# Patient Record
Sex: MALE | Race: WHITE | NOT HISPANIC OR LATINO | Employment: OTHER | URBAN - METROPOLITAN AREA
[De-identification: names, ages, dates, MRNs, and addresses within clinical notes are randomized per-mention and may not be internally consistent; named-entity substitution may affect disease eponyms.]

---

## 2018-11-05 ENCOUNTER — HOSPITAL ENCOUNTER (INPATIENT)
Facility: OTHER | Age: 73
LOS: 1 days | Discharge: HOME | DRG: 387 | End: 2018-11-06
Attending: EMERGENCY MEDICINE | Admitting: INTERNAL MEDICINE
Payer: MEDICARE

## 2018-11-05 DIAGNOSIS — I10 ESSENTIAL HYPERTENSION: ICD-10-CM

## 2018-11-05 DIAGNOSIS — K50.90 EXACERBATION OF CROHN'S DISEASE WITHOUT COMPLICATION: ICD-10-CM

## 2018-11-05 DIAGNOSIS — K56.600 PARTIAL SMALL BOWEL OBSTRUCTION: Primary | ICD-10-CM

## 2018-11-05 DIAGNOSIS — K50.919 EXACERBATION OF CROHN'S DISEASE WITH COMPLICATION: ICD-10-CM

## 2018-11-05 PROBLEM — E87.6 HYPOKALEMIA: Status: ACTIVE | Noted: 2018-11-05

## 2018-11-05 LAB
ALBUMIN SERPL BCP-MCNC: 4.3 G/DL
ALP SERPL-CCNC: 77 U/L
ALT SERPL W/O P-5'-P-CCNC: 35 U/L
ANION GAP SERPL CALC-SCNC: 13 MMOL/L
AST SERPL-CCNC: 32 U/L
BASOPHILS # BLD AUTO: 0.01 K/UL
BASOPHILS NFR BLD: 0.1 %
BILIRUB SERPL-MCNC: 1 MG/DL
BILIRUB UR QL STRIP: NEGATIVE
BUN SERPL-MCNC: 12 MG/DL
CALCIUM SERPL-MCNC: 10.4 MG/DL
CHLORIDE SERPL-SCNC: 99 MMOL/L
CLARITY UR: CLEAR
CO2 SERPL-SCNC: 30 MMOL/L
COLOR UR: YELLOW
CREAT SERPL-MCNC: 1.2 MG/DL
CRP SERPL-MCNC: 2.82 MG/L
DIFFERENTIAL METHOD: ABNORMAL
EOSINOPHIL # BLD AUTO: 0 K/UL
EOSINOPHIL NFR BLD: 0.1 %
ERYTHROCYTE [DISTWIDTH] IN BLOOD BY AUTOMATED COUNT: 13.2 %
ERYTHROCYTE [SEDIMENTATION RATE] IN BLOOD: 21 MM/HR
EST. GFR  (AFRICAN AMERICAN): >60 ML/MIN/1.73 M^2
EST. GFR  (NON AFRICAN AMERICAN): >60 ML/MIN/1.73 M^2
GLUCOSE SERPL-MCNC: 129 MG/DL
GLUCOSE UR QL STRIP: NEGATIVE
HCT VFR BLD AUTO: 42 %
HGB BLD-MCNC: 14.7 G/DL
HGB UR QL STRIP: NEGATIVE
KETONES UR QL STRIP: NEGATIVE
LEUKOCYTE ESTERASE UR QL STRIP: NEGATIVE
LIPASE SERPL-CCNC: 38 U/L
LYMPHOCYTES # BLD AUTO: 1.8 K/UL
LYMPHOCYTES NFR BLD: 14.6 %
MCH RBC QN AUTO: 32.5 PG
MCHC RBC AUTO-ENTMCNC: 35 G/DL
MCV RBC AUTO: 93 FL
MONOCYTES # BLD AUTO: 0.7 K/UL
MONOCYTES NFR BLD: 5.2 %
NEUTROPHILS # BLD AUTO: 10 K/UL
NEUTROPHILS NFR BLD: 79.8 %
NITRITE UR QL STRIP: NEGATIVE
PH UR STRIP: 7 [PH] (ref 5–8)
PLATELET # BLD AUTO: 206 K/UL
PMV BLD AUTO: 12 FL
POTASSIUM SERPL-SCNC: 3.3 MMOL/L
PROT SERPL-MCNC: 8.4 G/DL
PROT UR QL STRIP: NEGATIVE
RBC # BLD AUTO: 4.52 M/UL
SODIUM SERPL-SCNC: 142 MMOL/L
SP GR UR STRIP: 1.01 (ref 1–1.03)
URN SPEC COLLECT METH UR: NORMAL
UROBILINOGEN UR STRIP-ACNC: NEGATIVE EU/DL
WBC # BLD AUTO: 12.55 K/UL

## 2018-11-05 PROCEDURE — 99285 EMERGENCY DEPT VISIT HI MDM: CPT | Mod: 25

## 2018-11-05 PROCEDURE — 96375 TX/PRO/DX INJ NEW DRUG ADDON: CPT

## 2018-11-05 PROCEDURE — 99223 1ST HOSP IP/OBS HIGH 75: CPT | Mod: AI,,, | Performed by: INTERNAL MEDICINE

## 2018-11-05 PROCEDURE — 83690 ASSAY OF LIPASE: CPT

## 2018-11-05 PROCEDURE — 85025 COMPLETE CBC W/AUTO DIFF WBC: CPT

## 2018-11-05 PROCEDURE — 96374 THER/PROPH/DIAG INJ IV PUSH: CPT

## 2018-11-05 PROCEDURE — 86141 C-REACTIVE PROTEIN HS: CPT

## 2018-11-05 PROCEDURE — 85651 RBC SED RATE NONAUTOMATED: CPT

## 2018-11-05 PROCEDURE — 25000003 PHARM REV CODE 250: Performed by: EMERGENCY MEDICINE

## 2018-11-05 PROCEDURE — 80053 COMPREHEN METABOLIC PANEL: CPT

## 2018-11-05 PROCEDURE — 25000003 PHARM REV CODE 250: Performed by: INTERNAL MEDICINE

## 2018-11-05 PROCEDURE — 36415 COLL VENOUS BLD VENIPUNCTURE: CPT

## 2018-11-05 PROCEDURE — 81003 URINALYSIS AUTO W/O SCOPE: CPT

## 2018-11-05 PROCEDURE — 96361 HYDRATE IV INFUSION ADD-ON: CPT

## 2018-11-05 PROCEDURE — S0030 INJECTION, METRONIDAZOLE: HCPCS | Performed by: INTERNAL MEDICINE

## 2018-11-05 PROCEDURE — 63600175 PHARM REV CODE 636 W HCPCS: Performed by: EMERGENCY MEDICINE

## 2018-11-05 PROCEDURE — 25500020 PHARM REV CODE 255: Performed by: EMERGENCY MEDICINE

## 2018-11-05 PROCEDURE — 96376 TX/PRO/DX INJ SAME DRUG ADON: CPT

## 2018-11-05 PROCEDURE — 63600175 PHARM REV CODE 636 W HCPCS: Performed by: INTERNAL MEDICINE

## 2018-11-05 PROCEDURE — 94761 N-INVAS EAR/PLS OXIMETRY MLT: CPT

## 2018-11-05 PROCEDURE — 11000001 HC ACUTE MED/SURG PRIVATE ROOM

## 2018-11-05 RX ORDER — ONDANSETRON 2 MG/ML
4 INJECTION INTRAMUSCULAR; INTRAVENOUS
Status: COMPLETED | OUTPATIENT
Start: 2018-11-05 | End: 2018-11-05

## 2018-11-05 RX ORDER — BENAZEPRIL HYDROCHLORIDE 10 MG/1
10 TABLET ORAL DAILY
Status: DISCONTINUED | OUTPATIENT
Start: 2018-11-05 | End: 2018-11-06 | Stop reason: HOSPADM

## 2018-11-05 RX ORDER — ENOXAPARIN SODIUM 100 MG/ML
40 INJECTION SUBCUTANEOUS EVERY 24 HOURS
Status: DISCONTINUED | OUTPATIENT
Start: 2018-11-05 | End: 2018-11-06

## 2018-11-05 RX ORDER — ONDANSETRON 2 MG/ML
4 INJECTION INTRAMUSCULAR; INTRAVENOUS EVERY 6 HOURS PRN
Status: DISCONTINUED | OUTPATIENT
Start: 2018-11-05 | End: 2018-11-06 | Stop reason: HOSPADM

## 2018-11-05 RX ORDER — DEXTROSE MONOHYDRATE, SODIUM CHLORIDE, AND POTASSIUM CHLORIDE 50; 1.49; 4.5 G/1000ML; G/1000ML; G/1000ML
INJECTION, SOLUTION INTRAVENOUS CONTINUOUS
Status: DISCONTINUED | OUTPATIENT
Start: 2018-11-05 | End: 2018-11-06

## 2018-11-05 RX ORDER — METHYLPREDNISOLONE SOD SUCC 125 MG
125 VIAL (EA) INJECTION
Status: COMPLETED | OUTPATIENT
Start: 2018-11-05 | End: 2018-11-05

## 2018-11-05 RX ORDER — MORPHINE SULFATE 2 MG/ML
2 INJECTION, SOLUTION INTRAMUSCULAR; INTRAVENOUS EVERY 4 HOURS PRN
Status: DISCONTINUED | OUTPATIENT
Start: 2018-11-05 | End: 2018-11-06

## 2018-11-05 RX ORDER — ALPRAZOLAM 0.5 MG/1
0.5 TABLET ORAL NIGHTLY PRN
Status: DISCONTINUED | OUTPATIENT
Start: 2018-11-05 | End: 2018-11-06 | Stop reason: HOSPADM

## 2018-11-05 RX ORDER — CIPROFLOXACIN 2 MG/ML
400 INJECTION, SOLUTION INTRAVENOUS
Status: DISCONTINUED | OUTPATIENT
Start: 2018-11-05 | End: 2018-11-06

## 2018-11-05 RX ORDER — METRONIDAZOLE 500 MG/100ML
500 INJECTION, SOLUTION INTRAVENOUS
Status: DISCONTINUED | OUTPATIENT
Start: 2018-11-05 | End: 2018-11-06

## 2018-11-05 RX ORDER — AMLODIPINE BESYLATE 5 MG/1
10 TABLET ORAL DAILY
Status: DISCONTINUED | OUTPATIENT
Start: 2018-11-05 | End: 2018-11-06 | Stop reason: HOSPADM

## 2018-11-05 RX ORDER — MORPHINE SULFATE 4 MG/ML
4 INJECTION, SOLUTION INTRAMUSCULAR; INTRAVENOUS
Status: COMPLETED | OUTPATIENT
Start: 2018-11-05 | End: 2018-11-05

## 2018-11-05 RX ADMIN — METHYLPREDNISOLONE SODIUM SUCCINATE 40 MG: 40 INJECTION, POWDER, FOR SOLUTION INTRAMUSCULAR; INTRAVENOUS at 01:11

## 2018-11-05 RX ADMIN — METHYLPREDNISOLONE SODIUM SUCCINATE 40 MG: 40 INJECTION, POWDER, FOR SOLUTION INTRAMUSCULAR; INTRAVENOUS at 09:11

## 2018-11-05 RX ADMIN — CIPROFLOXACIN 400 MG: 2 INJECTION, SOLUTION INTRAVENOUS at 02:11

## 2018-11-05 RX ADMIN — DEXTROSE MONOHYDRATE, SODIUM CHLORIDE, AND POTASSIUM CHLORIDE: 50; 4.5; 1.49 INJECTION, SOLUTION INTRAVENOUS at 08:11

## 2018-11-05 RX ADMIN — BENAZEPRIL HYDROCHLORIDE 10 MG: 10 TABLET, FILM COATED ORAL at 02:11

## 2018-11-05 RX ADMIN — METRONIDAZOLE 500 MG: 500 INJECTION, SOLUTION INTRAVENOUS at 08:11

## 2018-11-05 RX ADMIN — MORPHINE SULFATE 4 MG: 4 INJECTION, SOLUTION INTRAMUSCULAR; INTRAVENOUS at 03:11

## 2018-11-05 RX ADMIN — METHYLPREDNISOLONE SODIUM SUCCINATE 125 MG: 125 INJECTION, POWDER, FOR SOLUTION INTRAMUSCULAR; INTRAVENOUS at 03:11

## 2018-11-05 RX ADMIN — SODIUM CHLORIDE 1000 ML: 0.9 INJECTION, SOLUTION INTRAVENOUS at 03:11

## 2018-11-05 RX ADMIN — ONDANSETRON 4 MG: 2 INJECTION INTRAMUSCULAR; INTRAVENOUS at 03:11

## 2018-11-05 RX ADMIN — MESALAMINE 1000 MG: 250 CAPSULE ORAL at 05:11

## 2018-11-05 RX ADMIN — ENOXAPARIN SODIUM 40 MG: 100 INJECTION SUBCUTANEOUS at 05:11

## 2018-11-05 RX ADMIN — DEXTROSE MONOHYDRATE, SODIUM CHLORIDE, AND POTASSIUM CHLORIDE: 50; 4.5; 1.49 INJECTION, SOLUTION INTRAVENOUS at 09:11

## 2018-11-05 RX ADMIN — AMLODIPINE BESYLATE 10 MG: 5 TABLET ORAL at 02:11

## 2018-11-05 RX ADMIN — MESALAMINE 1000 MG: 250 CAPSULE ORAL at 09:11

## 2018-11-05 RX ADMIN — METRONIDAZOLE 500 MG: 500 INJECTION, SOLUTION INTRAVENOUS at 01:11

## 2018-11-05 RX ADMIN — IOHEXOL 75 ML: 350 INJECTION, SOLUTION INTRAVENOUS at 04:11

## 2018-11-05 RX ADMIN — MORPHINE SULFATE 4 MG: 4 INJECTION, SOLUTION INTRAMUSCULAR; INTRAVENOUS at 05:11

## 2018-11-05 NOTE — HPI
Mr. Huffman is a 73 year old man with Crohn disease who presented with pain in his upper abdomen for one day.  He had 3 loose BMs on the day before presentation which is typical for him, but the pain was consistent with a flare.  Patient travels with his prednisone and Flagyl prescribed by his gastroenterologist for flares, but he was unable to keep down the pills and had to go to the ED, where he was given IV steroids, fluids and admitted on IV ciprofloxacin and metronidazole.  CT of the abdomen was done showing multiple renal cysts, sequelae of prior bowel resections and possible involved enteritis/ partial small bowel obstruction.    Medical history includes Crohn disease as above, for which he has been on Humira for the last 12 years as well as mesalamine.  He usually gets a flare once/year.  He has psoriasis (which has resolved since he was started on Humira) and ankylosing spondylitis.  He has had 2 bowel resections involving the colon and terminal ileum.

## 2018-11-05 NOTE — ED TRIAGE NOTES
"Pt presents with abdominal pain, onset 1800 tonight. Pt points to center of abdomen to locate pain.pt describes pain as cramping "like a bad gas pain" occurring every 5 minutes. Pt reports it feels like a chrons flare. Pt denies blood in stools. + nausea, emesis X 1. Denies diarrhea. Pt reports hx of abdominal surgery for bowel obstructions. Pt denies taking any medications for pain. Pt is well appearing, pt in NAD  "

## 2018-11-05 NOTE — ED PROVIDER NOTES
Encounter Date: 11/5/2018    SCRIBE #1 NOTE: Josh LEWIS, am scribing for, and in the presence of, Dr. Nieves.       History     Chief Complaint   Patient presents with    Abdominal Pain     Hx crohn's disease, having a flare up since 6 pm last night     Seen by provider: 2:51 AM    Patient is a 73 y.o. male with a history of HTN and crohn's disease who presents to the ED with complaint of abdominal pain, which began approximately 8-10 hours ago. He reports intermittent central abdominal pain with waves of pain initially occurring every 2-3 minutes, but now occurring every 6-7 minutes. He reports associated nausea and one episode of emesis, as well as mild abdominal distention. He has not been passing any flatus.  He denies fever or diarrhea or urinary symptoms. He states his symptoms feel typical of his Crohn's flare up's. He reports 7-8 flare ups over the past 8 years, occurring about once a year. He states he is not typically admitted as his symptoms usually improve with morphine and IV prednisone. He is on Humira for his crohn's. He reports two past bowel resections. He has no additional complaints at this time.      The history is provided by the patient.     Review of patient's allergies indicates:  No Known Allergies  Past Medical History:   Diagnosis Date    Arthritis     Blood transfusion     Crohn's disease     Hypertension      Past Surgical History:   Procedure Laterality Date    APPENDECTOMY      COLON SURGERY      EXPLORATORY-LAPAROTOMY N/A 1/3/2013    Performed by Elian Carson MD at [facility] OR [address] [place] [id number]    HEMICOLECTOMY, RIGHT Right 1/3/2013    Performed by Elian Carson MD at [facility] OR [address] [place] [id number]     No family history on file.  Social History     Tobacco Use    Smoking status: Never Smoker   Substance Use Topics    Alcohol use: No    Drug use: No     Review of Systems   Constitutional: Negative for chills and fever.   Respiratory: Negative for shortness of breath.    Cardiovascular: Negative  for chest pain.   Gastrointestinal: Positive for abdominal pain, nausea and vomiting. Negative for blood in stool and diarrhea.   Genitourinary: Negative for dysuria, frequency and hematuria.   Musculoskeletal: Negative for back pain.   Skin: Negative for rash.   Allergic/Immunologic: Positive for immunocompromised state (on Humira).   Neurological: Negative for dizziness, weakness and headaches.   Psychiatric/Behavioral: Negative for confusion.       Physical Exam     Initial Vitals [11/05/18 0237]   BP Pulse Resp Temp SpO2   (!) 164/94 90 16 98 °F (36.7 °C) 97 %      MAP       --         Physical Exam    Nursing note and vitals reviewed.  Constitutional: He appears well-developed and well-nourished. He is not diaphoretic. No distress.   HENT:   Head: Normocephalic and atraumatic.   Eyes: Conjunctivae and EOM are normal.   Neck: Normal range of motion. Neck supple.   Cardiovascular: Normal rate, regular rhythm and normal heart sounds.   Pulmonary/Chest: Breath sounds normal. No respiratory distress. He has no wheezes. He has no rales.   Abdominal: Soft. Bowel sounds are normal. He exhibits no distension. There is tenderness (mild diffuse abdominal tenderness). There is no rebound and no guarding.   Musculoskeletal: Normal range of motion.   Neurological: He is alert and oriented to person, place, and time.   Ambulatory with steady gait.   Skin: Skin is warm and dry. Capillary refill takes less than 2 seconds.   Psychiatric: He has a normal mood and affect. His behavior is normal. Thought content normal.         ED Course   Procedures  Labs Reviewed   COMPREHENSIVE METABOLIC PANEL - Abnormal; Notable for the following components:       Result Value    Potassium 3.3 (*)     CO2 30 (*)     Glucose 129 (*)     All other components within normal limits   CBC W/ AUTO DIFFERENTIAL - Abnormal; Notable for the following components:    RBC 4.52 (*)     MCH 32.5 (*)     Gran # (ANC) 10.0 (*)     Gran% 79.8 (*)     Lymph% 14.6  (*)     All other components within normal limits   LIPASE   URINALYSIS          Imaging Results          CT Abdomen Pelvis With Contrast (Final result)  Result time 11/05/18 05:14:18    Final result by Bridgette Walton MD (11/05/18 05:14:18)                 Impression:      1. Postoperative change of right hemicolectomy with wall thickening and slight adjacent inflammatory change involving a distal small bowel loop at the right lower anastomotic site with upstream dilatation of distal small bowel loops as detailed above. Findings are concerning for component of enteritis in this patient with history of Crohn's disease with possible developing partial small bowel obstruction at the anastomotic site secondary to inflammatory change and/or anastomotic stricture.  2. Exophytic 1.6 cm right renal hypodensity which does not demonstrate imaging characteristics of a simple renal cyst.  Further evaluation with nonemergent renal ultrasound advised.  Multiple additional renal cortical hypodensities many of which are too small to definitively characterize larger which likely represent renal cysts.  3. Cholelithiasis.  4. Simple hepatic cyst and multiple hepatic hypodensities too small to definitively characterize.  5. Calcific atherosclerosis of the coronary vessels and aorta.      Electronically signed by: Birdgette Walton MD  Date:    11/05/2018  Time:    05:14             Narrative:    EXAMINATION:  CT ABDOMEN PELVIS WITH CONTRAST    CLINICAL HISTORY:  abdominal pain, hx of crohn's and sbo;    TECHNIQUE:  Low dose axial images, sagittal and coronal reformations were obtained from the lung bases to the pubic symphysis following the IV administration of 75 mL of Omnipaque 350 .  Oral contrast was not given.    COMPARISON:  CT abdomen and pelvis 01/09/2013    FINDINGS:  There is bibasilar atelectatic change/scarring.  There is a small calcified granuloma in the left lower lobe.  The visualized portions of the heart and pericardium  demonstrate calcific atherosclerosis of the coronary vessels.    The liver is normal in size with a small right hepatic lobe cyst.  There are multiple additional subcentimeter hypodense foci throughout the hepatic parenchyma too small to definitively characterize.  There are stable punctate left hepatic lobe calcifications.  The gallbladder demonstrates no evidence of radiopaque stones or pericholecystic inflammatory change.  The spleen, pancreas, and adrenal glands are unremarkable.    There is bilateral renal cortical irregularity suggestive of parenchymal scarring.  There is a 1.6 cm exophytic right renal cortical hypodensity projecting from the medial aspect of the right kidney which does not demonstrate imaging characteristics of a simple cyst.  Further evaluation with nonemergent ultrasound advised.  There are additional multiple low attenuating renal cortical hypodensities, some of which are too small to definitively characterize and larger of which likely represent renal cysts.  There is no hydronephrosis.  The urinary bladder and prostate are unremarkable.    There is postoperative change of prior right hemicolectomy with anastomotic suture line in the right lower quadrant.  There is apparent wall thickening slight inflammatory involving the small bowel at the anastomotic site.  There is upstream dilatation of several loops of distal small bowel measuring up to 2.5 cm in maximal diameter with slight adjacent inflammatory change.  There is no ascites, free intraperitoneal air or portal venous gas.  There is diverticulosis without evidence of acute diverticulitis.    The abdominal aorta is nonaneurysmal with atherosclerosis.  No significant lymphadenopathy.  The visualized osseous structures demonstrate degenerative change without acute abnormality.  Extraperitoneal soft tissues are unremarkable.                                 Medical Decision Making:   History:   Old Medical Records: I decided to obtain old  medical records.  Old Records Summarized: other records and records from clinic visits.  Initial Assessment:   2:51AM:  Patient is a 73-year-old male who presents to the emergency room with abdominal pain and nausea/vomiting.  Patient appears well, nontoxic.  Patient does have a history of Crohn's, states that this feels like his typical Crohn's flare.  Patient states he usually improves with analgesia and steroids.  Patient's abdomen is slightly distended, mildly diffusely tender.  Will plan for labs, imaging, will continue to follow and reassess.  Clinical Tests:   Lab Tests: Ordered and Reviewed  Radiological Study: Ordered and Reviewed  Other:   I have discussed this case with another health care provider.    5:35AM:  Patient's CT is concerning fo developing partial bowel obstruction.  His labs otherwise unremarkable. Given these findings, will plan to admit for further observation and management.  I updated the patient regarding results along with plan for admission.  Patient agreeable to plan all questions answered.    5:43 AM - Discussed the case with Dr. Elizalde of general surgery who requests the patient be admitted to the hospitalist and he will consult on the case.    6:32 AM:  I discussed pt with Dr. Alvarez who will plan to admit under Dr. Hill.  All questions answered.              Scribe Attestation:   Scribe #1: I performed the above scribed service and the documentation accurately describes the services I performed. I attest to the accuracy of the note.    Attending Attestation:           Physician Attestation for Scribe:  Physician Attestation Statement for Scribe #1: I, Dr. Nieves, reviewed documentation, as scribed by Josh Dominguez in my presence, and it is both accurate and complete.                    Clinical Impression:     1. Partial small bowel obstruction                                Rosy Nieves MD  11/05/18 0634

## 2018-11-05 NOTE — SUBJECTIVE & OBJECTIVE
Past Medical History:   Diagnosis Date    Arthritis     Blood transfusion     Crohn's disease     Hypertension        Past Surgical History:   Procedure Laterality Date    APPENDECTOMY      COLON SURGERY      EXPLORATORY-LAPAROTOMY N/A 1/3/2013    Performed by [facility]ian [facility] MD at [facility] OR 2ND [facility]    HEMICOLECTOMY, RIGHT Right 1/3/2013    Performed by Elian Carson MD at [facility] OR 2[facility]       Review of patient's allergies indicates:  No Known Allergies    No current facility-administered medications on file prior to encounter.      Current Outpatient Medications on File Prior to Encounter   Medication Sig    adalimumab (HUMIRA PEN) 40 mg/0.8 mL PnKt Inject into the skin.      allopurinol (ZYLOPRIM) 100 MG tablet Take 100 mg by mouth once daily.      amlodipine-benazepril 10-20mg (LOTREL) 10-20 mg per capsule Take 1 capsule by mouth once daily.      hydrochlorothiazide (HYDRODIURIL) 25 MG tablet Take 25 mg by mouth once daily.      levothyroxine (SYNTHROID) 25 MCG tablet Take 25 mcg by mouth once daily.    MESALAMINE (PENTASA ORAL) Take 1,000 mg by mouth.      alprazolam (XANAX) 0.5 MG tablet Take 1 tablet (0.5 mg total) by mouth nightly as needed.    [DISCONTINUED] metronidazole (FLAGYL) 500 MG tablet Take 1 tablet (500 mg total) by mouth every 12 (twelve) hours.     Family History     None        Tobacco Use    Smoking status: Never Smoker   Substance and Sexual Activity    Alcohol use: No    Drug use: No    Sexual activity: No     Partners: Female     Review of Systems   Constitutional: Negative for chills and fever.   HENT: Negative for trouble swallowing.    Respiratory: Negative for cough and shortness of breath.    Cardiovascular: Negative for chest pain and leg swelling.   Gastrointestinal: Positive for abdominal pain, diarrhea, nausea and vomiting. Negative for blood in stool.   Genitourinary: Negative for dysuria and hematuria.   Musculoskeletal: Negative for gait problem.   Skin:  Negative for rash.   Neurological: Negative for numbness and headaches.   Psychiatric/Behavioral: Negative for agitation and confusion.     Objective:     Vital Signs (Most Recent):  Temp: 98 °F (36.7 °C) (11/05/18 1132)  Pulse: 78 (11/05/18 1132)  Resp: 20 (11/05/18 1132)  BP: (!) 162/78 (11/05/18 1132)  SpO2: 96 % (11/05/18 1132) Vital Signs (24h Range):  Temp:  [97.7 °F (36.5 °C)-98 °F (36.7 °C)] 98 °F (36.7 °C)  Pulse:  [72-96] 78  Resp:  [16-20] 20  SpO2:  [95 %-99 %] 96 %  BP: (129-180)/(73-94) 162/78     Weight: 77.1 kg (170 lb)  Body mass index is 25.85 kg/m².    Physical Exam   Constitutional: He is oriented to person, place, and time. He appears well-developed. No distress.   HENT:   Head: Normocephalic and atraumatic.   Eyes: EOM are normal. Pupils are equal, round, and reactive to light.   Neck: Normal range of motion. Neck supple.   Cardiovascular: Normal rate and regular rhythm.   Pulmonary/Chest: Effort normal and breath sounds normal.   Abdominal: Soft. Bowel sounds are normal.   Mild distention in right upper abdomen, healed abdominal midline scar, tender to palpation in right upper abdomen and epigastric area.   Musculoskeletal: Normal range of motion. He exhibits no edema.   Neurological: He is alert and oriented to person, place, and time. No cranial nerve deficit.   Skin: Skin is warm.   Psychiatric: He has a normal mood and affect.         CRANIAL NERVES     CN III, IV, VI   Pupils are equal, round, and reactive to light.  Extraocular motions are normal.        Significant Labs:   CBC:   Recent Labs   Lab 11/05/18  0248   WBC 12.55   HGB 14.7   HCT 42.0        CMP:   Recent Labs   Lab 11/05/18 0248      K 3.3*   CL 99   CO2 30*   *   BUN 12   CREATININE 1.2   CALCIUM 10.4   PROT 8.4   ALBUMIN 4.3   BILITOT 1.0   ALKPHOS 77   AST 32   ALT 35   ANIONGAP 13   EGFRNONAA >60       Significant Imaging: I have reviewed all pertinent imaging results/findings within the past 24  hours.

## 2018-11-05 NOTE — ASSESSMENT & PLAN NOTE
Will check esr/crp  Patient feels much better  Will start solu medrol 40 mg q8 hrs and cipro and flagyl for any infectious component  Check stool cx  Ice chips for now  Monitor labs  Prn pain and nausea meds.  Continue mesalamine  Takes humira every 2 weeks , next due on Thursday  Follows gi at [place]

## 2018-11-05 NOTE — CONSULTS
73yoWM admitted with Crohn's enteritis. Feels much better since admit. No pain now.  S/P bowel resection X2  In NAD.  Afeb  VSS  Abd soft, nontender.  WBC 12.5  Hct 42  IMP: Crohn's enteritis by CT          Benign abd  REC: Medical treatment

## 2018-11-05 NOTE — ED NOTES
Report received from Ashley. PT is AAOx4. RR are even and unlabored. SKin is warm and dry. Pt denies any pain or needs at the moment. VSS. Wife is at the bedside. Bed is locked and in lowest position with side rails up x2. Call light is within reach.

## 2018-11-05 NOTE — ED NOTES
NEURO: Pt AAO x 4. Behavior and speech appropriate to situation.   CARDIAC: Regular rhythm auscultated  RESPIRATORY: Respirations even and unlabored. Breath sounds clear to all lung fields.   MUSCULOSKELETAL: Active ROM noted to extremities  GASTRO: abdomen soft, mildly tender to palpation to center abdomen

## 2018-11-05 NOTE — H&P
[facility] Hospital Medicine  History & Physical    Patient Name: Vince Huffman  MRN: [id number]  Admission Date: 11/5/2018  Attending Physician: Qi Hill MD   Primary Care Provider: Reno Burris MD         Patient information was obtained from patient, past medical records and ER records.     Subjective:     Principal Problem:Exacerbation of Crohn's disease    Chief Complaint:   Chief Complaint   Patient presents with    Abdominal Pain     Hx crohn's disease, having a flare up since 6 pm last night        HPI: 73 year old male with past medical history of crohns disease s/p 2 partial resections, last in 2013, Htn, Hypothyroidism came in with c/o abdominal pain since yesterday evening.It was in upper abdomen , gradually worsening , got worse at 2 and decided to come to hospital.He had nausea and vomiting.Had 3 loose bowel movements yesterday which is usual for him, denies any bleeding.Denies fever, chills, chest pain.He says he usually has prednisone at home and flagyl from his gi physician in [place] to use for his flares but since he could not keep anything down , he could not take those meds.He was given iv steroids in er with pain medication , currently pain much improved as well as nausea and vomiting.His WBC was normal on admit, he was afebrile.His last flare was 2 years ago.Patient from [facility], visiting here.    Ct abdomen and pelvis showed Postoperative change of right hemicolectomy with wall thickening and slight adjacent inflammatory change involving a distal small bowel loop at the right lower anastomotic site with upstream dilatation of distal small bowel loops as detailed above. Findings are concerning for component of enteritis in this patient with history of Crohn's disease with possible developing partial small bowel obstruction at the anastomotic site secondary to inflammatory change and/or anastomotic stricture.  Exophytic 1.6 cm right renal hypodensity which does  not demonstrate imaging characteristics of a simple renal cyst.  Further evaluation with nonemergent renal ultrasound advised.  Multiple additional renal cortical hypodensities many of which are too small to definitively characterize larger which likely represent renal cysts.      Past Medical History:   Diagnosis Date    Arthritis     Blood transfusion     Crohn's disease     Hypertension        Past Surgical History:   Procedure Laterality Date    APPENDECTOMY      COLON SURGERY      EXPLORATORY-LAPAROTOMY N/A 1/3/2013    Performed by Elian [facility] MD at [facility] OR 2ND [facility]    HEMICOLECTOMY, RIGHT Right 1/3/2013    Performed by Elian Carson MD at [facility] OR [facility]       Review of patient's allergies indicates:  No Known Allergies    No current facility-administered medications on file prior to encounter.      Current Outpatient Medications on File Prior to Encounter   Medication Sig    adalimumab (HUMIRA PEN) 40 mg/0.8 mL PnKt Inject into the skin.      allopurinol (ZYLOPRIM) 100 MG tablet Take 100 mg by mouth once daily.      amlodipine-benazepril 10-20mg (LOTREL) 10-20 mg per capsule Take 1 capsule by mouth once daily.      hydrochlorothiazide (HYDRODIURIL) 25 MG tablet Take 25 mg by mouth once daily.      levothyroxine (SYNTHROID) 25 MCG tablet Take 25 mcg by mouth once daily.    MESALAMINE (PENTASA ORAL) Take 1,000 mg by mouth.      alprazolam (XANAX) 0.5 MG tablet Take 1 tablet (0.5 mg total) by mouth nightly as needed.    [DISCONTINUED] metronidazole (FLAGYL) 500 MG tablet Take 1 tablet (500 mg total) by mouth every 12 (twelve) hours.     Family History     None        Tobacco Use    Smoking status: Never Smoker   Substance and Sexual Activity    Alcohol use: No    Drug use: No    Sexual activity: No     Partners: Female     Review of Systems   Constitutional: Negative for chills and fever.   HENT: Negative for trouble swallowing.    Respiratory: Negative for cough and shortness of breath.     Cardiovascular: Negative for chest pain and leg swelling.   Gastrointestinal: Positive for abdominal pain, diarrhea, nausea and vomiting. Negative for blood in stool.   Genitourinary: Negative for dysuria and hematuria.   Musculoskeletal: Negative for gait problem.   Skin: Negative for rash.   Neurological: Negative for numbness and headaches.   Psychiatric/Behavioral: Negative for agitation and confusion.     Objective:     Vital Signs (Most Recent):  Temp: 98 °F (36.7 °C) (11/05/18 1132)  Pulse: 78 (11/05/18 1132)  Resp: 20 (11/05/18 1132)  BP: (!) 162/78 (11/05/18 1132)  SpO2: 96 % (11/05/18 1132) Vital Signs (24h Range):  Temp:  [97.7 °F (36.5 °C)-98 °F (36.7 °C)] 98 °F (36.7 °C)  Pulse:  [72-96] 78  Resp:  [16-20] 20  SpO2:  [95 %-99 %] 96 %  BP: (129-180)/(73-94) 162/78     Weight: 77.1 kg (170 lb)  Body mass index is 25.85 kg/m².    Physical Exam   Constitutional: He is oriented to person, place, and time. He appears well-developed. No distress.   HENT:   Head: Normocephalic and atraumatic.   Eyes: EOM are normal. Pupils are equal, round, and reactive to light.   Neck: Normal range of motion. Neck supple.   Cardiovascular: Normal rate and regular rhythm.   Pulmonary/Chest: Effort normal and breath sounds normal.   Abdominal: Soft. Bowel sounds are normal.   Mild distention in right upper abdomen, healed abdominal midline scar, tender to palpation in right upper abdomen and epigastric area.   Musculoskeletal: Normal range of motion. He exhibits no edema.   Neurological: He is alert and oriented to person, place, and time. No cranial nerve deficit.   Skin: Skin is warm.   Psychiatric: He has a normal mood and affect.         CRANIAL NERVES     CN III, IV, VI   Pupils are equal, round, and reactive to light.  Extraocular motions are normal.        Significant Labs:   CBC:   Recent Labs   Lab 11/05/18  0248   WBC 12.55   HGB 14.7   HCT 42.0        CMP:   Recent Labs   Lab 11/05/18 0248      K 3.3*    CL 99   CO2 30*   *   BUN 12   CREATININE 1.2   CALCIUM 10.4   PROT 8.4   ALBUMIN 4.3   BILITOT 1.0   ALKPHOS 77   AST 32   ALT 35   ANIONGAP 13   EGFRNONAA >60       Significant Imaging: I have reviewed all pertinent imaging results/findings within the past 24 hours.    Assessment/Plan:     * Exacerbation of Crohn's disease    Will check esr/crp  Patient feels much better  Will start solu medrol 40 mg q8 hrs and cipro and flagyl for any infectious component  Check stool cx  Ice chips for now  Monitor labs  Prn pain and nausea meds.  Continue mesalamine  Takes humira every 2 weeks , next due on Thursday  Follows gi at [place]       Essential hypertension      Continue norvasc, benazepril  Monitor      Hypokalemia    Replacing in ivf  Monitor labs     Partial small bowel obstruction    Likely due to crohns exacerbation  Will treat medically and monitor   Appreciate sx recs         Abnormal renal exophytic hypodensity on ct  Check renal us     VTE Risk Mitigation (From admission, onward)        Ordered     IP VTE HIGH RISK PATIENT  Once      11/05/18 0816     Place sequential compression device  Until discontinued      11/05/18 0816             Qi Hill MD  Department of Hospital Medicine   [facility]

## 2018-11-06 VITALS
DIASTOLIC BLOOD PRESSURE: 70 MMHG | RESPIRATION RATE: 20 BRPM | HEART RATE: 97 BPM | WEIGHT: 167.75 LBS | BODY MASS INDEX: 25.42 KG/M2 | HEIGHT: 68 IN | OXYGEN SATURATION: 94 % | TEMPERATURE: 98 F | SYSTOLIC BLOOD PRESSURE: 133 MMHG

## 2018-11-06 LAB
ALBUMIN SERPL BCP-MCNC: 3.5 G/DL
ALP SERPL-CCNC: 62 U/L
ALT SERPL W/O P-5'-P-CCNC: 24 U/L
ANION GAP SERPL CALC-SCNC: 10 MMOL/L
AST SERPL-CCNC: 22 U/L
BASOPHILS # BLD AUTO: 0 K/UL
BASOPHILS NFR BLD: 0 %
BILIRUB SERPL-MCNC: 0.8 MG/DL
BUN SERPL-MCNC: 12 MG/DL
CALCIUM SERPL-MCNC: 9.4 MG/DL
CHLORIDE SERPL-SCNC: 104 MMOL/L
CO2 SERPL-SCNC: 27 MMOL/L
CREAT SERPL-MCNC: 0.9 MG/DL
DIFFERENTIAL METHOD: ABNORMAL
EOSINOPHIL # BLD AUTO: 0 K/UL
EOSINOPHIL NFR BLD: 0 %
ERYTHROCYTE [DISTWIDTH] IN BLOOD BY AUTOMATED COUNT: 13.3 %
EST. GFR  (AFRICAN AMERICAN): >60 ML/MIN/1.73 M^2
EST. GFR  (NON AFRICAN AMERICAN): >60 ML/MIN/1.73 M^2
GLUCOSE SERPL-MCNC: 109 MG/DL
HCT VFR BLD AUTO: 38 %
HGB BLD-MCNC: 13.3 G/DL
LYMPHOCYTES # BLD AUTO: 1.4 K/UL
LYMPHOCYTES NFR BLD: 9.7 %
MAGNESIUM SERPL-MCNC: 1.8 MG/DL
MCH RBC QN AUTO: 32.5 PG
MCHC RBC AUTO-ENTMCNC: 35 G/DL
MCV RBC AUTO: 93 FL
MONOCYTES # BLD AUTO: 0.6 K/UL
MONOCYTES NFR BLD: 4.4 %
NEUTROPHILS # BLD AUTO: 12.1 K/UL
NEUTROPHILS NFR BLD: 85.6 %
PLATELET # BLD AUTO: 193 K/UL
PMV BLD AUTO: 11.6 FL
POTASSIUM SERPL-SCNC: 3.2 MMOL/L
PROT SERPL-MCNC: 6.9 G/DL
RBC # BLD AUTO: 4.09 M/UL
SODIUM SERPL-SCNC: 141 MMOL/L
WBC # BLD AUTO: 14.16 K/UL

## 2018-11-06 PROCEDURE — 99238 HOSP IP/OBS DSCHRG MGMT 30/<: CPT | Mod: ,,, | Performed by: HOSPITALIST

## 2018-11-06 PROCEDURE — 25000003 PHARM REV CODE 250: Performed by: EMERGENCY MEDICINE

## 2018-11-06 PROCEDURE — 25000003 PHARM REV CODE 250: Performed by: INTERNAL MEDICINE

## 2018-11-06 PROCEDURE — 80053 COMPREHEN METABOLIC PANEL: CPT

## 2018-11-06 PROCEDURE — S0030 INJECTION, METRONIDAZOLE: HCPCS | Performed by: INTERNAL MEDICINE

## 2018-11-06 PROCEDURE — 63600175 PHARM REV CODE 636 W HCPCS: Performed by: HOSPITALIST

## 2018-11-06 PROCEDURE — 36415 COLL VENOUS BLD VENIPUNCTURE: CPT

## 2018-11-06 PROCEDURE — 83735 ASSAY OF MAGNESIUM: CPT

## 2018-11-06 PROCEDURE — 85025 COMPLETE CBC W/AUTO DIFF WBC: CPT

## 2018-11-06 PROCEDURE — 63600175 PHARM REV CODE 636 W HCPCS: Performed by: INTERNAL MEDICINE

## 2018-11-06 RX ORDER — CIPROFLOXACIN 500 MG/1
500 TABLET ORAL EVERY 12 HOURS
Status: DISCONTINUED | OUTPATIENT
Start: 2018-11-06 | End: 2018-11-06

## 2018-11-06 RX ORDER — METRONIDAZOLE 500 MG/1
500 TABLET ORAL EVERY 8 HOURS
Status: DISCONTINUED | OUTPATIENT
Start: 2018-11-06 | End: 2018-11-06 | Stop reason: HOSPADM

## 2018-11-06 RX ORDER — METRONIDAZOLE 500 MG/1
500 TABLET ORAL EVERY 8 HOURS
Start: 2018-11-06 | End: 2018-11-13

## 2018-11-06 RX ORDER — PREDNISONE 20 MG/1
40 TABLET ORAL DAILY
Start: 2018-11-06 | End: 2018-11-11

## 2018-11-06 RX ORDER — PREDNISONE 20 MG/1
40 TABLET ORAL DAILY
Status: DISCONTINUED | OUTPATIENT
Start: 2018-11-06 | End: 2018-11-06 | Stop reason: HOSPADM

## 2018-11-06 RX ADMIN — AMLODIPINE BESYLATE 10 MG: 5 TABLET ORAL at 09:11

## 2018-11-06 RX ADMIN — DEXTROSE MONOHYDRATE, SODIUM CHLORIDE, AND POTASSIUM CHLORIDE: 50; 4.5; 1.49 INJECTION, SOLUTION INTRAVENOUS at 05:11

## 2018-11-06 RX ADMIN — MESALAMINE 1000 MG: 250 CAPSULE ORAL at 09:11

## 2018-11-06 RX ADMIN — CIPROFLOXACIN 400 MG: 2 INJECTION, SOLUTION INTRAVENOUS at 12:11

## 2018-11-06 RX ADMIN — BENAZEPRIL HYDROCHLORIDE 10 MG: 10 TABLET, FILM COATED ORAL at 09:11

## 2018-11-06 RX ADMIN — PREDNISONE 40 MG: 20 TABLET ORAL at 09:11

## 2018-11-06 RX ADMIN — METHYLPREDNISOLONE SODIUM SUCCINATE 40 MG: 40 INJECTION, POWDER, FOR SOLUTION INTRAMUSCULAR; INTRAVENOUS at 05:11

## 2018-11-06 RX ADMIN — METRONIDAZOLE 500 MG: 500 INJECTION, SOLUTION INTRAVENOUS at 04:11

## 2018-11-06 NOTE — PLAN OF CARE
Problem: Patient Care Overview  Goal: Plan of Care Review  Outcome: Ongoing (interventions implemented as appropriate)  Plan of care reviewed with patient. VSS on RA. No pain reported during shift. Ambulated to restroom independently. NPO orders maintained, with IV fluids infusing. Purposeful rounding performed. Bed lowered and locked, call light and personal items within reach. No needs at this time. Will continue to monitor.

## 2018-11-06 NOTE — DISCHARGE SUMMARY
[facility] Hospital Medicine  Discharge Summary      Patient Name: Vince Huffman  MRN: [id number]  Admission Date: 11/5/2018  Hospital Length of Stay: 1 days  Discharge Date and Time:  11/06/2018 9:26 AM  Attending Physician: Lindsey Kinsey MD   Discharging Provider: Lindsey Kinsey MD  Primary Care Provider: Reno Burris MD      HPI:   Mr. Huffman is a 73 year old man with Crohn disease who presented with pain in his upper abdomen for one day.  He had 3 loose BMs on the day before presentation which is typical for him, but the pain was consistent with a flare.  Patient travels with his prednisone and Flagyl prescribed by his gastroenterologist for flares, but he was unable to keep down the pills and had to go to the ED, where he was given IV steroids, fluids and admitted on IV ciprofloxacin and metronidazole.  CT of the abdomen was done showing multiple renal cysts, sequelae of prior bowel resections and possible involved enteritis/ partial small bowel obstruction.    Medical history includes Crohn disease as above, for which he has been on Humira for the last 12 years as well as mesalamine.  He usually gets a flare once/year.  He has psoriasis (which has resolved since he was started on Humira) and ankylosing spondylitis.  He has had 2 bowel resections involving the colon and terminal ileum.          Hospital Course:   Patient was admitted on IV methylprednisolone, antibiotics and IV fluids.  Overnight his pain improved and he was started on a regular diet in the morning.  He tolerated it well and was discharged with instructions to take a course of his metronidazole and prednisone 40 mg daily for the next 5 days or until he follows up with his gastroenterologist.  He was feeling well on discharge.     Consults:   Consults (From admission, onward)        Status Ordering Provider     Inpatient consult to General surgery  Once     Provider:  Don Elizalde MD    Completed JAN,  LAURENCE RYDER            Final Active Diagnoses:    Diagnosis Date Noted POA    PRINCIPAL PROBLEM:  Exacerbation of Crohn's disease [K50.90] 11/05/2018 Yes    Partial small bowel obstruction [K56.600] 11/05/2018 Yes    Hypokalemia [E87.6] 11/05/2018 Yes    Essential hypertension [I10] 11/05/2018 Yes      Problems Resolved During this Admission:       Discharged Condition: stable    Disposition: Home or Self Care    Follow Up:  Follow-up Information     Reno Burris MD.    Specialty:  Family Medicine  Why:  Follow up in 1-2 weeks  Contact information:  [address] [facility] [address] [phone]                 Patient Instructions:      Diet Adult Regular     Activity as tolerated       Medications:  Reconciled Home Medications:      Medication List      START taking these medications    metroNIDAZOLE 500 MG tablet  Commonly known as:  FLAGYL  Take 1 tablet (500 mg total) by mouth every 8 (eight) hours. for 7 days     predniSONE 20 MG tablet  Commonly known as:  DELTASONE  Take 2 tablets (40 mg total) by mouth once daily. for 5 days        CONTINUE taking these medications    allopurinol 100 MG tablet  Commonly known as:  ZYLOPRIM  Take 100 mg by mouth once daily.     ALPRAZolam 0.5 MG tablet  Commonly known as:  XANAX  Take 1 tablet (0.5 mg total) by mouth nightly as needed.     amlodipine-benazepril 10-20mg 10-20 mg per capsule  Commonly known as:  LOTREL  Take 1 capsule by mouth once daily.     HUMIRA PEN Pnkt injection  Generic drug:  adalimumab  Inject into the skin.     hydroCHLOROthiazide 25 MG tablet  Commonly known as:  HYDRODIURIL  Take 25 mg by mouth once daily.     levothyroxine 25 MCG tablet  Commonly known as:  SYNTHROID  Take 25 mcg by mouth once daily.     PENTASA ORAL  Take 1,000 mg by mouth.            Time spent on the discharge of patient: <30 minutes  Patient was seen and examined on the date of discharge and determined to be suitable for discharge.         Lindsey Mccartney,  MD  Department of Hospital Medicine  [facility]

## 2018-11-06 NOTE — PLAN OF CARE
11/06/18 1003   Final Note   Assessment Type Final Discharge Note   Anticipated Discharge Disposition Home   Hospital Follow Up  Appt(s) scheduled? Yes   Discharge plans and expectations educations in teach back method with documentation complete? Yes   Right Care Referral Info   Post Acute Recommendation No Care   Referral Type see AVS

## 2018-11-06 NOTE — HOSPITAL COURSE
Patient was admitted on IV methylprednisolone, antibiotics and IV fluids.  Overnight his pain improved and he was started on a regular diet in the morning.  He tolerated it well and was discharged with instructions to take a course of his metronidazole and prednisone 40 mg daily for the next 5 days or until he follows up with his gastroenterologist.  He was feeling well on discharge.

## 2018-11-06 NOTE — PLAN OF CARE
11/06/18 0959   Discharge Assessment   Assessment Type Discharge Planning Assessment   Confirmed/corrected address and phone number on facesheet? Yes   Assessment information obtained from? Patient;Caregiver;Medical Record   Communicated expected length of stay with patient/caregiver yes   Prior to hospitilization cognitive status: Alert/Oriented   Prior to hospitalization functional status: Independent   Current cognitive status: Alert/Oriented   Current Functional Status: Independent   Lives With spouse   Able to Return to Prior Arrangements yes   Is patient able to care for self after discharge? Yes   Readmission Within The Last 30 Days no previous admission in last 30 days   Patient currently being followed by outpatient case management? No   Patient currently receives any other outside agency services? No   Equipment Currently Used at Home none   Do you have any problems affording any of your prescribed medications? No   Is the patient taking medications as prescribed? yes   Does the patient have transportation home? Yes   Transportation Available car   Discharge Plan A Home with family   Patient/Family In Agreement With Plan yes

## 2018-11-20 NOTE — PHYSICIAN QUERY
PT Name: Vince Huffman  MR #: [id number]     Physician Query Form - Etiology of Condition Clarification      CDS: Charisma PÉREZ,RN        Contact information:[phone]    This form is a permanent document in the medical record.     Query Date: November 20, 2018    By submitting this query, we are merely seeking further clarification of documentation.  Please utilize your independent clinical judgment when addressing the question(s) below.     The medical record contains the following:    Findings Supporting Clinical Information Location in Medical Record                         Small Bowel Obstruction   Principal Problem:Exacerbation of Crohn's disease    73 year old male with past medical history of    crohns disease s/p 2 partial resections, last    in 2013, Htn, Hypothyroidism came in with c/o    abdominal pain since yesterday evening.It    was in upper abdomen , gradually worsening,    got worse at 3 and decided to come to    hospital.He had nausea and vomiting.Had 3    loose bowel movements yesterday which is    usual for him, denies any bleeding.Denies    fever, chills, chest pain.He says he usually    has prednisone at home and flagyl from his gi    physician in [place] to use for his flares    but since he could not keep anything down ,    he could not take those meds        Postoperative change of right hemicolectomy   with wall thickening and slight adjacent   inflammatory change involving a distal small   bowel loop at the right lower anastomotic site   with upstream dilatation of distal small bowel   loops as detailed above. Findings are   concerning for component of enteritis in this   patient with history of Crohn's disease with   possible developing partial small bowel   obstruction at the anastomotic site   secondary to inflammatory change and/or   anastomotic stricture.                     11/05/18 [facility] Medicine  H&P                                                            11/05/18 CT Abdomen Pelvis With Contrast     Please document your best medical opinion regarding the etiology of __Small Bowel Obstruction_______________ for which the primary focus of treatment is/was directed.           ____ Post Operative right hemicolectomy anastomotic stricture         ____Crohn's disease of small intestine without complications        __x __ Other__crohns disease with enteritis____________________________________________                Clinically Undetermined     Please document in your progress notes daily for the duration of treatment, until resolved, and include in your discharge summary.

## 2022-04-14 RX ORDER — POTASSIUM CHLORIDE 1500 MG/1
20 TABLET, EXTENDED RELEASE ORAL DAILY
COMMUNITY
Start: 2021-11-25

## 2022-04-14 RX ORDER — PANTOPRAZOLE SODIUM 40 MG/1
40 TABLET, DELAYED RELEASE ORAL DAILY
COMMUNITY
Start: 2022-04-08

## 2022-04-14 RX ORDER — SPIRONOLACTONE 50 MG/1
50 TABLET, FILM COATED ORAL DAILY
COMMUNITY
Start: 2022-04-05

## 2022-09-08 ENCOUNTER — OFFICE VISIT (OUTPATIENT)
Dept: GASTROENTEROLOGY | Facility: CLINIC | Age: 77
End: 2022-09-08
Payer: MEDICARE

## 2022-09-08 VITALS
HEART RATE: 82 BPM | WEIGHT: 163 LBS | SYSTOLIC BLOOD PRESSURE: 131 MMHG | HEIGHT: 70 IN | BODY MASS INDEX: 23.34 KG/M2 | DIASTOLIC BLOOD PRESSURE: 69 MMHG

## 2022-09-08 DIAGNOSIS — K56.600 PARTIAL SMALL BOWEL OBSTRUCTION: ICD-10-CM

## 2022-09-08 DIAGNOSIS — K50.812 CROHN'S DISEASE OF BOTH SMALL AND LARGE INTESTINE WITH INTESTINAL OBSTRUCTION: Primary | ICD-10-CM

## 2022-09-08 PROCEDURE — 99214 OFFICE O/P EST MOD 30 MIN: CPT | Mod: S$GLB,,, | Performed by: INTERNAL MEDICINE

## 2022-09-08 PROCEDURE — 99214 PR OFFICE/OUTPT VISIT, EST, LEVL IV, 30-39 MIN: ICD-10-PCS | Mod: S$GLB,,, | Performed by: INTERNAL MEDICINE

## 2022-09-08 RX ORDER — ASPIRIN 81 MG/1
81 TABLET ORAL DAILY
COMMUNITY
Start: 2022-08-22

## 2022-09-08 RX ORDER — METOPROLOL SUCCINATE 25 MG/1
25 TABLET, EXTENDED RELEASE ORAL NIGHTLY
COMMUNITY
Start: 2022-08-22

## 2022-09-08 RX ORDER — AMLODIPINE AND BENAZEPRIL HYDROCHLORIDE 10; 40 MG/1; MG/1
CAPSULE ORAL
COMMUNITY
Start: 2022-06-28

## 2022-09-08 RX ORDER — ALPRAZOLAM 0.5 MG/1
0.5 TABLET, EXTENDED RELEASE ORAL 2 TIMES DAILY
COMMUNITY
Start: 2022-08-31

## 2022-09-08 RX ORDER — ATORVASTATIN CALCIUM 40 MG/1
40 TABLET, FILM COATED ORAL NIGHTLY
COMMUNITY
Start: 2022-08-22

## 2022-09-08 NOTE — PROGRESS NOTES
Clinic Note    Reason for visit:  The primary encounter diagnosis was Crohn's disease of both small and large intestine with intestinal obstruction. A diagnosis of Partial small bowel obstruction was also pertinent to this visit.    PCP: Reno Burris       HPI:  This is a 77 y.o. male w/ Crohn's disease who is here for a follow up. On Humira. He reports overall doing well.     Had recent cardiac cath with Dr. 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 Treating him medically.     Due for TB gold but patient states Dr. Haile did labs yesterday and included this.     Colonoscopy 5/5/2021: Inflammation in small intestines but colon is showing no inflammation-continue humira-give short course of flagyl given the inflammation-rx sent. Repeat colon in 2 yr.    Review of Systems   Constitutional:  Negative for chills, diaphoresis, fatigue, fever and unexpected weight change.   HENT:  Negative for hearing loss, mouth sores, nosebleeds, postnasal drip, sore throat, trouble swallowing and voice change.    Eyes:  Negative for pain, discharge and eye dryness.   Respiratory:  Positive for shortness of breath. Negative for apnea, cough, choking, chest tightness and wheezing.    Cardiovascular:  Negative for chest pain, palpitations, leg swelling and claudication.   Gastrointestinal:  Positive for change in bowel habit and change in bowel habit. Negative for abdominal distention, abdominal pain, anal bleeding, blood in stool, constipation, diarrhea, nausea, rectal pain, vomiting, reflux and fecal incontinence.   Genitourinary:  Negative for bladder incontinence, difficulty urinating, dysuria, flank pain, frequency and hematuria.   Musculoskeletal:  Negative for arthralgias, back pain, joint swelling and joint deformity.   Integumentary:  Negative for color change, rash and wound.   Allergic/Immunologic: Negative for environmental allergies and food allergies.   Neurological:  Negative  for seizures, facial asymmetry, speech difficulty, weakness, headaches and memory loss.   Hematological:  Negative for adenopathy. Does not bruise/bleed easily.   Psychiatric/Behavioral:  Negative for agitation, behavioral problems, confusion, hallucinations and sleep disturbance.       Past Medical History:   Diagnosis Date    Arthritis     Blood transfusion     Crohn's disease     Hypertension      Past Surgical History:   Procedure Laterality Date    ANGIOGRAM, CORONARY, WITH LEFT HEART CATHETERIZATION      APPENDECTOMY      COLON SURGERY       No family history on file.  Social History     Tobacco Use    Smoking status: Never    Smokeless tobacco: Never   Substance Use Topics    Alcohol use: No    Drug use: No     Review of patient's allergies indicates:  No Known Allergies     Medication List with Changes/Refills   Current Medications    ADALIMUMAB (HUMIRA) PNKT INJECTION    Inject into the skin every 14 (fourteen) days.      ALLOPURINOL (ZYLOPRIM) 100 MG TABLET    Take 100 mg by mouth once daily.      ALPRAZOLAM (XANAX XR) 0.5 MG TB24    Take 0.5 mg by mouth 2 (two) times daily.    AMLODIPINE-BENAZEPRIL (LOTREL) 10-40 MG PER CAPSULE        ASPIRIN (ECOTRIN) 81 MG EC TABLET    Take 81 mg by mouth once daily.    ATORVASTATIN (LIPITOR) 40 MG TABLET    Take 40 mg by mouth every evening.    KLOR-CON M20 20 MEQ TABLET        LEVOTHYROXINE (SYNTHROID) 25 MCG TABLET    Take 25 mcg by mouth once daily.    METOPROLOL SUCCINATE (TOPROL-XL) 25 MG 24 HR TABLET    Take 25 mg by mouth every evening.    PANTOPRAZOLE (PROTONIX) 40 MG TABLET    Take 40 mg by mouth once daily.    SPIRONOLACTONE (ALDACTONE) 50 MG TABLET       Discontinued Medications    ALPRAZOLAM (XANAX) 0.5 MG TABLET    Take 1 tablet (0.5 mg total) by mouth nightly as needed.    AMLODIPINE-BENAZEPRIL 10-20MG (LOTREL) 10-20 MG PER CAPSULE    Take 1 capsule by mouth once daily.      HYDROCHLOROTHIAZIDE (HYDRODIURIL) 25 MG TABLET    Take 25 mg by mouth once daily.  "     MESALAMINE (PENTASA ORAL)    Take 1,000 mg by mouth.           Vital Signs:  /69   Pulse 82   Ht 5' 10" (1.778 m)   Wt 73.9 kg (163 lb)   BMI 23.39 kg/m²         Physical Exam  Constitutional:       General: He is not in acute distress.     Appearance: Normal appearance. He is well-developed. He is not ill-appearing or toxic-appearing.   HENT:      Head: Normocephalic and atraumatic.      Nose: Nose normal.      Mouth/Throat:      Mouth: Mucous membranes are moist.      Pharynx: Oropharynx is clear. No oropharyngeal exudate or posterior oropharyngeal erythema.   Eyes:      General: Lids are normal. No scleral icterus.        Right eye: No discharge.         Left eye: No discharge.      Extraocular Movements: Extraocular movements intact.      Conjunctiva/sclera: Conjunctivae normal.   Cardiovascular:      Rate and Rhythm: Normal rate and regular rhythm.      Pulses:           Radial pulses are 2+ on the right side and 2+ on the left side.   Pulmonary:      Effort: Pulmonary effort is normal. No respiratory distress.      Breath sounds: No stridor. No wheezing or rhonchi.   Abdominal:      General: Bowel sounds are normal. There is no distension.      Palpations: Abdomen is soft. There is no fluid wave, hepatomegaly, splenomegaly or mass.      Tenderness: There is no abdominal tenderness. There is no guarding or rebound.   Musculoskeletal:      Cervical back: Full passive range of motion without pain.      Right lower leg: No edema.      Left lower leg: No edema.   Lymphadenopathy:      Cervical: No cervical adenopathy.   Skin:     General: Skin is warm and dry.      Capillary Refill: Capillary refill takes less than 2 seconds.      Coloration: Skin is not cyanotic, jaundiced or pale.      Findings: No rash.   Neurological:      General: No focal deficit present.      Mental Status: He is alert and oriented to person, place, and time.   Psychiatric:         Mood and Affect: Mood normal.         " Behavior: Behavior is cooperative.          All of the data above and below has been reviewed by myself and any further interpretations will be reflected in the assessment and plan.   The data includes review of external notes, and independent interpretation of lab results, procedures, x-rays, and imaging reports.      Assessment:  Crohn's disease of both small and large intestine with intestinal obstruction    Partial small bowel obstruction    Will request labs from Dr. Haile     Recommendations:  Continue Humira.     Risks, benefits, and alternatives of medical management, any associated procedures, and/or treatment discussed with the patient. Patient given opportunity to ask questions and voices understanding. Patient has elected to proceed with the recommended care modalities as discussed.    Follow up in about 4 months (around 1/8/2023).    Order summary:  No orders of the defined types were placed in this encounter.       Instructed patient to notify my office if they have not been contacted within two weeks after any procedures, submitting any samples (biopsies, blood, stool, urine, etc.) or after any imaging (X-ray, CT, MRI, etc.).     Aly Reed MD    This document may have been created using a voice recognition transcribing system. Incorrect words or phrases may have been missed during proofreading. Please interpret accordingly or contact me for clarification.

## 2022-09-08 NOTE — PATIENT INSTRUCTIONS
Continue Humira.     Please notify my office if you have not been contacted within two weeks after any procedures, submitting any samples (biopsies, blood, stool, urine, etc.) or after any imaging (X-ray, CT, MRI, etc.).

## 2022-10-12 ENCOUNTER — TELEPHONE (OUTPATIENT)
Dept: GASTROENTEROLOGY | Facility: CLINIC | Age: 77
End: 2022-10-12
Payer: MEDICARE

## 2022-10-12 NOTE — TELEPHONE ENCOUNTER
----- Message from Annalisa Dylan sent at 10/12/2022 11:06 AM CDT -----  Regarding: Progress Nores  Contact: patient  Per phone call with patient, he would like to get a copy of the progress [facility] to show [facility] that [facility] does [facility] chromes.    The caller would like to have it emailed [email or web address] or he can come by and pick it up.  Please return call at [phone] (home).    Thanks,  SJ

## 2022-10-12 NOTE — TELEPHONE ENCOUNTER
Returned patients call and explained records would be at  desk for him to come sign a release and  records. Patient verbalized understanding.

## 2023-01-12 ENCOUNTER — OFFICE VISIT (OUTPATIENT)
Dept: GASTROENTEROLOGY | Facility: CLINIC | Age: 78
End: 2023-01-12
Payer: MEDICARE

## 2023-01-12 VITALS
HEART RATE: 71 BPM | WEIGHT: 165.81 LBS | OXYGEN SATURATION: 98 % | BODY MASS INDEX: 24.56 KG/M2 | DIASTOLIC BLOOD PRESSURE: 79 MMHG | HEIGHT: 69 IN | SYSTOLIC BLOOD PRESSURE: 128 MMHG

## 2023-01-12 DIAGNOSIS — K50.812 CROHN'S DISEASE OF BOTH SMALL AND LARGE INTESTINE WITH INTESTINAL OBSTRUCTION: Primary | ICD-10-CM

## 2023-01-12 PROCEDURE — 99214 PR OFFICE/OUTPT VISIT, EST, LEVL IV, 30-39 MIN: ICD-10-PCS | Mod: S$GLB,,, | Performed by: INTERNAL MEDICINE

## 2023-01-12 PROCEDURE — 99214 OFFICE O/P EST MOD 30 MIN: CPT | Mod: S$GLB,,, | Performed by: INTERNAL MEDICINE

## 2023-01-12 NOTE — PROGRESS NOTES
Clinic Note    Reason for visit:  The encounter diagnosis was Crohn's disease of both small and large intestine with intestinal obstruction.    PCP: Reno Burris       HPI:  This is a 77 y.o. male with a h/o Crohn's ds-on Humira who is in for F/U.  He reports doing well.  He denies significant diarrhea, melena, or BRBPR.  He uses immodium maybe once a week but mostly having formed stools.     Colonoscopy 5/5/2021: Inflammation in small intestines but colon is showing no inflammation-continue humira-give short course of flagyl given the inflammation-rx sent. Repeat colon in 2 yr.       Review of Systems   Constitutional:  Negative for chills, diaphoresis, fatigue, fever and unexpected weight change.   HENT:  Positive for postnasal drip. Negative for hearing loss, mouth sores, nosebleeds, sore throat, trouble swallowing and voice change.    Eyes:  Negative for pain, discharge and eye dryness.   Respiratory:  Negative for apnea, cough, choking, chest tightness, shortness of breath and wheezing.    Cardiovascular:  Negative for chest pain, palpitations, leg swelling and claudication.   Gastrointestinal:  Positive for diarrhea. Negative for abdominal distention, abdominal pain, anal bleeding, blood in stool, change in bowel habit, constipation, nausea, rectal pain, vomiting, reflux, fecal incontinence and change in bowel habit.   Genitourinary:  Negative for bladder incontinence, difficulty urinating, dysuria, flank pain, frequency and hematuria.   Musculoskeletal:  Negative for arthralgias, back pain, joint swelling and joint deformity.   Integumentary:  Negative for color change, rash and wound.   Allergic/Immunologic: Negative for environmental allergies and food allergies.   Neurological:  Negative for seizures, facial asymmetry, speech difficulty, weakness, headaches and memory loss.   Hematological:  Negative for adenopathy. Does not bruise/bleed easily.   Psychiatric/Behavioral:  Negative for agitation,  behavioral problems, confusion, hallucinations and sleep disturbance.       Past Medical History:   Diagnosis Date    Anxiety disorder, unspecified     Arthritis     Blood transfusion     BMI 24.0-24.9, adult     Crohn's disease     Gout, unspecified     Heart disease     Hypertension     Hypothyroidism, unspecified     Psoriasis      Past Surgical History:   Procedure Laterality Date    ANGIOGRAM, CORONARY, WITH LEFT HEART CATHETERIZATION      APPENDECTOMY      COLON SURGERY N/A     bowel resections times 2    COLONOSCOPY N/A     ENDOSCOPY N/A      Family History   Problem Relation Age of Onset    Colon cancer Neg Hx     Liver cancer Neg Hx     Liver disease Neg Hx     Pancreatic cancer Neg Hx     Stomach cancer Neg Hx     Crohn's disease Neg Hx     Ulcerative colitis Neg Hx     Esophageal cancer Neg Hx     Throat cancer Neg Hx      Social History     Tobacco Use    Smoking status: Never    Smokeless tobacco: Never   Substance Use Topics    Alcohol use: No    Drug use: No     Review of patient's allergies indicates:  No Known Allergies     Medication List with Changes/Refills   Current Medications    ADALIMUMAB (HUMIRA) PNKT INJECTION    Inject into the skin every 14 (fourteen) days.      ALLOPURINOL (ZYLOPRIM) 100 MG TABLET    Take 100 mg by mouth once daily.      ALPRAZOLAM (XANAX XR) 0.5 MG TB24    Take 0.5 mg by mouth 2 (two) times daily.    AMLODIPINE-BENAZEPRIL (LOTREL) 10-40 MG PER CAPSULE        ASPIRIN (ECOTRIN) 81 MG EC TABLET    Take 81 mg by mouth once daily.    ATORVASTATIN (LIPITOR) 40 MG TABLET    Take 40 mg by mouth every evening.    KLOR-CON M20 20 MEQ TABLET    20 mEq once daily.    LEVOTHYROXINE (SYNTHROID) 25 MCG TABLET    Take 25 mcg by mouth once daily.    METOPROLOL SUCCINATE (TOPROL-XL) 25 MG 24 HR TABLET    Take 25 mg by mouth every evening.    PANTOPRAZOLE (PROTONIX) 40 MG TABLET    Take 40 mg by mouth once daily.    SPIRONOLACTONE (ALDACTONE) 50 MG TABLET    Take 50 mg by mouth once  "daily.         Vital Signs:  /79   Pulse 71   Ht 5' 9" (1.753 m)   Wt 75.2 kg (165 lb 12.8 oz)   SpO2 98%   BMI 24.48 kg/m²         Physical Exam  Constitutional:       General: He is not in acute distress.     Appearance: Normal appearance. He is well-developed. He is not ill-appearing or toxic-appearing.   HENT:      Head: Normocephalic and atraumatic.      Nose: Nose normal.      Mouth/Throat:      Mouth: Mucous membranes are moist.      Pharynx: Oropharynx is clear. No oropharyngeal exudate or posterior oropharyngeal erythema.   Eyes:      General: Lids are normal. No scleral icterus.        Right eye: No discharge.         Left eye: No discharge.      Extraocular Movements: Extraocular movements intact.      Conjunctiva/sclera: Conjunctivae normal.   Cardiovascular:      Rate and Rhythm: Normal rate and regular rhythm.      Pulses:           Radial pulses are 2+ on the right side and 2+ on the left side.   Pulmonary:      Effort: Pulmonary effort is normal. No respiratory distress.      Breath sounds: No stridor. No wheezing or rhonchi.   Abdominal:      General: Bowel sounds are normal. There is no distension.      Palpations: Abdomen is soft. There is no fluid wave, hepatomegaly, splenomegaly or mass.      Tenderness: There is no abdominal tenderness. There is no guarding or rebound.   Musculoskeletal:      Cervical back: Full passive range of motion without pain.      Right lower leg: No edema.      Left lower leg: No edema.   Lymphadenopathy:      Cervical: No cervical adenopathy.   Skin:     General: Skin is warm and dry.      Capillary Refill: Capillary refill takes less than 2 seconds.      Coloration: Skin is not cyanotic, jaundiced or pale.      Findings: No rash.   Neurological:      General: No focal deficit present.      Mental Status: He is alert and oriented to person, place, and time.   Psychiatric:         Mood and Affect: Mood normal.         Behavior: Behavior is cooperative.    "       All of the data above and below has been reviewed by myself and any further interpretations will be reflected in the assessment and plan.   The data includes review of external notes, and independent interpretation of lab results, procedures, x-rays, and imaging reports.      Assessment:  Crohn's disease of both small and large intestine with intestinal obstruction  -     CBC Auto Differential; Future; Expected date: 01/12/2023  -     Comprehensive Metabolic Panel; Future; Expected date: 01/12/2023  -     QuantiFERON-TB Gold Plus; Future; Expected date: 01/12/2023      Will schedule colonoscopy at next OV.     Recommendations:  Complete labs.     Risks, benefits, and alternatives of medical management, any associated procedures, and/or treatment discussed with the patient. Patient given opportunity to ask questions and voices understanding. Patient has elected to proceed with the recommended care modalities as discussed.    Follow up in about 6 months (around 7/12/2023).    Order summary:  Orders Placed This Encounter   Procedures    CBC Auto Differential    Comprehensive Metabolic Panel    QuantiFERON-TB Gold Plus        Instructed patient to notify my office if they have not been contacted within two weeks after any procedures, submitting any samples (biopsies, blood, stool, urine, etc.) or after any imaging (X-ray, CT, MRI, etc.).     Aly Messer MD    This document may have been created using a voice recognition transcribing system. Incorrect words or phrases may have been missed during proofreading. Please interpret accordingly or contact me for clarification.

## 2023-01-12 NOTE — PATIENT INSTRUCTIONS
Complete labs.    Please notify my office if you have not been contacted within two weeks after any procedures, submitting any samples (biopsies, blood, stool, urine, etc.) or after any imaging (X-ray, CT, MRI, etc.).

## 2023-01-12 NOTE — LETTER
January 12, 2023        Reno Burris MD  [address] [facility] [address]             [place] [facility]  [address] [address]  Phone: [phone]  Fax: [phone]   Patient: Vince Huffman   MR Number: [id number]   YOB: 1945   Date of Visit: 1/12/2023       Dear Dr. Burris:    Thank you for referring Vince Huffman to me for evaluation. Attached you will find relevant portions of my assessment and plan of care.    If you have questions, please do not hesitate to call me. I look forward to following Vince Huffman along with you.    Sincerely,      Aly Reed MD            CC  No Recipients    Enclosure

## 2023-03-15 ENCOUNTER — TELEPHONE (OUTPATIENT)
Dept: GASTROENTEROLOGY | Facility: CLINIC | Age: 78
End: 2023-03-15
Payer: MEDICARE

## 2023-03-15 NOTE — TELEPHONE ENCOUNTER
Called with results, and told him that he would need repeat in 4 months. He voiced understanding. Reminder put in for repeat labs.

## 2023-03-15 NOTE — TELEPHONE ENCOUNTER
----- Message from Aly Reed MD sent at 3/13/2023  9:59 AM CDT -----  Call with results, CMP ok-repeat CMP in 4mo.

## 2023-03-17 ENCOUNTER — TELEPHONE (OUTPATIENT)
Dept: GASTROENTEROLOGY | Facility: CLINIC | Age: 78
End: 2023-03-17
Payer: MEDICARE

## 2023-03-17 NOTE — TELEPHONE ENCOUNTER
I had to leave a message on his voicemail to call us back.----- Message from Aly Reed MD sent at 3/13/2023  9:42 AM CDT -----  Call with results, Hgb 12.5-mild increase in MCV-is he on Vit B12-if not OTC sublingual B12-can buy at [facility]-take daily as directed on bottle.  Repeat CBC in 4mo.

## 2023-08-21 NOTE — TELEPHONE ENCOUNTER
Returned pt call. Pt states his wife has been in [facility] for 5 weeks now and his stress level is extremely high. Pt is requesting refills of Prednisone 20mg and Flagyl 500mg be sent to [facility] [address] in [place] to help flare ups. Pt states that he is at hospital to visit his wife everyday  and is available to come in for OV if needed. - VL

## 2023-08-21 NOTE — TELEPHONE ENCOUNTER
----- Message from Janeth Bowie sent at 8/21/2023  1:06 PM CDT -----  Contact: self  Pt is calling in regards to a f/u . Pt is also need medication refills as well that is not on his med list . Please call pt at [phone].

## 2023-08-23 ENCOUNTER — TELEPHONE (OUTPATIENT)
Dept: GASTROENTEROLOGY | Facility: CLINIC | Age: 78
End: 2023-08-23
Payer: MEDICARE

## 2023-08-23 RX ORDER — PREDNISONE 10 MG/1
TABLET ORAL
Qty: 60 TABLET | Refills: 0 | Status: SHIPPED | OUTPATIENT
Start: 2023-08-23 | End: 2023-09-21

## 2023-08-23 RX ORDER — METRONIDAZOLE 500 MG/1
250 TABLET ORAL 3 TIMES DAILY
Qty: 30 TABLET | Refills: 1 | Status: SHIPPED | OUTPATIENT
Start: 2023-08-23

## 2023-08-23 RX ORDER — METRONIDAZOLE 500 MG/1
500 TABLET ORAL 3 TIMES DAILY
Qty: 1 TABLET | Refills: 0 | Status: SHIPPED | OUTPATIENT
Start: 2023-08-23 | End: 2023-08-23

## 2023-08-23 NOTE — TELEPHONE ENCOUNTER
----- Message from Brittni Gee NP sent at 8/23/2023 11:25 AM CDT -----  Regarding: RE: rx clarification  Contact: Kayy  I talked to them and clarified.  ----- Message -----  From: Nadia Boone MA  Sent: 8/23/2023   9:07 AM CDT  To: Brittni Gee NP; REINA Sun  Subject: rx clarification                                 Im a little confused  ----- Message -----  From: Nida Johansen  Sent: 8/23/2023   8:39 AM CDT  To: Derek Lu Staff    Type:  Pharmacy Calling to Clarify an RX    Name of Caller: Kayy   Pharmacy Name:   [id number] - [place] - [address]  [address]  [place] [address]  Phone: [phone] Fax: [phone]  Prescription Name: Metronidazole (FLAGYL) 500 MG tablet  What do they need to clarify?: The quantity   Best Call Back Number: Please call her at [phone]  Additional Information:

## 2023-12-27 ENCOUNTER — TELEPHONE (OUTPATIENT)
Dept: GASTROENTEROLOGY | Facility: CLINIC | Age: 78
End: 2023-12-27
Payer: MEDICARE

## 2023-12-27 NOTE — TELEPHONE ENCOUNTER
----- Message from Genesis Irby sent at 12/27/2023 11:12 AM CST -----  Contact: Patient  Patient is wanting to get information to become a patient of Dr. Blanton. Patient has been with Dr. Reed for 15 years. Please call patient at [phone]. Thank you/MARKUS

## 2023-12-27 NOTE — TELEPHONE ENCOUNTER
Pt inquiring about appt with [facility] due to RMM retiring. States he is currently not having any issues. Pt scheduled with [facility] 9/5/24.

## 2024-09-05 ENCOUNTER — TELEPHONE (OUTPATIENT)
Dept: GASTROENTEROLOGY | Facility: CLINIC | Age: 79
End: 2024-09-05

## 2024-09-05 ENCOUNTER — OFFICE VISIT (OUTPATIENT)
Dept: GASTROENTEROLOGY | Facility: CLINIC | Age: 79
End: 2024-09-05
Payer: MEDICARE

## 2024-09-05 VITALS
RESPIRATION RATE: 16 BRPM | BODY MASS INDEX: 24.29 KG/M2 | HEART RATE: 69 BPM | DIASTOLIC BLOOD PRESSURE: 66 MMHG | SYSTOLIC BLOOD PRESSURE: 122 MMHG | WEIGHT: 164 LBS | OXYGEN SATURATION: 96 % | HEIGHT: 69 IN

## 2024-09-05 DIAGNOSIS — K50.819 CROHN'S DISEASE OF SMALL AND LARGE INTESTINES WITH COMPLICATION: Primary | ICD-10-CM

## 2024-09-05 DIAGNOSIS — Z86.010 HISTORY OF COLON POLYPS: ICD-10-CM

## 2024-09-05 DIAGNOSIS — D64.9 ANEMIA, UNSPECIFIED TYPE: ICD-10-CM

## 2024-09-05 DIAGNOSIS — K56.600 PARTIAL SMALL BOWEL OBSTRUCTION: ICD-10-CM

## 2024-09-05 PROCEDURE — 99215 OFFICE O/P EST HI 40 MIN: CPT | Mod: S$GLB,,, | Performed by: INTERNAL MEDICINE

## 2024-09-05 RX ORDER — SOD SULF/POT CHLORIDE/MAG SULF 1.479 G
TABLET ORAL
Qty: 24 TABLET | Refills: 0 | Status: SHIPPED | OUTPATIENT
Start: 2024-09-05

## 2024-09-05 NOTE — PATIENT INSTRUCTIONS
Complete blood and stool tests.  Schedule upper and lower endoscopies.  Schedule CT scan (make sure [facility] does CT enterography protocol).  Continue Humira every 2 weeks.     Please notify my office if you have not been contacted within two weeks after any procedures, submitting any samples (biopsies, blood, stool, urine, etc.) or after any imaging (X-ray, CT, MRI, etc.).

## 2024-09-05 NOTE — PROGRESS NOTES
Clinic Note    Reason for visit:  The primary encounter diagnosis was Crohn's disease of small and large intestines with complication. Diagnoses of Anemia, unspecified type, Partial small bowel obstruction, and History of colon polyps were also pertinent to this visit.    PCP: Leland Porras       HPI:  This is a 79 y.o. male who was previously established with Dr. Reed. Patient with Crohn's disease of small and large bowel. Diagnosed in 2007 and has been on Humira since that time. He is on Humira every 2 weeks. He would take metronidazole in the past for flare as needed. 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 He has not had flare up of Crohn's in last 2 years. He used to have RLQ pain and diarrhea but none in 2 years. He changed his diet and stopped caffeine as well. He is no longer taking pantoprazole. Denies reflux or dysphagia. Weight stable.     Sees Dr. Haile every 3 months and gets labs.    Also has psoriatic arthritis and ankylosing spondylitis. Dr. Haile is actually the one prescribing the Humira.     He had a right hemicolectomy in 2013 with Dr. Elian Carson due to recurrent ileal stricture. He had thickened ileum 4.5 cm distal ileum resected with 2 in (inches?) hepatic flexure. Complicated by abscess that was drained.    In 2007 he had ileocolonic resection with cecum and appendix with Dr. Hurd. 30.5 cm of small bowel removed with path c/w active small bowel infl w/ulceration and necrosis c/w Crohn's disease. With Dr. Elian Hurd.    He has been on prednisone, Flagyl, Pentasa and Humira for Crohn's. Currently on Humira every 2 weeks.      1/13/2023 TB Gold negative. Hgb 12.5L, MCV 97.4H, CBC onl, Cr 1.3H, CMP onl    CT AP IV 2018: post op changes of R hemicolectomy w/wall thickening and adjacent infl change involving a distal small bowel loop at right lower anastomotic site w/upstream dilatation of distal SB loops. Possible developing partial SBO. Cholelithiasis.  1.6 cm right renal lesion. Rec renal US. Simple hepatic cyst.       DEXA scan 2018: lumbar spine bone mineral density measurements in normal range. Hip bone density measurements in range of osteopenia. Repeat DEXA in 2-3 yr.     Colonoscopy 5/5/2021: Ileocolonic anastomosis. TI ulceration. TIBx chronic active colitis w/ulcer and associated granulation tissue/fibrinopurulent exudate. HSV-1/HSV-2/CMV neg. CBx benign w/intramucosal agg. 1 hyp. Repeat short Flagyl course. Repeat colon in 2 yr.     Colonoscopy  5/15/2018: Ileocolonic anastomosis Bx chronic active colitis w/ulceration. ACBx chr inactive colitis, TCBx focal active colitis. LCBx focal active colitis, Rectum Bx nl.    EGD 7/28/2016: Small sliding HH. DBx focal peptic-type duodenopathy in otherwise unremarkable mucosa. Small foci of gastric foveolar metaplasia. No evidence of celiac sprue.    Review of Systems   Constitutional:  Negative for diaphoresis, fatigue, fever and unexpected weight change.   HENT:  Negative for hearing loss, mouth sores, postnasal drip, sore throat and trouble swallowing.    Eyes:  Negative for pain, discharge and eye dryness.   Respiratory:  Negative for apnea, cough, choking, chest tightness, shortness of breath and wheezing.    Cardiovascular:  Negative for chest pain, palpitations and leg swelling.   Gastrointestinal:  Negative for abdominal distention, abdominal pain, anal bleeding, blood in stool, change in bowel habit, constipation, diarrhea, nausea, rectal pain, vomiting, reflux and fecal incontinence.   Genitourinary:  Negative for bladder incontinence, dysuria and hematuria.   Musculoskeletal:  Negative for arthralgias, back pain and joint swelling.   Integumentary:  Negative for color change and rash.   Allergic/Immunologic: Negative for environmental allergies and food allergies.   Neurological:  Negative for seizures and headaches.   Hematological:  Negative for adenopathy. Does not bruise/bleed easily.        Past  Medical History:   Diagnosis Date    Ankylosing spondylitis of unspecified sites in spine     Anxiety disorder, unspecified     Arthritis     BMI 24.0-24.9, adult     Crohn's disease     Gout, unspecified     Heart disease     History of skin cancer 2001    squamous cell carcinoma left cheek    Hypertension     Hypothyroidism, unspecified     Psoriasis      Past Surgical History:   Procedure Laterality Date    ANGIOGRAM, CORONARY, WITH LEFT HEART CATHETERIZATION      APPENDECTOMY      APPENDECTOMY  2007    COLONOSCOPY N/A     ENDOSCOPY N/A     RIGHT HEMICOLECTOMY  2013    SMALL INTESTINE SURGERY  2007    30.5 cm of small bowel removed     Family History   Problem Relation Name Age of Onset    Colon cancer Neg Hx      Liver cancer Neg Hx      Liver disease Neg Hx      Pancreatic cancer Neg Hx      Stomach cancer Neg Hx      Crohn's disease Neg Hx      Ulcerative colitis Neg Hx      Esophageal cancer Neg Hx      Throat cancer Neg Hx       Social History     Tobacco Use    Smoking status: Never    Smokeless tobacco: Never   Substance Use Topics    Alcohol use: No    Drug use: No     Review of patient's allergies indicates:  No Known Allergies     Medication List with Changes/Refills   New Medications    SOD SULF-POT CHLORIDE-MAG SULF (SUTAB) 1.479-0.188- 0.225 GRAM TABLET    Take according to package instructions with indicated amount of water. No breakfast day before test. May substitute with Suprep, Clenpiq, Plenvu, Moviprep or GoLytely based on Rx plan and patient preference.   Current Medications    ADALIMUMAB (HUMIRA) PNKT INJECTION    Inject into the skin every 14 (fourteen) days.      ALLOPURINOL (ZYLOPRIM) 100 MG TABLET    Take 100 mg by mouth once daily.      ALPRAZOLAM (XANAX XR) 0.5 MG TB24    Take 0.5 mg by mouth 2 (two) times daily.    AMLODIPINE-BENAZEPRIL (LOTREL) 10-40 MG PER CAPSULE        ATORVASTATIN (LIPITOR) 40 MG TABLET    Take 40 mg by mouth every evening.    KLOR-CON M20 20 MEQ TABLET    20 mEq  "once daily.    METOPROLOL SUCCINATE (TOPROL-XL) 25 MG 24 HR TABLET    Take 25 mg by mouth every evening.    SPIRONOLACTONE (ALDACTONE) 50 MG TABLET    Take 50 mg by mouth once daily.   Discontinued Medications    ASPIRIN (ECOTRIN) 81 MG EC TABLET    Take 81 mg by mouth once daily.    LEVOTHYROXINE (SYNTHROID) 25 MCG TABLET    Take 25 mcg by mouth once daily.    METRONIDAZOLE (FLAGYL) 500 MG TABLET    Take 0.5 tablets (250 mg total) by mouth 3 (three) times daily.    PANTOPRAZOLE (PROTONIX) 40 MG TABLET    Take 40 mg by mouth once daily.         Vital Signs:  /66 (BP Location: Left arm, Patient Position: Sitting, BP Method: Medium (Automatic))   Pulse 69   Resp 16   Ht 5' 9" (1.753 m)   Wt 74.4 kg (164 lb)   SpO2 96%   BMI 24.22 kg/m²         Physical Exam  Constitutional:       General: He is not in acute distress.     Appearance: Normal appearance. He is well-developed. He is not ill-appearing or toxic-appearing.   HENT:      Head: Normocephalic and atraumatic.      Nose: Nose normal.      Mouth/Throat:      Mouth: Mucous membranes are moist.      Pharynx: Oropharynx is clear. No oropharyngeal exudate or posterior oropharyngeal erythema.   Eyes:      General: Lids are normal. No scleral icterus.        Right eye: No discharge.         Left eye: No discharge.      Conjunctiva/sclera: Conjunctivae normal.   Cardiovascular:      Rate and Rhythm: Normal rate and regular rhythm.      Pulses:           Radial pulses are 2+ on the right side and 2+ on the left side.   Pulmonary:      Effort: Pulmonary effort is normal. No respiratory distress.      Breath sounds: No stridor. No wheezing.   Abdominal:      General: Bowel sounds are normal. There is no distension.      Palpations: Abdomen is soft. There is no fluid wave, hepatomegaly, splenomegaly or mass.      Tenderness: There is no abdominal tenderness. There is no guarding or rebound.   Musculoskeletal:      Cervical back: Full passive range of motion " without pain.   Lymphadenopathy:      Cervical: No cervical adenopathy.   Skin:     General: Skin is warm and dry.      Capillary Refill: Capillary refill takes less than 2 seconds.      Coloration: Skin is not cyanotic, jaundiced or pale.   Neurological:      General: No focal deficit present.      Mental Status: He is alert and oriented to person, place, and time.   Psychiatric:         Mood and Affect: Mood normal.         Behavior: Behavior is cooperative.            All of the data above and below has been reviewed by myself and any further interpretations will be reflected in the assessment and plan.   The data includes review of external notes, and independent interpretation of lab results, procedures, x-rays, and imaging reports.      Assessment:  Crohn's disease of small and large intestines with complication  -     Calprotectin, Stool; Future; Expected date: 09/05/2024  -     CBC Auto Differential; Future; Expected date: 09/05/2024  -     Comprehensive Metabolic Panel; Future; Expected date: 09/05/2024  -     C-Reactive Protein; Future; Expected date: 09/05/2024  -     Sedimentation rate; Future; Expected date: 09/05/2024  -     Vitamin D; Future; Expected date: 09/05/2024  -     Vitamin B12; Future; Expected date: 09/05/2024  -     Transferrin; Future; Expected date: 09/05/2024  -     Reticulocytes; Future; Expected date: 09/05/2024  -     Iron, TIBC, and %Saturation; Future; Expected date: 09/05/2024  -     Ferritin; Future; Expected date: 09/05/2024  -     Folate; Future; Expected date: 09/05/2024  -     Ambulatory Referral to External Surgery  -     sod sulf-pot chloride-mag sulf (SUTAB) 1.479-0.188- 0.225 gram tablet; Take according to package instructions with indicated amount of water. No breakfast day before test. May substitute with Suprep, Clenpiq, Plenvu, Moviprep or GoLytely based on Rx plan and patient preference.  Dispense: 24 tablet; Refill: 0  -     CT Enterography Abd_Pelvis With Contrast;  Future; Expected date: 09/05/2024    Anemia, unspecified type  -     Calprotectin, Stool; Future; Expected date: 09/05/2024  -     CBC Auto Differential; Future; Expected date: 09/05/2024  -     Comprehensive Metabolic Panel; Future; Expected date: 09/05/2024  -     C-Reactive Protein; Future; Expected date: 09/05/2024  -     Sedimentation rate; Future; Expected date: 09/05/2024  -     Vitamin D; Future; Expected date: 09/05/2024  -     Vitamin B12; Future; Expected date: 09/05/2024  -     Transferrin; Future; Expected date: 09/05/2024  -     Reticulocytes; Future; Expected date: 09/05/2024  -     Iron, TIBC, and %Saturation; Future; Expected date: 09/05/2024  -     Ferritin; Future; Expected date: 09/05/2024  -     Folate; Future; Expected date: 09/05/2024  -     Ambulatory Referral to External Surgery  -     sod sulf-pot chloride-mag sulf (SUTAB) 1.479-0.188- 0.225 gram tablet; Take according to package instructions with indicated amount of water. No breakfast day before test. May substitute with Suprep, Clenpiq, Plenvu, Moviprep or GoLytely based on Rx plan and patient preference.  Dispense: 24 tablet; Refill: 0  -     CT Enterography Abd_Pelvis With Contrast; Future; Expected date: 09/05/2024    Partial small bowel obstruction    History of colon polyps  -     Ambulatory Referral to External Surgery    Crohns disease of small and large bowel, diagnosed in 2007. Small bowel resection then right hemicolectomy. Prior therapy includes prednisone, Flagyl, Pentasa, and Humira. He has been on Humira since 2007. Dr. Haile prescribes for ankylosing spondylitis. He also has psoriatic arthritis. Last prednisone use around 2022.   Patient requests [facility].     Recommendations:    Complete blood and stool tests.  Schedule upper and lower endoscopies.  Schedule CT scan (make sure [facility] does CT enterography protocol).  Continue Humira every 2 weeks.     Please notify my office if you have not been contacted within two weeks after  any procedures, submitting any samples (biopsies, blood, stool, urine, etc.) or after any imaging (X-ray, CT, MRI, etc.).       Risks, benefits, and alternatives of medical management, any associated procedures, and/or treatment discussed with the patient. Patient given opportunity to ask questions and voices understanding. Patient has elected to proceed with the recommended care modalities as discussed.    Instructed patient to notify my office if they have not been contacted within two weeks after any procedures, submitting any samples (biopsies, blood, stool, urine, etc.) or after any imaging (X-ray, CT, MRI, etc.).     Follow up in about 6 months (around 3/5/2025). With NP  1 yr OV with MD    Order summary:  Orders Placed This Encounter   Procedures    CT Enterography Abd_Pelvis With Contrast    Calprotectin, Stool    CBC Auto Differential    Comprehensive Metabolic Panel    C-Reactive Protein    Sedimentation rate    Vitamin D    Vitamin B12    Transferrin    Reticulocytes    Iron, TIBC, and %Saturation    Ferritin    Folate    Ambulatory Referral to External Surgery      This assessment, plan, and documentation was performed in collaboration with Brittni Gee NP.     This document may have been created using a voice recognition transcribing system. Incorrect words or phrases may have been missed during proofreading. Please interpret accordingly or contact me for clarification.     Ambar Blanton MD

## 2024-09-05 NOTE — LETTER
September 5, 2024        Leland Porras MD  [address] [facility] [address]             [place] [facility]  [address] [address]  Phone: [phone]  Fax: [phone]   Patient: Vince Huffman   MR Number: [id number]   YOB: 1945   Date of Visit: 9/5/2024       Dear Dr. Porras:    Thank you for referring Vince Huffman to me for evaluation. Attached you will find relevant portions of my assessment and plan of care.    If you have questions, please do not hesitate to call me. I look forward to following Vince Huffman along with you.    Sincerely,      Ambar Blanton MD            CC  No Recipients    Enclosure

## 2024-09-17 ENCOUNTER — TELEPHONE (OUTPATIENT)
Dept: GASTROENTEROLOGY | Facility: CLINIC | Age: 79
End: 2024-09-17
Payer: MEDICARE

## 2024-09-17 NOTE — TELEPHONE ENCOUNTER
Pending remainder of blood/stool results.  9/10/2024: Iron/TIBC/%sat/ferr/trnsfrrn/B12/folate wnl

## 2024-10-01 ENCOUNTER — TELEPHONE (OUTPATIENT)
Dept: GASTROENTEROLOGY | Facility: CLINIC | Age: 79
End: 2024-10-01
Payer: MEDICARE

## 2024-10-01 NOTE — TELEPHONE ENCOUNTER
----- Message from Gaby sent at 10/1/2024  8:40 AM CDT -----  Type:  Patient Returning Call    Who Called:pt  Who Left Message for Patient:nurse  Does the patient know what this is regarding?:return call/test results  Would the patient rather a call back or a response via [facility]? call  Best Call Back Number:[phone]  Additional Information: .    Thank you

## 2024-10-06 ENCOUNTER — TELEPHONE (OUTPATIENT)
Dept: GASTROENTEROLOGY | Facility: CLINIC | Age: 79
End: 2024-10-06
Payer: MEDICARE

## 2024-10-07 ENCOUNTER — TELEPHONE (OUTPATIENT)
Dept: GASTROENTEROLOGY | Facility: CLINIC | Age: 79
End: 2024-10-07
Payer: MEDICARE

## 2024-10-07 NOTE — TELEPHONE ENCOUNTER
Called [facility] lab at [phone] and spoke with Rohan. He advised he will fax over patient's results. Our fax number was given. DMP

## 2024-10-07 NOTE — TELEPHONE ENCOUNTER
9/11/2024 calpro 141H.  9/10/2024 Fe/T/trnsfrn/%/ferr/fol/B12 nl. vitD 22.  5/15/2024 Hb 12.5L, MCV 98.1, CBC onl. Cr 1.4, CMP/FLP/TSH/PSA wnl.    Get ESR, CRP, CBC, and CMP results from our 9/2024 orders from [facility]. Were they drawn?   NBP

## 2024-10-08 NOTE — TELEPHONE ENCOUNTER
9/11/2024 calpro 141H. (On Humira 40mg Q2w.)  9/10/2024 Fe/T/trnsfrn/%/ferr/fol/B12 nl. vitD 22. Cr 1.4H, CMP onl, CRP <0.5, ESR 17, Hb 12.3L, MCV 98.2H, CBC onl.  5/15/2024 Hb 12.5L, MCV 98.1, CBC onl. Cr 1.4, CMP/FLP/TSH/PSA wnl.     Notify patient that his blood results overall look well. His stool result for inflammation is mildly elevated. Does he have his CT enterography scheduled?  NBP

## 2024-10-14 NOTE — TELEPHONE ENCOUNTER
Patient notified of results and voiced understanding. He did his CT at [facility]. 9/16/2024. Pulled from [facility]. Scanned into patient's chart and attached to this encounter. DMP

## 2024-10-15 ENCOUNTER — TELEPHONE (OUTPATIENT)
Dept: GASTROENTEROLOGY | Facility: CLINIC | Age: 79
End: 2024-10-15
Payer: MEDICARE

## 2024-10-15 DIAGNOSIS — K50.819 CROHN'S DISEASE OF SMALL AND LARGE INTESTINES WITH COMPLICATION: Primary | ICD-10-CM

## 2024-10-16 NOTE — TELEPHONE ENCOUNTER
10/10/2024 Hb 12.1L, MCV 97H, CBC onl. vitD 22.3L. Trnsfrn/retic/Fe/T/%/CMP/B12/ferr/fol nl. CRP <3, ESR 7.  9/11/2024 calpro 141H. (On Humira 40mg Q2w.)  9/10/2024 Fe/T/trnsfrn/%/ferr/fol/B12 nl. vitD 22. Cr 1.4H, CMP onl, CRP <0.5, ESR 17, Hb 12.3L, MCV 98.2H, CBC onl.  5/15/2024 Hb 12.5L, MCV 98.1, CBC onl. Cr 1.4, CMP/FLP/TSH/PSA wnl.    9/16/2024 CTE: wall thickening of TI w/mild stranding, no SB dil, A-F levels in SB and LB, renal/liver cysts.    Notify patient that I reviewed his CT results.  There are signs of continued inflammation of this small bowel where his prior surgery was.  It would be useful to know if the Humira level is at a good level or low. I recommend he have blood drawn the day of his next injection but BEFORE he has the injection. It will check for Humira drug level and for antibodies to Humira. Order signed.  JARRETT

## 2024-10-24 NOTE — TELEPHONE ENCOUNTER
Patient was notified of results/remarks and voiced understanding. Lab order was faxed to [facility] in [place] at [phone] per patient's request. DMP

## 2024-11-17 ENCOUNTER — TELEPHONE (OUTPATIENT)
Dept: GASTROENTEROLOGY | Facility: CLINIC | Age: 79
End: 2024-11-17
Payer: MEDICARE

## 2024-11-17 DIAGNOSIS — D84.821 IMMUNOSUPPRESSION DUE TO DRUG THERAPY: ICD-10-CM

## 2024-11-17 DIAGNOSIS — Z79.899 IMMUNOSUPPRESSION DUE TO DRUG THERAPY: ICD-10-CM

## 2024-11-17 DIAGNOSIS — K50.819 CROHN'S DISEASE OF SMALL AND LARGE INTESTINES WITH COMPLICATION: Primary | ICD-10-CM

## 2024-11-17 NOTE — TELEPHONE ENCOUNTER
11/4/2024 ADA trough 6, Ab <10. (On Humira 40mg Q2w)    Notify patient that his Humira level was good and he does not have antibodies built up against the Humira.  This tells us we need to switch his Crohn's therapy away from all medicines in the Humira family.  I recommend Stelara. It is also used to treat psoriatic arthritis and has been shown to help with ankylosing spondylitis.  If he agrees then we can begin the PA process.  Will need updated TB and hep B labs.  Another option is increasing his Humira to weekly and we can see if his Crohn's ulcers are healed at his 2/2025 procedure (I feel this is unlikely but given his medical history we can try to maximize his Humira).   Separately, has he received his shingles vaccines with Shingrix (two shots of the dead shingles vaccine)? If not, I recommend he do so.  NBP

## 2024-11-18 NOTE — TELEPHONE ENCOUNTER
Patient was notified of NBP's remarks and voiced understanding. Patient agreed to switch Crohn's therapy to Stelara. Patient requested to send TB and Hep B lab orders to [facility].     Patient said he did receive his shingles vaccines already with Dr. Reed. Maybe 2-3 years ago. DMP

## 2024-11-27 ENCOUNTER — TELEPHONE (OUTPATIENT)
Dept: GASTROENTEROLOGY | Facility: CLINIC | Age: 79
End: 2024-11-27
Payer: MEDICARE

## 2024-11-27 RX ORDER — SODIUM CHLORIDE 0.9 % (FLUSH) 0.9 %
10 SYRINGE (ML) INJECTION
OUTPATIENT
Start: 2024-11-27

## 2024-11-27 RX ORDER — METHYLPREDNISOLONE SOD SUCC 125 MG
40 VIAL (EA) INJECTION
OUTPATIENT
Start: 2024-11-27

## 2024-11-27 RX ORDER — HEPARIN 100 UNIT/ML
500 SYRINGE INTRAVENOUS
OUTPATIENT
Start: 2024-11-27

## 2024-11-27 RX ORDER — EPINEPHRINE 1 MG/ML
0.3 INJECTION, SOLUTION, CONCENTRATE INTRAVENOUS
OUTPATIENT
Start: 2024-11-27

## 2024-11-27 RX ORDER — ACETAMINOPHEN 325 MG/1
650 TABLET ORAL
OUTPATIENT
Start: 2024-11-27

## 2024-11-27 RX ORDER — IPRATROPIUM BROMIDE AND ALBUTEROL SULFATE 2.5; .5 MG/3ML; MG/3ML
3 SOLUTION RESPIRATORY (INHALATION)
OUTPATIENT
Start: 2024-11-27

## 2024-11-27 RX ORDER — DIPHENHYDRAMINE HYDROCHLORIDE 50 MG/ML
25 INJECTION INTRAMUSCULAR; INTRAVENOUS
OUTPATIENT
Start: 2024-11-27

## 2024-11-27 NOTE — TELEPHONE ENCOUNTER
Hep B and TB lab orders sent to [facility]. Once he has those completed, we can send him for Stelara infusion at [facility] and then once he is scheduled for the Stelara infusion, we can send maintenance Stelara injection dose to [facility]. Stelara infusion should not need PA but will still send through therapy plan.   MLC

## 2024-11-27 NOTE — TELEPHONE ENCOUNTER
Returned patients call. Lvm to speak to patient. 11/27/24 LRA         ----- Message from Jennifer sent at 11/26/2024 11:50 AM CST -----  Contact: pt  Type:  Patient Returning Call    Who Called:pt  Who Left Message for Patient:An  Does the patient know what this is regarding?:missed call  Would the patient rather a call back or a response via [facility]? Call back  Best Call Back Number:[phone]  Additional Information: none

## 2024-12-06 ENCOUNTER — TELEPHONE (OUTPATIENT)
Dept: GASTROENTEROLOGY | Facility: CLINIC | Age: 79
End: 2024-12-06
Payer: MEDICARE

## 2024-12-06 NOTE — TELEPHONE ENCOUNTER
Pt stated he called because he had a miss call.. staff advised him we are returning his call. He stated he's not sure what the call was for... OTF

## 2024-12-06 NOTE — TELEPHONE ENCOUNTER
----- Message from Jennifer sent at 12/6/2024 10:34 AM CST -----  Contact: pt  Type:  Patient Returning Call    Who Called:pt  Who Left Message for Patient:juana  Does the patient know what this is regarding?:missed call  Would the patient rather a call back or a response via [facility]? Call back  Best Call Back Number:[phone]  Additional Information: none

## 2024-12-11 ENCOUNTER — PATIENT MESSAGE (OUTPATIENT)
Dept: GASTROENTEROLOGY | Facility: CLINIC | Age: 79
End: 2024-12-11
Payer: MEDICARE

## 2024-12-11 DIAGNOSIS — K50.819 CROHN'S DISEASE OF SMALL AND LARGE INTESTINES WITH COMPLICATION: Primary | ICD-10-CM

## 2024-12-16 RX ORDER — USTEKINUMAB 90 MG/ML
90 INJECTION, SOLUTION SUBCUTANEOUS
Qty: 2 ML | Refills: 2 | Status: SHIPPED | OUTPATIENT
Start: 2024-12-16

## 2025-01-23 ENCOUNTER — TELEPHONE (OUTPATIENT)
Dept: GASTROENTEROLOGY | Facility: CLINIC | Age: 80
End: 2025-01-23
Payer: MEDICARE

## 2025-01-23 NOTE — TELEPHONE ENCOUNTER
----- Message from Michelle sent at 1/23/2025  1:31 PM CST -----  Contact: self  Type:  Patient Returning Call    Who Called:Vince Huffman  Who Left Message for Patient:unsure  Does the patient know what this is regarding?:unsure  Would the patient rather a call back or a response via [facility]? Call back  Best Call Back Number:[phone]  Additional Information: n/a

## 2025-01-23 NOTE — TELEPHONE ENCOUNTER
Staff return call to pt.. he stated someone call him him yesterday.. staff looked in pt chart and no one called from the office.  Pt asked date of colonoscopy.. 02-  OTF

## 2025-02-11 ENCOUNTER — OUTSIDE PLACE OF SERVICE (OUTPATIENT)
Dept: GASTROENTEROLOGY | Facility: CLINIC | Age: 80
End: 2025-02-11

## 2025-02-11 LAB — CRC RECOMMENDATION EXT: NORMAL

## 2025-02-11 PROCEDURE — 45380 COLONOSCOPY AND BIOPSY: CPT | Mod: ,,, | Performed by: INTERNAL MEDICINE

## 2025-02-11 PROCEDURE — 43239 EGD BIOPSY SINGLE/MULTIPLE: CPT | Mod: 51,,, | Performed by: INTERNAL MEDICINE

## 2025-02-17 ENCOUNTER — RESULTS FOLLOW-UP (OUTPATIENT)
Dept: GASTROENTEROLOGY | Facility: CLINIC | Age: 80
End: 2025-02-17
Payer: MEDICARE

## 2025-02-18 NOTE — TELEPHONE ENCOUNTER
S/W pt & notified him his colon Bx looked well w/o inflammation. His surgery site had some inflammation which is likely stable from his prior. Keep his follow up OV with us 3/3/25. Also informed him we will likely get routine blood work w/celiac Ab and plan for blood/stool/imaging prior to his 9/2025 OV with NBP. -JOBYP

## 2025-02-18 NOTE — TELEPHONE ENCOUNTER
2/11/2025 DBx non-sp ditis, GBx wnl, anas Bx (stenosis/ulcer, prox ?polyps) acute non-sp colitis w/crypt abscess, CBx nl. On Stelara since 12/19/2024.    Notify patient that his colon Bx looked well w/o inflammation. His surgery site had some inflammation which is likely stable from his prior.  Keep his follow up OV with us. Will likely get routine blood work w/celiac Ab and plan for blood/stool/imaging prior to his 9/2025 OV with me.  NBP

## 2025-03-03 ENCOUNTER — OFFICE VISIT (OUTPATIENT)
Dept: GASTROENTEROLOGY | Facility: CLINIC | Age: 80
End: 2025-03-03
Payer: MEDICARE

## 2025-03-03 ENCOUNTER — TELEPHONE (OUTPATIENT)
Dept: GASTROENTEROLOGY | Facility: CLINIC | Age: 80
End: 2025-03-03

## 2025-03-03 VITALS
HEIGHT: 69 IN | OXYGEN SATURATION: 97 % | HEART RATE: 72 BPM | DIASTOLIC BLOOD PRESSURE: 70 MMHG | SYSTOLIC BLOOD PRESSURE: 122 MMHG | BODY MASS INDEX: 23.76 KG/M2 | WEIGHT: 160.38 LBS

## 2025-03-03 DIAGNOSIS — K29.80 DUODENITIS DETERMINED BY BIOPSY: ICD-10-CM

## 2025-03-03 DIAGNOSIS — D64.9 ANEMIA, UNSPECIFIED TYPE: ICD-10-CM

## 2025-03-03 DIAGNOSIS — K50.819 CROHN'S DISEASE OF SMALL AND LARGE INTESTINES WITH COMPLICATION: Primary | ICD-10-CM

## 2025-03-03 DIAGNOSIS — E55.9 VITAMIN D DEFICIENCY: ICD-10-CM

## 2025-03-03 DIAGNOSIS — Z86.0100 HISTORY OF COLON POLYPS: ICD-10-CM

## 2025-03-03 PROCEDURE — 99214 OFFICE O/P EST MOD 30 MIN: CPT | Mod: S$PBB,,,

## 2025-03-03 RX ORDER — CALCIUM CARBONATE 200(500)MG
2 TABLET,CHEWABLE ORAL
COMMUNITY

## 2025-03-03 RX ORDER — ACETAMINOPHEN 500 MG
500 TABLET ORAL EVERY 6 HOURS PRN
COMMUNITY

## 2025-03-03 NOTE — PATIENT INSTRUCTIONS
Continue Stelara injections every 8 weeks.   Complete blood and stool tests.   Take Vitamin D over the counter 5,000 units daily.       If any tests, procedures, or imaging has been ordered and you are not contacted to schedule within 1-2 weeks, then you may call the central scheduling department directly at [phone].   Please notify my office if you have not been contacted within two weeks after any procedures, submitting any samples (biopsies, blood, stool, urine, etc.) or after any imaging (X-ray, CT, MRI, etc.).

## 2025-03-03 NOTE — LETTER
March 3, 2025        Leland Porras MD  [address] [facility] [address]             [place] [facility]  [address] [address]  Phone: [phone]  Fax: [phone]   Patient: Vince Huffman   MR Number: [id number]   YOB: 1945   Date of Visit: 3/3/2025       Dear Dr. Porras:    Thank you for referring Vince Huffman to me for evaluation. Attached you will find relevant portions of my assessment and plan of care.    If you have questions, please do not hesitate to call me. I look forward to following Vince Huffman along with you.    Sincerely,      Brittni Gee, NP            CC  No Recipients    Enclosure

## 2025-03-03 NOTE — PROGRESS NOTES
Clinic Note    Reason for visit:  The primary encounter diagnosis was Crohn's disease of small and large intestines with complication. Diagnoses of Duodenitis determined by biopsy, Anemia, unspecified type, Vitamin D deficiency, and History of colon polyps were also pertinent to this visit.    PCP: Leland Porras       HPI:  This is a 79 y.o. male who is here for a follow up.  Patient with Crohn's disease of small and large bowel. Switched from Humira to Stelara in 12/2024 and now on Stelara injections every 8 weeks. He is doing well. Prior to Stelara he was taking Imodium daily but has not had to take any with Stelara. Taking a multivitamin. Has Vit D at home but not taking. No NSAID use. Weight stable.     Also has psoriatic arthritis and ankylosing spondylitis. Followed by Dr. Haile. No joint pain.      He had a right hemicolectomy in 2013 with Dr. Elian Carson due to recurrent ileal stricture. He had thickened ileum 4.5 cm distal ileum resected with 2 in (inches?) hepatic flexure. Complicated by abscess that was drained.     In 2007 he had ileocolonic resection with cecum and appendix with Dr. Hurd. 30.5 cm of small bowel removed with path c/w active small bowel infl w/ulceration and necrosis c/w Crohn's disease. With Dr. Elian Hurd.     Prior Crohn's therapy: prednisone, Flagyl, Pentasa and Humira q 2 wk for Crohn's. Now on Stelara.      Vaccines: completed shingles vaccine x 2.     Labs:  12/2024: HBVcAb/HBVsAb/HBVsAg//TB Gold neg.   11/4/2024 ADA trough 6, Ab <10. (On Humira 40mg Q2w)   10/10/2024 Hb 12.1L, MCV 97H, CBC onl. vitD 22.3L. Trnsfrn/retic/Fe/T/%/CMP/B12/ferr/fol nl. CRP <3, ESR 7.   9/11/2024 calpro 141H. (On Humira 40mg Q2w.)   9/10/2024 Fe/T/trnsfrn/%/ferr/fol/B12 nl. vitD 22. Cr 1.4H, CMP onl, CRP <0.5, ESR 17, Hb 12.3L, MCV 98.2H, CBC onl.     5/15/2024 Hb 12.5L, MCV 98.1, CBC onl. Cr 1.4, CMP/FLP/TSH/PSA wnl.   1/13/2023 TB Gold negative. Hgb 12.5L, MCV 97.4H, CBC onl, Cr 1.3H, CMP  onl     Imagin2024 CTE: wall thickening of TI w/mild stranding, no SB dil, A-F levels in SB and LB, renal/liver cysts.   CT AP IV 2018: post op changes of R hemicolectomy w/wall thickening and adjacent infl change involving a distal small bowel loop at right lower anastomotic site w/upstream dilatation of distal SB loops. Possible developing partial SBO. Cholelithiasis. 1.6 cm right renal lesion. Rec renal US. Simple hepatic cyst.   DEXA scan 2018: lumbar spine bone mineral density measurements in normal range. Hip bone density measurements in range of osteopenia. Repeat DEXA in 2-3 yr.      Procedures:  EGD/Colonoscopy 2025: Small HH, DBx non-sp ditis, GBx wnl, anas Bx (stenosis/ulcer, prox ?polyps) acute non-sp colitis w/crypt abscess, CBx nl. Ileocolonic stricture/ulcer. Right colon ileocolonic anastomosis w/associated inflammatory appearing polyps proximal to the anastomosis, unable to traverse anastomosis with pediatric colonoscope. Sigmoid diverticulosis. On Stelara since 2024.     Colonoscopy 2021: Ileocolonic anastomosis. TI ulceration. TIBx chronic active colitis w/ulcer and associated granulation tissue/fibrinopurulent exudate. HSV-1/HSV-2/CMV neg. CBx benign w/intramucosal agg. 1 hyp. Repeat short Flagyl course. Repeat colon in 2 yr.   Colonoscopy  5/15/2018: Ileocolonic anastomosis Bx chronic active colitis w/ulceration. ACBx chr inactive colitis, TCBx focal active colitis. LCBx focal active colitis, Rectum Bx nl.  EGD 2016: Small sliding HH. DBx focal peptic-type duodenopathy in otherwise unremarkable mucosa. Small foci of gastric foveolar metaplasia. No evidence of celiac sprue.        Review of Systems   Constitutional:  Negative for diaphoresis, fatigue, fever and unexpected weight change.   HENT:  Negative for hearing loss, mouth sores, postnasal drip, sore throat and trouble swallowing.    Eyes:  Negative for pain, discharge and eye dryness.   Respiratory:  Negative for  apnea, cough, choking, chest tightness, shortness of breath and wheezing.    Cardiovascular:  Negative for chest pain, palpitations and leg swelling.   Gastrointestinal:  Negative for abdominal distention, abdominal pain, anal bleeding, blood in stool, change in bowel habit, constipation, diarrhea, nausea, rectal pain, vomiting, reflux and fecal incontinence.   Genitourinary:  Negative for bladder incontinence, dysuria and hematuria.   Musculoskeletal:  Negative for arthralgias, back pain and joint swelling.   Integumentary:  Negative for color change and rash.   Allergic/Immunologic: Negative for environmental allergies and food allergies.   Neurological:  Negative for seizures and headaches.   Hematological:  Negative for adenopathy. Does not bruise/bleed easily.        Past Medical History:   Diagnosis Date    Ankylosing spondylitis of unspecified sites in spine     Anxiety disorder, unspecified     BMI 23.0-23.9, adult     Crohn's disease     Gout, unspecified     Heart disease     History of skin cancer 2001    squamous cell carcinoma left cheek    Hypertension     Hypothyroidism, unspecified     Psoriasis     Psoriatic arthritis      Past Surgical History:   Procedure Laterality Date    ANGIOGRAM, CORONARY, WITH LEFT HEART CATHETERIZATION      APPENDECTOMY      APPENDECTOMY  2007    COLONOSCOPY N/A     ENDOSCOPY N/A     RIGHT HEMICOLECTOMY  2013    SMALL INTESTINE SURGERY  2007    30.5 cm of small bowel removed     Family History   Problem Relation Name Age of Onset    Colon cancer Neg Hx      Liver cancer Neg Hx      Liver disease Neg Hx      Pancreatic cancer Neg Hx      Stomach cancer Neg Hx      Crohn's disease Neg Hx      Ulcerative colitis Neg Hx      Esophageal cancer Neg Hx      Throat cancer Neg Hx      Celiac disease Neg Hx       Social History[1]  Review of patient's allergies indicates:  No Known Allergies   Medication List with Changes/Refills   Current Medications    ACETAMINOPHEN (TYLENOL) 500  "MG TABLET    Take 500 mg by mouth every 6 (six) hours as needed.    ALLOPURINOL (ZYLOPRIM) 100 MG TABLET    Take 100 mg by mouth once daily.      ALPRAZOLAM (XANAX XR) 0.5 MG TB24    Take 0.5 mg by mouth 2 (two) times daily.    AMLODIPINE-BENAZEPRIL (LOTREL) 10-40 MG PER CAPSULE        ATORVASTATIN (LIPITOR) 40 MG TABLET    Take 40 mg by mouth every evening.    CALCIUM CARBONATE (TUMS) 200 MG CALCIUM (500 MG) CHEWABLE TABLET    Take 2 tablets by mouth.    KLOR-CON M20 20 MEQ TABLET    20 mEq once daily.    METOPROLOL SUCCINATE (TOPROL-XL) 25 MG 24 HR TABLET    Take 25 mg by mouth every evening.    SPIRONOLACTONE (ALDACTONE) 50 MG TABLET    Take 50 mg by mouth once daily.    USTEKINUMAB (STELARA) 90 MG/ML SYRG SYRINGE    Inject 1 mL (90 mg total) into the skin every 8 weeks.   Discontinued Medications    SOD SULF-POT CHLORIDE-MAG SULF (SUTAB) 1.479-0.188- 0.225 GRAM TABLET    Take according to package instructions with indicated amount of water. No breakfast day before test. May substitute with Suprep, Clenpiq, Plenvu, Moviprep or GoLytely based on Rx plan and patient preference.         Vital Signs:  /70 (BP Location: Left arm, Patient Position: Sitting)   Pulse 72   Ht 5' 9" (1.753 m)   Wt 72.8 kg (160 lb 6.4 oz)   SpO2 97%   BMI 23.69 kg/m²        Physical Exam  Constitutional:       General: He is not in acute distress.     Appearance: Normal appearance. He is well-developed. He is not ill-appearing or toxic-appearing.   HENT:      Head: Normocephalic and atraumatic.      Nose: Nose normal.      Mouth/Throat:      Mouth: Mucous membranes are moist.      Pharynx: Oropharynx is clear. No oropharyngeal exudate or posterior oropharyngeal erythema.   Eyes:      General: Lids are normal. No scleral icterus.        Right eye: No discharge.         Left eye: No discharge.      Conjunctiva/sclera: Conjunctivae normal.   Cardiovascular:      Rate and Rhythm: Normal rate and regular rhythm.      Pulses:           " Radial pulses are 2+ on the right side and 2+ on the left side.   Pulmonary:      Effort: Pulmonary effort is normal. No respiratory distress.      Breath sounds: No stridor. No wheezing.   Abdominal:      General: Bowel sounds are normal. There is no distension.      Palpations: Abdomen is soft. There is no fluid wave, hepatomegaly, splenomegaly or mass.      Tenderness: There is no abdominal tenderness. There is no guarding or rebound.   Lymphadenopathy:      Cervical: No cervical adenopathy.   Skin:     General: Skin is warm and dry.      Capillary Refill: Capillary refill takes less than 2 seconds.      Coloration: Skin is not cyanotic, jaundiced or pale.   Neurological:      General: No focal deficit present.      Mental Status: He is alert and oriented to person, place, and time.   Psychiatric:         Mood and Affect: Mood normal.         Behavior: Behavior is cooperative.            All of the data above and below has been reviewed by myself and any further interpretations will be reflected in the assessment and plan.   The data includes review of external notes, and independent interpretation of lab results, procedures, x-rays, and imaging reports.      Assessment:  Crohn's disease of small and large intestines with complication  -     Calprotectin, Stool; Future; Expected date: 03/03/2025  -     CBC Auto Differential; Future; Expected date: 03/03/2025  -     Comprehensive Metabolic Panel; Future; Expected date: 03/03/2025  -     C-Reactive Protein; Future; Expected date: 03/03/2025  -     Sedimentation rate; Future; Expected date: 03/03/2025  -     Vitamin D; Future; Expected date: 03/03/2025  -     Endomysial antibody, IgA titer; Future; Expected date: 03/03/2025  -     Gliadin (Deamidated) Ab (IgG); Future; Expected date: 03/03/2025  -     Gliadin (Deamidated) Ab IgA; Future; Expected date: 03/03/2025  -     IgA; Future; Expected date: 03/03/2025  -     Tissue Transglutaminase, IgA; Future; Expected  date: 03/03/2025    Duodenitis determined by biopsy  -     Calprotectin, Stool; Future; Expected date: 03/03/2025  -     CBC Auto Differential; Future; Expected date: 03/03/2025  -     Comprehensive Metabolic Panel; Future; Expected date: 03/03/2025  -     C-Reactive Protein; Future; Expected date: 03/03/2025  -     Sedimentation rate; Future; Expected date: 03/03/2025  -     Vitamin D; Future; Expected date: 03/03/2025  -     Endomysial antibody, IgA titer; Future; Expected date: 03/03/2025  -     Gliadin (Deamidated) Ab (IgG); Future; Expected date: 03/03/2025  -     Gliadin (Deamidated) Ab IgA; Future; Expected date: 03/03/2025  -     IgA; Future; Expected date: 03/03/2025  -     Tissue Transglutaminase, IgA; Future; Expected date: 03/03/2025    Anemia, unspecified type    Vitamin D deficiency  -     Calprotectin, Stool; Future; Expected date: 03/03/2025  -     CBC Auto Differential; Future; Expected date: 03/03/2025  -     Comprehensive Metabolic Panel; Future; Expected date: 03/03/2025  -     C-Reactive Protein; Future; Expected date: 03/03/2025  -     Sedimentation rate; Future; Expected date: 03/03/2025  -     Vitamin D; Future; Expected date: 03/03/2025  -     Endomysial antibody, IgA titer; Future; Expected date: 03/03/2025  -     Gliadin (Deamidated) Ab (IgG); Future; Expected date: 03/03/2025  -     Gliadin (Deamidated) Ab IgA; Future; Expected date: 03/03/2025  -     IgA; Future; Expected date: 03/03/2025  -     Tissue Transglutaminase, IgA; Future; Expected date: 03/03/2025    History of colon polyps      Crohns disease of small and large bowel, diagnosed in 2007. Small bowel resection then right hemicolectomy. Prior therapy includes prednisone, Flagyl, Pentasa, and Humira. Had active inflammation on Humira. Transitioned to Stelara 12/2024. On Stelara every 8 weeks. Doing well.   Will plan for blood/stool tests prior to next OV. Will also order celiac serology. Would also plan for repeat imaging. May  order from next OV once we have updated kidney function etc.  Vit D deficiency. Rec Vit D 5,000 units OTC daily.  Dr. Haile follows him for ankylosing spondylitis. He also has psoriatic arthritis. Last prednisone use around 2022.     Recommendations:    Continue Stelara injections every 8 weeks.   Complete blood and stool tests.   Take Vitamin D over the counter 5,000 units daily.     If any tests, procedures, or imaging has been ordered and you are not contacted to schedule within 1-2 weeks, then you may call the central scheduling department directly at [phone].     Risks, benefits, and alternatives of medical management, any associated procedures, and/or treatment discussed with the patient. Patient given opportunity to ask questions and voices understanding. Patient has elected to proceed with the recommended care modalities as discussed.    Keep follow up with Dr. Blanton on 9/23/2025.     Order summary:  Orders Placed This Encounter   Procedures    Calprotectin, Stool    CBC Auto Differential    Comprehensive Metabolic Panel    C-Reactive Protein    Sedimentation rate    Vitamin D    Endomysial antibody, IgA titer    Gliadin (Deamidated) Ab (IgG)    Gliadin (Deamidated) Ab IgA    IgA    Tissue Transglutaminase, IgA          Instructed patient to notify my office if they have not been contacted within two weeks after any procedures, submitting any samples (biopsies, blood, stool, urine, etc.) or after any imaging (X-ray, CT, MRI, etc.).      Brittni Gee NP    This document may have been created using a voice recognition transcribing system. Incorrect words or phrases may have been missed during proofreading. Please interpret accordingly or contact me for clarification.          [1]   Social History  Tobacco Use    Smoking status: Never    Smokeless tobacco: Never   Substance Use Topics    Alcohol use: No    Drug use: No

## 2025-03-06 LAB
ABS NRBC COUNT: 0 X 10 3/UL (ref 0–0.01)
ABSOLUTE BASOPHIL: 0.03 X 10 3/UL (ref 0–0.22)
ABSOLUTE EOSINOPHIL: 0.21 X 10 3/UL (ref 0.04–0.54)
ABSOLUTE IMMATURE GRAN: 0.02 X 10 3/UL (ref 0–0.04)
ABSOLUTE LYMPHOCYTE: 2.1 X 10 3/UL (ref 0.86–4.75)
ABSOLUTE MONOCYTE: 0.71 X 10 3/UL (ref 0.22–1.08)
ADDITIONAL TESTING: NORMAL
ALBUMIN SERPL-MCNC: 4 G/DL (ref 3.5–5.2)
ALBUMIN/GLOB SERPL ELPH: 1.4 {RATIO} (ref 1–2.7)
ALP ISOS SERPL LEV INH-CCNC: 99 U/L (ref 40–130)
ALT (SGPT): 17 U/L (ref 0–41)
ANION GAP SERPL CALC-SCNC: 11 MMOL/L (ref 8–17)
AST SERPL-CCNC: 20 U/L (ref 0–40)
BASOPHILS NFR BLD: 0.3 % (ref 0.2–1.2)
BILIRUBIN, TOTAL: 0.79 MG/DL (ref 0–1.2)
BUN/CREAT SERPL: 11.1 (ref 6–20)
CALCIUM SERPL-MCNC: 9.4 MG/DL (ref 8.6–10.2)
CARBON DIOXIDE, CO2: 28 MMOL/L (ref 22–29)
CHLORIDE: 101 MMOL/L (ref 98–107)
CREAT SERPL-MCNC: 1.21 MG/DL (ref 0.7–1.2)
CRP QUALITATIVE: NEGATIVE MG/L
CRP QUANTITATIVE: <3 MG/L
EOSINOPHIL NFR BLD: 2.4 % (ref 0.7–7)
GFR ESTIMATION: 61.29 ML/MIN/1.73M2
GLIADIN AB IGA, DEAMINATED: NORMAL
GLIADIN AB IGG, DEAMINATED: NORMAL
GLOBULIN: 2.9 G/DL (ref 1.5–4.5)
GLUCOSE: 92 MG/DL (ref 82–115)
HCT VFR BLD AUTO: 33.6 % (ref 42–52)
HGB BLD-MCNC: 11.4 G/DL (ref 14–18)
IGA SERPL-MCNC: 420 MG/DL (ref 70–400)
IMMATURE GRANULOCYTES: 0.2 % (ref 0–0.5)
LYMPHOCYTES NFR BLD: 24.4 % (ref 19.3–53.1)
MCH RBC QN AUTO: 32.9 PG (ref 27–32)
MCHC RBC AUTO-ENTMCNC: 33.9 G/DL (ref 32–36)
MCV RBC AUTO: 96.8 FL (ref 80–94)
MONOCYTES NFR BLD: 8.2 % (ref 4.7–12.5)
NEUTROPHILS # BLD AUTO: 5.54 X 10 3/UL (ref 2.15–7.56)
NEUTROPHILS NFR BLD: 64.5 % (ref 34–71.1)
NUCLEATED RED BLOOD CELLS: 0 /100 WBC (ref 0–0.2)
PLATELET # BLD AUTO: 230 X 10 3/UL (ref 135–400)
POTASSIUM: 4.3 MMOL/L (ref 3.5–5.1)
PROT SNV-MCNC: 6.9 G/DL (ref 6.4–8.3)
RBC # BLD AUTO: 3.47 X 10 6/UL (ref 4.7–6.1)
RDW-SD: 46.8 FL (ref 37–54)
SED RATE (WESTERGREN): 9 MM/HR (ref 0–20)
SODIUM: 140 MMOL/L (ref 136–145)
TTG IGA: NORMAL
UREA NITROGEN (BUN): 13.4 MG/DL (ref 8–23)
VITAMIN D (25OHD): 22.8 NG/ML
WBC # BLD: 8.61 X 10 3/UL (ref 4.3–10.8)

## 2025-03-14 ENCOUNTER — RESULTS FOLLOW-UP (OUTPATIENT)
Dept: GASTROENTEROLOGY | Facility: CLINIC | Age: 80
End: 2025-03-14

## 2025-03-14 NOTE — TELEPHONE ENCOUNTER
Notify patient. I am waiting for one more celiac lab to result but so far he is negative for celiac disease. We will let him know if that changes after we receive the last result. Overall his labs look well. He is mildly anemic. We will plan to check iron studies/B12/folate with next last draw. His vitamin D level was low. He has vitamin D at home and was to start taking it again. He should make sure to take Vitamin D over the counter 5,000 units daily. His stool inflammation marker was normal and improved compared to when he was on Humira.     3/6/2025: Hgb 11.4L, MCV 96.8H, CBC onl, Cr 1.21H, CMP onl, VitD 22.8L, CRP nl, ESR 9, celiac neg pending endomysial Ab.   3/7/2025 calpro 61B. (On Stelara 90 mgQ8w since 12/2024)  MLC

## 2025-03-14 NOTE — PROGRESS NOTES
3/6/2025: Hgb 11.4L, MCV 96.8H, CBC onl, Cr 1.21H, CMP onl, VitD 22.8L, CRP nl, ESR 9, celiac neg pending endomysial Ab.   3/7/2025 calpro 61B.

## 2025-03-14 NOTE — LETTER
March 25, 2025    Vince Huffman  [address] [facility] [address]              Hi Mr. Clarke,     Our office has been trying to contact you but have been unsuccessful. Please give our office a call back at your earliest convenience.     Sincerely,    An Cosby MA  [facility]  Phone: [phone]  Fax: [phone]

## 2025-03-18 ENCOUNTER — RESULTS FOLLOW-UP (OUTPATIENT)
Dept: GASTROENTEROLOGY | Facility: CLINIC | Age: 80
End: 2025-03-18

## 2025-03-18 NOTE — TELEPHONE ENCOUNTER
May notify his last celiac lab was negative as well. Not sure if he was given any results yet.    Endomysial Ab neg.   MLC

## 2025-03-19 ENCOUNTER — TELEPHONE (OUTPATIENT)
Dept: GASTROENTEROLOGY | Facility: CLINIC | Age: 80
End: 2025-03-19
Payer: MEDICARE

## 2025-03-19 NOTE — TELEPHONE ENCOUNTER
----- Message from Gabriela sent at 3/18/2025  4:45 PM CDT -----  Contact: WILLA GALLOWAY [[id number]]  ...Type:  Patient Requesting Appointment Who Called: WILLA GALLOWAY [[id number]]Symptoms?: frequent going to the bathroom. Weak and feeling bad. No pain currently. When they would like to be seen?  This weekWould the patient rather a call back or a response via [facility]?  [facility] Call Back Number: [phone] (home) Additional Information:

## 2025-03-25 ENCOUNTER — TELEPHONE (OUTPATIENT)
Dept: GASTROENTEROLOGY | Facility: CLINIC | Age: 80
End: 2025-03-25
Payer: MEDICARE

## 2025-03-25 NOTE — TELEPHONE ENCOUNTER
----- Message from Lisa sent at 3/25/2025  3:46 PM CDT -----  Patient is calling in regards to medication ustekinumab (STELARA) 90 mg/mL Syrg syringe   [[id number]] Please call him back at [phone]

## 2025-04-01 ENCOUNTER — DOCUMENTATION ONLY (OUTPATIENT)
Dept: GASTROENTEROLOGY | Facility: CLINIC | Age: 80
End: 2025-04-01
Payer: MEDICARE

## 2025-04-13 NOTE — PROGRESS NOTES
Clinic Note    Reason for visit:  The primary encounter diagnosis was Crohn's disease of small and large intestines with complication. Diagnoses of Weight loss, Diarrhea, unspecified type, Duodenitis determined by biopsy, Anemia, unspecified type, Vitamin D deficiency, History of colon polyps, Immunosuppression due to drug therapy, Partial small bowel obstruction, Right lower quadrant pain, and Right upper quadrant pain were also pertinent to this visit.    PCP: Leland Porras       HPI:  This is a 79 y.o. male who is here for a follow up. Patient with Crohn's disease of small and large bowel. Switched from Humira to Stelara in 12/2024 and now on Stelara injections every 8 weeks. He hasn't been feeling well for past month.   He states since last OV he feels he has developed a gluten allergy. If he eats anything with gluten, he will have diarrhea. Small amount of diarrhea. He started gluten free diet a few days after his 3/3/2025 OV. He was also feeling fatigued and weak last month. No blood in stool. He is not having issues as long as he is avoiding gluten. He has lost 13# since last OV due to not being able to eat gluten. States he is not able to eat as much due to avoiding gluten. He drinks a Boost with his breakfast. He has intermittent right sided abd pain. Sometimes after eating but not always. Pain is not severe.     2/4/2025 weight 164#. Weight 3/3/2025 160#. Today 4/14/2025 weight is 147#.    States [employer] rep told him he qualified for 5$ drug but was still working on his application. He has his Stelara at his house that was shipped from the pharmacy and he did not have to pay anything for it.      States Dr. Porras recently took him off most of his medications to see if his fluid status?/kidneys improve.     Also has psoriatic arthritis and ankylosing spondylitis. Followed by Dr. Haile. No joint pain.      He had a right hemicolectomy in 2013 with Dr. Elian Carson due to recurrent ileal stricture.  He had thickened ileum 4.5 cm distal ileum resected with 2 in (inches?) hepatic flexure. Complicated by abscess that was drained.     In  he had ileocolonic resection with cecum and appendix with Dr. Hurd. 30.5 cm of small bowel removed with path c/w active small bowel infl w/ulceration and necrosis c/w Crohn's disease. With Dr. Elian Hurd.     Prior Crohn's therapy: prednisone, Flagyl, Pentasa and Humira q 2 wk for Crohn's. Now on Stelara.       Vaccines: completed shingles vaccine x 2.     Labs:  3/6/2025: Hgb 11.4L, MCV 96.8H, CBC onl, Cr 1.21H, CMP onl, VitD 22.8L, CRP nl, ESR 9, celiac neg   3/7/2025 calpro 61B. (On Stelara 90 mgQ8w since 2024: HBVcAb/HBVsAb/HBVsAg//TB Gold neg.   2024 ADA trough 6, Ab <10. (On Humira 40mg Q2w)   10/10/2024 Hb 12.1L, MCV 97H, CBC onl. vitD 22.3L. Trnsfrn/retic/Fe/T/%/CMP/B12/ferr/fol nl. CRP <3, ESR 7.   2024 calpro 141H. (On Humira 40mg Q2w.)   9/10/2024 Fe/T/trnsfrn/%/ferr/fol/B12 nl. vitD 22. Cr 1.4H, CMP onl, CRP <0.5, ESR 17, Hb 12.3L, MCV 98.2H, CBC onl.     5/15/2024 Hb 12.5L, MCV 98.1, CBC onl. Cr 1.4, CMP/FLP/TSH/PSA wnl.   2023 TB Gold negative. Hgb 12.5L, MCV 97.4H, CBC onl, Cr 1.3H, CMP onl     Imagin2024 CTE: wall thickening of TI w/mild stranding, no SB dil, A-F levels in SB and LB, renal/liver cysts.   CT AP IV 2018: post op changes of R hemicolectomy w/wall thickening and adjacent infl change involving a distal small bowel loop at right lower anastomotic site w/upstream dilatation of distal SB loops. Possible developing partial SBO. Cholelithiasis. 1.6 cm right renal lesion. Rec renal US. Simple hepatic cyst.   DEXA scan 2018: lumbar spine bone mineral density measurements in normal range. Hip bone density measurements in range of osteopenia. Repeat DEXA in 2-3 yr.      Procedures:  EGD/Colonoscopy 2025: Small HH, DBx non-sp ditis, GBx wnl, anas Bx (stenosis/ulcer, prox ?polyps) acute non-sp colitis w/crypt abscess,  CBx nl. Ileocolonic stricture/ulcer. Right colon ileocolonic anastomosis w/associated inflammatory appearing polyps proximal to the anastomosis, unable to traverse anastomosis with pediatric colonoscope. Sigmoid diverticulosis. On Stelara since 12/19/2024.      Colonoscopy 5/5/2021: Ileocolonic anastomosis. TI ulceration. TIBx chronic active colitis w/ulcer and associated granulation tissue/fibrinopurulent exudate. HSV-1/HSV-2/CMV neg. CBx benign w/intramucosal agg. 1 hyp. Repeat short Flagyl course. Repeat colon in 2 yr.   Colonoscopy  5/15/2018: Ileocolonic anastomosis Bx chronic active colitis w/ulceration. ACBx chr inactive colitis, TCBx focal active colitis. LCBx focal active colitis, Rectum Bx nl.  EGD 7/28/2016: Small sliding HH. DBx focal peptic-type duodenopathy in otherwise unremarkable mucosa. Small foci of gastric foveolar metaplasia. No evidence of celiac sprue.    Review of Systems   Constitutional:  Negative for diaphoresis, fatigue, fever and unexpected weight change.   HENT:  Negative for hearing loss, mouth sores, postnasal drip, sore throat and trouble swallowing.    Eyes:  Negative for pain, discharge and eye dryness.   Respiratory:  Negative for apnea, cough, choking, chest tightness, shortness of breath and wheezing.    Cardiovascular:  Negative for chest pain, palpitations and leg swelling.   Gastrointestinal:  Positive for diarrhea. Negative for abdominal distention, abdominal pain, anal bleeding, blood in stool, change in bowel habit, constipation, nausea, rectal pain, vomiting, reflux and fecal incontinence.   Genitourinary:  Negative for bladder incontinence, dysuria and hematuria.   Musculoskeletal:  Negative for arthralgias, back pain and joint swelling.   Integumentary:  Negative for color change and rash.   Allergic/Immunologic: Positive for food allergies. Negative for environmental allergies.   Neurological:  Negative for seizures and headaches.   Hematological:  Negative for  adenopathy. Does not bruise/bleed easily.        Past Medical History:   Diagnosis Date    Ankylosing spondylitis of unspecified sites in spine     Anxiety disorder, unspecified     BMI 21.0-21.9, adult 04/14/2025    Crohn's disease     Gout, unspecified     Heart disease     History of skin cancer 2001    squamous cell carcinoma left cheek    Hypertension     Hypothyroidism, unspecified     Psoriasis     Psoriatic arthritis      Past Surgical History:   Procedure Laterality Date    ANGIOGRAM, CORONARY, WITH LEFT HEART CATHETERIZATION      APPENDECTOMY      APPENDECTOMY  2007    COLONOSCOPY N/A     ENDOSCOPY N/A     RIGHT HEMICOLECTOMY  2013    SMALL INTESTINE SURGERY  2007    30.5 cm of small bowel removed     Family History   Problem Relation Name Age of Onset    Colon cancer Neg Hx      Liver cancer Neg Hx      Liver disease Neg Hx      Pancreatic cancer Neg Hx      Stomach cancer Neg Hx      Crohn's disease Neg Hx      Ulcerative colitis Neg Hx      Esophageal cancer Neg Hx      Throat cancer Neg Hx      Celiac disease Neg Hx       Social History[1]  Review of patient's allergies indicates:   Allergen Reactions    Gluten Diarrhea    Lactose       Medication List with Changes/Refills   Current Medications    ACETAMINOPHEN (TYLENOL) 500 MG TABLET    Take 500 mg by mouth every 6 (six) hours as needed.    ALLOPURINOL (ZYLOPRIM) 100 MG TABLET    Take 100 mg by mouth once daily.      ALPRAZOLAM (XANAX XR) 0.5 MG TB24    Take 0.5 mg by mouth 2 (two) times daily.    CALCIUM CARBONATE (TUMS) 200 MG CALCIUM (500 MG) CHEWABLE TABLET    Take 2 tablets by mouth.    USTEKINUMAB (STELARA) 90 MG/ML SYRG SYRINGE    Inject 1 mL (90 mg total) into the skin every 8 weeks.   Discontinued Medications    AMLODIPINE-BENAZEPRIL (LOTREL) 10-40 MG PER CAPSULE        ATORVASTATIN (LIPITOR) 40 MG TABLET    Take 40 mg by mouth every evening.    KLOR-CON M20 20 MEQ TABLET    20 mEq once daily.    METOPROLOL SUCCINATE (TOPROL-XL) 25 MG 24 HR  "TABLET    Take 25 mg by mouth every evening.    SPIRONOLACTONE (ALDACTONE) 50 MG TABLET    Take 50 mg by mouth once daily.         Vital Signs:  BP (!) 144/75   Pulse 68   Resp 16   Ht 5' 9" (1.753 m)   Wt 66.7 kg (147 lb)   SpO2 99%   BMI 21.71 kg/m²        Physical Exam  Constitutional:       General: He is not in acute distress.     Appearance: Normal appearance. He is well-developed. He is not ill-appearing or toxic-appearing.   HENT:      Head: Normocephalic and atraumatic.      Nose: Nose normal.      Mouth/Throat:      Mouth: Mucous membranes are moist.      Pharynx: Oropharynx is clear. No oropharyngeal exudate or posterior oropharyngeal erythema.   Eyes:      General: Lids are normal. No scleral icterus.        Right eye: No discharge.         Left eye: No discharge.      Conjunctiva/sclera: Conjunctivae normal.   Cardiovascular:      Rate and Rhythm: Normal rate and regular rhythm.      Pulses:           Radial pulses are 2+ on the right side and 2+ on the left side.   Pulmonary:      Effort: Pulmonary effort is normal. No respiratory distress.      Breath sounds: No stridor. No wheezing.   Abdominal:      General: Bowel sounds are normal. There is no distension.      Palpations: Abdomen is soft. There is no fluid wave, hepatomegaly, splenomegaly or mass.      Tenderness: There is no abdominal tenderness. There is no guarding or rebound.   Lymphadenopathy:      Cervical: No cervical adenopathy.   Skin:     General: Skin is warm and dry.      Capillary Refill: Capillary refill takes less than 2 seconds.      Coloration: Skin is not cyanotic, jaundiced or pale.   Neurological:      General: No focal deficit present.      Mental Status: He is alert and oriented to person, place, and time.   Psychiatric:         Mood and Affect: Mood normal.         Behavior: Behavior is cooperative.            All of the data above and below has been reviewed by myself and any further interpretations will be reflected " in the assessment and plan.   The data includes review of external notes, and independent interpretation of lab results, procedures, x-rays, and imaging reports.      Assessment:  Crohn's disease of small and large intestines with complication  -     CBC Auto Differential; Future; Expected date: 04/14/2025  -     Comprehensive Metabolic Panel; Future; Expected date: 04/14/2025  -     C-Reactive Protein; Future; Expected date: 04/14/2025  -     Sedimentation rate; Future; Expected date: 04/14/2025  -     Ferritin; Future; Expected date: 04/14/2025  -     Iron, TIBC, and %Saturation; Future; Expected date: 04/14/2025  -     Reticulocytes; Future; Expected date: 04/14/2025  -     Vitamin B12; Future; Expected date: 04/14/2025  -     Transferrin; Future; Expected date: 04/14/2025  -     TSH w/reflex to FT4; Future; Expected date: 04/14/2025  -     Folate; Future; Expected date: 04/14/2025  -     Calprotectin, Stool; Future; Expected date: 04/14/2025  -     Clostridium diff Toxin/GDH w/ reflex PCR; Future; Expected date: 04/14/2025    Weight loss  -     CBC Auto Differential; Future; Expected date: 04/14/2025  -     Comprehensive Metabolic Panel; Future; Expected date: 04/14/2025  -     C-Reactive Protein; Future; Expected date: 04/14/2025  -     Sedimentation rate; Future; Expected date: 04/14/2025  -     Ferritin; Future; Expected date: 04/14/2025  -     Iron, TIBC, and %Saturation; Future; Expected date: 04/14/2025  -     Reticulocytes; Future; Expected date: 04/14/2025  -     Vitamin B12; Future; Expected date: 04/14/2025  -     Transferrin; Future; Expected date: 04/14/2025  -     TSH w/reflex to FT4; Future; Expected date: 04/14/2025  -     Folate; Future; Expected date: 04/14/2025  -     Calprotectin, Stool; Future; Expected date: 04/14/2025    Diarrhea, unspecified type  -     Calprotectin, Stool; Future; Expected date: 04/14/2025  -     Clostridium diff Toxin/GDH w/ reflex PCR; Future; Expected date:  04/14/2025    Duodenitis determined by biopsy    Anemia, unspecified type  -     CBC Auto Differential; Future; Expected date: 04/14/2025  -     Comprehensive Metabolic Panel; Future; Expected date: 04/14/2025  -     C-Reactive Protein; Future; Expected date: 04/14/2025  -     Sedimentation rate; Future; Expected date: 04/14/2025  -     Ferritin; Future; Expected date: 04/14/2025  -     Iron, TIBC, and %Saturation; Future; Expected date: 04/14/2025  -     Reticulocytes; Future; Expected date: 04/14/2025  -     Vitamin B12; Future; Expected date: 04/14/2025  -     Transferrin; Future; Expected date: 04/14/2025  -     TSH w/reflex to FT4; Future; Expected date: 04/14/2025  -     Folate; Future; Expected date: 04/14/2025    Vitamin D deficiency    History of colon polyps    Immunosuppression due to drug therapy    Partial small bowel obstruction    Right lower quadrant pain    Right upper quadrant pain    Crohns disease of small and large bowel, diagnosed in 2007. Small bowel resection then right hemicolectomy. Prior therapy includes prednisone, Flagyl, Pentasa, and Humira. Had active inflammation on Humira. Transitioned to Stelara 12/2024. On Stelara every 8 weeks. Was doing well clinically. In 3/2025 he noticed diarrhea after eating gluten. Doing well now that he is avoiding gluten weight loss of 13 pounds in one month with 20# loss in 2 months. Attributes this to not eating as much due to avoiding gluten. Anemia on prior labs. Will get iron studies with B12/folate. Will likely need updated imaging but will review with Dr. Blanton. Will get updated calpro and check for C diff. 3/7/2025 calpro was 61. Celiac serology negative.   Vit D deficiency. Rec Vit D 5,000 units OTC daily.  Dr. Haile follows him for ankylosing spondylitis. He also has psoriatic arthritis. Last prednisone use around 2022       Recommendations:    Complete blood and stool tests.   Continue Stelara every 8 weeks.  Drink Boost or Ensure after each  meal.    If any tests, procedures, or imaging has been ordered and you are not contacted to schedule within 1-2 weeks, then you may call the central scheduling department directly at [phone].     Risks, benefits, and alternatives of medical management, any associated procedures, and/or treatment discussed with the patient. Patient given opportunity to ask questions and voices understanding. Patient has elected to proceed with the recommended care modalities as discussed.    Keep follow up with Dr. Blanton.     Order summary:  Orders Placed This Encounter   Procedures    Clostridium diff Toxin/GDH w/ reflex PCR    CBC Auto Differential    Comprehensive Metabolic Panel    C-Reactive Protein    Sedimentation rate    Ferritin    Iron, TIBC, and %Saturation    Reticulocytes    Vitamin B12    Transferrin    TSH w/reflex to FT4    Folate    Calprotectin, Stool          Instructed patient to notify my office if they have not been contacted within two weeks after any procedures, submitting any samples (biopsies, blood, stool, urine, etc.) or after any imaging (X-ray, CT, MRI, etc.).      Brittni Gee NP    This document may have been created using a voice recognition transcribing system. Incorrect words or phrases may have been missed during proofreading. Please interpret accordingly or contact me for clarification.          [1]   Social History  Tobacco Use    Smoking status: Never    Smokeless tobacco: Never   Substance Use Topics    Alcohol use: No    Drug use: No

## 2025-04-14 ENCOUNTER — TELEPHONE (OUTPATIENT)
Dept: GASTROENTEROLOGY | Facility: CLINIC | Age: 80
End: 2025-04-14

## 2025-04-14 ENCOUNTER — OFFICE VISIT (OUTPATIENT)
Dept: GASTROENTEROLOGY | Facility: CLINIC | Age: 80
End: 2025-04-14
Payer: MEDICARE

## 2025-04-14 VITALS
DIASTOLIC BLOOD PRESSURE: 75 MMHG | BODY MASS INDEX: 21.77 KG/M2 | WEIGHT: 147 LBS | HEART RATE: 68 BPM | HEIGHT: 69 IN | SYSTOLIC BLOOD PRESSURE: 144 MMHG | RESPIRATION RATE: 16 BRPM | OXYGEN SATURATION: 99 %

## 2025-04-14 DIAGNOSIS — D84.821 IMMUNOSUPPRESSION DUE TO DRUG THERAPY: ICD-10-CM

## 2025-04-14 DIAGNOSIS — K56.600 PARTIAL SMALL BOWEL OBSTRUCTION: ICD-10-CM

## 2025-04-14 DIAGNOSIS — R10.11 RIGHT UPPER QUADRANT PAIN: ICD-10-CM

## 2025-04-14 DIAGNOSIS — D64.9 ANEMIA, UNSPECIFIED TYPE: ICD-10-CM

## 2025-04-14 DIAGNOSIS — K29.80 DUODENITIS DETERMINED BY BIOPSY: ICD-10-CM

## 2025-04-14 DIAGNOSIS — K50.819 CROHN'S DISEASE OF SMALL AND LARGE INTESTINES WITH COMPLICATION: Primary | ICD-10-CM

## 2025-04-14 DIAGNOSIS — Z79.899 IMMUNOSUPPRESSION DUE TO DRUG THERAPY: ICD-10-CM

## 2025-04-14 DIAGNOSIS — R63.4 WEIGHT LOSS: ICD-10-CM

## 2025-04-14 DIAGNOSIS — R19.7 DIARRHEA, UNSPECIFIED TYPE: ICD-10-CM

## 2025-04-14 DIAGNOSIS — R10.31 RIGHT LOWER QUADRANT PAIN: ICD-10-CM

## 2025-04-14 DIAGNOSIS — E55.9 VITAMIN D DEFICIENCY: ICD-10-CM

## 2025-04-14 DIAGNOSIS — Z86.0100 HISTORY OF COLON POLYPS: ICD-10-CM

## 2025-04-14 PROCEDURE — 99215 OFFICE O/P EST HI 40 MIN: CPT | Mod: S$PBB,,,

## 2025-04-14 NOTE — PATIENT INSTRUCTIONS
Complete blood and stool tests.   Continue Stelara every 8 weeks.  Drink Boost or Ensure after each meal.    If any tests, procedures, or imaging has been ordered and you are not contacted to schedule within 1-2 weeks, then you may call the central scheduling department directly at [phone].   Please notify my office if you have not been contacted within two weeks after any procedures, submitting any samples (biopsies, blood, stool, urine, etc.) or after any imaging (X-ray, CT, MRI, etc.).

## 2025-04-15 ENCOUNTER — RESULTS FOLLOW-UP (OUTPATIENT)
Dept: GASTROENTEROLOGY | Facility: CLINIC | Age: 80
End: 2025-04-15
Payer: MEDICARE

## 2025-04-15 DIAGNOSIS — R10.31 RIGHT LOWER QUADRANT PAIN: ICD-10-CM

## 2025-04-15 DIAGNOSIS — R63.4 WEIGHT LOSS: ICD-10-CM

## 2025-04-15 DIAGNOSIS — K50.819 CROHN'S DISEASE OF SMALL AND LARGE INTESTINES WITH COMPLICATION: Primary | ICD-10-CM

## 2025-04-15 DIAGNOSIS — K50.80 CROHN'S DISEASE OF BOTH SMALL AND LARGE INTESTINE WITHOUT COMPLICATION: ICD-10-CM

## 2025-04-15 LAB
%TRANSFERRIN SATURATION: 28.6 % (ref 20–50)
ABS NRBC COUNT: 0 X 10 3/UL (ref 0–0.01)
ABSOLUTE BASOPHIL: 0.02 X 10 3/UL (ref 0–0.22)
ABSOLUTE EOSINOPHIL: 0.24 X 10 3/UL (ref 0.04–0.54)
ABSOLUTE IMMATURE GRAN: 0.01 X 10 3/UL (ref 0–0.04)
ABSOLUTE LYMPHOCYTE: 0.99 X 10 3/UL (ref 0.86–4.75)
ABSOLUTE MONOCYTE: 0.84 X 10 3/UL (ref 0.22–1.08)
ABSOLUTE RETIC: 0.04 X 10 6/UL (ref 0.03–0.1)
ADDITIONAL TESTING: NORMAL
ALBUMIN SERPL-MCNC: 3.7 G/DL (ref 3.5–5.2)
ALBUMIN/GLOB SERPL ELPH: 1.2 {RATIO} (ref 1–2.7)
ALP ISOS SERPL LEV INH-CCNC: 74 U/L (ref 40–130)
ALT (SGPT): 11 U/L (ref 0–41)
ANION GAP SERPL CALC-SCNC: 12 MMOL/L (ref 8–17)
APPEARANCE SNV: ABNORMAL
AST SERPL-CCNC: 14 U/L (ref 0–40)
B12: 363 PG/ML (ref 232–1245)
BASOPHILS NFR BLD: 0.3 % (ref 0.2–1.2)
BILIRUBIN, TOTAL: 0.63 MG/DL (ref 0–1.2)
BUN/CREAT SERPL: 9.6 (ref 6–20)
C DIFF TOXIN: ABNORMAL
CALCIUM SERPL-MCNC: 9.2 MG/DL (ref 8.6–10.2)
CARBON DIOXIDE, CO2: 27 MMOL/L (ref 22–29)
CHLORIDE: 101 MMOL/L (ref 98–107)
CREAT SERPL-MCNC: 1.43 MG/DL (ref 0.7–1.2)
CRP QUALITATIVE: NEGATIVE MG/L
CRP QUANTITATIVE: <3 MG/L
EOSINOPHIL NFR BLD: 3.7 % (ref 0.7–7)
FERRITIN: 123 NG/ML (ref 20–380)
FOLATE SERPL-MCNC: 37.2 NG/ML (ref 5.6–45.8)
GFR ESTIMATION: 50.15 ML/MIN/1.73M2
GLOBULIN: 3.1 G/DL (ref 1.5–4.5)
GLUCOSE: 98 MG/DL (ref 82–115)
HCT VFR BLD AUTO: 30.8 % (ref 42–52)
HGB BLD-MCNC: 10.4 G/DL (ref 14–18)
IMMATURE GRANULOCYTES: 0.2 % (ref 0–0.5)
IRON BINDING CAPACITY: 210 UG/DL (ref 262–472)
IRON SERPL-MCNC: 60 UG/DL (ref 59–158)
LYMPHOCYTES NFR BLD: 15.4 % (ref 19.3–53.1)
MCH RBC QN AUTO: 32.8 PG (ref 27–32)
MCHC RBC AUTO-ENTMCNC: 33.8 G/DL (ref 32–36)
MCV RBC AUTO: 97.2 FL (ref 80–94)
MONOCYTES NFR BLD: 13.1 % (ref 4.7–12.5)
NEUTROPHILS # BLD AUTO: 4.32 X 10 3/UL (ref 2.15–7.56)
NEUTROPHILS NFR BLD: 67.3 % (ref 34–71.1)
NUCLEATED RED BLOOD CELLS: 0 /100 WBC (ref 0–0.2)
PLATELET # BLD AUTO: 163 X 10 3/UL (ref 135–400)
POTASSIUM: 3.3 MMOL/L (ref 3.5–5.1)
PROT SNV-MCNC: 6.8 G/DL (ref 6.4–8.3)
RBC # BLD AUTO: 3.17 X 10 6/UL (ref 4.7–6.1)
RDW-SD: 45.9 FL (ref 37–54)
RETICULOCYTE COUNT: 1.4 % (ref 0.5–1.8)
SED RATE (WESTERGREN): 8 MM/HR (ref 0–20)
SODIUM: 140 MMOL/L (ref 136–145)
T4, FREE: 1.08 NG/DL (ref 0.93–1.7)
TSH W/REFLEX TO FT4: 0.24 UIU/ML (ref 0.27–4.2)
UIBC SERPL-MCNC: 150 UG/DL (ref 112–346)
UREA NITROGEN (BUN): 13.7 MG/DL (ref 8–23)
WBC # BLD: 6.42 X 10 3/UL (ref 4.3–10.8)

## 2025-04-15 NOTE — TELEPHONE ENCOUNTER
Sindi from [facility] called w/[facility]:  + C-Diff..  Staff notified [facility] of result at 9:18 am..  LDN  Staff to get the result and scan into chart.. LDN    Staff called [facility] spoke to Dayan to see if pt was positive for Antigen or Toxin... she stated positive for ANTIGEN and negative for toxin..  [facility] notified...  LDN

## 2025-04-15 NOTE — PROGRESS NOTES
4/14/2025: Cdiff Ag +, toxin neg. Pending calpro.  4/14/2025: Hgb 10.4L, MCV 97.2H, CBC onl, Cr 1.43H, K 3.3L, CMP onl, iron/%sat/ferr/retic/folate wnl, TSH 0.24L, FT4 1.08 wnl, CRP <3, ESR 8, B12 363. Pending trnsfrrn.  [facility]

## 2025-04-15 NOTE — TELEPHONE ENCOUNTER
Notify patient his stool test was negative for active C diff infection. I am waiting for his inflammatory marker to result. His iron/B12/folate levels were normal but he is still mildly anemic. His kidney function level was also elevated. I believe he said Dr. Porras is monitoring his kidney function. He should stay very well hydrated. Send a copy of blood results to Dr. Porras. Dr. Blanton recommended we go ahead and order an MRI of his small bowel  given his weight loss etc. MRE order signed.       Discussed with Dr. Blanton. Will recommend he stay well hydrated and order MRE. Would not treat for active C diff since he no longer has diarrhea.     Will have staff send results to PCP/[facility].     4/14/2025: Cdiff Ag +, toxin neg. Pending calpro.  4/14/2025: Hgb 10.4L, MCV 97.2H, CBC onl, Cr 1.43H, K 3.3L, CMP onl, iron/%sat/ferr/retic/folate wnl, TSH 0.24L, FT4 1.08 wnl, CRP <3, ESR 8, B12 363. Pending trnsfrrn.  [facility]

## 2025-04-16 LAB — TRANSFERRIN: 188 MG/DL (ref 188–341)

## 2025-04-16 NOTE — TELEPHONE ENCOUNTER
Patient was notified of results/remarks and voiced understanding. Dr. Porras is monitoring patient's kidney function. Patient agreed to stay very well hydrated. Patient also agreed to MRI. Order faxed to central scheduling. No PA required. Blood results faxed to Dr. Porras at [phone]. DMP

## 2025-04-19 LAB — CALPROTECTIN, STOOL: 38 MCG/G

## 2025-04-22 NOTE — TELEPHONE ENCOUNTER
Notify patient his stool inflammation marker is in normal range. Was he scheduled for MRI?    4/14/2025: Calpro 38.   [facility]

## 2025-04-23 ENCOUNTER — HOSPITAL ENCOUNTER (OUTPATIENT)
Facility: OTHER | Age: 80
Discharge: NURSING, REHAB OR LONG-TERM CARE | End: 2025-04-25
Attending: EMERGENCY MEDICINE | Admitting: INTERNAL MEDICINE
Payer: MEDICARE

## 2025-04-23 DIAGNOSIS — R53.1 WEAKNESS: ICD-10-CM
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
## 2025-04-23 DIAGNOSIS — R53.81 DEBILITY: ICD-10-CM

## 2025-04-23 DIAGNOSIS — R25.1 TREMOR: ICD-10-CM
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
## 2025-04-23 DIAGNOSIS — R26.81 UNSTEADY GAIT: Primary | ICD-10-CM

## 2025-04-23 LAB
ABSOLUTE EOSINOPHIL (OHS): 0.19 K/UL
ABSOLUTE MONOCYTE (OHS): 0.98 K/UL (ref 0.3–1)
ABSOLUTE NEUTROPHIL COUNT (OHS): 6.13 K/UL (ref 1.8–7.7)
ALBUMIN SERPL BCP-MCNC: 3.3 G/DL (ref 3.5–5.2)
ALP SERPL-CCNC: 68 UNIT/L (ref 40–150)
ALT SERPL W/O P-5'-P-CCNC: 11 UNIT/L (ref 10–44)
ANION GAP (OHS): 8 MMOL/L (ref 8–16)
AST SERPL-CCNC: 16 UNIT/L (ref 11–45)
BASOPHILS # BLD AUTO: 0.02 K/UL
BASOPHILS NFR BLD AUTO: 0.2 %
BILIRUB SERPL-MCNC: 0.5 MG/DL (ref 0.1–1)
BILIRUB UR QL STRIP.AUTO: NEGATIVE
BUN SERPL-MCNC: 12 MG/DL (ref 8–23)
CALCIUM SERPL-MCNC: 8.7 MG/DL (ref 8.7–10.5)
CHLORIDE SERPL-SCNC: 103 MMOL/L (ref 95–110)
CLARITY UR: CLEAR
CO2 SERPL-SCNC: 26 MMOL/L (ref 23–29)
COLOR UR AUTO: YELLOW
CREAT SERPL-MCNC: 1.3 MG/DL (ref 0.5–1.4)
CREAT SERPL-MCNC: 1.4 MG/DL (ref 0.5–1.4)
ERYTHROCYTE [DISTWIDTH] IN BLOOD BY AUTOMATED COUNT: 13.2 % (ref 11.5–14.5)
GFR SERPLBLD CREATININE-BSD FMLA CKD-EPI: 56 ML/MIN/1.73/M2
GLUCOSE SERPL-MCNC: 95 MG/DL (ref 70–110)
GLUCOSE UR QL STRIP: NEGATIVE
HCT VFR BLD AUTO: 31.8 % (ref 40–54)
HCV AB SERPL QL IA: NEGATIVE
HGB BLD-MCNC: 10.9 GM/DL (ref 14–18)
HGB UR QL STRIP: NEGATIVE
HIV 1+2 AB+HIV1 P24 AG SERPL QL IA: NEGATIVE
HOLD SPECIMEN: NORMAL
IMM GRANULOCYTES # BLD AUTO: 0.03 K/UL (ref 0–0.04)
IMM GRANULOCYTES NFR BLD AUTO: 0.3 % (ref 0–0.5)
KETONES UR QL STRIP: NEGATIVE
LEUKOCYTE ESTERASE UR QL STRIP: NEGATIVE
LYMPHOCYTES # BLD AUTO: 1.49 K/UL (ref 1–4.8)
MCH RBC QN AUTO: 33 PG (ref 27–31)
MCHC RBC AUTO-ENTMCNC: 34.3 G/DL (ref 32–36)
MCV RBC AUTO: 96 FL (ref 82–98)
NITRITE UR QL STRIP: NEGATIVE
NUCLEATED RBC (/100WBC) (OHS): 0 /100 WBC
PH UR STRIP: 6 [PH]
PLATELET # BLD AUTO: 223 K/UL (ref 150–450)
PMV BLD AUTO: 10.6 FL (ref 9.2–12.9)
POCT GLUCOSE: 104 MG/DL (ref 70–110)
POTASSIUM SERPL-SCNC: 3.5 MMOL/L (ref 3.5–5.1)
PROT SERPL-MCNC: 7 GM/DL (ref 6–8.4)
PROT UR QL STRIP: ABNORMAL
RBC # BLD AUTO: 3.3 M/UL (ref 4.6–6.2)
RELATIVE EOSINOPHIL (OHS): 2.1 %
RELATIVE LYMPHOCYTE (OHS): 16.9 % (ref 18–48)
RELATIVE MONOCYTE (OHS): 11.1 % (ref 4–15)
RELATIVE NEUTROPHIL (OHS): 69.4 % (ref 38–73)
SAMPLE: NORMAL
SODIUM SERPL-SCNC: 137 MMOL/L (ref 136–145)
SP GR UR STRIP: 1.02
T4 FREE SERPL-MCNC: 0.94 NG/DL (ref 0.71–1.51)
TSH SERPL-ACNC: 0.08 UIU/ML (ref 0.4–4)
UROBILINOGEN UR STRIP-ACNC: NEGATIVE EU/DL
WBC # BLD AUTO: 8.84 K/UL (ref 3.9–12.7)

## 2025-04-23 PROCEDURE — 93010 ELECTROCARDIOGRAM REPORT: CPT | Mod: ,,, | Performed by: INTERNAL MEDICINE

## 2025-04-23 PROCEDURE — 93005 ELECTROCARDIOGRAM TRACING: CPT

## 2025-04-23 PROCEDURE — 84425 ASSAY OF VITAMIN B-1: CPT | Performed by: EMERGENCY MEDICINE

## 2025-04-23 PROCEDURE — 82565 ASSAY OF CREATININE: CPT

## 2025-04-23 PROCEDURE — 82962 GLUCOSE BLOOD TEST: CPT

## 2025-04-23 PROCEDURE — 25000003 PHARM REV CODE 250: Performed by: EMERGENCY MEDICINE

## 2025-04-23 PROCEDURE — 94761 N-INVAS EAR/PLS OXIMETRY MLT: CPT

## 2025-04-23 PROCEDURE — 99285 EMERGENCY DEPT VISIT HI MDM: CPT | Mod: 25

## 2025-04-23 PROCEDURE — 81003 URINALYSIS AUTO W/O SCOPE: CPT

## 2025-04-23 PROCEDURE — 99900035 HC TECH TIME PER 15 MIN (STAT)

## 2025-04-23 PROCEDURE — 85025 COMPLETE CBC W/AUTO DIFF WBC: CPT

## 2025-04-23 PROCEDURE — 86803 HEPATITIS C AB TEST: CPT | Performed by: EMERGENCY MEDICINE

## 2025-04-23 PROCEDURE — 84443 ASSAY THYROID STIM HORMONE: CPT | Performed by: EMERGENCY MEDICINE

## 2025-04-23 PROCEDURE — 96361 HYDRATE IV INFUSION ADD-ON: CPT

## 2025-04-23 PROCEDURE — 80053 COMPREHEN METABOLIC PANEL: CPT

## 2025-04-23 PROCEDURE — 87389 HIV-1 AG W/HIV-1&-2 AB AG IA: CPT | Performed by: EMERGENCY MEDICINE

## 2025-04-23 PROCEDURE — 82746 ASSAY OF FOLIC ACID SERUM: CPT | Performed by: EMERGENCY MEDICINE

## 2025-04-23 PROCEDURE — 82803 BLOOD GASES ANY COMBINATION: CPT

## 2025-04-23 RX ADMIN — SODIUM CHLORIDE 1000 ML: 9 INJECTION, SOLUTION INTRAVENOUS at 09:04

## 2025-04-23 NOTE — FIRST PROVIDER EVALUATION
Medical screening examination initiated.  I have conducted a focused provider triage encounter, findings are as follows:    Brief history of present illness:  generazlied weakness X 5 weeks. Was recently diagnosed with gluten allergy and has not been eating a lot. Told by PCP he was severely dehydrated 2 weeks ago. No follow up. Living with his sister here in [facility] now (from [place]) who wants him evaluated. Ambulates independently but is weak.     Vitals:    04/23/25 1642   BP: (!) 168/82   BP Location: Left arm   Pulse: 84   Resp: 16   Temp: 98 °F (36.7 °C)   TempSrc: Oral   SpO2: 99%   Weight: 63.5 kg (140 lb)       Pertinent physical exam:  appears frail. Lungs cta bilaterally .No leg swelling.    Brief workup plan:  labs    Preliminary workup initiated; this workup will be continued and followed by the physician or advanced practice provider that is assigned to the patient when roomed.

## 2025-04-24 PROBLEM — R63.4 UNINTENDED WEIGHT LOSS: Status: ACTIVE | Noted: 2025-04-24

## 2025-04-24 PROBLEM — R53.1 GENERALIZED WEAKNESS: Status: ACTIVE | Noted: 2025-04-24

## 2025-04-24 PROBLEM — Z63.4 BEREAVEMENT: Status: ACTIVE | Noted: 2025-04-24

## 2025-04-24 LAB
ABSOLUTE EOSINOPHIL (OHS): 0.14 K/UL
ABSOLUTE MONOCYTE (OHS): 0.84 K/UL (ref 0.3–1)
ABSOLUTE NEUTROPHIL COUNT (OHS): 6.74 K/UL (ref 1.8–7.7)
ANION GAP (OHS): 9 MMOL/L (ref 8–16)
BASOPHILS # BLD AUTO: 0.03 K/UL
BASOPHILS NFR BLD AUTO: 0.3 %
BUN SERPL-MCNC: 9 MG/DL (ref 8–23)
CALCIUM SERPL-MCNC: 8.6 MG/DL (ref 8.7–10.5)
CHLORIDE SERPL-SCNC: 104 MMOL/L (ref 95–110)
CO2 SERPL-SCNC: 25 MMOL/L (ref 23–29)
CREAT SERPL-MCNC: 1 MG/DL (ref 0.5–1.4)
ERYTHROCYTE [DISTWIDTH] IN BLOOD BY AUTOMATED COUNT: 13 % (ref 11.5–14.5)
FOLATE SERPL-MCNC: 16.8 NG/ML (ref 4–24)
GFR SERPLBLD CREATININE-BSD FMLA CKD-EPI: >60 ML/MIN/1.73/M2
GLUCOSE SERPL-MCNC: 90 MG/DL (ref 70–110)
HCT VFR BLD AUTO: 30.4 % (ref 40–54)
HGB BLD-MCNC: 10.4 GM/DL (ref 14–18)
IMM GRANULOCYTES # BLD AUTO: 0.03 K/UL (ref 0–0.04)
IMM GRANULOCYTES NFR BLD AUTO: 0.3 % (ref 0–0.5)
LYMPHOCYTES # BLD AUTO: 1.33 K/UL (ref 1–4.8)
MAGNESIUM SERPL-MCNC: 1.5 MG/DL (ref 1.6–2.6)
MCH RBC QN AUTO: 32.7 PG (ref 27–31)
MCHC RBC AUTO-ENTMCNC: 34.2 G/DL (ref 32–36)
MCV RBC AUTO: 96 FL (ref 82–98)
NUCLEATED RBC (/100WBC) (OHS): 0 /100 WBC
PLATELET # BLD AUTO: 215 K/UL (ref 150–450)
PMV BLD AUTO: 10.1 FL (ref 9.2–12.9)
POTASSIUM SERPL-SCNC: 3.3 MMOL/L (ref 3.5–5.1)
PREALB SERPL-MCNC: 17 MG/DL (ref 20–43)
RBC # BLD AUTO: 3.18 M/UL (ref 4.6–6.2)
RELATIVE EOSINOPHIL (OHS): 1.5 %
RELATIVE LYMPHOCYTE (OHS): 14.6 % (ref 18–48)
RELATIVE MONOCYTE (OHS): 9.2 % (ref 4–15)
RELATIVE NEUTROPHIL (OHS): 74.1 % (ref 38–73)
SODIUM SERPL-SCNC: 138 MMOL/L (ref 136–145)
WBC # BLD AUTO: 9.11 K/UL (ref 3.9–12.7)

## 2025-04-24 PROCEDURE — 97166 OT EVAL MOD COMPLEX 45 MIN: CPT

## 2025-04-24 PROCEDURE — 96372 THER/PROPH/DIAG INJ SC/IM: CPT | Performed by: PHYSICIAN ASSISTANT

## 2025-04-24 PROCEDURE — A4216 STERILE WATER/SALINE, 10 ML: HCPCS | Performed by: PHYSICIAN ASSISTANT

## 2025-04-24 PROCEDURE — 25000003 PHARM REV CODE 250: Performed by: PHYSICIAN ASSISTANT

## 2025-04-24 PROCEDURE — G0378 HOSPITAL OBSERVATION PER HR: HCPCS

## 2025-04-24 PROCEDURE — 84134 ASSAY OF PREALBUMIN: CPT | Performed by: PHYSICIAN ASSISTANT

## 2025-04-24 PROCEDURE — 63600175 PHARM REV CODE 636 W HCPCS: Performed by: PHYSICIAN ASSISTANT

## 2025-04-24 PROCEDURE — 96366 THER/PROPH/DIAG IV INF ADDON: CPT

## 2025-04-24 PROCEDURE — 83735 ASSAY OF MAGNESIUM: CPT | Performed by: PHYSICIAN ASSISTANT

## 2025-04-24 PROCEDURE — 96365 THER/PROPH/DIAG IV INF INIT: CPT

## 2025-04-24 PROCEDURE — 80048 BASIC METABOLIC PNL TOTAL CA: CPT | Performed by: PHYSICIAN ASSISTANT

## 2025-04-24 PROCEDURE — 97162 PT EVAL MOD COMPLEX 30 MIN: CPT

## 2025-04-24 PROCEDURE — 85025 COMPLETE CBC W/AUTO DIFF WBC: CPT | Performed by: PHYSICIAN ASSISTANT

## 2025-04-24 PROCEDURE — 97535 SELF CARE MNGMENT TRAINING: CPT

## 2025-04-24 PROCEDURE — 36415 COLL VENOUS BLD VENIPUNCTURE: CPT | Performed by: PHYSICIAN ASSISTANT

## 2025-04-24 PROCEDURE — 25000003 PHARM REV CODE 250: Performed by: EMERGENCY MEDICINE

## 2025-04-24 PROCEDURE — 97116 GAIT TRAINING THERAPY: CPT

## 2025-04-24 RX ORDER — HEPARIN SODIUM 5000 [USP'U]/ML
5000 INJECTION, SOLUTION INTRAVENOUS; SUBCUTANEOUS EVERY 8 HOURS
Status: DISCONTINUED | OUTPATIENT
Start: 2025-04-24 | End: 2025-04-25 | Stop reason: HOSPADM

## 2025-04-24 RX ORDER — TALC
6 POWDER (GRAM) TOPICAL NIGHTLY PRN
Status: DISCONTINUED | OUTPATIENT
Start: 2025-04-24 | End: 2025-04-24

## 2025-04-24 RX ORDER — IBUPROFEN 200 MG
16 TABLET ORAL
Status: DISCONTINUED | OUTPATIENT
Start: 2025-04-24 | End: 2025-04-25 | Stop reason: HOSPADM

## 2025-04-24 RX ORDER — IBUPROFEN 200 MG
24 TABLET ORAL
Status: DISCONTINUED | OUTPATIENT
Start: 2025-04-24 | End: 2025-04-25 | Stop reason: HOSPADM

## 2025-04-24 RX ORDER — GLUCAGON 1 MG
1 KIT INJECTION
Status: DISCONTINUED | OUTPATIENT
Start: 2025-04-24 | End: 2025-04-25 | Stop reason: HOSPADM

## 2025-04-24 RX ORDER — AMLODIPINE BESYLATE 5 MG/1
5 TABLET ORAL DAILY
Status: DISCONTINUED | OUTPATIENT
Start: 2025-04-24 | End: 2025-04-25

## 2025-04-24 RX ORDER — MAGNESIUM SULFATE HEPTAHYDRATE 40 MG/ML
2 INJECTION, SOLUTION INTRAVENOUS ONCE
Status: COMPLETED | OUTPATIENT
Start: 2025-04-24 | End: 2025-04-24

## 2025-04-24 RX ORDER — ACETAMINOPHEN 325 MG/1
650 TABLET ORAL EVERY 6 HOURS PRN
Status: DISCONTINUED | OUTPATIENT
Start: 2025-04-24 | End: 2025-04-25 | Stop reason: HOSPADM

## 2025-04-24 RX ORDER — SODIUM CHLORIDE 0.9 % (FLUSH) 0.9 %
10 SYRINGE (ML) INJECTION
Status: DISCONTINUED | OUTPATIENT
Start: 2025-04-24 | End: 2025-04-25 | Stop reason: HOSPADM

## 2025-04-24 RX ORDER — TALC
6 POWDER (GRAM) TOPICAL NIGHTLY PRN
Status: DISCONTINUED | OUTPATIENT
Start: 2025-04-24 | End: 2025-04-25 | Stop reason: HOSPADM

## 2025-04-24 RX ORDER — SODIUM CHLORIDE 0.9 % (FLUSH) 0.9 %
10 SYRINGE (ML) INJECTION EVERY 8 HOURS
Status: DISCONTINUED | OUTPATIENT
Start: 2025-04-24 | End: 2025-04-25 | Stop reason: HOSPADM

## 2025-04-24 RX ORDER — ALPRAZOLAM 0.5 MG/1
0.5 TABLET ORAL
Status: DISCONTINUED | OUTPATIENT
Start: 2025-04-24 | End: 2025-04-25 | Stop reason: HOSPADM

## 2025-04-24 RX ORDER — ONDANSETRON HYDROCHLORIDE 2 MG/ML
4 INJECTION, SOLUTION INTRAVENOUS EVERY 8 HOURS PRN
Status: DISCONTINUED | OUTPATIENT
Start: 2025-04-24 | End: 2025-04-25 | Stop reason: HOSPADM

## 2025-04-24 RX ORDER — NALOXONE HCL 0.4 MG/ML
0.02 VIAL (ML) INJECTION
Status: DISCONTINUED | OUTPATIENT
Start: 2025-04-24 | End: 2025-04-25 | Stop reason: HOSPADM

## 2025-04-24 RX ADMIN — Medication 10 ML: at 02:04

## 2025-04-24 RX ADMIN — HEPARIN SODIUM 5000 UNITS: 5000 INJECTION INTRAVENOUS; SUBCUTANEOUS at 09:04

## 2025-04-24 RX ADMIN — SODIUM CHLORIDE 500 ML: 9 INJECTION, SOLUTION INTRAVENOUS at 02:04

## 2025-04-24 RX ADMIN — MAGNESIUM SULFATE HEPTAHYDRATE 2 G: 40 INJECTION, SOLUTION INTRAVENOUS at 02:04

## 2025-04-24 RX ADMIN — Medication 10 ML: at 09:04

## 2025-04-24 RX ADMIN — AMLODIPINE BESYLATE 5 MG: 5 TABLET ORAL at 04:04

## 2025-04-24 RX ADMIN — HEPARIN SODIUM 5000 UNITS: 5000 INJECTION INTRAVENOUS; SUBCUTANEOUS at 10:04

## 2025-04-24 NOTE — ED NOTES
Pt incontinent of urine, cleaned and dry linen placed. Pt remains otherwise NAD, resting quietly, denies any needs at this time

## 2025-04-24 NOTE — ED PROVIDER NOTES
"  Source of History:  Medical record, patient, patient's son, patient's sister    Chief complaint:  Per triage note: "multiple complaints  (Reports unsteady gait, slow speech, hand tremor, weight loss x1 week. Hx of Chron's disease. )  "    HPI:    Patient presents for evaluation of generalized weakness, decreased appetite, decreased oral intake, slower than usual speech, hand tremor over the past several weeks.  Progression has been gradually worsening accept the patient's sister states that the patient overall appears slightly better in the past several days.  He also has approximately 30 lb weight loss. No clear inciting factor, however the patient's wife recently passed away.  Since then he has been living by himself.  His sister convinced him to stay with her starting 5 days ago.  At baseline he does not use a cane, but 2 days ago she convinced him to start using hers to steady his gait.  He denies any focal strength or sensation deficits. He denies any fevers, dyspnea, or back pain.   He denies any motor weakness in his legs.  He states that he has trouble getting his knees moving, however once he does so he was able to walk and move around.  He denies any gait freezing or festination symptoms.        ROS:   See HPI for pertinent review of systems    Review of patient's allergies indicates:   Allergen Reactions    Gluten Diarrhea    Lactose        PMH:  As per HPI and below:  Past Medical History:   Diagnosis Date    Ankylosing spondylitis of unspecified sites in spine     Anxiety disorder, unspecified     BMI 21.0-21.9, adult 04/14/2025    Crohn's disease     Gout, unspecified     Heart disease     History of skin cancer 2001    squamous cell carcinoma left cheek    Hypertension     Hypothyroidism, unspecified     Psoriatic arthritis        Past Surgical History:   Procedure Laterality Date    ANGIOGRAM, CORONARY, WITH LEFT HEART CATHETERIZATION      APPENDECTOMY      APPENDECTOMY  2007    COLONOSCOPY N/A     " ENDOSCOPY N/A     RIGHT HEMICOLECTOMY  2013    SMALL INTESTINE SURGERY  2007    30.5 cm of small bowel removed       Social History[1]    Physical Exam:      Nursing note and vitals reviewed.  BP (!) 166/77   Pulse 82   Temp 98 °F (36.7 °C) (Oral)   Resp 20   Wt 63.5 kg (140 lb)   SpO2 98%   BMI 20.67 kg/m²     Constitutional:  Frail, thin, weak, chronically ill appearing.  No distress.   Eyes: EOMI. No discharge. Anicteric.  HENT:   Neck: Normal range of motion. Neck supple.  Cardiovascular: Normal rate. No murmur, no gallop and no friction rub heard.   Pulmonary/Chest: No respiratory distress. Effort normal. No wheezes, no rales, no rhonchi.   Abdominal: Bowel sounds normal. Soft. No distension and no mass. There is no tenderness. There is no rebound, no guarding, no tenderness at McBurney's point.  Neurological: GCS 15. Alert and oriented to person, place, and time. No gross cranial nerve, light touch or strength deficit.  Intention tremor.   Skin: Skin is warm and dry.   EXT: 2+ radial pulses.   Psych: appears to be minimizing symptoms when family present. Flat affect.         Medical Decision Making / Independent Interpretations / External Records Reviewed:      Pt is a 79 y.o. M PMH ankylosing spondylitis, Crohns, gout, HTN, history of hypothyroidism, former smoker x approx 40 yrs, who presents with fatigue, malaise, new unsteady gait, new tremor, in the setting of 30 lb weight loss, decreased appetite, decreased oral intake.  Patient's wife recently passed away.  Of these symptoms have been present for about 5 weeks.  Patient also reports having a new celiac disease diagnosis, only just started avoiding gluten.  On exam, patient has intention tremor, appears frail, thin, weak, has difficulty transferring from wheelchair to stretcher using his sister's cane.  The initial differential includes stroke, dehydration, gross metabolic abnormality, malnutrition, hypothyroid, neoplasm.  History and physical is  not consistent with cauda equina.  I doubt spinal mass given the patient's fairly global symptoms, no reported incontinence or retention of stool or urine.       ED Course as of 04/24/25 0216   Wed Apr 23, 2025 2117 --  EKG Interpretation: Sinus tachycardia at 103 beats per minute, no STEMI, no ischemic changes, normal intervals.   No acute change compared to prior tracing.  --   [RC]   2247 I independently interpreted labs which are notable for subclinical hyperthyroid, otherwise unremarkable and unrevealing.   I independently interpreted CXR which shows no pneumothorax, no focal consolidation, no cardiomegaly, no acute process.  I independently interpreted CT head which shows no acute intracranial bleeding, mass, skull fracture, or acute intracranial process.   [RC]   u Apr 24, 2025   0028 On reassessment patient appeared much better, was able to ambulate without any difficulty.  With the patient was back in the room, the patient's son asked for help, the patient was on the floor along said were of the walls sitting up.  Patient's son initially stated that he saw the patient fall, was unable to give any details, then stated that he heard the patient fall.  He was no reported loss of consciousness.  We assisted the patient back to standing in his assisted him back onto the stretcher.  When he initially stood up he was leaning far backwards, stated he was dizzy.  He again had intention tremor of his upper extremities as he is supported himself getting back into the stretcher.  We will obtain CT head, likely admit given his debility, inability to walk steadily or safely due to his high fall risk. [RC]   0212 I independently interpreted CT head which shows no acute intracranial bleeding, mass, skull fracture, or acute intracranial process.    Patient has a true medical need for admission/observation. I have discussed the patient history, exam, findings with hospitalist PRISCILLA Rangel, who accepts the patient for   [place].   [RC]      ED Course User Index  [RC] Franco Tamayo MD              Procedures    --  I decided to obtain the patient's medical records. I reviewed patient's prior external notes / results:  specialist documentation   and pharmacy / prescription records.   --  Additional Medical Decision Making: Prescription drug management, Hospitalization or escalation of care considered, and Decision regarding advanced imaging    Pt seen at a time of critical national shortage of IV fluids, affecting decision making and treatment considerations.       Medications   sodium chloride 0.9% bolus 500 mL 500 mL (has no administration in time range)   ALPRAZolam tablet 0.5 mg (has no administration in time range)   sodium chloride 0.9% bolus 1,000 mL 1,000 mL (1,000 mLs Intravenous New Bag 4/23/25 8777)              Future Appointments   Date Time Provider Department Center   9/23/2025  1:00 PM Ambar Blanton MD [facility]  [place]          Diagnostic Impression:    1. Unsteady gait    2. Weakness    3. Tremor    4. Fall during current hospitalization, initial encounter    5. Debility                      Franco Tamayo MD  04/24/25 0216         [1]   Social History  Tobacco Use    Smoking status: Never    Smokeless tobacco: Never   Substance Use Topics    Alcohol use: No    Drug use: No        Franco Tamayo MD  04/24/25 0218

## 2025-04-24 NOTE — ED NOTES
Pt resting quietly on stretcher with eyes closed; opens eyes to verbal stimuli.  Pt remains on continuous pulse ox monitoring with non-invasive blood pressure to cycle every 60 minutes. VS stable. Pt denies pain at this time; no acute distress or discomfort reported or observed.  Pt denies restroom needs at this time; remains on Pure Wick external catheter to continuous low wall suction. Pt remains clean and dry. Pt is able to reposition self on stretcher. Bed locked in lowest position; side rails up and locked x 2; call light, bedside table, and personal belongings within reach. Room assessed for safety measures and cleanliness; no action needed at this time. Plan of care discussed; awaiting hospital bed assignment. Pt instructed to alert nurse for assistance and before attempting to get out of bed; verbalizes understanding. Pt denies needs or complaints at this time; monitoring ongoing.

## 2025-04-24 NOTE — ED NOTES
Assumed care of pt.  Resting quietly with family member at bedside. Reports chronic symptoms; is otherwise NAD, VSS, call light in reach. Denies any needs at this time; plan of care ongoing.

## 2025-04-24 NOTE — NURSING
"1430  Pt received to unit in stable condition from ER. Pt AAOx4 and answering questions appropriately. Equal rise and fall of chest. Pt denies CP/SOB or pain at this time. Pt mood assessed, pt stated, "I'm fine." PIV flushed and patent. Pt acclimated to room. SR up x2. Bed in lowest position. Call light placed within reach. Cane at bedside. Care assumed.   "

## 2025-04-24 NOTE — PLAN OF CARE
Problem: Occupational Therapy  Goal: Occupational Therapy Goal  Description: Goals to be met by: 5/8/2025     Patient will increase functional independence with ADLs by performing:    UE Dressing with Stand-by Assistance.  LE Dressing with Moderate Assistance and Assistive Devices as needed.  Grooming for 1  task while standing at sink with Contact Guard Assistance.  Toileting from bedside commode with Minimal Assistance for hygiene and clothing management.   Toilet transfer to bedside commode with Contact Guard Assistance.    Outcome: Progressing     Initial OT eval/treat complete.  Has shower chair and hurri-care that Pt. Reports has been in use for past 3-4 days.  Needs RW and BSC currently though will defer DME needs to next level of care.  Able to tolerate 3 hours of daily therapy.  Recommend post acute High Intensity therapy.  Currently living with his sister.  Previously MOD I to independent with ADL reporting recent use of cane.  Will require intense interdisciplinary therapy services to return safely to previous living situation.  To benefit from continued acute care OT services to increase independence in self-care/functional transfers.  OT to follow.

## 2025-04-24 NOTE — ED NOTES
Pt ambulatory with steady gait and reports feeling much better after IVF completed. Dr. Tamayo notified.

## 2025-04-24 NOTE — PT/OT/SLP EVAL
Physical Therapy Evaluation    Patient Name:  Vince Huffman   MRN:  [id number]    Recommendations:     Discharge Recommendations: High Intensity Therapy   Discharge Equipment Recommendations: walker, rolling   Barriers to discharge: Decreased caregiver support and mobility limitations    Assessment:     Vince Huffman is a 79 y.o. male admitted with a medical diagnosis of Generalized weakness.  He presents with the following impairments/functional limitations: weakness, impaired endurance, impaired functional mobility, gait instability, decreased coordination, impaired balance .  Pt exhibited global weakness and decreased balance. Pt's PLOF is modified independent w cane however has had an onset of weakness and weight loss x5 weeks.Pt exhibiting posterior lean in sitting and standing at EOB requiring frequent cues for upright sitting/standing. Pt ambulated 30' w RW and CGA for balance and Min A for RW mgmt.   Recommending high intensity therapy following discharge.  Dizziness and loss of balance directly impact pt's safety and independence with ADLs and mobility.  Pt faces decreased chances of recovery and safety without intense therapy and direct medical oversight.     Rehab Prognosis: Good; patient would benefit from acute skilled PT services to address these deficits and reach maximum level of function.    Recent Surgery: * No surgery found *      Plan:     During this hospitalization, patient to be seen 4 x/week to address the identified rehab impairments via therapeutic activities, therapeutic exercises, gait training, neuromuscular re-education and progress toward the following goals:    Plan of Care Expires:  05/08/25    Subjective     Chief Complaint: pt reporting weakness/mobility limitations  Patient/Family Comments/goals: agreeable to participate with PT for evaluation  Pain/Comfort:  Pain Rating 1: 0/10    Patients cultural, spiritual, Latter day conflicts given the current situation: no    Living  Environment:  Lives w family in a H w 1 TERESA.   Prior to admission, patients level of function was Mod I with hurri-cane.  Equipment used at home: other (see comments) (hurri-cane).  DME owned (not currently used): none.  Upon discharge, patient will have assistance from family.    Objective:     Communicated with GRUPO Rosales prior to session.  Patient found supine with telemetry, pulse ox (continuous), Condom Catheter, PureWick, blood pressure cuff, bed alarm  upon PT entry to room.    General Precautions: Standard, fall  Orthopedic Precautions:N/A   Braces: N/A  Respiratory Status: Room air    Exams:  Sensation: -       Intact  RLE ROM: WFL  RLE Strength: 4/5 grossly  LLE ROM: WFL  LLE Strength: 4/5 grossly    Functional Mobility:  Bed Mobility:  Rolling Left:  minimum assistance  Scooting: contact guard assistance  Bridging: stand by assistance  Supine to Sit: moderate assistance  Sit to Supine: minimum assistance  Transfers:  Sit to Stand:  minimum assistance with rolling walker  Gait: 30' w RW and CGA for balance and Min A for RW management. Pt with forward flexed posture, decreased step length, and decreased trudi  Balance:     sitting EOB pt with fair balance. Requiring verbal cues for upright posture as well as Mod A to bring pt's COM to neutral as pt leaning posterior and to L.  Standing at EOB with RW, pt with fair (+) balance as pt leaning posteriorly onto bed. Verbal cues as well as Min A provided to bring COM to neutral for 2 episodes of posteriorly leaning and then verbal cues with good correction observed for 1 episode of posterior leaning prior to ambulation.       AM-PAC 6 CLICK MOBILITY  Total Score:17       Treatment & Education:  education provided regarding PT role, PT POC, and safety w use of RW for mobility       Patient left supine with all lines intact, bed alarm on, and RN notified.    GOALS:   Multidisciplinary Problems       Physical Therapy Goals          Problem: Physical Therapy     Goal Priority Disciplines Outcome Interventions   Physical Therapy Goal     PT, PT/OT Progressing    Description: P.T goals to be met in 2 weeks as follows:  1. Independent with  Bed Mobility  2. Mod I T/F using LRAD  3. Gait 200' Mod I with LRAD  4. Pt will ascend/descend 1 step with SBA in order to demonstrate safety with entry/exit of home.                          DME Justifications:   Vince's mobility limitation cannot be sufficiently resolved by the use of a cane. His functional mobility deficit can be sufficiently resolved with the use of a Rolling Walker. Patient's mobility limitation significantly impairs their ability to participate in one of more activities of daily living.  The use of a RW will significantly improve the patient's ability to participate in MRADLS and the patient will use it on regular basis in the home.    History:     Past Medical History:   Diagnosis Date    Ankylosing spondylitis of unspecified sites in spine     Anxiety disorder, unspecified     BMI 21.0-21.9, adult 04/14/2025    Crohn's disease     Gout, unspecified     Heart disease     History of skin cancer 2001    squamous cell carcinoma left cheek    Hypertension     Hypothyroidism, unspecified     Psoriatic arthritis        Past Surgical History:   Procedure Laterality Date    ANGIOGRAM, CORONARY, WITH LEFT HEART CATHETERIZATION      APPENDECTOMY      APPENDECTOMY  2007    COLONOSCOPY N/A     ENDOSCOPY N/A     RIGHT HEMICOLECTOMY  2013    SMALL INTESTINE SURGERY  2007    30.5 cm of small bowel removed       Time Tracking:     PT Received On: 04/24/25  PT Start Time: 0859     PT Stop Time: 0922  PT Total Time (min): 23 min     Billable Minutes: Evaluation 15 and Gait Training 8      04/24/2025

## 2025-04-24 NOTE — ED NOTES
Pt soiled of stool; cleaned. Pt assisted to bedside commode for BM and then returned to stretcher. New brief, gown, and linens provided.  Pt repositioned on stretcher for comfort; replaced on Pure wick external catheter to continuous low wall suction. Bed locked in lowest position; side rails up and locked x 2; call light, bedside table, and personal belongings within reach. Pt instructed to alert nurse for assistance before attempting to get out of bed; verbalizes understanding. Monitoring ongoing.

## 2025-04-24 NOTE — SUBJECTIVE & OBJECTIVE
Past Medical History:   Diagnosis Date    Ankylosing spondylitis of unspecified sites in spine     Anxiety disorder, unspecified     BMI 21.0-21.9, adult 04/14/2025    Crohn's disease     Gout, unspecified     Heart disease     History of skin cancer 2001    squamous cell carcinoma left cheek    Hypertension     Hypothyroidism, unspecified     Psoriatic arthritis        Past Surgical History:   Procedure Laterality Date    ANGIOGRAM, CORONARY, WITH LEFT HEART CATHETERIZATION      APPENDECTOMY      APPENDECTOMY  2007    COLONOSCOPY N/A     ENDOSCOPY N/A     RIGHT HEMICOLECTOMY  2013    SMALL INTESTINE SURGERY  2007    30.5 cm of small bowel removed       Review of patient's allergies indicates:   Allergen Reactions    Gluten Diarrhea    Lactose        No current facility-administered medications on file prior to encounter.     Current Outpatient Medications on File Prior to Encounter   Medication Sig    acetaminophen (TYLENOL) 500 MG tablet Take 500 mg by mouth every 6 (six) hours as needed.    allopurinol (ZYLOPRIM) 100 MG tablet Take 100 mg by mouth once daily.      ALPRAZolam (XANAX XR) 0.5 MG Tb24 Take 0.5 mg by mouth 2 (two) times daily.    calcium carbonate (TUMS) 200 mg calcium (500 mg) chewable tablet Take 2 tablets by mouth.    ustekinumab (STELARA) 90 mg/mL Syrg syringe Inject 1 mL (90 mg total) into the skin every 8 weeks.     Family History    None       Tobacco Use    Smoking status: Never    Smokeless tobacco: Never   Substance and Sexual Activity    Alcohol use: No    Drug use: No    Sexual activity: Never     Partners: Female     Review of Systems   Constitutional:  Positive for activity change, appetite change, fatigue and unexpected weight change. Negative for chills, diaphoresis and fever.   Respiratory:  Negative for cough, shortness of breath and wheezing.    Cardiovascular:  Negative for chest pain and palpitations.   Gastrointestinal:  Positive for diarrhea. Negative for abdominal pain,  constipation, nausea and vomiting.   Genitourinary:  Negative for decreased urine volume, difficulty urinating, dysuria, frequency, hematuria and urgency.   Musculoskeletal:  Negative for back pain, gait problem, joint swelling, myalgias, neck pain and neck stiffness.   Skin:  Negative for color change, pallor and rash.   Neurological:  Negative for dizziness, syncope, weakness, light-headedness and headaches.   Hematological:  Does not bruise/bleed easily.   Psychiatric/Behavioral:  Negative for agitation and confusion.      Objective:     Vital Signs (Most Recent):  Temp: 97.8 °F (36.6 °C) (04/24/25 0300)  Pulse: 81 (04/24/25 0600)  Resp: 11 (04/24/25 0215)  BP: (!) 150/94 (04/24/25 0441)  SpO2: (!) 93 % (04/24/25 0500) Vital Signs (24h Range):  Temp:  [97.8 °F (36.6 °C)-98.1 °F (36.7 °C)] 97.8 °F (36.6 °C)  Pulse:  [] 81  Resp:  [11-21] 11  SpO2:  [91 %-99 %] 93 %  BP: (150-188)/(77-94) 150/94     Weight: 63.5 kg (140 lb)  Body mass index is 20.67 kg/m².     Physical Exam  Vitals and nursing note reviewed.   Constitutional:       General: He is not in acute distress.     Appearance: Normal appearance. He is well-developed and underweight. He is ill-appearing (chronically). He is not toxic-appearing or diaphoretic.   HENT:      Head: Normocephalic and atraumatic.      Right Ear: External ear normal.      Left Ear: External ear normal.   Eyes:      General: No scleral icterus.        Right eye: No discharge.         Left eye: No discharge.      Conjunctiva/sclera: Conjunctivae normal.   Neck:      Vascular: No JVD.      Trachea: No tracheal deviation.   Cardiovascular:      Rate and Rhythm: Normal rate and regular rhythm.      Heart sounds: Normal heart sounds. No murmur heard.     No gallop.   Pulmonary:      Effort: Pulmonary effort is normal. No respiratory distress.      Breath sounds: Normal breath sounds. No stridor. No wheezing or rales.   Abdominal:      General: Bowel sounds are normal. There is no  "distension.      Palpations: Abdomen is soft. There is no mass.      Tenderness: There is no abdominal tenderness. There is no guarding.   Musculoskeletal:         General: No deformity. Normal range of motion.      Cervical back: Normal range of motion and neck supple.   Skin:     General: Skin is warm and dry.   Neurological:      General: No focal deficit present.      Mental Status: He is alert and oriented to person, place, and time.      Cranial Nerves: No cranial nerve deficit.      Motor: No abnormal muscle tone.      Coordination: Coordination normal.      Comments: Has some word finding difficulties, slowed speech.    Psychiatric:         Mood and Affect: Mood normal.         Behavior: Behavior normal.         Thought Content: Thought content normal.         Judgment: Judgment normal.                Significant Labs: All pertinent labs within the past 24 hours have been reviewed.  BMP:   Recent Labs   Lab 04/23/25 1936      K 3.5      CO2 26   BUN 12   CREATININE 1.3   CALCIUM 8.7     CBC:   Recent Labs   Lab 04/23/25 1936   WBC 8.84   HGB 10.9*   HCT 31.8*        CMP:   Recent Labs   Lab 04/23/25 1936      K 3.5      CO2 26   BUN 12   CREATININE 1.3   CALCIUM 8.7   ALBUMIN 3.3*   BILITOT 0.5   ALKPHOS 68   AST 16   ALT 11   ANIONGAP 8     Urine Culture: No results for input(s): "LABURIN" in the last 48 hours.  Urine Studies:   Recent Labs   Lab 04/23/25 1954   COLORU Yellow   APPEARANCEUA Clear   PHUR 6.0   SPECGRAV 1.020   PROTEINUA Trace*   GLUCUA Negative   BILIRUBINUA Negative   OCCULTUA Negative   NITRITE Negative   UROBILINOGEN Negative   LEUKOCYTESUR Negative       Significant Imaging: I have reviewed all pertinent imaging results/findings within the past 24 hours.  Imaging Results              MRI Brain Without Contrast (Final result)  Result time 04/24/25 02:57:51      Final result by Darryl Cabrera MD (04/24/25 02:57:51)                   Impression:    "   Limited examination as above.      Electronically signed by: Darryl Cabrera  Date:    04/24/2025  Time:    02:57               Narrative:    EXAMINATION:  MRI BRAIN WITHOUT CONTRAST    CLINICAL HISTORY:  Parkinsonian syndrome;    TECHNIQUE:  Multiplanar multisequence MR imaging of the brain was performed without contrast.    COMPARISON:  CT examination of the brain April 24, 2025    FINDINGS:  The examination is significantly limited, of limited diagnostic utility, there is prominent motion artifact on the examination and the examination is incomplete, the technologist indicates the patient terminated the examination prior to completion.    Chronic changes are noted, there is no evidence for intracranial mass, mass effect or midline shift, and no additional evidence for acute process on limited available imaging.  Clinical and historical correlation is otherwise needed to determine need for additional follow-up when the patient can tolerate.                                       CT Head Without Contrast (Final result)  Result time 04/24/25 01:24:29      Final result by Sarah Johnston MD (04/24/25 01:24:29)                   Impression:      No acute intracranial abnormality detected.  No significant change.      Electronically signed by: Sarah Johnston  Date:    04/24/2025  Time:    01:24               Narrative:    EXAMINATION:  CT OF THE HEAD WITHOUT    CLINICAL HISTORY:  Head trauma, minor (Age >= 65y);Fell while in Room;    TECHNIQUE:  5 mm unenhanced axial images were obtained from the skull base to the vertex.    COMPARISON:  04/23/2025 at 21:14    FINDINGS:  There is stable cerebral atrophy and chronic small vessel ischemic changes.  There is no acute intracranial hemorrhage, territorial infarct or mass effect, or midline shift. The visualized paranasal sinuses and mastoid air cells are clear.  The sella is empty.                                       CT Head Without Contrast (Final result)  Result  time 04/23/25 22:45:14      Final result by Umer Presley MD (04/23/25 22:45:14)                   Impression:      No acute intracranial process.  Consideration for contrast enhanced MRI of the brain, as clinically warranted.    Changes of chronic vessel ischemic disease and cerebral volume loss.      Electronically signed by: Umer Presley MD  Date:    04/23/2025  Time:    22:45               Narrative:    EXAMINATION:  CT HEAD WITHOUT CONTRAST    CLINICAL HISTORY:  Neuro deficit, persistent/recurrent, CNS neoplasm suspected;    TECHNIQUE:  Low dose axial images were obtained through the head.  Coronal and sagittal reformations were also performed. Contrast was not administered.    COMPARISON:  None.    FINDINGS:  The subcutaneous tissues are unremarkable.  The bony calvarium is intact.  The paranasal sinuses are unremarkable.  The mastoid air cells are clear.  The orbits and intraorbital contents are within normal limits.    The craniocervical junction is intact.  There is empty sella configuration.  There are no extra-axial fluid collections.  There is no evidence of intracranial hemorrhage.    The ventricles and sulci are prominent, suggestive cerebral volume loss.  There are hypodensities within the periventricular and subcortical white matter.  The gray-white differentiation is maintained.  There is no dense vessel sign.  There is no evidence of mass effect.                                       X-Ray Chest AP Portable (Final result)  Result time 04/23/25 18:08:44      Final result by Sarah Johnston MD (04/23/25 18:08:44)                   Impression:      No acute intrathoracic abnormality detected.      Electronically signed by: Sarah Johnston  Date:    04/23/2025  Time:    18:08               Narrative:    EXAMINATION:  AP PORTABLE CHEST    CLINICAL HISTORY:  Sepsis;    TECHNIQUE:  AP portable chest radiograph was submitted.    COMPARISON:  01/27/2013    FINDINGS:  AP portable chest radiograph  demonstrates a cardiac silhouette within normal limits.  There is no focal consolidation, pneumothorax, or pleural effusion.

## 2025-04-24 NOTE — PLAN OF CARE
D/C Recommendation- high intensity   DME Recommendation- RW    Eval completed; Pt exhibited global weakness and decreased balance. Pt's PLOF is modified independent w cane however has had an onset of weakness and weight loss x5 weeks.Pt exhibiting posterior lean in sitting and standing at EOB requiring frequent cues for upright sitting/standing. Pt ambulated 30' w RW and CGA for balance and Min A for RW mgmt.   Recommending high intensity therapy following discharge.  Dizziness and loss of balance directly impact pt's safety and independence with ADLs and mobility.  Pt faces decreased chances of recovery and safety without intense therapy and direct medical oversight.   Problem: Physical Therapy  Goal: Physical Therapy Goal  Description: P.T goals to be met in 2 weeks as follows:  1. Independent with  Bed Mobility  2. Mod I T/F using LRAD  3. Gait 200' Mod I with LRAD  4. Pt will ascend/descend 1 step with SBA in order to demonstrate safety with entry/exit of home.     Outcome: Progressing

## 2025-04-24 NOTE — H&P
"  Merged with [facility]  History & Physical    Patient Name: Vince Huffman  MRN: [id number]  Patient Class: OP- Observation  Admission Date: 4/23/2025  Attending Physician: JEFFREY Crowley MD   Primary Care Provider: Leland Porras MD         Patient information was obtained from patient, past medical records, and ER records.     Subjective:     Principal Problem:Generalized weakness    Chief Complaint:   Chief Complaint   Patient presents with    multiple complaints      Reports unsteady gait, slow speech, hand tremor, weight loss x1 week. Hx of Chron's disease.         HPI: Mr. Vince Huffman is a 79 y.o. male, with PMH of Crohn's disease, prior SBO, HTN, who presented to [facility] ED on 4/24/25 due to gradually worsening generalized weakness. He has not been getting out of bed as much, or walking as much as usual. He also notes decreased appetite/PO intake, slowed speech, unsteady gait, and hand tremors over the past several weeks. He reports a 30 lb weights loss. Since he lost his wife he has been staying with his sister, who has felt his gait is unsteady enough that she encouraged him to start using a cane. He denies changes/losses of strength of sensation, fever, dyspnea, back pain, leg weakness, gait freezing, or symptoms of festination. He states that moving his knees when he begins walking is difficult. He was evaluated in the ED with labs showing no leukocytosis or left shift. H&H were 10.9/31.8. A metabolic panel was overall normal. UA was without infection. A CXR showed no acute findings. A CT Head showed no acute process. An MRI of the brain was "significantly limited," showing motion artifact causing the exam to be incomplete.  He was placed on observation.     Past Medical History:   Diagnosis Date    Ankylosing spondylitis of unspecified sites in spine     Anxiety disorder, unspecified     BMI 21.0-21.9, adult 04/14/2025    Crohn's disease     Gout, unspecified     Heart " disease     History of skin cancer 2001    squamous cell carcinoma left cheek    Hypertension     Hypothyroidism, unspecified     Psoriatic arthritis        Past Surgical History:   Procedure Laterality Date    ANGIOGRAM, CORONARY, WITH LEFT HEART CATHETERIZATION      APPENDECTOMY      APPENDECTOMY  2007    COLONOSCOPY N/A     ENDOSCOPY N/A     RIGHT HEMICOLECTOMY  2013    SMALL INTESTINE SURGERY  2007    30.5 cm of small bowel removed       Review of patient's allergies indicates:   Allergen Reactions    Gluten Diarrhea    Lactose        No current facility-administered medications on file prior to encounter.     Current Outpatient Medications on File Prior to Encounter   Medication Sig    acetaminophen (TYLENOL) 500 MG tablet Take 500 mg by mouth every 6 (six) hours as needed.    allopurinol (ZYLOPRIM) 100 MG tablet Take 100 mg by mouth once daily.      ALPRAZolam (XANAX XR) 0.5 MG Tb24 Take 0.5 mg by mouth 2 (two) times daily.    calcium carbonate (TUMS) 200 mg calcium (500 mg) chewable tablet Take 2 tablets by mouth.    ustekinumab (STELARA) 90 mg/mL Syrg syringe Inject 1 mL (90 mg total) into the skin every 8 weeks.     Family History    None       Tobacco Use    Smoking status: Never    Smokeless tobacco: Never   Substance and Sexual Activity    Alcohol use: No    Drug use: No    Sexual activity: Never     Partners: Female     Review of Systems   Constitutional:  Positive for activity change, appetite change, fatigue and unexpected weight change. Negative for chills, diaphoresis and fever.   Respiratory:  Negative for cough, shortness of breath and wheezing.    Cardiovascular:  Negative for chest pain and palpitations.   Gastrointestinal:  Positive for diarrhea. Negative for abdominal pain, constipation, nausea and vomiting.   Genitourinary:  Negative for decreased urine volume, difficulty urinating, dysuria, frequency, hematuria and urgency.   Musculoskeletal:  Negative for back pain, gait problem, joint  swelling, myalgias, neck pain and neck stiffness.   Skin:  Negative for color change, pallor and rash.   Neurological:  Negative for dizziness, syncope, weakness, light-headedness and headaches.   Hematological:  Does not bruise/bleed easily.   Psychiatric/Behavioral:  Negative for agitation and confusion.      Objective:     Vital Signs (Most Recent):  Temp: 97.8 °F (36.6 °C) (04/24/25 0300)  Pulse: 81 (04/24/25 0600)  Resp: 11 (04/24/25 0215)  BP: (!) 150/94 (04/24/25 0441)  SpO2: (!) 93 % (04/24/25 0500) Vital Signs (24h Range):  Temp:  [97.8 °F (36.6 °C)-98.1 °F (36.7 °C)] 97.8 °F (36.6 °C)  Pulse:  [] 81  Resp:  [11-21] 11  SpO2:  [91 %-99 %] 93 %  BP: (150-188)/(77-94) 150/94     Weight: 63.5 kg (140 lb)  Body mass index is 20.67 kg/m².     Physical Exam  Vitals and nursing note reviewed.   Constitutional:       General: He is not in acute distress.     Appearance: Normal appearance. He is well-developed and underweight. He is ill-appearing (chronically). He is not toxic-appearing or diaphoretic.   HENT:      Head: Normocephalic and atraumatic.      Right Ear: External ear normal.      Left Ear: External ear normal.   Eyes:      General: No scleral icterus.        Right eye: No discharge.         Left eye: No discharge.      Conjunctiva/sclera: Conjunctivae normal.   Neck:      Vascular: No JVD.      Trachea: No tracheal deviation.   Cardiovascular:      Rate and Rhythm: Normal rate and regular rhythm.      Heart sounds: Normal heart sounds. No murmur heard.     No gallop.   Pulmonary:      Effort: Pulmonary effort is normal. No respiratory distress.      Breath sounds: Normal breath sounds. No stridor. No wheezing or rales.   Abdominal:      General: Bowel sounds are normal. There is no distension.      Palpations: Abdomen is soft. There is no mass.      Tenderness: There is no abdominal tenderness. There is no guarding.   Musculoskeletal:         General: No deformity. Normal range of motion.       "Cervical back: Normal range of motion and neck supple.   Skin:     General: Skin is warm and dry.   Neurological:      General: No focal deficit present.      Mental Status: He is alert and oriented to person, place, and time.      Cranial Nerves: No cranial nerve deficit.      Motor: No abnormal muscle tone.      Coordination: Coordination normal.      Comments: Has some word finding difficulties, slowed speech.    Psychiatric:         Mood and Affect: Mood normal.         Behavior: Behavior normal.         Thought Content: Thought content normal.         Judgment: Judgment normal.                Significant Labs: All pertinent labs within the past 24 hours have been reviewed.  BMP:   Recent Labs   Lab 04/23/25 1936      K 3.5      CO2 26   BUN 12   CREATININE 1.3   CALCIUM 8.7     CBC:   Recent Labs   Lab 04/23/25 1936   WBC 8.84   HGB 10.9*   HCT 31.8*        CMP:   Recent Labs   Lab 04/23/25 1936      K 3.5      CO2 26   BUN 12   CREATININE 1.3   CALCIUM 8.7   ALBUMIN 3.3*   BILITOT 0.5   ALKPHOS 68   AST 16   ALT 11   ANIONGAP 8     Urine Culture: No results for input(s): "LABURIN" in the last 48 hours.  Urine Studies:   Recent Labs   Lab 04/23/25 1954   COLORU Yellow   APPEARANCEUA Clear   PHUR 6.0   SPECGRAV 1.020   PROTEINUA Trace*   GLUCUA Negative   BILIRUBINUA Negative   OCCULTUA Negative   NITRITE Negative   UROBILINOGEN Negative   LEUKOCYTESUR Negative       Significant Imaging: I have reviewed all pertinent imaging results/findings within the past 24 hours.  Imaging Results              MRI Brain Without Contrast (Final result)  Result time 04/24/25 02:57:51      Final result by Darryl Cabrera MD (04/24/25 02:57:51)                   Impression:      Limited examination as above.      Electronically signed by: Darryl Cabrera  Date:    04/24/2025  Time:    02:57               Narrative:    EXAMINATION:  MRI BRAIN WITHOUT CONTRAST    CLINICAL HISTORY:  Parkinsonian " syndrome;    TECHNIQUE:  Multiplanar multisequence MR imaging of the brain was performed without contrast.    COMPARISON:  CT examination of the brain April 24, 2025    FINDINGS:  The examination is significantly limited, of limited diagnostic utility, there is prominent motion artifact on the examination and the examination is incomplete, the technologist indicates the patient terminated the examination prior to completion.    Chronic changes are noted, there is no evidence for intracranial mass, mass effect or midline shift, and no additional evidence for acute process on limited available imaging.  Clinical and historical correlation is otherwise needed to determine need for additional follow-up when the patient can tolerate.                                       CT Head Without Contrast (Final result)  Result time 04/24/25 01:24:29      Final result by Sarah Johnston MD (04/24/25 01:24:29)                   Impression:      No acute intracranial abnormality detected.  No significant change.      Electronically signed by: Sarah Johnston  Date:    04/24/2025  Time:    01:24               Narrative:    EXAMINATION:  CT OF THE HEAD WITHOUT    CLINICAL HISTORY:  Head trauma, minor (Age >= 65y);Fell while in Room;    TECHNIQUE:  5 mm unenhanced axial images were obtained from the skull base to the vertex.    COMPARISON:  04/23/2025 at 21:14    FINDINGS:  There is stable cerebral atrophy and chronic small vessel ischemic changes.  There is no acute intracranial hemorrhage, territorial infarct or mass effect, or midline shift. The visualized paranasal sinuses and mastoid air cells are clear.  The sella is empty.                                       CT Head Without Contrast (Final result)  Result time 04/23/25 22:45:14      Final result by Umer Presley MD (04/23/25 22:45:14)                   Impression:      No acute intracranial process.  Consideration for contrast enhanced MRI of the brain, as clinically  warranted.    Changes of chronic vessel ischemic disease and cerebral volume loss.      Electronically signed by: Umer Presley MD  Date:    04/23/2025  Time:    22:45               Narrative:    EXAMINATION:  CT HEAD WITHOUT CONTRAST    CLINICAL HISTORY:  Neuro deficit, persistent/recurrent, CNS neoplasm suspected;    TECHNIQUE:  Low dose axial images were obtained through the head.  Coronal and sagittal reformations were also performed. Contrast was not administered.    COMPARISON:  None.    FINDINGS:  The subcutaneous tissues are unremarkable.  The bony calvarium is intact.  The paranasal sinuses are unremarkable.  The mastoid air cells are clear.  The orbits and intraorbital contents are within normal limits.    The craniocervical junction is intact.  There is empty sella configuration.  There are no extra-axial fluid collections.  There is no evidence of intracranial hemorrhage.    The ventricles and sulci are prominent, suggestive cerebral volume loss.  There are hypodensities within the periventricular and subcortical white matter.  The gray-white differentiation is maintained.  There is no dense vessel sign.  There is no evidence of mass effect.                                       X-Ray Chest AP Portable (Final result)  Result time 04/23/25 18:08:44      Final result by Sarah Johnston MD (04/23/25 18:08:44)                   Impression:      No acute intrathoracic abnormality detected.      Electronically signed by: Sarah Johnston  Date:    04/23/2025  Time:    18:08               Narrative:    EXAMINATION:  AP PORTABLE CHEST    CLINICAL HISTORY:  Sepsis;    TECHNIQUE:  AP portable chest radiograph was submitted.    COMPARISON:  01/27/2013    FINDINGS:  AP portable chest radiograph demonstrates a cardiac silhouette within normal limits.  There is no focal consolidation, pneumothorax, or pleural effusion.                                       Assessment/Plan:     Assessment & Plan  Generalized  weakness  - Mr. Vince Huffman presents due to generalized weakness, 30 lb weight loss, poor appetite/poor PO intake   - PT, OT consulted   - prealbumin pending   - fall precautions   - imaging obtained in ED without explanatory causes   - states he had a colonoscopy 3 months ago, no documentation locally by last GI OV note indicates:   EGD/Colonoscopy 2/11/2025: Small HH, DBx non-sp ditis, GBx wnl, anas Bx (stenosis/ulcer, prox ?polyps) acute non-sp colitis w/crypt abscess, CBx nl. Ileocolonic stricture/ulcer. Right colon ileocolonic anastomosis w/associated inflammatory appearing polyps proximal to the anastomosis, unable to traverse anastomosis with pediatric colonoscope. Sigmoid diverticulosis. On Stelara since 12/19/2024.   - states he has diarrhea, which is his baseline with Crohn's, and denies increased pain/diarrhea recently   - last GI OV note indicates he drinks Boost with meals   - Dietary consulted to optimize protein/nutrient intake       Essential hypertension  Patient's blood pressure range in the last 24 hours was: BP  Min: 150/94  Max: 188/86.The patient's inpatient anti-hypertensive regimen is listed below:  Current Antihypertensives       Plan  - BP is controlled, no changes needed to their regimen  - PRN Hydralazine for SBP >180   Unintended weight loss  - reports a 30 lb unintended weight loss   - no explanatory findings on available imaging    Bereavement  - recently lost wife 5 months ago  - Psych consult suggested, patient agreed       VTE Risk Mitigation (From admission, onward)           Ordered     heparin (porcine) injection 5,000 Units  Every 8 hours         04/24/25 0308     IP VTE HIGH RISK PATIENT  Once         04/24/25 0315     Place sequential compression device  Until discontinued         04/24/25 0308                         On 04/24/2025, patient should be placed in hospital observation services under the care of Dr. JEFFREY Crowley MD.           Jada Rangel,  ARIK  Department of [facility] Medicine  [facility] - Emergency Dept

## 2025-04-24 NOTE — ED NOTES
On walking down the franco, this RN heard a loud sound followed by someone audibly asking for help; found pt on the ground, stating he was trying to get OOB and became dizzy and fell. Son at bedside stating he doesn 't know what happened and didn't see him fall while he was in there with him. Pt with c/o headache, states he hit his head on the wall.  No neck pain, no LOC. VSS, Dr. Tamayo aware and at bedside for eval.

## 2025-04-24 NOTE — PLAN OF CARE
Case Management Assessment     PCP: Leland Porras MD  Pharmacy: bedside    Patient Arrived From: Home  Existing Help at Home: Son    Barriers to Discharge: None    Discharge Plan:    A. Rehab    B. Home with HH     Met with patient to review discharge recommendation of Rehab and is agreeable to plan    Patient/family provided list of facilities in-network with patient's payor plan. Providers that are owned, operated, or affiliated with [facility] are included on the list.     Notified that referral sent to below listed facilities from in-network list based on proximity to home/family support:   1.[facility]    If an additional preferred facility not listed above is identified, additional referral to be sent. If above facilities unable to accept, will send additional referrals to in-network providers.     Sw sent referral and contacted Doug Barbosa with [facility] regarding the referral.     [facility] - Emergency Dept  Initial Discharge Assessment       Primary Care Provider: Leland Porras MD    Admission Diagnosis: Unsteady gait [R26.81]    Admission Date: 4/23/2025  Expected Discharge Date:     Transition of Care Barriers: (P) None    Payor: MEDICARE / Plan: MEDICARE PART A & B / Product Type: Government /     Extended Emergency Contact Information  Primary Emergency Contact: ciscoantonio  Home Phone: [phone]  Relation: Son  Preferred language: English   needed? No  Secondary Emergency Contact: Dale Huffman  Address: [address] [place] [address] [facility]  Home Phone: [phone]  Relation: Spouse    Discharge Plan A: (P) Rehab  Discharge Plan B: (P) Home Health      [facility] - [place] - [address]  [address] [facility] [address]  Phone: [phone] Fax: [phone]    [facility] [place] - [address]  [address]  [place] [address]  Phone: [phone] Fax:  [phone]    [facility] [place] - [address]  [address]  [address]  [facility] [address]  Phone: [phone] Fax: [phone]      Initial Assessment (most recent)       Adult Discharge Assessment - 04/24/25 1003          Discharge Assessment    Assessment Type Discharge Planning Assessment (P)      Confirmed/corrected address, phone number and insurance Yes (P)      Confirmed Demographics Correct on Facesheet (P)      Source of Information patient;health record (P)      People in Home child(aleida), adult (P)      Do you expect to return to your current living situation? No (P)      Do you have help at home or someone to help you manage your care at home? Yes (P)      Who are your caregiver(s) and their phone number(s)? Son (P)      Prior to hospitilization cognitive status: Alert/Oriented;No Deficits (P)      Current cognitive status: Alert/Oriented;No Deficits (P)      Equipment Currently Used at Home none (P)      Readmission within 30 days? No (P)      Patient currently being followed by outpatient case management? No (P)      Do you currently have service(s) that help you manage your care at home? No (P)      Do you take prescription medications? Yes (P)      Do you have prescription coverage? Yes (P)      Do you have any problems affording any of your prescribed medications? No (P)      Is the patient taking medications as prescribed? yes (P)      Who is going to help you get home at discharge? Son (P)      How do you get to doctors appointments? car, drives self;family or friend will provide (P)      Are you on dialysis? No (P)      Do you take coumadin? No (P)      Discharge Plan A Rehab (P)      Discharge Plan B Home Health (P)      Discharge Plan discussed with: Patient (P)      Transition of Care Barriers None (P)

## 2025-04-24 NOTE — ED NOTES
"Pt able to ambulate a short walk down the franco with a cane. Reports feeling "a little wobbly" but steady gait present; Dr. Tamayo notified of pt ability.  "

## 2025-04-24 NOTE — PT/OT/SLP EVAL
Occupational Therapy   Evaluation and Treatment    Name: Vince Huffman  MRN: [id number]  Admitting Diagnosis: Generalized weakness  Recent Surgery: * No surgery found *      Recommendations:     Discharge Recommendations: High Intensity Therapy  Discharge Equipment Recommendations:  to be determined by next level of care, bedside commode, walker, rolling  Barriers to discharge:  Inaccessible home environment, Decreased caregiver support (current functional level; lives with elderly sister per MD note and Pt. Reports living with his son)    Assessment:   Initial OT eval/treat complete.  Step transfer to BSC with HHA and MOD A needed for lift from bed and weight shifting to initiate steps; step transfer BSC>bed with MIN A for balance though with better lift with using B-armrests.  Able to doff sock from LLE via cross leg tech though unable to don clothing.  Cues needed for forward flexed trunk and safe hand placement during transitions.  Demos significant posterior lean in stance requiring increased assist with balance; also demos posterior lean seated though self-corrects with MIN verbal cues.  Has shower chair and hurri-care that Pt. Reports has been in use for past 3-4 days.  Needs RW and BSC currently though will defer DME needs to next level of care.  Able to tolerate 3 hours of daily therapy.  Recommend post acute High Intensity therapy.  Currently living with his elderly sister per MD note and Pt. Reports living with his son.  Previously MOD I to independent with ADL reporting recent use of cane.  Will require intense interdisciplinary therapy services to return safely to previous living situation.  To benefit from continued acute care OT services to increase independence in self-care/functional transfers.  OT to follow.       Vince Huffman is a 79 y.o. male with a medical diagnosis of Generalized weakness.  He presents with below deficits decreasing independence . Performance deficits affecting function:  weakness, impaired endurance, impaired self care skills, impaired functional mobility, gait instability, impaired balance, decreased safety awareness, decreased lower extremity function, impaired cognition, decreased coordination.      Rehab Prognosis: Good; patient would benefit from acute skilled OT services to address these deficits and reach maximum level of function.       Plan:     Patient to be seen 5 x/week to address the above listed problems via self-care/home management, therapeutic activities, therapeutic exercises  Plan of Care Expires: 05/08/25  Plan of Care Reviewed with:      Subjective     Chief Complaint: No c/o pain.   Patient/Family Comments/goals: No goals stated at this time.     Occupational Profile:  Lives with son; MD note also reports lives with sister  SSH; 0 TERESA  Bathroom setup  Walk-in shower  Shower chair  Standard and comfort height toilets  Previously MOD I to independent with ADL  Though with recent decline in independence   Sister suggested recent use of cane  Equipment Used at Home: shower chair (recently using hurri-cane for ambulation)  Assistance upon Discharge: Pt. Lives with son; MD note also reports living with his sister.     Pain/Comfort:  Pain Rating 1: 0/10  Pain Rating Post-Intervention 1: 0/10    Patients cultural, spiritual, Baptism conflicts given the current situation:  (None stated.)    Objective:     Communicated with: Emily MUNOZ RN prior to session.  Patient found HOB elevated with peripheral IV (ED monitoring; male external catheter) upon OT entry to room.    General Precautions: Standard, fall  Orthopedic Precautions: N/A  Braces: N/A  Respiratory Status: Room air    Occupational Performance:    Bed Mobility:    Supine>sit MIN A   Trunk management  Cues for sequencing  Sit>supine MOD A   Assist with BLE management  Cues for sequencing  Initially with forward trunk though gradual posterior trunk lean seated  Cues needed for maintaining more upright trunk seated  "    Functional Mobility/Transfers:  Step transfer EOB<>BSC HHA without AD  MOD A for transfer to BSC   MIN cues for safe hand placement  Manual assist for knee flex in prep for task  Assist for lift and weight shifting to initiate steps  Increased posterior trunk noted with balance assist needed  MIN A for return EOB  Improved lift using higher UE support of armrests   Cues for forward flexed trunk  Manual assist for knee flex in prep for task  Assist for lift and weight shifting to initiate steps  Increased posterior trunk noted with balance assist needed    Activities of Daily Living:  Doffed sock from LLE via Linkedwith tech  Unable to don sock to LLE seated  TOTAL A needed for donning socks to BLE this day  Male external catheter in place for voiding    Cognitive/Visual Perceptual:  Cognitive/Psychosocial Skills:  -       Oriented to: Person, Time, and Pt. Knows he's at [facility] though unable to state city; when oriented to city Pt. Stating, "No I'm not in [place]"   -       Follows Commands/attention:Follows one-step commands  -       Communication: able to make basic needs known   -       Memory: Deficits noted  -       Safety awareness/insight to disability: impaired   -       Mood/Affect/Coping skills/emotional control: Flat affect and Pleasant    Physical Exam:  Postural examination/scapula alignment: -       Rounded shoulders  -       Forward head  -       posterior trunk lean both seated and in stance  Skin integrity: Visible skin intact  Edema:  None noted  Upper Extremity Range of Motion:  -       Right Upper Extremity: WFL  -       Left Upper Extremity: WFL  Upper Extremity Strength: -       Right Upper Extremity: 4+/5 gross   -       Left Upper Extremity: 4+/5 gross    Strength: -       Right Upper Extremity: fair plus  -       Left Upper Extremity: fair plus  Fine Motor Coordination: -       Intact  Left hand thumb/finger opposition skills and Right hand thumb/finger opposition skills; mild " tremor noted       AMPAC 6 Click ADL:  AMPAC Total Score: 15    Treatment & Education:  Educated on role of OT, POC, functional transfer/ADL safety, review of call light, and importance of calling for assist as needed.        Patient left HOB elevated with all lines intact, call button in reach, and nursing notified    GOALS:   Multidisciplinary Problems       Occupational Therapy Goals          Problem: Occupational Therapy    Goal Priority Disciplines Outcome Interventions   Occupational Therapy Goal     OT, PT/OT Progressing    Description: Goals to be met by: 5/8/2025     Patient will increase functional independence with ADLs by performing:    UE Dressing with Stand-by Assistance.  LE Dressing with Moderate Assistance and Assistive Devices as needed.  Grooming for 1  task while standing at sink with Contact Guard Assistance.  Toileting from bedside commode with Minimal Assistance for hygiene and clothing management.   Toilet transfer to bedside commode with Contact Guard Assistance.                         DME Justifications:  Currently needed though will defer to next level of care.    Vince requires a commode for home use because he is confined to a single room.   Vince's mobility limitation cannot be sufficiently resolved by the use of a cane. His functional mobility deficit can be sufficiently resolved with the use of a Rolling Walker. Patient's mobility limitation significantly impairs their ability to participate in one of more activities of daily living.  The use of a RW will significantly improve the patient's ability to participate in MRADLS and the patient will use it on regular basis in the home.    History:     Past Medical History:   Diagnosis Date    Ankylosing spondylitis of unspecified sites in spine     Anxiety disorder, unspecified     BMI 21.0-21.9, adult 04/14/2025    Crohn's disease     Gout, unspecified     Heart disease     History of skin cancer 2001    squamous cell carcinoma left cheek     Hypertension     Hypothyroidism, unspecified     Psoriatic arthritis          Past Surgical History:   Procedure Laterality Date    ANGIOGRAM, CORONARY, WITH LEFT HEART CATHETERIZATION      APPENDECTOMY      APPENDECTOMY  2007    COLONOSCOPY N/A     ENDOSCOPY N/A     RIGHT HEMICOLECTOMY  2013    SMALL INTESTINE SURGERY  2007    30.5 cm of small bowel removed       Time Tracking:     OT Date of Treatment: 04/24/25  OT Start Time: 1312  OT Stop Time: 1338  OT Total Time (min): 26 min    Billable Minutes:Evaluation 15  Self Care/Home Management 11    4/24/2025

## 2025-04-24 NOTE — ED NOTES
Report received from offgoing nurse ARVIN [facility]; care assumed.

## 2025-04-24 NOTE — ASSESSMENT & PLAN NOTE
Patient's blood pressure range in the last 24 hours was: BP  Min: 150/94  Max: 188/86.The patient's inpatient anti-hypertensive regimen is listed below:  Current Antihypertensives       Plan  - BP is controlled, no changes needed to their regimen  - PRN Hydralazine for SBP >180

## 2025-04-24 NOTE — HPI
"Mr. Vince Huffman is a 79 y.o. male, with PMH of Crohn's disease, prior SBO, HTN, who presented to [facility] ED on 4/24/25 due to gradually worsening generalized weakness. He has not been getting out of bed as much, or walking as much as usual. He also notes decreased appetite/PO intake, slowed speech, unsteady gait, and hand tremors over the past several weeks. He reports a 30 lb weights loss. Since he lost his wife he has been staying with his sister, who has felt his gait is unsteady enough that she encouraged him to start using a cane. He denies changes/losses of strength of sensation, fever, dyspnea, back pain, leg weakness, gait freezing, or symptoms of festination. He states that moving his knees when he begins walking is difficult. He was evaluated in the ED with labs showing no leukocytosis or left shift. H&H were 10.9/31.8. A metabolic panel was overall normal. UA was without infection. A CXR showed no acute findings. A CT Head showed no acute process. An MRI of the brain was "significantly limited," showing motion artifact causing the exam to be incomplete.  He was placed on observation.   "

## 2025-04-24 NOTE — CARE UPDATE
Patient seen at bedside after working with PT. Reports feeling sleepy. Discussed PT recommendations for rehab vs 24h assistance at home due to his debility and he prefers to go to to rehab to build strength and independence before going back to live with his sister Luiz. Adding chocolate boost to his meals per his request

## 2025-04-24 NOTE — ASSESSMENT & PLAN NOTE
- Mr. Vince Huffman presents due to generalized weakness, 30 lb weight loss, poor appetite/poor PO intake   - PT, OT consulted   - prealbumin pending   - fall precautions   - imaging obtained in ED without explanatory causes   - states he had a colonoscopy 3 months ago, no documentation locally by last GI OV note indicates:   EGD/Colonoscopy 2/11/2025: Small HH, DBx non-sp ditis, GBx wnl, anas Bx (stenosis/ulcer, prox ?polyps) acute non-sp colitis w/crypt abscess, CBx nl. Ileocolonic stricture/ulcer. Right colon ileocolonic anastomosis w/associated inflammatory appearing polyps proximal to the anastomosis, unable to traverse anastomosis with pediatric colonoscope. Sigmoid diverticulosis. On Stelara since 12/19/2024.   - states he has diarrhea, which is his baseline with Crohn's, and denies increased pain/diarrhea recently   - last GI OV note indicates he drinks Boost with meals   - Dietary consulted to optimize protein/nutrient intake

## 2025-04-24 NOTE — ED NOTES
Pt back from CT and MRI. NAD, VSS no needs at this time. Family member at bedside, call light in reach, pt moved next to nursing station with bed alarm on. Plan of care ongoing.

## 2025-04-25 VITALS
BODY MASS INDEX: 19.6 KG/M2 | DIASTOLIC BLOOD PRESSURE: 81 MMHG | WEIGHT: 140 LBS | SYSTOLIC BLOOD PRESSURE: 165 MMHG | RESPIRATION RATE: 18 BRPM | TEMPERATURE: 98 F | OXYGEN SATURATION: 98 % | HEART RATE: 107 BPM | HEIGHT: 71 IN

## 2025-04-25 LAB
ABSOLUTE EOSINOPHIL (OHS): 0.23 K/UL
ABSOLUTE MONOCYTE (OHS): 0.79 K/UL (ref 0.3–1)
ABSOLUTE NEUTROPHIL COUNT (OHS): 5.68 K/UL (ref 1.8–7.7)
ANION GAP (OHS): 12 MMOL/L (ref 8–16)
BASOPHILS # BLD AUTO: 0.04 K/UL
BASOPHILS NFR BLD AUTO: 0.5 %
BUN SERPL-MCNC: 8 MG/DL (ref 8–23)
CALCIUM SERPL-MCNC: 8.7 MG/DL (ref 8.7–10.5)
CHLORIDE SERPL-SCNC: 100 MMOL/L (ref 95–110)
CO2 SERPL-SCNC: 24 MMOL/L (ref 23–29)
CREAT SERPL-MCNC: 1 MG/DL (ref 0.5–1.4)
ERYTHROCYTE [DISTWIDTH] IN BLOOD BY AUTOMATED COUNT: 12.8 % (ref 11.5–14.5)
GFR SERPLBLD CREATININE-BSD FMLA CKD-EPI: >60 ML/MIN/1.73/M2
GLUCOSE SERPL-MCNC: 89 MG/DL (ref 70–110)
HCT VFR BLD AUTO: 30.3 % (ref 40–54)
HGB BLD-MCNC: 10.5 GM/DL (ref 14–18)
IMM GRANULOCYTES # BLD AUTO: 0.03 K/UL (ref 0–0.04)
IMM GRANULOCYTES NFR BLD AUTO: 0.4 % (ref 0–0.5)
LYMPHOCYTES # BLD AUTO: 1.23 K/UL (ref 1–4.8)
MAGNESIUM SERPL-MCNC: 1.8 MG/DL (ref 1.6–2.6)
MCH RBC QN AUTO: 33 PG (ref 27–31)
MCHC RBC AUTO-ENTMCNC: 34.7 G/DL (ref 32–36)
MCV RBC AUTO: 95 FL (ref 82–98)
NUCLEATED RBC (/100WBC) (OHS): 0 /100 WBC
PLATELET # BLD AUTO: 207 K/UL (ref 150–450)
PMV BLD AUTO: 10.9 FL (ref 9.2–12.9)
POTASSIUM SERPL-SCNC: 3.3 MMOL/L (ref 3.5–5.1)
RBC # BLD AUTO: 3.18 M/UL (ref 4.6–6.2)
RELATIVE EOSINOPHIL (OHS): 2.9 %
RELATIVE LYMPHOCYTE (OHS): 15.4 % (ref 18–48)
RELATIVE MONOCYTE (OHS): 9.9 % (ref 4–15)
RELATIVE NEUTROPHIL (OHS): 70.9 % (ref 38–73)
SODIUM SERPL-SCNC: 136 MMOL/L (ref 136–145)
WBC # BLD AUTO: 8 K/UL (ref 3.9–12.7)

## 2025-04-25 PROCEDURE — G0378 HOSPITAL OBSERVATION PER HR: HCPCS

## 2025-04-25 PROCEDURE — 63600175 PHARM REV CODE 636 W HCPCS: Performed by: PHYSICIAN ASSISTANT

## 2025-04-25 PROCEDURE — 36415 COLL VENOUS BLD VENIPUNCTURE: CPT | Performed by: PHYSICIAN ASSISTANT

## 2025-04-25 PROCEDURE — 97530 THERAPEUTIC ACTIVITIES: CPT | Mod: CQ

## 2025-04-25 PROCEDURE — 97530 THERAPEUTIC ACTIVITIES: CPT

## 2025-04-25 PROCEDURE — 97116 GAIT TRAINING THERAPY: CPT | Mod: CQ

## 2025-04-25 PROCEDURE — 85025 COMPLETE CBC W/AUTO DIFF WBC: CPT | Performed by: PHYSICIAN ASSISTANT

## 2025-04-25 PROCEDURE — 25000003 PHARM REV CODE 250: Performed by: PHYSICIAN ASSISTANT

## 2025-04-25 PROCEDURE — 96372 THER/PROPH/DIAG INJ SC/IM: CPT | Performed by: PHYSICIAN ASSISTANT

## 2025-04-25 PROCEDURE — A4216 STERILE WATER/SALINE, 10 ML: HCPCS | Performed by: PHYSICIAN ASSISTANT

## 2025-04-25 PROCEDURE — 97110 THERAPEUTIC EXERCISES: CPT | Mod: CQ

## 2025-04-25 PROCEDURE — 84132 ASSAY OF SERUM POTASSIUM: CPT | Performed by: PHYSICIAN ASSISTANT

## 2025-04-25 PROCEDURE — 97535 SELF CARE MNGMENT TRAINING: CPT

## 2025-04-25 PROCEDURE — 83735 ASSAY OF MAGNESIUM: CPT | Performed by: PHYSICIAN ASSISTANT

## 2025-04-25 RX ORDER — POTASSIUM CHLORIDE 20 MEQ/1
40 TABLET, EXTENDED RELEASE ORAL ONCE
Status: COMPLETED | OUTPATIENT
Start: 2025-04-25 | End: 2025-04-25

## 2025-04-25 RX ORDER — AMLODIPINE BESYLATE 10 MG/1
10 TABLET ORAL DAILY
Status: ON HOLD
Start: 2025-04-25 | End: 2026-04-25

## 2025-04-25 RX ORDER — AMLODIPINE BESYLATE 5 MG/1
10 TABLET ORAL DAILY
Status: DISCONTINUED | OUTPATIENT
Start: 2025-04-25 | End: 2025-04-25 | Stop reason: HOSPADM

## 2025-04-25 RX ADMIN — AMLODIPINE BESYLATE 10 MG: 5 TABLET ORAL at 09:04

## 2025-04-25 RX ADMIN — HEPARIN SODIUM 5000 UNITS: 5000 INJECTION INTRAVENOUS; SUBCUTANEOUS at 05:04

## 2025-04-25 RX ADMIN — POTASSIUM CHLORIDE 40 MEQ: 20 TABLET, EXTENDED RELEASE ORAL at 09:04

## 2025-04-25 RX ADMIN — Medication 10 ML: at 05:04

## 2025-04-25 RX ADMIN — HEPARIN SODIUM 5000 UNITS: 5000 INJECTION INTRAVENOUS; SUBCUTANEOUS at 02:04

## 2025-04-25 NOTE — PT/OT/SLP PROGRESS
Occupational Therapy   Treatment    Name: Vince Huffman  MRN: [id number]  Admitting Diagnosis:  Generalized weakness       Recommendations:     Discharge Recommendations: High Intensity Therapy  Discharge Equipment Recommendations:  bedside commode, walker, rolling  Barriers to discharge:  Decreased caregiver support, Other (Comment) (decline in cognitive/functional status)    Assessment:     Vince Huffman is a 79 y.o. male with a medical diagnosis of Generalized weakness.  He presents with The primary encounter diagnosis was Unsteady gait. Diagnoses of Weakness, Tremor, Fall during current hospitalization, initial encounter, and Debility were also pertinent to this visit. Performance deficits affecting function are weakness, impaired endurance, impaired cognition, decreased coordination, impaired self care skills, decreased upper extremity function, decreased lower extremity function, impaired functional mobility, gait instability, decreased safety awareness, impaired balance.     Rehab Prognosis:  Good; patient would benefit from acute skilled OT services to address these deficits and reach maximum level of function.       Plan:     Patient to be seen 5 x/week to address the above listed problems via self-care/home management, therapeutic activities, therapeutic exercises  Plan of Care Expires: 05/25/25  Plan of Care Reviewed with: patient    Subjective     Chief Complaint: Patient agreeable to OT session.   Patient/Family Comments/goals: Improve strength/endurance.   Pain/Comfort:  Pain Rating 1: 0/10  Pain Rating 2: 0/10    Objective:     Communicated with: nsg prior to session.  Patient found up in chair with telemetry, peripheral IV, Condom Catheter upon OT entry to room.    General Precautions: Standard, fall    Orthopedic Precautions:N/A  Braces: N/A  Respiratory Status: Room air     Occupational Performance:     Functional Mobility/Transfers:  Patient completed 4 Sit <> Stand Transfers with minimum  assistance  with  rolling walker. Pt requires verbal  cues for proper hand placement using RW. Patient requires assistance for posterior lean in stance and safe descent seated back into recliner chair. Once in stance, patient requires verbal cues to lean forward and stand upright.   Patient completed Toilet Transfer Step Transfer technique with minimum assistance with  rolling walker  Functional Mobility: Patient performs 3x10 standing marches using RW w/ min A to improve dynamic balance and functional endurance.   Activities of Daily Living:  Toileting: minimum assistance to perform posterior hygiene post BM for thoroughness and safety in stance.   Grooming: Patient requires SBA to perform oral care seated in recliner chair.       [facility] 6 Click ADL: 21    Treatment & Education:  Pt educated on purpose/role of OT.   Patient performs as stated above.   Patient is A&O x3 this AM.   Patient continues to be appropriate for high intensity therapy at time of discharge.       Patient left up in chair with all lines intact, call button in reach, nsg notified, and family present    GOALS:   Multidisciplinary Problems       Occupational Therapy Goals          Problem: Occupational Therapy    Goal Priority Disciplines Outcome Interventions   Occupational Therapy Goal     OT, PT/OT Progressing    Description: Goals to be met by: 5/8/2025     Patient will increase functional independence with ADLs by performing:    UE Dressing with Stand-by Assistance.  LE Dressing with Moderate Assistance and Assistive Devices as needed.  Grooming for 1  task while standing at sink with Contact Guard Assistance.  Toileting from bedside commode with Minimal Assistance for hygiene and clothing management.   Toilet transfer to bedside commode with Contact Guard Assistance.                             Time Tracking:     OT Date of Treatment: 04/25/25  OT Start Time: 1018  OT Stop Time: 1049  OT Total Time (min): 31 min    Billable Minutes:Self  Care/Home Management 23  Therapeutic Activity 8    OT/PAUL: OT          4/25/2025

## 2025-04-25 NOTE — PLAN OF CARE
[facility]    FACILITY TRANSFER ORDERS      Patient Name: Vince Huffman  YOB: 1945    PCP: Leland Porras MD   PCP Address: [address] [facility] [place] [address]  PCP Phone Number: [phone]  PCP Fax: [phone]    Encounter Date: 04/25/2025    Admit to: REHAB    Vital Signs:  Routine    Diagnoses:   Active Hospital Problems    Diagnosis  POA    *Generalized weakness [R53.1]  Yes    Unintended weight loss [R63.4]  Yes    Bereavement [Z63.4]  Not Applicable    Essential hypertension [I10]  Yes      Resolved Hospital Problems   No resolved problems to display.       Allergies:  Review of patient's allergies indicates:   Allergen Reactions    Gluten Diarrhea    Lactose        Diet: regular diet; chocolate boost plus TIDWM    Activities: Activity as tolerated    Goals of Care Treatment Preferences:  Code Status: Full Code      Nursing: per facility protocol      Labs: n/a    CONSULTS:    Physical Therapy to evaluate and treat.  and Occupational Therapy to evaluate and treat.    MISCELLANEOUS CARE:  Routine Skin for Bedridden Patients: Apply moisture barrier cream to all skin folds and wet areas in perineal area daily and after baths and all bowel movements.    WOUND CARE ORDERS  None    Medications: Review discharge medications with patient and family and provide education.         Medication List        START taking these medications      amLODIPine 10 MG tablet  Commonly known as: NORVASC  Take 1 tablet (10 mg total) by mouth once daily.            CONTINUE taking these medications      acetaminophen 500 MG tablet  Commonly known as: TYLENOL  Take 500 mg by mouth every 6 (six) hours as needed.     ALPRAZolam 0.5 MG Tb24  Commonly known as: XANAX XR  Take 0.5 mg by mouth once daily.     STELARA 90 mg/mL Syrg syringe  Generic drug: ustekinumab  Inject 1 mL (90 mg total) into the skin every 8 weeks.                Immunizations Administered as of 4/25/2025       No  immunizations on file.            Some patients may experience side effects after vaccination.  These may include fever, headache, muscle or joint aches.  Most symptoms resolve with 24-48 hours and do not require urgent medical evaluation unless they persist for more than 72 hours or symptoms are concerning for an unrelated medical condition.          _________________________________  Abena Bender PA-C  04/25/2025

## 2025-04-25 NOTE — DISCHARGE SUMMARY
"[facility] ([address] Medicine  Discharge Summary      Patient Name: Vince Huffman  MRN: [id number]  [facility]: [id number]  Patient Class: OP- Observation  Admission Date: 4/23/2025  Hospital Length of Stay: 0 days  Discharge Date and Time: 4/25/2025  3:34 PM  Attending Physician: No att. providers found   Discharging Provider: Abena Bender PA-C  Primary Care Provider: Leland Porras MD    Primary Care Team: Networked reference to record PCT     HPI:   Mr. Vince Huffman is a 79 y.o. male, with PMH of Crohn's disease, prior SBO, HTN, who presented to [facility] ED on 4/24/25 due to gradually worsening generalized weakness. He has not been getting out of bed as much, or walking as much as usual. He also notes decreased appetite/PO intake, slowed speech, unsteady gait, and hand tremors over the past several weeks. He reports a 30 lb weights loss. Since he lost his wife he has been staying with his sister, who has felt his gait is unsteady enough that she encouraged him to start using a cane. He denies changes/losses of strength of sensation, fever, dyspnea, back pain, leg weakness, gait freezing, or symptoms of festination. He states that moving his knees when he begins walking is difficult. He was evaluated in the ED with labs showing no leukocytosis or left shift. H&H were 10.9/31.8. A metabolic panel was overall normal. UA was without infection. A CXR showed no acute findings. A CT Head showed no acute process. An MRI of the brain was "significantly limited," showing motion artifact causing the exam to be incomplete.  He was placed on observation.     * No surgery found *      Hospital Course:   Vince Huffman was admitted for debility, fall. CTH and MRI brain unremarkable. PTOT recommending rehab and he is agreeable. Hypertensive throughout stay, started on amlodipine. Stable for discharge with PCP follow up, return precautions discussed, no further questions at discharge.      Goals of " Care Treatment Preferences:  Code Status: Full Code         Consults:   Consults (From admission, onward)          Status Ordering Provider     Inpatient consult to Registered Dietitian/Nutritionist  Once        Provider:  (Not yet assigned)    Acknowledged LAZARA QUICK            Assessment & Plan  Generalized weakness  - Mr. Vince Huffman presents due to generalized weakness, 30 lb weight loss, poor appetite/poor PO intake   - PT, OT consulted   - prealbumin pending   - fall precautions   - imaging obtained in ED without explanatory causes   - states he had a colonoscopy 3 months ago, no documentation locally by last GI OV note indicates:   EGD/Colonoscopy 2/11/2025: Small HH, DBx non-sp ditis, GBx wnl, anas Bx (stenosis/ulcer, prox ?polyps) acute non-sp colitis w/crypt abscess, CBx nl. Ileocolonic stricture/ulcer. Right colon ileocolonic anastomosis w/associated inflammatory appearing polyps proximal to the anastomosis, unable to traverse anastomosis with pediatric colonoscope. Sigmoid diverticulosis. On Stelara since 12/19/2024.   - states he has diarrhea, which is his baseline with Crohn's, and denies increased pain/diarrhea recently   - last GI OV note indicates he drinks Boost with meals   - Dietary consulted to optimize protein/nutrient intake       Essential hypertension  Patient's blood pressure range in the last 24 hours was: BP  Min: 145/74  Max: 187/82.The patient's inpatient anti-hypertensive regimen is listed below:  Current Antihypertensives  amlodipine (NORVASC) tablet, Daily, Oral    Plan  - BP is controlled, no changes needed to their regimen  - PRN Hydralazine for SBP >180   Unintended weight loss  - reports a 30 lb unintended weight loss   - no explanatory findings on available imaging    Bereavement  - recently lost wife 5 months ago  - Psych consult suggested, patient agreed     Final Active Diagnoses:    Diagnosis Date Noted POA    PRINCIPAL PROBLEM:  Generalized weakness [R53.1]  04/24/2025 Yes    Unintended weight loss [R63.4] 04/24/2025 Yes    Bereavement [Z63.4] 04/24/2025 Not Applicable    Essential hypertension [I10] 11/05/2018 Yes      Problems Resolved During this Admission:       Discharged Condition: stable    Disposition: Rehab Facility    Follow Up:    Patient Instructions:      Notify your health care provider if you experience any of the following:  temperature >100.4     Notify your health care provider if you experience any of the following:  severe uncontrolled pain     Notify your health care provider if you experience any of the following:  redness, tenderness, or signs of infection (pain, swelling, redness, odor or green/yellow discharge around incision site)     Notify your health care provider if you experience any of the following:  worsening rash     Notify your health care provider if you experience any of the following:  persistent dizziness, light-headedness, or visual disturbances     Notify your health care provider if you experience any of the following:  increased confusion or weakness     Activity as tolerated       Significant Diagnostic Studies: Radiology: MRI: brain:The examination is significantly limited, of limited diagnostic utility, there is prominent motion artifact on the examination and the examination is incomplete, the technologist indicates the patient terminated the examination prior to completion.     Chronic changes are noted, there is no evidence for intracranial mass, mass effect or midline shift, and no additional evidence for acute process on limited available imaging.  Clinical and historical correlation is otherwise needed to determine need for additional follow-up when the patient can tolerate.     Impression:     Limited examination as above.      CT scan:  CTH:     No acute intracranial abnormality detected.  No significant change.     Pending Diagnostic Studies:       Procedure Component Value Units Date/Time    Vitamin B1  [[id number]] Collected: 04/23/25 2026    Order Status: Sent Lab Status: In process Updated: 04/23/25 2033    Specimen: Blood            Medications:  Reconciled Home Medications:      Medication List        START taking these medications      amLODIPine 10 MG tablet  Commonly known as: NORVASC  Take 1 tablet (10 mg total) by mouth once daily.            CONTINUE taking these medications      acetaminophen 500 MG tablet  Commonly known as: TYLENOL  Take 500 mg by mouth every 6 (six) hours as needed.     ALPRAZolam 0.5 MG Tb24  Commonly known as: XANAX XR  Take 0.5 mg by mouth once daily.     STELARA 90 mg/mL Syrg syringe  Generic drug: ustekinumab  Inject 1 mL (90 mg total) into the skin every 8 weeks.              Indwelling Lines/Drains at time of discharge:   Lines/Drains/Airways       None                   Time spent on the discharge of patient: >35 minutes         Abena Bender PA-C  Department of Hospital Medicine  [facility] ([address])

## 2025-04-25 NOTE — PLAN OF CARE
Vazquez reached out to Doug with [facility] who will be admitting patient today as they have a bed.

## 2025-04-25 NOTE — ASSESSMENT & PLAN NOTE
Patient's blood pressure range in the last 24 hours was: BP  Min: 145/74  Max: 187/82.The patient's inpatient anti-hypertensive regimen is listed below:  Current Antihypertensives  amlodipine (NORVASC) tablet, Daily, Oral    Plan  - BP is controlled, no changes needed to their regimen  - PRN Hydralazine for SBP >180

## 2025-04-25 NOTE — PLAN OF CARE
MOON Message     Medicare Outpatient and Observation Notification regarding financial responsibility -- Explained to patient/caregiver; Signed/date by patient/caregiver   Date DOWNS was signed -- 4/25/2025   Time DOWNS was signed -- [id number]

## 2025-04-25 NOTE — HOSPITAL COURSE
Vince Huffman was admitted for debility, fall. CTH and MRI brain unremarkable. PTOT recommending rehab and he is agreeable. Hypertensive throughout stay, started on amlodipine. Stable for discharge with PCP follow up, return precautions discussed, no further questions at discharge.

## 2025-04-25 NOTE — PLAN OF CARE
Medicare Message     Important Message from Medicare regarding Discharge Appeal Rights Explained to patient/caregiver; Signed/date by patient/caregiver   Date IMM was signed 4/25/2025   Time IMM was signed 113

## 2025-04-25 NOTE — TELEPHONE ENCOUNTER
Seems patient was admitted to [facility] [place] [place] due to weakness, unsteady gait, weight loss, decreased intake etc and is being transferred to [facility]. Will need to get update from him next week. Unsure how long he will be in rehab facility.   MLC

## 2025-04-25 NOTE — PT/OT/SLP PROGRESS
Physical Therapy Treatment    Patient Name:  Vince Huffman   MRN:  [id number]    Recommendations:     Discharge Recommendations: High Intensity Therapy  Discharge Equipment Recommendations: walker, rolling  Barriers to discharge: Decreased caregiver support and mobility limitation    Assessment:     Vince Huffman is a 79 y.o. male admitted with a medical diagnosis of Generalized weakness.  He presents with the following impairments/functional limitations: weakness, gait instability, impaired balance, impaired cognition, impaired functional mobility, impaired self care skills, decreased coordination, decreased lower extremity function, decreased safety awareness.    Supine>sit with SBA, HOB raised, use of rails  Sit>stand with RW and Kathrine  Amb ~50' in room with RW and CGA/Kathrine, pt with decreased B step length, decreased gait speed and trudi  Pt agreeable to therapy, demos fair mobility 2/2 sedentary lifestyle and generalized weakness  Rec High Intensity Therapy    Prior to admission pt was modified independent with mobility and self-care and there is expectation of returning to prior level of function to maintain independence avoiding readmission. Pt is at high risk of unplanned readmission due to fall risk and lack of 24 hour caregiver in prior setting. The lower level of care cannot provide total interdisciplinary approach needed. Pt is able to tolerate 3 hours of daily therapy. Pt is pleasant and motivated to return to prior level of function.        Rehab Prognosis: Good; patient would benefit from acute skilled PT services to address these deficits and reach maximum level of function.    Recent Surgery: * No surgery found *      Plan:     During this hospitalization, patient to be seen 4 x/week to address the identified rehab impairments via gait training, therapeutic activities, therapeutic exercises, neuromuscular re-education and progress toward the following goals:    Plan of Care Expires:   05/08/25    Subjective     Chief Complaint: none stated  Patient/Family Comments/goals: doing these exercises is somewhat tedious  Pain/Comfort:  Pain Rating 1: 0/10  Pain Rating Post-Intervention 1: 0/10      Objective:     Communicated with nurse Smalls prior to session.  Patient found HOB elevated with peripheral IV, Condom Catheter, bed alarm, telemetry upon PT entry to room.     General Precautions: Standard, fall  Orthopedic Precautions: N/A  Braces: N/A  Respiratory Status: Room air     Functional Mobility:  Bed Mobility:     Supine to Sit: stand by assistance  Transfers:     Sit to Stand:  minimum assistance with rolling walker  Gait: ~50' in room with RW and CGA/Kathrine, pt with decreased B step length, decreased gait speed and trudi      AM-PAC 6 CLICK MOBILITY  Turning over in bed (including adjusting bedclothes, sheets and blankets)?: 3  Sitting down on and standing up from a chair with arms (e.g., wheelchair, bedside commode, etc.): 3  Moving from lying on back to sitting on the side of the bed?: 3  Moving to and from a bed to a chair (including a wheelchair)?: 3  Need to walk in hospital room?: 3  Climbing 3-5 steps with a railing?: 2  Basic Mobility Total Score: 17       Treatment & Education:  Supine therex BLE: AP, QS, GS, heel slides, hip abd/add, SLR x 10  Seated therex BLE: LAQ x 10  Gait training as noted    Patient left up in chair with all lines intact, call button in reach, and nurse Slim present..    GOALS:   Multidisciplinary Problems       Physical Therapy Goals          Problem: Physical Therapy    Goal Priority Disciplines Outcome Interventions   Physical Therapy Goal     PT, PT/OT Progressing    Description: P.T goals to be met in 2 weeks as follows:  1. Independent with  Bed Mobility  2. Mod I T/F using LRAD  3. Gait 200' Mod I with LRAD  4. Pt will ascend/descend 1 step with SBA in order to demonstrate safety with entry/exit of home.                          DME Justifications:   Amelia  mobility limitation cannot be sufficiently resolved by the use of a cane. His functional mobility deficit can be sufficiently resolved with the use of a Rolling Walker. Patient's mobility limitation significantly impairs their ability to participate in one of more activities of daily living.  The use of a RW will significantly improve the patient's ability to participate in MRADLS and the patient will use it on regular basis in the home.    Time Tracking:     PT Received On: 04/25/25  PT Start Time: 0904     PT Stop Time: 0949  PT Total Time (min): 45 min     Billable Minutes: Gait Training 15, Therapeutic Activity 15, and Therapeutic Exercise 15    Treatment Type: Treatment  PT/PTA: PTA     Number of PTA visits since last PT visit: 1 04/25/2025

## 2025-04-25 NOTE — PLAN OF CARE
Case Management Final Discharge Note    Discharge Disposition: IPR    New DME ordered / company name: NA    Relevant SDOH / Transition of Care Barriers:  None    Person available to provide assistance at home when needed and their contact information: Self and son     Scheduled followup appointment: PCP    Referrals placed: NA    Transportation: Sw scheduled transportation for 1 PM.         Patient and family educated on discharge services and updated on DC plan. Bedside RN notified, patient clear to discharge from Case Management Perspective.   [facility] ([address])  Discharge Final Note    Primary Care Provider: Leland Porras MD    Expected Discharge Date: 4/25/2025    Final Discharge Note (most recent)       Final Note - 04/25/25 1210          Final Note    Assessment Type Final Discharge Note (P)      Anticipated Discharge Disposition Rehab Facility (P)      Hospital Resources/Appts/Education Provided Provided patient/caregiver with written discharge plan information;Appointments scheduled and added to AVS (P)         Post-Acute Status    Post-Acute Authorization Placement (P)      Post-Acute Placement Status Set-up Complete/Auth obtained (P)      Patient choice form signed by patient/caregiver List with quality metrics by geographic area provided (P)      Discharge Delays None known at this time (P)

## 2025-04-28 ENCOUNTER — HOSPITAL ENCOUNTER (OUTPATIENT)
Dept: RADIOLOGY | Facility: HOSPITAL | Age: 80
Discharge: HOME | End: 2025-04-28
Payer: MEDICARE

## 2025-04-28 LAB
OHS QRS DURATION: 82 MS
OHS QTC CALCULATION: 423 MS

## 2025-04-28 PROCEDURE — 93970 EXTREMITY STUDY: CPT | Mod: 26,,, | Performed by: RADIOLOGY

## 2025-04-30 ENCOUNTER — HOSPITAL ENCOUNTER (INPATIENT)
Facility: HOSPITAL | Age: 80
LOS: 32 days | Discharge: HOSPICE | DRG: 097 | End: 2025-06-02
Attending: STUDENT IN AN ORGANIZED HEALTH CARE EDUCATION/TRAINING PROGRAM | Admitting: STUDENT IN AN ORGANIZED HEALTH CARE EDUCATION/TRAINING PROGRAM
Payer: MEDICARE

## 2025-04-30 DIAGNOSIS — G93.49 OTHER ENCEPHALOPATHY: ICD-10-CM

## 2025-04-30 DIAGNOSIS — F32.2 MAJOR DEPRESSIVE DISORDER, SINGLE EPISODE, SEVERE WITHOUT PSYCHOSIS: ICD-10-CM

## 2025-04-30 DIAGNOSIS — G93.40 ENCEPHALOPATHY, UNSPECIFIED TYPE: ICD-10-CM

## 2025-04-30 DIAGNOSIS — R07.9 CHEST PAIN: ICD-10-CM

## 2025-04-30 DIAGNOSIS — R00.0 TACHYCARDIA: ICD-10-CM

## 2025-04-30 DIAGNOSIS — E43 SEVERE PROTEIN-CALORIE MALNUTRITION: ICD-10-CM

## 2025-04-30 DIAGNOSIS — K50.012 CROHN'S DISEASE OF SMALL INTESTINE WITH INTESTINAL OBSTRUCTION: ICD-10-CM

## 2025-04-30 DIAGNOSIS — D64.9 ANEMIA, UNSPECIFIED TYPE: ICD-10-CM

## 2025-04-30 DIAGNOSIS — G93.40 ACUTE ENCEPHALOPATHY: ICD-10-CM

## 2025-04-30 DIAGNOSIS — G20.C PARKINSONISM, UNSPECIFIED PARKINSONISM TYPE: ICD-10-CM

## 2025-04-30 DIAGNOSIS — R41.82 ALTERED MENTAL STATUS, UNSPECIFIED ALTERED MENTAL STATUS TYPE: ICD-10-CM

## 2025-04-30 DIAGNOSIS — R41.82 AMS (ALTERED MENTAL STATUS): Primary | ICD-10-CM

## 2025-04-30 PROBLEM — N39.0 UTI (URINARY TRACT INFECTION): Status: ACTIVE | Noted: 2025-04-30

## 2025-04-30 PROBLEM — R41.0 CONFUSION: Status: ACTIVE | Noted: 2025-04-30

## 2025-04-30 PROBLEM — E87.1 HYPONATREMIA: Status: ACTIVE | Noted: 2025-04-30

## 2025-04-30 LAB
ABSOLUTE EOSINOPHIL (OHS): 0.07 K/UL
ABSOLUTE MONOCYTE (OHS): 0.65 K/UL (ref 0.3–1)
ABSOLUTE NEUTROPHIL COUNT (OHS): 6.09 K/UL (ref 1.8–7.7)
ALBUMIN SERPL BCP-MCNC: 3.4 G/DL (ref 3.5–5.2)
ALP SERPL-CCNC: 80 UNIT/L (ref 40–150)
ALT SERPL W/O P-5'-P-CCNC: 29 UNIT/L (ref 10–44)
ANION GAP (OHS): 8 MMOL/L (ref 8–16)
AST SERPL-CCNC: 32 UNIT/L (ref 11–45)
BASOPHILS # BLD AUTO: 0.03 K/UL
BASOPHILS NFR BLD AUTO: 0.4 %
BILIRUB SERPL-MCNC: 0.5 MG/DL (ref 0.1–1)
BILIRUB UR QL STRIP.AUTO: NEGATIVE
BUN SERPL-MCNC: 16 MG/DL (ref 8–23)
CALCIUM SERPL-MCNC: 9.5 MG/DL (ref 8.7–10.5)
CHLORIDE SERPL-SCNC: 99 MMOL/L (ref 95–110)
CLARITY UR: CLEAR
CO2 SERPL-SCNC: 28 MMOL/L (ref 23–29)
COLOR UR AUTO: YELLOW
CREAT SERPL-MCNC: 1.1 MG/DL (ref 0.5–1.4)
CRP SERPL-MCNC: 5.4 MG/L
ERYTHROCYTE [DISTWIDTH] IN BLOOD BY AUTOMATED COUNT: 12.6 % (ref 11.5–14.5)
GFR SERPLBLD CREATININE-BSD FMLA CKD-EPI: >60 ML/MIN/1.73/M2
GLUCOSE SERPL-MCNC: 104 MG/DL (ref 70–110)
GLUCOSE UR QL STRIP: NEGATIVE
HCT VFR BLD AUTO: 39.3 % (ref 40–54)
HGB BLD-MCNC: 13 GM/DL (ref 14–18)
HGB UR QL STRIP: ABNORMAL
HOLD SPECIMEN: NORMAL
IMM GRANULOCYTES # BLD AUTO: 0.02 K/UL (ref 0–0.04)
IMM GRANULOCYTES NFR BLD AUTO: 0.3 % (ref 0–0.5)
KETONES UR QL STRIP: ABNORMAL
LEUKOCYTE ESTERASE UR QL STRIP: NEGATIVE
LYMPHOCYTES # BLD AUTO: 0.89 K/UL (ref 1–4.8)
MAGNESIUM SERPL-MCNC: 1.9 MG/DL (ref 1.6–2.6)
MCH RBC QN AUTO: 32.3 PG (ref 27–31)
MCHC RBC AUTO-ENTMCNC: 33.1 G/DL (ref 32–36)
MCV RBC AUTO: 98 FL (ref 82–98)
MICROSCOPIC COMMENT: ABNORMAL
NITRITE UR QL STRIP: NEGATIVE
NUCLEATED RBC (/100WBC) (OHS): 0 /100 WBC
OHS QRS DURATION: 86 MS
OHS QTC CALCULATION: 452 MS
PH UR STRIP: 7 [PH]
PLATELET # BLD AUTO: 242 K/UL (ref 150–450)
PMV BLD AUTO: 10.8 FL (ref 9.2–12.9)
POTASSIUM SERPL-SCNC: 5 MMOL/L (ref 3.5–5.1)
PROT SERPL-MCNC: 7.6 GM/DL (ref 6–8.4)
PROT UR QL STRIP: ABNORMAL
RBC # BLD AUTO: 4.03 M/UL (ref 4.6–6.2)
RBC #/AREA URNS AUTO: 85 /HPF (ref 0–4)
RELATIVE EOSINOPHIL (OHS): 0.9 %
RELATIVE LYMPHOCYTE (OHS): 11.5 % (ref 18–48)
RELATIVE MONOCYTE (OHS): 8.4 % (ref 4–15)
RELATIVE NEUTROPHIL (OHS): 78.5 % (ref 38–73)
SODIUM SERPL-SCNC: 135 MMOL/L (ref 136–145)
SP GR UR STRIP: >=1.03
SQUAMOUS #/AREA URNS AUTO: <1 /HPF
TSH SERPL-ACNC: 0.55 UIU/ML (ref 0.4–4)
UROBILINOGEN UR STRIP-ACNC: NEGATIVE EU/DL
WBC # BLD AUTO: 7.75 K/UL (ref 3.9–12.7)
WBC #/AREA URNS AUTO: 2 /HPF (ref 0–5)

## 2025-04-30 PROCEDURE — 93005 ELECTROCARDIOGRAM TRACING: CPT

## 2025-04-30 PROCEDURE — 86140 C-REACTIVE PROTEIN: CPT | Performed by: INTERNAL MEDICINE

## 2025-04-30 PROCEDURE — 84443 ASSAY THYROID STIM HORMONE: CPT | Performed by: STUDENT IN AN ORGANIZED HEALTH CARE EDUCATION/TRAINING PROGRAM

## 2025-04-30 PROCEDURE — 80053 COMPREHEN METABOLIC PANEL: CPT | Performed by: NURSE PRACTITIONER

## 2025-04-30 PROCEDURE — 93010 ELECTROCARDIOGRAM REPORT: CPT | Mod: ,,, | Performed by: INTERNAL MEDICINE

## 2025-04-30 PROCEDURE — 99285 EMERGENCY DEPT VISIT HI MDM: CPT | Mod: 25

## 2025-04-30 PROCEDURE — 96360 HYDRATION IV INFUSION INIT: CPT

## 2025-04-30 PROCEDURE — 63600175 PHARM REV CODE 636 W HCPCS: Performed by: INTERNAL MEDICINE

## 2025-04-30 PROCEDURE — 63600175 PHARM REV CODE 636 W HCPCS: Performed by: STUDENT IN AN ORGANIZED HEALTH CARE EDUCATION/TRAINING PROGRAM

## 2025-04-30 PROCEDURE — 83735 ASSAY OF MAGNESIUM: CPT | Performed by: NURSE PRACTITIONER

## 2025-04-30 PROCEDURE — 25500020 PHARM REV CODE 255: Performed by: STUDENT IN AN ORGANIZED HEALTH CARE EDUCATION/TRAINING PROGRAM

## 2025-04-30 PROCEDURE — G0378 HOSPITAL OBSERVATION PER HR: HCPCS

## 2025-04-30 PROCEDURE — 85025 COMPLETE CBC W/AUTO DIFF WBC: CPT | Performed by: NURSE PRACTITIONER

## 2025-04-30 PROCEDURE — 81003 URINALYSIS AUTO W/O SCOPE: CPT | Performed by: NURSE PRACTITIONER

## 2025-04-30 RX ORDER — CEFTRIAXONE 1 G/1
1 INJECTION, POWDER, FOR SOLUTION INTRAMUSCULAR; INTRAVENOUS
Status: DISCONTINUED | OUTPATIENT
Start: 2025-04-30 | End: 2025-05-01

## 2025-04-30 RX ORDER — ACETAMINOPHEN 500 MG
500 TABLET ORAL EVERY 6 HOURS PRN
Status: DISCONTINUED | OUTPATIENT
Start: 2025-04-30 | End: 2025-05-01

## 2025-04-30 RX ORDER — AMLODIPINE BESYLATE 10 MG/1
10 TABLET ORAL DAILY
Status: DISCONTINUED | OUTPATIENT
Start: 2025-05-01 | End: 2025-05-19

## 2025-04-30 RX ADMIN — IOHEXOL 75 ML: 350 INJECTION, SOLUTION INTRAVENOUS at 02:04

## 2025-04-30 RX ADMIN — CEFTRIAXONE 1 G: 1 INJECTION, POWDER, FOR SOLUTION INTRAMUSCULAR; INTRAVENOUS at 06:04

## 2025-04-30 RX ADMIN — SODIUM CHLORIDE, POTASSIUM CHLORIDE, SODIUM LACTATE AND CALCIUM CHLORIDE 500 ML: 600; 310; 30; 20 INJECTION, SOLUTION INTRAVENOUS at 03:04

## 2025-04-30 NOTE — FIRST PROVIDER EVALUATION
"Medical screening examination initiated.  I have conducted a focused provider triage encounter, findings are as follows:    Brief history of present illness:  Patient is a 79-year-old male presenting from [facility] for altered mental status.  As per staff patient got there on Friday and on Saturday they noticed some mild decline in cognitive function.  Since that time symptoms have progressed.  Patient states he feels like he is not right.  Patient denies any fever or chills.  Patient's spoke to his daughter this morning and she states he is not at his baseline.  Prior to being placed in rehab patient was able to drive himself to his daughters who lives out of states and live alone.  Patient is in rehab for strength building.    Vitals:    04/30/25 1046   BP: (!) 146/86   BP Location: Right arm   Pulse: 82   Resp: 18   Temp: 98.5 °F (36.9 °C)   TempSrc: Oral   SpO2: 98%   Weight: 63.5 kg (140 lb)   Height: 5' 11" (1.803 m)       Pertinent physical exam:  Vital signs stable.  Slightly delayed in responses.    Brief workup plan:  Labs, imaging, EKG    Preliminary workup initiated; this workup will be continued and followed by the physician or advanced practice provider that is assigned to the patient when roomed.  "

## 2025-04-30 NOTE — ED TRIAGE NOTES
Vince Huffman, a 79 y.o. male presents to the ED w/ complaint of AMS. Pt endorses HA, chest pain and SOB.     Triage note:  Chief Complaint   Patient presents with    Altered Mental Status     X2 days, from [facility] rehab     Review of patient's allergies indicates:   Allergen Reactions    Gluten Diarrhea    Lactose      Past Medical History:   Diagnosis Date    Ankylosing spondylitis of unspecified sites in spine     Anxiety disorder, unspecified     BMI 21.0-21.9, adult 04/14/2025    Crohn's disease     Gout, unspecified     Heart disease     History of skin cancer 2001    squamous cell carcinoma left cheek    Hypertension     Hypothyroidism, unspecified     Psoriatic arthritis

## 2025-04-30 NOTE — ED NOTES
Pt provided with new sheets, new gown, straight cathed for urine specimen, and provided emma care. Pt repositioned for comfort.

## 2025-04-30 NOTE — ASSESSMENT & PLAN NOTE
"Per CT scan "thickening of the distal ileum to the surgical anastomosis with the large bowel. Inflammatory infectious ileitis are considerations. Recommend clinical correlation and follow-up. "  Will check CRP, if elevated will consult GI, dicussed with them.  Consider enema    "

## 2025-04-30 NOTE — ED NOTES
Pt's son requesting to speak to drs. Notified ED and HOSP med team. Pt's son concerned on why pt does not have a sitter or a camera watching him and educated pt's son the nurse cannot sit in the room with him at all times.

## 2025-04-30 NOTE — ED NOTES
I assumed care of this patient at this time. Report received from GRUPO Smith. Pt is resting comfortably in ED stretcher + no acute distress observed. Pt is AAOx4. RR is even, unlabored, and spontaneous + pt's oxygen saturation is 98% at this time. Skin is warm, dry and intact. Pt denies pain or needs at this time. Pt remains on continuous cardiac monitor, pulse ox, and BP cuff to cycle. Bed low and locked; side rails up x2; call light within reach.

## 2025-04-30 NOTE — ASSESSMENT & PLAN NOTE
Hyponatremia is likely due to Dehydration/hypovolemia. The patient's most recent sodium results are listed below.  Recent Labs     04/28/25  0828 04/30/25  1202   * 135*     Plan  - Correct the sodium by 4-6mEq in 24 hours.   - Obtain the following studies: Urine sodium, urine osmolality, serum osmolality.  - Will treat the hyponatremia with IV fluids as follows:   - Monitor sodium Daily.   - Patient hyponatremia is stable

## 2025-04-30 NOTE — ASSESSMENT & PLAN NOTE
Probably multifactorial  No overt signs of infection  Decline over the past few months  Neurology consult

## 2025-04-30 NOTE — PROVIDER PROGRESS NOTES - EMERGENCY DEPT.
"Encounter Date: 4/30/2025    ED Physician Progress Notes            ED Resident HAND-OFF NOTE:  Vince Huffman is a 79 y.o. male who presented to the ED on 4/30/2025, patient C/O altered mental status. I assumed care of patient from off-going ED physician team patient pending CT abdomen/pelvis, UA.    Patient has had progressive decline in his cognitive function over the last 5 days.  Per patient's daughter, he is not at his baseline.  Reports mild abdominal pain.  Denies nausea/vomiting/diarrhea.  No significant findings on workup so far    On my evaluation, Vince Huffman appears well, hemodynamically stable and in NAD. Thus far, Vince Huffman has received:  Medications   lactated ringers bolus 500 mL (500 mLs Intravenous New Bag 4/30/25 1503)   iohexoL (OMNIPAQUE 350) injection 75 mL (75 mLs Intravenous Given 4/30/25 1435)       BP (!) 159/87   Pulse (!) 111   Temp 98.6 °F (37 °C) (Oral)   Resp 14   Ht 5' 11" (1.803 m)   Wt 63.5 kg (140 lb)   SpO2 98%   BMI 19.53 kg/m²         Disposition: I anticipate patient will be admission  ______________________  Naomi Orellana MD   Emergency Medicine Resident      UPDATE:   CT abdomen/pelvis showing inflammation but patient has known Crohn's disease.  Urinalysis is not overtly concerning for UTI.  Discussed patient's condition further with son and daughter.  States that he has had a subacute decline in his mental status.  States that he drove 4-1/2 hours on good Friday.  States that his acute decline began on 04/23 when patient had a fall.    Discussed patient's case with Hospital Medicine who agreed to admit patient to their service for undifferentiated encephalopathy.      :  AMS (altered mental status)    Attestation of Dr. Mcnamara (Attending Physician):      I have reviewed the record and the patient care provided by the Resident provider and agree with the documented HPI, ROS, PE.  I also agree with the treatment plan and work up and I have performed an " independent history / physical exam and MDM.

## 2025-04-30 NOTE — HPI
By Dr. Juan F Ramírez MD    78 yo M w/HTN, hypothyroid, crohn's disease, CAD, hx of SBO, presenting with AMS from [facility]. Patient was sent in from [facility] for progressively worsening cognitive function. Patient is A&O x1 at his baseline. Patient and his daughter endorse mild confusion. Which has been progressive.    Patient has not had any recent falls. Endorsed mild suprapubic abdominal pain. No UTI. Mild inflammation on CT, not unexpected in the setting of Crohn's.

## 2025-04-30 NOTE — H&P
[facility] - Emergency Dept  [facility] Medicine  History & Physical    Patient Name: Vince Huffman  MRN: [id number]  Patient Class: OP- Observation  Admission Date: 4/30/2025  Attending Physician: Juan F Ramírez MD   Primary Care Provider: Leland Porras MD         Patient information was obtained from patient and ER records.     Subjective:     Principal Problem:Confusion    Chief Complaint:   Chief Complaint   Patient presents with    Altered Mental Status     X2 days, from [facility] rehab        HPI: 78 yo M w/HTN, hypothyroid, crohn's disease, CAD, hx of SBO, presenting with AMS from [facility] rehab. Recent admit at [facility] for generalized weakness and weight loss.     Reports mild diffuse abdominal pain for unknown period of time, denies chest pain (as opposed to triage note), dyspnea. Denies diarrhea, dysuria, hematuria.  Reporting the year is 1895     Recent ultrasounds performed of the lower extremity negative for DVT in either leg, also with MRI brain performed in the last week showing chronic changes, however with artifact, but no large mass, mass effect or midline shift.    Past Medical History:   Diagnosis Date    Ankylosing spondylitis of unspecified sites in spine     Anxiety disorder, unspecified     BMI 21.0-21.9, adult 04/14/2025    Crohn's disease     Gout, unspecified     Heart disease     History of skin cancer 2001    squamous cell carcinoma left cheek    Hypertension     Hypothyroidism, unspecified     Psoriatic arthritis        Past Surgical History:   Procedure Laterality Date    ANGIOGRAM, CORONARY, WITH LEFT HEART CATHETERIZATION      APPENDECTOMY      APPENDECTOMY  2007    COLONOSCOPY N/A     ENDOSCOPY N/A     RIGHT HEMICOLECTOMY  2013    SMALL INTESTINE SURGERY  2007    30.5 cm of small bowel removed       Review of patient's allergies indicates:   Allergen Reactions    Gluten Diarrhea    Lactose        No current facility-administered medications on file prior to encounter.     Current  Outpatient Medications on File Prior to Encounter   Medication Sig    acetaminophen (TYLENOL) 500 MG tablet Take 500 mg by mouth every 6 (six) hours as needed.    ALPRAZolam (XANAX XR) 0.5 MG Tb24 Take 0.5 mg by mouth once daily.    amLODIPine (NORVASC) 10 MG tablet Take 1 tablet (10 mg total) by mouth once daily.    ustekinumab (STELARA) 90 mg/mL Syrg syringe Inject 1 mL (90 mg total) into the skin every 8 weeks.     Family History    None       Tobacco Use    Smoking status: Never    Smokeless tobacco: Never   Substance and Sexual Activity    Alcohol use: No    Drug use: No    Sexual activity: Never     Partners: Female     Review of Systems   Constitutional:  Positive for activity change and appetite change.   HENT: Negative.     Eyes: Negative.    Respiratory: Negative.     Gastrointestinal: Negative.    Endocrine: Negative.    Genitourinary: Negative.    Musculoskeletal: Negative.    Allergic/Immunologic: Negative.    Neurological:  Positive for dizziness, speech difficulty and weakness.   Hematological: Negative.    Psychiatric/Behavioral:  Positive for confusion.      Objective:     Vital Signs (Most Recent):  Temp: 98.3 °F (36.8 °C) (04/30/25 1634)  Pulse: 110 (04/30/25 1700)  Resp: 18 (04/30/25 1634)  BP: (!) 170/94 (04/30/25 1634)  SpO2: 98 % (04/30/25 1700) Vital Signs (24h Range):  Temp:  [98.3 °F (36.8 °C)-98.6 °F (37 °C)] 98.3 °F (36.8 °C)  Pulse:  [] 110  Resp:  [14-18] 18  SpO2:  [97 %-99 %] 98 %  BP: (146-170)/(84-94) 170/94     Weight: 63.5 kg (140 lb)  Body mass index is 19.53 kg/m².     Physical Exam  Vitals and nursing note reviewed.   Constitutional:       General: He is not in acute distress.     Appearance: Normal appearance. He is well-developed and underweight. He is ill-appearing (chronically). He is not toxic-appearing or diaphoretic.   HENT:      Head: Normocephalic and atraumatic.      Right Ear: External ear normal.      Left Ear: External ear normal.   Eyes:      General: No  "scleral icterus.        Right eye: No discharge.         Left eye: No discharge.      Conjunctiva/sclera: Conjunctivae normal.   Neck:      Vascular: No JVD.      Trachea: No tracheal deviation.   Cardiovascular:      Rate and Rhythm: Normal rate and regular rhythm.      Heart sounds: Normal heart sounds. No murmur heard.     No gallop.   Pulmonary:      Effort: Pulmonary effort is normal. No respiratory distress.      Breath sounds: Normal breath sounds. No stridor. No wheezing or rales.   Abdominal:      General: Bowel sounds are normal. There is no distension.      Palpations: Abdomen is soft. There is no mass.      Tenderness: There is no abdominal tenderness. There is no guarding.   Musculoskeletal:         General: No deformity. Normal range of motion.      Cervical back: Normal range of motion and neck supple.   Skin:     General: Skin is warm and dry.   Neurological:      General: No focal deficit present.      Mental Status: He is alert and oriented to person, place, and time.      Cranial Nerves: No cranial nerve deficit.      Motor: No abnormal muscle tone.      Coordination: Coordination normal.      Comments: Has some word finding difficulties, slowed speech.    Psychiatric:         Mood and Affect: Mood normal.         Behavior: Behavior normal.         Thought Content: Thought content normal.         Judgment: Judgment normal.                Significant Labs: All pertinent labs within the past 24 hours have been reviewed.  BMP:   Recent Labs   Lab 04/30/25  1202      *   K 5.0   CL 99   CO2 28   BUN 16   CREATININE 1.1   CALCIUM 9.5   MG 1.9       Significant Imaging: I have reviewed all pertinent imaging results/findings within the past 24 hours.  Assessment/Plan:     Assessment & Plan  Confusion  Probably multifactorial  No overt signs of infection  Decline over the past few months  Neurology consult    Crohn's disease  Per CT scan "thickening of the distal ileum to the surgical " "anastomosis with the large bowel. Inflammatory infectious ileitis are considerations. Recommend clinical correlation and follow-up. "  Will check CRP, if elevated will consult GI, dicussed with them.  Consider enema    Generalized weakness  Chronic  Recent admission at [facility]    Unintended weight loss  On gluten free diet    Bereavement  Recent death of wife    Hyponatremia  Hyponatremia is likely due to Dehydration/hypovolemia. The patient's most recent sodium results are listed below.  Recent Labs     04/28/25  0828 04/30/25  1202   * 135*     Plan  - Correct the sodium by 4-6mEq in 24 hours.   - Obtain the following studies: Urine sodium, urine osmolality, serum osmolality.  - Will treat the hyponatremia with IV fluids as follows:   - Monitor sodium Daily.   - Patient hyponatremia is stable    UTI (urinary tract infection)    Possible UTI  Rocephin for now  VTE Risk Mitigation (From admission, onward)      None               On 04/30/2025, patient should be placed in hospital observation services under my care.             Juan F Ramírez MD  Department of Hospital Medicine  [facility] - Emergency Dept          "

## 2025-04-30 NOTE — ED PROVIDER NOTES
Encounter Date: 4/30/2025       History     Chief Complaint   Patient presents with    Altered Mental Status     X2 days, from [facility] rehab     HPI    78 yo M w/HTN, hypothyroid, crohn's disease, CAD, hx of SBO, presenting with AMS from [facility] rehab    Reports mild diffuse abdominal pain for unknown period of time, denies chest pain (as opposed to triage note), dyspnea. Denies diarrhea, dysuria, hematuria.  Reporting the year is 1895    Recent ultrasounds performed of the lower extremity negative for DVT in either leg, also with MRI brain performed in the last week showing chronic changes, however with artifact, but no large mass, mass effect or midline shift.      Review of patient's allergies indicates:   Allergen Reactions    Gluten Diarrhea    Lactose      Past Medical History:   Diagnosis Date    Ankylosing spondylitis of unspecified sites in spine     Anxiety disorder, unspecified     BMI 21.0-21.9, adult 04/14/2025    Crohn's disease     Gout, unspecified     Heart disease     History of skin cancer 2001    squamous cell carcinoma left cheek    Hypertension     Hypothyroidism, unspecified     Psoriatic arthritis      Past Surgical History:   Procedure Laterality Date    ANGIOGRAM, CORONARY, WITH LEFT HEART CATHETERIZATION      APPENDECTOMY      APPENDECTOMY  2007    COLONOSCOPY N/A     ENDOSCOPY N/A     RIGHT HEMICOLECTOMY  2013    SMALL INTESTINE SURGERY  2007    30.5 cm of small bowel removed     Family History   Problem Relation Name Age of Onset    Colon cancer Neg Hx      Liver cancer Neg Hx      Liver disease Neg Hx      Pancreatic cancer Neg Hx      Stomach cancer Neg Hx      Crohn's disease Neg Hx      Ulcerative colitis Neg Hx      Esophageal cancer Neg Hx      Throat cancer Neg Hx      Celiac disease Neg Hx       Social History[1]  Review of Systems    Physical Exam     Initial Vitals [04/30/25 1046]   BP Pulse Resp Temp SpO2   (!) 146/86 82 18 98.5 °F (36.9 °C) 98 %      MAP       --         Physical  Exam    Constitutional: He appears well-developed.   HENT:   Head: Atraumatic.   Eyes: EOM are normal.   Neck: Neck supple.   Pulmonary/Chest: Breath sounds normal. No stridor.   Abdominal: Abdomen is soft. He exhibits no distension.   Diffuse mild tenderness in the lower abdomen, no distention, no rebound, no CVA tenderness bilaterally   Musculoskeletal:      Cervical back: Neck supple.     Neurological: He is alert.   AOX2   Skin: Skin is warm.         ED Course   Procedures  Labs Reviewed   COMPREHENSIVE METABOLIC PANEL - Abnormal       Result Value    Sodium 135 (*)     Potassium 5.0      Chloride 99      CO2 28      Glucose 104      BUN 16      Creatinine 1.1      Calcium 9.5      Protein Total 7.6      Albumin 3.4 (*)     Bilirubin Total 0.5      ALP 80      AST 32      ALT 29      Anion Gap 8      eGFR >60     URINALYSIS, REFLEX TO URINE CULTURE - Abnormal    Color, UA Yellow      Appearance, UA Clear      pH, UA 7.0      Spec Grav UA >=1.030 (*)     Protein, UA Trace (*)     Glucose, UA Negative      Ketones, UA Trace (*)     Bilirubin, UA Negative      Blood, UA 2+ (*)     Nitrites, UA Negative      Urobilinogen, UA Negative      Leukocyte Esterase, UA Negative     CBC WITH DIFFERENTIAL - Abnormal    WBC 7.75      RBC 4.03 (*)     HGB 13.0 (*)     HCT 39.3 (*)     MCV 98      MCH 32.3 (*)     MCHC 33.1      RDW 12.6      Platelet Count 242      MPV 10.8      Nucleated RBC 0      Neut % 78.5 (*)     Lymph % 11.5 (*)     Mono % 8.4      Eos % 0.9      Basophil % 0.4      Imm Grans % 0.3      Neut # 6.09      Lymph # 0.89 (*)     Mono # 0.65      Eos # 0.07      Baso # 0.03      Imm Grans # 0.02     URINALYSIS MICROSCOPIC - Abnormal    RBC, UA 85 (*)     WBC, UA 2      Squamous Epithelial Cells, UA <1      Microscopic Comment       MAGNESIUM - Normal    Magnesium  1.9     TSH - Normal    TSH 0.553     CBC W/ AUTO DIFFERENTIAL    Narrative:     The following orders were created for panel order CBC auto  differential.  Procedure                               Abnormality         Status                     ---------                               -----------         ------                     CBC with Differential[[id number]]       Abnormal            Final result                 Please view results for these tests on the individual orders.   [facility] URINE HOLD        ECG Results              EKG 12-lead (Final result)        Collection Time Result Time QRS Duration [facility] QTC Calculation    04/30/25 12:09:57 04/30/25 12:38:34 86 452                     Final result by Interface, Lab In [facility] (04/30/25 12:38:44)                   Narrative:    Test Reason : R41.82,    Vent. Rate :  97 BPM     Atrial Rate :  97 BPM     P-R Int : 142 ms          QRS Dur :  86 ms      QT Int : 356 ms       P-R-T Axes :  73 -35  35 degrees    QTcB Int : 452 ms    Normal sinus rhythm  Left axis deviation  Low septal forces  Baseline Artifact  Abnormal ECG  When compared with ECG of 23-Apr-2025 19:59,  No significant change was found  Confirmed by Jamir Joe ([id number]) on 4/30/2025 12:38:29 PM    Referred By: AAAREFERRAL SELF           Confirmed By: Jamir Joe                                  Imaging Results               CT Abdomen Pelvis With IV Contrast NO Oral Contrast (Final result)  Result time 04/30/25 15:46:15      Final result by Russell Ba MD (04/30/25 15:46:15)                   Impression:      1. Wall thickening of the distal ileum to the surgical anastomosis with the large bowel.  Inflammatory infectious ileitis are considerations.  Recommend clinical correlation and follow-up.  2. Constipation.  There is fecal distention of the rectum.  No fecal impaction is not excluded.  3. Bilateral renal cysts and hepatic cysts.  4. Nonobstructing nephrolithiasis of the right kidney.  5. Nondistention of the urinary bladder.  Mild wall thickening is not excluded.  6. Diverticulosis of the sigmoid colon.  No evidence of acute  diverticulitis.  7. This report was flagged in Epic as abnormal.      Electronically signed by: Russell Humphries  Date:    04/30/2025  Time:    15:46               Narrative:    EXAMINATION:  CT ABDOMEN PELVIS WITH IV CONTRAST    CLINICAL HISTORY:  LLQ abdominal pain;RLQ abdominal pain (Age >= 14y);    TECHNIQUE:  Low dose axial images, sagittal and coronal reformations were obtained from the lung bases to the pubic symphysis following the IV administration of 75 mL of Omnipaque 350 .  Oral contrast was not administered.    COMPARISON:  11/05/2018    FINDINGS:  Abdomen:    - Lower thorax:    - Lung bases: No infiltrates and no nodules.    - Liver: Multiple small hepatic cysts.    - Gallbladder: No calcified gallstones.    - Bile Ducts: No evidence of intra or extra hepatic biliary ductal dilation.    - Spleen: Negative.    - Kidneys: Multiple bilateral simple renal cysts.  Single nonobstructing stone in the right kidney.  No hydronephrosis or hydroureter bilaterally.    - Adrenals: Unremarkable.    - Pancreas: No mass or peripancreatic fat stranding.    - Retroperitoneum:  No significant adenopathy.    - Vascular: Mild ectasia of the distal aorta with no evidence of aneurysm.    - Abdominal wall:  Unremarkable.    Pelvis:    Urinary bladder is incompletely distended with possible mild wall thickening.    Postop changes at the ileocecal junction.  There is wall thickening noted to the distal ileum to the surgical anastomosis.  Inflammatory or infectious ileitis are considerations.  Follow-up recommended.    Bowel/Mesentery:    No evidence of bowel obstruction.  Moderate retained feces in the colon.    Fecal distention of the rectum.  Impaction is not excluded.    Mild diverticulosis of the sigmoid colon.  No evidence of acute diverticulitis.    Bones:  No acute osseous abnormality and no suspicious lytic or blastic lesion.                                       CT Head Without Contrast (Final result)  Result time  04/30/25 14:49:11      Final result by Ish Carrillo MD (04/30/25 14:49:11)                   Impression:      No evidence for acute intracranial pathology.      Electronically signed by: Ish Carrillo  Date:    04/30/2025  Time:    14:49               Narrative:    EXAMINATION:  CT HEAD WITHOUT CONTRAST    CLINICAL HISTORY:  Mental status change, unknown cause;    TECHNIQUE:  Low dose axial images were obtained through the head.  Coronal and sagittal reformations were also performed. Contrast was not administered.    COMPARISON:  MRI brain without contrast 04/24/2025, CT head without contrast 04/24/2025.    FINDINGS:  Ventricles are stable in size without evidence for new or worsening hydrocephalus.  No new or enlarging extra-axial blood or fluid collections.    Brain parenchyma appears unchanged.  Scattered supratentorial hypoattenuation, overall nonspecific, but can be seen in setting of microvascular ischemic change.  No parenchymal mass, edema, hemorrhage, or acute major vascular territory infarct.    No calvarial or skull base fracture or osseous destructive lesion.  Paranasal sinuses and mastoid air cells are essentially clear.                                       X-Ray Chest AP Portable (Final result)  Result time 04/30/25 12:49:39      Final result by Matti Cote MD (04/30/25 12:49:39)                   Impression:      See above      Electronically signed by: Matti Cote MD  Date:    04/30/2025  Time:    12:49               Narrative:    EXAMINATION:  XR CHEST AP PORTABLE    CLINICAL HISTORY:  Altered mental status, unspecified    TECHNIQUE:  Single frontal view of the chest was performed.    COMPARISON:  No 04/23/2025 ne    FINDINGS:  Heart size normal.  The tracheal slightly deviates to the right at the thoracic inlet probably related to enlarged thyroid gland.  Minimal subsegmental atelectatic changes noted at the right lung base.  Lungs are otherwise clear.  No pleural effusion.                                        Medications   lactated ringers bolus 500 mL (500 mLs Intravenous New Bag 4/30/25 1503)   iohexoL (OMNIPAQUE 350) injection 75 mL (75 mLs Intravenous Given 4/30/25 1435)     Medical Decision Making  80 yo M w/HTN, hypothyroid, crohn's disease, CAD, hx of SBO, presenting with AMS from [facility] rehab      Differential diagnosis includes but is not limited to: progressive dementia, decreased PO intake, UTI, intraabdominal infection, thyroid abnormality    Poor historian, with no collateral information able to be obtained from [facility] rehab, with triage note saying that daughter reporting he is altered via phone, unclear when the last time she talked to him was, or if this is progression of his dementia, which he has been undergoing workup for.  With slight tachycardia of 110s, sinus rhythm, normal blood pressure for patient, normal O2 saturation, and mild abdominal tenderness on exam, we will obtain labs, CT AP, CT head, UA to workup for altered mental status.    CBC with no leukocytosis, CMP with mild hyponatremia of 135, consistent with patient's baseline, and normal TSH.  Per chart review, prior to placement in rehab last week, patient was somewhat independent with ADLs, given change in location, with dementia, his change in environment may have exacerbated his pre-existing dementia. Anticipate admission for encephalopathy workup, signed out to incoming ED providers pending CTAP      Amount and/or Complexity of Data Reviewed  Labs: ordered.  Radiology: ordered.    Risk  Prescription drug management.            [unfilled]   ED Course as of 04/30/25 1551   Wed Apr 30, 2025   1228 EKG independently interpreted by me with normal sinus rhythm rate of 97, poor baseline artifact due to baseline tremor, no perceptible STEMI, no perceptible T-wave inversions,  [LF]   1459 CT head with no significant changes, no ICH, no large mass [LF]      ED Course User Index  [LF] Naima Escobar MD                            Clinical Impression:  Final diagnoses:  [R41.82] AMS (altered mental status)                   [1]   Social History  Tobacco Use    Smoking status: Never    Smokeless tobacco: Never   Substance Use Topics    Alcohol use: No    Drug use: No        Naima Escobar MD  04/30/25 5809

## 2025-04-30 NOTE — SUBJECTIVE & OBJECTIVE
Past Medical History:   Diagnosis Date    Ankylosing spondylitis of unspecified sites in spine     Anxiety disorder, unspecified     BMI 21.0-21.9, adult 04/14/2025    Crohn's disease     Gout, unspecified     Heart disease     History of skin cancer 2001    squamous cell carcinoma left cheek    Hypertension     Hypothyroidism, unspecified     Psoriatic arthritis        Past Surgical History:   Procedure Laterality Date    ANGIOGRAM, CORONARY, WITH LEFT HEART CATHETERIZATION      APPENDECTOMY      APPENDECTOMY  2007    COLONOSCOPY N/A     ENDOSCOPY N/A     RIGHT HEMICOLECTOMY  2013    SMALL INTESTINE SURGERY  2007    30.5 cm of small bowel removed       Review of patient's allergies indicates:   Allergen Reactions    Gluten Diarrhea    Lactose        No current facility-administered medications on file prior to encounter.     Current Outpatient Medications on File Prior to Encounter   Medication Sig    acetaminophen (TYLENOL) 500 MG tablet Take 500 mg by mouth every 6 (six) hours as needed.    ALPRAZolam (XANAX XR) 0.5 MG Tb24 Take 0.5 mg by mouth once daily.    amLODIPine (NORVASC) 10 MG tablet Take 1 tablet (10 mg total) by mouth once daily.    ustekinumab (STELARA) 90 mg/mL Syrg syringe Inject 1 mL (90 mg total) into the skin every 8 weeks.     Family History    None       Tobacco Use    Smoking status: Never    Smokeless tobacco: Never   Substance and Sexual Activity    Alcohol use: No    Drug use: No    Sexual activity: Never     Partners: Female     Review of Systems   Constitutional:  Positive for activity change and appetite change.   HENT: Negative.     Eyes: Negative.    Respiratory: Negative.     Gastrointestinal: Negative.    Endocrine: Negative.    Genitourinary: Negative.    Musculoskeletal: Negative.    Allergic/Immunologic: Negative.    Neurological:  Positive for dizziness, speech difficulty and weakness.   Hematological: Negative.    Psychiatric/Behavioral:  Positive for confusion.      Objective:      Vital Signs (Most Recent):  Temp: 98.3 °F (36.8 °C) (04/30/25 1634)  Pulse: 110 (04/30/25 1700)  Resp: 18 (04/30/25 1634)  BP: (!) 170/94 (04/30/25 1634)  SpO2: 98 % (04/30/25 1700) Vital Signs (24h Range):  Temp:  [98.3 °F (36.8 °C)-98.6 °F (37 °C)] 98.3 °F (36.8 °C)  Pulse:  [] 110  Resp:  [14-18] 18  SpO2:  [97 %-99 %] 98 %  BP: (146-170)/(84-94) 170/94     Weight: 63.5 kg (140 lb)  Body mass index is 19.53 kg/m².     Physical Exam  Vitals and nursing note reviewed.   Constitutional:       General: He is not in acute distress.     Appearance: Normal appearance. He is well-developed and underweight. He is ill-appearing (chronically). He is not toxic-appearing or diaphoretic.   HENT:      Head: Normocephalic and atraumatic.      Right Ear: External ear normal.      Left Ear: External ear normal.   Eyes:      General: No scleral icterus.        Right eye: No discharge.         Left eye: No discharge.      Conjunctiva/sclera: Conjunctivae normal.   Neck:      Vascular: No JVD.      Trachea: No tracheal deviation.   Cardiovascular:      Rate and Rhythm: Normal rate and regular rhythm.      Heart sounds: Normal heart sounds. No murmur heard.     No gallop.   Pulmonary:      Effort: Pulmonary effort is normal. No respiratory distress.      Breath sounds: Normal breath sounds. No stridor. No wheezing or rales.   Abdominal:      General: Bowel sounds are normal. There is no distension.      Palpations: Abdomen is soft. There is no mass.      Tenderness: There is no abdominal tenderness. There is no guarding.   Musculoskeletal:         General: No deformity. Normal range of motion.      Cervical back: Normal range of motion and neck supple.   Skin:     General: Skin is warm and dry.   Neurological:      General: No focal deficit present.      Mental Status: He is alert and oriented to person, place, and time.      Cranial Nerves: No cranial nerve deficit.      Motor: No abnormal muscle tone.      Coordination:  Coordination normal.      Comments: Has some word finding difficulties, slowed speech.    Psychiatric:         Mood and Affect: Mood normal.         Behavior: Behavior normal.         Thought Content: Thought content normal.         Judgment: Judgment normal.                Significant Labs: All pertinent labs within the past 24 hours have been reviewed.  BMP:   Recent Labs   Lab 04/30/25  1202      *   K 5.0   CL 99   CO2 28   BUN 16   CREATININE 1.1   CALCIUM 9.5   MG 1.9       Significant Imaging: I have reviewed all pertinent imaging results/findings within the past 24 hours.

## 2025-05-01 PROBLEM — I47.20 VENTRICULAR TACHYCARDIA: Status: ACTIVE | Noted: 2025-05-01

## 2025-05-01 PROBLEM — N39.0 UTI (URINARY TRACT INFECTION): Status: RESOLVED | Noted: 2025-04-30 | Resolved: 2025-05-01

## 2025-05-01 PROBLEM — Z63.4 BEREAVEMENT: Status: RESOLVED | Noted: 2025-04-24 | Resolved: 2025-05-01

## 2025-05-01 PROBLEM — R00.0 TACHYCARDIA: Status: ACTIVE | Noted: 2025-05-01

## 2025-05-01 PROBLEM — R41.82 AMS (ALTERED MENTAL STATUS): Status: ACTIVE | Noted: 2025-04-30

## 2025-05-01 PROBLEM — G93.40 ENCEPHALOPATHY: Status: ACTIVE | Noted: 2025-04-30

## 2025-05-01 LAB
ABSOLUTE EOSINOPHIL (OHS): 0.13 K/UL
ABSOLUTE MONOCYTE (OHS): 0.88 K/UL (ref 0.3–1)
ABSOLUTE NEUTROPHIL COUNT (OHS): 6.54 K/UL (ref 1.8–7.7)
ANION GAP (OHS): 9 MMOL/L (ref 8–16)
BASOPHILS # BLD AUTO: 0.03 K/UL
BASOPHILS NFR BLD AUTO: 0.3 %
BNP SERPL-MCNC: 17 PG/ML (ref 0–99)
BUN SERPL-MCNC: 14 MG/DL (ref 8–23)
CALCIUM SERPL-MCNC: 9.7 MG/DL (ref 8.7–10.5)
CHLORIDE SERPL-SCNC: 98 MMOL/L (ref 95–110)
CO2 SERPL-SCNC: 28 MMOL/L (ref 23–29)
CREAT SERPL-MCNC: 1.1 MG/DL (ref 0.5–1.4)
ERYTHROCYTE [DISTWIDTH] IN BLOOD BY AUTOMATED COUNT: 12.5 % (ref 11.5–14.5)
GFR SERPLBLD CREATININE-BSD FMLA CKD-EPI: >60 ML/MIN/1.73/M2
GLUCOSE SERPL-MCNC: 91 MG/DL (ref 70–110)
HCT VFR BLD AUTO: 38.4 % (ref 40–54)
HGB BLD-MCNC: 12.7 GM/DL (ref 14–18)
IMM GRANULOCYTES # BLD AUTO: 0.03 K/UL (ref 0–0.04)
IMM GRANULOCYTES NFR BLD AUTO: 0.3 % (ref 0–0.5)
LACTATE SERPL-SCNC: 0.8 MMOL/L (ref 0.5–2.2)
LYMPHOCYTES # BLD AUTO: 1.44 K/UL (ref 1–4.8)
MAGNESIUM SERPL-MCNC: 1.8 MG/DL (ref 1.6–2.6)
MCH RBC QN AUTO: 31.4 PG (ref 27–31)
MCHC RBC AUTO-ENTMCNC: 33.1 G/DL (ref 32–36)
MCV RBC AUTO: 95 FL (ref 82–98)
NUCLEATED RBC (/100WBC) (OHS): 0 /100 WBC
OHS QRS DURATION: 78 MS
OHS QTC CALCULATION: 461 MS
OSMOLALITY SERPL: 291 MOSM/KG (ref 280–300)
PHOSPHATE SERPL-MCNC: 3.4 MG/DL (ref 2.7–4.5)
PLATELET # BLD AUTO: 215 K/UL (ref 150–450)
PMV BLD AUTO: 11.4 FL (ref 9.2–12.9)
POTASSIUM SERPL-SCNC: 4.1 MMOL/L (ref 3.5–5.1)
PROCALCITONIN SERPL-MCNC: 0.08 NG/ML
RBC # BLD AUTO: 4.04 M/UL (ref 4.6–6.2)
RELATIVE EOSINOPHIL (OHS): 1.4 %
RELATIVE LYMPHOCYTE (OHS): 15.9 % (ref 18–48)
RELATIVE MONOCYTE (OHS): 9.7 % (ref 4–15)
RELATIVE NEUTROPHIL (OHS): 72.4 % (ref 38–73)
SODIUM SERPL-SCNC: 135 MMOL/L (ref 136–145)
TROPONIN I SERPL HS-MCNC: 6 NG/L
WBC # BLD AUTO: 9.05 K/UL (ref 3.9–12.7)
WBC #/AREA STL HPF: NORMAL /[HPF]

## 2025-05-01 PROCEDURE — 83930 ASSAY OF BLOOD OSMOLALITY: CPT

## 2025-05-01 PROCEDURE — 83735 ASSAY OF MAGNESIUM: CPT

## 2025-05-01 PROCEDURE — 82140 ASSAY OF AMMONIA: CPT

## 2025-05-01 PROCEDURE — 84145 PROCALCITONIN (PCT): CPT

## 2025-05-01 PROCEDURE — 25000003 PHARM REV CODE 250: Performed by: INTERNAL MEDICINE

## 2025-05-01 PROCEDURE — 93010 ELECTROCARDIOGRAM REPORT: CPT | Mod: ,,, | Performed by: INTERNAL MEDICINE

## 2025-05-01 PROCEDURE — 11000001 HC ACUTE MED/SURG PRIVATE ROOM

## 2025-05-01 PROCEDURE — 87046 STOOL CULTR AEROBIC BACT EA: CPT

## 2025-05-01 PROCEDURE — 25000003 PHARM REV CODE 250

## 2025-05-01 PROCEDURE — 85025 COMPLETE CBC W/AUTO DIFF WBC: CPT

## 2025-05-01 PROCEDURE — 89055 LEUKOCYTE ASSESSMENT FECAL: CPT

## 2025-05-01 PROCEDURE — 36415 COLL VENOUS BLD VENIPUNCTURE: CPT

## 2025-05-01 PROCEDURE — 63600175 PHARM REV CODE 636 W HCPCS

## 2025-05-01 PROCEDURE — 93005 ELECTROCARDIOGRAM TRACING: CPT

## 2025-05-01 PROCEDURE — 83880 ASSAY OF NATRIURETIC PEPTIDE: CPT

## 2025-05-01 PROCEDURE — 83605 ASSAY OF LACTIC ACID: CPT

## 2025-05-01 PROCEDURE — 84484 ASSAY OF TROPONIN QUANT: CPT

## 2025-05-01 PROCEDURE — 80048 BASIC METABOLIC PNL TOTAL CA: CPT

## 2025-05-01 PROCEDURE — 84100 ASSAY OF PHOSPHORUS: CPT

## 2025-05-01 PROCEDURE — 87427 SHIGA-LIKE TOXIN AG IA: CPT

## 2025-05-01 PROCEDURE — 21400001 HC TELEMETRY ROOM

## 2025-05-01 PROCEDURE — 99223 1ST HOSP IP/OBS HIGH 75: CPT | Mod: ,,, | Performed by: PSYCHIATRY & NEUROLOGY

## 2025-05-01 RX ORDER — ACETAMINOPHEN 325 MG/1
650 TABLET ORAL EVERY 4 HOURS PRN
Status: DISCONTINUED | OUTPATIENT
Start: 2025-05-01 | End: 2025-05-29

## 2025-05-01 RX ORDER — NALOXONE HCL 0.4 MG/ML
0.02 VIAL (ML) INJECTION
Status: DISCONTINUED | OUTPATIENT
Start: 2025-05-01 | End: 2025-06-02 | Stop reason: HOSPADM

## 2025-05-01 RX ORDER — SODIUM CHLORIDE 0.9 G/100ML
500 IRRIGANT IRRIGATION
Status: COMPLETED | OUTPATIENT
Start: 2025-05-01 | End: 2025-05-01

## 2025-05-01 RX ORDER — ONDANSETRON 8 MG/1
8 TABLET, ORALLY DISINTEGRATING ORAL EVERY 8 HOURS PRN
Status: DISCONTINUED | OUTPATIENT
Start: 2025-05-01 | End: 2025-06-02 | Stop reason: HOSPADM

## 2025-05-01 RX ORDER — PROCHLORPERAZINE EDISYLATE 5 MG/ML
5 INJECTION INTRAMUSCULAR; INTRAVENOUS EVERY 6 HOURS PRN
Status: DISCONTINUED | OUTPATIENT
Start: 2025-05-01 | End: 2025-06-02 | Stop reason: HOSPADM

## 2025-05-01 RX ORDER — SODIUM CHLORIDE 0.9 % (FLUSH) 0.9 %
10 SYRINGE (ML) INJECTION EVERY 12 HOURS PRN
Status: DISCONTINUED | OUTPATIENT
Start: 2025-05-01 | End: 2025-05-29

## 2025-05-01 RX ORDER — ENOXAPARIN SODIUM 100 MG/ML
40 INJECTION SUBCUTANEOUS EVERY 24 HOURS
Status: DISCONTINUED | OUTPATIENT
Start: 2025-05-01 | End: 2025-05-19

## 2025-05-01 RX ORDER — TALC
6 POWDER (GRAM) TOPICAL NIGHTLY PRN
Status: DISCONTINUED | OUTPATIENT
Start: 2025-05-01 | End: 2025-06-02 | Stop reason: HOSPADM

## 2025-05-01 RX ORDER — IBUPROFEN 200 MG
16 TABLET ORAL
Status: DISCONTINUED | OUTPATIENT
Start: 2025-05-01 | End: 2025-06-02 | Stop reason: HOSPADM

## 2025-05-01 RX ORDER — POLYETHYLENE GLYCOL 3350 17 G/17G
17 POWDER, FOR SOLUTION ORAL DAILY PRN
Status: DISCONTINUED | OUTPATIENT
Start: 2025-05-01 | End: 2025-05-02

## 2025-05-01 RX ORDER — METOPROLOL TARTRATE 1 MG/ML
5 INJECTION, SOLUTION INTRAVENOUS EVERY 5 MIN PRN
Status: DISCONTINUED | OUTPATIENT
Start: 2025-05-01 | End: 2025-05-02

## 2025-05-01 RX ORDER — GADOBUTROL 604.72 MG/ML
7 INJECTION INTRAVENOUS
Status: DISCONTINUED | OUTPATIENT
Start: 2025-05-01 | End: 2025-05-17

## 2025-05-01 RX ORDER — SYRING-NEEDL,DISP,INSUL,0.3 ML 29 G X1/2"
296 SYRINGE, EMPTY DISPOSABLE MISCELLANEOUS
Status: COMPLETED | OUTPATIENT
Start: 2025-05-01 | End: 2025-05-01

## 2025-05-01 RX ORDER — ALPRAZOLAM 0.5 MG/1
0.5 TABLET ORAL 2 TIMES DAILY PRN
Status: DISCONTINUED | OUTPATIENT
Start: 2025-05-01 | End: 2025-06-02 | Stop reason: HOSPADM

## 2025-05-01 RX ORDER — IBUPROFEN 200 MG
24 TABLET ORAL
Status: DISCONTINUED | OUTPATIENT
Start: 2025-05-01 | End: 2025-06-02 | Stop reason: HOSPADM

## 2025-05-01 RX ORDER — PSEUDOEPHEDRINE/ACETAMINOPHEN 30MG-500MG
100 TABLET ORAL
Status: COMPLETED | OUTPATIENT
Start: 2025-05-01 | End: 2025-05-01

## 2025-05-01 RX ORDER — GLUCAGON 1 MG
1 KIT INJECTION
Status: DISCONTINUED | OUTPATIENT
Start: 2025-05-01 | End: 2025-06-02 | Stop reason: HOSPADM

## 2025-05-01 RX ORDER — ACETAMINOPHEN 500 MG
1000 TABLET ORAL EVERY 8 HOURS PRN
Status: DISCONTINUED | OUTPATIENT
Start: 2025-05-01 | End: 2025-05-29

## 2025-05-01 RX ADMIN — Medication 100 ML: at 01:05

## 2025-05-01 RX ADMIN — METOROPROLOL TARTRATE 5 MG: 5 INJECTION, SOLUTION INTRAVENOUS at 03:05

## 2025-05-01 RX ADMIN — ENOXAPARIN SODIUM 40 MG: 40 INJECTION SUBCUTANEOUS at 03:05

## 2025-05-01 RX ADMIN — SODIUM CHLORIDE 500 ML: 0.9 INJECTION, SOLUTION INTRAVENOUS at 03:05

## 2025-05-01 RX ADMIN — AMLODIPINE BESYLATE 10 MG: 10 TABLET ORAL at 08:05

## 2025-05-01 RX ADMIN — SODIUM CHLORIDE 500 ML: 0.9 IRRIGANT IRRIGATION at 09:05

## 2025-05-01 RX ADMIN — MAGNESIUM CITRATE 296 ML: 1.75 LIQUID ORAL at 01:05

## 2025-05-01 NOTE — ASSESSMENT & PLAN NOTE
Vince Huffman is a 79 year old male with history of chron's disease, SBO, hypothyroidism, and CAD  presenting from [facility] with encephalopathy. Exam notable for disorientation to place, time, and situation. He is minimally responsive and will answer with one word. There is asterixis in his upper extremities and intention tremor most notably in the left hand. No hyperreflexia. Ptosis of left eye though no extraocular movement abnormalities/pupillary discrepancy to suggest third nerve palsy and mehran's respectively.     Recommendations:  - MRI brain from last week was essentially non diagnostic so recommend repeat during this admission  - Add extended EEG monitoring  - Order serum ammonia level  - Continued treatment of ileitis per primary team  - Delirium and fall precautions   - Neurology will follow up

## 2025-05-01 NOTE — SUBJECTIVE & OBJECTIVE
Past Medical History:   Diagnosis Date    Ankylosing spondylitis of unspecified sites in spine     Anxiety disorder, unspecified     BMI 21.0-21.9, adult 04/14/2025    Crohn's disease     Gout, unspecified     Heart disease     History of skin cancer 2001    squamous cell carcinoma left cheek    Hypertension     Hypothyroidism, unspecified     Psoriatic arthritis        Past Surgical History:   Procedure Laterality Date    ANGIOGRAM, CORONARY, WITH LEFT HEART CATHETERIZATION      APPENDECTOMY      APPENDECTOMY  2007    COLONOSCOPY N/A     ENDOSCOPY N/A     RIGHT HEMICOLECTOMY  2013    SMALL INTESTINE SURGERY  2007    30.5 cm of small bowel removed       Review of patient's allergies indicates:   Allergen Reactions    Gluten Diarrhea    Lactose        Current Neurological Medications: see below    No current facility-administered medications on file prior to encounter.     Current Outpatient Medications on File Prior to Encounter   Medication Sig    acetaminophen (TYLENOL) 500 MG tablet Take 500 mg by mouth every 6 (six) hours as needed.    ALPRAZolam (XANAX XR) 0.5 MG Tb24 Take 0.5 mg by mouth once daily.    amLODIPine (NORVASC) 10 MG tablet Take 1 tablet (10 mg total) by mouth once daily.    ustekinumab (STELARA) 90 mg/mL Syrg syringe Inject 1 mL (90 mg total) into the skin every 8 weeks.     Family History    None       Tobacco Use    Smoking status: Never    Smokeless tobacco: Never   Substance and Sexual Activity    Alcohol use: No    Drug use: No    Sexual activity: Never     Partners: Female     Review of Systems   Constitutional:  Positive for activity change. Negative for fever.   Respiratory:  Negative for shortness of breath.    Cardiovascular:  Negative for chest pain.   Gastrointestinal:  Positive for abdominal pain.   Neurological:  Positive for tremors and speech difficulty.   Psychiatric/Behavioral:  Positive for behavioral problems, confusion and decreased concentration.      Objective:     Vital  Signs (Most Recent):  Temp: 98.6 °F (37 °C) (05/01/25 1636)  Pulse: 95 (05/01/25 1636)  Resp: 16 (05/01/25 1412)  BP: (!) 148/85 (05/01/25 1636)  SpO2: 95 % (05/01/25 1636) Vital Signs (24h Range):  Temp:  [97.5 °F (36.4 °C)-98.6 °F (37 °C)] 98.6 °F (37 °C)  Pulse:  [] 95  Resp:  [12-20] 16  SpO2:  [95 %-100 %] 95 %  BP: (146-226)/() 148/85     Weight: 63 kg (138 lb 14.2 oz)  Body mass index is 19.37 kg/m².     Physical Exam  Constitutional:       Comments: The lens of his glasses for the left eye is missing. He appears confused and responds minimally to questions.    Eyes:      Pupils: Pupils are equal, round, and reactive to light.      Comments: Ptosis of left eye, unclear if at his baseline.   Pupils reactive to light, no discrepancy from left to right to suggest mehran's.  No dilation of impaired extraocular movements to suggest third nerve palsy.    Pulmonary:      Effort: Pulmonary effort is normal.   Neurological:      Mental Status: He is alert. He is disoriented.      Deep Tendon Reflexes:      Reflex Scores:       Brachioradialis reflexes are 2+ on the right side and 2+ on the left side.       Patellar reflexes are 2+ on the right side and 2+ on the left side.         NEUROLOGICAL EXAMINATION:     MENTAL STATUS   Oriented to person.   Disoriented to place.   Disoriented to time.   Attention: decreased. Concentration: decreased.   Level of consciousness: alert    CRANIAL NERVES     CN III, IV, VI   Pupils are equal, round, and reactive to light.       Mild ptosis of left eye without pupillary abnormalities     MOTOR EXAM   Overall muscle tone: increased  Right arm tone: increased  Left arm tone: increased       Asterixis with tremor most notably in the left arm     REFLEXES     Reflexes   Right brachioradialis: 2+  Left brachioradialis: 2+  Right patellar: 2+  Left patellar: 2+    SENSORY EXAM   Light touch normal.     GAIT AND COORDINATION     Tremor   Intention tremor:  present      Significant Labs: CBC:   Recent Labs   Lab 04/30/25  1202 05/01/25  0350   WBC 7.75 9.05   HGB 13.0* 12.7*   HCT 39.3* 38.4*    215     CMP:   Recent Labs   Lab 04/30/25  1202 05/01/25  0350    91   * 135*   K 5.0 4.1   CL 99 98   CO2 28 28   BUN 16 14   CREATININE 1.1 1.1   CALCIUM 9.5 9.7   MG 1.9 1.8   PROT 7.6  --    ALBUMIN 3.4*  --    BILITOT 0.5  --    ALKPHOS 80  --    AST 32  --    ALT 29  --    ANIONGAP 8 9       Significant Imaging: I have reviewed all pertinent imaging results/findings within the past 24 hours.

## 2025-05-01 NOTE — PT/OT/SLP PROGRESS
Physical Therapy      Patient Name:  Vince Huffman   MRN:  [id number]    Patient not seen today secondary to Nurse hold due to tachycardia . Will follow-up when appropriate.    5/1/2025

## 2025-05-01 NOTE — HOSPITAL COURSE
Mr. Huffman is a 79-year-old gentleman with Crohn's disease, ankylosing spondylitis, psoriatic arthritis, hypothyroidism, hypertension, coronary artery disease, anxiety disorder, gout, history of SBO.  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  CT of the abdomen pelvis showed wall thickening of the distal ileum.  Other studies including urinalysis, chest x-ray, inflammatory markers, MRI brain, EEG, ammonia levels were not indicative of etiology of encephalopathy.  Symptoms improved mildly with carbidopa-levodopa which was ordered per Neurology recommendations.  But then initial CSF studies from [facility] showed some evidence of meningitis.  Patient started on empiric treatment on 05/06.  However, as additional CSF studies came back, it was determined that findings likely did not represent meningitis.  ID recommended discontinuation of antimicrobials.  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  Completed 5 day course of IVIG on 05/14.  Patient developed severe hyponatremia.  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  Neurology started 5 day course of IV Solu-Medrol on 05/17.  Patient started on vitamin B12 and thiamine replacement.  Given overall decline, family decided to pursue comfort care approach and hospice.  Patient will be discharged to inpatient hospice service.

## 2025-05-01 NOTE — ASSESSMENT & PLAN NOTE
Hyponatremia is likely due to Dehydration/hypovolemia. The patient's most recent sodium results are listed below.  Recent Labs     04/30/25  1202 05/01/25  0350   * 135*     Plan  - Correct the sodium by 4-6mEq in 24 hours.   - Obtain the following studies: Urine sodium, urine osmolality, serum osmolality.  - Monitor sodium Daily.   - Patient hyponatremia is stable

## 2025-05-01 NOTE — ED NOTES
Assumed care of the patient. Report received from GRUPO Krishnamurthy. Pt on continuous cardiac monitoring  TTX: 0343.  Pt in hospital gown, side rails up X2, bed low and locked, and call light is placed within reach. No family/visitors at bedside at this time. Pt denies any complaints or needs. Patient oriented to self.  Male purewick remains in place.  Patient resting comfortably at this time and denies pain.      Vince Huffman, a 79 y.o. male presents to the ED w/ complaint of ileus and abd discomfort with AMS

## 2025-05-01 NOTE — ED NOTES
Telemetry Verification   Patient placed on Telemetry Box  Verified with War Room  Box # [id number]   Monitor Tech Adriana   Rate 104   Rhythm Sinus tach

## 2025-05-01 NOTE — HPI
"Vince Huffman is a 79 year old male with history of chron's disease, SBO, hypothyroidism, and CAD  presenting from [facility] with encephalopathy. He initially presented to [facility] for impaired mobility and difficulty with ADL's 2/2 generalized weakness. He was reportedly found confused today A&O x 1 (said the year is "1895") but is normally A&O x4. At this time he also expressed generalized abdominal pain. Recent ultrasounds of the lower extremity did not reveal DVT. MRI from last week was non diagnostic. UA unremarkable and CXR normal. CT abdomen and pelvis reveals wall thickening of the distal ileum consistent with possible infectious ileitis. Neurology consulted for further evaluation of encephalopathy.   "

## 2025-05-01 NOTE — ASSESSMENT & PLAN NOTE
Patient's blood pressure range in the last 24 hours was: BP  Min: 146/91  Max: 226/125.The patient's inpatient anti-hypertensive regimen is listed below:  Current Antihypertensives  amLODIPine tablet 10 mg, Daily, Oral    Plan  - BP is controlled, no changes needed to their regimen

## 2025-05-01 NOTE — SUBJECTIVE & OBJECTIVE
Interval History: Patient seen and assessed at bedside with son present. Afebrile, tachycardic intermittently, otherwise VSS. Patient AAOx1 (person only) during exam. Having some trouble answering questions; however follows commands appropriately. Obtaining MRI brain/spine for further evaluation of AMS. Neurology consulted, appreciate assistance.       Review of Systems   Constitutional:  Negative for chills and fever.   Respiratory:  Negative for chest tightness and shortness of breath.    Cardiovascular:  Negative for chest pain and leg swelling.   Gastrointestinal:  Negative for abdominal pain and nausea.   Neurological:  Positive for tremors. Negative for dizziness and weakness.   Psychiatric/Behavioral:  Positive for confusion.      Objective:     Vital Signs (Most Recent):  Temp: 97.6 °F (36.4 °C) (05/01/25 1100)  Pulse: 83 (05/01/25 1154)  Resp: 16 (05/01/25 1100)  BP: (!) 155/78 (05/01/25 1100)  SpO2: 95 % (05/01/25 1100) Vital Signs (24h Range):  Temp:  [97.5 °F (36.4 °C)-98.3 °F (36.8 °C)] 97.6 °F (36.4 °C)  Pulse:  [] 83  Resp:  [12-20] 16  SpO2:  [95 %-100 %] 95 %  BP: (146-226)/() 155/78     Weight: 63 kg (138 lb 14.2 oz)  Body mass index is 19.37 kg/m².    Intake/Output Summary (Last 24 hours) at 5/1/2025 1252  Last data filed at 5/1/2025 0745  Gross per 24 hour   Intake --   Output 900 ml   Net -900 ml      Physical Exam  Vitals and nursing note reviewed.   Constitutional:       Appearance: He is well-developed.   Eyes:      Extraocular Movements: Extraocular movements intact.   Cardiovascular:      Rate and Rhythm: Regular rhythm. Tachycardia present.   Pulmonary:      Effort: Pulmonary effort is normal.      Breath sounds: Normal breath sounds.   Abdominal:      Palpations: Abdomen is soft.      Tenderness: There is no abdominal tenderness.   Musculoskeletal:         General: No tenderness.   Skin:     General: Skin is warm and dry.   Neurological:      Mental Status: He is alert.       Comments: AAOx1 (person only), able to follow commands appropriately. Having some difficulty answering questions. Slowed speech.   With resting jaw tremor  Intentional tremor noted to bilateral upper ext   Psychiatric:         Behavior: Behavior normal.         Significant Labs:  CBC:  Recent Labs   Lab 04/30/25  1202 05/01/25  0350   WBC 7.75 9.05   HGB 13.0* 12.7*   HCT 39.3* 38.4*    215     CMP:  Recent Labs   Lab 04/30/25  1202 05/01/25  0350   * 135*   K 5.0 4.1   CL 99 98   CO2 28 28    91   BUN 16 14   CREATININE 1.1 1.1   CALCIUM 9.5 9.7   PROT 7.6  --    ALBUMIN 3.4*  --    BILITOT 0.5  --    ALKPHOS 80  --    AST 32  --    ALT 29  --    ANIONGAP 8 9

## 2025-05-01 NOTE — PT/OT/SLP PROGRESS
Occupational Therapy      Patient Name:  Vince Huffman   MRN:  [id number]    Patient not seen today secondary to nurse reported elevated HR and to Hold on this date. Will attempt 05-01-25 5/1/2025

## 2025-05-01 NOTE — ASSESSMENT & PLAN NOTE
"- Per CT scan "thickening of the distal ileum to the surgical anastomosis with the large bowel. Inflammatory infectious ileitis are considerations. Recommend clinical correlation and follow-up. "  - Will check CRP, if elevated will consult GI, dicussed with them.   - CRP negative  - trial enema for fecal distention noted on CT  - if enema unsuccessful, consider CRS consult  - son also with concerns of new stelara potentially causing side effects of confusion for patient as the medication is new    - GI without concerns of confusion being caused by sterlara, confirmed with pharmacy     "

## 2025-05-01 NOTE — ED NOTES
Provider ARIK Howe made aware of patient blood pressure being 226/125, and patient eating at the time blood pressure automatically took. This RN rechecked patient blood pressure, reading obtained was 184/97. Patient denies chest pain, shortness of breath or having a headache at this time

## 2025-05-01 NOTE — CARE UPDATE
Unit [facility] Care Support Interaction      I have reviewed the chart of Vince Huffman who is hospitalized for AMS (altered mental status). [facility]        I have assisted the primary physician in management of the following:      Telemetry - continued - reviewed     I have seen and examined the patient and provided the following support:     Clinical support - confirmed care plan  Proactive rounds - patient is stable  Patient sustaining -140s; EKG obtained - sinus tach  Patient receiving IVF bolus. Otherwise HDS and in no acute distress.           Evaluation of Early Detection of Sepsis Risk     Patient Name: Vince Huffman  MRN:  [id number]  Primary Care Team: [facility]  Date of Admission:  4/30/2025     Principal Problem:  AMS (altered mental status)   Date of Review: 05/01/2025    Patient reviewed for elevated sepsis risk score. Sepsis Screen Negative  Will check screening lactate and notify attending team for additional orders as indicated. Please notify Rapid Response Team for any hypotension or decompensation.    Sepsis Screen Results     IP Sepsis Screen (most recent)       Sepsis Screen (IP) - 05/01/25 1543       Is the patient's history or complaint suggestive of a possible infection? Yes  -AR    Are there at least two of the following signs and symptoms present? No  -AR    Sepsis signs/symptoms - Tachycardia Tachycardia     >90  -AR    Are any of the following organ dysfunction criteria present and not considered to be due to a chronic condition? No  -AR    Initiate Sepsis Protocol No  -AR              User Key  (r) = Recorded By, (t) = Taken By, (c) = Cosigned By      Initials Name    Мария Patrick PA-C                    Lactic acid added. Result normal.        Мария Leal PA-C  Unit Based [facility]

## 2025-05-01 NOTE — ASSESSMENT & PLAN NOTE
79 y.o.M with progressive acutely worsening confusion/tremors since 4/20. Previously AAOx4, now AAOx1  - AF, tachycardic intermittently, otherwise VSS  - no leukocytosis  - UA non-infectious appearing  - CXR clear  - obtain MRI brain/spine for further eval   - neurology consulted, appreciate recommendations  - neuro checks q4hrs  - PT/OT consult placed

## 2025-05-01 NOTE — PROGRESS NOTES
[facility] - Internal Medicine [facility] Medicine  Progress Note    Patient Name: Vince Huffman  MRN: [id number]  Patient Class: OP- Observation   Admission Date: 4/30/2025  Length of Stay: 0 days  Attending Physician: Arsenio Oglesby MD  Primary Care Provider: Leland Porras MD        Subjective     Principal Problem:AMS (altered mental status)        HPI:  80 yo M w/HTN, hypothyroid, crohn's disease, CAD, hx of SBO, presenting with AMS from [facility]. Patient was sent in from [facility] for progressively worsening cognitive function. Patient is A&O x1 at his baseline. Patient and his daughter endorse mild confusion. Which has been progressive.    Patient has not had any recent falls. Endorsed mild suprapubic abdominal pain. No UTI. Mild inflammation on CT, not unexpected in the setting of Crohn's.     Overview/Hospital Course:  Vince Huffman is a 79 y.o. M who was admitted to  for further evaluation of worsening AMS per rehab facility. AAOx1 on admission, normally AAOx4 prior to 4/20 per son. UA negative. CXR clear. CT abd/pelvis with wall thickening of distal ileum, inflammatory infectious ileitis are considerations, fecal distention of the rectum, impaction not excluded. Discussed with GI, anticipated these are chronic findings. CRP negative. Will give enema and monitor for improvement. Obtaining MRI brain/spine for further eval of confusion. Neurology consulted, appreciate recommendations.       Interval History: Patient seen and assessed at bedside with son present. Afebrile, tachycardic intermittently, otherwise VSS. Patient AAOx1 (person only) during exam. Having some trouble answering questions; however follows commands appropriately. Obtaining MRI brain/spine for further evaluation of AMS. Neurology consulted, appreciate assistance.       Review of Systems   Constitutional:  Negative for chills and fever.   Respiratory:  Negative for chest tightness and shortness of breath.     Cardiovascular:  Negative for chest pain and leg swelling.   Gastrointestinal:  Negative for abdominal pain and nausea.   Neurological:  Positive for tremors. Negative for dizziness and weakness.   Psychiatric/Behavioral:  Positive for confusion.      Objective:     Vital Signs (Most Recent):  Temp: 97.6 °F (36.4 °C) (05/01/25 1100)  Pulse: 83 (05/01/25 1154)  Resp: 16 (05/01/25 1100)  BP: (!) 155/78 (05/01/25 1100)  SpO2: 95 % (05/01/25 1100) Vital Signs (24h Range):  Temp:  [97.5 °F (36.4 °C)-98.3 °F (36.8 °C)] 97.6 °F (36.4 °C)  Pulse:  [] 83  Resp:  [12-20] 16  SpO2:  [95 %-100 %] 95 %  BP: (146-226)/() 155/78     Weight: 63 kg (138 lb 14.2 oz)  Body mass index is 19.37 kg/m².    Intake/Output Summary (Last 24 hours) at 5/1/2025 1252  Last data filed at 5/1/2025 0745  Gross per 24 hour   Intake --   Output 900 ml   Net -900 ml      Physical Exam  Vitals and nursing note reviewed.   Constitutional:       Appearance: He is well-developed.   Eyes:      Extraocular Movements: Extraocular movements intact.   Cardiovascular:      Rate and Rhythm: Regular rhythm. Tachycardia present.   Pulmonary:      Effort: Pulmonary effort is normal.      Breath sounds: Normal breath sounds.   Abdominal:      Palpations: Abdomen is soft.      Tenderness: There is no abdominal tenderness.   Musculoskeletal:         General: No tenderness.   Skin:     General: Skin is warm and dry.   Neurological:      Mental Status: He is alert.      Comments: AAOx1 (person only), able to follow commands appropriately. Having some difficulty answering questions. Slowed speech.   With resting jaw tremor  Intentional tremor noted to bilateral upper ext   Psychiatric:         Behavior: Behavior normal.         Significant Labs:  CBC:  Recent Labs   Lab 04/30/25  1202 05/01/25  0350   WBC 7.75 9.05   HGB 13.0* 12.7*   HCT 39.3* 38.4*    215     CMP:  Recent Labs   Lab 04/30/25  1202 05/01/25  0350   * 135*   K 5.0 4.1   CL 99  "98   CO2 28 28    91   BUN 16 14   CREATININE 1.1 1.1   CALCIUM 9.5 9.7   PROT 7.6  --    ALBUMIN 3.4*  --    BILITOT 0.5  --    ALKPHOS 80  --    AST 32  --    ALT 29  --    ANIONGAP 8 9           Assessment & Plan  AMS (altered mental status)  79 y.o.M with progressive acutely worsening confusion/tremors since 4/20. Previously AAOx4, now AAOx1  - AF, tachycardic intermittently, otherwise VSS  - no leukocytosis  - UA non-infectious appearing  - CXR clear  - obtain MRI brain/spine for further eval   - neurology consulted, appreciate recommendations  - neuro checks q4hrs  - PT/OT consult placed  Crohn's disease  - Per CT scan "thickening of the distal ileum to the surgical anastomosis with the large bowel. Inflammatory infectious ileitis are considerations. Recommend clinical correlation and follow-up. "  - Will check CRP, if elevated will consult GI, dicussed with them.   - CRP negative  - trial enema for fecal distention noted on CT  - if enema unsuccessful, consider CRS consult  - son also with concerns of new stelara potentially causing side effects of confusion for patient as the medication is new    - GI without concerns of confusion being caused by sterlara, confirmed with pharmacy     Generalized weakness  - sent from SNF  - progressive worsening weakness per son   - PT/OT ordered    Unintended weight loss  - reported per son  - recently started gluten free diet per recommendations from GI    Hyponatremia  Hyponatremia is likely due to Dehydration/hypovolemia. The patient's most recent sodium results are listed below.  Recent Labs     04/30/25  1202 05/01/25  0350   * 135*     Plan  - Correct the sodium by 4-6mEq in 24 hours.   - Obtain the following studies: Urine sodium, urine osmolality, serum osmolality.  - Monitor sodium Daily.   - Patient hyponatremia is stable    Essential hypertension  Patient's blood pressure range in the last 24 hours was: BP  Min: 146/91  Max: 226/125.The patient's " inpatient anti-hypertensive regimen is listed below:  Current Antihypertensives  amLODIPine tablet 10 mg, Daily, Oral    Plan  - BP is controlled, no changes needed to their regimen        VTE Risk Mitigation (From admission, onward)           Ordered     enoxaparin injection 40 mg  Daily         05/01/25 0827     IP VTE HIGH RISK PATIENT  Once         05/01/25 0827     Place sequential compression device  Until discontinued         05/01/25 0827                    Discharge Planning   ERNIE: 5/4/2025     Code Status: Full Code   Medical Readiness for Discharge Date:                    Please place Justification for DME        Zuly Wheat PA-C  Department of Hospital Medicine   [facility] - Internal Medicine Telemetry

## 2025-05-01 NOTE — CONSULTS
"[facility] - Internal Medicine Telemetry  Neurology  Consult Note    Patient Name: Vince Huffman  MRN: [id number]  Admission Date: 4/30/2025  Hospital Length of Stay: 0 days  Code Status: Full Code   Attending Provider: Arsenio Oglesby MD   Consulting Provider: Elian López MD  Primary Care Physician: Leland Porras MD  Principal Problem:Encephalopathy    Inpatient consult to General Neurology  Consult performed by: Elian López MD  Consult ordered by: Zuly Wheat PA-C         Subjective:     Chief Complaint:  Encephalopathy     HPI:   Vince Huffman is a 79 year old male with history of chron's disease, SBO, hypothyroidism, and CAD  presenting from [facility] with encephalopathy. He initially presented to [facility] for impaired mobility and difficulty with ADL's 2/2 generalized weakness. He was reportedly found confused today A&O x 1 (said the year is "1895") but is normally A&O x4. At this time he also expressed generalized abdominal pain. Recent ultrasounds of the lower extremity did not reveal DVT. MRI from last week was non diagnostic. UA unremarkable and CXR normal. CT abdomen and pelvis reveals wall thickening of the distal ileum consistent with possible infectious ileitis. Neurology consulted for further evaluation of encephalopathy.      Past Medical History:   Diagnosis Date    Ankylosing spondylitis of unspecified sites in spine     Anxiety disorder, unspecified     BMI 21.0-21.9, adult 04/14/2025    Crohn's disease     Gout, unspecified     Heart disease     History of skin cancer 2001    squamous cell carcinoma left cheek    Hypertension     Hypothyroidism, unspecified     Psoriatic arthritis        Past Surgical History:   Procedure Laterality Date    ANGIOGRAM, CORONARY, WITH LEFT HEART CATHETERIZATION      APPENDECTOMY      APPENDECTOMY  2007    COLONOSCOPY N/A     ENDOSCOPY N/A     RIGHT HEMICOLECTOMY  2013    SMALL INTESTINE SURGERY  2007    30.5 cm of small bowel removed "       Review of patient's allergies indicates:   Allergen Reactions    Gluten Diarrhea    Lactose        Current Neurological Medications: see below    No current facility-administered medications on file prior to encounter.     Current Outpatient Medications on File Prior to Encounter   Medication Sig    acetaminophen (TYLENOL) 500 MG tablet Take 500 mg by mouth every 6 (six) hours as needed.    ALPRAZolam (XANAX XR) 0.5 MG Tb24 Take 0.5 mg by mouth once daily.    amLODIPine (NORVASC) 10 MG tablet Take 1 tablet (10 mg total) by mouth once daily.    ustekinumab (STELARA) 90 mg/mL Syrg syringe Inject 1 mL (90 mg total) into the skin every 8 weeks.     Family History    None       Tobacco Use    Smoking status: Never    Smokeless tobacco: Never   Substance and Sexual Activity    Alcohol use: No    Drug use: No    Sexual activity: Never     Partners: Female     Review of Systems   Constitutional:  Positive for activity change. Negative for fever.   Respiratory:  Negative for shortness of breath.    Cardiovascular:  Negative for chest pain.   Gastrointestinal:  Positive for abdominal pain.   Neurological:  Positive for tremors and speech difficulty.   Psychiatric/Behavioral:  Positive for behavioral problems, confusion and decreased concentration.      Objective:     Vital Signs (Most Recent):  Temp: 98.6 °F (37 °C) (05/01/25 1636)  Pulse: 95 (05/01/25 1636)  Resp: 16 (05/01/25 1412)  BP: (!) 148/85 (05/01/25 1636)  SpO2: 95 % (05/01/25 1636) Vital Signs (24h Range):  Temp:  [97.5 °F (36.4 °C)-98.6 °F (37 °C)] 98.6 °F (37 °C)  Pulse:  [] 95  Resp:  [12-20] 16  SpO2:  [95 %-100 %] 95 %  BP: (146-226)/() 148/85     Weight: 63 kg (138 lb 14.2 oz)  Body mass index is 19.37 kg/m².     Physical Exam  Constitutional:       Comments: The lens of his glasses for the left eye is missing. He appears confused and responds minimally to questions.    Eyes:      Pupils: Pupils are equal, round, and reactive to light.       Comments: Ptosis of left eye, unclear if at his baseline.   Pupils reactive to light, no discrepancy from left to right to suggest mehran's.  No dilation of impaired extraocular movements to suggest third nerve palsy.    Pulmonary:      Effort: Pulmonary effort is normal.   Neurological:      Mental Status: He is alert. He is disoriented.      Deep Tendon Reflexes:      Reflex Scores:       Brachioradialis reflexes are 2+ on the right side and 2+ on the left side.       Patellar reflexes are 2+ on the right side and 2+ on the left side.         NEUROLOGICAL EXAMINATION:     MENTAL STATUS   Oriented to person.   Disoriented to place.   Disoriented to time.   Attention: decreased. Concentration: decreased.   Level of consciousness: alert    CRANIAL NERVES     CN III, IV, VI   Pupils are equal, round, and reactive to light.       Mild ptosis of left eye without pupillary abnormalities     MOTOR EXAM   Overall muscle tone: increased  Right arm tone: increased  Left arm tone: increased       Asterixis with tremor most notably in the left arm     REFLEXES     Reflexes   Right brachioradialis: 2+  Left brachioradialis: 2+  Right patellar: 2+  Left patellar: 2+    SENSORY EXAM   Light touch normal.     GAIT AND COORDINATION     Tremor   Intention tremor: present      Significant Labs: CBC:   Recent Labs   Lab 04/30/25  1202 05/01/25  0350   WBC 7.75 9.05   HGB 13.0* 12.7*   HCT 39.3* 38.4*    215     CMP:   Recent Labs   Lab 04/30/25  1202 05/01/25  0350    91   * 135*   K 5.0 4.1   CL 99 98   CO2 28 28   BUN 16 14   CREATININE 1.1 1.1   CALCIUM 9.5 9.7   MG 1.9 1.8   PROT 7.6  --    ALBUMIN 3.4*  --    BILITOT 0.5  --    ALKPHOS 80  --    AST 32  --    ALT 29  --    ANIONGAP 8 9       Significant Imaging: I have reviewed all pertinent imaging results/findings within the past 24 hours.  Assessment and Plan:     * Encephalopathy  Vince Huffman is a 79 year old male with history of chron's disease, SBO,  hypothyroidism, and CAD  presenting from [facility] with encephalopathy. Exam notable for disorientation to place, time, and situation. He is minimally responsive and will answer with one word. There is asterixis in his upper extremities and intention tremor most notably in the left hand. No hyperreflexia. Ptosis of left eye though no extraocular movement abnormalities/pupillary discrepancy to suggest third nerve palsy and mehran's respectively.     Recommendations:  - MRI brain from last week was essentially non diagnostic so recommend repeat during this admission  - Add extended EEG monitoring  - Order serum ammonia level  - Continued treatment of ileitis per primary team  - Delirium and fall precautions   - Neurology will follow up        VTE Risk Mitigation (From admission, onward)           Ordered     enoxaparin injection 40 mg  Daily         05/01/25 0827     IP VTE HIGH RISK PATIENT  Once         05/01/25 0827     Place sequential compression device  Until discontinued         05/01/25 0827                    Thank you for your consult. I will follow-up with patient. Please contact us if you have any additional questions.    Elian Lópze MD  Neurology  [facility] - Internal Medicine Telemetry

## 2025-05-01 NOTE — PHARMACY MED REC
"  Admission Medication History     The home medication history was taken by Sabino Short.    You may go to "Admission" then "Reconcile Home Medications" tabs to review and/or act upon these items.     The home medication list has been updated by the Pharmacy department.   Please read ALL comments highlighted in yellow.   Please address this information as you see fit.    Feel free to contact us if you have any questions or require assistance.      Medications listed below were obtained from: SNF/Rehab/[facility]     Current Scheduled Medications:   Scheduled/Continuous Medications      amLODIPine (NORVASC) tablet 10 mg   10 mg, PO, Once a day     heparin (porcine) injection 5,000 Units   5,000 Units, SC, Q8H JOY         Current PRN Medications:     acetaminophen  ALPRAZolam  melatonin  ondansetron ODT          Potential issues to be addressed PRIOR TO DISCHARGE  The listed medications were obtained from another facility ([facility]). The patient may not have been able to fill these prescriptions prior to this admission and may require new scripts upon discharge.             Sabino Short  EXT [id number]                .        "

## 2025-05-02 LAB
ABSOLUTE EOSINOPHIL (OHS): 0.05 K/UL
ABSOLUTE MONOCYTE (OHS): 0.84 K/UL (ref 0.3–1)
ABSOLUTE NEUTROPHIL COUNT (OHS): 7.69 K/UL (ref 1.8–7.7)
ALBUMIN SERPL BCP-MCNC: 3.4 G/DL (ref 3.5–5.2)
ALP SERPL-CCNC: 90 UNIT/L (ref 40–150)
ALT SERPL W/O P-5'-P-CCNC: 35 UNIT/L (ref 10–44)
AMMONIA PLAS-SCNC: 44 UMOL/L (ref 10–50)
AMPHET UR QL SCN: NEGATIVE
ANION GAP (OHS): 13 MMOL/L (ref 8–16)
AST SERPL-CCNC: 26 UNIT/L (ref 11–45)
BACTERIA #/AREA URNS AUTO: NORMAL /HPF
BARBITURATE SCN PRESENT UR: NEGATIVE
BASOPHILS # BLD AUTO: 0.03 K/UL
BASOPHILS NFR BLD AUTO: 0.3 %
BENZODIAZ UR QL SCN: NEGATIVE
BILIRUB SERPL-MCNC: 0.8 MG/DL (ref 0.1–1)
BILIRUB UR QL STRIP.AUTO: NEGATIVE
BUN SERPL-MCNC: 15 MG/DL (ref 8–23)
C COLI+JEJ+UPSA DNA STL QL NAA+NON-PROBE: NEGATIVE
CALCIUM SERPL-MCNC: 9.5 MG/DL (ref 8.7–10.5)
CANNABINOIDS UR QL SCN: NEGATIVE
CHLORIDE SERPL-SCNC: 97 MMOL/L (ref 95–110)
CLARITY UR: CLEAR
CO2 SERPL-SCNC: 25 MMOL/L (ref 23–29)
COCAINE UR QL SCN: NEGATIVE
COLOR UR AUTO: YELLOW
CREAT SERPL-MCNC: 1 MG/DL (ref 0.5–1.4)
CREAT UR-MCNC: 150 MG/DL (ref 23–375)
CREAT UR-MCNC: 155 MG/DL (ref 23–375)
ERYTHROCYTE [DISTWIDTH] IN BLOOD BY AUTOMATED COUNT: 12.2 % (ref 11.5–14.5)
ETHANOL UR-MCNC: <10 MG/DL
GFR SERPLBLD CREATININE-BSD FMLA CKD-EPI: >60 ML/MIN/1.73/M2
GLUCOSE SERPL-MCNC: 97 MG/DL (ref 70–110)
GLUCOSE UR QL STRIP: NEGATIVE
HCT VFR BLD AUTO: 38.4 % (ref 40–54)
HGB BLD-MCNC: 13.1 GM/DL (ref 14–18)
HGB UR QL STRIP: NEGATIVE
HOLD SPECIMEN: NORMAL
HYALINE CASTS UR QL AUTO: 0 /LPF (ref 0–1)
IMM GRANULOCYTES # BLD AUTO: 0.04 K/UL (ref 0–0.04)
IMM GRANULOCYTES NFR BLD AUTO: 0.4 % (ref 0–0.5)
KETONES UR QL STRIP: ABNORMAL
LEUKOCYTE ESTERASE UR QL STRIP: NEGATIVE
LYMPHOCYTES # BLD AUTO: 0.95 K/UL (ref 1–4.8)
MAGNESIUM SERPL-MCNC: 1.6 MG/DL (ref 1.6–2.6)
MCH RBC QN AUTO: 32.2 PG (ref 27–31)
MCHC RBC AUTO-ENTMCNC: 34.1 G/DL (ref 32–36)
MCV RBC AUTO: 94 FL (ref 82–98)
METHADONE UR QL SCN: NEGATIVE
MICROSCOPIC COMMENT: NORMAL
NITRITE UR QL STRIP: NEGATIVE
NUCLEATED RBC (/100WBC) (OHS): 0 /100 WBC
OPIATES UR QL SCN: NEGATIVE
OSMOLALITY UR: 663 MOSM/KG (ref 50–1200)
PCP UR QL: NEGATIVE
PH UR STRIP: 6 [PH]
PHOSPHATE SERPL-MCNC: 2.9 MG/DL (ref 2.7–4.5)
PLATELET # BLD AUTO: 249 K/UL (ref 150–450)
PMV BLD AUTO: 10.8 FL (ref 9.2–12.9)
POTASSIUM SERPL-SCNC: 4 MMOL/L (ref 3.5–5.1)
PROT SERPL-MCNC: 7.3 GM/DL (ref 6–8.4)
PROT UR QL STRIP: ABNORMAL
RBC # BLD AUTO: 4.07 M/UL (ref 4.6–6.2)
RBC #/AREA URNS AUTO: <1 /HPF (ref 0–4)
RELATIVE EOSINOPHIL (OHS): 0.5 %
RELATIVE LYMPHOCYTE (OHS): 9.9 % (ref 18–48)
RELATIVE MONOCYTE (OHS): 8.8 % (ref 4–15)
RELATIVE NEUTROPHIL (OHS): 80.1 % (ref 38–73)
SODIUM SERPL-SCNC: 135 MMOL/L (ref 136–145)
SODIUM UR-SCNC: 107 MMOL/L (ref 20–250)
SP GR UR STRIP: 1.02
SQUAMOUS #/AREA URNS AUTO: <1 /HPF
UROBILINOGEN UR STRIP-ACNC: NEGATIVE EU/DL
WBC # BLD AUTO: 9.6 K/UL (ref 3.9–12.7)
WBC #/AREA URNS AUTO: 2 /HPF (ref 0–5)

## 2025-05-02 PROCEDURE — 82570 ASSAY OF URINE CREATININE: CPT

## 2025-05-02 PROCEDURE — 21400001 HC TELEMETRY ROOM

## 2025-05-02 PROCEDURE — 83735 ASSAY OF MAGNESIUM: CPT

## 2025-05-02 PROCEDURE — 63600175 PHARM REV CODE 636 W HCPCS

## 2025-05-02 PROCEDURE — 81003 URINALYSIS AUTO W/O SCOPE: CPT

## 2025-05-02 PROCEDURE — 11000001 HC ACUTE MED/SURG PRIVATE ROOM

## 2025-05-02 PROCEDURE — 36415 COLL VENOUS BLD VENIPUNCTURE: CPT

## 2025-05-02 PROCEDURE — 84300 ASSAY OF URINE SODIUM: CPT

## 2025-05-02 PROCEDURE — 00JU3ZZ INSPECTION OF SPINAL CANAL, PERCUTANEOUS APPROACH: ICD-10-PCS | Performed by: PSYCHIATRY & NEUROLOGY

## 2025-05-02 PROCEDURE — 25000003 PHARM REV CODE 250: Performed by: INTERNAL MEDICINE

## 2025-05-02 PROCEDURE — 83935 ASSAY OF URINE OSMOLALITY: CPT

## 2025-05-02 PROCEDURE — 84100 ASSAY OF PHOSPHORUS: CPT

## 2025-05-02 PROCEDURE — 25000003 PHARM REV CODE 250

## 2025-05-02 PROCEDURE — 80307 DRUG TEST PRSMV CHEM ANLYZR: CPT

## 2025-05-02 PROCEDURE — 85025 COMPLETE CBC W/AUTO DIFF WBC: CPT

## 2025-05-02 PROCEDURE — 82247 BILIRUBIN TOTAL: CPT

## 2025-05-02 PROCEDURE — 95813 EEG EXTND MNTR 61-119 MIN: CPT

## 2025-05-02 RX ORDER — POLYETHYLENE GLYCOL 3350 17 G/17G
17 POWDER, FOR SOLUTION ORAL 2 TIMES DAILY
Status: DISCONTINUED | OUTPATIENT
Start: 2025-05-02 | End: 2025-06-02 | Stop reason: HOSPADM

## 2025-05-02 RX ADMIN — ENOXAPARIN SODIUM 40 MG: 40 INJECTION SUBCUTANEOUS at 03:05

## 2025-05-02 RX ADMIN — AMLODIPINE BESYLATE 10 MG: 10 TABLET ORAL at 08:05

## 2025-05-02 RX ADMIN — POLYETHYLENE GLYCOL 3350 17 G: 17 POWDER, FOR SOLUTION ORAL at 08:05

## 2025-05-02 NOTE — PLAN OF CARE
Recommendation/Intervention:   1) Continue current regular (gluten free) diet as tolerated, encourage PO intake, honor food preferences as able   2) Continue Boost Plus TID to promote adequate kcal/protein consumption   3) RD to monitor and follow-up as needed     Goals: Meet % of EEN/EPN by next RD follow-up  Nutrition Goal Status: new  Communication of RD Recs: other (comment) (POC)

## 2025-05-02 NOTE — ASSESSMENT & PLAN NOTE
"- Per CT scan "thickening of the distal ileum to the surgical anastomosis with the large bowel. Inflammatory infectious ileitis are considerations. Recommend clinical correlation and follow-up. "  - Will check CRP, if elevated will consult GI, dicussed with them.   - CRP negative  - trial enema for fecal distention noted on CT  - if enema unsuccessful, consider CRS consult   - large BM on 5/1  - son also with concerns of new stelara potentially causing side effects of confusion for patient as the medication is new    - GI without concerns of confusion being caused by sterlara,   confirmed with pharmacy     "

## 2025-05-02 NOTE — PT/OT/SLP PROGRESS
Physical Therapy   Attempted Evaluation    Vince Huffman   MRN: [id number]     PT attempted co-eval with OT this date; however, pt minimally responsive and was unable to adequately assess. Pt opened eyes to voice inconsistently and was unable to follow commands. Attempted to assess active BLE movements with no response. Pt would benefit from another eval attempt at a later date when he is more alert and better able to follow commands.     Christi Espinosa, SPT  5/2/2025

## 2025-05-02 NOTE — PLAN OF CARE
[facility] - Internal Medicine Telemetry  Initial Discharge Assessment       Primary Care Provider: Leland Porras MD    Admission Diagnosis: Chest pain [R07.9]  AMS (altered mental status) [R41.82]    Admission Date: 4/30/2025  Expected Discharge Date: 5/5/2025    Transition of Care Barriers: (P) None    Payor: MEDICARE / Plan: MEDICARE PART A & B / Product Type: Government /     Extended Emergency Contact Information  Primary Emergency Contact: antonio peck  Home Phone: [phone]  Relation: Son  Preferred language: English   needed? No    Discharge Plan A: (P) Rehab  Discharge Plan B: (P) Skilled Nursing Facility      [facility] - [place], [place] - [address]  [address]  [place] [address]  Phone: [phone] Fax: [phone]    [facility] [place] [address]  [address]  [place] [address]  Phone: [phone] Fax: [phone]    [facility] - [place], [place] - [address]  [address]  [address]  [facility] [address]  Phone: [phone] Fax: [phone]      Initial Assessment (most recent)       Adult Discharge Assessment - 05/02/25 1603          Discharge Assessment    Assessment Type Discharge Planning Assessment     Confirmed/corrected address, phone number and insurance Yes     Confirmed Demographics Correct on Facesheet     Source of Information patient     If unable to respond/provide information was family/caregiver contacted? Yes     Contact Name/Number antonio peck (Son)  [phone]     When was your last doctors appointment? --   Last visit was last month with Dr. Porras pt's PCP    Communicated ERNIE with patient/caregiver Yes     Reason For Admission Encephalopathy     People in Home child(aleida), adult     Facility Arrived From: [facility]     Do you expect to return to your current living situation? Other (see comments)   Plans to return to [facility] at discharge    Do you  have help at home or someone to help you manage your care at home? Yes     Who are your caregiver(s) and their phone number(s)? antonio peck (Son)  [phone]     Prior to hospitilization cognitive status: Alert/Oriented     Current cognitive status: Unable to Assess     Walking or Climbing Stairs Difficulty yes (P)      Walking or Climbing Stairs ambulation difficulty, dependent;transferring difficulty, dependent;stair climbing difficulty, dependent (P)      Dressing/Bathing Difficulty yes (P)      Dressing/Bathing bathing difficulty, dependent (P)      Do you have any problems with: Needs other help (P)      Specify other help Patient's son states is dependent with all ADLs and mobility at this time. Patient's son that the patient was able to ambulate without the assistance of DME prior. (P)      Equipment Currently Used at Home none (P)      Readmission within 30 days? Yes (P)      Patient currently being followed by outpatient case management? No (P)      Do you currently have service(s) that help you manage your care at home? No (P)      Do you take prescription medications? Yes (P)      Do you have prescription coverage? Yes (P)      Coverage Medicare A and B (P)      Do you have any problems affording any of your prescribed medications? No (P)      Is the patient taking medications as prescribed? yes (P)      Who is going to help you get home at discharge? Patient will need transportation (P)      How do you get to doctors appointments? family or friend will provide;car, drives self (P)      Are you on dialysis? No (P)      Do you take coumadin? No (P)      Discharge Plan A Rehab (P)      Discharge Plan B Skilled Nursing Facility (P)      DME Needed Upon Discharge  other (see comments) (P)    TBD    Discharge Plan discussed with: Patient (P)      Transition of Care Barriers None (P)         Physical Activity    On average, how many days per week do you engage in moderate to strenuous exercise (like a brisk  walk)? 0 days (P)      On average, how many minutes do you engage in exercise at this level? 0 min (P)         Financial Resource Strain    How hard is it for you to pay for the very basics like food, housing, medical care, and heating? Not hard at all (P)         Housing Stability    In the last 12 months, was there a time when you were not able to pay the mortgage or rent on time? No (P)      At any time in the past 12 months, were you homeless or living in a shelter (including now)? No (P)         Transportation Needs    In the past 12 months, has lack of transportation kept you from medical appointments or from getting medications? No (P)      In the past 12 months, has lack of transportation kept you from meetings, work, or from getting things needed for daily living? No (P)         Food Insecurity    Within the past 12 months, you worried that your food would run out before you got the money to buy more. Never true (P)      Within the past 12 months, the food you bought just didn't last and you didn't have money to get more. Never true (P)         Stress    Do you feel stress - tense, restless, nervous, or anxious, or unable to sleep at night because your mind is troubled all the time - these days? Not at all (P)         Social Isolation    How often do you feel lonely or isolated from those around you?  Patient unable to answer (P)         Alcohol Use    Q1: How often do you have a drink containing alcohol? Never (P)      Q2: How many drinks containing alcohol do you have on a typical day when you are drinking? Patient does not drink (P)      Q3: How often do you have six or more drinks on one occasion? Never (P)         InMyShow    In the past 12 months has the electric, gas, oil, or water company threatened to shut off services in your home? No (P)         Health Literacy    How often do you need to have someone help you when you read instructions, pamphlets, or other written material from your doctor or  pharmacy? Patient declines to respond (P)         OTHER    Name(s) of People in Home antonio peck (Son)  [phone] (P)                    CM spoke with patient's son Vince. Notified CM that the patient was admitted to the hospital from [facility]. Vince states that the patient is independent with mobility prior to his hospital stay.     Discharge Plan A and Plan B have been determined by review of patient's clinical status, future medical and therapeutic needs, and coverage/benefits for post-acute care in coordination with multidisciplinary team members.    Remi Schneider RN-CM  Case Management  [facility]  [phone]

## 2025-05-02 NOTE — PT/OT/SLP PROGRESS
Occupational Therapy      Patient Name:  Vince Huffman   MRN:  [id number]    At this time, pt is not appropriate for OT evaluation due to inability to stay awake and follow verbal commands.     OT will follow-up on 05/03/2025 or when medically appropriate.    5/2/2025

## 2025-05-02 NOTE — SUBJECTIVE & OBJECTIVE
Interval History: Patient seen and assessed at bedside this morning while EEG being placed. Afebrile, tachycardic, otherwise VSS. Patient AAOx1. Intermittently answering questions and intermittently following commands which is different than yesterday. Neuro following. Review EEG. Medicine team consulted for LP given worsening encephalopathy..       Review of Systems   Unable to perform ROS: Mental status change     Objective:     Vital Signs (Most Recent):  Temp: 98.8 °F (37.1 °C) (05/02/25 1155)  Pulse: 110 (05/02/25 1155)  Resp: 18 (05/02/25 1155)  BP: (!) 152/82 (05/02/25 1155)  SpO2: 96 % (05/02/25 1155) Vital Signs (24h Range):  Temp:  [97.7 °F (36.5 °C)-98.8 °F (37.1 °C)] 98.8 °F (37.1 °C)  Pulse:  [] 110  Resp:  [16-18] 18  SpO2:  [93 %-97 %] 96 %  BP: (144-172)/(78-98) 152/82     Weight: 63 kg (138 lb 14.2 oz)  Body mass index is 19.37 kg/m².    Intake/Output Summary (Last 24 hours) at 5/2/2025 1350  Last data filed at 5/2/2025 0700  Gross per 24 hour   Intake 120 ml   Output 800 ml   Net -680 ml      Physical Exam  Vitals and nursing note reviewed.   Constitutional:       Appearance: He is well-developed.   Eyes:      Extraocular Movements: Extraocular movements intact.   Cardiovascular:      Rate and Rhythm: Regular rhythm. Tachycardia present.   Pulmonary:      Effort: Pulmonary effort is normal.      Breath sounds: Normal breath sounds.   Abdominal:      General: There is no distension.      Palpations: Abdomen is soft.   Musculoskeletal:         General: No tenderness.   Skin:     General: Skin is warm and dry.   Neurological:      Mental Status: He is alert.      Comments: AAOx1 (person only)  Intermittently answering questions appropriately   Intermittently following commands   With resting jaw tremor  Intentional tremor noted to bilateral upper ext   Psychiatric:         Behavior: Behavior normal.         Computed MELD 3.0 unavailable. One or more values for this score either were not found  within the given timeframe or did not fit some other criterion.  Computed MELD-Na unavailable. One or more values for this score either were not found within the given timeframe or did not fit some other criterion.      Significant Labs:  CBC:  Recent Labs   Lab 05/01/25  0350 05/02/25  0615   WBC 9.05 9.60   HGB 12.7* 13.1*   HCT 38.4* 38.4*    249     CMP:  Recent Labs   Lab 05/01/25 0350 05/02/25  0615   * 135*   K 4.1 4.0   CL 98 97   CO2 28 25   GLU 91 97   BUN 14 15   CREATININE 1.1 1.0   CALCIUM 9.7 9.5   PROT  --  7.3   ALBUMIN  --  3.4*   BILITOT  --  0.8   ALKPHOS  --  90   AST  --  26   ALT  --  35   ANIONGAP 9 13

## 2025-05-02 NOTE — PROGRESS NOTES
[facility] - Internal Medicine [facility] Medicine  Progress Note    Patient Name: Vince Huffman  MRN: [id number]  Patient Class: IP- Inpatient   Admission Date: 4/30/2025  Length of Stay: 1 days  Attending Physician: Arsenio Oglesby MD  Primary Care Provider: Leland Porras MD        Subjective     Principal Problem:Encephalopathy        HPI:  78 yo M w/HTN, hypothyroid, crohn's disease, CAD, hx of SBO, presenting with AMS from [facility]. Patient was sent in from [facility] for progressively worsening cognitive function. Patient is A&O x1 at his baseline. Patient and his daughter endorse mild confusion. Which has been progressive.    Patient has not had any recent falls. Endorsed mild suprapubic abdominal pain. No UTI. Mild inflammation on CT, not unexpected in the setting of Crohn's.     Overview/Hospital Course:  Vince Huffman is a 79 y.o. M who was admitted to  for further evaluation of worsening AMS per rehab facility. AAOx1 on admission, normally AAOx4 prior to 4/20 per son. UA negative. CXR clear. CT abd/pelvis with wall thickening of distal ileum, inflammatory infectious ileitis are considerations, fecal distention of the rectum, impaction not excluded. Discussed with GI, anticipated these are chronic findings. CRP negative. Will give enema and monitor for improvement. Patient with successful BM. Neurology consulted: MRI brain ordered (no acute findings, motion artifact), extended EEG, ammonia levels. Worsening mental status on 5/2, medicine team consulted for LP.       Interval History: Patient seen and assessed at bedside this morning while EEG being placed. Afebrile, tachycardic, otherwise VSS. Patient with large BM overnight, continue bowel regimen. Patient AAOx1. Intermittently answering questions and intermittently following commands which is different than yesterday. Neuro following. Review EEG. Medicine team consulted for LP given worsening encephalopathy..       Review of  Systems   Unable to perform ROS: Mental status change     Objective:     Vital Signs (Most Recent):  Temp: 98.8 °F (37.1 °C) (05/02/25 1155)  Pulse: 110 (05/02/25 1155)  Resp: 18 (05/02/25 1155)  BP: (!) 152/82 (05/02/25 1155)  SpO2: 96 % (05/02/25 1155) Vital Signs (24h Range):  Temp:  [97.7 °F (36.5 °C)-98.8 °F (37.1 °C)] 98.8 °F (37.1 °C)  Pulse:  [] 110  Resp:  [16-18] 18  SpO2:  [93 %-97 %] 96 %  BP: (144-172)/(78-98) 152/82     Weight: 63 kg (138 lb 14.2 oz)  Body mass index is 19.37 kg/m².    Intake/Output Summary (Last 24 hours) at 5/2/2025 1350  Last data filed at 5/2/2025 0700  Gross per 24 hour   Intake 120 ml   Output 800 ml   Net -680 ml      Physical Exam  Vitals and nursing note reviewed.   Constitutional:       Appearance: He is well-developed.   Eyes:      Extraocular Movements: Extraocular movements intact.   Cardiovascular:      Rate and Rhythm: Regular rhythm. Tachycardia present.   Pulmonary:      Effort: Pulmonary effort is normal.      Breath sounds: Normal breath sounds.   Abdominal:      General: There is no distension.      Palpations: Abdomen is soft.   Musculoskeletal:         General: No tenderness.   Skin:     General: Skin is warm and dry.   Neurological:      Mental Status: He is alert.      Comments: AAOx1 (person only)  Intermittently answering questions appropriately   Intermittently following commands   With resting jaw tremor  Intentional tremor noted to bilateral upper ext   Psychiatric:         Behavior: Behavior normal.         Computed MELD 3.0 unavailable. One or more values for this score either were not found within the given timeframe or did not fit some other criterion.  Computed MELD-Na unavailable. One or more values for this score either were not found within the given timeframe or did not fit some other criterion.      Significant Labs:  CBC:  Recent Labs   Lab 05/01/25  0350 05/02/25  0615   WBC 9.05 9.60   HGB 12.7* 13.1*   HCT 38.4* 38.4*    249  "    CMP:  Recent Labs   Lab 05/01/25  0350 05/02/25  0615   * 135*   K 4.1 4.0   CL 98 97   CO2 28 25   GLU 91 97   BUN 14 15   CREATININE 1.1 1.0   CALCIUM 9.7 9.5   PROT  --  7.3   ALBUMIN  --  3.4*   BILITOT  --  0.8   ALKPHOS  --  90   AST  --  26   ALT  --  35   ANIONGAP 9 13         Assessment & Plan  Encephalopathy  79 y.o.M with progressive acutely worsening confusion/tremors since 4/20. Previously AAOx4, now AAOx1  - AF, tachycardic intermittently, otherwise VSS  - no leukocytosis  - UA non-infectious appearing  - CXR clear  - MRI brain with significantly degraded exam by motion artifact, no evidence of acute infarct or hemorrhage  - neurology consulted, appreciate recommendations:  - MRI brain from last week was essentially non diagnostic so recommend repeat during this admission  - Add extended EEG monitoring  - Order serum ammonia level  - Continued treatment of ileitis per primary team  - Delirium and fall precautions   - Neurology will follow up  - worsening encephalopathy, medicine team consulted for LP  - LP labs ordered   - neuro checks q4hrs  - PT/OT consult placed, patient admitted from [facility]  Crohn's disease  - Per CT scan "thickening of the distal ileum to the surgical anastomosis with the large bowel. Inflammatory infectious ileitis are considerations. Recommend clinical correlation and follow-up. "  - Will check CRP, if elevated will consult GI, dicussed with them.   - CRP negative  - trial enema for fecal distention noted on CT  - if enema unsuccessful, consider CRS consult   - large BM on 5/1  - son also with concerns of new stelara potentially causing side effects of confusion for patient as the medication is new    - GI without concerns of confusion being caused by sterlara,   confirmed with pharmacy     Generalized weakness  - sent from SNF  - progressive worsening weakness per son   - PT/OT ordered    Unintended weight loss  - reported per son  - recently started gluten free diet " per recommendations from GI    Hyponatremia  Hyponatremia is likely due to Dehydration/hypovolemia. The patient's most recent sodium results are listed below.  Recent Labs     04/30/25  1202 05/01/25  0350 05/02/25  0615   * 135* 135*     Plan  - Correct the sodium by 4-6mEq in 24 hours.   - Obtain the following studies: Urine sodium, urine osmolality, serum osmolality.  - Monitor sodium Daily.   - Patient hyponatremia is stable    Essential hypertension  Patient's blood pressure range in the last 24 hours was: BP  Min: 144/78  Max: 172/98.The patient's inpatient anti-hypertensive regimen is listed below:  Current Antihypertensives  amLODIPine tablet 10 mg, Daily, Oral  metoprolol injection 5 mg, Every 5 min PRN, Intravenous    Plan  - BP is controlled, no changes needed to their regimen    Tachycardia  - patient with HR consistently 100s-120s  - EKG with sinus tachycardia  - unimproved with IVF  - infectious work-up negative so far  - cardiac monitoring         VTE Risk Mitigation (From admission, onward)           Ordered     enoxaparin injection 40 mg  Daily         05/01/25 0827     IP VTE HIGH RISK PATIENT  Once         05/01/25 0827     Place sequential compression device  Until discontinued         05/01/25 0827                    Discharge Planning   ERNIE: 5/5/2025     Code Status: Full Code   Medical Readiness for Discharge Date:   Discharge Plan A: Rehab                Please place Justification for DME        Zuly Wheat PA-C  Department of Hospital Medicine   [facility] - Internal Medicine Telemetry

## 2025-05-02 NOTE — ASSESSMENT & PLAN NOTE
79 y.o.M with progressive acutely worsening confusion/tremors since 4/20. Previously AAOx4, now AAOx1  - AF, tachycardic intermittently, otherwise VSS  - no leukocytosis  - UA non-infectious appearing  - CXR clear  - MRI brain with significantly degraded exam by motion artifact, no evidence of acute infarct or hemorrhage  - neurology consulted, appreciate recommendations:  - MRI brain from last week was essentially non diagnostic so recommend repeat during this admission  - Add extended EEG monitoring  - Order serum ammonia level  - Continued treatment of ileitis per primary team  - Delirium and fall precautions   - Neurology will follow up  - worsening encephalopathy, medicine team consulted for LP  - LP labs ordered   - neuro checks q4hrs  - PT/OT consult placed, patient admitted from [facility]

## 2025-05-02 NOTE — ASSESSMENT & PLAN NOTE
Patient's blood pressure range in the last 24 hours was: BP  Min: 144/78  Max: 172/98.The patient's inpatient anti-hypertensive regimen is listed below:  Current Antihypertensives  amLODIPine tablet 10 mg, Daily, Oral  metoprolol injection 5 mg, Every 5 min PRN, Intravenous    Plan  - BP is controlled, no changes needed to their regimen

## 2025-05-02 NOTE — PLAN OF CARE
Encephalopathy  Vince Huffman is a 79 year old male with history of chron's disease, SBO, hypothyroidism, and CAD  presenting from [facility] with encephalopathy. Exam notable for disorientation to place, time, and situation. He is minimally responsive and will answer with one word. There is asterixis in his upper extremities and intention tremor most notably in the left hand. Ptosis of left eye though no extraocular movement abnormalities/pupillary discrepancy to suggest third nerve palsy and mehran's respectively.     5/2: Continued disorientation, bilateral lower extremity clonus, bilateral extensor plantar response, and asterixis LUE>RUE. Follow up MRI without findings of acute stroke though limited by motion artifact. Ammonia level wnl.     Recommendations:  - If LP obtained include: csf cell count, differential, protein, glucose, freeze and hold, culture, ME panel, Encephalopathy/autoimmune panel, VDRL, and cytology/flow cytometry (if enough csf)  - Add serum encephalopathy autoimmune panel  - Delirium and fall precautions   - Will follow up EEG

## 2025-05-02 NOTE — NURSING
Pt arrived back to room from MRI in no acute distress, AAO x 1, telemetry monitor placed back on, VS stable.

## 2025-05-02 NOTE — ASSESSMENT & PLAN NOTE
Hyponatremia is likely due to Dehydration/hypovolemia. The patient's most recent sodium results are listed below.  Recent Labs     04/30/25  1202 05/01/25  0350 05/02/25  0615   * 135* 135*     Plan  - Correct the sodium by 4-6mEq in 24 hours.   - Obtain the following studies: Urine sodium, urine osmolality, serum osmolality.  - Monitor sodium Daily.   - Patient hyponatremia is stable

## 2025-05-02 NOTE — CONSULTS
"[facility]mezquita - Internal Medicine Telemetry  Adult Nutrition  Consult Note    SUMMARY     Recommendation/Intervention:   1) Continue current regular (gluten free) diet as tolerated, encourage PO intake, honor food preferences as able   2) Continue Boost Plus TID to promote adequate kcal/protein consumption   3) RD to monitor and follow-up as needed    Goals: Meet % of EEN/EPN by next RD follow-up  Nutrition Goal Status: new  Communication of RD Recs: other (comment) (POC)    Nutrition Discharge Planning     Nutrition Discharge Planning: General healthy diet, Oral supplement regimen (comments)  Oral supplement regimen (comments): ONS of choice PRN      Malnutrition Assessment  NFPE not completed at this time- pt seen remotely. Will complete at f/u visit to assess for potential fat loss/muscle wasting    Reason for Assessment    Reason For Assessment: identified at risk by screening criteria  Diagnosis: other (see comments) (Encephalopathy)  General Information Comments: Pt screened by RD for MST 4- answered yes to weight loss of 24-33 lbs, yes to recent decline in appetite. New consult now received for malnutrition. PMH of HTN, hypothyroid, crohn's disease, CAD, hx of SBO, presenting with AMS from [facility]. On regular gluten free diet at this time with Boost PLus ordered TID at all meals. No PO intake documented since admit. LBM yesterday, 5/1.    Nutrition/Diet History    Food Allergies: other (see comments) (Gluten)  Factors Affecting Nutritional Intake: decreased appetite    Nutrition Related Social Determinants of Health: SDOH: Unable to assess at this time.   Food Insecurity: No Food Insecurity (5/2/2025)    Hunger Vital Sign     Worried About Running Out of Food in the Last Year: Never true     Ran Out of Food in the Last Year: Never true     Anthropometrics    Height: 5' 11" (180.3 cm)  Height (inches): 71 in  Height Method: Stated  Weight: 63 kg (138 lb 14.2 oz)  Weight (lb): 138.89 lb  Weight Method: " Stated  Ideal Body Weight (IBW), Male: 172 lb  % Ideal Body Weight, Male (lb): 80.75 %  BMI (Calculated): 19.4  BMI Grade: less than 23 (older than 65 years) - underweight       Lab/Procedures/Meds    Pertinent Labs Reviewed: reviewed  Pertinent Labs Comments: Hgb: 13.1, Hct: 38.4, Na: 135  Pertinent Medications Reviewed: reviewed   amLODIPine  10 mg Oral Daily    enoxparin  40 mg Subcutaneous Daily    polyethylene glycol  17 g Oral BID       Estimated/Assessed Needs    Weight Used For Calorie Calculations: 63 kg (138 lb 14.2 oz)  Energy Calorie Requirements (kcal): 1646 kcal/day  Energy Need Method: Letcher-St Jeor (x 1.2)  Protein Requirements: 82 g (1.3 g/kg)  Weight Used For Protein Calculations: 63 kg (138 lb 14.2 oz)  Fluid Requirements (mL): 1 mL/kcal or per MD  Estimated Fluid Requirement Method: RDA Method  RDA Method (mL): 1646     Nutrition Prescription Ordered    Current Diet Order: Regular, gluten free  Oral Nutrition Supplement: Boost Plus TID    Evaluation of Received Nutrient/Fluid Intake    I/O: - 1.6 L since admit  Comments: LBM 5/1  % Intake of Estimated Energy Needs: Other: No PO intake documented since admit  % Meal Intake: Other: No PO intake documented since admit    PES Statement  Nutrition PES Status: New  Nutrition PES Problem: Underweight  Nutrition PES Etiology: Other (comment) (Pt report of recent 24-33 lb weight loss per MST)  Nutrition PES Signs and Symptoms: BMI    Nutrition Risk    Level of Risk/Frequency of Follow-up: high       Monitor and Evaluation    Monitor and Evaluation: Energy intake, Food and beverage intake, Protein intake, Diet order, Weight       Nutrition Follow-Up    RD Follow-up?: Yes

## 2025-05-02 NOTE — ASSESSMENT & PLAN NOTE
- patient with HR consistently 100s-120s  - EKG with sinus tachycardia  - unimproved with IVF  - infectious work-up negative so far  - cardiac monitoring

## 2025-05-03 LAB
ABSOLUTE EOSINOPHIL (OHS): 0.06 K/UL
ABSOLUTE MONOCYTE (OHS): 1.16 K/UL (ref 0.3–1)
ABSOLUTE NEUTROPHIL COUNT (OHS): 8.67 K/UL (ref 1.8–7.7)
ALBUMIN SERPL BCP-MCNC: 3.3 G/DL (ref 3.5–5.2)
ALP SERPL-CCNC: 83 UNIT/L (ref 40–150)
ALT SERPL W/O P-5'-P-CCNC: 38 UNIT/L (ref 10–44)
ANION GAP (OHS): 8 MMOL/L (ref 8–16)
AST SERPL-CCNC: 26 UNIT/L (ref 11–45)
BACTERIA STL CULT: NORMAL
BASOPHILS # BLD AUTO: 0.04 K/UL
BASOPHILS NFR BLD AUTO: 0.4 %
BILIRUB SERPL-MCNC: 0.8 MG/DL (ref 0.1–1)
BUN SERPL-MCNC: 19 MG/DL (ref 8–23)
CALCIUM SERPL-MCNC: 9.5 MG/DL (ref 8.7–10.5)
CHLORIDE SERPL-SCNC: 96 MMOL/L (ref 95–110)
CK SERPL-CCNC: 17 U/L (ref 20–200)
CO2 SERPL-SCNC: 27 MMOL/L (ref 23–29)
CREAT SERPL-MCNC: 1.3 MG/DL (ref 0.5–1.4)
E COLI SXT1 STL QL IA: NEGATIVE
E COLI SXT2 STL QL IA: NEGATIVE
ERYTHROCYTE [DISTWIDTH] IN BLOOD BY AUTOMATED COUNT: 12.3 % (ref 11.5–14.5)
FOLATE SERPL-MCNC: 16.5 NG/ML (ref 4–24)
GFR SERPLBLD CREATININE-BSD FMLA CKD-EPI: 56 ML/MIN/1.73/M2
GLUCOSE SERPL-MCNC: 127 MG/DL (ref 70–110)
HCT VFR BLD AUTO: 39.8 % (ref 40–54)
HGB BLD-MCNC: 13.2 GM/DL (ref 14–18)
IMM GRANULOCYTES # BLD AUTO: 0.05 K/UL (ref 0–0.04)
IMM GRANULOCYTES NFR BLD AUTO: 0.5 % (ref 0–0.5)
LYMPHOCYTES # BLD AUTO: 1.13 K/UL (ref 1–4.8)
MAGNESIUM SERPL-MCNC: 1.8 MG/DL (ref 1.6–2.6)
MCH RBC QN AUTO: 31.7 PG (ref 27–31)
MCHC RBC AUTO-ENTMCNC: 33.2 G/DL (ref 32–36)
MCV RBC AUTO: 95 FL (ref 82–98)
NUCLEATED RBC (/100WBC) (OHS): 0 /100 WBC
PHOSPHATE SERPL-MCNC: 2.9 MG/DL (ref 2.7–4.5)
PLATELET # BLD AUTO: 272 K/UL (ref 150–450)
PMV BLD AUTO: 10 FL (ref 9.2–12.9)
POTASSIUM SERPL-SCNC: 4.1 MMOL/L (ref 3.5–5.1)
PROT SERPL-MCNC: 7.2 GM/DL (ref 6–8.4)
RBC # BLD AUTO: 4.17 M/UL (ref 4.6–6.2)
RELATIVE EOSINOPHIL (OHS): 0.5 %
RELATIVE LYMPHOCYTE (OHS): 10.2 % (ref 18–48)
RELATIVE MONOCYTE (OHS): 10.4 % (ref 4–15)
RELATIVE NEUTROPHIL (OHS): 78 % (ref 38–73)
SODIUM SERPL-SCNC: 131 MMOL/L (ref 136–145)
T PALLIDUM IGG+IGM SER QL: NORMAL
VIT B12 SERPL-MCNC: 456 PG/ML (ref 210–950)
WBC # BLD AUTO: 11.11 K/UL (ref 3.9–12.7)

## 2025-05-03 PROCEDURE — 25000003 PHARM REV CODE 250: Performed by: INTERNAL MEDICINE

## 2025-05-03 PROCEDURE — 97530 THERAPEUTIC ACTIVITIES: CPT

## 2025-05-03 PROCEDURE — 99232 SBSQ HOSP IP/OBS MODERATE 35: CPT | Mod: ,,, | Performed by: PSYCHIATRY & NEUROLOGY

## 2025-05-03 PROCEDURE — 82607 VITAMIN B-12: CPT

## 2025-05-03 PROCEDURE — 82525 ASSAY OF COPPER: CPT

## 2025-05-03 PROCEDURE — 84630 ASSAY OF ZINC: CPT

## 2025-05-03 PROCEDURE — 63600175 PHARM REV CODE 636 W HCPCS

## 2025-05-03 PROCEDURE — 21400001 HC TELEMETRY ROOM

## 2025-05-03 PROCEDURE — 82746 ASSAY OF FOLIC ACID SERUM: CPT

## 2025-05-03 PROCEDURE — 83735 ASSAY OF MAGNESIUM: CPT

## 2025-05-03 PROCEDURE — 85025 COMPLETE CBC W/AUTO DIFF WBC: CPT

## 2025-05-03 PROCEDURE — 84425 ASSAY OF VITAMIN B-1: CPT

## 2025-05-03 PROCEDURE — 84100 ASSAY OF PHOSPHORUS: CPT

## 2025-05-03 PROCEDURE — 84446 ASSAY OF VITAMIN E: CPT

## 2025-05-03 PROCEDURE — 97166 OT EVAL MOD COMPLEX 45 MIN: CPT

## 2025-05-03 PROCEDURE — 36415 COLL VENOUS BLD VENIPUNCTURE: CPT

## 2025-05-03 PROCEDURE — 25000003 PHARM REV CODE 250

## 2025-05-03 PROCEDURE — 11000001 HC ACUTE MED/SURG PRIVATE ROOM

## 2025-05-03 PROCEDURE — 97162 PT EVAL MOD COMPLEX 30 MIN: CPT

## 2025-05-03 PROCEDURE — 82784 ASSAY IGA/IGD/IGG/IGM EACH: CPT

## 2025-05-03 PROCEDURE — 97112 NEUROMUSCULAR REEDUCATION: CPT

## 2025-05-03 PROCEDURE — 86364 TISS TRNSGLTMNASE EA IG CLAS: CPT

## 2025-05-03 PROCEDURE — 80053 COMPREHEN METABOLIC PANEL: CPT

## 2025-05-03 PROCEDURE — 82550 ASSAY OF CK (CPK): CPT

## 2025-05-03 PROCEDURE — 86593 SYPHILIS TEST NON-TREP QUANT: CPT

## 2025-05-03 RX ORDER — CARBIDOPA AND LEVODOPA 25; 100 MG/1; MG/1
1 TABLET ORAL 3 TIMES DAILY
Status: DISCONTINUED | OUTPATIENT
Start: 2025-05-03 | End: 2025-05-05

## 2025-05-03 RX ADMIN — POLYETHYLENE GLYCOL 3350 17 G: 17 POWDER, FOR SOLUTION ORAL at 08:05

## 2025-05-03 RX ADMIN — CARBIDOPA AND LEVODOPA 1 TABLET: 25; 100 TABLET ORAL at 08:05

## 2025-05-03 RX ADMIN — AMLODIPINE BESYLATE 10 MG: 10 TABLET ORAL at 08:05

## 2025-05-03 RX ADMIN — ENOXAPARIN SODIUM 40 MG: 40 INJECTION SUBCUTANEOUS at 04:05

## 2025-05-03 NOTE — PLAN OF CARE
Problem: Adult Inpatient Plan of Care  Goal: Plan of Care Review  Outcome: Progressing  Goal: Patient-Specific Goal (Individualized)  Outcome: Progressing  Goal: Absence of Hospital-Acquired Illness or Injury  Outcome: Progressing  Goal: Optimal Comfort and Wellbeing  Outcome: Progressing  Goal: Readiness for Transition of Care  Outcome: Progressing       Problem: Skin Injury Risk Increased  Goal: Skin Health and Integrity  Outcome: Progressing       No acute events this shift. Pt awake and alert, VS stable. Pt with no signs or complaints of discomfort. Safety maintained, fall camera at bedside, call light within reach. Plan of care ongoing.

## 2025-05-03 NOTE — NURSING
With encouragement, pt drank 2 cups of water and one carton of Boost. Pt also more alert and saying more words when you repeatedly ask a question.

## 2025-05-03 NOTE — PLAN OF CARE
Problem: Adult Inpatient Plan of Care  Goal: Optimal Comfort and Wellbeing  Intervention: Provide Person-Centered Care  Flowsheets (Taken 5/3/2025 1633)  Trust Relationship/Rapport:   care explained   choices provided   emotional support provided     Problem: Skin Injury Risk Increased  Goal: Skin Health and Integrity  Intervention: Promote and Optimize Oral Intake  Flowsheets (Taken 5/3/2025 1633)  Oral Nutrition Promotion:   physical activity promoted   calorie-dense foods provided   nutrition counseling provided  Nutrition Interventions:   food preferences provided   diet adjustment recommended   meal set-up provided   frequent small meals provided   supplemental drinks provided     Problem: Adult Inpatient Plan of Care  Goal: Absence of Hospital-Acquired Illness or Injury  Intervention: Prevent Infection  Flowsheets (Taken 5/3/2025 1638)  Infection Prevention:   environmental surveillance performed   equipment surfaces disinfected   rest/sleep promoted

## 2025-05-03 NOTE — PT/OT/SLP EVAL
Physical Therapy Co-Evaluation    Patient Name:  Vince Huffman   MRN:  [id number]    Co-evaluation and co-treatment performed for this visit due to suspected patient need for two skilled therapists to ensure patient and staff safety and to accommodate for patient activity tolerance/pain management     Recommendations:     Discharge Recommendations: Moderate Intensity Therapy   Discharge Equipment Recommendations: bedside commode, walker, rolling   Barriers to discharge: None    Assessment:     Vince Huffman is a 79 y.o. male admitted with a medical diagnosis of Encephalopathy.  He presents with the following impairments/functional limitations: weakness, impaired endurance, impaired self care skills, impaired functional mobility, gait instability, impaired balance, impaired cognition, decreased safety awareness, decreased coordination, impaired fine motor Pt. cooperative and tolerated treatment fairly well. Pt. progressing with mobility with Max-Total A. Pt. inconsistent with answering questions or following commands, but more alert and interactive today.    Rehab Prognosis: Fair; patient would benefit from acute skilled PT services to address these deficits and reach maximum level of function.    Recent Surgery: * No surgery found *      Plan:     During this hospitalization, patient to be seen 4 x/week to address the identified rehab impairments via gait training, therapeutic activities, therapeutic exercises, neuromuscular re-education and progress toward the following goals:    Plan of Care Expires:  06/02/25    Subjective     Chief Complaint: appeared uncomfortable with (L) LE PROM  Patient/Family Comments/goals: pt. Agreeable to PT  Pain/Comfort:  Pain Rating 1:  (pt. did not rate)    Patients cultural, spiritual, Scientologist conflicts given the current situation: no    Living Environment:  Per chart, pt. lives with family in [facility] with 1 [address]  Prior to admission, patients level of function was amb. with  hurry-cane.  Equipment used at home:  (hurry-cane).  Upon discharge, patient will have assistance from family.    Objective:     Communicated with nursing prior to session.  Patient found supine with peripheral IV, telemetry  upon PT entry to room.    General Precautions: Standard, fall  Orthopedic Precautions:N/A   Braces: N/A  Respiratory Status: Room air    Exams:  RLE ROM: WFL  RLE Strength: unable to accurately test, but active movement noted  LLE ROM: WFL  LLE Strength: unable to accurately test, but active movement noted    Functional Mobility:  Bed Mobility:     Rolling Right: total assistance  Scooting: total assistance  Supine to Sit: total assistance  Transfers:     Sit to Stand:  maximal assistance with hand-held assist  Bed to Chair: total assistance with  hand-held assist  using  Stand Pivot  Gait: 2 steps with HHA and Total A for weight shifting/balance/support with decreased floor clearance  Balance: poor sitting/standing      AM-PAC 6 CLICK MOBILITY  Total Score:7       Treatment & Education:  Discussed therapy needs, goals, and POC. Pt. performed sitting balance/tolerance on EOB for ~8 min. with CGA-Max A due to posterior lean. Pt. Performed static standing with Max A and HHA with (B) flexed knees for ~20 sec.    Patient left up in chair with all lines intact and call button in reach.    GOALS:   Multidisciplinary Problems       Physical Therapy Goals          Problem: Physical Therapy    Goal Priority Disciplines Outcome Interventions   Physical Therapy Goal     PT, PT/OT Progressing    Description: Goals to be met by: 2025     Patient will increase functional independence with mobility by performin. Supine to sit with MInimal Assistance  2. Sit to supine with MInimal Assistance  3. Sit to stand transfer with Minimal Assistance  4. Bed to chair transfer with Minimal Assistance using LRAD  5. Gait  x 25 feet with Minimal Assistance using LRAD.   6. Lower extremity exercise program x15  reps per handout, with assistance as needed                         DME Justifications:   Vince requires a commode for home use because he is confined to a single room.   Vince's mobility limitation cannot be sufficiently resolved by the use of a cane. His functional mobility deficit can be sufficiently resolved with the use of a Rolling Walker. Patient's mobility limitation significantly impairs their ability to participate in one of more activities of daily living.  The use of a RW will significantly improve the patient's ability to participate in MRADLS and the patient will use it on regular basis in the home.    History:     Past Medical History:   Diagnosis Date    Ankylosing spondylitis of unspecified sites in spine     Anxiety disorder, unspecified     BMI 21.0-21.9, adult 04/14/2025    Crohn's disease     Gout, unspecified     Heart disease     History of skin cancer 2001    squamous cell carcinoma left cheek    Hypertension     Hypothyroidism, unspecified     Psoriatic arthritis        Past Surgical History:   Procedure Laterality Date    ANGIOGRAM, CORONARY, WITH LEFT HEART CATHETERIZATION      APPENDECTOMY      APPENDECTOMY  2007    COLONOSCOPY N/A     ENDOSCOPY N/A     RIGHT HEMICOLECTOMY  2013    SMALL INTESTINE SURGERY  2007    30.5 cm of small bowel removed       Time Tracking:     PT Received On: 05/03/25  PT Start Time: 1009     PT Stop Time: 1031  PT Total Time (min): 22 min     Billable Minutes: Evaluation 10 and Neuromuscular Re-education 12      05/03/2025

## 2025-05-03 NOTE — PROGRESS NOTES
[facility] - Internal Medicine [facility] Medicine  Progress Note    Patient Name: Vince Huffman  MRN: [id number]  Patient Class: IP- Inpatient   Admission Date: 4/30/2025  Length of Stay: 2 days  Attending Physician: Genaro Pedraza MD  Primary Care Provider: Leland Porras MD        Subjective     Principal Problem:Encephalopathy        HPI:  78 yo M w/HTN, hypothyroid, crohn's disease, CAD, hx of SBO, presenting with AMS from [facility]. Patient was sent in from [facility] for progressively worsening cognitive function. Patient is A&O x1 at his baseline. Patient and his daughter endorse mild confusion. Which has been progressive.    Patient has not had any recent falls. Endorsed mild suprapubic abdominal pain. No UTI. Mild inflammation on CT, not unexpected in the setting of Crohn's.     Overview/Hospital Course:  Vince Huffman is a 79 y.o. M who was admitted to  for further evaluation of worsening AMS per rehab facility. AAOx1 on admission, normally AAOx4 prior to 4/20 per son. UA negative. CXR clear. CT abd/pelvis with wall thickening of distal ileum, inflammatory infectious ileitis are considerations, fecal distention of the rectum, impaction not excluded. Discussed with GI, anticipated these are chronic findings. CRP negative. Will give enema and monitor for improvement. Patient with successful BM. Neurology consulted: MRI brain ordered (no acute findings, motion artifact), extended EEG, ammonia levels. Worsening mental status on 5/2, medicine team consulted for LP which was unsuccessful. More alert on 5/3 and answering yes/no questions when redirected.      Interval History: Pt seen and examined this morning on rounds. NHIEON. More alert this AM per PT/OT. Able to follow instruction and answer simple yes/no questions.     LP attempted yesterday however unsuccessful.    Objective:     Vital Signs (Most Recent):  Temp: 98.1 °F (36.7 °C) (05/03/25 0705)  Pulse: (!) 137 (05/03/25  1021)  Resp: 14 (05/03/25 0705)  BP: (!) 152/85 (05/03/25 0705)  SpO2: 97 % (05/03/25 0705) Vital Signs (24h Range):  Temp:  [97.7 °F (36.5 °C)-98.8 °F (37.1 °C)] 98.1 °F (36.7 °C)  Pulse:  [] 137  Resp:  [14-18] 14  SpO2:  [95 %-97 %] 97 %  BP: (114-155)/(79-87) 152/85     Weight: 63 kg (138 lb 14.2 oz)  Body mass index is 19.37 kg/m².    Intake/Output Summary (Last 24 hours) at 5/3/2025 1106  Last data filed at 5/3/2025 0638  Gross per 24 hour   Intake 720 ml   Output 200 ml   Net 520 ml      Physical Exam:  Gen: in NAD, appears stated age  Neuro: Quiet, not answering orientation questions however able to deny listed symptoms and able to answer yes/no questions which is an improvement. CN2-12 grossly intact BL; motor, sensory, and strength grossly intact BL  HEENT: NTNC, EOMI, MMM  CVS: RRR, no m/r/g; S1/S2 auscultated with no S3 or S4  Resp: lungs CTAB, no w/r/r; no belabored breathing or accessory muscle use appreciated   Abd: NTND  Extrem: no UE or LE edema BL      Computed MELD 3.0 unavailable. One or more values for this score either were not found within the given timeframe or did not fit some other criterion.  Computed MELD-Na unavailable. One or more values for this score either were not found within the given timeframe or did not fit some other criterion.      Significant Labs:  CBC:  Recent Labs   Lab 05/02/25  0615 05/03/25 0524   WBC 9.60 11.11   HGB 13.1* 13.2*   HCT 38.4* 39.8*    272     CMP:  Recent Labs   Lab 05/02/25  0615 05/03/25 0524   * 131*   K 4.0 4.1   CL 97 96   CO2 25 27   GLU 97 127*   BUN 15 19   CREATININE 1.0 1.3   CALCIUM 9.5 9.5   PROT 7.3 7.2   ALBUMIN 3.4* 3.3*   BILITOT 0.8 0.8   ALKPHOS 90 83   AST 26 26   ALT 35 38   ANIONGAP 13 8           Assessment & Plan  Encephalopathy  79 y.o.M with progressive acutely worsening confusion/tremors since 4/20. Previously AAOx4, now AAOx1  - AF, tachycardic intermittently, otherwise VSS  - no leukocytosis  - UA  "non-infectious appearing  - CXR clear  - MRI brain with significantly degraded exam by motion artifact, no evidence of acute infarct or hemorrhage  - neurology consulted, appreciate recommendations:  - MRI brain from last week was essentially non diagnostic so recommend repeat during this admission  - Add extended EEG monitoring  - Order serum ammonia level  - Continued treatment of ileitis per primary team  - Delirium and fall precautions   - Neurology will follow up  - worsening encephalopathy 5/2, medicine team consulted for LP on which was unsuccessful.  - Now with improvement in mentation 5/3; not at baseline  - neuro checks q4hrs  - PT/OT consult placed, patient admitted from [facility]  Crohn's disease  - Per CT scan "thickening of the distal ileum to the surgical anastomosis with the large bowel. Inflammatory infectious ileitis are considerations. Recommend clinical correlation and follow-up. "  - Will check CRP, if elevated will consult GI, dicussed with them.   - CRP negative  - trial enema for fecal distention noted on CT  - if enema unsuccessful, consider CRS consult   - large BM on 5/1  - son also with concerns of new stelara potentially causing side effects of confusion for patient as the medication is new    - GI without concerns of confusion being caused by sterlara,   confirmed with pharmacy     Generalized weakness  - sent from SNF  - progressive worsening weakness per son   - PT/OT ordered    Unintended weight loss  - reported per son  - recently started gluten free diet per recommendations from GI    Hyponatremia  Hyponatremia is likely due to Dehydration/hypovolemia. The patient's most recent sodium results are listed below.  Recent Labs     05/01/25  0350 05/02/25  0615 05/03/25  0524   * 135* 131*     Plan  - Correct the sodium by 4-6mEq in 24 hours.   - Obtain the following studies: Urine sodium, urine osmolality, serum osmolality.  - Monitor sodium Daily.   - Patient hyponatremia is " stable    Essential hypertension  Patient's blood pressure range in the last 24 hours was: BP  Min: 114/81  Max: 155/87.The patient's inpatient anti-hypertensive regimen is listed below:  Current Antihypertensives  amLODIPine tablet 10 mg, Daily, Oral    Plan  - BP is controlled, no changes needed to their regimen    Tachycardia  - patient with HR consistently 100s-120s  - EKG with sinus tachycardia  - unimproved with IVF  - infectious work-up negative so far  - cardiac monitoring         VTE Risk Mitigation (From admission, onward)           Ordered     enoxaparin injection 40 mg  Daily         05/01/25 0827     IP VTE HIGH RISK PATIENT  Once         05/01/25 0827     Place sequential compression device  Until discontinued         05/01/25 0827                    Discharge Planning   ERNIE: 5/5/2025     Code Status: Full Code   Medical Readiness for Discharge Date:   Discharge Plan A: Rehab                Please place Justification for DME        Genaro Pedraza MD  Department of Hospital Medicine   [facility] - Internal Medicine Telemetry

## 2025-05-03 NOTE — SUBJECTIVE & OBJECTIVE
Interval History: Pt seen and examined this morning on rounds. HALEY. More alert this AM per PT/OT. Able to follow instruction and answer simple yes/no questions.     LP attempted yesterday however unsuccessful.    Objective:     Vital Signs (Most Recent):  Temp: 98.1 °F (36.7 °C) (05/03/25 0705)  Pulse: (!) 137 (05/03/25 1021)  Resp: 14 (05/03/25 0705)  BP: (!) 152/85 (05/03/25 0705)  SpO2: 97 % (05/03/25 0705) Vital Signs (24h Range):  Temp:  [97.7 °F (36.5 °C)-98.8 °F (37.1 °C)] 98.1 °F (36.7 °C)  Pulse:  [] 137  Resp:  [14-18] 14  SpO2:  [95 %-97 %] 97 %  BP: (114-155)/(79-87) 152/85     Weight: 63 kg (138 lb 14.2 oz)  Body mass index is 19.37 kg/m².    Intake/Output Summary (Last 24 hours) at 5/3/2025 1106  Last data filed at 5/3/2025 0638  Gross per 24 hour   Intake 720 ml   Output 200 ml   Net 520 ml      Physical Exam:  Gen: in NAD, appears stated age  Neuro: Quiet, not answering orientation questions however able to deny listed symptoms and able to answer yes/no questions which is an improvement. CN2-12 grossly intact BL; motor, sensory, and strength grossly intact BL  HEENT: NTNC, EOMI, MMM  CVS: RRR, no m/r/g; S1/S2 auscultated with no S3 or S4  Resp: lungs CTAB, no w/r/r; no belabored breathing or accessory muscle use appreciated   Abd: NTND  Extrem: no UE or LE edema BL      Computed MELD 3.0 unavailable. One or more values for this score either were not found within the given timeframe or did not fit some other criterion.  Computed MELD-Na unavailable. One or more values for this score either were not found within the given timeframe or did not fit some other criterion.      Significant Labs:  CBC:  Recent Labs   Lab 05/02/25  0615 05/03/25  0524   WBC 9.60 11.11   HGB 13.1* 13.2*   HCT 38.4* 39.8*    272     CMP:  Recent Labs   Lab 05/02/25  0615 05/03/25  0524   * 131*   K 4.0 4.1   CL 97 96   CO2 25 27   GLU 97 127*   BUN 15 19   CREATININE 1.0 1.3   CALCIUM 9.5 9.5   PROT 7.3 7.2    ALBUMIN 3.4* 3.3*   BILITOT 0.8 0.8   ALKPHOS 90 83   AST 26 26   ALT 35 38   ANIONGAP 13 8

## 2025-05-03 NOTE — PLAN OF CARE
Problem: Physical Therapy  Goal: Physical Therapy Goal  Description: Goals to be met by: 2025     Patient will increase functional independence with mobility by performin. Supine to sit with MInimal Assistance  2. Sit to supine with MInimal Assistance  3. Sit to stand transfer with Minimal Assistance  4. Bed to chair transfer with Minimal Assistance using LRAD  5. Gait  x 25 feet with Minimal Assistance using LRAD.   6. Lower extremity exercise program x15 reps per handout, with assistance as needed    5/3/2025 1441 by Elian Brown, PT  Outcome: Progressing  5/3/2025 1439 by Elian Brown, PT  Outcome: Progressing

## 2025-05-03 NOTE — PROCEDURES
"Vince Huffman is a 79 y.o. male patient.    Temp: 97.9 °F (36.6 °C) (05/02/25 1546)  Pulse: 85 (05/02/25 1859)  Resp: 18 (05/02/25 1546)  BP: (!) 155/87 (05/02/25 1546)  SpO2: 95 % (05/02/25 1546)  Weight: 63 kg (138 lb 14.2 oz) (05/01/25 1100)  Height: 5' 11" (180.3 cm) (05/01/25 1100)       **UNSUCCESSFUL**    Lumbar Puncture    Date/Time: 5/2/2025 7:35 PM  Location procedure was performed: [facility] MEDICINE    Performed by: Eric Marcano MD  Authorized by: Eric Marcano MD  Consent Done: Yes  Indications: evaluation for infection and evaluation for altered mental status    Anesthesia:  Local Anesthetic: lidocaine 1% without epinephrine    Patient sedated: no  Lumbar space: L4-L5 interspace  Patient's position: left lateral decubitus  Needle gauge: 18  Needle type: spinal needle - Quincke tip  Complications: No  Estimated blood loss (mL): 1  Specimens: No        Eric Marcano MD  Department of Internal Medicine  [facility]    5/2/2025    "

## 2025-05-03 NOTE — CONSULTS
Please see procedure note dated 05/02 7:34PM    LP attempted--**unsuccessful**    Eric Marcano MD  Department of Internal Medicine  [facility]

## 2025-05-03 NOTE — PT/OT/SLP EVAL
"Occupational Therapy  Co -  Evaluation with PT    Co-evaluation/treatment performed due to patient's multiple deficits requiring two skilled therapists to appropriately and safely assess patient's strength and endurance while facilitating functional tasks in addition to accommodating for patient's activity tolerance.         Name: Vince Huffman  MRN: [id number]  Admitting Diagnosis: Encephalopathy  Recent Surgery: * No surgery found *      Recommendations:     Discharge Recommendations: Moderate Intensity Therapy  Discharge Equipment Recommendations:  bedside commode, walker, rolling  Barriers to discharge:  Decreased caregiver support    Assessment:     Vince Huffman is a 79 y.o. male with a medical diagnosis of Encephalopathy.  He presents with performance deficits affecting function: weakness, impaired endurance, impaired self care skills, impaired functional mobility, impaired balance, decreased coordination, impaired cognition, decreased upper extremity function, decreased lower extremity function, decreased safety awareness, pain, abnormal tone.      Pt engaged fairly in therapy this date, limited by minimal responsiveness to questions and directions and intermittently following instructions or answering questions appropriately. Ie: Pt was unable to state his name or  this date, but when writing therapist told pt "I like your blanket", pt answered in a full sentence "My doctor just told me that."  Otherwise, pt only able to answer yes or no questions by nodding or shaking his head. When encouraged to reach out/up to touch writing Ot's hand, pt able to respond appropriately ~30% of the time with R hand, requiring initiation but unable to switch to L hand despite multisensory prompts. Pt provided with cup of water to lips and able to take 1 sip before shaking his head "no" when asked if he wanted more. As water spilled out of pt's mouth, pt observed to reach for napkin and then stop activity, seeming to " forget intention in mid-motion. Pt then provided with wet washcloth to wipe face, pt again initiated movement to face with washcloth but unable to complete motion, stopping ~4 inches from face and requiring writing OT to assist pt to place washcloth on his face, when he wiped it across his mouth 1x. Pt required significant assistance of 2 persons with bed mobility and was unable to hold himself upright at EOB, presenting with posterior lean. Pt required total A to complete STS and bed to chair transfer using stand pivot method.  Increased time provided for quick visit by MD. Patient currently demonstrates a need for moderate intensity therapy on a daily basis post acute secondary to a decline in functional status due to illness        Rehab Prognosis: Good; patient would benefit from acute skilled OT services to address these deficits and reach maximum level of function.       Plan:     Patient to be seen 4 x/week to address the above listed problems via self-care/home management, therapeutic activities, therapeutic exercises  Plan of Care Expires: 05/24/25  Plan of Care Reviewed with: patient    Subjective     Chief Complaint: Unable to report this date  Patient/Family Comments/goals: Unable to report    Occupational Profile: From previous note:  Living Environment: Pt lives with sister in [facility] with 1 TERESA, WIS + SC, has been walking with [employer] since 4/23    Pain/Comfort:  Pain Rating 1:  (not rated)  Location - Side 1: Left  Location - Orientation 1: generalized  Location 1: leg  Pain Addressed 1: Cessation of Activity, Distraction, Reposition  Pain Rating Post-Intervention 1:  (unable to rate but grimaced during mobility + strength testing)    Patients cultural, spiritual, Sikhism conflicts given the current situation: no    Objective:     Communicated with: RN prior to session.  Patient found HOB elevated with telemetry, peripheral IV, colostomy upon OT entry to room.    General Precautions: Standard,  fall  Orthopedic Precautions: N/A  Braces: N/A  Respiratory Status: Room air    Occupational Performance:    Bed Mobility:    Patient completed Rolling/Turning to Right with maximal assistance and 2 persons  Patient completed Scooting/Bridging with maximal assistance and 2 persons  Patient completed Supine to Sit with maximal assistance and 2 persons  Pt required max A for posterior lean while sitting EOB ~8min to assess BLE strength & direction following    Functional Mobility/Transfers:  Patient completed Sit <> Stand Transfer with total assistance  with  hand-held assist   Patient completed Bed <> Chair Transfer using Stand Pivot technique with total assistance with hand-held assist    Activities of Daily Living:  Upper Body Dressing: total assistance affixing gown at neck and back to maximize coverage   Lower Body Dressing: total assistance donning bilateral nonslip socks  Toileting: OT/PT managed catheter throughout session      Cognitive/Visual Perceptual:  Cognitive/Psychosocial Skills:     -       Oriented to: AOX1, though unable to communicate name/ this date   -       Follows Commands/attention:Inattentive, Easily distracted, and Follows one-step commands ~30% of time  -       Communication: expressive aphasia  -       Memory: Impaired STM, Impaired LTM, and Poor immediate recall  -       Safety awareness/insight to disability: impaired   -       Mood/Affect/Coping skills/emotional control: Flat affect, Pleasant, and Withdrawn  Visual/Perceptual:      -Intact      Physical Exam:  Balance:    -       Sitting:  Impaired. Standing:  Impaired  Postural examination/scapula alignment:    -       No postural abnormalities identified  Upper Extremity Range of Motion:     -       Right Upper Extremity: WFL  -       Left Upper Extremity: WFL  Upper Extremity Strength:    -       Right Upper Extremity: WFL  -       Left Upper Extremity: WFL   Strength:    -       Right Upper Extremity: WFL  -       Left Upper  Extremity: WFL  Fine Motor Coordination:    -       Intact  Neurological:    -       impaired    AMPAC 6 Click ADL:  AMPAC Total Score: 12    Treatment & Education:  Pt educated on role of OT, POC, and goals for therapy.    POC was dicussed with patient/caregiver, who was included in its development and is in agreement with the identified goals and treatment plan.   Patient and family aware of patient's deficits and therapy progression.   Time provided for therapeutic counseling and discussion of health disposition.   Educated on importance of EOB/OOB mobility, maintaining routine, sitting up in chair, and maximizing independence with ADLs during admission   Pt completed ADLs and functional mobility for treatment session as noted above   Pt/caregiver verbalized understanding and expressed no further concerns/questions.  Updated communication board with level of assist required       Patient left up in chair with all lines intact, call button in reach, RN notified    GOALS:   Multidisciplinary Problems       Occupational Therapy Goals          Problem: Occupational Therapy    Goal Priority Disciplines Outcome Interventions   Occupational Therapy Goal     OT, PT/OT Progressing    Description: Goals to be met by: 5/24/25     Patient will increase functional independence with ADLs by performing:    UE Dressing with Contact Guard Assistance.  LE Dressing with Minimal Assistance.  Grooming while bedside chair with Minimal Assistance.  Toileting from bedside commode with Minimal Assistance for hygiene and clothing management.   Sitting at edge of bed x5 minutes with Contact Guard Assistance for upright balance.  Rolling to Bilateral with Minimal Assistance.   Supine to sit with Minimal Assistance.  Step transfer with Moderate Assistance  Toilet transfer to bedside commode with Moderate Assistance.                         DME Justifications:  No DME recommended requiring DME justifications - TBD    History:     Past Medical  History:   Diagnosis Date    Ankylosing spondylitis of unspecified sites in spine     Anxiety disorder, unspecified     BMI 21.0-21.9, adult 04/14/2025    Crohn's disease     Gout, unspecified     Heart disease     History of skin cancer 2001    squamous cell carcinoma left cheek    Hypertension     Hypothyroidism, unspecified     Psoriatic arthritis          Past Surgical History:   Procedure Laterality Date    ANGIOGRAM, CORONARY, WITH LEFT HEART CATHETERIZATION      APPENDECTOMY      APPENDECTOMY  2007    COLONOSCOPY N/A     ENDOSCOPY N/A     RIGHT HEMICOLECTOMY  2013    SMALL INTESTINE SURGERY  2007    30.5 cm of small bowel removed       Time Tracking:     OT Date of Treatment: 05/03/25  OT Start Time: 1011  OT Stop Time: 1030  OT Total Time (min): 19 min    Billable Minutes:Evaluation 8  Therapeutic Activity 11    5/3/2025

## 2025-05-03 NOTE — ASSESSMENT & PLAN NOTE
79 y.o.M with progressive acutely worsening confusion/tremors since 4/20. Previously AAOx4, now AAOx1  - AF, tachycardic intermittently, otherwise VSS  - no leukocytosis  - UA non-infectious appearing  - CXR clear  - MRI brain with significantly degraded exam by motion artifact, no evidence of acute infarct or hemorrhage  - neurology consulted, appreciate recommendations:  - MRI brain from last week was essentially non diagnostic so recommend repeat during this admission  - Add extended EEG monitoring  - Order serum ammonia level  - Continued treatment of ileitis per primary team  - Delirium and fall precautions   - Neurology will follow up  - worsening encephalopathy 5/2, medicine team consulted for LP on which was unsuccessful.  - Now with improvement in mentation 5/3; not at baseline  - neuro checks q4hrs  - PT/OT consult placed, patient admitted from [facility]

## 2025-05-03 NOTE — ASSESSMENT & PLAN NOTE
Patient's blood pressure range in the last 24 hours was: BP  Min: 114/81  Max: 155/87.The patient's inpatient anti-hypertensive regimen is listed below:  Current Antihypertensives  amLODIPine tablet 10 mg, Daily, Oral    Plan  - BP is controlled, no changes needed to their regimen

## 2025-05-03 NOTE — ASSESSMENT & PLAN NOTE
Hyponatremia is likely due to Dehydration/hypovolemia. The patient's most recent sodium results are listed below.  Recent Labs     05/01/25  0350 05/02/25  0615 05/03/25  0524   * 135* 131*     Plan  - Correct the sodium by 4-6mEq in 24 hours.   - Obtain the following studies: Urine sodium, urine osmolality, serum osmolality.  - Monitor sodium Daily.   - Patient hyponatremia is stable

## 2025-05-03 NOTE — NURSING
Pt alert and responds to voice, however, unable to assess orientation as he only says one to no words. Pt does nod appropriately to questions. Pt does not appear to be in any distress at this time and is not exhibiting any signs of discomfort or pain. Vital signs are stable.

## 2025-05-03 NOTE — PLAN OF CARE
Pt engaged fairly in therapy this date.     Problem: Occupational Therapy  Goal: Occupational Therapy Goal  Description: Goals to be met by: 5/24/25     Patient will increase functional independence with ADLs by performing:    UE Dressing with Contact Guard Assistance.  LE Dressing with Minimal Assistance.  Grooming while bedside chair with Minimal Assistance.  Toileting from bedside commode with Minimal Assistance for hygiene and clothing management.   Sitting at edge of bed x5 minutes with Contact Guard Assistance for upright balance.  Rolling to Bilateral with Minimal Assistance.   Supine to sit with Minimal Assistance.  Step transfer with Moderate Assistance  Toilet transfer to bedside commode with Moderate Assistance.    Outcome: Progressing

## 2025-05-04 LAB
ABSOLUTE EOSINOPHIL (OHS): 0.15 K/UL
ABSOLUTE MONOCYTE (OHS): 1.67 K/UL (ref 0.3–1)
ABSOLUTE NEUTROPHIL COUNT (OHS): 8.48 K/UL (ref 1.8–7.7)
ALBUMIN SERPL BCP-MCNC: 3.2 G/DL (ref 3.5–5.2)
ALP SERPL-CCNC: 79 UNIT/L (ref 40–150)
ALT SERPL W/O P-5'-P-CCNC: 9 UNIT/L (ref 10–44)
ANION GAP (OHS): 10 MMOL/L (ref 8–16)
AST SERPL-CCNC: 20 UNIT/L (ref 11–45)
BASOPHILS # BLD AUTO: 0.04 K/UL
BASOPHILS NFR BLD AUTO: 0.3 %
BILIRUB SERPL-MCNC: 0.5 MG/DL (ref 0.1–1)
BUN SERPL-MCNC: 25 MG/DL (ref 8–23)
CALCIUM SERPL-MCNC: 9.7 MG/DL (ref 8.7–10.5)
CHLORIDE SERPL-SCNC: 96 MMOL/L (ref 95–110)
CO2 SERPL-SCNC: 26 MMOL/L (ref 23–29)
CREAT SERPL-MCNC: 1.2 MG/DL (ref 0.5–1.4)
ERYTHROCYTE [DISTWIDTH] IN BLOOD BY AUTOMATED COUNT: 12.4 % (ref 11.5–14.5)
GFR SERPLBLD CREATININE-BSD FMLA CKD-EPI: >60 ML/MIN/1.73/M2
GLUCOSE SERPL-MCNC: 90 MG/DL (ref 70–110)
HCT VFR BLD AUTO: 37.1 % (ref 40–54)
HGB BLD-MCNC: 12.4 GM/DL (ref 14–18)
IMM GRANULOCYTES # BLD AUTO: 0.08 K/UL (ref 0–0.04)
IMM GRANULOCYTES NFR BLD AUTO: 0.7 % (ref 0–0.5)
LYMPHOCYTES # BLD AUTO: 1.44 K/UL (ref 1–4.8)
MAGNESIUM SERPL-MCNC: 1.9 MG/DL (ref 1.6–2.6)
MCH RBC QN AUTO: 32 PG (ref 27–31)
MCHC RBC AUTO-ENTMCNC: 33.4 G/DL (ref 32–36)
MCV RBC AUTO: 96 FL (ref 82–98)
NUCLEATED RBC (/100WBC) (OHS): 0 /100 WBC
PHOSPHATE SERPL-MCNC: 3.1 MG/DL (ref 2.7–4.5)
PLATELET # BLD AUTO: 261 K/UL (ref 150–450)
PMV BLD AUTO: 11.1 FL (ref 9.2–12.9)
POCT GLUCOSE: 121 MG/DL (ref 70–110)
POTASSIUM SERPL-SCNC: 4.2 MMOL/L (ref 3.5–5.1)
PROT SERPL-MCNC: 7.1 GM/DL (ref 6–8.4)
RBC # BLD AUTO: 3.87 M/UL (ref 4.6–6.2)
RELATIVE EOSINOPHIL (OHS): 1.3 %
RELATIVE LYMPHOCYTE (OHS): 12.1 % (ref 18–48)
RELATIVE MONOCYTE (OHS): 14.1 % (ref 4–15)
RELATIVE NEUTROPHIL (OHS): 71.5 % (ref 38–73)
SODIUM SERPL-SCNC: 132 MMOL/L (ref 136–145)
WBC # BLD AUTO: 11.86 K/UL (ref 3.9–12.7)

## 2025-05-04 PROCEDURE — 83735 ASSAY OF MAGNESIUM: CPT

## 2025-05-04 PROCEDURE — 21400001 HC TELEMETRY ROOM

## 2025-05-04 PROCEDURE — 36415 COLL VENOUS BLD VENIPUNCTURE: CPT

## 2025-05-04 PROCEDURE — 84100 ASSAY OF PHOSPHORUS: CPT

## 2025-05-04 PROCEDURE — 25000003 PHARM REV CODE 250: Performed by: INTERNAL MEDICINE

## 2025-05-04 PROCEDURE — 99232 SBSQ HOSP IP/OBS MODERATE 35: CPT | Mod: ,,, | Performed by: PSYCHIATRY & NEUROLOGY

## 2025-05-04 PROCEDURE — 25000003 PHARM REV CODE 250

## 2025-05-04 PROCEDURE — 63600175 PHARM REV CODE 636 W HCPCS

## 2025-05-04 PROCEDURE — 85025 COMPLETE CBC W/AUTO DIFF WBC: CPT

## 2025-05-04 PROCEDURE — 80053 COMPREHEN METABOLIC PANEL: CPT

## 2025-05-04 PROCEDURE — 86255 FLUORESCENT ANTIBODY SCREEN: CPT

## 2025-05-04 PROCEDURE — 11000001 HC ACUTE MED/SURG PRIVATE ROOM

## 2025-05-04 RX ADMIN — CARBIDOPA AND LEVODOPA 1 TABLET: 25; 100 TABLET ORAL at 02:05

## 2025-05-04 RX ADMIN — AMLODIPINE BESYLATE 10 MG: 10 TABLET ORAL at 08:05

## 2025-05-04 RX ADMIN — CARBIDOPA AND LEVODOPA 1 TABLET: 25; 100 TABLET ORAL at 08:05

## 2025-05-04 RX ADMIN — CARBIDOPA AND LEVODOPA 1 SPLIT TABLET: 25; 100 TABLET ORAL at 09:05

## 2025-05-04 RX ADMIN — ENOXAPARIN SODIUM 40 MG: 40 INJECTION SUBCUTANEOUS at 04:05

## 2025-05-04 RX ADMIN — ACETAMINOPHEN 650 MG: 325 TABLET ORAL at 02:05

## 2025-05-04 RX ADMIN — CARBIDOPA AND LEVODOPA 1 TABLET: 25; 100 TABLET ORAL at 09:05

## 2025-05-04 RX ADMIN — POLYETHYLENE GLYCOL 3350 17 G: 17 POWDER, FOR SOLUTION ORAL at 09:05

## 2025-05-04 RX ADMIN — POLYETHYLENE GLYCOL 3350 17 G: 17 POWDER, FOR SOLUTION ORAL at 08:05

## 2025-05-04 NOTE — SUBJECTIVE & OBJECTIVE
Interval History: Pt seen and examined this morning on rounds. HALEY. More conversant today and answering in sentences. Aox1-2; at times having to ask the same questions multiple times to elicit and answer. Denies pain, headache, vision changes, dizziness, abdominal pain, nausea.      Objective:     Vital Signs (Most Recent):  Temp: 98.6 °F (37 °C) (05/04/25 0745)  Pulse: 86 (05/04/25 0745)  Resp: 16 (05/04/25 0745)  BP: (!) 152/84 (05/04/25 0745)  SpO2: 97 % (05/04/25 0745) Vital Signs (24h Range):  Temp:  [97.3 °F (36.3 °C)-98.6 °F (37 °C)] 98.6 °F (37 °C)  Pulse:  [] 86  Resp:  [16-18] 16  SpO2:  [96 %-97 %] 97 %  BP: (118-155)/(72-90) 152/84     Weight: 63 kg (138 lb 14.2 oz)  Body mass index is 19.37 kg/m².    Intake/Output Summary (Last 24 hours) at 5/4/2025 1104  Last data filed at 5/4/2025 0830  Gross per 24 hour   Intake 355 ml   Output 200 ml   Net 155 ml      Physical Exam:  Gen: in NAD, appears stated age  Neuro: Aox1-2; more conversant (improving) CN2-12 grossly intact BL; motor, sensory, and strength grossly intact BL  HEENT: NTNC, EOMI, MMM  CVS: RRR, no m/r/g; S1/S2 auscultated with no S3 or S4  Resp: lungs CTAB, no w/r/r; no belabored breathing or accessory muscle use appreciated   Abd: NTND  Extrem: no UE or LE edema BL      Computed MELD 3.0 unavailable. One or more values for this score either were not found within the given timeframe or did not fit some other criterion.  Computed MELD-Na unavailable. One or more values for this score either were not found within the given timeframe or did not fit some other criterion.      Significant Labs:  CBC:  Recent Labs   Lab 05/03/25  0524 05/04/25  0328   WBC 11.11 11.86   HGB 13.2* 12.4*   HCT 39.8* 37.1*    261     CMP:  Recent Labs   Lab 05/03/25  0524 05/04/25  0328   * 132*   K 4.1 4.2   CL 96 96   CO2 27 26   * 90   BUN 19 25*   CREATININE 1.3 1.2   CALCIUM 9.5 9.7   PROT 7.2 7.1   ALBUMIN 3.3* 3.2*   BILITOT 0.8 0.5    ALKPHOS 83 79   AST 26 20   ALT 38 9*   ANIONGAP 8 10

## 2025-05-04 NOTE — PROGRESS NOTES
"[facility] Medicine Tele[facility]  Neurology  Progress Note    Patient Name: Vince Huffman  MRN: [id number]  Admission Date: 4/30/2025  Hospital Length of Stay: 2 days  Code Status: Full Code   Attending Provider: Genaro Pedraza MD  Primary Care Physician: Leland Porras MD   Principal Problem:Encephalopathy    HPI:   Vince Huffman is a 79 year old male with history of chron's disease, SBO, hypothyroidism, and CAD  presenting from [facility] with encephalopathy. He initially presented to [facility] for impaired mobility and difficulty with ADL's 2/2 generalized weakness. He was reportedly found confused today A&O x 1 (said the year is "1895") but is normally A&O x4. At this time he also expressed generalized abdominal pain. Recent ultrasounds of the lower extremity did not reveal DVT. MRI from last week was non diagnostic. UA unremarkable and CXR normal. CT abdomen and pelvis reveals wall thickening of the distal ileum consistent with possible infectious ileitis. Neurology consulted for further evaluation of encephalopathy.     Overview/Hospital Course:  No notes on file      Past Medical History:   Diagnosis Date    Ankylosing spondylitis of unspecified sites in spine     Anxiety disorder, unspecified     BMI 21.0-21.9, adult 04/14/2025    Crohn's disease     Gout, unspecified     Heart disease     History of skin cancer 2001    squamous cell carcinoma left cheek    Hypertension     Hypothyroidism, unspecified     Psoriatic arthritis        Past Surgical History:   Procedure Laterality Date    ANGIOGRAM, CORONARY, WITH LEFT HEART CATHETERIZATION      APPENDECTOMY      APPENDECTOMY  2007    COLONOSCOPY N/A     ENDOSCOPY N/A     RIGHT HEMICOLECTOMY  2013    SMALL INTESTINE SURGERY  2007    30.5 cm of small bowel removed       Review of patient's allergies indicates:   Allergen Reactions    Gluten Diarrhea    Lactose        Current Neurological Medications: see below    No current " facility-administered medications on file prior to encounter.     Current Outpatient Medications on File Prior to Encounter   Medication Sig    acetaminophen (TYLENOL) 500 MG tablet Take 500 mg by mouth every 6 (six) hours as needed.    ALPRAZolam (XANAX XR) 0.5 MG Tb24 Take 0.5 mg by mouth once daily.    amLODIPine (NORVASC) 10 MG tablet Take 1 tablet (10 mg total) by mouth once daily.    ustekinumab (STELARA) 90 mg/mL Syrg syringe Inject 1 mL (90 mg total) into the skin every 8 weeks.     Family History    None       Tobacco Use    Smoking status: Never    Smokeless tobacco: Never   Substance and Sexual Activity    Alcohol use: No    Drug use: No    Sexual activity: Never     Partners: Female     Review of Systems   Constitutional:  Positive for activity change. Negative for fever.   Respiratory:  Negative for shortness of breath.    Cardiovascular:  Negative for chest pain.   Gastrointestinal:  Positive for abdominal pain.   Neurological:  Positive for tremors and speech difficulty.   Psychiatric/Behavioral:  Positive for behavioral problems, confusion and decreased concentration.      Objective:     Vital Signs (Most Recent):  Temp: 98.5 °F (36.9 °C) (05/03/25 1650)  Pulse: 90 (05/03/25 1859)  Resp: 18 (05/03/25 1650)  BP: (!) 151/90 (05/03/25 1650)  SpO2: 97 % (05/03/25 1650) Vital Signs (24h Range):  Temp:  [97.3 °F (36.3 °C)-98.6 °F (37 °C)] 98.5 °F (36.9 °C)  Pulse:  [] 90  Resp:  [14-18] 18  SpO2:  [96 %-97 %] 97 %  BP: (114-152)/(72-90) 151/90     Weight: 63 kg (138 lb 14.2 oz)  Body mass index is 19.37 kg/m².     Physical Exam  Constitutional:       Comments: The lens of his glasses for the left eye is missing. He appears confused and responds minimally to questions.    Eyes:      Pupils: Pupils are equal, round, and reactive to light.      Comments: Ptosis of left eye, unclear if at his baseline.   Pupils reactive to light, no discrepancy from left to right to suggest mehran's.  No dilation of  impaired extraocular movements to suggest third nerve palsy.    Pulmonary:      Effort: Pulmonary effort is normal.   Neurological:      Mental Status: He is alert. He is disoriented.      Deep Tendon Reflexes:      Reflex Scores:       Brachioradialis reflexes are 2+ on the right side and 2+ on the left side.       Patellar reflexes are 2+ on the right side and 2+ on the left side.         NEUROLOGICAL EXAMINATION:     MENTAL STATUS   Oriented to person.   Disoriented to place.   Disoriented to time.   Attention: decreased. Concentration: decreased.   Level of consciousness: alert    CRANIAL NERVES     CN III, IV, VI   Pupils are equal, round, and reactive to light.       Mild ptosis of left eye without pupillary abnormalities     MOTOR EXAM   Overall muscle tone: increased  Right arm tone: increased  Left arm tone: increased       Asterixis with tremor most notably in the left arm     REFLEXES     Reflexes   Right brachioradialis: 2+  Left brachioradialis: 2+  Right patellar: 2+  Left patellar: 2+    SENSORY EXAM   Light touch normal.     GAIT AND COORDINATION     Tremor   Intention tremor: present      Significant Labs: CBC:   Recent Labs   Lab 05/02/25  0615 05/03/25  0524   WBC 9.60 11.11   HGB 13.1* 13.2*   HCT 38.4* 39.8*    272     CMP:   Recent Labs   Lab 05/02/25  0615 05/03/25  0524   GLU 97 127*   * 131*   K 4.0 4.1   CL 97 96   CO2 25 27   BUN 15 19   CREATININE 1.0 1.3   CALCIUM 9.5 9.5   MG 1.6 1.8   PROT 7.3 7.2   ALBUMIN 3.4* 3.3*   BILITOT 0.8 0.8   ALKPHOS 90 83   AST 26 26   ALT 35 38   ANIONGAP 13 8       Significant Imaging: I have reviewed all pertinent imaging results/findings within the past 24 hours.  Assessment and Plan:     * Encephalopathy  Vince Huffman is a 79 year old male with history of chron's disease, SBO, hypothyroidism, and CAD  presenting from [facility] with encephalopathy. Exam notable for disorientation to place, time, and situation. He is minimally  responsive and will answer with one word. There is asterixis in his upper extremities and intention tremor most notably in the left hand. No hyperreflexia. Ptosis of left eye though no extraocular movement abnormalities/pupillary discrepancy to suggest third nerve palsy and mehran's respectively.     5/3: Repeat MRI also affected by motion artifact though no findings of acute stroke. LP performed by hospital medicine yesterday unsuccessful. Ammonia 44. UDS/UA unremarkable.     Recommendations:  - Start carbidopa-levodopa trial tonight 25 -100 mg TID  - Ordered labs including: vitamin B1, B12, folate, ck, treponema, vitamin E, zinc, copper, celiac panel   - Likely LP tomorrow depending on response to sinemet trial   - Pending EEG monitoring  - Delirium and fall precautions   - Neurology will follow up        VTE Risk Mitigation (From admission, onward)           Ordered     enoxaparin injection 40 mg  Daily         05/01/25 0827     IP VTE HIGH RISK PATIENT  Once         05/01/25 0827     Place sequential compression device  Until discontinued         05/01/25 0827                    Elian López MD  Neurology  [facility] Tele[facility]

## 2025-05-04 NOTE — PROGRESS NOTES
[facility]  Progress Note    Patient Name: Vince Huffman  MRN: [id number]  Patient Class: IP- Inpatient   Admission Date: 4/30/2025  Length of Stay: 3 days  Attending Physician: Genaro Pedraza MD  Primary Care Provider: Leland Porras MD        Subjective     Principal Problem:Encephalopathy        HPI:  80 yo M w/HTN, hypothyroid, crohn's disease, CAD, hx of SBO, presenting with AMS from [facility]. Patient was sent in from [facility] for progressively worsening cognitive function. Patient is A&O x1 at his baseline. Patient and his daughter endorse mild confusion. Which has been progressive.    Patient has not had any recent falls. Endorsed mild suprapubic abdominal pain. No UTI. Mild inflammation on CT, not unexpected in the setting of Crohn's.     Overview/Hospital Course:  Vince Huffman is a 79 y.o. M who was admitted to  for further evaluation of worsening AMS per rehab facility. AAOx1 on admission, normally AAOx4 prior to 4/20 per son. UA negative. CXR clear. CT abd/pelvis with wall thickening of distal ileum, inflammatory infectious ileitis are considerations, fecal distention of the rectum, impaction not excluded. Discussed with GI, anticipated these are chronic findings. CRP negative. Will give enema and monitor for improvement. Patient with successful BM. Neurology consulted: MRI brain ordered (no acute findings, motion artifact), extended EEG, ammonia levels. Worsening mental status on 5/2, medicine team consulted for LP which was unsuccessful. More alert on 5/3 and answering yes/no questions when redirected. Neurology recommending carbidopa-levodopa trial, EEG, and attempting another LP.     Interval History: Pt seen and examined this morning on rounds. HALEY. More conversant today and answering in sentences. Aox1-2; at times having to ask the same questions multiple times to elicit and answer. Denies pain, headache, vision changes,  dizziness, abdominal pain, nausea.      Objective:     Vital Signs (Most Recent):  Temp: 98.6 °F (37 °C) (05/04/25 0745)  Pulse: 86 (05/04/25 0745)  Resp: 16 (05/04/25 0745)  BP: (!) 152/84 (05/04/25 0745)  SpO2: 97 % (05/04/25 0745) Vital Signs (24h Range):  Temp:  [97.3 °F (36.3 °C)-98.6 °F (37 °C)] 98.6 °F (37 °C)  Pulse:  [] 86  Resp:  [16-18] 16  SpO2:  [96 %-97 %] 97 %  BP: (118-155)/(72-90) 152/84     Weight: 63 kg (138 lb 14.2 oz)  Body mass index is 19.37 kg/m².    Intake/Output Summary (Last 24 hours) at 5/4/2025 1104  Last data filed at 5/4/2025 0830  Gross per 24 hour   Intake 355 ml   Output 200 ml   Net 155 ml      Physical Exam:  Gen: in NAD, appears stated age  Neuro: Aox1-2; more conversant (improving) CN2-12 grossly intact BL; motor, sensory, and strength grossly intact BL  HEENT: NTNC, EOMI, MMM  CVS: RRR, no m/r/g; S1/S2 auscultated with no S3 or S4  Resp: lungs CTAB, no w/r/r; no belabored breathing or accessory muscle use appreciated   Abd: NTND  Extrem: no UE or LE edema BL      Computed MELD 3.0 unavailable. One or more values for this score either were not found within the given timeframe or did not fit some other criterion.  Computed MELD-Na unavailable. One or more values for this score either were not found within the given timeframe or did not fit some other criterion.      Significant Labs:  CBC:  Recent Labs   Lab 05/03/25 0524 05/04/25 0328   WBC 11.11 11.86   HGB 13.2* 12.4*   HCT 39.8* 37.1*    261     CMP:  Recent Labs   Lab 05/03/25 0524 05/04/25 0328   * 132*   K 4.1 4.2   CL 96 96   CO2 27 26   * 90   BUN 19 25*   CREATININE 1.3 1.2   CALCIUM 9.5 9.7   PROT 7.2 7.1   ALBUMIN 3.3* 3.2*   BILITOT 0.8 0.5   ALKPHOS 83 79   AST 26 20   ALT 38 9*   ANIONGAP 8 10           Assessment & Plan  Encephalopathy  79 y.o.M with progressive acutely worsening confusion/tremors since 4/20. Previously AAOx4, now AAOx1  - AF, tachycardic intermittently, otherwise  "VSS  - no leukocytosis  - UA non-infectious appearing  - CXR clear  - MRI brain with significantly degraded exam by motion artifact, no evidence of acute infarct or hemorrhage  - neurology consulted, appreciate recommendations:  - MRI brain from last week was essentially non diagnostic so recommend repeat during this admission  - EEG, re-attempt LP, and sinemet trial  - worsening encephalopathy 5/2, medicine team consulted for LP on which was unsuccessful.  - Delirium and fall precautions   - Mentation improving; not yet at baseline  - neuro checks q4hrs  - PT/OT consult placed, patient admitted from [facility]  Crohn's disease  - Per CT scan "thickening of the distal ileum to the surgical anastomosis with the large bowel. Inflammatory infectious ileitis are considerations. Recommend clinical correlation and follow-up. "  - Will check CRP, if elevated will consult GI, dicussed with them.   - CRP negative  - trial enema for fecal distention noted on CT  - if enema unsuccessful, consider CRS consult   - large BM on 5/1  - son also with concerns of new stelara potentially causing side effects of confusion for patient as the medication is new    - GI without concerns of confusion being caused by sterlara,   confirmed with pharmacy     Generalized weakness  - sent from SNF  - progressive worsening weakness per son   - PT/OT ordered    Unintended weight loss  - reported per son  - recently started gluten free diet per recommendations from GI    Hyponatremia  Hyponatremia is likely due to Dehydration/hypovolemia. The patient's most recent sodium results are listed below.  Recent Labs     05/02/25  0615 05/03/25  0524 05/04/25  0328   * 131* 132*     Plan  - Correct the sodium by 4-6mEq in 24 hours.   - Obtain the following studies: Urine sodium, urine osmolality, serum osmolality.  - Monitor sodium Daily.   - Patient hyponatremia is stable    Essential hypertension  Patient's blood pressure range in the last 24 hours " was: BP  Min: 118/72  Max: 155/85.The patient's inpatient anti-hypertensive regimen is listed below:  Current Antihypertensives  amLODIPine tablet 10 mg, Daily, Oral    Plan  - BP is controlled, no changes needed to their regimen    Tachycardia  - patient with HR consistently 100s-120s  - EKG with sinus tachycardia  - unimproved with IVF  - infectious work-up negative so far  - improved    VTE Risk Mitigation (From admission, onward)           Ordered     enoxaparin injection 40 mg  Daily         05/01/25 0827     IP VTE HIGH RISK PATIENT  Once         05/01/25 0827     Place sequential compression device  Until discontinued         05/01/25 0827                    Discharge Planning   ERNIE: 5/5/2025     Code Status: Full Code   Medical Readiness for Discharge Date:   Discharge Plan A: Rehab                Please place Justification for DME        Genaro Pedraza MD  Department of Hospital Medicine   [facility] - Internal Medicine Telemetry

## 2025-05-04 NOTE — SUBJECTIVE & OBJECTIVE
Past Medical History:   Diagnosis Date    Ankylosing spondylitis of unspecified sites in spine     Anxiety disorder, unspecified     BMI 21.0-21.9, adult 04/14/2025    Crohn's disease     Gout, unspecified     Heart disease     History of skin cancer 2001    squamous cell carcinoma left cheek    Hypertension     Hypothyroidism, unspecified     Psoriatic arthritis        Past Surgical History:   Procedure Laterality Date    ANGIOGRAM, CORONARY, WITH LEFT HEART CATHETERIZATION      APPENDECTOMY      APPENDECTOMY  2007    COLONOSCOPY N/A     ENDOSCOPY N/A     RIGHT HEMICOLECTOMY  2013    SMALL INTESTINE SURGERY  2007    30.5 cm of small bowel removed       Review of patient's allergies indicates:   Allergen Reactions    Gluten Diarrhea    Lactose        Current Neurological Medications: see below    No current facility-administered medications on file prior to encounter.     Current Outpatient Medications on File Prior to Encounter   Medication Sig    acetaminophen (TYLENOL) 500 MG tablet Take 500 mg by mouth every 6 (six) hours as needed.    ALPRAZolam (XANAX XR) 0.5 MG Tb24 Take 0.5 mg by mouth once daily.    amLODIPine (NORVASC) 10 MG tablet Take 1 tablet (10 mg total) by mouth once daily.    ustekinumab (STELARA) 90 mg/mL Syrg syringe Inject 1 mL (90 mg total) into the skin every 8 weeks.     Family History    None       Tobacco Use    Smoking status: Never    Smokeless tobacco: Never   Substance and Sexual Activity    Alcohol use: No    Drug use: No    Sexual activity: Never     Partners: Female     Review of Systems   Constitutional:  Positive for activity change. Negative for fever.   Respiratory:  Negative for shortness of breath.    Cardiovascular:  Negative for chest pain.   Gastrointestinal:  Positive for abdominal pain.   Neurological:  Positive for tremors and speech difficulty.   Psychiatric/Behavioral:  Positive for behavioral problems, confusion and decreased concentration.      Objective:     Vital  Signs (Most Recent):  Temp: 98.5 °F (36.9 °C) (05/03/25 1650)  Pulse: 90 (05/03/25 1859)  Resp: 18 (05/03/25 1650)  BP: (!) 151/90 (05/03/25 1650)  SpO2: 97 % (05/03/25 1650) Vital Signs (24h Range):  Temp:  [97.3 °F (36.3 °C)-98.6 °F (37 °C)] 98.5 °F (36.9 °C)  Pulse:  [] 90  Resp:  [14-18] 18  SpO2:  [96 %-97 %] 97 %  BP: (114-152)/(72-90) 151/90     Weight: 63 kg (138 lb 14.2 oz)  Body mass index is 19.37 kg/m².     Physical Exam  Constitutional:       Comments: The lens of his glasses for the left eye is missing. He appears confused and responds minimally to questions.    Eyes:      Pupils: Pupils are equal, round, and reactive to light.      Comments: Ptosis of left eye, unclear if at his baseline.   Pupils reactive to light, no discrepancy from left to right to suggest mehran's.  No dilation of impaired extraocular movements to suggest third nerve palsy.    Pulmonary:      Effort: Pulmonary effort is normal.   Neurological:      Mental Status: He is alert. He is disoriented.      Deep Tendon Reflexes:      Reflex Scores:       Brachioradialis reflexes are 2+ on the right side and 2+ on the left side.       Patellar reflexes are 2+ on the right side and 2+ on the left side.         NEUROLOGICAL EXAMINATION:     MENTAL STATUS   Oriented to person.   Disoriented to place.   Disoriented to time.   Attention: decreased. Concentration: decreased.   Level of consciousness: alert    CRANIAL NERVES     CN III, IV, VI   Pupils are equal, round, and reactive to light.       Mild ptosis of left eye without pupillary abnormalities     MOTOR EXAM   Overall muscle tone: increased  Right arm tone: increased  Left arm tone: increased       Asterixis with tremor most notably in the left arm     REFLEXES     Reflexes   Right brachioradialis: 2+  Left brachioradialis: 2+  Right patellar: 2+  Left patellar: 2+    SENSORY EXAM   Light touch normal.     GAIT AND COORDINATION     Tremor   Intention tremor:  present      Significant Labs: CBC:   Recent Labs   Lab 05/02/25  0615 05/03/25  0524   WBC 9.60 11.11   HGB 13.1* 13.2*   HCT 38.4* 39.8*    272     CMP:   Recent Labs   Lab 05/02/25  0615 05/03/25  0524   GLU 97 127*   * 131*   K 4.0 4.1   CL 97 96   CO2 25 27   BUN 15 19   CREATININE 1.0 1.3   CALCIUM 9.5 9.5   MG 1.6 1.8   PROT 7.3 7.2   ALBUMIN 3.4* 3.3*   BILITOT 0.8 0.8   ALKPHOS 90 83   AST 26 26   ALT 35 38   ANIONGAP 13 8       Significant Imaging: I have reviewed all pertinent imaging results/findings within the past 24 hours.

## 2025-05-04 NOTE — ASSESSMENT & PLAN NOTE
Patient's blood pressure range in the last 24 hours was: BP  Min: 118/72  Max: 155/85.The patient's inpatient anti-hypertensive regimen is listed below:  Current Antihypertensives  amLODIPine tablet 10 mg, Daily, Oral    Plan  - BP is controlled, no changes needed to their regimen

## 2025-05-04 NOTE — PLAN OF CARE
Problem: Skin Injury Risk Increased  Goal: Skin Health and Integrity  Outcome: Progressing  Intervention: Promote and Optimize Oral Intake  Flowsheets (Taken 5/4/2025 1604)  Oral Nutrition Promotion:   calorie-dense foods provided   nutrition counseling provided  Nutrition Interventions:   food preferences provided   frequent small meals provided   meal set-up provided   meals from home/family encouraged     Problem: Adult Inpatient Plan of Care  Goal: Absence of Hospital-Acquired Illness or Injury  Outcome: Progressing  Intervention: Identify and Manage Fall Risk  Flowsheets (Taken 5/4/2025 1606)  Safety Promotion/Fall Prevention:   bed alarm set   side rails raised x 2   /camera at bedside     Problem: Adult Inpatient Plan of Care  Goal: Optimal Comfort and Wellbeing  Outcome: Progressing  Intervention: Provide Person-Centered Care  Flowsheets (Taken 5/4/2025 1607)  Trust Relationship/Rapport:   care explained   choices provided   emotional support provided   empathic listening provided   thoughts/feelings acknowledged

## 2025-05-04 NOTE — ASSESSMENT & PLAN NOTE
Hyponatremia is likely due to Dehydration/hypovolemia. The patient's most recent sodium results are listed below.  Recent Labs     05/02/25  0615 05/03/25  0524 05/04/25  0328   * 131* 132*     Plan  - Correct the sodium by 4-6mEq in 24 hours.   - Obtain the following studies: Urine sodium, urine osmolality, serum osmolality.  - Monitor sodium Daily.   - Patient hyponatremia is stable

## 2025-05-04 NOTE — ASSESSMENT & PLAN NOTE
79 y.o.M with progressive acutely worsening confusion/tremors since 4/20. Previously AAOx4, now AAOx1  - AF, tachycardic intermittently, otherwise VSS  - no leukocytosis  - UA non-infectious appearing  - CXR clear  - MRI brain with significantly degraded exam by motion artifact, no evidence of acute infarct or hemorrhage  - neurology consulted, appreciate recommendations:  - MRI brain from last week was essentially non diagnostic so recommend repeat during this admission  - EEG, re-attempt LP, and sinemet trial  - worsening encephalopathy 5/2, medicine team consulted for LP on which was unsuccessful.  - Delirium and fall precautions   - Mentation improving; not yet at baseline  - neuro checks q4hrs  - PT/OT consult placed, patient admitted from [facility]

## 2025-05-04 NOTE — ASSESSMENT & PLAN NOTE
- patient with HR consistently 100s-120s  - EKG with sinus tachycardia  - unimproved with IVF  - infectious work-up negative so far  - improved

## 2025-05-04 NOTE — ASSESSMENT & PLAN NOTE
Vince Huffman is a 79 year old male with history of chron's disease, SBO, hypothyroidism, and CAD  presenting from [facility] with encephalopathy. Exam notable for disorientation to place, time, and situation. He is minimally responsive and will answer with one word. There is asterixis in his upper extremities and intention tremor most notably in the left hand. No hyperreflexia. Ptosis of left eye though no extraocular movement abnormalities/pupillary discrepancy to suggest third nerve palsy and mehran's respectively.     5/3: Repeat MRI also affected by motion artifact though no findings of acute stroke. LP performed by hospital medicine yesterday unsuccessful. Ammonia 44. UDS/UA unremarkable.     Recommendations:  - Start carbidopa-levodopa trial tonight 25 -100 mg TID  - Ordered labs including: vitamin B1, B12, folate, ck, treponema, vitamin E, zinc, copper, celiac panel   - Likely LP tomorrow depending on response to sinemet trial   - Pending EEG monitoring  - Delirium and fall precautions   - Neurology will follow up

## 2025-05-05 PROBLEM — R63.4 UNINTENDED WEIGHT LOSS: Chronic | Status: ACTIVE | Noted: 2025-04-24

## 2025-05-05 PROBLEM — I10 ESSENTIAL HYPERTENSION: Chronic | Status: ACTIVE | Noted: 2018-11-05

## 2025-05-05 PROBLEM — R53.1 GENERALIZED WEAKNESS: Chronic | Status: ACTIVE | Noted: 2025-04-24

## 2025-05-05 PROBLEM — R41.82 AMS (ALTERED MENTAL STATUS): Status: ACTIVE | Noted: 2025-05-05

## 2025-05-05 PROBLEM — G20.C PARKINSONISM: Status: ACTIVE | Noted: 2025-05-05

## 2025-05-05 LAB
CLARITY CSF: CLEAR
CLARITY CSF: CLEAR
COLOR CSF: ABNORMAL
COLOR CSF: COLORLESS
CSF TUBE NUMBER (OHS): 1
CSF TUBE NUMBER (OHS): 1
FREEZE AND HOLD: NORMAL
GLUCOSE CSF-MCNC: 46 MG/DL (ref 40–70)
INR PPP: 1 (ref 0.8–1.2)
LYMPHOCYTES NFR CSF MANUAL: 66 % (ref 40–80)
LYMPHOCYTES NFR CSF MANUAL: 86 % (ref 40–80)
MONOS+MACROS NFR CSF MANUAL: 13 % (ref 15–45)
MONOS+MACROS NFR CSF MANUAL: 29 % (ref 15–45)
NEUTROPHILS NFR CSF MANUAL: 1 %
NEUTROPHILS NFR CSF MANUAL: 5 %
PROT CSF-MCNC: 60 MG/DL (ref 15–40)
PROTHROMBIN TIME: 11.4 SECONDS (ref 9–12.5)
RBC # CSF: 183 /CU MM
RBC # CSF: 4 /CU MM
SPECIMEN VOL CSF: 2 ML
SPECIMEN VOL CSF: 3 ML
WBC # CSF: 29 /CU MM
WBC # CSF: 43 /CU MM

## 2025-05-05 PROCEDURE — 009U3ZX DRAINAGE OF SPINAL CANAL, PERCUTANEOUS APPROACH, DIAGNOSTIC: ICD-10-PCS | Performed by: PSYCHIATRY & NEUROLOGY

## 2025-05-05 PROCEDURE — 99499 UNLISTED E&M SERVICE: CPT | Mod: ,,, | Performed by: PSYCHIATRY & NEUROLOGY

## 2025-05-05 PROCEDURE — 87206 SMEAR FLUORESCENT/ACID STAI: CPT

## 2025-05-05 PROCEDURE — 83916 OLIGOCLONAL BANDS: CPT | Performed by: INTERNAL MEDICINE

## 2025-05-05 PROCEDURE — 25000003 PHARM REV CODE 250

## 2025-05-05 PROCEDURE — 89051 BODY FLUID CELL COUNT: CPT

## 2025-05-05 PROCEDURE — 85610 PROTHROMBIN TIME: CPT

## 2025-05-05 PROCEDURE — 88108 CYTOPATH CONCENTRATE TECH: CPT | Mod: 26,,, | Performed by: PATHOLOGY

## 2025-05-05 PROCEDURE — 83916 OLIGOCLONAL BANDS: CPT

## 2025-05-05 PROCEDURE — 88108 CYTOPATH CONCENTRATE TECH: CPT | Mod: TC

## 2025-05-05 PROCEDURE — 87483 CNS DNA AMP PROBE TYPE 12-25: CPT

## 2025-05-05 PROCEDURE — 87205 SMEAR GRAM STAIN: CPT

## 2025-05-05 PROCEDURE — 36415 COLL VENOUS BLD VENIPUNCTURE: CPT

## 2025-05-05 PROCEDURE — 11000001 HC ACUTE MED/SURG PRIVATE ROOM

## 2025-05-05 PROCEDURE — 84157 ASSAY OF PROTEIN OTHER: CPT

## 2025-05-05 PROCEDURE — 86592 SYPHILIS TEST NON-TREP QUAL: CPT

## 2025-05-05 PROCEDURE — 82945 GLUCOSE OTHER FLUID: CPT

## 2025-05-05 PROCEDURE — 83615 LACTATE (LD) (LDH) ENZYME: CPT

## 2025-05-05 PROCEDURE — 99000 SPECIMEN HANDLING OFFICE-LAB: CPT

## 2025-05-05 PROCEDURE — 86255 FLUORESCENT ANTIBODY SCREEN: CPT

## 2025-05-05 PROCEDURE — 87015 SPECIMEN INFECT AGNT CONCNTJ: CPT

## 2025-05-05 PROCEDURE — 99222 1ST HOSP IP/OBS MODERATE 55: CPT | Mod: ,,, | Performed by: PSYCHIATRY & NEUROLOGY

## 2025-05-05 PROCEDURE — 25000003 PHARM REV CODE 250: Performed by: INTERNAL MEDICINE

## 2025-05-05 RX ORDER — CARBIDOPA AND LEVODOPA 25; 100 MG/1; MG/1
2 TABLET ORAL 3 TIMES DAILY
Status: DISCONTINUED | OUTPATIENT
Start: 2025-05-05 | End: 2025-06-02 | Stop reason: HOSPADM

## 2025-05-05 RX ORDER — LIDOCAINE HYDROCHLORIDE 10 MG/ML
5 INJECTION, SOLUTION INFILTRATION; PERINEURAL
Status: DISCONTINUED | OUTPATIENT
Start: 2025-05-05 | End: 2025-05-17

## 2025-05-05 RX ADMIN — AMLODIPINE BESYLATE 10 MG: 10 TABLET ORAL at 10:05

## 2025-05-05 RX ADMIN — CARBIDOPA AND LEVODOPA 1 TABLET: 25; 100 TABLET ORAL at 10:05

## 2025-05-05 RX ADMIN — CARBIDOPA AND LEVODOPA 1 SPLIT TABLET: 25; 100 TABLET ORAL at 04:05

## 2025-05-05 RX ADMIN — CARBIDOPA AND LEVODOPA 1 TABLET: 25; 100 TABLET ORAL at 04:05

## 2025-05-05 RX ADMIN — POLYETHYLENE GLYCOL 3350 17 G: 17 POWDER, FOR SOLUTION ORAL at 10:05

## 2025-05-05 RX ADMIN — POLYETHYLENE GLYCOL 3350 17 G: 17 POWDER, FOR SOLUTION ORAL at 09:05

## 2025-05-05 RX ADMIN — CARBIDOPA AND LEVODOPA 2 TABLET: 25; 100 TABLET ORAL at 09:05

## 2025-05-05 RX ADMIN — CARBIDOPA AND LEVODOPA 1 SPLIT TABLET: 25; 100 TABLET ORAL at 10:05

## 2025-05-05 NOTE — SUBJECTIVE & OBJECTIVE
Past Medical History:   Diagnosis Date    Ankylosing spondylitis of unspecified sites in spine     Anxiety disorder, unspecified     BMI 21.0-21.9, adult 04/14/2025    Crohn's disease     Gout, unspecified     Heart disease     History of skin cancer 2001    squamous cell carcinoma left cheek    Hypertension     Hypothyroidism, unspecified     Psoriatic arthritis        Past Surgical History:   Procedure Laterality Date    ANGIOGRAM, CORONARY, WITH LEFT HEART CATHETERIZATION      APPENDECTOMY      APPENDECTOMY  2007    COLONOSCOPY N/A     ENDOSCOPY N/A     RIGHT HEMICOLECTOMY  2013    SMALL INTESTINE SURGERY  2007    30.5 cm of small bowel removed       Review of patient's allergies indicates:   Allergen Reactions    Gluten Diarrhea    Lactose        Current Neurological Medications: see below    No current facility-administered medications on file prior to encounter.     Current Outpatient Medications on File Prior to Encounter   Medication Sig    acetaminophen (TYLENOL) 500 MG tablet Take 500 mg by mouth every 6 (six) hours as needed.    ALPRAZolam (XANAX XR) 0.5 MG Tb24 Take 0.5 mg by mouth once daily.    amLODIPine (NORVASC) 10 MG tablet Take 1 tablet (10 mg total) by mouth once daily.    ustekinumab (STELARA) 90 mg/mL Syrg syringe Inject 1 mL (90 mg total) into the skin every 8 weeks.     Family History    None       Tobacco Use    Smoking status: Never    Smokeless tobacco: Never   Substance and Sexual Activity    Alcohol use: No    Drug use: No    Sexual activity: Never     Partners: Female     Review of Systems   Constitutional:  Positive for activity change. Negative for fever.   Respiratory:  Negative for shortness of breath.    Cardiovascular:  Negative for chest pain.   Gastrointestinal:  Positive for abdominal pain.   Neurological:  Positive for tremors and speech difficulty.   Psychiatric/Behavioral:  Positive for behavioral problems, confusion and decreased concentration.      Objective:     Vital  Signs (Most Recent):  Temp: 98.1 °F (36.7 °C) (05/04/25 1914)  Pulse: 90 (05/04/25 1914)  Resp: 18 (05/04/25 1914)  BP: (!) 155/81 (05/04/25 1914)  SpO2: 98 % (05/04/25 1914) Vital Signs (24h Range):  Temp:  [97.8 °F (36.6 °C)-98.6 °F (37 °C)] 98.1 °F (36.7 °C)  Pulse:  [] 90  Resp:  [16-18] 18  SpO2:  [95 %-98 %] 98 %  BP: (139-155)/(75-85) 155/81     Weight: 63 kg (138 lb 14.2 oz)  Body mass index is 19.37 kg/m².     Physical Exam  Constitutional:       Comments: The lens of his glasses for the left eye is missing. He appears confused and responds minimally to questions.    Eyes:      Pupils: Pupils are equal, round, and reactive to light.      Comments: Ptosis of left eye, unclear if at his baseline.   Pupils reactive to light, no discrepancy from left to right to suggest mehran's.  No dilation of impaired extraocular movements to suggest third nerve palsy.    Pulmonary:      Effort: Pulmonary effort is normal.   Neurological:      Mental Status: He is alert. He is disoriented.      Deep Tendon Reflexes:      Reflex Scores:       Brachioradialis reflexes are 2+ on the right side and 2+ on the left side.       Patellar reflexes are 2+ on the right side and 2+ on the left side.         NEUROLOGICAL EXAMINATION:     MENTAL STATUS   Oriented to person.   Disoriented to place.   Disoriented to time.   Attention: decreased. Concentration: decreased.   Level of consciousness: alert    CRANIAL NERVES     CN III, IV, VI   Pupils are equal, round, and reactive to light.       Mild ptosis of left eye without pupillary abnormalities     MOTOR EXAM   Overall muscle tone: increased  Right arm tone: increased  Left arm tone: increased       Asterixis with tremor most notably in the left arm     REFLEXES     Reflexes   Right brachioradialis: 2+  Left brachioradialis: 2+  Right patellar: 2+  Left patellar: 2+    SENSORY EXAM   Light touch normal.     GAIT AND COORDINATION     Tremor   Intention tremor:  present      Significant Labs: CBC:   Recent Labs   Lab 05/03/25  0524 05/04/25  0328   WBC 11.11 11.86   HGB 13.2* 12.4*   HCT 39.8* 37.1*    261     CMP:   Recent Labs   Lab 05/03/25 0524 05/04/25  0328   * 90   * 132*   K 4.1 4.2   CL 96 96   CO2 27 26   BUN 19 25*   CREATININE 1.3 1.2   CALCIUM 9.5 9.7   MG 1.8 1.9   PROT 7.2 7.1   ALBUMIN 3.3* 3.2*   BILITOT 0.8 0.5   ALKPHOS 83 79   AST 26 20   ALT 38 9*   ANIONGAP 8 10       Significant Imaging: I have reviewed all pertinent imaging results/findings within the past 24 hours.

## 2025-05-05 NOTE — PROGRESS NOTES
"[facility]  Neurology  Progress Note    Patient Name: Vince Huffman  MRN: [id number]  Admission Date: 4/30/2025  Hospital Length of Stay: 3 days  Code Status: Full Code   Attending Provider: Genaro Pedraza MD  Primary Care Physician: Leland Porras MD   Principal Problem:Encephalopathy    HPI:   Vince Huffman is a 79 year old male with history of chron's disease, SBO, hypothyroidism, and CAD  presenting from [facility] with encephalopathy. He initially presented to [facility] for impaired mobility and difficulty with ADL's 2/2 generalized weakness. He was reportedly found confused today A&O x 1 (said the year is "1895") but is normally A&O x4. At this time he also expressed generalized abdominal pain. Recent ultrasounds of the lower extremity did not reveal DVT. MRI from last week was non diagnostic. UA unremarkable and CXR normal. CT abdomen and pelvis reveals wall thickening of the distal ileum consistent with possible infectious ileitis. Neurology consulted for further evaluation of encephalopathy.     Overview/Hospital Course:  No notes on file      Past Medical History:   Diagnosis Date    Ankylosing spondylitis of unspecified sites in spine     Anxiety disorder, unspecified     BMI 21.0-21.9, adult 04/14/2025    Crohn's disease     Gout, unspecified     Heart disease     History of skin cancer 2001    squamous cell carcinoma left cheek    Hypertension     Hypothyroidism, unspecified     Psoriatic arthritis        Past Surgical History:   Procedure Laterality Date    ANGIOGRAM, CORONARY, WITH LEFT HEART CATHETERIZATION      APPENDECTOMY      APPENDECTOMY  2007    COLONOSCOPY N/A     ENDOSCOPY N/A     RIGHT HEMICOLECTOMY  2013    SMALL INTESTINE SURGERY  2007    30.5 cm of small bowel removed       Review of patient's allergies indicates:   Allergen Reactions    Gluten Diarrhea    Lactose        Current Neurological Medications: see below    No current " facility-administered medications on file prior to encounter.     Current Outpatient Medications on File Prior to Encounter   Medication Sig    acetaminophen (TYLENOL) 500 MG tablet Take 500 mg by mouth every 6 (six) hours as needed.    ALPRAZolam (XANAX XR) 0.5 MG Tb24 Take 0.5 mg by mouth once daily.    amLODIPine (NORVASC) 10 MG tablet Take 1 tablet (10 mg total) by mouth once daily.    ustekinumab (STELARA) 90 mg/mL Syrg syringe Inject 1 mL (90 mg total) into the skin every 8 weeks.     Family History    None       Tobacco Use    Smoking status: Never    Smokeless tobacco: Never   Substance and Sexual Activity    Alcohol use: No    Drug use: No    Sexual activity: Never     Partners: Female     Review of Systems   Constitutional:  Positive for activity change. Negative for fever.   Respiratory:  Negative for shortness of breath.    Cardiovascular:  Negative for chest pain.   Gastrointestinal:  Positive for abdominal pain.   Neurological:  Positive for tremors and speech difficulty.   Psychiatric/Behavioral:  Positive for behavioral problems, confusion and decreased concentration.      Objective:     Vital Signs (Most Recent):  Temp: 98.1 °F (36.7 °C) (05/04/25 1914)  Pulse: 90 (05/04/25 1914)  Resp: 18 (05/04/25 1914)  BP: (!) 155/81 (05/04/25 1914)  SpO2: 98 % (05/04/25 1914) Vital Signs (24h Range):  Temp:  [97.8 °F (36.6 °C)-98.6 °F (37 °C)] 98.1 °F (36.7 °C)  Pulse:  [] 90  Resp:  [16-18] 18  SpO2:  [95 %-98 %] 98 %  BP: (139-155)/(75-85) 155/81     Weight: 63 kg (138 lb 14.2 oz)  Body mass index is 19.37 kg/m².     Physical Exam  Constitutional:       Comments: The lens of his glasses for the left eye is missing. He appears confused and responds minimally to questions.    Eyes:      Pupils: Pupils are equal, round, and reactive to light.      Comments: Ptosis of left eye, unclear if at his baseline.   Pupils reactive to light, no discrepancy from left to right to suggest mehran's.  No dilation of  impaired extraocular movements to suggest third nerve palsy.    Pulmonary:      Effort: Pulmonary effort is normal.   Neurological:      Mental Status: He is alert. He is disoriented.      Deep Tendon Reflexes:      Reflex Scores:       Brachioradialis reflexes are 2+ on the right side and 2+ on the left side.       Patellar reflexes are 2+ on the right side and 2+ on the left side.         NEUROLOGICAL EXAMINATION:     MENTAL STATUS   Oriented to person.   Disoriented to place.   Disoriented to time.   Attention: decreased. Concentration: decreased.   Level of consciousness: alert    CRANIAL NERVES     CN III, IV, VI   Pupils are equal, round, and reactive to light.       Mild ptosis of left eye without pupillary abnormalities     MOTOR EXAM   Overall muscle tone: increased  Right arm tone: increased  Left arm tone: increased       Asterixis with tremor most notably in the left arm     REFLEXES     Reflexes   Right brachioradialis: 2+  Left brachioradialis: 2+  Right patellar: 2+  Left patellar: 2+    SENSORY EXAM   Light touch normal.     GAIT AND COORDINATION     Tremor   Intention tremor: present      Significant Labs: CBC:   Recent Labs   Lab 05/03/25  0524 05/04/25  0328   WBC 11.11 11.86   HGB 13.2* 12.4*   HCT 39.8* 37.1*    261     CMP:   Recent Labs   Lab 05/03/25  0524 05/04/25  0328   * 90   * 132*   K 4.1 4.2   CL 96 96   CO2 27 26   BUN 19 25*   CREATININE 1.3 1.2   CALCIUM 9.5 9.7   MG 1.8 1.9   PROT 7.2 7.1   ALBUMIN 3.3* 3.2*   BILITOT 0.8 0.5   ALKPHOS 83 79   AST 26 20   ALT 38 9*   ANIONGAP 8 10       Significant Imaging: I have reviewed all pertinent imaging results/findings within the past 24 hours.  Assessment and Plan:     * Encephalopathy  Vince Huffman is a 79 year old male with history of chron's disease, SBO, hypothyroidism, and CAD  presenting from [facility] with encephalopathy. Exam notable for disorientation to place, time, and situation. He is minimally  responsive and will answer with one word. There is asterixis in his upper extremities and intention tremor most notably in the left hand. No hyperreflexia. Ptosis of left eye though no extraocular movement abnormalities/pupillary discrepancy to suggest third nerve palsy and mehran's respectively.     5/4: Repeat MRI also affected by motion artifact though no findings of acute stroke. LP performed by hospital medicine unsuccessful. Ammonia 44. UDS/UA unremarkable. Patient improved after sinemet trial; we will gradually increase the dose.     Recommendations:  - Increase carbidopa-levodopa 1 tablet 25 -100 mg TID to 1.5 tablets TID  - Ordered labs including: vitamin B1, B12 (456), folate (16.5), ck (17), treponema (-), vitamin E, zinc, copper, celiac panel   - Will consider LP tomorrow depending on response to sinemet dose increase   - Pending EEG monitoring  - Delirium and fall precautions   - Neurology will follow up        VTE Risk Mitigation (From admission, onward)           Ordered     enoxaparin injection 40 mg  Daily         05/01/25 0827     IP VTE HIGH RISK PATIENT  Once         05/01/25 0827     Place sequential compression device  Until discontinued         05/01/25 0827                    Elian López MD  Neurology  [facility]

## 2025-05-05 NOTE — ASSESSMENT & PLAN NOTE
Vince Huffman is a 79 year old male with history of chron's disease, SBO, hypothyroidism, and CAD  presenting from [facility] with encephalopathy. Exam notable for disorientation to place, time, and situation. He is minimally responsive and will answer with one word. There is asterixis in his upper extremities and intention tremor most notably in the left hand. No hyperreflexia. Ptosis of left eye though no extraocular movement abnormalities/pupillary discrepancy to suggest third nerve palsy and mehran's respectively.     5/5: Repeat MRI also affected by motion artifact though no findings of acute stroke. LP performed by hospital medicine unsuccessful. Ammonia 44. UDS/UA unremarkable. Patient improved after sinemet trial; we will gradually increase the dose. Increase dose to 2 tablets tonight. Will attempt LP.     Recommendations:  - Increase carbidopa-levodopa 1.5 tablet 25 -100 mg TID to 2 tablets TID  - Ordered labs including: vitamin B1, B12 (456), folate (16.5), ck (17), treponema (-), vitamin E, zinc, copper, celiac panel   - Plan for LP tonight; consent obtained from daughter over phone   - Pending EEG monitoring  - Delirium and fall precautions   - Neurology will follow up

## 2025-05-05 NOTE — PLAN OF CARE
"Patient ate a small portion of his dinner tray and 50% of his boost drink.  Patient asked if he is in pain or discomfort, he responded, "No" Turned to sides and weight shifted.   Bed locked and lowered. Side rails x 3. Bed alarm and fall camera in place.    AO x 1.      Problem: Adult Inpatient Plan of Care  Goal: Plan of Care Review  Outcome: Progressing  Goal: Patient-Specific Goal (Individualized)  Outcome: Progressing  Goal: Absence of Hospital-Acquired Illness or Injury  Outcome: Progressing  Goal: Optimal Comfort and Wellbeing  Outcome: Progressing  Goal: Readiness for Transition of Care  Outcome: Progressing     Problem: Skin Injury Risk Increased  Goal: Skin Health and Integrity  Outcome: Progressing     "

## 2025-05-05 NOTE — PROGRESS NOTES
"[facility]  Neurology  Progress Note    Patient Name: Vince Huffman  MRN: [id number]  Admission Date: 4/30/2025  Hospital Length of Stay: 4 days  Code Status: Full Code   Attending Provider: Genaro Pedraza MD  Primary Care Physician: Leland Porras MD   Principal Problem:Encephalopathy    HPI:   Vince Huffman is a 79 year old male with history of chron's disease, SBO, hypothyroidism, and CAD  presenting from [facility] with encephalopathy. He initially presented to [facility] for impaired mobility and difficulty with ADL's 2/2 generalized weakness. He was reportedly found confused today A&O x 1 (said the year is "1895") but is normally A&O x4. At this time he also expressed generalized abdominal pain. Recent ultrasounds of the lower extremity did not reveal DVT. MRI from last week was non diagnostic. UA unremarkable and CXR normal. CT abdomen and pelvis reveals wall thickening of the distal ileum consistent with possible infectious ileitis. Neurology consulted for further evaluation of encephalopathy.     Overview/Hospital Course:  No notes on file      Past Medical History:   Diagnosis Date    Ankylosing spondylitis of unspecified sites in spine     Anxiety disorder, unspecified     BMI 21.0-21.9, adult 04/14/2025    Crohn's disease     Gout, unspecified     Heart disease     History of skin cancer 2001    squamous cell carcinoma left cheek    Hypertension     Hypothyroidism, unspecified     Psoriatic arthritis        Past Surgical History:   Procedure Laterality Date    ANGIOGRAM, CORONARY, WITH LEFT HEART CATHETERIZATION      APPENDECTOMY      APPENDECTOMY  2007    COLONOSCOPY N/A     ENDOSCOPY N/A     RIGHT HEMICOLECTOMY  2013    SMALL INTESTINE SURGERY  2007    30.5 cm of small bowel removed       Review of patient's allergies indicates:   Allergen Reactions    Gluten Diarrhea    Lactose        Current Neurological Medications: see below    No current " facility-administered medications on file prior to encounter.     Current Outpatient Medications on File Prior to Encounter   Medication Sig    acetaminophen (TYLENOL) 500 MG tablet Take 500 mg by mouth every 6 (six) hours as needed.    ALPRAZolam (XANAX XR) 0.5 MG Tb24 Take 0.5 mg by mouth once daily.    amLODIPine (NORVASC) 10 MG tablet Take 1 tablet (10 mg total) by mouth once daily.    ustekinumab (STELARA) 90 mg/mL Syrg syringe Inject 1 mL (90 mg total) into the skin every 8 weeks.     Family History    None       Tobacco Use    Smoking status: Never    Smokeless tobacco: Never   Substance and Sexual Activity    Alcohol use: No    Drug use: No    Sexual activity: Never     Partners: Female     Review of Systems   Constitutional:  Positive for activity change. Negative for fever.   Respiratory:  Negative for shortness of breath.    Cardiovascular:  Negative for chest pain.   Gastrointestinal:  Positive for abdominal pain.   Neurological:  Positive for tremors and speech difficulty.   Psychiatric/Behavioral:  Positive for behavioral problems, confusion and decreased concentration.      Objective:     Vital Signs (Most Recent):  Temp: 98 °F (36.7 °C) (05/05/25 1536)  Pulse: 93 (05/05/25 1536)  Resp: 18 (05/05/25 1536)  BP: (!) 148/80 (05/05/25 1536)  SpO2: 99 % (05/05/25 1536) Vital Signs (24h Range):  Temp:  [97.4 °F (36.3 °C)-98.1 °F (36.7 °C)] 98 °F (36.7 °C)  Pulse:  [] 93  Resp:  [16-18] 18  SpO2:  [96 %-99 %] 99 %  BP: (148-161)/(80-89) 148/80     Weight: 63 kg (138 lb 14.2 oz)  Body mass index is 19.37 kg/m².     Physical Exam  Constitutional:       Comments: The lens of his glasses for the left eye is missing. He appears confused and responds minimally to questions.    Eyes:      Pupils: Pupils are equal, round, and reactive to light.      Comments: Ptosis of left eye, unclear if at his baseline.   Pupils reactive to light, no discrepancy from left to right to suggest mehran's.  No dilation of  impaired extraocular movements to suggest third nerve palsy.    Pulmonary:      Effort: Pulmonary effort is normal.   Neurological:      Mental Status: He is alert. He is disoriented.      Deep Tendon Reflexes:      Reflex Scores:       Brachioradialis reflexes are 2+ on the right side and 2+ on the left side.       Patellar reflexes are 2+ on the right side and 2+ on the left side.         NEUROLOGICAL EXAMINATION:     MENTAL STATUS   Oriented to person.   Disoriented to place.   Disoriented to time.   Attention: decreased. Concentration: decreased.   Level of consciousness: alert    CRANIAL NERVES     CN III, IV, VI   Pupils are equal, round, and reactive to light.       Mild ptosis of left eye without pupillary abnormalities     MOTOR EXAM   Overall muscle tone: increased  Right arm tone: increased  Left arm tone: increased       Asterixis with tremor most notably in the left arm     REFLEXES     Reflexes   Right brachioradialis: 2+  Left brachioradialis: 2+  Right patellar: 2+  Left patellar: 2+    SENSORY EXAM   Light touch normal.     GAIT AND COORDINATION     Tremor   Intention tremor: present      Significant Labs: CBC:   Recent Labs   Lab 05/04/25  0328   WBC 11.86   HGB 12.4*   HCT 37.1*        CMP:   Recent Labs   Lab 05/04/25  0328   GLU 90   *   K 4.2   CL 96   CO2 26   BUN 25*   CREATININE 1.2   CALCIUM 9.7   MG 1.9   PROT 7.1   ALBUMIN 3.2*   BILITOT 0.5   ALKPHOS 79   AST 20   ALT 9*   ANIONGAP 10       Significant Imaging: I have reviewed all pertinent imaging results/findings within the past 24 hours.  Assessment and Plan:     * Encephalopathy  Vince Huffman is a 79 year old male with history of chron's disease, SBO, hypothyroidism, and CAD  presenting from [facility] with encephalopathy. Exam notable for disorientation to place, time, and situation. He is minimally responsive and will answer with one word. There is asterixis in his upper extremities and intention tremor most  notably in the left hand. No hyperreflexia. Ptosis of left eye though no extraocular movement abnormalities/pupillary discrepancy to suggest third nerve palsy and mehran's respectively.     5/5: Repeat MRI also affected by motion artifact though no findings of acute stroke. LP performed by hospital medicine unsuccessful. Ammonia 44. UDS/UA unremarkable. Patient improved after sinemet trial; we will gradually increase the dose. Increase dose to 2 tablets tonight. Will attempt LP.     Recommendations:  - Increase carbidopa-levodopa 1.5 tablet 25 -100 mg TID to 2 tablets TID  - Ordered labs including: vitamin B1, B12 (456), folate (16.5), ck (17), treponema (-), vitamin E, zinc, copper, celiac panel   - Plan for LP tonight; consent obtained from daughter over phone   - Pending EEG monitoring  - Delirium and fall precautions   - Neurology will follow up        VTE Risk Mitigation (From admission, onward)           Ordered     enoxaparin injection 40 mg  Daily         05/01/25 0827     IP VTE HIGH RISK PATIENT  Once         05/01/25 0827     Place sequential compression device  Until discontinued         05/01/25 0827                    Elian López MD  Neurology  [facility]

## 2025-05-05 NOTE — SUBJECTIVE & OBJECTIVE
Past Medical History:   Diagnosis Date    Ankylosing spondylitis of unspecified sites in spine     Anxiety disorder, unspecified     BMI 21.0-21.9, adult 04/14/2025    Crohn's disease     Gout, unspecified     Heart disease     History of skin cancer 2001    squamous cell carcinoma left cheek    Hypertension     Hypothyroidism, unspecified     Psoriatic arthritis        Past Surgical History:   Procedure Laterality Date    ANGIOGRAM, CORONARY, WITH LEFT HEART CATHETERIZATION      APPENDECTOMY      APPENDECTOMY  2007    COLONOSCOPY N/A     ENDOSCOPY N/A     RIGHT HEMICOLECTOMY  2013    SMALL INTESTINE SURGERY  2007    30.5 cm of small bowel removed       Review of patient's allergies indicates:   Allergen Reactions    Gluten Diarrhea    Lactose        Current Neurological Medications: see below    No current facility-administered medications on file prior to encounter.     Current Outpatient Medications on File Prior to Encounter   Medication Sig    acetaminophen (TYLENOL) 500 MG tablet Take 500 mg by mouth every 6 (six) hours as needed.    ALPRAZolam (XANAX XR) 0.5 MG Tb24 Take 0.5 mg by mouth once daily.    amLODIPine (NORVASC) 10 MG tablet Take 1 tablet (10 mg total) by mouth once daily.    ustekinumab (STELARA) 90 mg/mL Syrg syringe Inject 1 mL (90 mg total) into the skin every 8 weeks.     Family History    None       Tobacco Use    Smoking status: Never    Smokeless tobacco: Never   Substance and Sexual Activity    Alcohol use: No    Drug use: No    Sexual activity: Never     Partners: Female     Review of Systems   Constitutional:  Positive for activity change. Negative for fever.   Respiratory:  Negative for shortness of breath.    Cardiovascular:  Negative for chest pain.   Gastrointestinal:  Positive for abdominal pain.   Neurological:  Positive for tremors and speech difficulty.   Psychiatric/Behavioral:  Positive for behavioral problems, confusion and decreased concentration.      Objective:     Vital  Signs (Most Recent):  Temp: 98 °F (36.7 °C) (05/05/25 1536)  Pulse: 93 (05/05/25 1536)  Resp: 18 (05/05/25 1536)  BP: (!) 148/80 (05/05/25 1536)  SpO2: 99 % (05/05/25 1536) Vital Signs (24h Range):  Temp:  [97.4 °F (36.3 °C)-98.1 °F (36.7 °C)] 98 °F (36.7 °C)  Pulse:  [] 93  Resp:  [16-18] 18  SpO2:  [96 %-99 %] 99 %  BP: (148-161)/(80-89) 148/80     Weight: 63 kg (138 lb 14.2 oz)  Body mass index is 19.37 kg/m².     Physical Exam  Constitutional:       Comments: The lens of his glasses for the left eye is missing. He appears confused and responds minimally to questions.    Eyes:      Pupils: Pupils are equal, round, and reactive to light.      Comments: Ptosis of left eye, unclear if at his baseline.   Pupils reactive to light, no discrepancy from left to right to suggest mehran's.  No dilation of impaired extraocular movements to suggest third nerve palsy.    Pulmonary:      Effort: Pulmonary effort is normal.   Neurological:      Mental Status: He is alert. He is disoriented.      Deep Tendon Reflexes:      Reflex Scores:       Brachioradialis reflexes are 2+ on the right side and 2+ on the left side.       Patellar reflexes are 2+ on the right side and 2+ on the left side.         NEUROLOGICAL EXAMINATION:     MENTAL STATUS   Oriented to person.   Disoriented to place.   Disoriented to time.   Attention: decreased. Concentration: decreased.   Level of consciousness: alert    CRANIAL NERVES     CN III, IV, VI   Pupils are equal, round, and reactive to light.       Mild ptosis of left eye without pupillary abnormalities     MOTOR EXAM   Overall muscle tone: increased  Right arm tone: increased  Left arm tone: increased       Asterixis with tremor most notably in the left arm     REFLEXES     Reflexes   Right brachioradialis: 2+  Left brachioradialis: 2+  Right patellar: 2+  Left patellar: 2+    SENSORY EXAM   Light touch normal.     GAIT AND COORDINATION     Tremor   Intention tremor:  present      Significant Labs: CBC:   Recent Labs   Lab 05/04/25  0328   WBC 11.86   HGB 12.4*   HCT 37.1*        CMP:   Recent Labs   Lab 05/04/25  0328   GLU 90   *   K 4.2   CL 96   CO2 26   BUN 25*   CREATININE 1.2   CALCIUM 9.7   MG 1.9   PROT 7.1   ALBUMIN 3.2*   BILITOT 0.5   ALKPHOS 79   AST 20   ALT 9*   ANIONGAP 10       Significant Imaging: I have reviewed all pertinent imaging results/findings within the past 24 hours.

## 2025-05-05 NOTE — ASSESSMENT & PLAN NOTE
79 y.o.M with progressive acutely worsening confusion/tremors since 4/20. Previously AAOx4, now AAOx1  - neurology consulted, appreciate recommendations:  - MRI brain from last week was essentially non diagnostic  - Repeat MRI again without obvious new pathology   -working diagnosis is Parkinson's disease, as improving with Sinemet: increase dose to two tabs TID  -LP and EEG pending   - Delirium and fall precautions along with neuro checks  - PT/OT consult placed; return to [facility] upon discharge

## 2025-05-05 NOTE — ASSESSMENT & PLAN NOTE
Vince Huffman is a 79 year old male with history of chron's disease, SBO, hypothyroidism, and CAD  presenting from [facility] with encephalopathy. Exam notable for disorientation to place, time, and situation. He is minimally responsive and will answer with one word. There is asterixis in his upper extremities and intention tremor most notably in the left hand. No hyperreflexia. Ptosis of left eye though no extraocular movement abnormalities/pupillary discrepancy to suggest third nerve palsy and mehran's respectively.     5/4: Repeat MRI also affected by motion artifact though no findings of acute stroke. LP performed by hospital medicine unsuccessful. Ammonia 44. UDS/UA unremarkable. Patient improved after sinemet trial; we will gradually increase the dose.     Recommendations:  - Increase carbidopa-levodopa 1 tablet 25 -100 mg TID to 1.5 tablets TID  - Ordered labs including: vitamin B1, B12 (456), folate (16.5), ck (17), treponema (-), vitamin E, zinc, copper, celiac panel   - Will consider LP tomorrow depending on response to sinemet dose increase   - Pending EEG monitoring  - Delirium and fall precautions   - Neurology will follow up

## 2025-05-06 PROBLEM — R00.0 TACHYCARDIA: Status: RESOLVED | Noted: 2025-05-01 | Resolved: 2025-05-06

## 2025-05-06 LAB
ANION GAP (OHS): 12 MMOL/L (ref 8–16)
BUN SERPL-MCNC: 27 MG/DL (ref 8–23)
C GATTII+NEOFOR DNA CSF QL NAA+NON-PROBE: NOT DETECTED
CALCIUM SERPL-MCNC: 10.1 MG/DL (ref 8.7–10.5)
CHLORIDE SERPL-SCNC: 94 MMOL/L (ref 95–110)
CMV DNA CSF QL NAA+NON-PROBE: NOT DETECTED
CO2 SERPL-SCNC: 26 MMOL/L (ref 23–29)
CREAT SERPL-MCNC: 1.3 MG/DL (ref 0.5–1.4)
E COLI K1 DNA CSF QL NAA+NON-PROBE: NOT DETECTED
EV RNA CSF QL NAA+NON-PROBE: NOT DETECTED
GFR SERPLBLD CREATININE-BSD FMLA CKD-EPI: 56 ML/MIN/1.73/M2
GLUCOSE SERPL-MCNC: 108 MG/DL (ref 70–110)
GP B STREP DNA CSF QL NAA+NON-PROBE: NOT DETECTED
HAEM INFLU DNA CSF QL NAA+NON-PROBE: NOT DETECTED
HHV6 DNA CSF QL NAA+NON-PROBE: NOT DETECTED
HSV1 DNA CSF QL NAA+NON-PROBE: NOT DETECTED
HSV2 DNA CSF QL NAA+NON-PROBE: NOT DETECTED
L MONOCYTOG DNA CSF QL NAA+NON-PROBE: NOT DETECTED
N MEN DNA CSF QL NAA+NON-PROBE: NOT DETECTED
PARECHOVIRUS A RNA CSF QL NAA+NON-PROBE: NOT DETECTED
POCT GLUCOSE: 104 MG/DL (ref 70–110)
POTASSIUM SERPL-SCNC: 4.6 MMOL/L (ref 3.5–5.1)
S PNEUM DNA CSF QL NAA+NON-PROBE: NOT DETECTED
SODIUM SERPL-SCNC: 132 MMOL/L (ref 136–145)
VZV DNA CSF QL NAA+NON-PROBE: NOT DETECTED

## 2025-05-06 PROCEDURE — 63600175 PHARM REV CODE 636 W HCPCS

## 2025-05-06 PROCEDURE — 99232 SBSQ HOSP IP/OBS MODERATE 35: CPT | Mod: ,,, | Performed by: PSYCHIATRY & NEUROLOGY

## 2025-05-06 PROCEDURE — 97535 SELF CARE MNGMENT TRAINING: CPT

## 2025-05-06 PROCEDURE — 36415 COLL VENOUS BLD VENIPUNCTURE: CPT | Performed by: INTERNAL MEDICINE

## 2025-05-06 PROCEDURE — 97112 NEUROMUSCULAR REEDUCATION: CPT

## 2025-05-06 PROCEDURE — 25000003 PHARM REV CODE 250

## 2025-05-06 PROCEDURE — 63600175 PHARM REV CODE 636 W HCPCS: Performed by: INTERNAL MEDICINE

## 2025-05-06 PROCEDURE — 25000003 PHARM REV CODE 250: Performed by: INTERNAL MEDICINE

## 2025-05-06 PROCEDURE — 11000001 HC ACUTE MED/SURG PRIVATE ROOM

## 2025-05-06 PROCEDURE — 82435 ASSAY OF BLOOD CHLORIDE: CPT | Performed by: INTERNAL MEDICINE

## 2025-05-06 PROCEDURE — 97530 THERAPEUTIC ACTIVITIES: CPT

## 2025-05-06 PROCEDURE — 97116 GAIT TRAINING THERAPY: CPT

## 2025-05-06 RX ORDER — CEFTRIAXONE 2 G/1
2 INJECTION, POWDER, FOR SOLUTION INTRAMUSCULAR; INTRAVENOUS
Status: DISCONTINUED | OUTPATIENT
Start: 2025-05-06 | End: 2025-05-08

## 2025-05-06 RX ADMIN — AMPICILLIN 2 G: 2 INJECTION, POWDER, FOR SOLUTION INTRAMUSCULAR; INTRAVENOUS at 10:05

## 2025-05-06 RX ADMIN — ACYCLOVIR SODIUM 640 MG: 50 INJECTION, SOLUTION INTRAVENOUS at 10:05

## 2025-05-06 RX ADMIN — CARBIDOPA AND LEVODOPA 2 TABLET: 25; 100 TABLET ORAL at 09:05

## 2025-05-06 RX ADMIN — AMPICILLIN 2 G: 2 INJECTION, POWDER, FOR SOLUTION INTRAMUSCULAR; INTRAVENOUS at 06:05

## 2025-05-06 RX ADMIN — CARBIDOPA AND LEVODOPA 2 TABLET: 25; 100 TABLET ORAL at 04:05

## 2025-05-06 RX ADMIN — AMLODIPINE BESYLATE 10 MG: 10 TABLET ORAL at 09:05

## 2025-05-06 RX ADMIN — ENOXAPARIN SODIUM 40 MG: 40 INJECTION SUBCUTANEOUS at 04:05

## 2025-05-06 RX ADMIN — AMPICILLIN 2 G: 2 INJECTION, POWDER, FOR SOLUTION INTRAMUSCULAR; INTRAVENOUS at 02:05

## 2025-05-06 RX ADMIN — CEFTRIAXONE 2 G: 2 INJECTION, POWDER, FOR SOLUTION INTRAMUSCULAR; INTRAVENOUS at 11:05

## 2025-05-06 RX ADMIN — ACYCLOVIR SODIUM 640 MG: 50 INJECTION, SOLUTION INTRAVENOUS at 11:05

## 2025-05-06 RX ADMIN — POLYETHYLENE GLYCOL 3350 17 G: 17 POWDER, FOR SOLUTION ORAL at 09:05

## 2025-05-06 RX ADMIN — VANCOMYCIN HYDROCHLORIDE 1500 MG: 1.5 INJECTION, POWDER, LYOPHILIZED, FOR SOLUTION INTRAVENOUS at 05:05

## 2025-05-06 NOTE — PROGRESS NOTES
Pharmacokinetic Initial Assessment: IV Vancomycin    Assessment/Plan:    Initiate intravenous vancomycin with loading dose of 1500 mg once with subsequent doses when random concentrations are less than 20 mcg/mL.  - Mr. Huffman's Scr is 1.3 and appears slightly above his baseline of ~1.0.   - Will pulse dose for now to better assess his clearance.   - Desired empiric serum trough concentration is 15 to 20 mcg/mL.  - Draw vancomycin random level on 5/7 at 1700.    Pharmacy will continue to follow and monitor vancomycin.      Please contact pharmacy at extension [phone] with any questions regarding this assessment.     Thank you for the consult,   Elena Sotelo       Patient brief summary:  Vince Huffman is a 79 y.o. male initiated on antimicrobial therapy with IV Vancomycin for treatment of suspected meningitis    Actual Body Weight:   64.1 kg    Renal Function:   Estimated Creatinine Clearance: 42.5 mL/min (based on SCr of 1.3 mg/dL).    Dialysis Method (if applicable):  N/A

## 2025-05-06 NOTE — ASSESSMENT & PLAN NOTE
"- Per CT scan "thickening of the distal ileum to the surgical anastomosis with the large bowel. Inflammatory infectious ileitis are considerations. Recommend clinical correlation and follow-up. "  -CRP negative. No concern for flare.   -GI without concerns of confusion being caused by sterlara, confirmed with pharmacy     "

## 2025-05-06 NOTE — PT/OT/SLP PROGRESS
Physical Therapy Treatment    Patient Name:  Vince Huffman   MRN:  [id number]    Recommendations:     Discharge Recommendations: High Intensity Therapy  Discharge Equipment Recommendations: bedside commode, walker, rolling  Barriers to discharge: None    Assessment:     Vince Huffman is a 79 y.o. male admitted with a medical diagnosis of Encephalopathy.  He presents with the following impairments/functional limitations: weakness, impaired endurance, impaired self care skills, impaired functional mobility, gait instability, impaired balance, impaired cognition, decreased safety awareness, decreased coordination Pt. cooperative and tolerated treatment well. Pt. progressing with mobility with HHA-Max A x2 for amb. Pt. with improved mentation and will change recommendation to high intensity.    Rehab Prognosis: Good; patient would benefit from acute skilled PT services to address these deficits and reach maximum level of function.    Recent Surgery: * No surgery found *      Plan:     During this hospitalization, patient to be seen 4 x/week to address the identified rehab impairments via gait training, therapeutic activities, therapeutic exercises, neuromuscular re-education and progress toward the following goals:    Plan of Care Expires:  06/02/25    Subjective     Chief Complaint: no new c/o  Patient/Family Comments/goals: pt. Agreeable to PT  Pain/Comfort:  Pain Rating 1: 0/10  Pain Rating Post-Intervention 1: 0/10      Objective:     Communicated with nursing prior to session.  Patient found supine with peripheral IV, Condom Catheter, telemetry upon PT entry to room.     General Precautions: Standard, fall  Orthopedic Precautions: N/A  Braces: N/A  Respiratory Status: Room air     Functional Mobility:  Bed Mobility:     Rolling Right: minimum assistance  Scooting: minimum assistance  Supine to Sit: minimum assistance  Transfers:     Sit to Stand:  moderate assistance and of 2 persons with hand-held assist  Bed to  Chair: moderate assistance and of 2 persons with  hand-held assist  using  Stand Pivot  Gait: 12 steps with HHA-Mod A x2 for balance/guidance/weight shifting/safety. Pt. amb. with decreased step length/trudi and narrow JOHN.  Balance: fair sitting and poor standing      AM-PAC 6 CLICK MOBILITY  Turning over in bed (including adjusting bedclothes, sheets and blankets)?: 3  Sitting down on and standing up from a chair with arms (e.g., wheelchair, bedside commode, etc.): 2  Moving from lying on back to sitting on the side of the bed?: 3  Moving to and from a bed to a chair (including a wheelchair)?: 2  Need to walk in hospital room?: 2  Climbing 3-5 steps with a railing?: 2  Basic Mobility Total Score: 14       Treatment & Education:  Discussed pt.'s progress, goals, and POC. Completed ADLs with OT seated in bedside chair. Pt. performed sitting/standing balance/tolerance along EOB.    Patient left up in chair with all lines intact, call button in reach, and safety camera in room..    GOALS:   Multidisciplinary Problems       Physical Therapy Goals          Problem: Physical Therapy    Goal Priority Disciplines Outcome Interventions   Physical Therapy Goal     PT, PT/OT Progressing    Description: Goals to be met by: 2025     Patient will increase functional independence with mobility by performin. Supine to sit with MInimal Assistance  2. Sit to supine with MInimal Assistance  3. Sit to stand transfer with Minimal Assistance  4. Bed to chair transfer with Minimal Assistance using LRAD  5. Gait  x 25 feet with Minimal Assistance using LRAD.   6. Lower extremity exercise program x15 reps per handout, with assistance as needed                         DME Justifications:   Vince requires a commode for home use because he is confined to a single room.   Vince's mobility limitation cannot be sufficiently resolved by the use of a cane. His functional mobility deficit can be sufficiently resolved with the use of a  Rolling Walker. Patient's mobility limitation significantly impairs their ability to participate in one of more activities of daily living.  The use of a RW will significantly improve the patient's ability to participate in MRADLS and the patient will use it on regular basis in the home.    Time Tracking:     PT Received On: 05/06/25  PT Start Time: 0847     PT Stop Time: 0916  PT Total Time (min): 29 min     Billable Minutes: Gait Training 15 and Neuromuscular Re-education 14    Treatment Type: Treatment  PT/PTA: PT     Number of PTA visits since last PT visit: 0     05/06/2025

## 2025-05-06 NOTE — SUBJECTIVE & OBJECTIVE
Past Medical History:   Diagnosis Date    Ankylosing spondylitis of unspecified sites in spine     Anxiety disorder, unspecified     BMI 21.0-21.9, adult 04/14/2025    Crohn's disease     Gout, unspecified     Heart disease     History of skin cancer 2001    squamous cell carcinoma left cheek    Hypertension     Hypothyroidism, unspecified     Psoriatic arthritis        Past Surgical History:   Procedure Laterality Date    ANGIOGRAM, CORONARY, WITH LEFT HEART CATHETERIZATION      APPENDECTOMY      APPENDECTOMY  2007    COLONOSCOPY N/A     ENDOSCOPY N/A     RIGHT HEMICOLECTOMY  2013    SMALL INTESTINE SURGERY  2007    30.5 cm of small bowel removed       Review of patient's allergies indicates:   Allergen Reactions    Gluten Diarrhea    Lactose        Current Neurological Medications: see below    No current facility-administered medications on file prior to encounter.     Current Outpatient Medications on File Prior to Encounter   Medication Sig    acetaminophen (TYLENOL) 500 MG tablet Take 500 mg by mouth every 6 (six) hours as needed.    ALPRAZolam (XANAX XR) 0.5 MG Tb24 Take 0.5 mg by mouth once daily.    amLODIPine (NORVASC) 10 MG tablet Take 1 tablet (10 mg total) by mouth once daily.    ustekinumab (STELARA) 90 mg/mL Syrg syringe Inject 1 mL (90 mg total) into the skin every 8 weeks.     Family History    None       Tobacco Use    Smoking status: Never    Smokeless tobacco: Never   Substance and Sexual Activity    Alcohol use: No    Drug use: No    Sexual activity: Never     Partners: Female     Review of Systems   Constitutional:  Positive for activity change. Negative for fever.   Respiratory:  Negative for shortness of breath.    Cardiovascular:  Negative for chest pain.   Gastrointestinal:  Positive for abdominal pain.   Neurological:  Positive for tremors and speech difficulty.   Psychiatric/Behavioral:  Positive for behavioral problems, confusion and decreased concentration.      Objective:     Vital  Signs (Most Recent):  Temp: 97.9 °F (36.6 °C) (05/06/25 1618)  Pulse: 109 (05/06/25 1618)  Resp: 18 (05/06/25 1618)  BP: 134/83 (05/06/25 1618)  SpO2: 98 % (05/06/25 1618) Vital Signs (24h Range):  Temp:  [97.6 °F (36.4 °C)-98.3 °F (36.8 °C)] 97.9 °F (36.6 °C)  Pulse:  [] 109  Resp:  [16-18] 18  SpO2:  [96 %-99 %] 98 %  BP: (120-149)/(70-83) 134/83     Weight: 64.1 kg (141 lb 5 oz)  Body mass index is 19.71 kg/m².     Physical Exam  Constitutional:       Comments: The lens of his glasses for the left eye is missing. He appears confused and responds minimally to questions.    Eyes:      Pupils: Pupils are equal, round, and reactive to light.      Comments: Ptosis of left eye, unclear if at his baseline.   Pupils reactive to light, no discrepancy from left to right to suggest mehran's.  No dilation of impaired extraocular movements to suggest third nerve palsy.    Pulmonary:      Effort: Pulmonary effort is normal.   Neurological:      Mental Status: He is alert. He is disoriented.      Deep Tendon Reflexes:      Reflex Scores:       Brachioradialis reflexes are 2+ on the right side and 2+ on the left side.       Patellar reflexes are 2+ on the right side and 2+ on the left side.         NEUROLOGICAL EXAMINATION:     MENTAL STATUS   Oriented to person.   Disoriented to place.   Disoriented to time.   Attention: decreased. Concentration: decreased.   Level of consciousness: alert    CRANIAL NERVES     CN III, IV, VI   Pupils are equal, round, and reactive to light.       Mild ptosis of left eye without pupillary abnormalities     MOTOR EXAM   Overall muscle tone: increased  Right arm tone: increased  Left arm tone: increased       Asterixis with tremor most notably in the left arm     REFLEXES     Reflexes   Right brachioradialis: 2+  Left brachioradialis: 2+  Right patellar: 2+  Left patellar: 2+    SENSORY EXAM   Light touch normal.     GAIT AND COORDINATION     Tremor   Intention tremor:  "present      Significant Labs: CBC:   No results for input(s): "WBC", "HGB", "HCT", "PLT" in the last 48 hours.    CMP:   Recent Labs   Lab 05/06/25  1423      *   K 4.6   CL 94*   CO2 26   BUN 27*   CREATININE 1.3   CALCIUM 10.1   ANIONGAP 12       Significant Imaging: I have reviewed all pertinent imaging results/findings within the past 24 hours.  "

## 2025-05-06 NOTE — ASSESSMENT & PLAN NOTE
Patient's blood pressure range in the last 24 hours was: BP  Min: 120/70  Max: 151/88.The patient's inpatient anti-hypertensive regimen is listed below:  Current Antihypertensives  amLODIPine tablet 10 mg, Daily, Oral    Plan  - BP is controlled, no changes needed to their regimen

## 2025-05-06 NOTE — ASSESSMENT & PLAN NOTE
Patient's blood pressure range in the last 24 hours was: BP  Min: 120/70  Max: 161/84.The patient's inpatient anti-hypertensive regimen is listed below:  Current Antihypertensives  amLODIPine tablet 10 mg, Daily, Oral    Plan  - BP is controlled, no changes needed to their regimen

## 2025-05-06 NOTE — PLAN OF CARE
Problem: Adult Inpatient Plan of Care  Goal: Plan of Care Review  Outcome: Progressing  Flowsheets (Taken 5/6/2025 0519)  Plan of Care Reviewed With: patient  Goal: Patient-Specific Goal (Individualized)  Outcome: Progressing  Goal: Absence of Hospital-Acquired Illness or Injury  Outcome: Progressing  Intervention: Prevent Skin Injury  Flowsheets (Taken 5/6/2025 0519)  Skin Protection: incontinence pads utilized  Device Skin Pressure Protection: absorbent pad utilized/changed  Intervention: Prevent Infection  Flowsheets (Taken 5/6/2025 0519)  Infection Prevention: hand hygiene promoted  Goal: Optimal Comfort and Wellbeing  Outcome: Progressing  Intervention: Monitor Pain and Promote Comfort  Flowsheets (Taken 5/6/2025 0519)  Pain Management Interventions:   care clustered   pillow support provided   quiet environment facilitated   position adjusted  Goal: Readiness for Transition of Care  Outcome: Progressing     Problem: Skin Injury Risk Increased  Goal: Skin Health and Integrity  Outcome: Progressing  Intervention: Optimize Skin Protection  Flowsheets (Taken 5/6/2025 0519)  Skin Protection: incontinence pads utilized  Intervention: Promote and Optimize Oral Intake  Flowsheets (Taken 5/6/2025 0519)  Oral Nutrition Promotion: rest periods promoted  Nutrition Interventions: meal set-up provided

## 2025-05-06 NOTE — ASSESSMENT & PLAN NOTE
79 y.o.M with progressive acutely worsening confusion/tremors since 4/20. Previously AAOx4, now AAOx1  - neurology consulted, appreciate recommendations:  - MRI brain from last week was essentially non diagnostic  - Repeat MRI again without obvious new pathology   -working diagnosis is Parkinson's disease, as improving with Sinemet: iContinue two tabs TID  -LP concerning for possible meningitis. Will start treatment per Neurology recs   -EEG pending   - Delirium and fall precautions along with neuro checks  - PT/OT consult placed; return to [facility] upon discharge

## 2025-05-06 NOTE — ASSESSMENT & PLAN NOTE
Vince Huffman is a 79 year old male with history of chron's disease, SBO, hypothyroidism, and CAD  presenting from [facility] with encephalopathy. Exam notable for disorientation to place, time, and situation. He is minimally responsive and will answer with one word. There is asterixis in his upper extremities and intention tremor most notably in the left hand. No hyperreflexia. Ptosis of left eye though no extraocular movement abnormalities/pupillary discrepancy to suggest third nerve palsy and mehran's respectively.     5/6: Repeat MRI also affected by motion artifact though no findings of acute stroke. Ammonia 44. UDS/UA unremarkable. Patient improved after sinemet trial; we gradually increase the dose to 2 tablets TID. Repeat LP attempt revealed wbc 29, lymph 66, glucose 46, and protein 60.     Recommendations:  - Started empiric meningitic coverage with vancomycin, ceftriaxone, ampicillin, and acyclovir  - Consider discussing case with Infectious Disease   - Increased carbidopa-levodopa 1.5 tablet 25 -100 mg TID to 2 tablets TID  - Ordered labs including: vitamin B1, B12 (456), folate (16.5), ck (17), treponema (-), vitamin E, zinc, copper, celiac panel   - Findings discussed with daughter over the phone   - Delirium and fall precautions   - Pending additional csf work up

## 2025-05-06 NOTE — PROCEDURES
"Vince Huffman is a 79 y.o. male patient.    Temp: 97.6 °F (36.4 °C) (05/05/25 2045)  Pulse: 88 (05/05/25 2045)  Resp: 16 (05/05/25 2045)  BP: 120/70 (05/05/25 2045)  SpO2: 97 % (05/05/25 2045)  Weight: 63 kg (138 lb 14.2 oz) (05/01/25 1100)  Height: 5' 11" (180.3 cm) (05/01/25 1100)       Lumbar Puncture    Date/Time: 5/5/2025 8:51 PM  Location procedure was performed: [facility] NEUROLOGY    Performed by: Elian López MD  Authorized by: Elian López MD  Consent Done: Yes  Indications: evaluation for altered mental status    Anesthesia:  Local Anesthetic: lidocaine 1% without epinephrine    Patient sedated: no  Lumbar space: L3-L4 interspace  Patient's position: right lateral decubitus  Needle gauge: 22  Needle length: 2.5 in  Number of attempts: 3  Fluid appearance: blood-tinged and clear  Tubes of fluid: 4  Total volume: 9 ml  Post-procedure: site cleaned and adhesive bandage applied  Complications: No  Estimated blood loss (mL): 1  Specimens: No  Implants: No  Comments: Low volume tap          5/5/2025    "

## 2025-05-06 NOTE — PROGRESS NOTES
"[facility] - Internal Medicine [facility] Medicine  Progress Note    Patient Name: Vince Huffman  MRN: [id number]  Patient Class: IP- Inpatient   Admission Date: 4/30/2025  Length of Stay: 4 days  Attending Physician: Hill Steen MD  Primary Care Provider: Leland Porras MD        Subjective     Principal Problem:Encephalopathy        HPI:  78 yo M w/HTN, hypothyroid, crohn's disease, CAD, hx of SBO, presenting with AMS from [facility]. Patient was sent in from [facility] for progressively worsening cognitive function. Patient is A&O x1 at his baseline. Patient and his daughter endorse mild confusion. Which has been progressive.    Patient has not had any recent falls. Endorsed mild suprapubic abdominal pain. No UTI. Mild inflammation on CT, not unexpected in the setting of Crohn's.     Overview/Hospital Course:  Vince Huffman is a 79 y.o. M who was admitted to  for further evaluation of worsening AMS per rehab facility. AAOx1 on admission, normally AAOx4 prior to 4/20 per son. UA negative. CXR clear. CT abd/pelvis with wall thickening of distal ileum, inflammatory infectious ileitis are considerations, fecal distention of the rectum, impaction not excluded. Discussed with GI, anticipated these are chronic findings. CRP negative. Will give enema and monitor for improvement. Patient with successful BM. Neurology consulted: MRI brain ordered (no acute findings, motion artifact), extended EEG, ammonia levels. Worsening mental status on 5/2, medicine team consulted for LP which was unsuccessful. More alert on 5/3 and answering yes/no questions when redirected. Neurology recommending carbidopa-levodopa trial, EEG, and attempting another LP.     Interval History: States that he is "not doing too well," as he "has a political agenda to talk to the President of [place]." Otherwise, unable to obtain ROS.     Review of Systems   Reason unable to perform ROS: mental status change.     Objective: "     Vital Signs (Most Recent):  Temp: 97.6 °F (36.4 °C) (05/05/25 2045)  Pulse: 88 (05/05/25 2045)  Resp: 16 (05/05/25 2045)  BP: 120/70 (05/05/25 2045)  SpO2: 97 % (05/05/25 2045) Vital Signs (24h Range):  Temp:  [97.4 °F (36.3 °C)-98 °F (36.7 °C)] 97.6 °F (36.4 °C)  Pulse:  [] 88  Resp:  [16-18] 16  SpO2:  [96 %-99 %] 97 %  BP: (120-161)/(70-89) 120/70     Weight: 63 kg (138 lb 14.2 oz)  Body mass index is 19.37 kg/m².    Intake/Output Summary (Last 24 hours) at 5/5/2025 2227  Last data filed at 5/5/2025 1821  Gross per 24 hour   Intake 290 ml   Output 75 ml   Net 215 ml      Physical Exam  Vitals and nursing note reviewed.   Constitutional:       General: He is not in acute distress.     Appearance: He is well-developed. He is not ill-appearing or diaphoretic.   HENT:      Head: Normocephalic and atraumatic.   Eyes:      General: No scleral icterus.     Conjunctiva/sclera: Conjunctivae normal.   Neck:      Vascular: No JVD.   Pulmonary:      Effort: Pulmonary effort is normal. No respiratory distress.   Skin:     Coloration: Skin is not jaundiced or pale.   Neurological:      Mental Status: He is alert.      Motor: No abnormal muscle tone.      Comments: Oriented to person only, clear but nonsensical speech   Psychiatric:         Mood and Affect: Mood normal.         Behavior: Behavior normal.         MELD 3.0: 13 at 5/5/2025  4:24 PM  MELD-Na: 8 at 5/5/2025  4:24 PM  Calculated from:  Serum Creatinine: 1.2 mg/dL at 5/4/2025  3:28 AM  Serum Sodium: 132 mmol/L at 5/4/2025  3:28 AM  Total Bilirubin: 0.5 mg/dL (Using min of 1 mg/dL) at 5/4/2025  3:28 AM  Serum Albumin: 3.2 g/dL at 5/4/2025  3:28 AM  INR(ratio): 1 at 5/5/2025  4:24 PM  Age at listing (hypothetical): 79 years  Sex: Male at 5/5/2025  4:24 PM      Significant Labs:  CBC:  Recent Labs   Lab 05/04/25  0328   WBC 11.86   HGB 12.4*   HCT 37.1*        CMP:  Recent Labs   Lab 05/04/25 0328   *   K 4.2   CL 96   CO2 26   GLU 90   BUN 25*  "  CREATININE 1.2   CALCIUM 9.7   PROT 7.1   ALBUMIN 3.2*   BILITOT 0.5   ALKPHOS 79   AST 20   ALT 9*   ANIONGAP 10     PTINR:  Recent Labs   Lab 05/05/25  1624   INR 1.0       Significant Procedures:   Dobutamine Stress Test with Color Flow: No results found for this or any previous visit.    None      Assessment & Plan  Encephalopathy  79 y.o.M with progressive acutely worsening confusion/tremors since 4/20. Previously AAOx4, now AAOx1  - neurology consulted, appreciate recommendations:  - MRI brain from last week was essentially non diagnostic  - Repeat MRI again without obvious new pathology   -working diagnosis is Parkinson's disease, as improving with Sinemet: increase dose to two tabs TID  -LP and EEG pending   - Delirium and fall precautions along with neuro checks  - PT/OT consult placed; return to [facility] upon discharge   Crohn's disease  - Per CT scan "thickening of the distal ileum to the surgical anastomosis with the large bowel. Inflammatory infectious ileitis are considerations. Recommend clinical correlation and follow-up. "  - Will check CRP, if elevated will consult GI, dicussed with them.   - CRP negative  - trial enema for fecal distention noted on CT  - if enema unsuccessful, consider CRS consult   - large BM on 5/1  - son also with concerns of new stelara potentially causing side effects of confusion for patient as the medication is new    - GI without concerns of confusion being caused by sterlara,   confirmed with pharmacy     Generalized weakness  - sent from SNF  - progressive worsening weakness per son   - PT/OT ordered    Unintended weight loss  - reported per son  - recently started gluten free diet per recommendations from GI    Hyponatremia  Hyponatremia is likely due to Dehydration/hypovolemia. The patient's most recent sodium results are listed below.  Recent Labs     05/03/25  0524 05/04/25  0328   * 132*     Plan  - Correct the sodium by 4-6mEq in 24 hours.   - Obtain " the following studies: Urine sodium, urine osmolality, serum osmolality.  - Monitor sodium Daily.   - Patient hyponatremia is stable    Essential hypertension  Patient's blood pressure range in the last 24 hours was: BP  Min: 120/70  Max: 161/84.The patient's inpatient anti-hypertensive regimen is listed below:  Current Antihypertensives  amLODIPine tablet 10 mg, Daily, Oral    Plan  - BP is controlled, no changes needed to their regimen    Tachycardia  - patient with HR consistently 100s-120s  - EKG with sinus tachycardia  - unimproved with IVF  - infectious work-up negative so far  - improved    AMS (altered mental status)      Parkinsonism      VTE Risk Mitigation (From admission, onward)           Ordered     enoxaparin injection 40 mg  Daily         05/01/25 0827     IP VTE HIGH RISK PATIENT  Once         05/01/25 0827     Place sequential compression device  Until discontinued         05/01/25 0827                    Discharge Planning   ERNIE: 5/8/2025     Code Status: Full Code   Medical Readiness for Discharge Date:   Discharge Plan A: Rehab                Please place Justification for DME        Samy Buenrostro MD  Department of Hospital Medicine   [facility] - Internal Medicine Telemetry

## 2025-05-06 NOTE — PLAN OF CARE
[facility] - Internal Medicine Telemetry  Discharge Reassessment    Primary Care Provider: Leland Porras MD    Expected Discharge Date: 5/8/2025    Reassessment (most recent)       Discharge Reassessment - 05/06/25 1605          Discharge Reassessment    Assessment Type Discharge Planning Reassessment (P)      Did the patient's condition or plan change since previous assessment? No (P)      Discharge Plan discussed with: Patient (P)      Communicated ERNIE with patient/caregiver Yes (P)      Discharge Plan A Rehab (P)      Discharge Plan B Skilled Nursing Facility (P)      DME Needed Upon Discharge  other (see comments) (P)    TBD    Transition of Care Barriers None (P)      Why the patient remains in the hospital Requires continued medical care (P)         Post-Acute Status    Post-Acute Authorization Placement (P)      Post-Acute Placement Status Pending post-acute provider review/more information requested (P)      Coverage Medicare A and B (P)      Patient choice form signed by patient/caregiver List with quality metrics by geographic area provided (P)      Discharge Delays None known at this time (P)                      Discharge Plan A and Plan B have been determined by review of patient's clinical status, future medical and therapeutic needs, and coverage/benefits for post-acute care in coordination with multidisciplinary team members.    Remi Schneider RN-CM  Case Management  [facility]  [phone]

## 2025-05-06 NOTE — PROGRESS NOTES
"[facility] - Internal Medicine Telemetry  Neurology  Progress Note    Patient Name: Vince Huffman  MRN: [id number]  Admission Date: 4/30/2025  Hospital Length of Stay: 5 days  Code Status: Full Code   Attending Provider: Hill Steen MD  Primary Care Physician: Leland Porras MD   Principal Problem:Encephalopathy    HPI:   Vince Huffman is a 79 year old male with history of chron's disease, SBO, hypothyroidism, and CAD  presenting from [facility] with encephalopathy. He initially presented to [facility] for impaired mobility and difficulty with ADL's 2/2 generalized weakness. He was reportedly found confused today A&O x 1 (said the year is "1895") but is normally A&O x4. At this time he also expressed generalized abdominal pain. Recent ultrasounds of the lower extremity did not reveal DVT. MRI from last week was non diagnostic. UA unremarkable and CXR normal. CT abdomen and pelvis reveals wall thickening of the distal ileum consistent with possible infectious ileitis. Neurology consulted for further evaluation of encephalopathy.     Overview/Hospital Course:  No notes on file      Past Medical History:   Diagnosis Date    Ankylosing spondylitis of unspecified sites in spine     Anxiety disorder, unspecified     BMI 21.0-21.9, adult 04/14/2025    Crohn's disease     Gout, unspecified     Heart disease     History of skin cancer 2001    squamous cell carcinoma left cheek    Hypertension     Hypothyroidism, unspecified     Psoriatic arthritis        Past Surgical History:   Procedure Laterality Date    ANGIOGRAM, CORONARY, WITH LEFT HEART CATHETERIZATION      APPENDECTOMY      APPENDECTOMY  2007    COLONOSCOPY N/A     ENDOSCOPY N/A     RIGHT HEMICOLECTOMY  2013    SMALL INTESTINE SURGERY  2007    30.5 cm of small bowel removed       Review of patient's allergies indicates:   Allergen Reactions    Gluten Diarrhea    Lactose        Current Neurological Medications: see below    No current " facility-administered medications on file prior to encounter.     Current Outpatient Medications on File Prior to Encounter   Medication Sig    acetaminophen (TYLENOL) 500 MG tablet Take 500 mg by mouth every 6 (six) hours as needed.    ALPRAZolam (XANAX XR) 0.5 MG Tb24 Take 0.5 mg by mouth once daily.    amLODIPine (NORVASC) 10 MG tablet Take 1 tablet (10 mg total) by mouth once daily.    ustekinumab (STELARA) 90 mg/mL Syrg syringe Inject 1 mL (90 mg total) into the skin every 8 weeks.     Family History    None       Tobacco Use    Smoking status: Never    Smokeless tobacco: Never   Substance and Sexual Activity    Alcohol use: No    Drug use: No    Sexual activity: Never     Partners: Female     Review of Systems   Constitutional:  Positive for activity change. Negative for fever.   Respiratory:  Negative for shortness of breath.    Cardiovascular:  Negative for chest pain.   Gastrointestinal:  Positive for abdominal pain.   Neurological:  Positive for tremors and speech difficulty.   Psychiatric/Behavioral:  Positive for behavioral problems, confusion and decreased concentration.      Objective:     Vital Signs (Most Recent):  Temp: 97.9 °F (36.6 °C) (05/06/25 1618)  Pulse: 109 (05/06/25 1618)  Resp: 18 (05/06/25 1618)  BP: 134/83 (05/06/25 1618)  SpO2: 98 % (05/06/25 1618) Vital Signs (24h Range):  Temp:  [97.6 °F (36.4 °C)-98.3 °F (36.8 °C)] 97.9 °F (36.6 °C)  Pulse:  [] 109  Resp:  [16-18] 18  SpO2:  [96 %-99 %] 98 %  BP: (120-149)/(70-83) 134/83     Weight: 64.1 kg (141 lb 5 oz)  Body mass index is 19.71 kg/m².     Physical Exam  Constitutional:       Comments: The lens of his glasses for the left eye is missing. He appears confused and responds minimally to questions.    Eyes:      Pupils: Pupils are equal, round, and reactive to light.      Comments: Ptosis of left eye, unclear if at his baseline.   Pupils reactive to light, no discrepancy from left to right to suggest mehran's.  No dilation of  "impaired extraocular movements to suggest third nerve palsy.    Pulmonary:      Effort: Pulmonary effort is normal.   Neurological:      Mental Status: He is alert. He is disoriented.      Deep Tendon Reflexes:      Reflex Scores:       Brachioradialis reflexes are 2+ on the right side and 2+ on the left side.       Patellar reflexes are 2+ on the right side and 2+ on the left side.         NEUROLOGICAL EXAMINATION:     MENTAL STATUS   Oriented to person.   Disoriented to place.   Disoriented to time.   Attention: decreased. Concentration: decreased.   Level of consciousness: alert    CRANIAL NERVES     CN III, IV, VI   Pupils are equal, round, and reactive to light.       Mild ptosis of left eye without pupillary abnormalities     MOTOR EXAM   Overall muscle tone: increased  Right arm tone: increased  Left arm tone: increased       Asterixis with tremor most notably in the left arm     REFLEXES     Reflexes   Right brachioradialis: 2+  Left brachioradialis: 2+  Right patellar: 2+  Left patellar: 2+    SENSORY EXAM   Light touch normal.     GAIT AND COORDINATION     Tremor   Intention tremor: present      Significant Labs: CBC:   No results for input(s): "WBC", "HGB", "HCT", "PLT" in the last 48 hours.    CMP:   Recent Labs   Lab 05/06/25  1423      *   K 4.6   CL 94*   CO2 26   BUN 27*   CREATININE 1.3   CALCIUM 10.1   ANIONGAP 12       Significant Imaging: I have reviewed all pertinent imaging results/findings within the past 24 hours.  Assessment and Plan:     * Encephalopathy  Vince Huffman is a 79 year old male with history of chron's disease, SBO, hypothyroidism, and CAD  presenting from [facility] with encephalopathy. Exam notable for disorientation to place, time, and situation. He is minimally responsive and will answer with one word. There is asterixis in his upper extremities and intention tremor most notably in the left hand. No hyperreflexia. Ptosis of left eye though no extraocular " movement abnormalities/pupillary discrepancy to suggest third nerve palsy and mehran's respectively.     5/6: Repeat MRI also affected by motion artifact though no findings of acute stroke. Ammonia 44. UDS/UA unremarkable. Patient improved after sinemet trial; we gradually increase the dose to 2 tablets TID. Repeat LP attempt revealed wbc 29, lymph 66, glucose 46, and protein 60.     Recommendations:  - Started empiric meningitic coverage with vancomycin, ceftriaxone, ampicillin, and acyclovir  - Consider discussing case with Infectious Disease   - Increased carbidopa-levodopa 1.5 tablet 25 -100 mg TID to 2 tablets TID  - Ordered labs including: vitamin B1, B12 (456), folate (16.5), ck (17), treponema (-), vitamin E, zinc, copper, celiac panel   - Findings discussed with daughter over the phone   - Delirium and fall precautions   - Pending additional csf work up          VTE Risk Mitigation (From admission, onward)           Ordered     enoxaparin injection 40 mg  Daily         05/01/25 0827     IP VTE HIGH RISK PATIENT  Once         05/01/25 0827     Place sequential compression device  Until discontinued         05/01/25 0827                    Elian López MD  Neurology  [facility] - Internal Medicine Telemetry

## 2025-05-06 NOTE — PT/OT/SLP PROGRESS
"Occupational Therapy   Co-Treatment  Co-treatment performed due to patient's multiple deficits requiring two skilled therapists to appropriately and safely assess patient's strength and endurance while facilitating functional tasks in addition to accommodating for patient's activity tolerance.        Name: Vince Huffman  MRN: [id number]  Admitting Diagnosis:  Encephalopathy       Recommendations:     Discharge Recommendations: High Intensity Therapy  Discharge Equipment Recommendations:  bedside commode, walker, rolling  Barriers to discharge:  Decreased caregiver support (Increased skilled assistance required)    Assessment:     Vince Huffman is a 79 y.o. male with a medical diagnosis of Encephalopathy.  He presents with the following performance deficits affecting function are weakness, impaired endurance, impaired self care skills, impaired functional mobility, impaired balance, decreased safety awareness, decreased coordination.     Pt agreeable to therapy and tolerated fairly. Pt is demonstrating improvements in participation, following verbal commands & activity tolerance. However, pt remains limited in ADLs, functional mobility, and functional transfers and is currently not performing tasks at [facility]. Pt would continue to benefit from skilled OT services to maximize functional independence with ADLs and functional mobility, reduce caregiver burden, and facilitate safe discharge in the least restrictive environment.      Rehab Prognosis:  Good; patient would benefit from acute skilled OT services to address these deficits and reach maximum level of function.       Plan:     Patient to be seen 4 x/week to address the above listed problems via self-care/home management, therapeutic activities, therapeutic exercises  Plan of Care Expires: 05/24/25  Plan of Care Reviewed with: patient    Subjective     Chief Complaint: none reported  Patient/Family Comments/goals: pt responded "not too bad" when asked how he was " doing  Pain/Comfort:  Pain Rating 1: 0/10    Objective:     Communicated with: nurse prior to session. Patient found HOB elevated with peripheral IV, Condom Catheter, telemetry upon OT entry to room.    General Precautions: Standard, fall    Orthopedic Precautions:N/A  Braces: N/A  Respiratory Status: Room air     Occupational Performance:     Bed Mobility:  (pt required additional time to complete transfers)  Patient completed Rolling to Right with minimum assistance    Patient completed Scooting to EOB with total assistance    Patient completed R sidelying to Sit with minimum assistance and 2 persons     Functional Mobility/Transfers:  Patient completed Sit -> Stand Transfer (from EOB) with moderate assistance and of 2 persons with  hand-held assist   Pt required multiple verbal/tactile cues for hand/feet placement and to engage glutes and extend knees to progress to an upright stand  Pt demonstrated involuntary resistive movements which require 2 skilled therapists to manage pt, maintain pt safety & maximize performance    Patient completed Bed -> Chair Transfer using Step Transfer technique with moderate assistance and of 2 persons with hand-held assist  Pt demonstrated involuntary resistive movements which require 2 skilled therapists to manage pt, maintain pt safety & maximize performance    Functional Mobility: pt is significantly limited with mobility, but was able to complete short distance bed->chair transfer with mod A via B HHA  Pt required increase time due to involuntary resistive movements & poor gross coordination    Activities of Daily Living:  Grooming: total assistance to complete oral hygiene while seated in chair  Due to pt's deficits with fine/gross motor skills, pt was unable to complete oral hygiene independently. However, pt was able to gargle water & spit toothpaste/food debris accurately into basin    Upper Body Dressing: total assistance to don gown over back while seated EOB  While PT  assisted with pt's trunk control, OT assisted patient with donning gown through B UE and onto pt's back  Despite visual, verbal & tactile cues, pt was unable to voluntarily extend elbows to bring UE through sleeve    AMPAC 6 Click ADL: 12    Treatment & Education:  Pt completed trunk control exercise which involved holding onto therapists hands to isolate and focus on using core strength to return to midline. UE support was controlled by giving more or less support when appropriate.  -Education on task modification to maximize safety and (I) during ADLs and mobility  -Education on importance of OOB activity to improve/maintain overall strength, activity tolerance and promote recovery  -Pt educated to call for assistance and to transfer with hospital staff only  -Provided education regarding OT POC with pt verbalizing understanding.  Pt had no further questions & when asked whether there were any concerns pt reported none.     Patient left up in chair with all lines intact, call button in reach, table in front of patient and telesitter present    GOALS:   Multidisciplinary Problems       Occupational Therapy Goals          Problem: Occupational Therapy    Goal Priority Disciplines Outcome Interventions   Occupational Therapy Goal     OT, PT/OT Progressing    Description: Goals to be met by: 5/24/25     Patient will increase functional independence with ADLs by performing:    UE Dressing with Contact Guard Assistance.  LE Dressing with Minimal Assistance.  Grooming while bedside chair with Minimal Assistance.  Toileting from bedside commode with Minimal Assistance for hygiene and clothing management.   Sitting at edge of bed x5 minutes with Contact Guard Assistance for upright balance.  Rolling to Bilateral with Minimal Assistance.   Supine to sit with Minimal Assistance.  Step transfer with Moderate Assistance  Toilet transfer to bedside commode with Moderate Assistance.                         DME Justifications:    Vince requires a commode for home use because he is confined to a single room.   Vince's mobility limitation cannot be sufficiently resolved by the use of a cane. His functional mobility deficit can be sufficiently resolved with the use of a Rolling Walker. Patient's mobility limitation significantly impairs their ability to participate in one of more activities of daily living.  The use of a RW will significantly improve the patient's ability to participate in MRADLS and the patient will use it on regular basis in the home.    Time Tracking:     OT Date of Treatment: 05/06/25  OT Start Time: 0848  OT Stop Time: 0916  OT Total Time (min): 28 min    Billable Minutes:Self Care/Home Management 12  Therapeutic Activity 16    OT/PAUL: OT          5/6/2025

## 2025-05-06 NOTE — PROGRESS NOTES
[facility]mezquita - Internal Medicine [facility] Medicine  Progress Note    Patient Name: Vince Huffman  MRN: [id number]  Patient Class: IP- Inpatient   Admission Date: 4/30/2025  Length of Stay: 5 days  Attending Physician: Hill Steen MD  Primary Care Provider: Leland Porras MD        Subjective     Principal Problem:Encephalopathy        HPI:  80 yo M w/HTN, hypothyroid, crohn's disease, CAD, hx of SBO, presenting with AMS from [facility]. Patient was sent in from [facility] for progressively worsening cognitive function. Patient is A&O x1 at his baseline. Patient and his daughter endorse mild confusion. Which has been progressive.    Patient has not had any recent falls. Endorsed mild suprapubic abdominal pain. No UTI. Mild inflammation on CT, not unexpected in the setting of Crohn's.     Overview/Hospital Course:  Vince Huffman is a 79 y.o. M who was admitted to  for further evaluation of worsening AMS per rehab facility. AAOx1 on admission, normally AAOx4 prior to 4/20 per son. UA negative. CXR clear. CT abd/pelvis with wall thickening of distal ileum, inflammatory infectious ileitis are considerations, fecal distention of the rectum, impaction not excluded. Discussed with GI, anticipated these are chronic findings. CRP negative. Will give enema and monitor for improvement. Patient with successful BM. Neurology consulted: MRI brain ordered (no acute findings, motion artifact), extended EEG, ammonia levels. Worsening mental status on 5/2, medicine team consulted for LP which was unsuccessful. More alert on 5/3 and answering yes/no questions when redirected. Neurology recommending carbidopa-levodopa trial, EEG, and attempting another LP.     Interval History: No acute events overnight. Pt notes continued confusion intermittently. Oriented to person and year. States he is in [place] currently.     Review of Systems   Constitutional:  Positive for fatigue. Negative for chills and fever.    HENT:  Negative for congestion, trouble swallowing and voice change.    Eyes:  Negative for pain and visual disturbance.   Respiratory:  Negative for cough and shortness of breath.    Gastrointestinal:  Negative for abdominal pain, constipation, diarrhea, nausea and vomiting.   Genitourinary:  Negative for difficulty urinating.   Musculoskeletal:  Negative for arthralgias.   Skin:  Negative for pallor and rash.   Neurological:  Negative for dizziness, weakness and light-headedness.   Psychiatric/Behavioral:  Negative for agitation and behavioral problems.      Objective:     Vital Signs (Most Recent):  Temp: 98.3 °F (36.8 °C) (05/06/25 0752)  Pulse: 82 (05/06/25 0752)  Resp: 18 (05/06/25 0752)  BP: 132/80 (05/06/25 0752)  SpO2: 96 % (05/06/25 0752) Vital Signs (24h Range):  Temp:  [97.6 °F (36.4 °C)-98.3 °F (36.8 °C)] 98.3 °F (36.8 °C)  Pulse:  [] 82  Resp:  [16-18] 18  SpO2:  [96 %-99 %] 96 %  BP: (120-151)/(70-88) 132/80     Weight: 64.1 kg (141 lb 5 oz)  Body mass index is 19.71 kg/m².    Intake/Output Summary (Last 24 hours) at 5/6/2025 1129  Last data filed at 5/5/2025 2130  Gross per 24 hour   Intake 480 ml   Output --   Net 480 ml      Physical Exam  Vitals reviewed.   Constitutional:       General: He is not in acute distress.  HENT:      Head: Normocephalic and atraumatic.      Mouth/Throat:      Mouth: Mucous membranes are moist.   Eyes:      General: No scleral icterus.     Extraocular Movements: Extraocular movements intact.   Cardiovascular:      Rate and Rhythm: Normal rate and regular rhythm.      Heart sounds: No murmur heard.     No friction rub. No gallop.   Pulmonary:      Effort: Pulmonary effort is normal. No respiratory distress.      Breath sounds: No wheezing, rhonchi or rales.   Abdominal:      General: There is no distension.      Palpations: Abdomen is soft.      Tenderness: There is no abdominal tenderness. There is no guarding or rebound.   Musculoskeletal:         General:  "Normal range of motion.      Cervical back: Neck supple. No rigidity.   Skin:     General: Skin is warm and dry.      Capillary Refill: Capillary refill takes less than 2 seconds.   Neurological:      General: No focal deficit present.      Mental Status: He is alert.      Comments: Oriented to person and year   Psychiatric:         Mood and Affect: Mood normal.         Behavior: Behavior normal.         MELD 3.0: 13 at 5/5/2025  4:24 PM  MELD-Na: 8 at 5/5/2025  4:24 PM  Calculated from:  Serum Creatinine: 1.2 mg/dL at 5/4/2025  3:28 AM  Serum Sodium: 132 mmol/L at 5/4/2025  3:28 AM  Total Bilirubin: 0.5 mg/dL (Using min of 1 mg/dL) at 5/4/2025  3:28 AM  Serum Albumin: 3.2 g/dL at 5/4/2025  3:28 AM  INR(ratio): 1 at 5/5/2025  4:24 PM  Age at listing (hypothetical): 79 years  Sex: Male at 5/5/2025  4:24 PM      Significant Labs:  CBC:  No results for input(s): "WBC", "HGB", "HCT", "PLT" in the last 48 hours.  CMP:  No results for input(s): "NA", "K", "CL", "CO2", "GLU", "BUN", "CREATININE", "CALCIUM", "PROT", "ALBUMIN", "BILITOT", "ALKPHOS", "AST", "ALT", "ANIONGAP", "EGFRNONAA" in the last 48 hours.    Invalid input(s): "ESTGFAFRICA"  PTINR:  Recent Labs   Lab 05/05/25  1624   INR 1.0       Significant Procedures:   Dobutamine Stress Test with Color Flow: No results found for this or any previous visit.    None      Assessment & Plan  Encephalopathy  79 y.o.M with progressive acutely worsening confusion/tremors since 4/20. Previously AAOx4, now AAOx1  - neurology consulted, appreciate recommendations:  - MRI brain from last week was essentially non diagnostic  - Repeat MRI again without obvious new pathology   -working diagnosis is Parkinson's disease, as improving with Sinemet: iContinue two tabs TID  -LP concerning for possible meningitis. Will start treatment per Neurology recs   -EEG pending   - Delirium and fall precautions along with neuro checks  - PT/OT consult placed; return to [facility] upon discharge " "  Crohn's disease  - Per CT scan "thickening of the distal ileum to the surgical anastomosis with the large bowel. Inflammatory infectious ileitis are considerations. Recommend clinical correlation and follow-up. "  -CRP negative. No concern for flare.   -GI without concerns of confusion being caused by sterlara, confirmed with pharmacy     Generalized weakness  - sent from SNF  - progressive worsening weakness per son   - PT/OT ordered    Unintended weight loss  - reported per son  - recently started gluten free diet per recommendations from GI    Hyponatremia  Hyponatremia is likely due to Dehydration/hypovolemia. The patient's most recent sodium results are listed below.  Recent Labs     05/04/25  0328   *     Maxime  - Monitor sodium Daily.   - Patient hyponatremia is stable    Essential hypertension  Patient's blood pressure range in the last 24 hours was: BP  Min: 120/70  Max: 151/88.The patient's inpatient anti-hypertensive regimen is listed below:  Current Antihypertensives  amLODIPine tablet 10 mg, Daily, Oral    Plan  - BP is controlled, no changes needed to their regimen    Parkinsonism  Suspect symptoms are due to Parkinson's disease. Continue Sinemet as above.     VTE Risk Mitigation (From admission, onward)           Ordered     enoxaparin injection 40 mg  Daily         05/01/25 0827     IP VTE HIGH RISK PATIENT  Once         05/01/25 0827     Place sequential compression device  Until discontinued         05/01/25 0827                    Discharge Planning   ERNIE: 5/8/2025     Code Status: Full Code   Medical Readiness for Discharge Date:   Discharge Plan A: Rehab                Please place Justification for DME        Hill Steen MD  Department of Hospital Medicine   [facility] - Internal Medicine Telemetry    "

## 2025-05-06 NOTE — SUBJECTIVE & OBJECTIVE
Interval History: No acute events overnight. Pt notes continued confusion intermittently. Oriented to person and year. States he is in [place] currently.     Review of Systems   Constitutional:  Positive for fatigue. Negative for chills and fever.   HENT:  Negative for congestion, trouble swallowing and voice change.    Eyes:  Negative for pain and visual disturbance.   Respiratory:  Negative for cough and shortness of breath.    Gastrointestinal:  Negative for abdominal pain, constipation, diarrhea, nausea and vomiting.   Genitourinary:  Negative for difficulty urinating.   Musculoskeletal:  Negative for arthralgias.   Skin:  Negative for pallor and rash.   Neurological:  Negative for dizziness, weakness and light-headedness.   Psychiatric/Behavioral:  Negative for agitation and behavioral problems.      Objective:     Vital Signs (Most Recent):  Temp: 98.3 °F (36.8 °C) (05/06/25 0752)  Pulse: 82 (05/06/25 0752)  Resp: 18 (05/06/25 0752)  BP: 132/80 (05/06/25 0752)  SpO2: 96 % (05/06/25 0752) Vital Signs (24h Range):  Temp:  [97.6 °F (36.4 °C)-98.3 °F (36.8 °C)] 98.3 °F (36.8 °C)  Pulse:  [] 82  Resp:  [16-18] 18  SpO2:  [96 %-99 %] 96 %  BP: (120-151)/(70-88) 132/80     Weight: 64.1 kg (141 lb 5 oz)  Body mass index is 19.71 kg/m².    Intake/Output Summary (Last 24 hours) at 5/6/2025 1129  Last data filed at 5/5/2025 2130  Gross per 24 hour   Intake 480 ml   Output --   Net 480 ml      Physical Exam  Vitals reviewed.   Constitutional:       General: He is not in acute distress.  HENT:      Head: Normocephalic and atraumatic.      Mouth/Throat:      Mouth: Mucous membranes are moist.   Eyes:      General: No scleral icterus.     Extraocular Movements: Extraocular movements intact.   Cardiovascular:      Rate and Rhythm: Normal rate and regular rhythm.      Heart sounds: No murmur heard.     No friction rub. No gallop.   Pulmonary:      Effort: Pulmonary effort is normal. No respiratory distress.       "Breath sounds: No wheezing, rhonchi or rales.   Abdominal:      General: There is no distension.      Palpations: Abdomen is soft.      Tenderness: There is no abdominal tenderness. There is no guarding or rebound.   Musculoskeletal:         General: Normal range of motion.      Cervical back: Neck supple. No rigidity.   Skin:     General: Skin is warm and dry.      Capillary Refill: Capillary refill takes less than 2 seconds.   Neurological:      General: No focal deficit present.      Mental Status: He is alert.      Comments: Oriented to person and year   Psychiatric:         Mood and Affect: Mood normal.         Behavior: Behavior normal.         MELD 3.0: 13 at 5/5/2025  4:24 PM  MELD-Na: 8 at 5/5/2025  4:24 PM  Calculated from:  Serum Creatinine: 1.2 mg/dL at 5/4/2025  3:28 AM  Serum Sodium: 132 mmol/L at 5/4/2025  3:28 AM  Total Bilirubin: 0.5 mg/dL (Using min of 1 mg/dL) at 5/4/2025  3:28 AM  Serum Albumin: 3.2 g/dL at 5/4/2025  3:28 AM  INR(ratio): 1 at 5/5/2025  4:24 PM  Age at listing (hypothetical): 79 years  Sex: Male at 5/5/2025  4:24 PM      Significant Labs:  CBC:  No results for input(s): "WBC", "HGB", "HCT", "PLT" in the last 48 hours.  CMP:  No results for input(s): "NA", "K", "CL", "CO2", "GLU", "BUN", "CREATININE", "CALCIUM", "PROT", "ALBUMIN", "BILITOT", "ALKPHOS", "AST", "ALT", "ANIONGAP", "EGFRNONAA" in the last 48 hours.    Invalid input(s): "ESTGFAFRICA"  PTINR:  Recent Labs   Lab 05/05/25  1624   INR 1.0       Significant Procedures:   Dobutamine Stress Test with Color Flow: No results found for this or any previous visit.    None  "

## 2025-05-06 NOTE — ASSESSMENT & PLAN NOTE
Hyponatremia is likely due to Dehydration/hypovolemia. The patient's most recent sodium results are listed below.  Recent Labs     05/03/25  0524 05/04/25  0328   * 132*     Plan  - Correct the sodium by 4-6mEq in 24 hours.   - Obtain the following studies: Urine sodium, urine osmolality, serum osmolality.  - Monitor sodium Daily.   - Patient hyponatremia is stable

## 2025-05-06 NOTE — PLAN OF CARE
Problem: Adult Inpatient Plan of Care  Goal: Plan of Care Review  Outcome: Progressing     Problem: Adult Inpatient Plan of Care  Goal: Optimal Comfort and Wellbeing  Outcome: Progressing     Problem: Skin Injury Risk Increased  Goal: Skin Health and Integrity  Outcome: Progressing

## 2025-05-06 NOTE — SUBJECTIVE & OBJECTIVE
"Interval History: States that he is "not doing too well," as he "has a political agenda to talk to the President of [place]." Otherwise, unable to obtain ROS.     Review of Systems   Reason unable to perform ROS: mental status change.     Objective:     Vital Signs (Most Recent):  Temp: 97.6 °F (36.4 °C) (05/05/25 2045)  Pulse: 88 (05/05/25 2045)  Resp: 16 (05/05/25 2045)  BP: 120/70 (05/05/25 2045)  SpO2: 97 % (05/05/25 2045) Vital Signs (24h Range):  Temp:  [97.4 °F (36.3 °C)-98 °F (36.7 °C)] 97.6 °F (36.4 °C)  Pulse:  [] 88  Resp:  [16-18] 16  SpO2:  [96 %-99 %] 97 %  BP: (120-161)/(70-89) 120/70     Weight: 63 kg (138 lb 14.2 oz)  Body mass index is 19.37 kg/m².    Intake/Output Summary (Last 24 hours) at 5/5/2025 2227  Last data filed at 5/5/2025 1821  Gross per 24 hour   Intake 290 ml   Output 75 ml   Net 215 ml      Physical Exam  Vitals and nursing note reviewed.   Constitutional:       General: He is not in acute distress.     Appearance: He is well-developed. He is not ill-appearing or diaphoretic.   HENT:      Head: Normocephalic and atraumatic.   Eyes:      General: No scleral icterus.     Conjunctiva/sclera: Conjunctivae normal.   Neck:      Vascular: No JVD.   Pulmonary:      Effort: Pulmonary effort is normal. No respiratory distress.   Skin:     Coloration: Skin is not jaundiced or pale.   Neurological:      Mental Status: He is alert.      Motor: No abnormal muscle tone.      Comments: Oriented to person only, clear but nonsensical speech   Psychiatric:         Mood and Affect: Mood normal.         Behavior: Behavior normal.         MELD 3.0: 13 at 5/5/2025  4:24 PM  MELD-Na: 8 at 5/5/2025  4:24 PM  Calculated from:  Serum Creatinine: 1.2 mg/dL at 5/4/2025  3:28 AM  Serum Sodium: 132 mmol/L at 5/4/2025  3:28 AM  Total Bilirubin: 0.5 mg/dL (Using min of 1 mg/dL) at 5/4/2025  3:28 AM  Serum Albumin: 3.2 g/dL at 5/4/2025  3:28 AM  INR(ratio): 1 at 5/5/2025  4:24 PM  Age at listing (hypothetical): " 79 years  Sex: Male at 5/5/2025  4:24 PM      Significant Labs:  CBC:  Recent Labs   Lab 05/04/25  0328   WBC 11.86   HGB 12.4*   HCT 37.1*        CMP:  Recent Labs   Lab 05/04/25  0328   *   K 4.2   CL 96   CO2 26   GLU 90   BUN 25*   CREATININE 1.2   CALCIUM 9.7   PROT 7.1   ALBUMIN 3.2*   BILITOT 0.5   ALKPHOS 79   AST 20   ALT 9*   ANIONGAP 10     PTINR:  Recent Labs   Lab 05/05/25  1624   INR 1.0       Significant Procedures:   Dobutamine Stress Test with Color Flow: No results found for this or any previous visit.    None

## 2025-05-06 NOTE — ASSESSMENT & PLAN NOTE
Hyponatremia is likely due to Dehydration/hypovolemia. The patient's most recent sodium results are listed below.  Recent Labs     05/04/25  0328   *     Maxime  - Monitor sodium Daily.   - Patient hyponatremia is stable

## 2025-05-07 LAB
ACID FAST MOD KINY STN SPEC: NORMAL
ANION GAP (OHS): 8 MMOL/L (ref 8–16)
BODY FLD TYPE: NORMAL
BUN SERPL-MCNC: 21 MG/DL (ref 8–23)
CALCIUM SERPL-MCNC: 9.4 MG/DL (ref 8.7–10.5)
CHLORIDE SERPL-SCNC: 97 MMOL/L (ref 95–110)
CO2 SERPL-SCNC: 27 MMOL/L (ref 23–29)
CREAT SERPL-MCNC: 1.1 MG/DL (ref 0.5–1.4)
ESTROGEN SERPL-MCNC: NORMAL PG/ML
GFR SERPLBLD CREATININE-BSD FMLA CKD-EPI: >60 ML/MIN/1.73/M2
GLUCOSE SERPL-MCNC: 103 MG/DL (ref 70–110)
IGA SERPL-MCNC: 386 MG/DL (ref 61–356)
IMMUNOLOGIST REVIEW: ABNORMAL
INSULIN SERPL-ACNC: NORMAL U[IU]/ML
LAB AP GROSS DESCRIPTION: NORMAL
LAB AP NON-GYN INTERPRETATION SPECIMEN 1: NORMAL
LAB AP PERFORMING LOCATION(S): NORMAL
LDH FLD L TO P-CCNC: 35 U/L
POTASSIUM SERPL-SCNC: 4.3 MMOL/L (ref 3.5–5.1)
SODIUM SERPL-SCNC: 132 MMOL/L (ref 136–145)
TTG IGA SER IA-ACNC: <1.2 U/ML
VANCOMYCIN SERPL-MCNC: 8.7 UG/ML (ref ?–80)
VDRL CSF QL: NEGATIVE
W ZINC: 65 UG/DL

## 2025-05-07 PROCEDURE — 80202 ASSAY OF VANCOMYCIN: CPT | Performed by: INTERNAL MEDICINE

## 2025-05-07 PROCEDURE — 63600175 PHARM REV CODE 636 W HCPCS: Performed by: INTERNAL MEDICINE

## 2025-05-07 PROCEDURE — 11000001 HC ACUTE MED/SURG PRIVATE ROOM

## 2025-05-07 PROCEDURE — 99222 1ST HOSP IP/OBS MODERATE 55: CPT | Mod: AI,,, | Performed by: NURSE PRACTITIONER

## 2025-05-07 PROCEDURE — 63600175 PHARM REV CODE 636 W HCPCS

## 2025-05-07 PROCEDURE — 25000003 PHARM REV CODE 250: Performed by: INTERNAL MEDICINE

## 2025-05-07 PROCEDURE — 80048 BASIC METABOLIC PNL TOTAL CA: CPT | Performed by: INTERNAL MEDICINE

## 2025-05-07 PROCEDURE — 25000003 PHARM REV CODE 250

## 2025-05-07 PROCEDURE — 36415 COLL VENOUS BLD VENIPUNCTURE: CPT | Performed by: INTERNAL MEDICINE

## 2025-05-07 PROCEDURE — 99232 SBSQ HOSP IP/OBS MODERATE 35: CPT | Mod: ,,, | Performed by: PSYCHIATRY & NEUROLOGY

## 2025-05-07 RX ADMIN — CARBIDOPA AND LEVODOPA 2 TABLET: 25; 100 TABLET ORAL at 09:05

## 2025-05-07 RX ADMIN — ACYCLOVIR SODIUM 640 MG: 50 INJECTION, SOLUTION INTRAVENOUS at 06:05

## 2025-05-07 RX ADMIN — CEFTRIAXONE 2 G: 2 INJECTION, POWDER, FOR SOLUTION INTRAMUSCULAR; INTRAVENOUS at 10:05

## 2025-05-07 RX ADMIN — AMPICILLIN 2 G: 2 INJECTION, POWDER, FOR SOLUTION INTRAMUSCULAR; INTRAVENOUS at 10:05

## 2025-05-07 RX ADMIN — CARBIDOPA AND LEVODOPA 2 TABLET: 25; 100 TABLET ORAL at 02:05

## 2025-05-07 RX ADMIN — AMPICILLIN 2 G: 2 INJECTION, POWDER, FOR SOLUTION INTRAMUSCULAR; INTRAVENOUS at 02:05

## 2025-05-07 RX ADMIN — AMPICILLIN 2 G: 2 INJECTION, POWDER, FOR SOLUTION INTRAMUSCULAR; INTRAVENOUS at 05:05

## 2025-05-07 RX ADMIN — POLYETHYLENE GLYCOL 3350 17 G: 17 POWDER, FOR SOLUTION ORAL at 09:05

## 2025-05-07 RX ADMIN — ENOXAPARIN SODIUM 40 MG: 40 INJECTION SUBCUTANEOUS at 05:05

## 2025-05-07 RX ADMIN — ACYCLOVIR SODIUM 640 MG: 50 INJECTION, SOLUTION INTRAVENOUS at 03:05

## 2025-05-07 RX ADMIN — AMPICILLIN 2 G: 2 INJECTION, POWDER, FOR SOLUTION INTRAMUSCULAR; INTRAVENOUS at 09:05

## 2025-05-07 RX ADMIN — AMPICILLIN 2 G: 2 INJECTION, POWDER, FOR SOLUTION INTRAMUSCULAR; INTRAVENOUS at 06:05

## 2025-05-07 RX ADMIN — POLYETHYLENE GLYCOL 3350 17 G: 17 POWDER, FOR SOLUTION ORAL at 08:05

## 2025-05-07 RX ADMIN — AMPICILLIN 2 G: 2 INJECTION, POWDER, FOR SOLUTION INTRAMUSCULAR; INTRAVENOUS at 03:05

## 2025-05-07 RX ADMIN — VANCOMYCIN HYDROCHLORIDE 1000 MG: 1 INJECTION, POWDER, LYOPHILIZED, FOR SOLUTION INTRAVENOUS at 09:05

## 2025-05-07 RX ADMIN — CARBIDOPA AND LEVODOPA 2 TABLET: 25; 100 TABLET ORAL at 08:05

## 2025-05-07 RX ADMIN — CEFTRIAXONE 2 G: 2 INJECTION, POWDER, FOR SOLUTION INTRAMUSCULAR; INTRAVENOUS at 09:05

## 2025-05-07 RX ADMIN — AMLODIPINE BESYLATE 10 MG: 10 TABLET ORAL at 08:05

## 2025-05-07 RX ADMIN — ACYCLOVIR SODIUM 640 MG: 50 INJECTION, SOLUTION INTRAVENOUS at 09:05

## 2025-05-07 NOTE — HPI
Per chart review, 78 yo M w/HTN, hypothyroid, crohn's disease, CAD, hx of SBO, presenting with AMS from [facility]. Patient was sent in from [facility] for progressively worsening cognitive function. On admit,  UA was negative. CXR was negative. CT abd/pelvis was with wall thickening of distal ileum, inflammatory infectious ileitis are considerations, fecal distention of the rectum, impaction not excluded. Pt was administered enema with subsequent BM. MRI brain showed no evidence of acute ischemic infarct or hemorrhage. Neurology was consulted. Pt was started on Carbidopa- Levodopa trial.  LP was consulted. Pt was started on empiric meningitic coverage with vancomycin, ceftriaxone, ampicillin, and acyclovir. CSF work up was started. PM &R was consulted to evaluate pt for post acute placement recommendation.     Functional History: Patient lives  with family  in a single  story home with one step  to enter.  Prior to admission, Pt was Mod I with hurrycane use.  DME: [employer].

## 2025-05-07 NOTE — ASSESSMENT & PLAN NOTE
-Neurology following.  -On Empiric meningitic coverage with vancomycin, ceftriaxone, ampicillin, and acyclovir.  -Continue Carbidopa-Levodopa.  -S/P LP.  -CSF studies pending.   -Delirium  precautions.   -Camera sitter.

## 2025-05-07 NOTE — SUBJECTIVE & OBJECTIVE
Past Medical History:   Diagnosis Date    Ankylosing spondylitis of unspecified sites in spine     Anxiety disorder, unspecified     BMI 21.0-21.9, adult 04/14/2025    Crohn's disease     Gout, unspecified     Heart disease     History of skin cancer 2001    squamous cell carcinoma left cheek    Hypertension     Hypothyroidism, unspecified     Psoriatic arthritis        Past Surgical History:   Procedure Laterality Date    ANGIOGRAM, CORONARY, WITH LEFT HEART CATHETERIZATION      APPENDECTOMY      APPENDECTOMY  2007    COLONOSCOPY N/A     ENDOSCOPY N/A     RIGHT HEMICOLECTOMY  2013    SMALL INTESTINE SURGERY  2007    30.5 cm of small bowel removed       Review of patient's allergies indicates:   Allergen Reactions    Gluten Diarrhea    Lactose        Current Neurological Medications: see below    No current facility-administered medications on file prior to encounter.     Current Outpatient Medications on File Prior to Encounter   Medication Sig    acetaminophen (TYLENOL) 500 MG tablet Take 500 mg by mouth every 6 (six) hours as needed.    ALPRAZolam (XANAX XR) 0.5 MG Tb24 Take 0.5 mg by mouth once daily.    amLODIPine (NORVASC) 10 MG tablet Take 1 tablet (10 mg total) by mouth once daily.    ustekinumab (STELARA) 90 mg/mL Syrg syringe Inject 1 mL (90 mg total) into the skin every 8 weeks.     Family History    None       Tobacco Use    Smoking status: Never    Smokeless tobacco: Never   Substance and Sexual Activity    Alcohol use: No    Drug use: No    Sexual activity: Never     Partners: Female     Review of Systems   Constitutional:  Positive for activity change. Negative for fever.   Respiratory:  Negative for shortness of breath.    Cardiovascular:  Negative for chest pain.   Gastrointestinal:  Positive for abdominal pain.   Neurological:  Positive for tremors and speech difficulty.   Psychiatric/Behavioral:  Positive for behavioral problems, confusion and decreased concentration.      Objective:     Vital  Signs (Most Recent):  Temp: 98.2 °F (36.8 °C) (05/07/25 1133)  Pulse: 110 (05/07/25 1133)  Resp: 18 (05/07/25 1133)  BP: (!) 147/70 (05/07/25 1133)  SpO2: 96 % (05/07/25 1133) Vital Signs (24h Range):  Temp:  [97.6 °F (36.4 °C)-99.2 °F (37.3 °C)] 98.2 °F (36.8 °C)  Pulse:  [] 110  Resp:  [16-18] 18  SpO2:  [95 %-98 %] 96 %  BP: (127-148)/(70-84) 147/70     Weight: 64.1 kg (141 lb 5 oz)  Body mass index is 19.71 kg/m².     Physical Exam  Constitutional:       Comments: The lens of his glasses for the left eye is missing. He appears confused and responds minimally to questions.    Eyes:      Pupils: Pupils are equal, round, and reactive to light.      Comments: Ptosis of left eye, unclear if at his baseline.   Pupils reactive to light, no discrepancy from left to right to suggest mehran's.  No dilation of impaired extraocular movements to suggest third nerve palsy.    Pulmonary:      Effort: Pulmonary effort is normal.   Neurological:      Mental Status: He is alert. He is disoriented.      Deep Tendon Reflexes:      Reflex Scores:       Brachioradialis reflexes are 2+ on the right side and 2+ on the left side.       Patellar reflexes are 2+ on the right side and 2+ on the left side.         NEUROLOGICAL EXAMINATION:     MENTAL STATUS   Oriented to person.   Disoriented to place.   Disoriented to time.   Attention: decreased. Concentration: decreased.   Level of consciousness: alert    CRANIAL NERVES     CN III, IV, VI   Pupils are equal, round, and reactive to light.       Mild ptosis of left eye without pupillary abnormalities     MOTOR EXAM   Overall muscle tone: increased  Right arm tone: increased  Left arm tone: increased       Asterixis with tremor most notably in the left arm     REFLEXES     Reflexes   Right brachioradialis: 2+  Left brachioradialis: 2+  Right patellar: 2+  Left patellar: 2+    SENSORY EXAM   Light touch normal.     GAIT AND COORDINATION     Tremor   Intention tremor:  "present      Significant Labs: CBC:   No results for input(s): "WBC", "HGB", "HCT", "PLT" in the last 48 hours.    CMP:   Recent Labs   Lab 05/06/25  1423 05/07/25  0757    103   * 132*   K 4.6 4.3   CL 94* 97   CO2 26 27   BUN 27* 21   CREATININE 1.3 1.1   CALCIUM 10.1 9.4   ANIONGAP 12 8       Significant Imaging: I have reviewed all pertinent imaging results/findings within the past 24 hours.  "

## 2025-05-07 NOTE — ASSESSMENT & PLAN NOTE
Patient's blood pressure range in the last 24 hours was: BP  Min: 127/81  Max: 148/84.The patient's inpatient anti-hypertensive regimen is listed below:  Current Antihypertensives  amLODIPine tablet 10 mg, Daily, Oral    Plan  - BP is controlled, no changes needed to their regimen

## 2025-05-07 NOTE — PROGRESS NOTES
"[facility] - [facility] Telemetry  Neurology  Progress Note    Patient Name: Vince Huffman  MRN: [id number]  Admission Date: 4/30/2025  Hospital Length of Stay: 6 days  Code Status: Full Code   Attending Provider: Hill Steen MD  Primary Care Physician: Leland Porras MD   Principal Problem:Encephalopathy    HPI:   Vince Huffman is a 79 year old male with history of chron's disease, SBO, hypothyroidism, and CAD  presenting from [facility] with encephalopathy. He initially presented to [facility] for impaired mobility and difficulty with ADL's 2/2 generalized weakness. He was reportedly found confused today A&O x 1 (said the year is "1895") but is normally A&O x4. At this time he also expressed generalized abdominal pain. Recent ultrasounds of the lower extremity did not reveal DVT. MRI from last week was non diagnostic. UA unremarkable and CXR normal. CT abdomen and pelvis reveals wall thickening of the distal ileum consistent with possible infectious ileitis. Neurology consulted for further evaluation of encephalopathy.     Overview/Hospital Course:  No notes on file      Past Medical History:   Diagnosis Date    Ankylosing spondylitis of unspecified sites in spine     Anxiety disorder, unspecified     BMI 21.0-21.9, adult 04/14/2025    Crohn's disease     Gout, unspecified     Heart disease     History of skin cancer 2001    squamous cell carcinoma left cheek    Hypertension     Hypothyroidism, unspecified     Psoriatic arthritis        Past Surgical History:   Procedure Laterality Date    ANGIOGRAM, CORONARY, WITH LEFT HEART CATHETERIZATION      APPENDECTOMY      APPENDECTOMY  2007    COLONOSCOPY N/A     ENDOSCOPY N/A     RIGHT HEMICOLECTOMY  2013    SMALL INTESTINE SURGERY  2007    30.5 cm of small bowel removed       Review of patient's allergies indicates:   Allergen Reactions    Gluten Diarrhea    Lactose        Current Neurological Medications: see below    No current " facility-administered medications on file prior to encounter.     Current Outpatient Medications on File Prior to Encounter   Medication Sig    acetaminophen (TYLENOL) 500 MG tablet Take 500 mg by mouth every 6 (six) hours as needed.    ALPRAZolam (XANAX XR) 0.5 MG Tb24 Take 0.5 mg by mouth once daily.    amLODIPine (NORVASC) 10 MG tablet Take 1 tablet (10 mg total) by mouth once daily.    ustekinumab (STELARA) 90 mg/mL Syrg syringe Inject 1 mL (90 mg total) into the skin every 8 weeks.     Family History    None       Tobacco Use    Smoking status: Never    Smokeless tobacco: Never   Substance and Sexual Activity    Alcohol use: No    Drug use: No    Sexual activity: Never     Partners: Female     Review of Systems   Constitutional:  Positive for activity change. Negative for fever.   Respiratory:  Negative for shortness of breath.    Cardiovascular:  Negative for chest pain.   Gastrointestinal:  Positive for abdominal pain.   Neurological:  Positive for tremors and speech difficulty.   Psychiatric/Behavioral:  Positive for behavioral problems, confusion and decreased concentration.      Objective:     Vital Signs (Most Recent):  Temp: 98.2 °F (36.8 °C) (05/07/25 1133)  Pulse: 110 (05/07/25 1133)  Resp: 18 (05/07/25 1133)  BP: (!) 147/70 (05/07/25 1133)  SpO2: 96 % (05/07/25 1133) Vital Signs (24h Range):  Temp:  [97.6 °F (36.4 °C)-99.2 °F (37.3 °C)] 98.2 °F (36.8 °C)  Pulse:  [] 110  Resp:  [16-18] 18  SpO2:  [95 %-98 %] 96 %  BP: (127-148)/(70-84) 147/70     Weight: 64.1 kg (141 lb 5 oz)  Body mass index is 19.71 kg/m².     Physical Exam  Constitutional:       Comments: The lens of his glasses for the left eye is missing. He appears confused and responds minimally to questions.    Eyes:      Pupils: Pupils are equal, round, and reactive to light.      Comments: Ptosis of left eye, unclear if at his baseline.   Pupils reactive to light, no discrepancy from left to right to suggest mehran's.  No dilation of  "impaired extraocular movements to suggest third nerve palsy.    Pulmonary:      Effort: Pulmonary effort is normal.   Neurological:      Mental Status: He is alert. He is disoriented.      Deep Tendon Reflexes:      Reflex Scores:       Brachioradialis reflexes are 2+ on the right side and 2+ on the left side.       Patellar reflexes are 2+ on the right side and 2+ on the left side.         NEUROLOGICAL EXAMINATION:     MENTAL STATUS   Oriented to person.   Disoriented to place.   Disoriented to time.   Attention: decreased. Concentration: decreased.   Level of consciousness: alert    CRANIAL NERVES     CN III, IV, VI   Pupils are equal, round, and reactive to light.       Mild ptosis of left eye without pupillary abnormalities     MOTOR EXAM   Overall muscle tone: increased  Right arm tone: increased  Left arm tone: increased       Asterixis with tremor most notably in the left arm     REFLEXES     Reflexes   Right brachioradialis: 2+  Left brachioradialis: 2+  Right patellar: 2+  Left patellar: 2+    SENSORY EXAM   Light touch normal.     GAIT AND COORDINATION     Tremor   Intention tremor: present      Significant Labs: CBC:   No results for input(s): "WBC", "HGB", "HCT", "PLT" in the last 48 hours.    CMP:   Recent Labs   Lab 05/06/25  1423 05/07/25  0757    103   * 132*   K 4.6 4.3   CL 94* 97   CO2 26 27   BUN 27* 21   CREATININE 1.3 1.1   CALCIUM 10.1 9.4   ANIONGAP 12 8       Significant Imaging: I have reviewed all pertinent imaging results/findings within the past 24 hours.  Assessment and Plan:     * Encephalopathy  Vince Huffman is a 79 year old male with history of chron's disease, SBO, hypothyroidism, and CAD  presenting from [facility] with encephalopathy. Exam notable for disorientation to place, time, and situation. He is minimally responsive and will answer with one word. There is asterixis in his upper extremities and intention tremor most notably in the left hand. No " hyperreflexia. Ptosis of left eye though no extraocular movement abnormalities/pupillary discrepancy to suggest third nerve palsy and mehran's respectively.     5/7: Repeat MRI also affected by motion artifact though no findings of acute stroke. Ammonia 44. UDS/UA unremarkable. Patient improved after sinemet trial; we gradually increase the dose to 2 tablets TID. Repeat LP attempt revealed wbc 29, lymph 66, glucose 46, and protein 60.     Recommendations:  - Started empiric meningitic coverage with vancomycin, ceftriaxone, and ampicillin (consider de-escalating antibiotics)  - Dc acyclovir   - Discussing case with Infectious Disease   - Increased carbidopa-levodopa 1.5 tablet 25 -100 mg TID to 2 tablets TID  - Ordered labs including: vitamin B1, B12 (456), folate (16.5), ck (17), treponema (-), vitamin E, zinc (65), copper, celiac panel   - Findings discussed with daughter over the phone   - Delirium and fall precautions   - Pending additional csf work up          VTE Risk Mitigation (From admission, onward)           Ordered     enoxaparin injection 40 mg  Daily         05/01/25 0827     IP VTE HIGH RISK PATIENT  Once         05/01/25 0827     Place sequential compression device  Until discontinued         05/01/25 0827                    Elian López MD  Neurology  [facility] - [facility] Tele[facility]

## 2025-05-07 NOTE — PLAN OF CARE
Problem: Adult Inpatient Plan of Care  Goal: Absence of Hospital-Acquired Illness or Injury  Outcome: Progressing     Problem: Skin Injury Risk Increased  Goal: Skin Health and Integrity  Outcome: Progressing     Problem: Adult Inpatient Plan of Care  Goal: Optimal Comfort and Wellbeing  Outcome: Not Progressing  Goal: Readiness for Transition of Care  Outcome: Not Progressing      [Parents] : parents

## 2025-05-07 NOTE — SUBJECTIVE & OBJECTIVE
Interval History: No acute events overnight. Pt appears stable on exam. Denies current concerns or complaints     Review of Systems   Constitutional:  Positive for fatigue. Negative for chills and fever.   HENT:  Negative for congestion, hearing loss, trouble swallowing and voice change.    Eyes:  Negative for pain and visual disturbance.   Respiratory:  Negative for cough and shortness of breath.    Cardiovascular:  Negative for chest pain.   Gastrointestinal:  Negative for abdominal pain, constipation, diarrhea, nausea and vomiting.   Genitourinary:  Negative for difficulty urinating.   Musculoskeletal:  Negative for arthralgias and back pain.   Skin:  Negative for pallor and rash.   Neurological:  Positive for weakness. Negative for dizziness and light-headedness.   Psychiatric/Behavioral:  Negative for agitation and behavioral problems.      Objective:     Vital Signs (Most Recent):  Temp: 98.2 °F (36.8 °C) (05/07/25 1133)  Pulse: 110 (05/07/25 1133)  Resp: 18 (05/07/25 1133)  BP: (!) 147/70 (05/07/25 1133)  SpO2: 96 % (05/07/25 1133) Vital Signs (24h Range):  Temp:  [97.6 °F (36.4 °C)-99.2 °F (37.3 °C)] 98.2 °F (36.8 °C)  Pulse:  [] 110  Resp:  [16-18] 18  SpO2:  [95 %-98 %] 96 %  BP: (127-148)/(70-84) 147/70     Weight: 64.1 kg (141 lb 5 oz)  Body mass index is 19.71 kg/m².    Intake/Output Summary (Last 24 hours) at 5/7/2025 1411  Last data filed at 5/7/2025 0943  Gross per 24 hour   Intake 953 ml   Output 650 ml   Net 303 ml      Physical Exam  Vitals reviewed.   Constitutional:       General: He is not in acute distress.  HENT:      Head: Normocephalic and atraumatic.      Nose: Nose normal.      Mouth/Throat:      Mouth: Mucous membranes are moist.   Eyes:      Extraocular Movements: Extraocular movements intact.   Cardiovascular:      Rate and Rhythm: Normal rate and regular rhythm.      Heart sounds: No murmur heard.     No friction rub. No gallop.   Pulmonary:      Effort: Pulmonary effort is  "normal. No respiratory distress.      Breath sounds: No wheezing, rhonchi or rales.   Abdominal:      General: There is no distension.      Palpations: Abdomen is soft.      Tenderness: There is no abdominal tenderness. There is no guarding or rebound.   Musculoskeletal:         General: Normal range of motion.      Cervical back: Neck supple. No rigidity.   Skin:     General: Skin is warm and dry.      Capillary Refill: Capillary refill takes less than 2 seconds.   Neurological:      Mental Status: He is alert.      Comments: Oriented to person and year   Psychiatric:         Mood and Affect: Mood normal.         Behavior: Behavior normal.         MELD 3.0: 13 at 5/6/2025  2:23 PM  MELD-Na: 9 at 5/6/2025  2:23 PM  Calculated from:  Serum Creatinine: 1.3 mg/dL at 5/6/2025  2:23 PM  Serum Sodium: 132 mmol/L at 5/6/2025  2:23 PM  Total Bilirubin: 0.5 mg/dL (Using min of 1 mg/dL) at 5/4/2025  3:28 AM  Serum Albumin: 3.2 g/dL at 5/4/2025  3:28 AM  INR(ratio): 1 at 5/5/2025  4:24 PM  Age at listing (hypothetical): 79 years  Sex: Male at 5/6/2025  2:23 PM      Significant Labs:  CBC:  No results for input(s): "WBC", "HGB", "HCT", "PLT" in the last 48 hours.  CMP:  Recent Labs   Lab 05/06/25  1423 05/07/25  0757   * 132*   K 4.6 4.3   CL 94* 97   CO2 26 27    103   BUN 27* 21   CREATININE 1.3 1.1   CALCIUM 10.1 9.4   ANIONGAP 12 8     PTINR:  Recent Labs   Lab 05/05/25  1624   INR 1.0       Significant Procedures:   Dobutamine Stress Test with Color Flow: No results found for this or any previous visit.    None  "

## 2025-05-07 NOTE — ASSESSMENT & PLAN NOTE
79 y.o.M with progressive acutely worsening confusion/tremors since 4/20. Previously AAOx4, now AAOx1  - neurology consulted, appreciate recommendations:  - MRI brain from last week was essentially non diagnostic  - Repeat MRI again without obvious new pathology   -working diagnosis is Parkinson's disease, as improving with Sinemet: iContinue two tabs TID  -LP concerning for possible meningitis. Continue Empiric treatment per Neurology recs   -EEG pending   - Delirium and fall precautions along with neuro checks  - PT/OT consult placed; return to [facility] upon discharge

## 2025-05-07 NOTE — ASSESSMENT & PLAN NOTE
- sent from SNF  - progressive worsening weakness per son   - PT/OT ordered. Will need placement at discharge

## 2025-05-07 NOTE — ASSESSMENT & PLAN NOTE
Hyponatremia is likely due to Dehydration/hypovolemia. The patient's most recent sodium results are listed below.  Recent Labs     05/06/25  1423 05/07/25  0757   * 132*     Maxime  - Monitor sodium Daily.   - Patient hyponatremia is stable

## 2025-05-07 NOTE — CONSULTS
Inpatient consult to Physical Medicine Rehab  Consult performed by: Hawa Leonardo NP  Consult ordered by: Мария Leal PA-C  Reason for consult: Rehab      Consult received.     CAROLINA Coleman, FNP-C  Physical Medicine & Rehabilitation   05/07/2025

## 2025-05-07 NOTE — HOSPITAL COURSE
Per chart review,    PT-05/08    Functional Mobility:  Bed Mobility:     Rolling Right: moderate assistance  Scooting: moderate assistance  Supine to Sit: moderate assistance  Transfers:     Bed to Chair: maximal assistance with  hand-held assist and rolling walker  using  Stand Pivot  Gait: 3' with RW and Max A on first trial and 10' with HHA-Max A      PT- 05/06    Functional Mobility:  Bed Mobility:     Rolling Right: minimum assistance  Scooting: minimum assistance  Supine to Sit: minimum assistance  Transfers:     Sit to Stand:  moderate assistance and of 2 persons with hand-held assist  Bed to Chair: moderate assistance and of 2 persons with  hand-held assist  using  Stand Pivot  Gait: 12 steps with HHA-Mod A x2

## 2025-05-07 NOTE — CONSULTS
[facility] - [facility] Medicine Tele[facility]  Physical Medicine & Rehab  Consult Note    Patient Name: Vince Huffman  MRN: [id number]  Admission Date: 4/30/2025  Hospital Length of Stay: 6 days  Attending Physician: Hill Steen MD   Consults  Subjective:     Principal Problem: Encephalopathy    HPI: Per chart review, 78 yo M w/HTN, hypothyroid, crohn's disease, CAD, hx of SBO, presenting with AMS from [facility]. Patient was sent in from [facility] for progressively worsening cognitive function. On admit,  UA was negative. CXR was negative. CT abd/pelvis was with wall thickening of distal ileum, inflammatory infectious ileitis are considerations, fecal distention of the rectum, impaction not excluded. Pt was administered enema with subsequent BM. MRI brain showed no evidence of acute ischemic infarct or hemorrhage. Neurology was consulted. Pt was started on Carbidopa- Levodopa trial.  LP was consulted. Pt was started on empiric meningitic coverage with vancomycin, ceftriaxone, ampicillin, and acyclovir. CSF work up was started. PM &R was consulted to evaluate pt for post acute placement recommendation.     Functional History: Patient lives  with family  in a single  story home with one step  to enter.  Prior to admission, Pt was Mod I with hurrycane use.  DME: [employer].     Hospital Course: Per chart review,    PT- 05/06    Functional Mobility:  Bed Mobility:     Rolling Right: minimum assistance  Scooting: minimum assistance  Supine to Sit: minimum assistance  Transfers:     Sit to Stand:  moderate assistance and of 2 persons with hand-held assist  Bed to Chair: moderate assistance and of 2 persons with  hand-held assist  using  Stand Pivot  Gait: 12 steps with HHA-Mod A x2    Past Medical History:   Diagnosis Date    Ankylosing spondylitis of unspecified sites in spine     Anxiety disorder, unspecified     BMI 21.0-21.9, adult 04/14/2025    Crohn's disease     Gout, unspecified     Heart disease     History of  skin cancer 2001    squamous cell carcinoma left cheek    Hypertension     Hypothyroidism, unspecified     Psoriatic arthritis      Past Surgical History:   Procedure Laterality Date    ANGIOGRAM, CORONARY, WITH LEFT HEART CATHETERIZATION      APPENDECTOMY      APPENDECTOMY  2007    COLONOSCOPY N/A     ENDOSCOPY N/A     RIGHT HEMICOLECTOMY  2013    SMALL INTESTINE SURGERY  2007    30.5 cm of small bowel removed     Review of patient's allergies indicates:   Allergen Reactions    Gluten Diarrhea    Lactose        Scheduled Medications:    acyclovir  10 mg/kg Intravenous Q8H    amLODIPine  10 mg Oral Daily    ampicillin IV (PEDS and ADULTS)  2 g Intravenous Q4H    carbidopa-levodopa  mg  2 tablet Oral TID    cefTRIAXone (Rocephin) IV (PEDS and ADULTS)  2 g Intravenous Q12H    enoxparin  40 mg Subcutaneous Daily    polyethylene glycol  17 g Oral BID       PRN Medications:   Current Facility-Administered Medications:     acetaminophen, 1,000 mg, Oral, Q8H PRN    acetaminophen, 650 mg, Oral, Q4H PRN    ALPRAZolam, 0.5 mg, Oral, BID PRN    dextrose 50%, 12.5 g, Intravenous, PRN    dextrose 50%, 25 g, Intravenous, PRN    gadobutroL, 7 mL, Intravenous, ONCE PRN    glucagon (human recombinant), 1 mg, Intramuscular, PRN    glucose, 16 g, Oral, PRN    glucose, 24 g, Oral, PRN    LIDOcaine HCL 10 mg/ml (1%), 5 mL, Other, On Call Procedure    melatonin, 6 mg, Oral, Nightly PRN    naloxone, 0.02 mg, Intravenous, PRN    ondansetron, 8 mg, Oral, Q8H PRN    prochlorperazine, 5 mg, Intravenous, Q6H PRN    sodium chloride 0.9%, 10 mL, Intravenous, Q12H PRN    Pharmacy to dose Vancomycin consult, , , Once **AND** vancomycin - pharmacy to dose, , Intravenous, pharmacy to manage frequency    Family History    None       Tobacco Use    Smoking status: Never    Smokeless tobacco: Never   Substance and Sexual Activity    Alcohol use: No    Drug use: No    Sexual activity: Never     Partners: Female     Review of Systems    Constitutional:  Positive for activity change.   Musculoskeletal:  Positive for gait problem.   Neurological:  Positive for weakness.   Psychiatric/Behavioral:  Positive for confusion and decreased concentration.      Objective:     Vital Signs (Most Recent):  Temp: 98.2 °F (36.8 °C) (05/07/25 1133)  Pulse: 110 (05/07/25 1133)  Resp: 18 (05/07/25 1133)  BP: (!) 147/70 (05/07/25 1133)  SpO2: 96 % (05/07/25 1133)    Vital Signs (24h Range):  Temp:  [97.6 °F (36.4 °C)-99.2 °F (37.3 °C)] 98.2 °F (36.8 °C)  Pulse:  [] 110  Resp:  [16-18] 18  SpO2:  [95 %-98 %] 96 %  BP: (127-148)/(70-84) 147/70     Body mass index is 19.71 kg/m².     Physical Exam  Vitals and nursing note reviewed.   Pulmonary:      Effort: Respiratory distress present.   Musculoskeletal:      Cervical back: Normal range of motion and neck supple.      Comments: MARIA DEL ROSARIO BUE. Pt not cooperative with lifting BUE. Able to lift BLE.    Skin:     General: Skin is warm and dry.      Capillary Refill: Capillary refill takes 2 to 3 seconds.   Neurological:      General: No focal deficit present.      Mental Status: He is alert.      GCS: GCS eye subscore is 4. GCS verbal subscore is 4. GCS motor subscore is 4.      Motor: Weakness present.      Coordination: Coordination abnormal.      Gait: Gait abnormal.   Psychiatric:         Mood and Affect: Mood normal.         Behavior: Behavior normal.               Diagnostic Results: Labs: Reviewed  Assessment/Plan:     * Encephalopathy  -Neurology following.  -On Empiric meningitic coverage with vancomycin, ceftriaxone, ampicillin, and acyclovir.  -Continue Carbidopa-Levodopa.  -S/P LP.  -CSF studies pending.   -Delirium  precautions.   -Camera sitter.     Parkinsonism  -Continue Carbidopa/ Levodopa.     Generalized weakness  -PT/OT rec for high intensity.  -Would need rehab.     Exacerbation of Crohn's disease   -No concerns for flare up at this time.       PM&R Recommendation:     At this time, the PM&R team has  reviewed this patient's ongoing medical case including inpatient diagnosis, medical history, clinical examination, labs, vitals, current social and functional history. We will continue to follow pt for potential rehab candidate pending medical readiness, improvement in AMS, Neurology's final plan.        Thank you for your consult.     Angelina Juan NP  Department of Physical Medicine & Rehab  [facility] - Internal Medicine Telemetry

## 2025-05-07 NOTE — PROGRESS NOTES
[facility] - Internal Medicine [facility] Medicine  Progress Note    Patient Name: Vince Huffman  MRN: [id number]  Patient Class: IP- Inpatient   Admission Date: 4/30/2025  Length of Stay: 6 days  Attending Physician: Hill Steen MD  Primary Care Provider: Leland Porras MD        Subjective     Principal Problem:Encephalopathy        HPI:  80 yo M w/HTN, hypothyroid, crohn's disease, CAD, hx of SBO, presenting with AMS from [facility]. Patient was sent in from [facility] for progressively worsening cognitive function. Patient is A&O x1 at his baseline. Patient and his daughter endorse mild confusion. Which has been progressive.    Patient has not had any recent falls. Endorsed mild suprapubic abdominal pain. No UTI. Mild inflammation on CT, not unexpected in the setting of Crohn's.     Overview/Hospital Course:  Vince Huffman is a 79 y.o. M who was admitted to  for further evaluation of worsening AMS per rehab facility. AAOx1 on admission, normally AAOx4 prior to 4/20 per son. UA negative. CXR clear. CT abd/pelvis with wall thickening of distal ileum, inflammatory infectious ileitis are considerations, fecal distention of the rectum, impaction not excluded. Discussed with GI, anticipated these are chronic findings. CRP negative. Will give enema and monitor for improvement. Patient with successful BM. Neurology consulted: MRI brain ordered (no acute findings, motion artifact), extended EEG, ammonia levels. Worsening mental status on 5/2, medicine team consulted for LP which was unsuccessful. More alert on 5/3 and answering yes/no questions when redirected. Neurology recommending carbidopa-levodopa trial, EEG, and attempting another LP.       Symptoms improved with increasing doses of Carbidopa/Levodopa. LP concerning for meningitis so he was started on Empiric treatment on 5/6/25.     Interval History: No acute events overnight. Pt appears stable on exam. Denies current concerns or  complaints     Review of Systems   Constitutional:  Positive for fatigue. Negative for chills and fever.   HENT:  Negative for congestion, hearing loss, trouble swallowing and voice change.    Eyes:  Negative for pain and visual disturbance.   Respiratory:  Negative for cough and shortness of breath.    Cardiovascular:  Negative for chest pain.   Gastrointestinal:  Negative for abdominal pain, constipation, diarrhea, nausea and vomiting.   Genitourinary:  Negative for difficulty urinating.   Musculoskeletal:  Negative for arthralgias and back pain.   Skin:  Negative for pallor and rash.   Neurological:  Positive for weakness. Negative for dizziness and light-headedness.   Psychiatric/Behavioral:  Negative for agitation and behavioral problems.      Objective:     Vital Signs (Most Recent):  Temp: 98.2 °F (36.8 °C) (05/07/25 1133)  Pulse: 110 (05/07/25 1133)  Resp: 18 (05/07/25 1133)  BP: (!) 147/70 (05/07/25 1133)  SpO2: 96 % (05/07/25 1133) Vital Signs (24h Range):  Temp:  [97.6 °F (36.4 °C)-99.2 °F (37.3 °C)] 98.2 °F (36.8 °C)  Pulse:  [] 110  Resp:  [16-18] 18  SpO2:  [95 %-98 %] 96 %  BP: (127-148)/(70-84) 147/70     Weight: 64.1 kg (141 lb 5 oz)  Body mass index is 19.71 kg/m².    Intake/Output Summary (Last 24 hours) at 5/7/2025 1411  Last data filed at 5/7/2025 0943  Gross per 24 hour   Intake 953 ml   Output 650 ml   Net 303 ml      Physical Exam  Vitals reviewed.   Constitutional:       General: He is not in acute distress.  HENT:      Head: Normocephalic and atraumatic.      Nose: Nose normal.      Mouth/Throat:      Mouth: Mucous membranes are moist.   Eyes:      Extraocular Movements: Extraocular movements intact.   Cardiovascular:      Rate and Rhythm: Normal rate and regular rhythm.      Heart sounds: No murmur heard.     No friction rub. No gallop.   Pulmonary:      Effort: Pulmonary effort is normal. No respiratory distress.      Breath sounds: No wheezing, rhonchi or rales.   Abdominal:       "General: There is no distension.      Palpations: Abdomen is soft.      Tenderness: There is no abdominal tenderness. There is no guarding or rebound.   Musculoskeletal:         General: Normal range of motion.      Cervical back: Neck supple. No rigidity.   Skin:     General: Skin is warm and dry.      Capillary Refill: Capillary refill takes less than 2 seconds.   Neurological:      Mental Status: He is alert.      Comments: Oriented to person and year   Psychiatric:         Mood and Affect: Mood normal.         Behavior: Behavior normal.         MELD 3.0: 13 at 5/6/2025  2:23 PM  MELD-Na: 9 at 5/6/2025  2:23 PM  Calculated from:  Serum Creatinine: 1.3 mg/dL at 5/6/2025  2:23 PM  Serum Sodium: 132 mmol/L at 5/6/2025  2:23 PM  Total Bilirubin: 0.5 mg/dL (Using min of 1 mg/dL) at 5/4/2025  3:28 AM  Serum Albumin: 3.2 g/dL at 5/4/2025  3:28 AM  INR(ratio): 1 at 5/5/2025  4:24 PM  Age at listing (hypothetical): 79 years  Sex: Male at 5/6/2025  2:23 PM      Significant Labs:  CBC:  No results for input(s): "WBC", "HGB", "HCT", "PLT" in the last 48 hours.  CMP:  Recent Labs   Lab 05/06/25  1423 05/07/25  0757   * 132*   K 4.6 4.3   CL 94* 97   CO2 26 27    103   BUN 27* 21   CREATININE 1.3 1.1   CALCIUM 10.1 9.4   ANIONGAP 12 8     PTINR:  Recent Labs   Lab 05/05/25  1624   INR 1.0       Significant Procedures:   Dobutamine Stress Test with Color Flow: No results found for this or any previous visit.    None      Assessment & Plan  Encephalopathy  79 y.o.M with progressive acutely worsening confusion/tremors since 4/20. Previously AAOx4, now AAOx1  - neurology consulted, appreciate recommendations:  - MRI brain from last week was essentially non diagnostic  - Repeat MRI again without obvious new pathology   -working diagnosis is Parkinson's disease, as improving with Sinemet: iContinue two tabs TID  -LP concerning for possible meningitis. Continue Empiric treatment per Neurology recs   -EEG pending   - " "Delirium and fall precautions along with neuro checks  - PT/OT consult placed; return to [facility] upon discharge   Crohn's disease  - Per CT scan "thickening of the distal ileum to the surgical anastomosis with the large bowel. Inflammatory infectious ileitis are considerations. Recommend clinical correlation and follow-up. "  -CRP negative. No concern for flare.   -GI without concerns of confusion being caused by sterlara, confirmed with pharmacy     Generalized weakness  - sent from SNF  - progressive worsening weakness per son   - PT/OT ordered. Will need placement at discharge    Unintended weight loss  - reported per son  - recently started gluten free diet per recommendations from GI    Hyponatremia  Hyponatremia is likely due to Dehydration/hypovolemia. The patient's most recent sodium results are listed below.  Recent Labs     05/06/25  1423 05/07/25  0757   * 132*     Maxime  - Monitor sodium Daily.   - Patient hyponatremia is stable    Essential hypertension  Patient's blood pressure range in the last 24 hours was: BP  Min: 127/81  Max: 148/84.The patient's inpatient anti-hypertensive regimen is listed below:  Current Antihypertensives  amLODIPine tablet 10 mg, Daily, Oral    Plan  - BP is controlled, no changes needed to their regimen    Parkinsonism  Suspect symptoms are at least partially due to Parkinson's disease. Continue Sinemet as above.     VTE Risk Mitigation (From admission, onward)           Ordered     enoxaparin injection 40 mg  Daily         05/01/25 0827     IP VTE HIGH RISK PATIENT  Once         05/01/25 0827     Place sequential compression device  Until discontinued         05/01/25 0827                    Discharge Planning   ERNIE: 5/10/2025     Code Status: Full Code   Medical Readiness for Discharge Date:   Discharge Plan A: Rehab   Discharge Delays: None known at this time      Hill Steen MD  Department of Hospital Medicine   [facility] - Internal Medicine Telemetry    "

## 2025-05-07 NOTE — ASSESSMENT & PLAN NOTE
Vince Huffman is a 79 year old male with history of chron's disease, SBO, hypothyroidism, and CAD  presenting from [facility] with encephalopathy. Exam notable for disorientation to place, time, and situation. He is minimally responsive and will answer with one word. There is asterixis in his upper extremities and intention tremor most notably in the left hand. No hyperreflexia. Ptosis of left eye though no extraocular movement abnormalities/pupillary discrepancy to suggest third nerve palsy and mehran's respectively.     5/7: Repeat MRI also affected by motion artifact though no findings of acute stroke. Ammonia 44. UDS/UA unremarkable. Patient improved after sinemet trial; we gradually increase the dose to 2 tablets TID. Repeat LP attempt revealed wbc 29, lymph 66, glucose 46, and protein 60.     Recommendations:  - Started empiric meningitic coverage with vancomycin, ceftriaxone, and ampicillin (consider de-escalating antibiotics)  - Dc acyclovir   - Discussing case with Infectious Disease   - Increased carbidopa-levodopa 1.5 tablet 25 -100 mg TID to 2 tablets TID  - Ordered labs including: vitamin B1, B12 (456), folate (16.5), ck (17), treponema (-), vitamin E, zinc (65), copper, celiac panel   - Findings discussed with daughter over the phone   - Delirium and fall precautions   - Pending additional csf work up

## 2025-05-07 NOTE — SUBJECTIVE & OBJECTIVE
Past Medical History:   Diagnosis Date    Ankylosing spondylitis of unspecified sites in spine     Anxiety disorder, unspecified     BMI 21.0-21.9, adult 04/14/2025    Crohn's disease     Gout, unspecified     Heart disease     History of skin cancer 2001    squamous cell carcinoma left cheek    Hypertension     Hypothyroidism, unspecified     Psoriatic arthritis      Past Surgical History:   Procedure Laterality Date    ANGIOGRAM, CORONARY, WITH LEFT HEART CATHETERIZATION      APPENDECTOMY      APPENDECTOMY  2007    COLONOSCOPY N/A     ENDOSCOPY N/A     RIGHT HEMICOLECTOMY  2013    SMALL INTESTINE SURGERY  2007    30.5 cm of small bowel removed     Review of patient's allergies indicates:   Allergen Reactions    Gluten Diarrhea    Lactose        Scheduled Medications:    acyclovir  10 mg/kg Intravenous Q8H    amLODIPine  10 mg Oral Daily    ampicillin IV (PEDS and ADULTS)  2 g Intravenous Q4H    carbidopa-levodopa  mg  2 tablet Oral TID    cefTRIAXone (Rocephin) IV (PEDS and ADULTS)  2 g Intravenous Q12H    enoxparin  40 mg Subcutaneous Daily    polyethylene glycol  17 g Oral BID       PRN Medications:   Current Facility-Administered Medications:     acetaminophen, 1,000 mg, Oral, Q8H PRN    acetaminophen, 650 mg, Oral, Q4H PRN    ALPRAZolam, 0.5 mg, Oral, BID PRN    dextrose 50%, 12.5 g, Intravenous, PRN    dextrose 50%, 25 g, Intravenous, PRN    gadobutroL, 7 mL, Intravenous, ONCE PRN    glucagon (human recombinant), 1 mg, Intramuscular, PRN    glucose, 16 g, Oral, PRN    glucose, 24 g, Oral, PRN    LIDOcaine HCL 10 mg/ml (1%), 5 mL, Other, On Call Procedure    melatonin, 6 mg, Oral, Nightly PRN    naloxone, 0.02 mg, Intravenous, PRN    ondansetron, 8 mg, Oral, Q8H PRN    prochlorperazine, 5 mg, Intravenous, Q6H PRN    sodium chloride 0.9%, 10 mL, Intravenous, Q12H PRN    Pharmacy to dose Vancomycin consult, , , Once **AND** vancomycin - pharmacy to dose, , Intravenous, pharmacy to manage  frequency    Family History    None       Tobacco Use    Smoking status: Never    Smokeless tobacco: Never   Substance and Sexual Activity    Alcohol use: No    Drug use: No    Sexual activity: Never     Partners: Female     Review of Systems   Constitutional:  Positive for activity change.   Musculoskeletal:  Positive for gait problem.   Neurological:  Positive for weakness.   Psychiatric/Behavioral:  Positive for confusion and decreased concentration.      Objective:     Vital Signs (Most Recent):  Temp: 98.2 °F (36.8 °C) (05/07/25 1133)  Pulse: 110 (05/07/25 1133)  Resp: 18 (05/07/25 1133)  BP: (!) 147/70 (05/07/25 1133)  SpO2: 96 % (05/07/25 1133)    Vital Signs (24h Range):  Temp:  [97.6 °F (36.4 °C)-99.2 °F (37.3 °C)] 98.2 °F (36.8 °C)  Pulse:  [] 110  Resp:  [16-18] 18  SpO2:  [95 %-98 %] 96 %  BP: (127-148)/(70-84) 147/70     Body mass index is 19.71 kg/m².     Physical Exam  Vitals and nursing note reviewed.   Pulmonary:      Effort: Respiratory distress present.   Musculoskeletal:      Cervical back: Normal range of motion and neck supple.      Comments: MARIA DEL ROSARIO BUE. Pt not cooperative with lifting BUE. Able to lift BLE.    Skin:     General: Skin is warm and dry.      Capillary Refill: Capillary refill takes 2 to 3 seconds.   Neurological:      General: No focal deficit present.      Mental Status: He is alert.      GCS: GCS eye subscore is 4. GCS verbal subscore is 4. GCS motor subscore is 4.      Motor: Weakness present.      Coordination: Coordination abnormal.      Gait: Gait abnormal.   Psychiatric:         Mood and Affect: Mood normal.         Behavior: Behavior normal.               Diagnostic Results: Labs: Reviewed

## 2025-05-07 NOTE — PLAN OF CARE
Problem: Adult Inpatient Plan of Care  Goal: Plan of Care Review  Outcome: Progressing  Goal: Patient-Specific Goal (Individualized)  Outcome: Progressing  Goal: Absence of Hospital-Acquired Illness or Injury  Outcome: Progressing  Goal: Optimal Comfort and Wellbeing  Outcome: Progressing  Goal: Readiness for Transition of Care  Outcome: Progressing       Problem: Skin Injury Risk Increased  Goal: Skin Health and Integrity  Outcome: Progressing       No acute events this shift. Pt  AAO x 1, VS stable. Medications given; IV abx cont. Fall camera at bedside. Side rails up x 3, bed locked and low, bed alarm set, call light within reach. Plan of care ongoing.

## 2025-05-07 NOTE — PLAN OF CARE
Problem: Adult Inpatient Plan of Care  Goal: Plan of Care Review  Outcome: Progressing     Problem: Adult Inpatient Plan of Care  Goal: Readiness for Transition of Care  Outcome: Progressing     Problem: Skin Injury Risk Increased  Goal: Skin Health and Integrity  Outcome: Progressing      risk factors

## 2025-05-08 LAB
IGA SERPL-MCNC: 466 MG/DL (ref 40–350)
W COPPER: 1304 UG/L

## 2025-05-08 PROCEDURE — 95714 VEEG EA 12-26 HR UNMNTR: CPT

## 2025-05-08 PROCEDURE — 99223 1ST HOSP IP/OBS HIGH 75: CPT | Mod: GC,,, | Performed by: STUDENT IN AN ORGANIZED HEALTH CARE EDUCATION/TRAINING PROGRAM

## 2025-05-08 PROCEDURE — 82784 ASSAY IGA/IGD/IGG/IGM EACH: CPT

## 2025-05-08 PROCEDURE — G0545 PR VISIT INHERENT TO INPT OR OBS CARE, INFECTIOUS DISEASE: HCPCS | Mod: ,,, | Performed by: STUDENT IN AN ORGANIZED HEALTH CARE EDUCATION/TRAINING PROGRAM

## 2025-05-08 PROCEDURE — 30233S1 TRANSFUSION OF NONAUTOLOGOUS GLOBULIN INTO PERIPHERAL VEIN, PERCUTANEOUS APPROACH: ICD-10-PCS | Performed by: PSYCHIATRY & NEUROLOGY

## 2025-05-08 PROCEDURE — 20600001 HC STEP DOWN PRIVATE ROOM

## 2025-05-08 PROCEDURE — 36415 COLL VENOUS BLD VENIPUNCTURE: CPT | Performed by: INTERNAL MEDICINE

## 2025-05-08 PROCEDURE — 25000003 PHARM REV CODE 250

## 2025-05-08 PROCEDURE — 36415 COLL VENOUS BLD VENIPUNCTURE: CPT

## 2025-05-08 PROCEDURE — 63600175 PHARM REV CODE 636 W HCPCS

## 2025-05-08 PROCEDURE — 97112 NEUROMUSCULAR REEDUCATION: CPT

## 2025-05-08 PROCEDURE — 97535 SELF CARE MNGMENT TRAINING: CPT

## 2025-05-08 PROCEDURE — 25000003 PHARM REV CODE 250: Performed by: INTERNAL MEDICINE

## 2025-05-08 PROCEDURE — 95700 EEG CONT REC W/VID EEG TECH: CPT

## 2025-05-08 PROCEDURE — 97110 THERAPEUTIC EXERCISES: CPT

## 2025-05-08 PROCEDURE — 97116 GAIT TRAINING THERAPY: CPT

## 2025-05-08 PROCEDURE — 95720 EEG PHY/QHP EA INCR W/VEEG: CPT | Mod: ,,, | Performed by: PSYCHIATRY & NEUROLOGY

## 2025-05-08 PROCEDURE — 86682 HELMINTH ANTIBODY: CPT | Performed by: INTERNAL MEDICINE

## 2025-05-08 PROCEDURE — 99232 SBSQ HOSP IP/OBS MODERATE 35: CPT | Mod: ,,, | Performed by: PSYCHIATRY & NEUROLOGY

## 2025-05-08 RX ORDER — SODIUM CHLORIDE 9 MG/ML
INJECTION, SOLUTION INTRAVENOUS CONTINUOUS
Status: DISCONTINUED | OUTPATIENT
Start: 2025-05-08 | End: 2025-05-10

## 2025-05-08 RX ORDER — DIPHENHYDRAMINE HYDROCHLORIDE 50 MG/ML
25 INJECTION, SOLUTION INTRAMUSCULAR; INTRAVENOUS
Status: COMPLETED | OUTPATIENT
Start: 2025-05-08 | End: 2025-05-12

## 2025-05-08 RX ADMIN — DIPHENHYDRAMINE HYDROCHLORIDE 25 MG: 50 INJECTION, SOLUTION INTRAMUSCULAR; INTRAVENOUS at 10:05

## 2025-05-08 RX ADMIN — HUMAN IMMUNOGLOBULIN G 25 G: 20 LIQUID INTRAVENOUS at 10:05

## 2025-05-08 RX ADMIN — CEFTRIAXONE 2 G: 2 INJECTION, POWDER, FOR SOLUTION INTRAMUSCULAR; INTRAVENOUS at 10:05

## 2025-05-08 RX ADMIN — POLYETHYLENE GLYCOL 3350 17 G: 17 POWDER, FOR SOLUTION ORAL at 08:05

## 2025-05-08 RX ADMIN — ACYCLOVIR SODIUM 640 MG: 50 INJECTION, SOLUTION INTRAVENOUS at 03:05

## 2025-05-08 RX ADMIN — AMPICILLIN 2 G: 2 INJECTION, POWDER, FOR SOLUTION INTRAMUSCULAR; INTRAVENOUS at 10:05

## 2025-05-08 RX ADMIN — AMPICILLIN 2 G: 2 INJECTION, POWDER, FOR SOLUTION INTRAMUSCULAR; INTRAVENOUS at 05:05

## 2025-05-08 RX ADMIN — CARBIDOPA AND LEVODOPA 2 TABLET: 25; 100 TABLET ORAL at 03:05

## 2025-05-08 RX ADMIN — ENOXAPARIN SODIUM 40 MG: 40 INJECTION SUBCUTANEOUS at 04:05

## 2025-05-08 RX ADMIN — SODIUM CHLORIDE: 9 INJECTION, SOLUTION INTRAVENOUS at 10:05

## 2025-05-08 RX ADMIN — AMLODIPINE BESYLATE 10 MG: 10 TABLET ORAL at 08:05

## 2025-05-08 RX ADMIN — CARBIDOPA AND LEVODOPA 2 TABLET: 25; 100 TABLET ORAL at 08:05

## 2025-05-08 RX ADMIN — AMPICILLIN 2 G: 2 INJECTION, POWDER, FOR SOLUTION INTRAMUSCULAR; INTRAVENOUS at 03:05

## 2025-05-08 NOTE — SUBJECTIVE & OBJECTIVE
Past Medical History:   Diagnosis Date    Ankylosing spondylitis of unspecified sites in spine     Anxiety disorder, unspecified     BMI 21.0-21.9, adult 04/14/2025    Crohn's disease     Gout, unspecified     Heart disease     History of skin cancer 2001    squamous cell carcinoma left cheek    Hypertension     Hypothyroidism, unspecified     Psoriatic arthritis        Past Surgical History:   Procedure Laterality Date    ANGIOGRAM, CORONARY, WITH LEFT HEART CATHETERIZATION      APPENDECTOMY      APPENDECTOMY  2007    COLONOSCOPY N/A     ENDOSCOPY N/A     RIGHT HEMICOLECTOMY  2013    SMALL INTESTINE SURGERY  2007    30.5 cm of small bowel removed       Review of patient's allergies indicates:   Allergen Reactions    Gluten Diarrhea    Lactose        Medications:  Medications Prior to Admission   Medication Sig    acetaminophen (TYLENOL) 500 MG tablet Take 500 mg by mouth every 6 (six) hours as needed.    ALPRAZolam (XANAX XR) 0.5 MG Tb24 Take 0.5 mg by mouth once daily.    amLODIPine (NORVASC) 10 MG tablet Take 1 tablet (10 mg total) by mouth once daily.    ustekinumab (STELARA) 90 mg/mL Syrg syringe Inject 1 mL (90 mg total) into the skin every 8 weeks.     Antibiotics (From admission, onward)      None          Antifungals (From admission, onward)      None          Antivirals (From admission, onward)      None               There is no immunization history on file for this patient.    Family History    None       Social History     Socioeconomic History    Marital status:    Tobacco Use    Smoking status: Never    Smokeless tobacco: Never   Substance and Sexual Activity    Alcohol use: No    Drug use: No    Sexual activity: Never     Partners: Female     Social Drivers of Health     Financial Resource Strain: Low Risk  (5/2/2025)    Overall Financial Resource Strain (CARDIA)     Difficulty of Paying Living Expenses: Not hard at all   Recent Concern: Financial Resource Strain - High Risk (5/1/2025)     Overall Financial Resource Strain (CARDIA)     Difficulty of Paying Living Expenses: Very hard   Food Insecurity: No Food Insecurity (5/2/2025)    Hunger Vital Sign     Worried About Running Out of Food in the Last Year: Never true     Ran Out of Food in the Last Year: Never true   Transportation Needs: No Transportation Needs (5/2/2025)    PRAPARE - Transportation     Lack of Transportation (Medical): No     Lack of Transportation (Non-Medical): No   Physical Activity: Inactive (5/2/2025)    Exercise Vital Sign     Days of Exercise per Week: 0 days     Minutes of Exercise per Session: 0 min   Stress: No Stress Concern Present (5/2/2025)    [employer] - Occupational Stress Questionnaire     Feeling of Stress : Not at all   Housing Stability: Low Risk  (5/2/2025)    Housing Stability Vital Sign     Unable to Pay for Housing in the Last Year: No     Homeless in the Last Year: No     Review of Systems   Unable to perform ROS: Mental status change   Constitutional:  Negative for chills and fever.     Objective:     Vital Signs (Most Recent):  Temp: 97.6 °F (36.4 °C) (05/08/25 1141)  Pulse: 99 (05/08/25 1141)  Resp: 18 (05/08/25 1141)  BP: (!) 150/76 (05/08/25 1141)  SpO2: 98 % (05/08/25 1141) Vital Signs (24h Range):  Temp:  [97.6 °F (36.4 °C)-98.5 °F (36.9 °C)] 97.6 °F (36.4 °C)  Pulse:  [] 99  Resp:  [18] 18  SpO2:  [95 %-98 %] 98 %  BP: (119-150)/(66-81) 150/76     Weight: 64.1 kg (141 lb 5 oz)  Body mass index is 19.71 kg/m².    Estimated Creatinine Clearance: 50.2 mL/min (based on SCr of 1.1 mg/dL).     Physical Exam  Vitals and nursing note reviewed.   Constitutional:       General: He is not in acute distress.     Appearance: Normal appearance. He is ill-appearing. He is not toxic-appearing or diaphoretic.   HENT:      Head: Normocephalic and atraumatic.      Mouth/Throat:      Mouth: Mucous membranes are moist.      Pharynx: Oropharynx is clear. No oropharyngeal exudate.  "  Eyes:      General: No scleral icterus.     Extraocular Movements: Extraocular movements intact.      Conjunctiva/sclera: Conjunctivae normal.   Cardiovascular:      Rate and Rhythm: Normal rate and regular rhythm.      Pulses: Normal pulses.      Heart sounds: Normal heart sounds. No murmur heard.  Pulmonary:      Effort: Pulmonary effort is normal. No respiratory distress.      Breath sounds: Normal breath sounds. No wheezing.   Abdominal:      General: Abdomen is flat. Bowel sounds are normal. There is no distension.      Tenderness: There is no abdominal tenderness.   Musculoskeletal:         General: No swelling.      Right lower leg: No edema.      Left lower leg: No edema.   Skin:     General: Skin is warm and dry.   Neurological:      Mental Status: He is alert.      Cranial Nerves: No dysarthria or facial asymmetry.      Motor: Weakness (generalized) and tremor present.   Psychiatric:         Mood and Affect: Mood normal.         Behavior: Behavior normal.        Significant Labs:   CBC: No results for input(s): "WBC", "HGB", "HCT", "PLT" in the last 48 hours.  CMP:   Recent Labs   Lab 05/06/25  1423 05/07/25  0757   * 132*   K 4.6 4.3   CL 94* 97   CO2 26 27    103   BUN 27* 21   CREATININE 1.3 1.1   CALCIUM 10.1 9.4   ANIONGAP 12 8     Urine Studies:   Recent Labs   Lab 05/02/25  1047   COLORU Yellow   APPEARANCEUA Clear   PHUR 6.0   SPECGRAV 1.020   PROTEINUA 1+*   GLUCUA Negative   BILIRUBINUA Negative   OCCULTUA Negative   NITRITE Negative   UROBILINOGEN Negative   LEUKOCYTESUR Negative   RBCUA <1   WBCUA 2   BACTERIA Occasional   HYALINECASTS 0     All pertinent labs within the past 24 hours have been reviewed.    Significant Imaging: I have reviewed all pertinent imaging results/findings within the past 24 hours.  "

## 2025-05-08 NOTE — ASSESSMENT & PLAN NOTE
Vince Huffman is a 79 year old male with history of chron's disease, SBO, hypothyroidism, and CAD  presenting from [facility] with encephalopathy. Exam notable for disorientation to place, time, and situation. He is minimally responsive and will answer with one word. There is asterixis in his upper extremities, axial rigidity/extremity/rigidity, masked facies, and intention tremor most notably in the left hand. No hyperreflexia. Ptosis of left eye though no extraocular movement abnormalities/pupillary discrepancy to suggest third nerve palsy and mehran's respectively. Unclear cause of encephalopathy at this time, though acute presentation with concurrent parkinsonism suggest autoimmune etiology.    5/8: Repeat MRI also affected by motion artifact though no findings of acute stroke. Ammonia 44. UDS/UA unremarkable. Patient improved after sinemet trial; we gradually increase the dose to 2 tablets TID. Repeat LP attempt revealed wbc 29, lymph 66, glucose 46, and protein 60. Celiac panel + 386.    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 (pending encephalopathy panel)   - IVF and IV benadryl co-administered with IVIG  - Pending strongyloides IgG  - Increased carbidopa-levodopa 1.5 tablet 25 -100 mg TID to 2 tablets TID  - Ordered labs including: vitamin B1, B12 (456), folate (16.5), ck (17), treponema (-), vitamin E, zinc (65), copper(1304), celiac panel (+ 386)  - Delirium and fall precautions

## 2025-05-08 NOTE — PROGRESS NOTES
"[facility] Tele[facility]  Neurology  Progress Note    Patient Name: Vince Huffman  MRN: [id number]  Admission Date: 4/30/2025  Hospital Length of Stay: 7 days  Code Status: Full Code   Attending Provider: Elian Morejon MD  Primary Care Physician: Leland Porras MD   Principal Problem:Encephalopathy    HPI:   Vince Huffman is a 79 year old male with history of chron's disease, SBO, hypothyroidism, and CAD  presenting from [facility] with encephalopathy. He initially presented to [facility] for impaired mobility and difficulty with ADL's 2/2 generalized weakness. He was reportedly found confused today A&O x 1 (said the year is "1895") but is normally A&O x4. At this time he also expressed generalized abdominal pain. Recent ultrasounds of the lower extremity did not reveal DVT. MRI from last week was non diagnostic. UA unremarkable and CXR normal. CT abdomen and pelvis reveals wall thickening of the distal ileum consistent with possible infectious ileitis. Neurology consulted for further evaluation of encephalopathy.     Overview/Hospital Course:  No notes on file      Past Medical History:   Diagnosis Date    Ankylosing spondylitis of unspecified sites in spine     Anxiety disorder, unspecified     BMI 21.0-21.9, adult 04/14/2025    Crohn's disease     Gout, unspecified     Heart disease     History of skin cancer 2001    squamous cell carcinoma left cheek    Hypertension     Hypothyroidism, unspecified     Psoriatic arthritis        Past Surgical History:   Procedure Laterality Date    ANGIOGRAM, CORONARY, WITH LEFT HEART CATHETERIZATION      APPENDECTOMY      APPENDECTOMY  2007    COLONOSCOPY N/A     ENDOSCOPY N/A     RIGHT HEMICOLECTOMY  2013    SMALL INTESTINE SURGERY  2007    30.5 cm of small bowel removed       Review of patient's allergies indicates:   Allergen Reactions    Gluten Diarrhea    Lactose        Current Neurological Medications: see below    No current facility-administered " medications on file prior to encounter.     Current Outpatient Medications on File Prior to Encounter   Medication Sig    acetaminophen (TYLENOL) 500 MG tablet Take 500 mg by mouth every 6 (six) hours as needed.    ALPRAZolam (XANAX XR) 0.5 MG Tb24 Take 0.5 mg by mouth once daily.    amLODIPine (NORVASC) 10 MG tablet Take 1 tablet (10 mg total) by mouth once daily.    ustekinumab (STELARA) 90 mg/mL Syrg syringe Inject 1 mL (90 mg total) into the skin every 8 weeks.     Family History    None       Tobacco Use    Smoking status: Never    Smokeless tobacco: Never   Substance and Sexual Activity    Alcohol use: No    Drug use: No    Sexual activity: Never     Partners: Female     Review of Systems   Constitutional:  Positive for activity change. Negative for fever.   Respiratory:  Negative for shortness of breath.    Cardiovascular:  Negative for chest pain.   Gastrointestinal:  Positive for abdominal pain.   Neurological:  Positive for tremors and speech difficulty.   Psychiatric/Behavioral:  Positive for behavioral problems, confusion and decreased concentration.      Objective:     Vital Signs (Most Recent):  Temp: 98.2 °F (36.8 °C) (05/08/25 1553)  Pulse: 103 (05/08/25 1553)  Resp: 18 (05/08/25 1553)  BP: 135/82 (05/08/25 1553)  SpO2: (!) 94 % (05/08/25 1553) Vital Signs (24h Range):  Temp:  [97.6 °F (36.4 °C)-98.5 °F (36.9 °C)] 98.2 °F (36.8 °C)  Pulse:  [] 103  Resp:  [18] 18  SpO2:  [94 %-98 %] 94 %  BP: (119-150)/(66-82) 135/82     Weight: 64.1 kg (141 lb 5 oz)  Body mass index is 19.71 kg/m².     Physical Exam  Constitutional:       Comments: The lens of his glasses for the left eye is missing. He appears confused and responds minimally to questions.    Eyes:      Pupils: Pupils are equal, round, and reactive to light.      Comments: Ptosis of left eye, unclear if at his baseline.   Pupils reactive to light, no discrepancy from left to right to suggest mehran's.  No dilation of impaired extraocular  "movements to suggest third nerve palsy.    Pulmonary:      Effort: Pulmonary effort is normal.   Neurological:      Mental Status: He is alert. He is disoriented.      Deep Tendon Reflexes:      Reflex Scores:       Brachioradialis reflexes are 2+ on the right side and 2+ on the left side.       Patellar reflexes are 2+ on the right side and 2+ on the left side.         NEUROLOGICAL EXAMINATION:     MENTAL STATUS   Oriented to person.   Disoriented to place.   Disoriented to time.   Attention: decreased. Concentration: decreased.   Level of consciousness: alert    CRANIAL NERVES     CN III, IV, VI   Pupils are equal, round, and reactive to light.       Mild ptosis of left eye without pupillary abnormalities     MOTOR EXAM   Overall muscle tone: increased  Right arm tone: increased  Left arm tone: increased       Asterixis with tremor most notably in the left arm     REFLEXES     Reflexes   Right brachioradialis: 2+  Left brachioradialis: 2+  Right patellar: 2+  Left patellar: 2+    SENSORY EXAM   Light touch normal.     GAIT AND COORDINATION     Tremor   Intention tremor: present      Significant Labs: CBC:   No results for input(s): "WBC", "HGB", "HCT", "PLT" in the last 48 hours.    CMP:   Recent Labs   Lab 05/07/25  0757      *   K 4.3   CL 97   CO2 27   BUN 21   CREATININE 1.1   CALCIUM 9.4   ANIONGAP 8       Significant Imaging: I have reviewed all pertinent imaging results/findings within the past 24 hours.  Assessment and Plan:     * Encephalopathy  Vince Huffman is a 79 year old male with history of chron's disease, SBO, hypothyroidism, and CAD  presenting from [facility] with encephalopathy. Exam notable for disorientation to place, time, and situation. He is minimally responsive and will answer with one word. There is asterixis in his upper extremities, axial rigidity/extremity/rigidity, masked facies, and intention tremor most notably in the left hand. No hyperreflexia. Ptosis of left eye " though no extraocular movement abnormalities/pupillary discrepancy to suggest third nerve palsy and mehran's respectively. Unclear cause of encephalopathy at this time, though acute presentation with concurrent parkinsonism suggest autoimmune etiology.    5/8: Repeat MRI also affected by motion artifact though no findings of acute stroke. Ammonia 44. UDS/UA unremarkable. Patient improved after sinemet trial; we gradually increase the dose to 2 tablets TID. Repeat LP attempt revealed wbc 29, lymph 66, glucose 46, and protein 60. Celiac panel + 386.    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 (pending encephalopathy panel)   - IVF and IV benadryl co-administered with IVIG  - Pending strongyloides IgG  - Increased carbidopa-levodopa 1.5 tablet 25 -100 mg TID to 2 tablets TID  - Ordered labs including: vitamin B1, B12 (456), folate (16.5), ck (17), treponema (-), vitamin E, zinc (65), copper(1304), celiac panel (+ 386)  - Delirium and fall precautions             VTE Risk Mitigation (From admission, onward)           Ordered     enoxaparin injection 40 mg  Daily         05/01/25 0827     IP VTE HIGH RISK PATIENT  Once         05/01/25 0827     Place sequential compression device  Until discontinued         05/01/25 0827                    Elian López MD  Neurology  [facility] Tele[facility]

## 2025-05-08 NOTE — PLAN OF CARE
Problem: Adult Inpatient Plan of Care  Goal: Plan of Care Review  Outcome: Progressing  Goal: Patient-Specific Goal (Individualized)  Outcome: Progressing  Goal: Absence of Hospital-Acquired Illness or Injury  Outcome: Progressing  Goal: Optimal Comfort and Wellbeing  Outcome: Progressing  Goal: Readiness for Transition of Care  Outcome: Progressing       Problem: Skin Injury Risk Increased  Goal: Skin Health and Integrity  Outcome: Progressing

## 2025-05-08 NOTE — SUBJECTIVE & OBJECTIVE
Past Medical History:   Diagnosis Date    Ankylosing spondylitis of unspecified sites in spine     Anxiety disorder, unspecified     BMI 21.0-21.9, adult 04/14/2025    Crohn's disease     Gout, unspecified     Heart disease     History of skin cancer 2001    squamous cell carcinoma left cheek    Hypertension     Hypothyroidism, unspecified     Psoriatic arthritis        Past Surgical History:   Procedure Laterality Date    ANGIOGRAM, CORONARY, WITH LEFT HEART CATHETERIZATION      APPENDECTOMY      APPENDECTOMY  2007    COLONOSCOPY N/A     ENDOSCOPY N/A     RIGHT HEMICOLECTOMY  2013    SMALL INTESTINE SURGERY  2007    30.5 cm of small bowel removed       Review of patient's allergies indicates:   Allergen Reactions    Gluten Diarrhea    Lactose        Current Neurological Medications: see below    No current facility-administered medications on file prior to encounter.     Current Outpatient Medications on File Prior to Encounter   Medication Sig    acetaminophen (TYLENOL) 500 MG tablet Take 500 mg by mouth every 6 (six) hours as needed.    ALPRAZolam (XANAX XR) 0.5 MG Tb24 Take 0.5 mg by mouth once daily.    amLODIPine (NORVASC) 10 MG tablet Take 1 tablet (10 mg total) by mouth once daily.    ustekinumab (STELARA) 90 mg/mL Syrg syringe Inject 1 mL (90 mg total) into the skin every 8 weeks.     Family History    None       Tobacco Use    Smoking status: Never    Smokeless tobacco: Never   Substance and Sexual Activity    Alcohol use: No    Drug use: No    Sexual activity: Never     Partners: Female     Review of Systems   Constitutional:  Positive for activity change. Negative for fever.   Respiratory:  Negative for shortness of breath.    Cardiovascular:  Negative for chest pain.   Gastrointestinal:  Positive for abdominal pain.   Neurological:  Positive for tremors and speech difficulty.   Psychiatric/Behavioral:  Positive for behavioral problems, confusion and decreased concentration.      Objective:     Vital  Signs (Most Recent):  Temp: 98.2 °F (36.8 °C) (05/08/25 1553)  Pulse: 103 (05/08/25 1553)  Resp: 18 (05/08/25 1553)  BP: 135/82 (05/08/25 1553)  SpO2: (!) 94 % (05/08/25 1553) Vital Signs (24h Range):  Temp:  [97.6 °F (36.4 °C)-98.5 °F (36.9 °C)] 98.2 °F (36.8 °C)  Pulse:  [] 103  Resp:  [18] 18  SpO2:  [94 %-98 %] 94 %  BP: (119-150)/(66-82) 135/82     Weight: 64.1 kg (141 lb 5 oz)  Body mass index is 19.71 kg/m².     Physical Exam  Constitutional:       Comments: The lens of his glasses for the left eye is missing. He appears confused and responds minimally to questions.    Eyes:      Pupils: Pupils are equal, round, and reactive to light.      Comments: Ptosis of left eye, unclear if at his baseline.   Pupils reactive to light, no discrepancy from left to right to suggest mehran's.  No dilation of impaired extraocular movements to suggest third nerve palsy.    Pulmonary:      Effort: Pulmonary effort is normal.   Neurological:      Mental Status: He is alert. He is disoriented.      Deep Tendon Reflexes:      Reflex Scores:       Brachioradialis reflexes are 2+ on the right side and 2+ on the left side.       Patellar reflexes are 2+ on the right side and 2+ on the left side.         NEUROLOGICAL EXAMINATION:     MENTAL STATUS   Oriented to person.   Disoriented to place.   Disoriented to time.   Attention: decreased. Concentration: decreased.   Level of consciousness: alert    CRANIAL NERVES     CN III, IV, VI   Pupils are equal, round, and reactive to light.       Mild ptosis of left eye without pupillary abnormalities     MOTOR EXAM   Overall muscle tone: increased  Right arm tone: increased  Left arm tone: increased       Asterixis with tremor most notably in the left arm     REFLEXES     Reflexes   Right brachioradialis: 2+  Left brachioradialis: 2+  Right patellar: 2+  Left patellar: 2+    SENSORY EXAM   Light touch normal.     GAIT AND COORDINATION     Tremor   Intention tremor:  "present      Significant Labs: CBC:   No results for input(s): "WBC", "HGB", "HCT", "PLT" in the last 48 hours.    CMP:   Recent Labs   Lab 05/07/25  0757      *   K 4.3   CL 97   CO2 27   BUN 21   CREATININE 1.1   CALCIUM 9.4   ANIONGAP 8       Significant Imaging: I have reviewed all pertinent imaging results/findings within the past 24 hours.  "

## 2025-05-08 NOTE — PT/OT/SLP PROGRESS
Physical Therapy Treatment    Patient Name:  Vince Huffman   MRN:  [id number]    Recommendations:     Discharge Recommendations: High Intensity Therapy  Discharge Equipment Recommendations: bedside commode, walker, rolling  Barriers to discharge: None    Assessment:     Vince Huffman is a 79 y.o. male admitted with a medical diagnosis of Encephalopathy.  He presents with the following impairments/functional limitations: weakness, impaired endurance, impaired self care skills, impaired functional mobility, gait instability, impaired balance, impaired cognition, decreased coordination, decreased upper extremity function, decreased lower extremity function, decreased safety awareness Pt. cooperative and tolerated treatment well, but appeared more confused today. Pt. progressing with mobility with HHA-Max A amb. ~10'.    Rehab Prognosis: Good; patient would benefit from acute skilled PT services to address these deficits and reach maximum level of function.    Recent Surgery: * No surgery found *      Plan:     During this hospitalization, patient to be seen 4 x/week to address the identified rehab impairments via gait training, therapeutic activities, therapeutic exercises, neuromuscular re-education and progress toward the following goals:    Plan of Care Expires:  06/02/25    Subjective     Chief Complaint: no new c/o  Patient/Family Comments/goals: pt. Agreeable to PT  Pain/Comfort:  Pain Rating 1: 0/10  Pain Rating Post-Intervention 1: 0/10      Objective:     Communicated with nursing prior to session.  Patient found supine with Condom Catheter, peripheral IV, telemetry upon PT entry to room.     General Precautions: Standard, fall  Orthopedic Precautions: N/A  Braces: N/A  Respiratory Status: Room air     Functional Mobility:  Bed Mobility:     Rolling Right: moderate assistance  Scooting: moderate assistance  Supine to Sit: moderate assistance  Transfers:     Bed to Chair: maximal assistance with  hand-held  assist and rolling walker  using  Stand Pivot  Gait: 3' with RW and Max A on first trial and 10' with HHA-Max A on second trial. Pt. Required assist for balance/guidance/safety/weight shifting. Pt. Amb. with narrow JOHN and ataxic gait pattern with poor initiation and increased knee flexion. Pt. had chair follow each gait trial.  Balance: fair-poor sitting with (R) lateral and posterior lean, poor standing      AM-PAC 6 CLICK MOBILITY  Turning over in bed (including adjusting bedclothes, sheets and blankets)?: 2  Sitting down on and standing up from a chair with arms (e.g., wheelchair, bedside commode, etc.): 2  Moving from lying on back to sitting on the side of the bed?: 2  Moving to and from a bed to a chair (including a wheelchair)?: 2  Need to walk in hospital room?: 2  Climbing 3-5 steps with a railing?: 2  Basic Mobility Total Score: 12       Treatment & Education:  Discussed pt.'s progress, goals, and POC. Pt. Had BM during each gait trial. Pt. Performed static standing in front of bedside chair to be cleaned up. PT assisted pt. To standing with HHA-Mod A x2 trials while OT assisted pt. with emma-care.    Patient left up in chair with all lines intact and call button in reach..    GOALS:   Multidisciplinary Problems       Physical Therapy Goals          Problem: Physical Therapy    Goal Priority Disciplines Outcome Interventions   Physical Therapy Goal     PT, PT/OT Progressing    Description: Goals to be met by: 2025     Patient will increase functional independence with mobility by performin. Supine to sit with MInimal Assistance  2. Sit to supine with MInimal Assistance  3. Sit to stand transfer with Minimal Assistance  4. Bed to chair transfer with Minimal Assistance using LRAD  5. Gait  x 25 feet with Minimal Assistance using LRAD.   6. Lower extremity exercise program x15 reps per handout, with assistance as needed                         DME Justifications:   Vince requires a commode for  home use because he is confined to a single room.   Vince's mobility limitation cannot be sufficiently resolved by the use of a cane. His functional mobility deficit can be sufficiently resolved with the use of a Rolling Walker. Patient's mobility limitation significantly impairs their ability to participate in one of more activities of daily living.  The use of a RW will significantly improve the patient's ability to participate in MRADLS and the patient will use it on regular basis in the home.    Time Tracking:     PT Received On: 05/08/25  PT Start Time: 0940     PT Stop Time: 1018  PT Total Time (min): 38 min     Billable Minutes: Gait Training 25 and Neuromuscular Re-education 13    Treatment Type: Treatment  PT/PTA: PT     Number of PTA visits since last PT visit: 0     05/08/2025

## 2025-05-08 NOTE — SUBJECTIVE & OBJECTIVE
Interval History:  Mentation the same.  Awaiting ID input.  Cultures no growth.  No acute issues reported.  Acyclovir discontinued per neurology's recommendation.    Review of Systems   Unable to perform ROS: Mental status change     Objective:     Vital Signs (Most Recent):  Temp: 98 °F (36.7 °C) (05/08/25 0028)  Pulse: 99 (05/08/25 0759)  Resp: 18 (05/08/25 0759)  BP: 138/77 (05/08/25 0759)  SpO2: 96 % (05/08/25 0759) Vital Signs (24h Range):  Temp:  [98 °F (36.7 °C)-98.5 °F (36.9 °C)] 98 °F (36.7 °C)  Pulse:  [] 99  Resp:  [18] 18  SpO2:  [95 %-97 %] 96 %  BP: (119-147)/(66-81) 138/77     Weight: 64.1 kg (141 lb 5 oz)  Body mass index is 19.71 kg/m².    Intake/Output Summary (Last 24 hours) at 5/8/2025 0821  Last data filed at 5/8/2025 0646  Gross per 24 hour   Intake 713 ml   Output 1050 ml   Net -337 ml      Physical Exam  Constitutional:       General: He is not in acute distress.     Appearance: He is ill-appearing.   HENT:      Head: Normocephalic.      Right Ear: External ear normal.      Left Ear: External ear normal.      Nose: Nose normal.   Cardiovascular:      Rate and Rhythm: Normal rate.      Heart sounds: Normal heart sounds.   Pulmonary:      Breath sounds: Normal breath sounds.   Abdominal:      Palpations: Abdomen is soft.      Tenderness: There is no abdominal tenderness.   Musculoskeletal:      Right lower leg: No edema.      Left lower leg: No edema.      Comments: SCD   Skin:     General: Skin is warm.   Neurological:      General: No focal deficit present.      Mental Status: He is alert.      Comments: Ax1. Tremors         MELD 3.0: 13 at 5/6/2025  2:23 PM  MELD-Na: 9 at 5/6/2025  2:23 PM  Calculated from:  Serum Creatinine: 1.3 mg/dL at 5/6/2025  2:23 PM  Serum Sodium: 132 mmol/L at 5/6/2025  2:23 PM  Total Bilirubin: 0.5 mg/dL (Using min of 1 mg/dL) at 5/4/2025  3:28 AM  Serum Albumin: 3.2 g/dL at 5/4/2025  3:28 AM  INR(ratio): 1 at 5/5/2025  4:24 PM  Age at listing (hypothetical):  "79 years  Sex: Male at 5/6/2025  2:23 PM      Significant Labs:  CBC:  No results for input(s): "WBC", "HGB", "HCT", "PLT" in the last 48 hours.  CMP:  Recent Labs   Lab 05/06/25  1423 05/07/25  0757   * 132*   K 4.6 4.3   CL 94* 97   CO2 26 27    103   BUN 27* 21   CREATININE 1.3 1.1   CALCIUM 10.1 9.4   ANIONGAP 12 8     PTINR:  No results for input(s): "INR" in the last 48 hours.    Significant Procedures:   Dobutamine Stress Test with Color Flow: No results found for this or any previous visit.      "

## 2025-05-08 NOTE — PLAN OF CARE
[facility] Tele[facility]  Discharge Reassessment    Primary Care Provider: Leland Porras MD    Expected Discharge Date: 5/13/2025    Reassessment (most recent)       Discharge Reassessment - 05/08/25 1601          Discharge Reassessment    Assessment Type Discharge Planning Reassessment (P)      Did the patient's condition or plan change since previous assessment? No (P)      Discharge Plan discussed with: Patient (P)      Communicated ERNIE with patient/caregiver Yes (P)      Discharge Plan A Rehab (P)      Discharge Plan B Skilled Nursing Facility (P)      DME Needed Upon Discharge  other (see comments) (P)    TBD    Transition of Care Barriers None (P)      Why the patient remains in the hospital Requires continued medical care (P)         Post-Acute Status    Post-Acute Authorization Placement (P)      Post-Acute Placement Status Pending post-acute provider review/more information requested (P)      Coverage Medicare A and B (P)      Patient choice form signed by patient/caregiver List with quality metrics by geographic area provided (P)      Discharge Delays None known at this time (P)                      Discharge Plan A and Plan B have been determined by review of patient's clinical status, future medical and therapeutic needs, and coverage/benefits for post-acute care in coordination with multidisciplinary team members.    Remi Schneider RN-CM  Case Management  [facility]  [phone]

## 2025-05-08 NOTE — PT/OT/SLP PROGRESS
Occupational Therapy   Co-Treatment  Co-treatment performed due to patient's multiple deficits/co-morbidities requiring two skilled therapists to appropriately and safely assess patient's strength and endurance while facilitating functional tasks in addition to accommodating for patient's activity tolerance.        Name: Vince Huffman  MRN: [id number]  Admitting Diagnosis:  Encephalopathy       Recommendations:     Discharge Recommendations: High Intensity Therapy  Discharge Equipment Recommendations:  bedside commode, walker, rolling  Barriers to discharge:  Decreased caregiver support    Assessment:     Vince Huffman is a 79 y.o. male with a medical diagnosis of Encephalopathy.  He presents with the following performance deficits affecting function are weakness, impaired endurance, impaired balance, impaired self care skills, impaired functional mobility, decreased safety awareness, impaired cognition, decreased upper extremity function, decreased lower extremity function, impaired coordination.     Pt agreeable to therapy and tolerated fairly. Pt was cooperative & demonstrated good participation. However, pt remains significantly limited in ADLs, functional mobility, and functional transfers and is currently not performing tasks at [facility]. Pt would continue to benefit from skilled OT services to maximize functional independence with ADLs and functional mobility, reduce caregiver burden, and facilitate safe discharge in the least restrictive environment.      Rehab Prognosis:  Good; patient would benefit from acute skilled OT services to address these deficits and reach maximum level of function.       Plan:     Patient to be seen 4 x/week to address the above listed problems via self-care/home management, therapeutic activities, therapeutic exercises, neuromuscular re-education  Plan of Care Expires: 05/24/25  Plan of Care Reviewed with: patient, daughter    Subjective     Chief Complaint: none  reported  Patient/Family Comments/goals: pt agreeable to therapy session  Pain/Comfort:  Pain Rating 1: 0/10    Objective:     Communicated with: nurse prior to session.  Patient found HOB elevated with peripheral IV, Condom Catheter, telemetry & daughter present upon OT entry to room.    General Precautions: Standard, fall    Orthopedic Precautions:N/A  Braces: N/A  Respiratory Status: Room air     Occupational Performance:     Bed Mobility:    Patient completed Scooting back into chair with total assistance    Patient completed Supine to Sit with moderate assistance and 2 persons     Functional Mobility/Transfers: (gait belt utilized to maintain pt safety)  Patient completed 4 Sit -> Stand Transfers with maximal assistance with  hand-held assist    3 STS from BSC  1 from EOB    Patient completed Chair Transfer Step Transfer technique with maximal assistance and of 2 persons with  hand-held assist  Pt required max A & verbal cueing for LE sequencing & hand placement  Pt required OT to bring chair behind pt to complete transfer due fatigue & B LE  weakness    Functional Mobility: pt ambulated in room to simulate household environment to improve overall strength, coordination, activity tolerance & safety awareness required for participation/independence wit MRADLs/IADLs  Pt ambulated a total of 13 ft with max A using via HHA & chair follow provided    Pt began to have a BM while ambulating    Activities of Daily Living:  Upper Body Dressing: total assistance to don gown over back    Toileting: total assistance Pt is incontinent and began to have a bowel movmenet during functional mobility training  Pt required to be cleaned up twice. Pt stood with PT while OT assisted with bottom hygiene    AMPA 6 Click ADL: 11    Treatment & Education:  -Education on task modification to maximize safety and (I) during bed mobility and functional mobility/transfers  -Education on importance of OOB activity to improve/maintain  overall strength, activity tolerance and promote recovery  -Pt educated to call for assistance and to transfer with hospital staff only  -Provided education regarding role of OT & POC with pt verbalizing understanding. Pt had no further questions & when asked whether there were any concerns pt reported none.     Patient left up in chair with all lines intact, call button in reach, nurse notified, and daughter present    GOALS:   Multidisciplinary Problems       Occupational Therapy Goals          Problem: Occupational Therapy    Goal Priority Disciplines Outcome Interventions   Occupational Therapy Goal     OT, PT/OT Progressing    Description: Goals to be met by: 5/24/25     Patient will increase functional independence with ADLs by performing:    UE Dressing with Contact Guard Assistance.  LE Dressing with Minimal Assistance.  Grooming while bedside chair with Minimal Assistance.  Toileting from bedside commode with Minimal Assistance for hygiene and clothing management.   Sitting at edge of bed x5 minutes with Contact Guard Assistance for upright balance.  Rolling to Bilateral with Minimal Assistance.   Supine to sit with Minimal Assistance.  Step transfer with Moderate Assistance  Toilet transfer to bedside commode with Moderate Assistance.                         DME Justifications:   Vince requires a commode for home use because he is confined to a single room.    Time Tracking:     OT Date of Treatment: 05/08/25  OT Start Time: 0942  OT Stop Time: 1021  OT Total Time (min): 39 min    Billable Minutes:Self Care/Home Management 25  Therapeutic Exercise 14    OT/PAUL: OT          5/8/2025

## 2025-05-08 NOTE — CONSULTS
[facility] - Internal Medicine Telemetry  Infectious Disease  Consult Note    Patient Name: Vince Huffman  MRN: [id number]  Admission Date: 4/30/2025  Hospital Length of Stay: 7 days  Attending Physician: Elian Morejon MD  Primary Care Provider: Leland Porras MD     Isolation Status: No active isolations    Patient information was obtained from patient, past medical records, ER records, and primary team.      Inpatient consult to Infectious Diseases  Consult performed by: Brittany Martinez MD  Consult ordered by: Hill Steen MD        Assessment/Plan:     Neuro  * Encephalopathy  79M with hx of HTN, hypothyroid, crohn's disease, CAD, SBO who presented with acute encephalopathy from [facility] on 4/30. Reportedly A&O x4 at baseline with progressively worsening cognitive function recently with acute change of mental status on 5/2. Attempted LP x2 with success on 5/6. CSF cell count with diff showed 29 WBC, 66% lymphocytes, 29% monocytes, protein 60, glucose 46, gram stain and cultures negative. ME panel negative. Noted issues of memory loss and tremor recently within the past few months, as early as march, per family. No fevers or recent sickness. ID was consulted for concerns of meningitis as potential causative factor for patient's acute encephalopathy.     PLAN:  -low suspicion for infectious meningitis at this time given CSF studies and patient overall presentation  -discontinue empiric antimicrobials including IV vancomycin, ceftriaxone, ampicillin, and acyclovir  -agree with neurology plan for further work up of autoimmune etiology  -discussed plan with hospital medicine primary team  -we will sign off, please do no hesitate to call if CSF studies return positive or changes in clinical status        Thank you for your consult. ID will sign off. Please contact us if you have any additional questions.    Brittany Martinez MD  Infectious Disease  [facility] - Internal Medicine Telemetry    Subjective:      Principal Problem: Encephalopathy    HPI: Vince Huffman is a 79 year old male with hx of HTN, hypothyroidism, crohn's disease, CAD, SBO who presented with acute encephalopathy from [facility] on 4/30. Reportedly A&O x4 at baseline with progressively worsening cognitive function recently with acute change of mental status on 5/2 which prompted attempts for lumbar puncture as part of further work up. Attempted LP x2 with success on 5/6. CSF cell count with diff showed 29 WBC, 66% lymphocytes, 29% monocytes, protein 60, glucose 46, gram stain and cultures negative. Treponema pallidium Ig G/IgM negative. ME panel negative. Collateral information from family at bedside notes patient has likely had recent issues with memory over the past few months prior to initial presentation. Patient was noted to have had misplacing things frequently at home and has been having more issues of tremor and rigidity. He was evaluated by neurology during this admission and is being trialed on sinamet for possible parkinsonism. Patient was started on empiric antimicrobials for concerns of meningitis on 5/6 including IV vancomycin, ceftriaxone, ampicillin, and acyclovir. ID was consulted for further evaluation based on neurology recommendations.     Past Medical History:   Diagnosis Date    Ankylosing spondylitis of unspecified sites in spine     Anxiety disorder, unspecified     BMI 21.0-21.9, adult 04/14/2025    Crohn's disease     Gout, unspecified     Heart disease     History of skin cancer 2001    squamous cell carcinoma left cheek    Hypertension     Hypothyroidism, unspecified     Psoriatic arthritis        Past Surgical History:   Procedure Laterality Date    ANGIOGRAM, CORONARY, WITH LEFT HEART CATHETERIZATION      APPENDECTOMY      APPENDECTOMY  2007    COLONOSCOPY N/A     ENDOSCOPY N/A     RIGHT HEMICOLECTOMY  2013    SMALL INTESTINE SURGERY  2007    30.5 cm of small bowel removed       Review of patient's allergies  indicates:   Allergen Reactions    Gluten Diarrhea    Lactose        Medications:  Medications Prior to Admission   Medication Sig    acetaminophen (TYLENOL) 500 MG tablet Take 500 mg by mouth every 6 (six) hours as needed.    ALPRAZolam (XANAX XR) 0.5 MG Tb24 Take 0.5 mg by mouth once daily.    amLODIPine (NORVASC) 10 MG tablet Take 1 tablet (10 mg total) by mouth once daily.    ustekinumab (STELARA) 90 mg/mL Syrg syringe Inject 1 mL (90 mg total) into the skin every 8 weeks.     Antibiotics (From admission, onward)      None          Antifungals (From admission, onward)      None          Antivirals (From admission, onward)      None               There is no immunization history on file for this patient.    Family History    None       Social History     Socioeconomic History    Marital status:    Tobacco Use    Smoking status: Never    Smokeless tobacco: Never   Substance and Sexual Activity    Alcohol use: No    Drug use: No    Sexual activity: Never     Partners: Female     Social Drivers of Health     Financial Resource Strain: Low Risk  (5/2/2025)    Overall Financial Resource Strain (CARDIA)     Difficulty of Paying Living Expenses: Not hard at all   Recent Concern: Financial Resource Strain - High Risk (5/1/2025)    Overall Financial Resource Strain (CARDIA)     Difficulty of Paying Living Expenses: Very hard   Food Insecurity: No Food Insecurity (5/2/2025)    Hunger Vital Sign     Worried About Running Out of Food in the Last Year: Never true     Ran Out of Food in the Last Year: Never true   Transportation Needs: No Transportation Needs (5/2/2025)    PRAPARE - Transportation     Lack of Transportation (Medical): No     Lack of Transportation (Non-Medical): No   Physical Activity: Inactive (5/2/2025)    Exercise Vital Sign     Days of Exercise per Week: 0 days     Minutes of Exercise per Session: 0 min   Stress: No Stress Concern Present (5/2/2025)    [employer] [place] [employer] -  Occupational Stress Questionnaire     Feeling of Stress : Not at all   Housing Stability: Low Risk  (5/2/2025)    Housing Stability Vital Sign     Unable to Pay for Housing in the Last Year: No     Homeless in the Last Year: No     Review of Systems   Unable to perform ROS: Mental status change   Constitutional:  Negative for chills and fever.     Objective:     Vital Signs (Most Recent):  Temp: 97.6 °F (36.4 °C) (05/08/25 1141)  Pulse: 99 (05/08/25 1141)  Resp: 18 (05/08/25 1141)  BP: (!) 150/76 (05/08/25 1141)  SpO2: 98 % (05/08/25 1141) Vital Signs (24h Range):  Temp:  [97.6 °F (36.4 °C)-98.5 °F (36.9 °C)] 97.6 °F (36.4 °C)  Pulse:  [] 99  Resp:  [18] 18  SpO2:  [95 %-98 %] 98 %  BP: (119-150)/(66-81) 150/76     Weight: 64.1 kg (141 lb 5 oz)  Body mass index is 19.71 kg/m².    Estimated Creatinine Clearance: 50.2 mL/min (based on SCr of 1.1 mg/dL).     Physical Exam  Vitals and nursing note reviewed.   Constitutional:       General: He is not in acute distress.     Appearance: Normal appearance. He is ill-appearing. He is not toxic-appearing or diaphoretic.   HENT:      Head: Normocephalic and atraumatic.      Mouth/Throat:      Mouth: Mucous membranes are moist.      Pharynx: Oropharynx is clear. No oropharyngeal exudate.   Eyes:      General: No scleral icterus.     Extraocular Movements: Extraocular movements intact.      Conjunctiva/sclera: Conjunctivae normal.   Cardiovascular:      Rate and Rhythm: Normal rate and regular rhythm.      Pulses: Normal pulses.      Heart sounds: Normal heart sounds. No murmur heard.  Pulmonary:      Effort: Pulmonary effort is normal. No respiratory distress.      Breath sounds: Normal breath sounds. No wheezing.   Abdominal:      General: Abdomen is flat. Bowel sounds are normal. There is no distension.      Tenderness: There is no abdominal tenderness.   Musculoskeletal:         General: No swelling.      Right lower leg: No edema.      Left lower leg: No edema.  "  Skin:     General: Skin is warm and dry.   Neurological:      Mental Status: He is alert.      Cranial Nerves: No dysarthria or facial asymmetry.      Motor: Weakness (generalized) and tremor present.   Psychiatric:         Mood and Affect: Mood normal.         Behavior: Behavior normal.        Significant Labs:   CBC: No results for input(s): "WBC", "HGB", "HCT", "PLT" in the last 48 hours.  CMP:   Recent Labs   Lab 05/06/25  1423 05/07/25  0757   * 132*   K 4.6 4.3   CL 94* 97   CO2 26 27    103   BUN 27* 21   CREATININE 1.3 1.1   CALCIUM 10.1 9.4   ANIONGAP 12 8     Urine Studies:   Recent Labs   Lab 05/02/25  1047   COLORU Yellow   APPEARANCEUA Clear   PHUR 6.0   SPECGRAV 1.020   PROTEINUA 1+*   GLUCUA Negative   BILIRUBINUA Negative   OCCULTUA Negative   NITRITE Negative   UROBILINOGEN Negative   LEUKOCYTESUR Negative   RBCUA <1   WBCUA 2   BACTERIA Occasional   HYALINECASTS 0     All pertinent labs within the past 24 hours have been reviewed.    Significant Imaging: I have reviewed all pertinent imaging results/findings within the past 24 hours.              "

## 2025-05-08 NOTE — PROGRESS NOTES
[facility] - Internal Medicine [facility] Medicine  Progress Note    Patient Name: Vince Huffman  MRN: [id number]  Patient Class: IP- Inpatient   Admission Date: 4/30/2025  Length of Stay: 7 days  Attending Physician: Elian Morejon MD  Primary Care Provider: Leland Porras MD        Subjective     Principal Problem:Encephalopathy        HPI:  80 yo M w/HTN, hypothyroid, crohn's disease, CAD, hx of SBO, presenting with AMS from [facility]. Patient was sent in from [facility] for progressively worsening cognitive function. Patient is A&O x1 at his baseline. Patient and his daughter endorse mild confusion. Which has been progressive.    Patient has not had any recent falls. Endorsed mild suprapubic abdominal pain. No UTI. Mild inflammation on CT, not unexpected in the setting of Crohn's.     Overview/Hospital Course:  Vince Huffman is a 79 y.o. M who was admitted to  for further evaluation of worsening AMS per rehab facility. AAOx1 on admission, normally AAOx4 prior to 4/20 per son. UA negative. CXR clear. CT abd/pelvis with wall thickening of distal ileum, inflammatory infectious ileitis are considerations, fecal distention of the rectum, impaction not excluded. Discussed with GI, anticipated these are chronic findings. CRP negative. Will give enema and monitor for improvement. Patient with successful BM. Neurology consulted: MRI brain ordered (no acute findings, motion artifact), extended EEG, ammonia levels. Worsening mental status on 5/2, medicine team consulted for LP which was unsuccessful. More alert on 5/3 and answering yes/no questions when redirected. Neurology recommending carbidopa-levodopa trial, EEG, and attempting another LP.       Symptoms improved with increasing doses of Carbidopa/Levodopa. LP concerning for meningitis so he was started on Empiric treatment on 5/6/25.     Interval History:  Mentation the same.  Awaiting ID input.  Cultures no growth.  No acute issues reported.   "Acyclovir discontinued per neurology's recommendation.    Review of Systems   Unable to perform ROS: Mental status change     Objective:     Vital Signs (Most Recent):  Temp: 98 °F (36.7 °C) (05/08/25 0028)  Pulse: 99 (05/08/25 0759)  Resp: 18 (05/08/25 0759)  BP: 138/77 (05/08/25 0759)  SpO2: 96 % (05/08/25 0759) Vital Signs (24h Range):  Temp:  [98 °F (36.7 °C)-98.5 °F (36.9 °C)] 98 °F (36.7 °C)  Pulse:  [] 99  Resp:  [18] 18  SpO2:  [95 %-97 %] 96 %  BP: (119-147)/(66-81) 138/77     Weight: 64.1 kg (141 lb 5 oz)  Body mass index is 19.71 kg/m².    Intake/Output Summary (Last 24 hours) at 5/8/2025 0821  Last data filed at 5/8/2025 0646  Gross per 24 hour   Intake 713 ml   Output 1050 ml   Net -337 ml      Physical Exam  Constitutional:       General: He is not in acute distress.     Appearance: He is ill-appearing.   HENT:      Head: Normocephalic.      Right Ear: External ear normal.      Left Ear: External ear normal.      Nose: Nose normal.   Cardiovascular:      Rate and Rhythm: Normal rate.      Heart sounds: Normal heart sounds.   Pulmonary:      Breath sounds: Normal breath sounds.   Abdominal:      Palpations: Abdomen is soft.      Tenderness: There is no abdominal tenderness.   Musculoskeletal:      Right lower leg: No edema.      Left lower leg: No edema.      Comments: SCD   Skin:     General: Skin is warm.   Neurological:      General: No focal deficit present.      Mental Status: He is alert.      Comments: Ax1. Tremors         MELD 3.0: 13 at 5/6/2025  2:23 PM  MELD-Na: 9 at 5/6/2025  2:23 PM  Calculated from:  Serum Creatinine: 1.3 mg/dL at 5/6/2025  2:23 PM  Serum Sodium: 132 mmol/L at 5/6/2025  2:23 PM  Total Bilirubin: 0.5 mg/dL (Using min of 1 mg/dL) at 5/4/2025  3:28 AM  Serum Albumin: 3.2 g/dL at 5/4/2025  3:28 AM  INR(ratio): 1 at 5/5/2025  4:24 PM  Age at listing (hypothetical): 79 years  Sex: Male at 5/6/2025  2:23 PM      Significant Labs:  CBC:  No results for input(s): "WBC", " ""HGB", "HCT", "PLT" in the last 48 hours.  CMP:  Recent Labs   Lab 05/06/25  1423 05/07/25  0757   * 132*   K 4.6 4.3   CL 94* 97   CO2 26 27    103   BUN 27* 21   CREATININE 1.3 1.1   CALCIUM 10.1 9.4   ANIONGAP 12 8     PTINR:  No results for input(s): "INR" in the last 48 hours.    Significant Procedures:   Dobutamine Stress Test with Color Flow: No results found for this or any previous visit.          Assessment & Plan  Encephalopathy  79 y.o.M with progressive acutely worsening confusion/tremors since 4/20. Previously AAOx4, now AAOx1  - neurology consulted, appreciate recommendations:  - MRI brain from last week was essentially non diagnostic  - Repeat MRI again without obvious new pathology   -working diagnosis is Parkinson's disease, as improving with Sinemet: iContinue two tabs TID  -LP concerning for possible meningitis. Continue Empiric treatment per Neurology recs   -EEG pending   - Delirium and fall precautions along with neuro checks  - PT/OT consult placed; return to [facility] upon discharge     5/8  Viral meningitis panel unremarkable.  Cultures no growth.  CSF with elevated proteins and white cell count.  Started on empiric antibiotics, vancomycin ceftriaxone and ampicillin, infectious disease consulted.  Follow up with Neurology recommendations.  Started on carbidopa levodopa.  Dispo plan acute rehab.  Mentation remains the same during this hospitalization A&O x1  Crohn's disease  - Per CT scan "thickening of the distal ileum to the surgical anastomosis with the large bowel. Inflammatory infectious ileitis are considerations. Recommend clinical correlation and follow-up. "  -CRP negative. No concern for flare.   -GI without concerns of confusion being caused by sterlara, confirmed with pharmacy     Generalized weakness  - sent from SNF  - progressive worsening weakness per son   - PT/OT ordered. Will need placement at discharge    Unintended weight loss  - reported per son  - " recently started gluten free diet per recommendations from GI    Hyponatremia  Hyponatremia is likely due to Dehydration/hypovolemia. The patient's most recent sodium results are listed below.  Recent Labs     05/06/25  1423 05/07/25  0757   * 132*     Maxime  - Monitor sodium Daily.   - Patient hyponatremia is stable    Essential hypertension  Patient's blood pressure range in the last 24 hours was: BP  Min: 119/66  Max: 147/70.The patient's inpatient anti-hypertensive regimen is listed below:  Current Antihypertensives  amLODIPine tablet 10 mg, Daily, Oral    Plan  - BP is controlled, no changes needed to their regimen    Parkinsonism  Suspect symptoms are at least partially due to Parkinson's disease. Continue Sinemet as above.     VTE Risk Mitigation (From admission, onward)           Ordered     enoxaparin injection 40 mg  Daily         05/01/25 0827     IP VTE HIGH RISK PATIENT  Once         05/01/25 0827     Place sequential compression device  Until discontinued         05/01/25 0827                    Discharge Planning   ERNIE: 5/10/2025     Code Status: Full Code   Medical Readiness for Discharge Date:   Discharge Plan A: Rehab   Discharge Delays: None known at this time            Please place Justification for DME        Elian Morejon MD  Department of Hospital Medicine   [facility] - Internal Medicine Telemetry

## 2025-05-08 NOTE — ASSESSMENT & PLAN NOTE
79M with hx of HTN, hypothyroid, crohn's disease, CAD, SBO who presented with acute encephalopathy from [facility] on 4/30. Reportedly A&O x4 at baseline with progressively worsening cognitive function recently with acute change of mental status on 5/2. Attempted LP x2 with success on 5/6. CSF cell count with diff showed 29 WBC, 66% lymphocytes, 29% monocytes, protein 60, glucose 46, gram stain and cultures negative. ME panel negative. Noted issues of memory loss and tremor recently within the past few months, as early as march, per family. No fevers or recent sickness. ID was consulted for concerns of meningitis as potential causative factor for patient's acute encephalopathy.     PLAN:  -low suspicion for infectious meningitis at this time given CSF studies and patient overall presentation  -discontinue empiric antimicrobials including IV vancomycin, ceftriaxone, ampicillin, and acyclovir  -agree with neurology plan for further work up of autoimmune etiology  -discussed plan with hospital medicine primary team  -we will sign off, please do no hesitate to call if CSF studies return positive or changes in clinical status

## 2025-05-08 NOTE — PLAN OF CARE
Problem: Adult Inpatient Plan of Care  Goal: Patient-Specific Goal (Individualized)  Outcome: Progressing  Goal: Absence of Hospital-Acquired Illness or Injury  Outcome: Progressing  Goal: Optimal Comfort and Wellbeing  Outcome: Progressing     Problem: Infection  Goal: Absence of Infection Signs and Symptoms  Outcome: Progressing  Intervention: Prevent or Manage Infection  Flowsheets (Taken 5/8/2025 0574)  Fever Reduction/Comfort Measures:   fluid intake increased   lightweight clothing

## 2025-05-08 NOTE — PROGRESS NOTES
Pharmacokinetic Assessment Follow Up: IV Vancomycin    Vancomycin serum concentration assessment(s):    The random level was drawn correctly and can be used to guide therapy at this time. The measurement is below the desired definitive target range of 10 to 20 mcg/mL.  - The random level was drawn ~24 hours after the initial loading dose and resulted at 8.7.     Vancomycin Regimen Plan:    Change regimen to Vancomycin 1000 mg IV every 24 hours with next serum trough concentration measured at 2000 prior to 3rd dose on 5/9.  - Mr. Huffman's Scr has down-trended back to his baseline. He is making UOP.   - Will schedule a regimen, but will continue to watch trends closely with low threshold to resume pulse dosing if needed.   - Please draw random level sooner than scheduled trough if renal function changes significantly.    Drug levels (last 3 results):  Recent Labs   Lab Result Units 05/07/25  1655   Vancomycin Random ug/ml 8.7       Pharmacy will continue to follow and monitor vancomycin.    Please contact pharmacy at extension [phone] for questions regarding this assessment.    Thank you for the consult,   Elena Sotelo       Patient brief summary:  Vince Huffman is a 79 y.o. male initiated on antimicrobial therapy with IV Vancomycin for treatment of meningitis    The patient's previous regimen was pulse dosing in the setting of slightly elevated Scr above baseline.     Actual Body Weight:   64.1 kg    Renal Function:   Estimated Creatinine Clearance: 50.2 mL/min (based on SCr of 1.1 mg/dL).     Dialysis Method (if applicable):  N/A

## 2025-05-08 NOTE — HPI
Vince Huffman is a 79 year old male with hx of HTN, hypothyroidism, crohn's disease, CAD, SBO who presented with acute encephalopathy from [facility] on 4/30. Reportedly A&O x4 at baseline with progressively worsening cognitive function recently with acute change of mental status on 5/2 which prompted attempts for lumbar puncture as part of further work up. Attempted LP x2 with success on 5/6. CSF cell count with diff showed 29 WBC, 66% lymphocytes, 29% monocytes, protein 60, glucose 46, gram stain and cultures negative. Treponema pallidium Ig G/IgM negative. ME panel negative. Collateral information from family at bedside notes patient has likely had recent issues with memory over the past few months prior to initial presentation. Patient was noted to have had misplacing things frequently at home and has been having more issues of tremor and rigidity. He was evaluated by neurology during this admission and is being trialed on sinamet for possible parkinsonism. Patient was started on empiric antimicrobials for concerns of meningitis on 5/6 including IV vancomycin, ceftriaxone, ampicillin, and acyclovir. ID was consulted for further evaluation based on neurology recommendations.

## 2025-05-08 NOTE — ASSESSMENT & PLAN NOTE
Patient's blood pressure range in the last 24 hours was: BP  Min: 119/66  Max: 147/70.The patient's inpatient anti-hypertensive regimen is listed below:  Current Antihypertensives  amLODIPine tablet 10 mg, Daily, Oral    Plan  - BP is controlled, no changes needed to their regimen

## 2025-05-08 NOTE — PROGRESS NOTES
VANCOMYCIN DOSING BY PHARMACY DISCONTINUATION NOTE    Vince Huffman is a 79 y.o. male who had been consulted for vancomycin dosing.    The pharmacy consult for vancomycin dosing has been discontinued.     Vancomycin Dosing by Pharmacy Consult will sign-off. Please reconsult if necessary. Thank you for allowing us to participate in this patient's care.     Genaro Yeager Pharm.D.  [phone]

## 2025-05-08 NOTE — ASSESSMENT & PLAN NOTE
79 y.o.M with progressive acutely worsening confusion/tremors since 4/20. Previously AAOx4, now AAOx1  - neurology consulted, appreciate recommendations:  - MRI brain from last week was essentially non diagnostic  - Repeat MRI again without obvious new pathology   -working diagnosis is Parkinson's disease, as improving with Sinemet: iContinue two tabs TID  -LP concerning for possible meningitis. Continue Empiric treatment per Neurology recs   -EEG pending   - Delirium and fall precautions along with neuro checks  - PT/OT consult placed; return to [facility] upon discharge     5/8  Viral meningitis panel unremarkable.  Cultures no growth.  CSF with elevated proteins and white cell count.  Started on empiric antibiotics, vancomycin ceftriaxone and ampicillin, infectious disease consulted.  Follow up with Neurology recommendations.  Started on carbidopa levodopa.  Dispo plan acute rehab.  Mentation remains the same during this hospitalization A&O x1

## 2025-05-09 PROBLEM — R41.82 AMS (ALTERED MENTAL STATUS): Status: ACTIVE | Noted: 2025-05-09

## 2025-05-09 PROBLEM — I25.10 CAD (CORONARY ARTERY DISEASE): Status: ACTIVE | Noted: 2025-05-09

## 2025-05-09 LAB
ABSOLUTE EOSINOPHIL (OHS): 0.24 K/UL
ABSOLUTE MONOCYTE (OHS): 0.88 K/UL (ref 0.3–1)
ABSOLUTE NEUTROPHIL COUNT (OHS): 5.36 K/UL (ref 1.8–7.7)
ALBUMIN SERPL BCP-MCNC: 2.8 G/DL (ref 3.5–5.2)
ALP SERPL-CCNC: 79 UNIT/L (ref 40–150)
ALT SERPL W/O P-5'-P-CCNC: <5 UNIT/L (ref 10–44)
ANION GAP (OHS): 8 MMOL/L (ref 8–16)
AST SERPL-CCNC: 17 UNIT/L (ref 11–45)
BASOPHILS # BLD AUTO: 0.03 K/UL
BASOPHILS NFR BLD AUTO: 0.4 %
BILIRUB SERPL-MCNC: 0.6 MG/DL (ref 0.1–1)
BUN SERPL-MCNC: 13 MG/DL (ref 8–23)
CALCIUM SERPL-MCNC: 9 MG/DL (ref 8.7–10.5)
CHLORIDE SERPL-SCNC: 99 MMOL/L (ref 95–110)
CO2 SERPL-SCNC: 26 MMOL/L (ref 23–29)
CREAT SERPL-MCNC: 0.9 MG/DL (ref 0.5–1.4)
ERYTHROCYTE [DISTWIDTH] IN BLOOD BY AUTOMATED COUNT: 11.8 % (ref 11.5–14.5)
GFR SERPLBLD CREATININE-BSD FMLA CKD-EPI: >60 ML/MIN/1.73/M2
GLUCOSE SERPL-MCNC: 88 MG/DL (ref 70–110)
HCT VFR BLD AUTO: 34.8 % (ref 40–54)
HGB BLD-MCNC: 11.6 GM/DL (ref 14–18)
IMM GRANULOCYTES # BLD AUTO: 0.03 K/UL (ref 0–0.04)
IMM GRANULOCYTES NFR BLD AUTO: 0.4 % (ref 0–0.5)
LYMPHOCYTES # BLD AUTO: 1.08 K/UL (ref 1–4.8)
MCH RBC QN AUTO: 32 PG (ref 27–31)
MCHC RBC AUTO-ENTMCNC: 33.3 G/DL (ref 32–36)
MCV RBC AUTO: 96 FL (ref 82–98)
NUCLEATED RBC (/100WBC) (OHS): 0 /100 WBC
PLATELET # BLD AUTO: 240 K/UL (ref 150–450)
PMV BLD AUTO: 10.8 FL (ref 9.2–12.9)
POTASSIUM SERPL-SCNC: 3.9 MMOL/L (ref 3.5–5.1)
PROT SERPL-MCNC: 7 GM/DL (ref 6–8.4)
RBC # BLD AUTO: 3.62 M/UL (ref 4.6–6.2)
RELATIVE EOSINOPHIL (OHS): 3.1 %
RELATIVE LYMPHOCYTE (OHS): 14.2 % (ref 18–48)
RELATIVE MONOCYTE (OHS): 11.5 % (ref 4–15)
RELATIVE NEUTROPHIL (OHS): 70.4 % (ref 38–73)
SODIUM SERPL-SCNC: 133 MMOL/L (ref 136–145)
WBC # BLD AUTO: 7.62 K/UL (ref 3.9–12.7)

## 2025-05-09 PROCEDURE — 63600175 PHARM REV CODE 636 W HCPCS

## 2025-05-09 PROCEDURE — 25000003 PHARM REV CODE 250

## 2025-05-09 PROCEDURE — 25000003 PHARM REV CODE 250: Performed by: INTERNAL MEDICINE

## 2025-05-09 PROCEDURE — 36415 COLL VENOUS BLD VENIPUNCTURE: CPT | Performed by: INTERNAL MEDICINE

## 2025-05-09 PROCEDURE — 99233 SBSQ HOSP IP/OBS HIGH 50: CPT | Mod: ,,, | Performed by: PSYCHIATRY & NEUROLOGY

## 2025-05-09 PROCEDURE — 30233S1 TRANSFUSION OF NONAUTOLOGOUS GLOBULIN INTO PERIPHERAL VEIN, PERCUTANEOUS APPROACH: ICD-10-PCS | Performed by: PSYCHIATRY & NEUROLOGY

## 2025-05-09 PROCEDURE — 80053 COMPREHEN METABOLIC PANEL: CPT | Performed by: INTERNAL MEDICINE

## 2025-05-09 PROCEDURE — 85025 COMPLETE CBC W/AUTO DIFF WBC: CPT | Performed by: INTERNAL MEDICINE

## 2025-05-09 PROCEDURE — 20600001 HC STEP DOWN PRIVATE ROOM

## 2025-05-09 RX ADMIN — CARBIDOPA AND LEVODOPA 2 TABLET: 25; 100 TABLET ORAL at 08:05

## 2025-05-09 RX ADMIN — SODIUM CHLORIDE: 9 INJECTION, SOLUTION INTRAVENOUS at 12:05

## 2025-05-09 RX ADMIN — ENOXAPARIN SODIUM 40 MG: 40 INJECTION SUBCUTANEOUS at 05:05

## 2025-05-09 RX ADMIN — AMLODIPINE BESYLATE 10 MG: 10 TABLET ORAL at 09:05

## 2025-05-09 RX ADMIN — CARBIDOPA AND LEVODOPA 2 TABLET: 25; 100 TABLET ORAL at 09:05

## 2025-05-09 RX ADMIN — HUMAN IMMUNOGLOBULIN G 25 G: 20 LIQUID INTRAVENOUS at 06:05

## 2025-05-09 RX ADMIN — CARBIDOPA AND LEVODOPA 2 TABLET: 25; 100 TABLET ORAL at 05:05

## 2025-05-09 RX ADMIN — POLYETHYLENE GLYCOL 3350 17 G: 17 POWDER, FOR SOLUTION ORAL at 08:05

## 2025-05-09 RX ADMIN — DIPHENHYDRAMINE HYDROCHLORIDE 25 MG: 50 INJECTION, SOLUTION INTRAMUSCULAR; INTRAVENOUS at 05:05

## 2025-05-09 NOTE — ASSESSMENT & PLAN NOTE
Patient's blood pressure range in the last 24 hours was: BP  Min: 135/82  Max: 167/81.The patient's inpatient anti-hypertensive regimen is listed below:  Current Antihypertensives  amLODIPine tablet 10 mg, Daily, Oral    Plan  - BP is controlled, no changes needed to their regimen

## 2025-05-09 NOTE — PT/OT/SLP PROGRESS
Physical Therapy      Patient Name:  Vince Huffman   MRN:  [id number]    Attempted 1312. Patient not seen today secondary to Testing/imaging. Will follow-up next scheduled visit.

## 2025-05-09 NOTE — PROGRESS NOTES
"[facility] - Acute [facility]  Neurology  Progress Note    Patient Name: Vince Huffman  MRN: [id number]  Admission Date: 4/30/2025  Hospital Length of Stay: 8 days  Code Status: Full Code   Attending Provider: lEian Morejon MD  Primary Care Physician: Leland Porras MD   Principal Problem:Encephalopathy    HPI:   Vince Huffman is a 79 year old male with history of chron's disease, SBO, hypothyroidism, and CAD  presenting from [facility] with encephalopathy. He initially presented to [facility] for impaired mobility and difficulty with ADL's 2/2 generalized weakness. He was reportedly found confused today A&O x 1 (said the year is "1895") but is normally A&O x4. At this time he also expressed generalized abdominal pain. Recent ultrasounds of the lower extremity did not reveal DVT. MRI from last week was non diagnostic. UA unremarkable and CXR normal. CT abdomen and pelvis reveals wall thickening of the distal ileum consistent with possible infectious ileitis. Neurology consulted for further evaluation of encephalopathy.     Overview/Hospital Course:  No notes on file      Past Medical History:   Diagnosis Date    Ankylosing spondylitis of unspecified sites in spine     Anxiety disorder, unspecified     BMI 21.0-21.9, adult 04/14/2025    Crohn's disease     Gout, unspecified     Heart disease     History of skin cancer 2001    squamous cell carcinoma left cheek    Hypertension     Hypothyroidism, unspecified     Psoriatic arthritis        Past Surgical History:   Procedure Laterality Date    ANGIOGRAM, CORONARY, WITH LEFT HEART CATHETERIZATION      APPENDECTOMY      APPENDECTOMY  2007    COLONOSCOPY N/A     ENDOSCOPY N/A     RIGHT HEMICOLECTOMY  2013    SMALL INTESTINE SURGERY  2007    30.5 cm of small bowel removed       Review of patient's allergies indicates:   Allergen Reactions    Gluten Diarrhea    Lactose        Current Neurological Medications: see below    No current facility-administered " medications on file prior to encounter.     Current Outpatient Medications on File Prior to Encounter   Medication Sig    acetaminophen (TYLENOL) 500 MG tablet Take 500 mg by mouth every 6 (six) hours as needed.    ALPRAZolam (XANAX XR) 0.5 MG Tb24 Take 0.5 mg by mouth once daily.    amLODIPine (NORVASC) 10 MG tablet Take 1 tablet (10 mg total) by mouth once daily.    ustekinumab (STELARA) 90 mg/mL Syrg syringe Inject 1 mL (90 mg total) into the skin every 8 weeks.     Family History    None       Tobacco Use    Smoking status: Never    Smokeless tobacco: Never   Substance and Sexual Activity    Alcohol use: No    Drug use: No    Sexual activity: Never     Partners: Female     Review of Systems   Constitutional:  Positive for activity change. Negative for fever.   Respiratory:  Negative for shortness of breath.    Cardiovascular:  Negative for chest pain.   Gastrointestinal:  Positive for abdominal pain.   Neurological:  Positive for tremors and speech difficulty.   Psychiatric/Behavioral:  Positive for behavioral problems, confusion and decreased concentration.      Objective:     Vital Signs (Most Recent):  Temp: 97.5 °F (36.4 °C) (05/09/25 1528)  Pulse: 108 (05/09/25 1528)  Resp: 18 (05/09/25 1528)  BP: 139/68 (05/09/25 1528)  SpO2: 97 % (05/09/25 1528) Vital Signs (24h Range):  Temp:  [97.4 °F (36.3 °C)-99.1 °F (37.3 °C)] 97.5 °F (36.4 °C)  Pulse:  [] 108  Resp:  [12-18] 18  SpO2:  [96 %-99 %] 97 %  BP: (138-167)/(68-89) 139/68     Weight: 64.1 kg (141 lb 5 oz)  Body mass index is 19.71 kg/m².     Physical Exam  Constitutional:       Comments: He appears confused and responds minimally to questions. Some days his speech will improve.   Eyes:      Pupils: Pupils are equal, round, and reactive to light.   Pulmonary:      Effort: Pulmonary effort is normal.   Neurological:      Mental Status: He is alert. He is disoriented.      Deep Tendon Reflexes:      Reflex Scores:       Brachioradialis reflexes are 2+  on the right side and 2+ on the left side.       Patellar reflexes are 2+ on the right side and 2+ on the left side.         NEUROLOGICAL EXAMINATION:     MENTAL STATUS   Oriented to person.   Disoriented to place.   Disoriented to time.   Attention: decreased. Concentration: decreased.   Level of consciousness: alert    CRANIAL NERVES     CN III, IV, VI   Pupils are equal, round, and reactive to light.       Mild ptosis of left eye without pupillary abnormalities     MOTOR EXAM   Overall muscle tone: increased  Right arm tone: increased  Left arm tone: increased       Asterixis with tremor most notably in the left arm     REFLEXES     Reflexes   Right brachioradialis: 2+  Left brachioradialis: 2+  Right patellar: 2+  Left patellar: 2+    SENSORY EXAM   Light touch normal.     GAIT AND COORDINATION     Tremor   Intention tremor: present      Significant Labs: CBC:   Recent Labs   Lab 05/09/25  0604   WBC 7.62   HGB 11.6*   HCT 34.8*          CMP:   Recent Labs   Lab 05/09/25  0604   GLU 88   *   K 3.9   CL 99   CO2 26   BUN 13   CREATININE 0.9   CALCIUM 9.0   PROT 7.0   ALBUMIN 2.8*   BILITOT 0.6   ALKPHOS 79   AST 17   ALT <5*   ANIONGAP 8       Significant Imaging: I have reviewed all pertinent imaging results/findings within the past 24 hours.  Assessment and Plan:     * Encephalopathy  Vince Huffman is a 79 year old male with history of chron's disease, SBO, hypothyroidism, and CAD  presenting from [facility] with encephalopathy. Exam notable for disorientation to place, time, and situation. He is minimally responsive and will answer with one word. There is asterixis in his upper extremities, axial rigidity/extremity/rigidity, masked facies, and intention tremor most notably in the left hand. No hyperreflexia. Ptosis of left eye though no extraocular movement abnormalities/pupillary discrepancy to suggest third nerve palsy and mehran's respectively. Unclear cause of encephalopathy at this time,  though acute presentation with concurrent parkinsonism suggests autoimmune etiology.     5/9: Repeat MRI also affected by motion artifact though no findings of acute stroke. Ammonia 44. UDS/UA unremarkable. Patient improved after sinemet trial; we gradually increased the dose to 2 tablets TID. Repeat LP attempt revealed wbc 29, lymph 66, glucose 46, and protein 60. Celiac panel + 386.    Recommendations:  - Given low suspicion for infectious meningitis and discussion with ID we discontinued empiric meningitic coverage including vancomycin, ceftriaxone, acyclovir and ampicillin   - Continue IVIG 0.4 g/kg for 5 days, day 2 in the setting of possible autoimmune encephalopathy (pending encephalopathy panel)   - IVF and IV benadryl co-administered with IVIG  - Pending strongyloides IgG  - Increased carbidopa-levodopa 1.5 tablet 25 -100 mg TID to 2 tablets TID  - Ordered labs including: vitamin B1, B12 (456), folate (16.5), ck (17), treponema (-), vitamin E, zinc (65), copper(1304), celiac panel (+ 386)  - Delirium and fall precautions             VTE Risk Mitigation (From admission, onward)           Ordered     enoxaparin injection 40 mg  Daily         05/01/25 0827     IP VTE HIGH RISK PATIENT  Once         05/01/25 0827     Place sequential compression device  Until discontinued         05/01/25 0827                    Elian López MD  Neurology  [facility]

## 2025-05-09 NOTE — CARE UPDATE
I have reviewed the chart of Vince PARKER Armida who is hospitalized for the following:    Active Hospital Problems    Diagnosis    *Encephalopathy    CAD (coronary artery disease)  - no acute issues currently     Parkinsonism    Hyponatremia    Generalized weakness    Unintended weight loss    Essential hypertension    Crohn's disease        SHYAM Rock-BC  Unit Based [place]

## 2025-05-09 NOTE — PLAN OF CARE
[facility]  Discharge Reassessment    Primary Care Provider: Leland Porras MD    Expected Discharge Date: 5/13/2025    Reassessment (most recent)       Discharge Reassessment - 05/09/25 1626          Discharge Reassessment    Assessment Type Discharge Planning Reassessment     Did the patient's condition or plan change since previous assessment? Yes     Discharge Plan discussed with: Adult children;Patient     Communicated ERNIE with patient/caregiver Date not available/Unable to determine     Discharge Plan A Rehab   TBD    Discharge Plan B Skilled Nursing Facility     DME Needed Upon Discharge  none     Transition of Care Barriers None     Why the patient remains in the hospital Requires continued medical care        Post-Acute Status    Coverage Medicare     Discharge Delays None known at this time                     Discharge Plan A and Plan B have been determined by review of patient's clinical status, future medical and therapeutic needs, and coverage/benefits for post-acute care in coordination with multidisciplinary team members.     See post acute notes, patient remains not medically ready for placement initial plan is Return to Rehab pending new recs.     Marilyn Domínguez RN  Case Management  [phone]

## 2025-05-09 NOTE — PLAN OF CARE
AAOx2, IVIG administered, no adverse rxn noted. No other issues noted at this time. Will cont to monitor    Problem: Adult Inpatient Plan of Care  Goal: Plan of Care Review  Outcome: Progressing  Goal: Patient-Specific Goal (Individualized)  Outcome: Progressing  Goal: Absence of Hospital-Acquired Illness or Injury  Outcome: Progressing  Goal: Optimal Comfort and Wellbeing  Outcome: Progressing  Goal: Readiness for Transition of Care  Outcome: Progressing     Problem: Skin Injury Risk Increased  Goal: Skin Health and Integrity  Outcome: Progressing     Problem: Infection  Goal: Absence of Infection Signs and Symptoms  Outcome: Progressing

## 2025-05-09 NOTE — PLAN OF CARE
05/09/25 1206   Post-Acute Status   Post-Acute Authorization Other  (Advance directives and Living will  booklet given at bedside)   Discharge Plan   Discharge Plan A Rehab   Discharge Plan B Rehab       Discharge Plan A and Plan B have been determined by review of patient's clinical status, future medical and therapeutic needs, and coverage/benefits for post-acute care in coordination with multidisciplinary team members.     Cm spoke with daughter  Radha Mehtaette [phone] at bedside, patient is oriented to self at this time. Daughter has concerns about advanced directives and POA with patient being confused and lacking mental capacity to make his decisions at this time. Messaged provider with contact information. Daughter is staying a [facility] and will be returning to [place] in the morning, she has a brother than can also assist with decisions, but normally defers to her. Advance directives booklet given to family at bedside.     Marilyn Domínguez RN  Case Management  [phone]    Patient to call Dr. Adorno's office to schedule outpatient post op appointment. Call [phone].

## 2025-05-09 NOTE — ASSESSMENT & PLAN NOTE
Hyponatremia is likely due to Dehydration/hypovolemia. The patient's most recent sodium results are listed below.  Recent Labs     05/06/25  1423 05/07/25  0757 05/09/25  0604   * 132* 133*     Maxime  - Monitor sodium Daily.   - Patient hyponatremia is stable

## 2025-05-09 NOTE — SUBJECTIVE & OBJECTIVE
Past Medical History:   Diagnosis Date    Ankylosing spondylitis of unspecified sites in spine     Anxiety disorder, unspecified     BMI 21.0-21.9, adult 04/14/2025    Crohn's disease     Gout, unspecified     Heart disease     History of skin cancer 2001    squamous cell carcinoma left cheek    Hypertension     Hypothyroidism, unspecified     Psoriatic arthritis        Past Surgical History:   Procedure Laterality Date    ANGIOGRAM, CORONARY, WITH LEFT HEART CATHETERIZATION      APPENDECTOMY      APPENDECTOMY  2007    COLONOSCOPY N/A     ENDOSCOPY N/A     RIGHT HEMICOLECTOMY  2013    SMALL INTESTINE SURGERY  2007    30.5 cm of small bowel removed       Review of patient's allergies indicates:   Allergen Reactions    Gluten Diarrhea    Lactose        Current Neurological Medications: see below    No current facility-administered medications on file prior to encounter.     Current Outpatient Medications on File Prior to Encounter   Medication Sig    acetaminophen (TYLENOL) 500 MG tablet Take 500 mg by mouth every 6 (six) hours as needed.    ALPRAZolam (XANAX XR) 0.5 MG Tb24 Take 0.5 mg by mouth once daily.    amLODIPine (NORVASC) 10 MG tablet Take 1 tablet (10 mg total) by mouth once daily.    ustekinumab (STELARA) 90 mg/mL Syrg syringe Inject 1 mL (90 mg total) into the skin every 8 weeks.     Family History    None       Tobacco Use    Smoking status: Never    Smokeless tobacco: Never   Substance and Sexual Activity    Alcohol use: No    Drug use: No    Sexual activity: Never     Partners: Female     Review of Systems   Constitutional:  Positive for activity change. Negative for fever.   Respiratory:  Negative for shortness of breath.    Cardiovascular:  Negative for chest pain.   Gastrointestinal:  Positive for abdominal pain.   Neurological:  Positive for tremors and speech difficulty.   Psychiatric/Behavioral:  Positive for behavioral problems, confusion and decreased concentration.      Objective:     Vital  Signs (Most Recent):  Temp: 97.5 °F (36.4 °C) (05/09/25 1528)  Pulse: 108 (05/09/25 1528)  Resp: 18 (05/09/25 1528)  BP: 139/68 (05/09/25 1528)  SpO2: 97 % (05/09/25 1528) Vital Signs (24h Range):  Temp:  [97.4 °F (36.3 °C)-99.1 °F (37.3 °C)] 97.5 °F (36.4 °C)  Pulse:  [] 108  Resp:  [12-18] 18  SpO2:  [96 %-99 %] 97 %  BP: (138-167)/(68-89) 139/68     Weight: 64.1 kg (141 lb 5 oz)  Body mass index is 19.71 kg/m².     Physical Exam  Constitutional:       Comments: He appears confused and responds minimally to questions. Some days his speech will improve.   Eyes:      Pupils: Pupils are equal, round, and reactive to light.   Pulmonary:      Effort: Pulmonary effort is normal.   Neurological:      Mental Status: He is alert. He is disoriented.      Deep Tendon Reflexes:      Reflex Scores:       Brachioradialis reflexes are 2+ on the right side and 2+ on the left side.       Patellar reflexes are 2+ on the right side and 2+ on the left side.         NEUROLOGICAL EXAMINATION:     MENTAL STATUS   Oriented to person.   Disoriented to place.   Disoriented to time.   Attention: decreased. Concentration: decreased.   Level of consciousness: alert    CRANIAL NERVES     CN III, IV, VI   Pupils are equal, round, and reactive to light.       Mild ptosis of left eye without pupillary abnormalities     MOTOR EXAM   Overall muscle tone: increased  Right arm tone: increased  Left arm tone: increased       Asterixis with tremor most notably in the left arm     REFLEXES     Reflexes   Right brachioradialis: 2+  Left brachioradialis: 2+  Right patellar: 2+  Left patellar: 2+    SENSORY EXAM   Light touch normal.     GAIT AND COORDINATION     Tremor   Intention tremor: present      Significant Labs: CBC:   Recent Labs   Lab 05/09/25  0604   WBC 7.62   HGB 11.6*   HCT 34.8*          CMP:   Recent Labs   Lab 05/09/25  0604   GLU 88   *   K 3.9   CL 99   CO2 26   BUN 13   CREATININE 0.9   CALCIUM 9.0   PROT 7.0    ALBUMIN 2.8*   BILITOT 0.6   ALKPHOS 79   AST 17   ALT <5*   ANIONGAP 8       Significant Imaging: I have reviewed all pertinent imaging results/findings within the past 24 hours.

## 2025-05-09 NOTE — ASSESSMENT & PLAN NOTE
Vince Huffman is a 79 year old male with history of chron's disease, SBO, hypothyroidism, and CAD  presenting from [facility] with encephalopathy. Exam notable for disorientation to place, time, and situation. He is minimally responsive and will answer with one word. There is asterixis in his upper extremities, axial rigidity/extremity/rigidity, masked facies, and intention tremor most notably in the left hand. No hyperreflexia. Ptosis of left eye though no extraocular movement abnormalities/pupillary discrepancy to suggest third nerve palsy and mehran's respectively. Unclear cause of encephalopathy at this time, though acute presentation with concurrent parkinsonism suggests autoimmune etiology.     5/9: Repeat MRI also affected by motion artifact though no findings of acute stroke. Ammonia 44. UDS/UA unremarkable. Patient improved after sinemet trial; we gradually increased the dose to 2 tablets TID. Repeat LP attempt revealed wbc 29, lymph 66, glucose 46, and protein 60. Celiac panel + 386.    Recommendations:  - Given low suspicion for infectious meningitis and discussion with ID we discontinued empiric meningitic coverage including vancomycin, ceftriaxone, acyclovir and ampicillin   - Continue IVIG 0.4 g/kg for 5 days, day 2 in the setting of possible autoimmune encephalopathy (pending encephalopathy panel)   - IVF and IV benadryl co-administered with IVIG  - Pending strongyloides IgG  - Increased carbidopa-levodopa 1.5 tablet 25 -100 mg TID to 2 tablets TID  - Ordered labs including: vitamin B1, B12 (456), folate (16.5), ck (17), treponema (-), vitamin E, zinc (65), copper(1304), celiac panel (+ 386)  - Delirium and fall precautions

## 2025-05-09 NOTE — PLAN OF CARE
EEG completed, Pt eating well. No issues. POC continued  Problem: Adult Inpatient Plan of Care  Goal: Plan of Care Review  5/9/2025 1827 by Celia Reardon RN  Outcome: Progressing  5/9/2025 1827 by Celia Reardon RN  Outcome: Progressing  Goal: Patient-Specific Goal (Individualized)  5/9/2025 1827 by Celia Reardon RN  Outcome: Progressing  5/9/2025 1827 by Celia Reardon RN  Outcome: Progressing  Goal: Absence of Hospital-Acquired Illness or Injury  5/9/2025 1827 by Celia Reardon RN  Outcome: Progressing  5/9/2025 1827 by Celia Reardon RN  Outcome: Progressing  Goal: Optimal Comfort and Wellbeing  5/9/2025 1827 by Celia Reardon RN  Outcome: Progressing  5/9/2025 1827 by Celia Reardon RN  Outcome: Progressing  Goal: Readiness for Transition of Care  5/9/2025 1827 by Celia Reardon RN  Outcome: Progressing  5/9/2025 1827 by Celia Reardon RN  Outcome: Progressing     Problem: Skin Injury Risk Increased  Goal: Skin Health and Integrity  5/9/2025 1827 by Celia Reardon RN  Outcome: Progressing  5/9/2025 1827 by Celia Reardon RN  Outcome: Progressing     Problem: Infection  Goal: Absence of Infection Signs and Symptoms  5/9/2025 1827 by Celia Reardon RN  Outcome: Progressing  5/9/2025 1827 by Celia Reardon RN  Outcome: Progressing

## 2025-05-09 NOTE — NURSING TRANSFER
Nursing Transfer Note      5/8/2025   10:03 PM    Nurse giving handoff:Bárbara  Nurse receiving handoff:Keily    Reason patient is being transferred: need IGG treatment    Transfer From: [facility]    Transfer via stretcher    Transfer with 167/89    Transported by ***    Transfer Vital Signs:  Blood Pressure:163/84  Heart Rate:102  O2:96  Temperature:98.7  Respirations:18    Telemetry: {TELEMETRY:62216}  Order for Tele Monitor? No    Additional Lines: {Additional Lines:08499}    Medicines sent: IGG    Any special needs or follow-up needed: ***    Patient belongings transferred with patient: Yes    Chart send with patient: Yes    Notified: daughter    Patient reassessed at: *** (date, time)  1  Upon arrival to floor: {IP NSG TRANSFER ARRIVAL OHS:62843}

## 2025-05-09 NOTE — PROGRESS NOTES
[facility] Medicine  Progress Note    Patient Name: Vince Huffman  MRN: [id number]  Patient Class: IP- Inpatient   Admission Date: 4/30/2025  Length of Stay: 8 days  Attending Physician: Elian Morejon MD  Primary Care Provider: Leland Porras MD        Subjective     Principal Problem:Encephalopathy        HPI:  80 yo M w/HTN, hypothyroid, crohn's disease, CAD, hx of SBO, presenting with AMS from [facility]. Patient was sent in from [facility] for progressively worsening cognitive function. Patient is A&O x1 at his baseline. Patient and his daughter endorse mild confusion. Which has been progressive.    Patient has not had any recent falls. Endorsed mild suprapubic abdominal pain. No UTI. Mild inflammation on CT, not unexpected in the setting of Crohn's.     Overview/Hospital Course:  Vince Huffman is a 79 y.o. M who was admitted to  for further evaluation of worsening AMS per rehab facility. AAOx1 on admission, normally AAOx4 prior to 4/20 per son. UA negative. CXR clear. CT abd/pelvis with wall thickening of distal ileum, inflammatory infectious ileitis are considerations, fecal distention of the rectum, impaction not excluded. Discussed with GI, anticipated these are chronic findings. CRP negative. Will give enema and monitor for improvement. Patient with successful BM. Neurology consulted: MRI brain ordered (no acute findings, motion artifact), extended EEG, ammonia levels. Worsening mental status on 5/2, medicine team consulted for LP which was unsuccessful. More alert on 5/3 and answering yes/no questions when redirected. Neurology recommending carbidopa-levodopa trial, EEG, and attempting another LP.       Symptoms improved with increasing doses of Carbidopa/Levodopa. LP concerning for meningitis so he was started on Empiric treatment on 5/6/25.     5/9  Discussion held with both Neurology and Infectious Disease, Infectious Disease does not believe patient has  any infectious etiology and as such recommended discontinuing antimicrobials.  Neurology started IVIG for presumed autoimmune process    Interval History:  Transferred to step-down unit to initiate IVIG.  No acute issues.  EEG monitor noted    Review of Systems   Unable to perform ROS: Other     Objective:     Vital Signs (Most Recent):  Temp: 98 °F (36.7 °C) (05/09/25 0703)  Pulse: 86 (05/09/25 0703)  Resp: 18 (05/09/25 0703)  BP: (!) 153/77 (05/09/25 0703)  SpO2: 98 % (05/09/25 0703) Vital Signs (24h Range):  Temp:  [97.4 °F (36.3 °C)-99.1 °F (37.3 °C)] 98 °F (36.7 °C)  Pulse:  [] 86  Resp:  [12-18] 18  SpO2:  [94 %-99 %] 98 %  BP: (135-167)/(76-89) 153/77     Weight: 64.1 kg (141 lb 5 oz)  Body mass index is 19.71 kg/m².    Intake/Output Summary (Last 24 hours) at 5/9/2025 0857  Last data filed at 5/9/2025 0508  Gross per 24 hour   Intake 580 ml   Output 1801 ml   Net -1221 ml        Physical Exam  Constitutional:       General: He is not in acute distress.     Appearance: He is ill-appearing.   HENT:      Head: Normocephalic.      Right Ear: External ear normal.      Left Ear: External ear normal.      Nose: Nose normal.   Cardiovascular:      Rate and Rhythm: Normal rate.      Heart sounds: Normal heart sounds.   Pulmonary:      Breath sounds: Normal breath sounds.   Abdominal:      Palpations: Abdomen is soft.      Tenderness: There is no abdominal tenderness.   Musculoskeletal:      Right lower leg: No edema.      Left lower leg: No edema.      Comments: SCD   Skin:     General: Skin is warm.   Neurological:      General: No focal deficit present.      Mental Status: He is alert.      Comments: Ax1. Tremors        MELD 3.0: 13 at 5/6/2025  2:23 PM  MELD-Na: 9 at 5/6/2025  2:23 PM  Calculated from:  Serum Creatinine: 1.3 mg/dL at 5/6/2025  2:23 PM  Serum Sodium: 132 mmol/L at 5/6/2025  2:23 PM  Total Bilirubin: 0.5 mg/dL (Using min of 1 mg/dL) at 5/4/2025  3:28 AM  Serum Albumin: 3.2 g/dL at 5/4/2025   "3:28 AM  INR(ratio): 1 at 5/5/2025  4:24 PM  Age at listing (hypothetical): 79 years  Sex: Male at 5/6/2025  2:23 PM      Significant Labs:  CBC:  Recent Labs   Lab 05/09/25  0604   WBC 7.62   HGB 11.6*   HCT 34.8*        CMP:  Recent Labs   Lab 05/09/25  0604   *   K 3.9   CL 99   CO2 26   GLU 88   BUN 13   CREATININE 0.9   CALCIUM 9.0   PROT 7.0   ALBUMIN 2.8*   BILITOT 0.6   ALKPHOS 79   AST 17   ALT <5*   ANIONGAP 8     PTINR:  No results for input(s): "INR" in the last 48 hours.    Significant Procedures:   Dobutamine Stress Test with Color Flow: No results found for this or any previous visit.          Assessment & Plan  Encephalopathy  79 y.o.M with progressive acutely worsening confusion/tremors since 4/20. Previously AAOx4, now AAOx1  - neurology consulted, appreciate recommendations:  - MRI brain from last week was essentially non diagnostic  - Repeat MRI again without obvious new pathology   -working diagnosis is Parkinson's disease, as improving with Sinemet: iContinue two tabs TID  -LP concerning for possible meningitis. Continue Empiric treatment per Neurology recs   -EEG pending   - Delirium and fall precautions along with neuro checks  - PT/OT consult placed; return to [facility] upon discharge     5/8  Viral meningitis panel unremarkable.  Cultures no growth.  CSF with elevated proteins and white cell count.  Started on empiric antibiotics, vancomycin ceftriaxone and ampicillin, infectious disease consulted.  Follow up with Neurology recommendations.  Started on carbidopa levodopa.  Dispo plan acute rehab.  Mentation remains the same during this hospitalization A&O x1  5/9  Started on IVIG per Neurology.  EEG(f/u)  Crohn's disease  - Per CT scan "thickening of the distal ileum to the surgical anastomosis with the large bowel. Inflammatory infectious ileitis are considerations. Recommend clinical correlation and follow-up. "  -CRP negative. No concern for flare.   -GI without concerns " of confusion being caused by sterlara, confirmed with pharmacy     Generalized weakness  - sent from SNF  - progressive worsening weakness per son   - PT/OT ordered. Will need placement at discharge    Unintended weight loss  - reported per son  - recently started gluten free diet per recommendations from GI    Hyponatremia  Hyponatremia is likely due to Dehydration/hypovolemia. The patient's most recent sodium results are listed below.  Recent Labs     05/06/25  1423 05/07/25  0757 05/09/25  0604   * 132* 133*     Maxime  - Monitor sodium Daily.   - Patient hyponatremia is stable    Essential hypertension  Patient's blood pressure range in the last 24 hours was: BP  Min: 135/82  Max: 167/81.The patient's inpatient anti-hypertensive regimen is listed below:  Current Antihypertensives  amLODIPine tablet 10 mg, Daily, Oral    Plan  - BP is controlled, no changes needed to their regimen    Parkinsonism  Suspect symptoms are at least partially due to Parkinson's disease. Continue Sinemet as above.     VTE Risk Mitigation (From admission, onward)           Ordered     enoxaparin injection 40 mg  Daily         05/01/25 0827     IP VTE HIGH RISK PATIENT  Once         05/01/25 0827     Place sequential compression device  Until discontinued         05/01/25 0827                    Discharge Planning   ERNIE: 5/13/2025     Code Status: Full Code   Medical Readiness for Discharge Date:   Discharge Plan A: Rehab   Discharge Delays: None known at this time            Please place Justification for DME        Elian Morejon MD  Department of Hospital Medicine   [facility] - Acute Medical Stepdown

## 2025-05-09 NOTE — SUBJECTIVE & OBJECTIVE
Interval History:  Transferred to step-down unit to initiate IVIG.  No acute issues.  EEG monitor noted    Review of Systems   Unable to perform ROS: Other     Objective:     Vital Signs (Most Recent):  Temp: 98 °F (36.7 °C) (05/09/25 0703)  Pulse: 86 (05/09/25 0703)  Resp: 18 (05/09/25 0703)  BP: (!) 153/77 (05/09/25 0703)  SpO2: 98 % (05/09/25 0703) Vital Signs (24h Range):  Temp:  [97.4 °F (36.3 °C)-99.1 °F (37.3 °C)] 98 °F (36.7 °C)  Pulse:  [] 86  Resp:  [12-18] 18  SpO2:  [94 %-99 %] 98 %  BP: (135-167)/(76-89) 153/77     Weight: 64.1 kg (141 lb 5 oz)  Body mass index is 19.71 kg/m².    Intake/Output Summary (Last 24 hours) at 5/9/2025 0857  Last data filed at 5/9/2025 0508  Gross per 24 hour   Intake 580 ml   Output 1801 ml   Net -1221 ml        Physical Exam  Constitutional:       General: He is not in acute distress.     Appearance: He is ill-appearing.   HENT:      Head: Normocephalic.      Right Ear: External ear normal.      Left Ear: External ear normal.      Nose: Nose normal.   Cardiovascular:      Rate and Rhythm: Normal rate.      Heart sounds: Normal heart sounds.   Pulmonary:      Breath sounds: Normal breath sounds.   Abdominal:      Palpations: Abdomen is soft.      Tenderness: There is no abdominal tenderness.   Musculoskeletal:      Right lower leg: No edema.      Left lower leg: No edema.      Comments: SCD   Skin:     General: Skin is warm.   Neurological:      General: No focal deficit present.      Mental Status: He is alert.      Comments: Ax1. Tremors        MELD 3.0: 13 at 5/6/2025  2:23 PM  MELD-Na: 9 at 5/6/2025  2:23 PM  Calculated from:  Serum Creatinine: 1.3 mg/dL at 5/6/2025  2:23 PM  Serum Sodium: 132 mmol/L at 5/6/2025  2:23 PM  Total Bilirubin: 0.5 mg/dL (Using min of 1 mg/dL) at 5/4/2025  3:28 AM  Serum Albumin: 3.2 g/dL at 5/4/2025  3:28 AM  INR(ratio): 1 at 5/5/2025  4:24 PM  Age at listing (hypothetical): 79 years  Sex: Male at 5/6/2025  2:23 PM      Significant  "Labs:  CBC:  Recent Labs   Lab 05/09/25  0604   WBC 7.62   HGB 11.6*   HCT 34.8*        CMP:  Recent Labs   Lab 05/09/25  0604   *   K 3.9   CL 99   CO2 26   GLU 88   BUN 13   CREATININE 0.9   CALCIUM 9.0   PROT 7.0   ALBUMIN 2.8*   BILITOT 0.6   ALKPHOS 79   AST 17   ALT <5*   ANIONGAP 8     PTINR:  No results for input(s): "INR" in the last 48 hours.    Significant Procedures:   Dobutamine Stress Test with Color Flow: No results found for this or any previous visit.      "

## 2025-05-09 NOTE — PLAN OF CARE
[facility].     CM/SW has been discussing discharge plan    Discharge Plan A: Rehab  Discharge Plan B: Skilled Nursing Facility    with patient and Extended Emergency Contact Information  Primary Emergency Contact: Radha Huffman  [place] Phone: [phone]  Relation: Daughter  Secondary Emergency Contact: Vince Huffman  [place] Phone: [phone]  Relation: Son  Preferred language: English   needed? No.     Patient/Family preference is Rehab

## 2025-05-09 NOTE — PROCEDURES
EEG REPORT      Vince Huffman  [id number]  1945    DATE OF SERVICE: 5/8/2025     -1,2    METHODOLOGY      Extended electroencephalographic recording is made while the patient is ambulatory and continuing normal daily activities.  Electrodes are placed according to the International 10-20 placement system and included T1 and T2 electrode placement.  Twenty four (24) channels of digital signal (sampling rate of 512/sec) was simultaneously recorded from the scalp including EKG and eye monitors.  Recording band pass was 0.1 to 100 hz and all data was stored digitally on the recorder.  The patient is instructed to press an event button when clinical symptoms occur and write the symptoms into a diary. Activation procedures which include photic stimulation, hyperventilation and instructing patients to perform simple task are done in selected patients.        The EEG is displayed on a monitor screen and can be reformatted into different montages for evaluation.  The entire recoding is submitted for computer assisted analysis to detect spike and electrographic seizure activity.  The entire recording is visually reviewed and the times identified by computer analysis as being spikes or seizures are reviewed again.  Compresses spectral analysis (CSA) is also performed on the activity recorded from each individual channel.  This is displayed as a power display of frequencies from 0 to 30 Hz over time.   The CSA analysis is done and displayed continuously.  This is reviewed for asymmetries in power between homologous areas of the scalp and for presence of changes in power which canbe seen when seizures occur.  Sections of suspected abnormalities on the CSA is then compared with the original EEG recording.  .     [employer] software was also utilized in the review of this study.  This software suite analyzes the EEG recording in multiple domains.  Coherence and rhythmicity is computed to identify EEG sections which may  contain organized seizures.  Each channel undergoes analysis to detect presence of spike and sharp waves which have special and morphological characteristic of epileptic activity.  The routine EEG recording is converted from spacial into frequency domain.  This is then displayed comparing homologous areas to identify areas of significant asymmetry.  Algorithm to identify non-cortically generated artifact is used to separate eye movement, EMG and other artifact from the EEG     Recording Times  Start on 5/8/2025  Stop on 5/9/2025    A total of 14:59:14 and 8:21:48 hours of EEG was recorded.      EEG FINDINGS:  Background activity:   The background rhythm was characterized by poorly organized alpha and theta with intermittent polymorphic and quasi-rhythmic delta activity with absent posterior dominant rhythm.   Symmetry and continuity: the background was continuous and symmetric     Sleep:   No sleep transients although there is cycling of the background.    Activation procedures:   NA    Abnormal activity:   Frequent generalized periodic discharges with triphasic morphology.    IMPRESSION:   Abnormal EEG due to moderate generalized cerebral dysfunction as is commonly seen in a wide variety of states of toxic and metabolic derangement with no electrographic seizures or indications of seizure tendency.      Nghia Giraldo MD  Neurology-Epilepsy.  [facility].

## 2025-05-09 NOTE — ASSESSMENT & PLAN NOTE
79 y.o.M with progressive acutely worsening confusion/tremors since 4/20. Previously AAOx4, now AAOx1  - neurology consulted, appreciate recommendations:  - MRI brain from last week was essentially non diagnostic  - Repeat MRI again without obvious new pathology   -working diagnosis is Parkinson's disease, as improving with Sinemet: iContinue two tabs TID  -LP concerning for possible meningitis. Continue Empiric treatment per Neurology recs   -EEG pending   - Delirium and fall precautions along with neuro checks  - PT/OT consult placed; return to [facility] upon discharge     5/8  Viral meningitis panel unremarkable.  Cultures no growth.  CSF with elevated proteins and white cell count.  Started on empiric antibiotics, vancomycin ceftriaxone and ampicillin, infectious disease consulted.  Follow up with Neurology recommendations.  Started on carbidopa levodopa.  Dispo plan acute rehab.  Mentation remains the same during this hospitalization A&O x1  5/9  Started on IVIG per Neurology.  EEG(f/u)

## 2025-05-09 NOTE — PLAN OF CARE
Recommendations  1) Continue current regular (gluten free) diet as tolerated, encourage PO intake, honor food preferences as able   2) Continue Boost Plus TID to promote adequate kcal/protein consumption 3) RD to monitor and follow-up as needed    Goals: Meet % of EEN/EPN by next RD follow-up  Nutrition Goal Status: new

## 2025-05-09 NOTE — PROGRESS NOTES
"  [facility] - Acute Medical Stepdown  Adult Nutrition  Consult Note    SUMMARY     Recommendations  1) Continue current regular (gluten free) diet as tolerated, encourage PO intake, honor food preferences as able   2) Continue Boost Plus TID to promote adequate kcal/protein consumption 3) RD to monitor and follow-up as needed    Goals: Meet % of EEN/EPN by next RD follow-up  Nutrition Goal Status: new  Communication of RD Recs: other (comment) (POC)    Nutrition Discharge Planning     Nutrition Discharge Planning: General healthy diet, Oral supplement regimen (comments)  Oral supplement regimen (comments): ONS of choice PRN      Reason for Assessment    Reason For Assessment: RD follow-up  Diagnosis: other (see comments) (Encephalopathy)  General Information Comments: Pt. assessed today for RD follow up. Continues Regular, Gluten Free diet with Boost Plus all meals. PO intake is 25-50%. Weight is stable within past month. No s/s impaired sin integrity. LBM 5/8/25. RD team following.    Nutrition/Diet History    Spiritual, Cultural Beliefs, Restorationist Practices, Values that Affect Care: no  Food Allergies: other (see comments) (Gluten)  Factors Affecting Nutritional Intake: decreased appetite    Anthropometrics    Height: 5' 11" (180.3 cm)  Height (inches): 71 in  Height Method: Stated  Weight: 64.1 kg (141 lb 5 oz)  Weight (lb): 141.32 lb  Weight Method: Bed Scale  Ideal Body Weight (IBW), Male: 172 lb  % Ideal Body Weight, Male (lb): 80.75 %  BMI (Calculated): 19.7  BMI Grade: less than 23 (older than 65 years) - underweight       Lab/Procedures/Meds    Pertinent Labs Reviewed: reviewed  Pertinent Labs Comments: 5/9/25: H/H 11.6/34.8L, Na 133L, Alb 2.8L, aLT <5L  Pertinent Medications Reviewed: reviewed  Pertinent Medications Comments: Amlodipine, Enoxaparin, Polyethylene glycol    Estimated/Assessed Needs    Weight Used For Calorie Calculations: 63 kg (138 lb 14.2 oz)  Energy Calorie Requirements (kcal): 1646 " kcal/day  Energy Need Method: [place]-St Jeor (x 1.2)  Protein Requirements: 82 g (1.3 g/kg)  Weight Used For Protein Calculations: 63 kg (138 lb 14.2 oz)  Fluid Requirements (mL): 1 mL/kcal or per MD  Estimated Fluid Requirement Method: RDA Method  RDA Method (mL): 1646         Nutrition Prescription Ordered    Current Diet Order: Regular Gluten Free  Oral Nutrition Supplement: Boost Plus all meals    Evaluation of Received Nutrient/Fluid Intake    I/O: - 1.6 L since admit  Comments: LBM 5/1  % Intake of Estimated Energy Needs: 25 - 50 %  % Meal Intake: 25 - 50 %    PES Statement  Underweight related to Other (comment) (Pt report of recent 24-33 lb weight loss per MST) as evidenced by BMI  Status: New    Nutrition Risk    Level of Risk/Frequency of Follow-up: low - moderate       Monitor and Evaluation    Monitor and Evaluation: Energy intake, Food and beverage intake, Protein intake, Diet order, Weight       Nutrition Follow-Up    RD Follow-up?: Yes

## 2025-05-10 PROCEDURE — 30233S1 TRANSFUSION OF NONAUTOLOGOUS GLOBULIN INTO PERIPHERAL VEIN, PERCUTANEOUS APPROACH: ICD-10-PCS | Performed by: PSYCHIATRY & NEUROLOGY

## 2025-05-10 PROCEDURE — 25000003 PHARM REV CODE 250

## 2025-05-10 PROCEDURE — 63600175 PHARM REV CODE 636 W HCPCS

## 2025-05-10 PROCEDURE — 25000003 PHARM REV CODE 250: Performed by: INTERNAL MEDICINE

## 2025-05-10 PROCEDURE — 20600001 HC STEP DOWN PRIVATE ROOM

## 2025-05-10 PROCEDURE — 99233 SBSQ HOSP IP/OBS HIGH 50: CPT | Mod: ,,, | Performed by: PSYCHIATRY & NEUROLOGY

## 2025-05-10 RX ADMIN — CARBIDOPA AND LEVODOPA 2 TABLET: 25; 100 TABLET ORAL at 02:05

## 2025-05-10 RX ADMIN — CARBIDOPA AND LEVODOPA 2 TABLET: 25; 100 TABLET ORAL at 09:05

## 2025-05-10 RX ADMIN — SODIUM CHLORIDE: 9 INJECTION, SOLUTION INTRAVENOUS at 02:05

## 2025-05-10 RX ADMIN — DIPHENHYDRAMINE HYDROCHLORIDE 25 MG: 50 INJECTION, SOLUTION INTRAMUSCULAR; INTRAVENOUS at 05:05

## 2025-05-10 RX ADMIN — HUMAN IMMUNOGLOBULIN G 25 G: 20 LIQUID INTRAVENOUS at 05:05

## 2025-05-10 RX ADMIN — AMLODIPINE BESYLATE 10 MG: 10 TABLET ORAL at 08:05

## 2025-05-10 RX ADMIN — ENOXAPARIN SODIUM 40 MG: 40 INJECTION SUBCUTANEOUS at 05:05

## 2025-05-10 RX ADMIN — CARBIDOPA AND LEVODOPA 2 TABLET: 25; 100 TABLET ORAL at 08:05

## 2025-05-10 NOTE — PLAN OF CARE
Patient lying in bed pleasantly confused, incont. of bowel and bladder  Doesn't make needs known, requires assistance with feeding.  Problem: Adult Inpatient Plan of Care  Goal: Optimal Comfort and Wellbeing  Outcome: Progressing     Problem: Skin Injury Risk Increased  Goal: Skin Health and Integrity  Outcome: Progressing     Problem: Infection  Goal: Absence of Infection Signs and Symptoms  Outcome: Progressing     Problem: Adult Inpatient Plan of Care  Goal: Plan of Care Review  Outcome: Progressing

## 2025-05-10 NOTE — ASSESSMENT & PLAN NOTE
79 y.o.M with progressive acutely worsening confusion/tremors since 4/20. Previously AAOx4, now AAOx1  - neurology consulted, appreciate recommendations:  - MRI brain from last week was essentially non diagnostic  - Repeat MRI again without obvious new pathology   -working diagnosis is Parkinson's disease, as improving with Sinemet: iContinue two tabs TID  -LP concerning for possible meningitis. Continue Empiric treatment per Neurology recs   -EEG pending   - Delirium and fall precautions along with neuro checks  - PT/OT consult placed; return to [facility] upon discharge     5/8  Viral meningitis panel unremarkable.  Cultures no growth.  CSF with elevated proteins and white cell count.  Started on empiric antibiotics, vancomycin ceftriaxone and ampicillin, infectious disease consulted.  Follow up with Neurology recommendations.  Started on carbidopa levodopa.  Dispo plan acute rehab.  Mentation remains the same during this hospitalization A&O x1  5/9  Started on IVIG per Neurology.  EEG(f/u)  5/10  Continue with IVIG, follow up with Neurology.  EEG report noted

## 2025-05-10 NOTE — ASSESSMENT & PLAN NOTE
Vince Huffman is a 79 year old male with history of chron's disease, SBO, hypothyroidism, and CAD  presenting from [facility] with encephalopathy. Exam notable for disorientation to place, time, and situation. He is minimally responsive and will answer with one word. There is asterixis in his upper extremities, axial rigidity/extremity/rigidity, masked facies, and intention tremor most notably in the left hand. No hyperreflexia. Ptosis of left eye though no extraocular movement abnormalities/pupillary discrepancy to suggest third nerve palsy and mehran's respectively. Unclear cause of encephalopathy at this time, though acute presentation with concurrent parkinsonism suggests autoimmune etiology. Motion artifact on MRI brain. CSF studies with xanthochromia without relevant SAH on images. Increased proteins on CSF. Started on IVIG. Celiac panel + 386. Discontinued empiric meningitis coverage. Labs thus far: B12 (456), folate (16.5), CK (17), treponema (-), zinc (65), copper(1304).    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

## 2025-05-10 NOTE — SUBJECTIVE & OBJECTIVE
Interval History:  Mentation improving.  He is aware of the year. EEG report noted.    Review of Systems   Unable to perform ROS: Other     Objective:     Vital Signs (Most Recent):  Temp: 98 °F (36.7 °C) (05/10/25 0742)  Pulse: 91 (05/10/25 0742)  Resp: 18 (05/10/25 0742)  BP: 118/71 (05/10/25 0742)  SpO2: 100 % (05/10/25 0742) Vital Signs (24h Range):  Temp:  [97.5 °F (36.4 °C)-98.5 °F (36.9 °C)] 98 °F (36.7 °C)  Pulse:  [] 91  Resp:  [16-20] 18  SpO2:  [96 %-100 %] 100 %  BP: (118-153)/(68-91) 118/71     Weight: 64.1 kg (141 lb 5 oz)  Body mass index is 19.71 kg/m².    Intake/Output Summary (Last 24 hours) at 5/10/2025 0850  Last data filed at 5/10/2025 0415  Gross per 24 hour   Intake 2249.68 ml   Output 850 ml   Net 1399.68 ml        Physical Exam  Constitutional:       General: He is not in acute distress.     Appearance: He is ill-appearing.   HENT:      Head: Normocephalic.      Right Ear: External ear normal.      Left Ear: External ear normal.      Nose: Nose normal.   Cardiovascular:      Rate and Rhythm: Normal rate.      Heart sounds: Normal heart sounds.   Pulmonary:      Breath sounds: Normal breath sounds.   Abdominal:      Palpations: Abdomen is soft.      Tenderness: There is no abdominal tenderness.   Musculoskeletal:      Right lower leg: No edema.      Left lower leg: No edema.      Comments: SCD   Skin:     General: Skin is warm.   Neurological:      General: No focal deficit present.      Mental Status: He is alert. improved          MELD 3.0: 13 at 5/6/2025  2:23 PM  MELD-Na: 9 at 5/6/2025  2:23 PM  Calculated from:  Serum Creatinine: 1.3 mg/dL at 5/6/2025  2:23 PM  Serum Sodium: 132 mmol/L at 5/6/2025  2:23 PM  Total Bilirubin: 0.5 mg/dL (Using min of 1 mg/dL) at 5/4/2025  3:28 AM  Serum Albumin: 3.2 g/dL at 5/4/2025  3:28 AM  INR(ratio): 1 at 5/5/2025  4:24 PM  Age at listing (hypothetical): 79 years  Sex: Male at 5/6/2025  2:23 PM      Significant Labs:  CBC:  Recent Labs   Lab  "05/09/25  0604   WBC 7.62   HGB 11.6*   HCT 34.8*        CMP:  Recent Labs   Lab 05/09/25  0604   *   K 3.9   CL 99   CO2 26   GLU 88   BUN 13   CREATININE 0.9   CALCIUM 9.0   PROT 7.0   ALBUMIN 2.8*   BILITOT 0.6   ALKPHOS 79   AST 17   ALT <5*   ANIONGAP 8     PTINR:  No results for input(s): "INR" in the last 48 hours.    Significant Procedures:   Dobutamine Stress Test with Color Flow: No results found for this or any previous visit.      "

## 2025-05-10 NOTE — SUBJECTIVE & OBJECTIVE
Subjective:     Interval History: Improving mentation, with some decrease in concentration. Mild tremors, but improved following Sinemet trial and IVIG (D3/5)    Current Medications[1]    Review of Systems   Neurological:  Positive for tremors.   Psychiatric/Behavioral:  Positive for confusion and decreased concentration.      Objective:     Vital Signs (Most Recent):  Temp: 98 °F (36.7 °C) (05/10/25 1620)  Pulse: 98 (05/10/25 1620)  Resp: 18 (05/10/25 1620)  BP: 134/69 (05/10/25 1620)  SpO2: 98 % (05/10/25 1620) Vital Signs (24h Range):  Temp:  [97.5 °F (36.4 °C)-98.4 °F (36.9 °C)] 98 °F (36.7 °C)  Pulse:  [] 98  Resp:  [16-20] 18  SpO2:  [97 %-100 %] 98 %  BP: (118-153)/(65-91) 134/69     Weight: 64.1 kg (141 lb 5 oz)  Body mass index is 19.71 kg/m².     Physical Exam  Constitutional:       Comments: He appears confused and responds minimally to questions. Some days his speech will improve.   Eyes:      Pupils: Pupils are equal, round, and reactive to light.   Pulmonary:      Effort: Pulmonary effort is normal.   Neurological:      Mental Status: He is alert. He is disoriented.      Deep Tendon Reflexes:      Reflex Scores:       Brachioradialis reflexes are 2+ on the right side and 2+ on the left side.       Patellar reflexes are 2+ on the right side and 2+ on the left side.         NEUROLOGICAL EXAMINATION:     MENTAL STATUS   Oriented to person.   Disoriented to place.   Disoriented to time.   Attention: decreased. Concentration: decreased.   Level of consciousness: alert    CRANIAL NERVES     CN III, IV, VI   Pupils are equal, round, and reactive to light.       Mild ptosis of left eye without pupillary abnormalities     MOTOR EXAM   Overall muscle tone: increased  Right arm tone: increased  Left arm tone: increased       Asterixis with tremor most notably in the left arm     REFLEXES     Reflexes   Right brachioradialis: 2+  Left brachioradialis: 2+  Right patellar: 2+  Left patellar: 2+    SENSORY  EXAM   Light touch normal.     GAIT AND COORDINATION     Tremor   Intention tremor: present      Significant Labs: All pertinent lab results from the past 24 hours have been reviewed.    Significant Imaging: I have reviewed all pertinent imaging results/findings within the past 24 hours.       [1]   Current Facility-Administered Medications   Medication Dose Route Frequency Provider Last Rate Last Admin    acetaminophen tablet 1,000 mg  1,000 mg Oral Q8H PRN Zuly Wheat PA-C        acetaminophen tablet 650 mg  650 mg Oral Q4H PRN Zuly Wheat PA-C   650 mg at 05/04/25 1453    ALPRAZolam tablet 0.5 mg  0.5 mg Oral BID PRN Zuly Wheat PA-C        amLODIPine tablet 10 mg  10 mg Oral Daily Juan F Ramírez MD   10 mg at 05/10/25 0816    carbidopa-levodopa  mg per tablet 2 tablet  2 tablet Oral TID Elian López MD   2 tablet at 05/10/25 1409    dextrose 50% injection 12.5 g  12.5 g Intravenous PRN Zuly Wheat PA-C        dextrose 50% injection 25 g  25 g Intravenous PRN Zuly Wheat PA-C        diphenhydrAMINE injection 25 mg  25 mg Intravenous Q24H Elian López MD   25 mg at 05/09/25 1733    enoxaparin injection 40 mg  40 mg Subcutaneous Daily Zuly Wheat PA-C   40 mg at 05/09/25 1734    gadobutroL (GADAVIST) injection 7 mL  7 mL Intravenous ONCE PRN Arsenio Oglesby MD        glucagon (human recombinant) injection 1 mg  1 mg Intramuscular PRN Zuly Wheat PA-C        glucose chewable tablet 16 g  16 g Oral PRN Zuly Wheat PA-C        glucose chewable tablet 24 g  24 g Oral PRN Zuly Wheat PA-C        Immune Globulin G (IGG)-PRO-IGA 10 % injection (Privigen) 10 % injection 25 g  0.4 g/kg Intravenous Q24H Elian López MD 19 mL/hr at 05/09/25 1842 25 g at 05/09/25 1842    LIDOcaine HCL 10 mg/ml (1%) injection 5 mL  5 mL Other On Call Procedure Elian López MD        melatonin tablet 6 mg  6 mg Oral Nightly PRN Zuly Wheat PA-C        naloxone 0.4 mg/mL  injection 0.02 mg  0.02 mg Intravenous PRN Zuly Wheat PA-C        ondansetron disintegrating tablet 8 mg  8 mg Oral Q8H PRN Zuly Wheat PA-C        polyethylene glycol packet 17 g  17 g Oral BID Zuly Wheat PA-C   17 g at 05/09/25 2027    prochlorperazine injection Soln 5 mg  5 mg Intravenous Q6H PRN Zuly Wheat PA-C        sodium chloride 0.9% flush 10 mL  10 mL Intravenous Q12H PRN Zuly Wheat PA-C

## 2025-05-10 NOTE — PLAN OF CARE
Weekend SW spoke with pt daughter Radha [phone] in regards to pt discharge plan.   Pt daughter had concerns about if pt would stay on current floor or be transferred.     SW was able to assist pt daughter with any further questions.   Pt daughter also stated that pt came from [facility] and was only there for 3 days and she would like for pt to return once med ready.     Weekday SW will follow up.     Shaunna Jara MSW, CSW

## 2025-05-10 NOTE — PROGRESS NOTES
"[facility]  Neurology  Progress Note    Patient Name: Vince Huffman  MRN: [id number]  Admission Date: 4/30/2025  Hospital Length of Stay: 9 days  Code Status: Full Code   Attending Provider: Elian Morejon MD  Primary Care Physician: Leland Porras MD   Principal Problem:Encephalopathy    HPI:   Vince Huffman is a 79 year old male with history of chron's disease, SBO, hypothyroidism, and CAD  presenting from [facility] with encephalopathy. He initially presented to [facility] for impaired mobility and difficulty with ADL's 2/2 generalized weakness. He was reportedly found confused today A&O x 1 (said the year is "1895") but is normally A&O x4. At this time he also expressed generalized abdominal pain. Recent ultrasounds of the lower extremity did not reveal DVT. MRI from last week was non diagnostic. UA unremarkable and CXR normal. CT abdomen and pelvis reveals wall thickening of the distal ileum consistent with possible infectious ileitis. Neurology consulted for further evaluation of encephalopathy.       Subjective:     Interval History: Improving mentation, with some decrease in concentration. Mild tremors, but improved following Sinemet trial and IVIG (D3/5)    Current Medications[1]    Review of Systems   Neurological:  Positive for tremors.   Psychiatric/Behavioral:  Positive for confusion and decreased concentration.      Objective:     Vital Signs (Most Recent):  Temp: 98 °F (36.7 °C) (05/10/25 1620)  Pulse: 98 (05/10/25 1620)  Resp: 18 (05/10/25 1620)  BP: 134/69 (05/10/25 1620)  SpO2: 98 % (05/10/25 1620) Vital Signs (24h Range):  Temp:  [97.5 °F (36.4 °C)-98.4 °F (36.9 °C)] 98 °F (36.7 °C)  Pulse:  [] 98  Resp:  [16-20] 18  SpO2:  [97 %-100 %] 98 %  BP: (118-153)/(65-91) 134/69     Weight: 64.1 kg (141 lb 5 oz)  Body mass index is 19.71 kg/m².     Physical Exam  Constitutional:       Comments: He appears confused and responds minimally to questions. Some days his " speech will improve.   Eyes:      Pupils: Pupils are equal, round, and reactive to light.   Pulmonary:      Effort: Pulmonary effort is normal.   Neurological:      Mental Status: He is alert. He is disoriented.      Deep Tendon Reflexes:      Reflex Scores:       Brachioradialis reflexes are 2+ on the right side and 2+ on the left side.       Patellar reflexes are 2+ on the right side and 2+ on the left side.         NEUROLOGICAL EXAMINATION:     MENTAL STATUS   Oriented to person.   Disoriented to place.   Disoriented to time.   Attention: decreased. Concentration: decreased.   Level of consciousness: alert    CRANIAL NERVES     CN III, IV, VI   Pupils are equal, round, and reactive to light.       Mild ptosis of left eye without pupillary abnormalities     MOTOR EXAM   Overall muscle tone: increased  Right arm tone: increased  Left arm tone: increased       Asterixis with tremor most notably in the left arm     REFLEXES     Reflexes   Right brachioradialis: 2+  Left brachioradialis: 2+  Right patellar: 2+  Left patellar: 2+    SENSORY EXAM   Light touch normal.     GAIT AND COORDINATION     Tremor   Intention tremor: present      Significant Labs: All pertinent lab results from the past 24 hours have been reviewed.    Significant Imaging: I have reviewed all pertinent imaging results/findings within the past 24 hours.    Assessment and Plan:     * Encephalopathy  Vince Huffman is a 79 year old male with history of chron's disease, SBO, hypothyroidism, and CAD  presenting from [facility] with encephalopathy. Exam notable for disorientation to place, time, and situation. He is minimally responsive and will answer with one word. There is asterixis in his upper extremities, axial rigidity/extremity/rigidity, masked facies, and intention tremor most notably in the left hand. No hyperreflexia. Ptosis of left eye though no extraocular movement abnormalities/pupillary discrepancy to suggest third nerve palsy and  mehran's respectively. Unclear cause of encephalopathy at this time, though acute presentation with concurrent parkinsonism suggests autoimmune etiology. Motion artifact on MRI brain. CSF studies with xanthochromia without relevant SAH on images. Increased proteins on CSF. Started on IVIG. Celiac panel + 386. Discontinued empiric meningitis coverage. Labs thus far: B12 (456), folate (16.5), CK (17), treponema (-), zinc (65), copper(1304).    Recommendations:  -Continue IVIG for 5 days in the setting of possible autoimmune encephalopathy (pending encephalopathy panel) (D3/5)   -IVF and IV benadryl co-administered with IVIG; please discontinue after last IVIG dose   -Pending strongyloides IgG  -Continue optimized Sinemet trial 25 -100 mg TID to 2 tablets TID  -Delirium and fall precautions   -Neurology will continue to follow       Samy Fuller MD  Neurology       [1]   Current Facility-Administered Medications   Medication Dose Route Frequency Provider Last Rate Last Admin    acetaminophen tablet 1,000 mg  1,000 mg Oral Q8H PRN Zuly Wheat PA-C        acetaminophen tablet 650 mg  650 mg Oral Q4H PRN Zuly Wheat PA-C   650 mg at 05/04/25 1453    ALPRAZolam tablet 0.5 mg  0.5 mg Oral BID PRN Zuly Wheat PA-C        amLODIPine tablet 10 mg  10 mg Oral Daily Juan F Ramírez MD   10 mg at 05/10/25 0816    carbidopa-levodopa  mg per tablet 2 tablet  2 tablet Oral TID Elian López MD   2 tablet at 05/10/25 1409    dextrose 50% injection 12.5 g  12.5 g Intravenous PRN Zuly Wheat PA-C        dextrose 50% injection 25 g  25 g Intravenous PRN Zuly Wheat PA-C        diphenhydrAMINE injection 25 mg  25 mg Intravenous Q24H Elian López MD   25 mg at 05/09/25 1733    enoxaparin injection 40 mg  40 mg Subcutaneous Daily Zuly Wheat PA-C   40 mg at 05/09/25 1734    gadobutroL (GADAVIST) injection 7 mL  7 mL Intravenous ONCE PRN Arsenio Oglesby MD        glucagon (human  recombinant) injection 1 mg  1 mg Intramuscular PRN Zuly Wheat PA-C        glucose chewable tablet 16 g  16 g Oral PRN Zuly Wheat PA-C        glucose chewable tablet 24 g  24 g Oral PRN Zuly Wheat PA-C        Immune Globulin G (IGG)-PRO-IGA 10 % injection (Privigen) 10 % injection 25 g  0.4 g/kg Intravenous Q24H Elian López MD 19 mL/hr at 05/09/25 1842 25 g at 05/09/25 1842    LIDOcaine HCL 10 mg/ml (1%) injection 5 mL  5 mL Other On Call Procedure Elian López MD        melatonin tablet 6 mg  6 mg Oral Nightly PRN Zuly Wheat PA-C        naloxone 0.4 mg/mL injection 0.02 mg  0.02 mg Intravenous PRN Zuly Wheat PA-C        ondansetron disintegrating tablet 8 mg  8 mg Oral Q8H PRN Zuly Wheat PA-C        polyethylene glycol packet 17 g  17 g Oral BID Zuly Wheat PA-C   17 g at 05/09/25 2027    prochlorperazine injection Soln 5 mg  5 mg Intravenous Q6H PRN Zuly Wheat PA-C        sodium chloride 0.9% flush 10 mL  10 mL Intravenous Q12H PRN Zuly Wheat PA-C

## 2025-05-10 NOTE — PROGRESS NOTES
[facility] Medicine  Progress Note    Patient Name: Vince Huffman  MRN: [id number]  Patient Class: IP- Inpatient   Admission Date: 4/30/2025  Length of Stay: 9 days  Attending Physician: Elian Morejon MD  Primary Care Provider: Leland Porras MD        Subjective     Principal Problem:Encephalopathy        HPI:  80 yo M w/HTN, hypothyroid, crohn's disease, CAD, hx of SBO, presenting with AMS from [facility]. Patient was sent in from [facility] for progressively worsening cognitive function. Patient is A&O x1 at his baseline. Patient and his daughter endorse mild confusion. Which has been progressive.    Patient has not had any recent falls. Endorsed mild suprapubic abdominal pain. No UTI. Mild inflammation on CT, not unexpected in the setting of Crohn's.     Overview/Hospital Course:  Vince Huffman is a 79 y.o. M who was admitted to  for further evaluation of worsening AMS per rehab facility. AAOx1 on admission, normally AAOx4 prior to 4/20 per son. UA negative. CXR clear. CT abd/pelvis with wall thickening of distal ileum, inflammatory infectious ileitis are considerations, fecal distention of the rectum, impaction not excluded. Discussed with GI, anticipated these are chronic findings. CRP negative. Will give enema and monitor for improvement. Patient with successful BM. Neurology consulted: MRI brain ordered (no acute findings, motion artifact), extended EEG, ammonia levels. Worsening mental status on 5/2, medicine team consulted for LP which was unsuccessful. More alert on 5/3 and answering yes/no questions when redirected. Neurology recommending carbidopa-levodopa trial, EEG, and attempting another LP.       Symptoms improved with increasing doses of Carbidopa/Levodopa. LP concerning for meningitis so he was started on Empiric treatment on 5/6/25.     5/9  Discussion held with both Neurology and Infectious Disease, Infectious Disease does not believe patient has  any infectious etiology and as such recommended discontinuing antimicrobials.  Neurology started IVIG for presumed autoimmune process    Interval History:  Mentation improving.  He is aware of the year. EEG report noted.    Review of Systems   Unable to perform ROS: Other     Objective:     Vital Signs (Most Recent):  Temp: 98 °F (36.7 °C) (05/10/25 0742)  Pulse: 91 (05/10/25 0742)  Resp: 18 (05/10/25 0742)  BP: 118/71 (05/10/25 0742)  SpO2: 100 % (05/10/25 0742) Vital Signs (24h Range):  Temp:  [97.5 °F (36.4 °C)-98.5 °F (36.9 °C)] 98 °F (36.7 °C)  Pulse:  [] 91  Resp:  [16-20] 18  SpO2:  [96 %-100 %] 100 %  BP: (118-153)/(68-91) 118/71     Weight: 64.1 kg (141 lb 5 oz)  Body mass index is 19.71 kg/m².    Intake/Output Summary (Last 24 hours) at 5/10/2025 0850  Last data filed at 5/10/2025 0415  Gross per 24 hour   Intake 2249.68 ml   Output 850 ml   Net 1399.68 ml        Physical Exam  Constitutional:       General: He is not in acute distress.     Appearance: He is ill-appearing.   HENT:      Head: Normocephalic.      Right Ear: External ear normal.      Left Ear: External ear normal.      Nose: Nose normal.   Cardiovascular:      Rate and Rhythm: Normal rate.      Heart sounds: Normal heart sounds.   Pulmonary:      Breath sounds: Normal breath sounds.   Abdominal:      Palpations: Abdomen is soft.      Tenderness: There is no abdominal tenderness.   Musculoskeletal:      Right lower leg: No edema.      Left lower leg: No edema.      Comments: SCD   Skin:     General: Skin is warm.   Neurological:      General: No focal deficit present.      Mental Status: He is alert. improved          MELD 3.0: 13 at 5/6/2025  2:23 PM  MELD-Na: 9 at 5/6/2025  2:23 PM  Calculated from:  Serum Creatinine: 1.3 mg/dL at 5/6/2025  2:23 PM  Serum Sodium: 132 mmol/L at 5/6/2025  2:23 PM  Total Bilirubin: 0.5 mg/dL (Using min of 1 mg/dL) at 5/4/2025  3:28 AM  Serum Albumin: 3.2 g/dL at 5/4/2025  3:28 AM  INR(ratio): 1 at  "5/5/2025  4:24 PM  Age at listing (hypothetical): 79 years  Sex: Male at 5/6/2025  2:23 PM      Significant Labs:  CBC:  Recent Labs   Lab 05/09/25  0604   WBC 7.62   HGB 11.6*   HCT 34.8*        CMP:  Recent Labs   Lab 05/09/25  0604   *   K 3.9   CL 99   CO2 26   GLU 88   BUN 13   CREATININE 0.9   CALCIUM 9.0   PROT 7.0   ALBUMIN 2.8*   BILITOT 0.6   ALKPHOS 79   AST 17   ALT <5*   ANIONGAP 8     PTINR:  No results for input(s): "INR" in the last 48 hours.    Significant Procedures:   Dobutamine Stress Test with Color Flow: No results found for this or any previous visit.          Assessment & Plan  Encephalopathy  79 y.o.M with progressive acutely worsening confusion/tremors since 4/20. Previously AAOx4, now AAOx1  - neurology consulted, appreciate recommendations:  - MRI brain from last week was essentially non diagnostic  - Repeat MRI again without obvious new pathology   -working diagnosis is Parkinson's disease, as improving with Sinemet: iContinue two tabs TID  -LP concerning for possible meningitis. Continue Empiric treatment per Neurology recs   -EEG pending   - Delirium and fall precautions along with neuro checks  - PT/OT consult placed; return to [facility] upon discharge     5/8  Viral meningitis panel unremarkable.  Cultures no growth.  CSF with elevated proteins and white cell count.  Started on empiric antibiotics, vancomycin ceftriaxone and ampicillin, infectious disease consulted.  Follow up with Neurology recommendations.  Started on carbidopa levodopa.  Dispo plan acute rehab.  Mentation remains the same during this hospitalization A&O x1  5/9  Started on IVIG per Neurology.  EEG(f/u)  5/10  Continue with IVIG, follow up with Neurology.  EEG report noted  Crohn's disease  - Per CT scan "thickening of the distal ileum to the surgical anastomosis with the large bowel. Inflammatory infectious ileitis are considerations. Recommend clinical correlation and follow-up. "  -CRP negative. " No concern for flare.   -GI without concerns of confusion being caused by sterlara, confirmed with pharmacy     Generalized weakness  - sent from SNF  - progressive worsening weakness per son   - PT/OT ordered. Will need placement at discharge    Unintended weight loss  - reported per son  - recently started gluten free diet per recommendations from GI    Hyponatremia  Hyponatremia is likely due to Dehydration/hypovolemia. The patient's most recent sodium results are listed below.  Recent Labs     05/09/25  0604   *     Maxime  - Monitor sodium Daily.   - Patient hyponatremia is stable    Essential hypertension  Patient's blood pressure range in the last 24 hours was: BP  Min: 118/71  Max: 153/91.The patient's inpatient anti-hypertensive regimen is listed below:  Current Antihypertensives  amLODIPine tablet 10 mg, Daily, Oral    Plan  - BP is controlled, no changes needed to their regimen    Parkinsonism  Suspect symptoms are at least partially due to Parkinson's disease. Continue Sinemet as above.     CAD (coronary artery disease)  y.   AMS (altered mental status)      VTE Risk Mitigation (From admission, onward)           Ordered     enoxaparin injection 40 mg  Daily         05/01/25 0827     IP VTE HIGH RISK PATIENT  Once         05/01/25 0827     Place sequential compression device  Until discontinued         05/01/25 0827                    Discharge Planning   ERNIE: 5/13/2025     Code Status: Full Code   Medical Readiness for Discharge Date:   Discharge Plan A: Rehab (TBD)   Discharge Delays: None known at this time            Please place Justification for DME        Elian Morejon MD  Department of Hospital Medicine   [facility] - [facility]

## 2025-05-10 NOTE — ASSESSMENT & PLAN NOTE
Patient's blood pressure range in the last 24 hours was: BP  Min: 118/71  Max: 153/91.The patient's inpatient anti-hypertensive regimen is listed below:  Current Antihypertensives  amLODIPine tablet 10 mg, Daily, Oral    Plan  - BP is controlled, no changes needed to their regimen

## 2025-05-10 NOTE — ASSESSMENT & PLAN NOTE
Hyponatremia is likely due to Dehydration/hypovolemia. The patient's most recent sodium results are listed below.  Recent Labs     05/09/25  0604   *     Maxime  - Monitor sodium Daily.   - Patient hyponatremia is stable

## 2025-05-11 LAB
ABSOLUTE EOSINOPHIL (OHS): 0.06 K/UL
ABSOLUTE MONOCYTE (OHS): 1.14 K/UL (ref 0.3–1)
ABSOLUTE NEUTROPHIL COUNT (OHS): 5.37 K/UL (ref 1.8–7.7)
ALBUMIN SERPL BCP-MCNC: 2.7 G/DL (ref 3.5–5.2)
ALP SERPL-CCNC: 73 UNIT/L (ref 40–150)
ALT SERPL W/O P-5'-P-CCNC: 11 UNIT/L (ref 10–44)
ANION GAP (OHS): 9 MMOL/L (ref 8–16)
AST SERPL-CCNC: 22 UNIT/L (ref 11–45)
BACTERIA CSF CULT: NO GROWTH
BASOPHILS # BLD AUTO: 0.03 K/UL
BASOPHILS NFR BLD AUTO: 0.4 %
BILIRUB SERPL-MCNC: 0.4 MG/DL (ref 0.1–1)
BUN SERPL-MCNC: 11 MG/DL (ref 8–23)
CALCIUM SERPL-MCNC: 9.1 MG/DL (ref 8.7–10.5)
CHLORIDE SERPL-SCNC: 97 MMOL/L (ref 95–110)
CO2 SERPL-SCNC: 24 MMOL/L (ref 23–29)
CREAT SERPL-MCNC: 0.8 MG/DL (ref 0.5–1.4)
ERYTHROCYTE [DISTWIDTH] IN BLOOD BY AUTOMATED COUNT: 11.7 % (ref 11.5–14.5)
GFR SERPLBLD CREATININE-BSD FMLA CKD-EPI: >60 ML/MIN/1.73/M2
GLUCOSE SERPL-MCNC: 93 MG/DL (ref 70–110)
GRAM STN SPEC: NORMAL
GRAM STN SPEC: NORMAL
HCT VFR BLD AUTO: 33.2 % (ref 40–54)
HGB BLD-MCNC: 11.2 GM/DL (ref 14–18)
IMM GRANULOCYTES # BLD AUTO: 0.03 K/UL (ref 0–0.04)
IMM GRANULOCYTES NFR BLD AUTO: 0.4 % (ref 0–0.5)
LYMPHOCYTES # BLD AUTO: 0.58 K/UL (ref 1–4.8)
MCH RBC QN AUTO: 31.5 PG (ref 27–31)
MCHC RBC AUTO-ENTMCNC: 33.7 G/DL (ref 32–36)
MCV RBC AUTO: 93 FL (ref 82–98)
NUCLEATED RBC (/100WBC) (OHS): 0 /100 WBC
PLATELET # BLD AUTO: 210 K/UL (ref 150–450)
PMV BLD AUTO: 10.4 FL (ref 9.2–12.9)
POTASSIUM SERPL-SCNC: 3.8 MMOL/L (ref 3.5–5.1)
PROT SERPL-MCNC: 7.9 GM/DL (ref 6–8.4)
RBC # BLD AUTO: 3.56 M/UL (ref 4.6–6.2)
RELATIVE EOSINOPHIL (OHS): 0.8 %
RELATIVE LYMPHOCYTE (OHS): 8 % (ref 18–48)
RELATIVE MONOCYTE (OHS): 15.8 % (ref 4–15)
RELATIVE NEUTROPHIL (OHS): 74.6 % (ref 38–73)
SODIUM SERPL-SCNC: 130 MMOL/L (ref 136–145)
WBC # BLD AUTO: 7.21 K/UL (ref 3.9–12.7)

## 2025-05-11 PROCEDURE — 99233 SBSQ HOSP IP/OBS HIGH 50: CPT | Mod: GC,,, | Performed by: PSYCHIATRY & NEUROLOGY

## 2025-05-11 PROCEDURE — 20600001 HC STEP DOWN PRIVATE ROOM

## 2025-05-11 PROCEDURE — 25000003 PHARM REV CODE 250

## 2025-05-11 PROCEDURE — 63600175 PHARM REV CODE 636 W HCPCS

## 2025-05-11 PROCEDURE — 85025 COMPLETE CBC W/AUTO DIFF WBC: CPT | Performed by: INTERNAL MEDICINE

## 2025-05-11 PROCEDURE — 30233S1 TRANSFUSION OF NONAUTOLOGOUS GLOBULIN INTO PERIPHERAL VEIN, PERCUTANEOUS APPROACH: ICD-10-PCS | Performed by: PSYCHIATRY & NEUROLOGY

## 2025-05-11 PROCEDURE — 63600175 PHARM REV CODE 636 W HCPCS: Mod: JZ

## 2025-05-11 PROCEDURE — 80053 COMPREHEN METABOLIC PANEL: CPT | Performed by: INTERNAL MEDICINE

## 2025-05-11 PROCEDURE — 36415 COLL VENOUS BLD VENIPUNCTURE: CPT | Performed by: INTERNAL MEDICINE

## 2025-05-11 PROCEDURE — 25000003 PHARM REV CODE 250: Performed by: INTERNAL MEDICINE

## 2025-05-11 RX ADMIN — ACETAMINOPHEN 1000 MG: 500 TABLET ORAL at 01:05

## 2025-05-11 RX ADMIN — CARBIDOPA AND LEVODOPA 2 TABLET: 25; 100 TABLET ORAL at 09:05

## 2025-05-11 RX ADMIN — ENOXAPARIN SODIUM 40 MG: 40 INJECTION SUBCUTANEOUS at 06:05

## 2025-05-11 RX ADMIN — HUMAN IMMUNOGLOBULIN G 25 G: 20 LIQUID INTRAVENOUS at 06:05

## 2025-05-11 RX ADMIN — DIPHENHYDRAMINE HYDROCHLORIDE 25 MG: 50 INJECTION, SOLUTION INTRAMUSCULAR; INTRAVENOUS at 06:05

## 2025-05-11 RX ADMIN — AMLODIPINE BESYLATE 10 MG: 10 TABLET ORAL at 09:05

## 2025-05-11 RX ADMIN — CARBIDOPA AND LEVODOPA 2 TABLET: 25; 100 TABLET ORAL at 04:05

## 2025-05-11 RX ADMIN — POLYETHYLENE GLYCOL 3350 17 G: 17 POWDER, FOR SOLUTION ORAL at 08:05

## 2025-05-11 RX ADMIN — POLYETHYLENE GLYCOL 3350 17 G: 17 POWDER, FOR SOLUTION ORAL at 09:05

## 2025-05-11 RX ADMIN — CARBIDOPA AND LEVODOPA 2 TABLET: 25; 100 TABLET ORAL at 08:05

## 2025-05-11 NOTE — SUBJECTIVE & OBJECTIVE
Subjective:     Interval History:  Improving mentation. Mild tremors, but improved following Sinemet trial and IVIG (D4/5)     Current Medications[1]    Review of Systems   Neurological:  Positive for tremors.   Psychiatric/Behavioral:  Positive for decreased concentration.      Objective:     Vital Signs (Most Recent):  Temp: 99.1 °F (37.3 °C) (05/11/25 0800)  Pulse: 90 (05/11/25 0800)  Resp: 16 (05/11/25 0800)  BP: (!) 149/77 (05/11/25 0800)  SpO2: 98 % (05/11/25 0800) Vital Signs (24h Range):  Temp:  [98 °F (36.7 °C)-99.3 °F (37.4 °C)] 99.1 °F (37.3 °C)  Pulse:  [] 90  Resp:  [13-18] 16  SpO2:  [98 %-100 %] 98 %  BP: (134-160)/(62-80) 149/77     Weight: 64.1 kg (141 lb 5 oz)  Body mass index is 19.71 kg/m².     Physical Exam  Constitutional:       Comments: He appears confused and responds minimally to questions. Some days his speech will improve.   Eyes:      Pupils: Pupils are equal, round, and reactive to light.   Pulmonary:      Effort: Pulmonary effort is normal.   Neurological:      Mental Status: He is alert. He is disoriented.      Deep Tendon Reflexes:      Reflex Scores:       Brachioradialis reflexes are 2+ on the right side and 2+ on the left side.       Patellar reflexes are 2+ on the right side and 2+ on the left side.         NEUROLOGICAL EXAMINATION:     MENTAL STATUS   Oriented to person.   Disoriented to place.   Disoriented to time.   Attention: decreased. Concentration: decreased.   Level of consciousness: alert    CRANIAL NERVES     CN III, IV, VI   Pupils are equal, round, and reactive to light.       Mild ptosis of left eye without pupillary abnormalities     MOTOR EXAM   Overall muscle tone: increased  Right arm tone: increased  Left arm tone: increased       Asterixis with tremor most notably in the left arm     REFLEXES     Reflexes   Right brachioradialis: 2+  Left brachioradialis: 2+  Right patellar: 2+  Left patellar: 2+    SENSORY EXAM   Light touch normal.     GAIT AND  COORDINATION     Tremor   Intention tremor: present      Significant Labs: All pertinent lab results from the past 24 hours have been reviewed.    Significant Imaging: I have reviewed all pertinent imaging results/findings within the past 24 hours.       [1]   Current Facility-Administered Medications   Medication Dose Route Frequency Provider Last Rate Last Admin    acetaminophen tablet 1,000 mg  1,000 mg Oral Q8H PRN Zuly Wheat PA-C        acetaminophen tablet 650 mg  650 mg Oral Q4H PRN Zuly Wheat PA-C   650 mg at 05/04/25 1453    ALPRAZolam tablet 0.5 mg  0.5 mg Oral BID PRN Zuly Wheat PA-C        amLODIPine tablet 10 mg  10 mg Oral Daily Juan F Ramírez MD   10 mg at 05/11/25 0941    carbidopa-levodopa  mg per tablet 2 tablet  2 tablet Oral TID Elian López MD   2 tablet at 05/11/25 0941    dextrose 50% injection 12.5 g  12.5 g Intravenous PRN Zuly Wheat PA-C        dextrose 50% injection 25 g  25 g Intravenous PRN Zuly Wheat PA-C        diphenhydrAMINE injection 25 mg  25 mg Intravenous Q24H Elian López MD   25 mg at 05/10/25 1719    enoxaparin injection 40 mg  40 mg Subcutaneous Daily Zuly Wheat PA-C   40 mg at 05/10/25 1718    gadobutroL (GADAVIST) injection 7 mL  7 mL Intravenous ONCE PRN Arsenio Oglesby MD        glucagon (human recombinant) injection 1 mg  1 mg Intramuscular PRN Zuly Wheat PA-C        glucose chewable tablet 16 g  16 g Oral PRN Zuly Wheat PA-C        glucose chewable tablet 24 g  24 g Oral PRN Zuly Wheat PA-C        Immune Globulin G (IGG)-PRO-IGA 10 % injection (Privigen) 10 % injection 25 g  0.4 g/kg Intravenous Q24H Elian López .84 mL/hr at 05/10/25 1859 Rate Change at 05/10/25 1859    LIDOcaine HCL 10 mg/ml (1%) injection 5 mL  5 mL Other On Call Procedure Elian López MD        melatonin tablet 6 mg  6 mg Oral Nightly PRN Zuly Wheat PA-C        naloxone 0.4 mg/mL injection 0.02 mg  0.02 mg  Intravenous PRN Zuly Wheat PA-C        ondansetron disintegrating tablet 8 mg  8 mg Oral Q8H PRN Zuly Wheat PA-C        polyethylene glycol packet 17 g  17 g Oral BID Zuly Wheat PA-C   17 g at 05/11/25 0941    prochlorperazine injection Soln 5 mg  5 mg Intravenous Q6H PRN Zuly Wheat PA-C        sodium chloride 0.9% flush 10 mL  10 mL Intravenous Q12H PRN Zuly Wheat PA-C

## 2025-05-11 NOTE — ASSESSMENT & PLAN NOTE
Hyponatremia is likely due to Dehydration/hypovolemia. The patient's most recent sodium results are listed below.  Recent Labs     05/09/25  0604 05/11/25  0908   * 130*     Maxime  - Monitor sodium Daily.   - Patient hyponatremia is stable

## 2025-05-11 NOTE — PROGRESS NOTES
[facility]  Progress Note    Patient Name: Vince Huffman  MRN: [id number]  Patient Class: IP- Inpatient   Admission Date: 4/30/2025  Length of Stay: 10 days  Attending Physician: Elian Morejon MD  Primary Care Provider: Leland Porras MD        Subjective     Principal Problem:Encephalopathy        HPI:  80 yo M w/HTN, hypothyroid, crohn's disease, CAD, hx of SBO, presenting with AMS from [facility]. Patient was sent in from [facility] for progressively worsening cognitive function. Patient is A&O x1 at his baseline. Patient and his daughter endorse mild confusion. Which has been progressive.    Patient has not had any recent falls. Endorsed mild suprapubic abdominal pain. No UTI. Mild inflammation on CT, not unexpected in the setting of Crohn's.     Overview/Hospital Course:  Vince Huffman is a 79 y.o. M who was admitted to  for further evaluation of worsening AMS per rehab facility. AAOx1 on admission, normally AAOx4 prior to 4/20 per son. UA negative. CXR clear. CT abd/pelvis with wall thickening of distal ileum, inflammatory infectious ileitis are considerations, fecal distention of the rectum, impaction not excluded. Discussed with GI, anticipated these are chronic findings. CRP negative. Will give enema and monitor for improvement. Patient with successful BM. Neurology consulted: MRI brain ordered (no acute findings, motion artifact), extended EEG, ammonia levels. Worsening mental status on 5/2, medicine team consulted for LP which was unsuccessful. More alert on 5/3 and answering yes/no questions when redirected. Neurology recommending carbidopa-levodopa trial, EEG, and attempting another LP.       Symptoms improved with increasing doses of Carbidopa/Levodopa. LP concerning for meningitis so he was started on Empiric treatment on 5/6/25.     5/9  Discussion held with both Neurology and Infectious Disease, Infectious Disease does not believe patient has  any infectious etiology and as such recommended discontinuing antimicrobials.  Neurology started IVIG for presumed autoimmune process    Interval History:  No acute issues at this time.    Review of Systems   Unable to perform ROS: Other     Objective:     Vital Signs (Most Recent):  Temp: 99.1 °F (37.3 °C) (05/11/25 0800)  Pulse: 90 (05/11/25 0800)  Resp: 16 (05/11/25 0800)  BP: (!) 149/77 (05/11/25 0800)  SpO2: 98 % (05/11/25 0800) Vital Signs (24h Range):  Temp:  [98 °F (36.7 °C)-99.3 °F (37.4 °C)] 99.1 °F (37.3 °C)  Pulse:  [] 90  Resp:  [13-18] 16  SpO2:  [98 %-100 %] 98 %  BP: (127-160)/(62-80) 149/77     Weight: 64.1 kg (141 lb 5 oz)  Body mass index is 19.71 kg/m².    Intake/Output Summary (Last 24 hours) at 5/11/2025 1128  Last data filed at 5/11/2025 0650  Gross per 24 hour   Intake 150 ml   Output 1250 ml   Net -1100 ml          Physical Exam  Constitutional:       General: He is not in acute distress.     Appearance: He is ill-appearing.   HENT:      Head: Normocephalic.      Right Ear: External ear normal.      Left Ear: External ear normal.      Nose: Nose normal.   Cardiovascular:      Rate and Rhythm: Normal rate.      Heart sounds: Normal heart sounds.   Pulmonary:      Breath sounds: Normal breath sounds.   Abdominal:      Palpations: Abdomen is soft.      Tenderness: There is no abdominal tenderness.   Musculoskeletal:      Right lower leg: No edema.      Left lower leg: No edema.      Comments: SCD   Skin:     General: Skin is warm.   Neurological:      General: No focal deficit present.      Mental Status: He is alert. improved    MELD 3.0: 13 at 5/6/2025  2:23 PM  MELD-Na: 9 at 5/6/2025  2:23 PM  Calculated from:  Serum Creatinine: 1.3 mg/dL at 5/6/2025  2:23 PM  Serum Sodium: 132 mmol/L at 5/6/2025  2:23 PM  Total Bilirubin: 0.5 mg/dL (Using min of 1 mg/dL) at 5/4/2025  3:28 AM  Serum Albumin: 3.2 g/dL at 5/4/2025  3:28 AM  INR(ratio): 1 at 5/5/2025  4:24 PM  Age at listing  "(hypothetical): 79 years  Sex: Male at 5/6/2025  2:23 PM      Significant Labs:  CBC:  Recent Labs   Lab 05/11/25  0908   WBC 7.21   HGB 11.2*   HCT 33.2*        CMP:  Recent Labs   Lab 05/11/25  0908   *   K 3.8   CL 97   CO2 24   GLU 93   BUN 11   CREATININE 0.8   CALCIUM 9.1   PROT 7.9   ALBUMIN 2.7*   BILITOT 0.4   ALKPHOS 73   AST 22   ALT 11   ANIONGAP 9     PTINR:  No results for input(s): "INR" in the last 48 hours.    Significant Procedures:   Dobutamine Stress Test with Color Flow: No results found for this or any previous visit.          Assessment & Plan  Encephalopathy  79 y.o.M with progressive acutely worsening confusion/tremors since 4/20. Previously AAOx4, now AAOx1  - neurology consulted, appreciate recommendations:  - MRI brain from last week was essentially non diagnostic  - Repeat MRI again without obvious new pathology   -working diagnosis is Parkinson's disease, as improving with Sinemet: iContinue two tabs TID  -LP concerning for possible meningitis. Continue Empiric treatment per Neurology recs   -EEG pending   - Delirium and fall precautions along with neuro checks  - PT/OT consult placed; return to [facility] upon discharge     5/8  Viral meningitis panel unremarkable.  Cultures no growth.  CSF with elevated proteins and white cell count.  Started on empiric antibiotics, vancomycin ceftriaxone and ampicillin, infectious disease consulted.  Follow up with Neurology recommendations.  Started on carbidopa levodopa.  Dispo plan acute rehab.  Mentation remains the same during this hospitalization A&O x1  5/9  Started on IVIG per Neurology.  EEG(f/u)  5/10  Continue with IVIG, follow up with Neurology.  EEG report noted  Crohn's disease  - Per CT scan "thickening of the distal ileum to the surgical anastomosis with the large bowel. Inflammatory infectious ileitis are considerations. Recommend clinical correlation and follow-up. "  -CRP negative. No concern for flare.   -GI " without concerns of confusion being caused by sterlara, confirmed with pharmacy     Generalized weakness  - sent from SNF  - progressive worsening weakness per son   - PT/OT ordered. Will need placement at discharge    Unintended weight loss  - reported per son  - recently started gluten free diet per recommendations from GI    Hyponatremia  Hyponatremia is likely due to Dehydration/hypovolemia. The patient's most recent sodium results are listed below.  Recent Labs     05/09/25  0604 05/11/25  0908   * 130*     Maxime  - Monitor sodium Daily.   - Patient hyponatremia is stable    Essential hypertension  Patient's blood pressure range in the last 24 hours was: BP  Min: 127/65  Max: 160/80.The patient's inpatient anti-hypertensive regimen is listed below:  Current Antihypertensives  amLODIPine tablet 10 mg, Daily, Oral    Plan  - BP is controlled, no changes needed to their regimen    Parkinsonism  Suspect symptoms are at least partially due to Parkinson's disease. Continue Sinemet as above.     CAD (coronary artery disease)  y.   AMS (altered mental status)      VTE Risk Mitigation (From admission, onward)           Ordered     enoxaparin injection 40 mg  Daily         05/01/25 0827     IP VTE HIGH RISK PATIENT  Once         05/01/25 0827     Place sequential compression device  Until discontinued         05/01/25 0827                    Discharge Planning   ERNIE: 5/13/2025     Code Status: Full Code   Medical Readiness for Discharge Date:   Discharge Plan A: Rehab (TBD)   Discharge Delays: None known at this time            Please place Justification for DME        Elian Morejon MD  Department of Hospital Medicine   [facility]

## 2025-05-11 NOTE — ASSESSMENT & PLAN NOTE
Patient's blood pressure range in the last 24 hours was: BP  Min: 127/65  Max: 160/80.The patient's inpatient anti-hypertensive regimen is listed below:  Current Antihypertensives  amLODIPine tablet 10 mg, Daily, Oral    Plan  - BP is controlled, no changes needed to their regimen

## 2025-05-11 NOTE — ASSESSMENT & PLAN NOTE
Vince Huffman is a 79 year old male with history of chron's disease, SBO, hypothyroidism, and CAD  presenting from [facility] with encephalopathy. Exam notable for disorientation to place, time, and situation. He is minimally responsive and will answer with one word. There is asterixis in his upper extremities, axial rigidity/extremity/rigidity, masked facies, and intention tremor most notably in the left hand. No hyperreflexia. Ptosis of left eye though no extraocular movement abnormalities/pupillary discrepancy to suggest third nerve palsy and mehran's respectively. Unclear cause of encephalopathy at this time, though acute presentation with concurrent parkinsonism suggests autoimmune etiology. Motion artifact on MRI brain. CSF studies with xanthochromia without relevant SAH on images. Increased proteins on CSF. Started on IVIG. Celiac panel + 386. Discontinued empiric meningitis coverage. Labs thus far: B12 (456), folate (16.5), CK (17), treponema (-), zinc (65), copper(1304).    Recommendations:  -Continue IVIG for 5 days in the setting of possible autoimmune encephalopathy (pending encephalopathy panel) (D4/5)   -IVF and IV benadryl co-administered with IVIG; please discontinue after last IVIG dose   -Pending strongyloides IgG  -Continue optimized Sinemet trial 25 -100 mg 2 tablets TID  -Delirium and fall precautions   -Neurology will continue to follow

## 2025-05-11 NOTE — PROGRESS NOTES
"[facility]  Neurology  Progress Note    Patient Name: Vince Huffman  MRN: [id number]  Admission Date: 4/30/2025  Hospital Length of Stay: 10 days  Code Status: Full Code   Attending Provider: Elian Morejon MD  Primary Care Physician: Leland Porras MD   Principal Problem:Encephalopathy    HPI:   Vince Huffman is a 79 year old male with history of chron's disease, SBO, hypothyroidism, and CAD  presenting from [facility] with encephalopathy. He initially presented to [facility] for impaired mobility and difficulty with ADL's 2/2 generalized weakness. He was reportedly found confused today A&O x 1 (said the year is "1895") but is normally A&O x4. At this time he also expressed generalized abdominal pain. Recent ultrasounds of the lower extremity did not reveal DVT. MRI from last week was non diagnostic. UA unremarkable and CXR normal. CT abdomen and pelvis reveals wall thickening of the distal ileum consistent with possible infectious ileitis. Neurology consulted for further evaluation of encephalopathy.       Subjective:     Interval History:  Improving mentation. Mild tremors, but improved following Sinemet trial and IVIG (D4/5)     Current Medications[1]    Review of Systems   Neurological:  Positive for tremors.   Psychiatric/Behavioral:  Positive for decreased concentration.      Objective:     Vital Signs (Most Recent):  Temp: 99.1 °F (37.3 °C) (05/11/25 0800)  Pulse: 90 (05/11/25 0800)  Resp: 16 (05/11/25 0800)  BP: (!) 149/77 (05/11/25 0800)  SpO2: 98 % (05/11/25 0800) Vital Signs (24h Range):  Temp:  [98 °F (36.7 °C)-99.3 °F (37.4 °C)] 99.1 °F (37.3 °C)  Pulse:  [] 90  Resp:  [13-18] 16  SpO2:  [98 %-100 %] 98 %  BP: (134-160)/(62-80) 149/77     Weight: 64.1 kg (141 lb 5 oz)  Body mass index is 19.71 kg/m².     Physical Exam  Constitutional:       Comments: He appears confused and responds minimally to questions. Some days his speech will improve.   Eyes:      Pupils: " Pupils are equal, round, and reactive to light.   Pulmonary:      Effort: Pulmonary effort is normal.   Neurological:      Mental Status: He is alert. He is disoriented.      Deep Tendon Reflexes:      Reflex Scores:       Brachioradialis reflexes are 2+ on the right side and 2+ on the left side.       Patellar reflexes are 2+ on the right side and 2+ on the left side.         NEUROLOGICAL EXAMINATION:     MENTAL STATUS   Oriented to person.   Disoriented to place.   Disoriented to time.   Attention: decreased. Concentration: decreased.   Level of consciousness: alert    CRANIAL NERVES     CN III, IV, VI   Pupils are equal, round, and reactive to light.       Mild ptosis of left eye without pupillary abnormalities     MOTOR EXAM   Overall muscle tone: increased  Right arm tone: increased  Left arm tone: increased       Asterixis with tremor most notably in the left arm     REFLEXES     Reflexes   Right brachioradialis: 2+  Left brachioradialis: 2+  Right patellar: 2+  Left patellar: 2+    SENSORY EXAM   Light touch normal.     GAIT AND COORDINATION     Tremor   Intention tremor: present      Significant Labs: All pertinent lab results from the past 24 hours have been reviewed.    Significant Imaging: I have reviewed all pertinent imaging results/findings within the past 24 hours.    Assessment and Plan:     * Encephalopathy  Vince Huffman is a 79 year old male with history of chron's disease, SBO, hypothyroidism, and CAD  presenting from [facility] with encephalopathy. Exam notable for disorientation to place, time, and situation. He is minimally responsive and will answer with one word. There is asterixis in his upper extremities, axial rigidity/extremity/rigidity, masked facies, and intention tremor most notably in the left hand. No hyperreflexia. Ptosis of left eye though no extraocular movement abnormalities/pupillary discrepancy to suggest third nerve palsy and mehran's respectively. Unclear cause of  encephalopathy at this time, though acute presentation with concurrent parkinsonism suggests autoimmune etiology. Motion artifact on MRI brain. CSF studies with xanthochromia without relevant SAH on images. Increased proteins on CSF. Started on IVIG. Celiac panel + 386. Discontinued empiric meningitis coverage. Labs thus far: B12 (456), folate (16.5), CK (17), treponema (-), zinc (65), copper(1304).    Recommendations:  -Continue IVIG for 5 days in the setting of possible autoimmune encephalopathy (pending encephalopathy panel) (D4/5)   -IVF and IV benadryl co-administered with IVIG; please discontinue after last IVIG dose   -Pending strongyloides IgG  -Continue optimized Sinemet trial 25 -100 mg 2 tablets TID  -Delirium and fall precautions   -Neurology will continue to follow       Samy Fuller MD  Neurology       [1]   Current Facility-Administered Medications   Medication Dose Route Frequency Provider Last Rate Last Admin    acetaminophen tablet 1,000 mg  1,000 mg Oral Q8H PRN Zuly Wheat PA-C        acetaminophen tablet 650 mg  650 mg Oral Q4H PRN Zuly Wheat PA-C   650 mg at 05/04/25 1453    ALPRAZolam tablet 0.5 mg  0.5 mg Oral BID PRN Zuly Wheat PA-C        amLODIPine tablet 10 mg  10 mg Oral Daily Juan F Ramírez MD   10 mg at 05/11/25 0941    carbidopa-levodopa  mg per tablet 2 tablet  2 tablet Oral TID Elian López MD   2 tablet at 05/11/25 0941    dextrose 50% injection 12.5 g  12.5 g Intravenous PRN Zuly Wheat PA-C        dextrose 50% injection 25 g  25 g Intravenous PRN Zuly Wheat PA-C        diphenhydrAMINE injection 25 mg  25 mg Intravenous Q24H Elian López MD   25 mg at 05/10/25 1719    enoxaparin injection 40 mg  40 mg Subcutaneous Daily Zuly Wheat PA-C   40 mg at 05/10/25 1718    gadobutroL (GADAVIST) injection 7 mL  7 mL Intravenous ONCE PRN Arsenio Oglesby MD        glucagon (human recombinant) injection 1 mg  1 mg Intramuscular PRN  Zuly Wheat PA-C        glucose chewable tablet 16 g  16 g Oral PRN Zuly Wheat PA-C        glucose chewable tablet 24 g  24 g Oral PRN Zuly Wheat PA-C        Immune Globulin G (IGG)-PRO-IGA 10 % injection (Privigen) 10 % injection 25 g  0.4 g/kg Intravenous Q24H Elian López .84 mL/hr at 05/10/25 1859 Rate Change at 05/10/25 1859    LIDOcaine HCL 10 mg/ml (1%) injection 5 mL  5 mL Other On Call Procedure Elian López MD        melatonin tablet 6 mg  6 mg Oral Nightly PRN Zuly Wheat PA-C        naloxone 0.4 mg/mL injection 0.02 mg  0.02 mg Intravenous PRN Zuly Wheat PA-C        ondansetron disintegrating tablet 8 mg  8 mg Oral Q8H PRN Zuly Wheat PA-C        polyethylene glycol packet 17 g  17 g Oral BID Zuly Wheat PA-C   17 g at 05/11/25 0941    prochlorperazine injection Soln 5 mg  5 mg Intravenous Q6H PRN Zuly Wheat PA-C        sodium chloride 0.9% flush 10 mL  10 mL Intravenous Q12H PRN Zuly Wheat PA-C

## 2025-05-11 NOTE — SUBJECTIVE & OBJECTIVE
Interval History:  No acute issues at this time.    Review of Systems   Unable to perform ROS: Other     Objective:     Vital Signs (Most Recent):  Temp: 99.1 °F (37.3 °C) (05/11/25 0800)  Pulse: 90 (05/11/25 0800)  Resp: 16 (05/11/25 0800)  BP: (!) 149/77 (05/11/25 0800)  SpO2: 98 % (05/11/25 0800) Vital Signs (24h Range):  Temp:  [98 °F (36.7 °C)-99.3 °F (37.4 °C)] 99.1 °F (37.3 °C)  Pulse:  [] 90  Resp:  [13-18] 16  SpO2:  [98 %-100 %] 98 %  BP: (127-160)/(62-80) 149/77     Weight: 64.1 kg (141 lb 5 oz)  Body mass index is 19.71 kg/m².    Intake/Output Summary (Last 24 hours) at 5/11/2025 1128  Last data filed at 5/11/2025 0650  Gross per 24 hour   Intake 150 ml   Output 1250 ml   Net -1100 ml          Physical Exam  Constitutional:       General: He is not in acute distress.     Appearance: He is ill-appearing.   HENT:      Head: Normocephalic.      Right Ear: External ear normal.      Left Ear: External ear normal.      Nose: Nose normal.   Cardiovascular:      Rate and Rhythm: Normal rate.      Heart sounds: Normal heart sounds.   Pulmonary:      Breath sounds: Normal breath sounds.   Abdominal:      Palpations: Abdomen is soft.      Tenderness: There is no abdominal tenderness.   Musculoskeletal:      Right lower leg: No edema.      Left lower leg: No edema.      Comments: SCD   Skin:     General: Skin is warm.   Neurological:      General: No focal deficit present.      Mental Status: He is alert. improved    MELD 3.0: 13 at 5/6/2025  2:23 PM  MELD-Na: 9 at 5/6/2025  2:23 PM  Calculated from:  Serum Creatinine: 1.3 mg/dL at 5/6/2025  2:23 PM  Serum Sodium: 132 mmol/L at 5/6/2025  2:23 PM  Total Bilirubin: 0.5 mg/dL (Using min of 1 mg/dL) at 5/4/2025  3:28 AM  Serum Albumin: 3.2 g/dL at 5/4/2025  3:28 AM  INR(ratio): 1 at 5/5/2025  4:24 PM  Age at listing (hypothetical): 79 years  Sex: Male at 5/6/2025  2:23 PM      Significant Labs:  CBC:  Recent Labs   Lab 05/11/25  0908   WBC 7.21   HGB 11.2*   HCT  "33.2*        CMP:  Recent Labs   Lab 05/11/25  0908   *   K 3.8   CL 97   CO2 24   GLU 93   BUN 11   CREATININE 0.8   CALCIUM 9.1   PROT 7.9   ALBUMIN 2.7*   BILITOT 0.4   ALKPHOS 73   AST 22   ALT 11   ANIONGAP 9     PTINR:  No results for input(s): "INR" in the last 48 hours.    Significant Procedures:   Dobutamine Stress Test with Color Flow: No results found for this or any previous visit.      "

## 2025-05-12 LAB
ABSOLUTE EOSINOPHIL (OHS): 0.02 K/UL
ABSOLUTE MONOCYTE (OHS): 0.99 K/UL (ref 0.3–1)
ABSOLUTE NEUTROPHIL COUNT (OHS): 4.69 K/UL (ref 1.8–7.7)
ALBUMIN SERPL BCP-MCNC: 2.7 G/DL (ref 3.5–5.2)
ALP SERPL-CCNC: 70 UNIT/L (ref 40–150)
ALT SERPL W/O P-5'-P-CCNC: <5 UNIT/L (ref 10–44)
AMPAR2 IGG SERPL QL CBA IFA: NEGATIVE
AMPHIPHYSIN IGG SER QL IA: NEGATIVE
ANION GAP (OHS): 8 MMOL/L (ref 8–16)
ANION GAP (OHS): 8 MMOL/L (ref 8–16)
ANNOTATION COMMENT IMP: NORMAL
AST SERPL-CCNC: 23 UNIT/L (ref 11–45)
BASOPHILS # BLD AUTO: 0.01 K/UL
BASOPHILS NFR BLD AUTO: 0.2 %
BILIRUB SERPL-MCNC: 0.4 MG/DL (ref 0.1–1)
BUN SERPL-MCNC: 13 MG/DL (ref 8–23)
BUN SERPL-MCNC: 18 MG/DL (ref 8–23)
CALCIUM SERPL-MCNC: 8.9 MG/DL (ref 8.7–10.5)
CALCIUM SERPL-MCNC: 9.3 MG/DL (ref 8.7–10.5)
CASPR2 IGG SER QL CBA IFA: NEGATIVE
CHLORIDE SERPL-SCNC: 96 MMOL/L (ref 95–110)
CHLORIDE SERPL-SCNC: 98 MMOL/L (ref 95–110)
CO2 SERPL-SCNC: 24 MMOL/L (ref 23–29)
CO2 SERPL-SCNC: 25 MMOL/L (ref 23–29)
CREAT SERPL-MCNC: 0.8 MG/DL (ref 0.5–1.4)
CREAT SERPL-MCNC: 1 MG/DL (ref 0.5–1.4)
CV2 AB SERPL QL IF: NEGATIVE
ERYTHROCYTE [DISTWIDTH] IN BLOOD BY AUTOMATED COUNT: 11.5 % (ref 11.5–14.5)
GABABR IGG SERPL QL CBA IFA: NEGATIVE
GAD65 AB SER-SCNC: 0.02 NMOL/L
GFAP ALPHA IGG SER QL IF: NEGATIVE
GFR SERPLBLD CREATININE-BSD FMLA CKD-EPI: >60 ML/MIN/1.73/M2
GFR SERPLBLD CREATININE-BSD FMLA CKD-EPI: >60 ML/MIN/1.73/M2
GLIAL NUC TYPE 1 AB SER QL IF: NEGATIVE
GLUCOSE SERPL-MCNC: 89 MG/DL (ref 70–110)
GLUCOSE SERPL-MCNC: 92 MG/DL (ref 70–110)
HCT VFR BLD AUTO: 33.8 % (ref 40–54)
HGB BLD-MCNC: 11.4 GM/DL (ref 14–18)
HU1 AB SER QL: NEGATIVE
HU2 AB SER QL IF: NEGATIVE
HU3 AB SER QL: NEGATIVE
IGLON5 IGG SER QL CBA IFA: NEGATIVE
IMM GRANULOCYTES # BLD AUTO: 0.02 K/UL (ref 0–0.04)
IMM GRANULOCYTES NFR BLD AUTO: 0.3 % (ref 0–0.5)
IMMUNOLOGIST REVIEW: NORMAL
LGI1 IGG SER QL CBA IFA: NEGATIVE
LYMPHOCYTES # BLD AUTO: 0.45 K/UL (ref 1–4.8)
M DPPX AB CBA, S: NEGATIVE
M MGLUR1 AB IFA, S: NEGATIVE
M SEPTIN-7 IFA, S: NEGATIVE
MCH RBC QN AUTO: 31.2 PG (ref 27–31)
MCHC RBC AUTO-ENTMCNC: 33.7 G/DL (ref 32–36)
MCV RBC AUTO: 93 FL (ref 82–98)
NEUROCHONDRIN, IFA, S: NEGATIVE
NIF IGG SER QL IF: NEGATIVE
NMDAR1 IGG SER QL CBA IFA: NEGATIVE
NUCLEATED RBC (/100WBC) (OHS): 0 /100 WBC
PCA-1 AB SER QL IF: NEGATIVE
PCA-2 AB SER QL IF: NEGATIVE
PCA-TR AB SER QL IF: NEGATIVE
PHOSPHODIESTERASE 10A (PDE10A) IGG, SERUM: NEGATIVE
PLATELET # BLD AUTO: 207 K/UL (ref 150–450)
PMV BLD AUTO: 10.4 FL (ref 9.2–12.9)
POTASSIUM SERPL-SCNC: 3.9 MMOL/L (ref 3.5–5.1)
POTASSIUM SERPL-SCNC: 4.3 MMOL/L (ref 3.5–5.1)
PROT SERPL-MCNC: 8.5 GM/DL (ref 6–8.4)
RBC # BLD AUTO: 3.65 M/UL (ref 4.6–6.2)
RELATIVE EOSINOPHIL (OHS): 0.3 %
RELATIVE LYMPHOCYTE (OHS): 7.3 % (ref 18–48)
RELATIVE MONOCYTE (OHS): 16 % (ref 4–15)
RELATIVE NEUTROPHIL (OHS): 75.9 % (ref 38–73)
SODIUM SERPL-SCNC: 128 MMOL/L (ref 136–145)
SODIUM SERPL-SCNC: 131 MMOL/L (ref 136–145)
STRONGYLOIDES IGG SER QL IA: NEGATIVE
TRIM46 AB IFA, SERUM: NEGATIVE
W VITAMIN B1: 54 UG/L
WBC # BLD AUTO: 6.18 K/UL (ref 3.9–12.7)

## 2025-05-12 PROCEDURE — 25000003 PHARM REV CODE 250: Performed by: INTERNAL MEDICINE

## 2025-05-12 PROCEDURE — 99233 SBSQ HOSP IP/OBS HIGH 50: CPT | Mod: ,,, | Performed by: PSYCHIATRY & NEUROLOGY

## 2025-05-12 PROCEDURE — 97535 SELF CARE MNGMENT TRAINING: CPT

## 2025-05-12 PROCEDURE — 63600175 PHARM REV CODE 636 W HCPCS

## 2025-05-12 PROCEDURE — 97530 THERAPEUTIC ACTIVITIES: CPT

## 2025-05-12 PROCEDURE — 97112 NEUROMUSCULAR REEDUCATION: CPT

## 2025-05-12 PROCEDURE — 99232 SBSQ HOSP IP/OBS MODERATE 35: CPT | Mod: ,,, | Performed by: NURSE PRACTITIONER

## 2025-05-12 PROCEDURE — 25000003 PHARM REV CODE 250

## 2025-05-12 PROCEDURE — 82040 ASSAY OF SERUM ALBUMIN: CPT | Performed by: INTERNAL MEDICINE

## 2025-05-12 PROCEDURE — 36415 COLL VENOUS BLD VENIPUNCTURE: CPT | Performed by: INTERNAL MEDICINE

## 2025-05-12 PROCEDURE — 30233S1 TRANSFUSION OF NONAUTOLOGOUS GLOBULIN INTO PERIPHERAL VEIN, PERCUTANEOUS APPROACH: ICD-10-PCS | Performed by: PSYCHIATRY & NEUROLOGY

## 2025-05-12 PROCEDURE — 85025 COMPLETE CBC W/AUTO DIFF WBC: CPT | Performed by: INTERNAL MEDICINE

## 2025-05-12 PROCEDURE — 20600001 HC STEP DOWN PRIVATE ROOM

## 2025-05-12 RX ORDER — SODIUM CHLORIDE 9 MG/ML
INJECTION, SOLUTION INTRAVENOUS CONTINUOUS
Status: ACTIVE | OUTPATIENT
Start: 2025-05-12 | End: 2025-05-12

## 2025-05-12 RX ADMIN — DIPHENHYDRAMINE HYDROCHLORIDE 25 MG: 50 INJECTION, SOLUTION INTRAMUSCULAR; INTRAVENOUS at 03:05

## 2025-05-12 RX ADMIN — CARBIDOPA AND LEVODOPA 2 TABLET: 25; 100 TABLET ORAL at 07:05

## 2025-05-12 RX ADMIN — CARBIDOPA AND LEVODOPA 2 TABLET: 25; 100 TABLET ORAL at 09:05

## 2025-05-12 RX ADMIN — CARBIDOPA AND LEVODOPA 2 TABLET: 25; 100 TABLET ORAL at 03:05

## 2025-05-12 RX ADMIN — AMLODIPINE BESYLATE 10 MG: 10 TABLET ORAL at 07:05

## 2025-05-12 RX ADMIN — ENOXAPARIN SODIUM 40 MG: 40 INJECTION SUBCUTANEOUS at 05:05

## 2025-05-12 RX ADMIN — SODIUM CHLORIDE: 0.9 INJECTION, SOLUTION INTRAVENOUS at 10:05

## 2025-05-12 RX ADMIN — ACETAMINOPHEN 1000 MG: 500 TABLET ORAL at 03:05

## 2025-05-12 RX ADMIN — HUMAN IMMUNOGLOBULIN G 25 G: 20 LIQUID INTRAVENOUS at 05:05

## 2025-05-12 NOTE — PLAN OF CARE
Dr Morejon notfied of temp 100.4 O. New orders to give tylenol, continue IVF, recheck temp in 1 hour and hold IVIG for now.

## 2025-05-12 NOTE — ASSESSMENT & PLAN NOTE
Vince Huffman is a 79 year old male with history of Crohn's disease, SBO, hypothyroidism, and CAD  presenting from [facility] with encephalopathy. Unclear cause of encephalopathy at this time, though acute presentation with concurrent parkinsonism suggests autoimmune etiology. Motion artifact on MRI brain. CSF studies with xanthochromia without relevant SAH on images. Increased proteins on CSF. Started on IVIG. Celiac panel + 386. Discontinued empiric meningitis coverage. Labs thus far: B12 (456), folate (16.5), CK (17), treponema (-), zinc (65), copper(1304), elevated IgA.    On initial exam, was disoriented to place, time, and situation, minimally responsive and will answer with one word, asterixis in his upper extremities, axial rigidity/extremity/rigidity, masked facies, and intention tremor most notably in the left hand. No hyperreflexia. Ptosis of left eye though no extraocular movement abnormalities/pupillary discrepancy. On my exam today, patient was not alert but not speaking nor following commands; did have increased tone but further exam was limited by lack of participation.     DDX: autoimmune Parkinsonism vs catatonia vs other autoimmune encephalopathy    Recommendations:  - Complete 5-day course of IVIG (Day 5/5 today)  IVF and IV benadryl co-administered with IVIG; please discontinue after last IVIG dose   - F/u pending labs - strongyloides IgG; autoimmune encephalopathy serum panel, autoimmune encephalopathy CSF panel, autoimmune movement disorders serum panel, and anti-Ma2 serum  - Continue Sinemet 25 -100 mg 2 tablets TID  - Delirium and fall precautions     We will continue to follow. Please reach out with any questions.

## 2025-05-12 NOTE — PT/OT/SLP PROGRESS
Occupational Therapy   Treatment    Name: Vince Huffman  MRN: [id number]  Admitting Diagnosis:  Encephalopathy       Recommendations:     Discharge Recommendations: High Intensity Therapy  Discharge Equipment Recommendations:  bedside commode, walker, rolling  Barriers to discharge:  Decreased caregiver support    Assessment:   CO-TX with PT for pt safety and max participation with both disciplines.     Vince Huffman is a 79 y.o. male with a medical diagnosis of Encephalopathy.  He presents with poor cognition but able to follow simple commands with better initiation and attention this tx.  Pt also presents c/ stiffness/rigid tone in both UE and LE. Performance deficits affecting function are weakness, impaired endurance, impaired self care skills, impaired functional mobility, gait instability, impaired balance, impaired cognition, decreased coordination, decreased upper extremity function, decreased lower extremity function, decreased safety awareness, pain.     Rehab Prognosis:  Good; patient would benefit from acute skilled OT services to address these deficits and reach maximum level of function.       Plan:     Patient to be seen 4 x/week to address the above listed problems via self-care/home management, therapeutic activities, therapeutic exercises, neuromuscular re-education  Plan of Care Expires: 05/24/25  Plan of Care Reviewed with: patient    Subjective     Chief Complaint: None stated  Patient/Family Comments/goals: return home  Pain/Comfort:  Pain Rating 1:  (unable to rate 2/2 cognitive deficits)    Objective:     Communicated with: nsg prior to session.  Patient found HOB elevated with PureWick, peripheral IV, telemetry, pulse ox (continuous) upon OT entry to room.    General Precautions: Standard, fall    Orthopedic Precautions:N/A  Braces: N/A  Respiratory Status: Room air     Occupational Performance:     Bed Mobility:    Patient completed Rolling/Turning to Left with  maximal assistance and 2  persons  Patient completed Rolling/Turning to Right with maximal assistance and 2 persons  Patient completed Scooting/Bridging with maximal assistance and 2 persons  Patient completed Supine to Sit with maximal assistance and 2 persons  Patient completed Sit to Supine with maximal assistance and 2 persons     Functional Mobility/Transfers:  Functional Mobility: NT 2/2 sitting balance and fair command following    Activities of Daily Living:  Feeding:  minimum assistance bringing empty cup to mouth  Grooming: total assistance facial hygiene using wash cloth  Upper Body Dressing: maximal assistance adjust and tie gown  Toileting: total assistance and dependence purewick donned    Therapeutic activity:  Pt able to reach for eyeglass case when prompted and take from therapist's hand (at different heights), however Pt requires cues to return item when prompted, also used activity to address core muscles and sitting balance.      [facility] 6 Click ADL: 11    Treatment & Education:  Pt educated on role and purpose of therapy  Pt educated on goal setting  Pt educated on benefits of OOB activity  Pt educated on self advocacy     Patient left HOB elevated with all lines intact, call button in reach, and nsg notified    GOALS:   Multidisciplinary Problems       Occupational Therapy Goals          Problem: Occupational Therapy    Goal Priority Disciplines Outcome Interventions   Occupational Therapy Goal     OT, PT/OT Progressing    Description: Goals to be met by: 5/24/25     Patient will increase functional independence with ADLs by performing:    UE Dressing with Contact Guard Assistance.  LE Dressing with Minimal Assistance.  Grooming while bedside chair with Minimal Assistance.  Toileting from bedside commode with Minimal Assistance for hygiene and clothing management.   Sitting at edge of bed x5 minutes with Contact Guard Assistance for upright balance.  Rolling to Bilateral with Minimal Assistance.   Supine to sit with  Minimal Assistance.  Step transfer with Moderate Assistance  Toilet transfer to bedside commode with Moderate Assistance.                         DME Justifications:   Vince requires a commode for home use because he is confined to a single room.   Vince's mobility limitation cannot be sufficiently resolved by the use of a cane. His functional mobility deficit can be sufficiently resolved with the use of a Rolling Walker. Patient's mobility limitation significantly impairs their ability to participate in one of more activities of daily living.  The use of a RW will significantly improve the patient's ability to participate in MRADLS and the patient will use it on regular basis in the home.    Time Tracking:     OT Date of Treatment: 05/12/25  OT Start Time: 1428  OT Stop Time: 1453  OT Total Time (min): 25 min    Billable Minutes:Self Care/Home Management 10  Neuromuscular Re-education 15    OT/PAUL: OT          5/12/2025

## 2025-05-12 NOTE — PROGRESS NOTES
"[facility]  Neurology  Progress Note    Patient Name: Vince Huffman  MRN: [id number]  Admission Date: 4/30/2025  Hospital Length of Stay: 11 days  Code Status: Full Code   Attending Provider: Elian Morejon MD  Primary Care Physician: Leland Porras MD   Principal Problem:Encephalopathy    HPI:   Vince Huffman is a 79 year old male with history of chron's disease, SBO, hypothyroidism, and CAD  presenting from [facility] with encephalopathy. He initially presented to [facility] for impaired mobility and difficulty with ADL's 2/2 generalized weakness. He was reportedly found confused today A&O x 1 (said the year is "1895") but is normally A&O x4. At this time he also expressed generalized abdominal pain. Recent ultrasounds of the lower extremity did not reveal DVT. MRI from last week was non diagnostic. UA unremarkable and CXR normal. CT abdomen and pelvis reveals wall thickening of the distal ileum consistent with possible infectious ileitis. Neurology consulted for further evaluation of encephalopathy.     Overview/Hospital Course:  05/12/2025 - IVIG Day 5/5. Exam worsened compared to yesterday (no longer speaking.)         Subjective:     Interval History: See hospital course    Current Neurological Medications: See below    Current Medications[1]    Review of Systems   Unable to perform ROS: Mental status change     Objective:     Vital Signs (Most Recent):  Temp: 98.3 °F (36.8 °C) (05/12/25 1235)  Pulse: 99 (05/12/25 1235)  Resp: 14 (05/12/25 1235)  BP: 128/61 (05/12/25 1235)  SpO2: 98 % (05/12/25 1235) Vital Signs (24h Range):  Temp:  [97.3 °F (36.3 °C)-100.3 °F (37.9 °C)] 98.3 °F (36.8 °C)  Pulse:  [92-99] 99  Resp:  [14-18] 14  SpO2:  [98 %-100 %] 98 %  BP: (128-156)/(61-83) 128/61     Weight: 64.1 kg (141 lb 5 oz)  Body mass index is 19.71 kg/m².     Physical Exam  Vitals and nursing note reviewed.   Constitutional:       General: He is not in acute distress.     Comments:  " Thin   HENT:      Head: Normocephalic and atraumatic.   Eyes:      Conjunctiva/sclera: Conjunctivae normal.   Pulmonary:      Effort: Pulmonary effort is normal. No respiratory distress.   Musculoskeletal:      Right lower leg: No edema.      Left lower leg: No edema.   Skin:     General: Skin is warm and dry.   Neurological:      Mental Status: He is alert.      Deep Tendon Reflexes:      Reflex Scores:       Bicep reflexes are 2+ on the right side and 2+ on the left side.       Brachioradialis reflexes are 2+ on the right side and 2+ on the left side.       Patellar reflexes are 2+ on the right side and 2+ on the left side.         NEUROLOGICAL EXAMINATION:     MENTAL STATUS   Speech: mute (Unable to assess orientation as patient is not speaking)  Level of consciousness: alert       Neuro exam limited due to lack of patient participation - not speaking nor following commands      CRANIAL NERVES        - L ptosis  - PERRLA     MOTOR EXAM   Muscle bulk: increased  Overall muscle tone: increased    REFLEXES     Reflexes   Right brachioradialis: 2+  Left brachioradialis: 2+  Right biceps: 2+  Left biceps: 2+  Right patellar: 2+  Left patellar: 2+      Significant Labs: All pertinent lab results from the past 24 hours have been reviewed.    Significant Imaging: I have reviewed and interpreted all pertinent imaging results/findings within the past 24 hours.    Assessment and Plan:     * Encephalopathy  Vince Huffman is a 79 year old male with history of Crohn's disease, SBO, hypothyroidism, and CAD  presenting from [facility] with encephalopathy. Unclear cause of encephalopathy at this time, though acute presentation with concurrent parkinsonism suggests autoimmune etiology. Motion artifact on MRI brain. CSF studies with xanthochromia without relevant SAH on images. Increased proteins on CSF. Started on IVIG. Celiac panel + 386. Discontinued empiric meningitis coverage. Labs thus far: B12 (456), folate (16.5), CK  (17), treponema (-), zinc (65), copper(1304), elevated IgA.    On initial exam, was disoriented to place, time, and situation, minimally responsive and will answer with one word, asterixis in his upper extremities, axial rigidity/extremity/rigidity, masked facies, and intention tremor most notably in the left hand. No hyperreflexia. Ptosis of left eye though no extraocular movement abnormalities/pupillary discrepancy. On my exam today, patient was not alert but not speaking nor following commands; did have increased tone but further exam was limited by lack of participation.     DDX: autoimmune Parkinsonism vs catatonia vs other autoimmune encephalopathy    Recommendations:  - Complete 5-day course of IVIG (Day 5/5 today)  IVF and IV benadryl co-administered with IVIG; please discontinue after last IVIG dose   - F/u pending labs - strongyloides IgG; autoimmune encephalopathy serum panel, autoimmune encephalopathy CSF panel, autoimmune movement disorders serum panel, and anti-Ma2 serum  - Continue Sinemet 25 -100 mg 2 tablets TID  - Delirium and fall precautions     We will continue to follow. Please reach out with any questions.         VTE Risk Mitigation (From admission, onward)           Ordered     enoxaparin injection 40 mg  Daily         05/01/25 0827     IP VTE HIGH RISK PATIENT  Once         05/01/25 0827     Place sequential compression device  Until discontinued         05/01/25 0827                    Abdirashid Collier MD  Neurology  [facility] - Acute Medical Stepdown       [1]   Current Facility-Administered Medications   Medication Dose Route Frequency Provider Last Rate Last Admin    acetaminophen tablet 1,000 mg  1,000 mg Oral Q8H PRN Zuly Wheat PA-C   1,000 mg at 05/11/25 1335    acetaminophen tablet 650 mg  650 mg Oral Q4H PRN Zuly Wheat PA-C   650 mg at 05/04/25 1453    ALPRAZolam tablet 0.5 mg  0.5 mg Oral BID PRN Zuly Wheat PA-C        amLODIPine tablet 10 mg  10 mg Oral Daily Darrell  Juan F TONEY MD   10 mg at 05/12/25 0737    carbidopa-levodopa  mg per tablet 2 tablet  2 tablet Oral TID Elian López MD   2 tablet at 05/12/25 0737    dextrose 50% injection 12.5 g  12.5 g Intravenous PRN Zuly Wheat PA-C        dextrose 50% injection 25 g  25 g Intravenous PRN Zuly Wheat PA-C        enoxaparin injection 40 mg  40 mg Subcutaneous Daily Zuly Wheat PA-C   40 mg at 05/11/25 1800    gadobutroL (GADAVIST) injection 7 mL  7 mL Intravenous ONCE PRN Arsenio Oglesby MD        glucagon (human recombinant) injection 1 mg  1 mg Intramuscular PRN Zuly Wheat PA-C        glucose chewable tablet 16 g  16 g Oral PRN Zuly Wheat PA-C        glucose chewable tablet 24 g  24 g Oral PRN Zuly Wheat PA-C        Immune Globulin G (IGG)-PRO-IGA 10 % injection (Privigen) 10 % injection 25 g  0.4 g/kg Intravenous Q24H Elian López MD 19 mL/hr at 05/11/25 1812 25 g at 05/11/25 1812    LIDOcaine HCL 10 mg/ml (1%) injection 5 mL  5 mL Other On Call Procedure Elian López MD        melatonin tablet 6 mg  6 mg Oral Nightly PRN Zuly Wheat PA-C        naloxone 0.4 mg/mL injection 0.02 mg  0.02 mg Intravenous PRN Zuly Wheat PA-C        ondansetron disintegrating tablet 8 mg  8 mg Oral Q8H PRN Zuly Wheat PA-C        polyethylene glycol packet 17 g  17 g Oral BID Zuly Wheat PA-C   17 g at 05/11/25 2029    prochlorperazine injection Soln 5 mg  5 mg Intravenous Q6H PRN Zuly Wheat PA-C        sodium chloride 0.9% flush 10 mL  10 mL Intravenous Q12H PRN Zuly Wheat PA-C

## 2025-05-12 NOTE — ASSESSMENT & PLAN NOTE
Hyponatremia is likely due to Dehydration/hypovolemia. The patient's most recent sodium results are listed below.  Recent Labs     05/11/25  0908 05/12/25  0836   * 128*     Maxime  - Monitor sodium Daily.   - Patient hyponatremia is stable

## 2025-05-12 NOTE — ASSESSMENT & PLAN NOTE
Patient's blood pressure range in the last 24 hours was: BP  Min: 136/63  Max: 156/83.The patient's inpatient anti-hypertensive regimen is listed below:  Current Antihypertensives  amLODIPine tablet 10 mg, Daily, Oral    Plan  - BP is controlled, no changes needed to their regimen

## 2025-05-12 NOTE — PLAN OF CARE
Problem: Adult Inpatient Plan of Care  Goal: Plan of Care Review  Outcome: Progressing  Goal: Patient-Specific Goal (Individualized)  Outcome: Progressing  Goal: Absence of Hospital-Acquired Illness or Injury  Outcome: Progressing  Goal: Optimal Comfort and Wellbeing  Outcome: Progressing  Goal: Readiness for Transition of Care  Outcome: Progressing     Problem: Skin Injury Risk Increased  Goal: Skin Health and Integrity  Outcome: Progressing      Pt was given her normal pap result report and was informed that a letter will be mailed to her as well. Pt voiced understanding. [email or web address]    ----- Message from Rafy Coto sent at 2/20/2019 11:38 AM CST -----  Contact: Self/[phone]  The patient would like to speak to the staff regarding her test results.          Thank you

## 2025-05-12 NOTE — CONSULTS
Inpatient consult to Physical Medicine Rehab  Consult performed by: Angelina Juan NP  Consult ordered by: Elian Morejon MD      Consult received.      Angelina Juan NP  Physical Medicine & Rehabilitation   05/12/2025

## 2025-05-12 NOTE — PROGRESS NOTES
[facility] Medicine  Progress Note    Patient Name: Vince Huffman  MRN: [id number]  Patient Class: IP- Inpatient   Admission Date: 4/30/2025  Length of Stay: 11 days  Attending Physician: Elian Morejon MD  Primary Care Provider: Leland Porras MD        Subjective     Principal Problem:Encephalopathy        HPI:  80 yo M w/HTN, hypothyroid, crohn's disease, CAD, hx of SBO, presenting with AMS from [facility]. Patient was sent in from [facility] for progressively worsening cognitive function. Patient is A&O x1 at his baseline. Patient and his daughter endorse mild confusion. Which has been progressive.    Patient has not had any recent falls. Endorsed mild suprapubic abdominal pain. No UTI. Mild inflammation on CT, not unexpected in the setting of Crohn's.     Overview/Hospital Course:  Vince Huffman is a 79 y.o. M who was admitted to  for further evaluation of worsening AMS per rehab facility. AAOx1 on admission, normally AAOx4 prior to 4/20 per son. UA negative. CXR clear. CT abd/pelvis with wall thickening of distal ileum, inflammatory infectious ileitis are considerations, fecal distention of the rectum, impaction not excluded. Discussed with GI, anticipated these are chronic findings. CRP negative. Will give enema and monitor for improvement. Patient with successful BM. Neurology consulted: MRI brain ordered (no acute findings, motion artifact), extended EEG, ammonia levels. Worsening mental status on 5/2, medicine team consulted for LP which was unsuccessful. More alert on 5/3 and answering yes/no questions when redirected. Neurology recommending carbidopa-levodopa trial, EEG, and attempting another LP.       Symptoms improved with increasing doses of Carbidopa/Levodopa. LP concerning for meningitis so he was started on Empiric treatment on 5/6/25.     5/9  Discussion held with both Neurology and Infectious Disease, Infectious Disease does not believe patient has  any infectious etiology and as such recommended discontinuing antimicrobials.  Neurology started IVIG for presumed autoimmune process    Interval History:  Reports of poor oral intake, however drinks boost.  Sodium of 128 today.  No other acute issues or complaints at this time.  Sister in-law updated at bedside    Review of Systems   Unable to perform ROS: Other     Objective:     Vital Signs (Most Recent):  Temp: 100.3 °F (37.9 °C) (05/12/25 0745)  Pulse: 97 (05/12/25 0745)  Resp: 16 (05/12/25 0745)  BP: (!) 151/74 (05/12/25 0745)  SpO2: 98 % (05/12/25 0745) Vital Signs (24h Range):  Temp:  [97.3 °F (36.3 °C)-100.3 °F (37.9 °C)] 100.3 °F (37.9 °C)  Pulse:  [92-99] 97  Resp:  [16-18] 16  SpO2:  [98 %-100 %] 98 %  BP: (136-156)/(63-83) 151/74     Weight: 64.1 kg (141 lb 5 oz)  Body mass index is 19.71 kg/m².    Intake/Output Summary (Last 24 hours) at 5/12/2025 1039  Last data filed at 5/12/2025 0612  Gross per 24 hour   Intake 500 ml   Output 960 ml   Net -460 ml        Physical Exam  Constitutional:       General: He is not in acute distress.     Appearance: He is ill-appearing.   HENT:      Head: Normocephalic.      Right Ear: External ear normal.      Left Ear: External ear normal.      Nose: Nose normal.   Cardiovascular:      Rate and Rhythm: Normal rate.      Heart sounds: Normal heart sounds.   Pulmonary:      Breath sounds: Normal breath sounds.   Abdominal:      Palpations: Abdomen is soft.      Tenderness: There is no abdominal tenderness.   Musculoskeletal:      Right lower leg: No edema.      Left lower leg: No edema.      Comments: SCD   Skin:     General: Skin is warm.   Neurological:      General: No focal deficit present.      Mental Status: He is alert. improved       MELD 3.0: 13 at 5/6/2025  2:23 PM  MELD-Na: 9 at 5/6/2025  2:23 PM  Calculated from:  Serum Creatinine: 1.3 mg/dL at 5/6/2025  2:23 PM  Serum Sodium: 132 mmol/L at 5/6/2025  2:23 PM  Total Bilirubin: 0.5 mg/dL (Using min of 1 mg/dL)  "at 5/4/2025  3:28 AM  Serum Albumin: 3.2 g/dL at 5/4/2025  3:28 AM  INR(ratio): 1 at 5/5/2025  4:24 PM  Age at listing (hypothetical): 79 years  Sex: Male at 5/6/2025  2:23 PM      Significant Labs:  CBC:  Recent Labs   Lab 05/11/25  0908   WBC 7.21   HGB 11.2*   HCT 33.2*        CMP:  Recent Labs   Lab 05/11/25  0908 05/12/25  0836   * 128*   K 3.8 3.9   CL 97 96   CO2 24 24   GLU 93 92   BUN 11 13   CREATININE 0.8 0.8   CALCIUM 9.1 9.3   PROT 7.9 8.5*   ALBUMIN 2.7* 2.7*   BILITOT 0.4 0.4   ALKPHOS 73 70   AST 22 23   ALT 11 <5*   ANIONGAP 9 8     PTINR:  No results for input(s): "INR" in the last 48 hours.    Significant Procedures:   Dobutamine Stress Test with Color Flow: No results found for this or any previous visit.          Assessment & Plan  Encephalopathy  79 y.o.M with progressive acutely worsening confusion/tremors since 4/20. Previously AAOx4, now AAOx1  - neurology consulted, appreciate recommendations:  - MRI brain from last week was essentially non diagnostic  - Repeat MRI again without obvious new pathology   -working diagnosis is Parkinson's disease, as improving with Sinemet: iContinue two tabs TID  -LP concerning for possible meningitis. Continue Empiric treatment per Neurology recs   -EEG pending   - Delirium and fall precautions along with neuro checks  - PT/OT consult placed; return to [facility] upon discharge     5/8  Viral meningitis panel unremarkable.  Cultures no growth.  CSF with elevated proteins and white cell count.  Started on empiric antibiotics, vancomycin ceftriaxone and ampicillin, infectious disease consulted.  Follow up with Neurology recommendations.  Started on carbidopa levodopa.  Dispo plan acute rehab.  Mentation remains the same during this hospitalization A&O x1  5/9  Started on IVIG per Neurology.  EEG(f/u)  5/10  Continue with IVIG, follow up with Neurology.  EEG report noted  Crohn's disease  - Per CT scan "thickening of the distal ileum to the " "surgical anastomosis with the large bowel. Inflammatory infectious ileitis are considerations. Recommend clinical correlation and follow-up. "  -CRP negative. No concern for flare.   -GI without concerns of confusion being caused by sterlara, confirmed with pharmacy     Generalized weakness  - sent from SNF  - progressive worsening weakness per son   - PT/OT ordered. Will need placement at discharge    Unintended weight loss  - reported per son  - recently started gluten free diet per recommendations from GI    5/12  Has poor oral intake, mild hyponatremia at 1:28 a.m. today.  Seems to be doing well with boost, we will add this to his diet.  IV fluids today with monitoring of BMP every 4-6 hours.    Hyponatremia  Hyponatremia is likely due to Dehydration/hypovolemia. The patient's most recent sodium results are listed below.  Recent Labs     05/11/25  0908 05/12/25 0836   * 128*     Maxime  - Monitor sodium Daily.   - Patient hyponatremia is stable    Essential hypertension  Patient's blood pressure range in the last 24 hours was: BP  Min: 136/63  Max: 156/83.The patient's inpatient anti-hypertensive regimen is listed below:  Current Antihypertensives  amLODIPine tablet 10 mg, Daily, Oral    Plan  - BP is controlled, no changes needed to their regimen    Parkinsonism  Suspect symptoms are at least partially due to Parkinson's disease. Continue Sinemet as above.     CAD (coronary artery disease)  y.   AMS (altered mental status)      VTE Risk Mitigation (From admission, onward)           Ordered     enoxaparin injection 40 mg  Daily         05/01/25 0827     IP VTE HIGH RISK PATIENT  Once         05/01/25 0827     Place sequential compression device  Until discontinued         05/01/25 0827                    Discharge Planning   ERNIE: 5/13/2025     Code Status: Full Code   Medical Readiness for Discharge Date:   Discharge Plan A: Rehab (TBD)   Discharge Delays: None known at this time            Please place " Justification for DME        Elian Morejon MD  Department of Hospital Medicine   [facility] - Acute Medical Stepdown

## 2025-05-12 NOTE — PLAN OF CARE
Ptg dx encephalopathy. Opens eyes to voice, follows simple commands slowly. Oriented to self only. CM in use, alarms set and on. NAD. Appears comfortable. Explained current plan of care, no evidence of learning. Continuing to monitor pt.

## 2025-05-12 NOTE — ASSESSMENT & PLAN NOTE
-Neurology following.  -On Empiric meningitic coverage with vancomycin, ceftriaxone, ampicillin, and acyclovir.  -Continue Carbidopa-Levodopa.  -S/P LP.  -CSF studies pending.   -Delirium  precautions.   -Camera sitter.   -05/12-S/P IVIG initiation per neurology. Continuing Sinemet. Will need final plan per neurology. Pt not following commands consistently.

## 2025-05-12 NOTE — PT/OT/SLP PROGRESS
"Physical Therapy Treatment    Patient Name:  Vince Huffman   MRN:  [id number]    Recommendations:     Discharge Recommendations: High Intensity Therapy  Discharge Equipment Recommendations: bedside commode, walker, rolling, wheelchair  Barriers to discharge: requiring increased level of skilled Assist    Assessment:     Vince Huffman is a 79 y.o. male admitted with a medical diagnosis of Encephalopathy.  He presents with the following impairments/functional limitations: weakness, impaired endurance, impaired self care skills, impaired functional mobility, gait instability, impaired balance, impaired cognition, decreased coordination, decreased upper extremity function, decreased lower extremity function, decreased safety awareness Patient continues with some altered mental status throughout session - provided re-orientation. Session with primary focus on core strengthening seated at EOB reaching outside JOHN. Patient will continue to benefit from skilled PT during this admit to address BLE strength and endurance deficits, and maximize independence with functional mobility.    Rehab Prognosis: Good; patient would benefit from acute skilled PT services to address these deficits and reach maximum level of function.    Recent Surgery: * No surgery found *      Plan:     During this hospitalization, patient to be seen 4 x/week to address the identified rehab impairments via gait training, therapeutic activities, therapeutic exercises, neuromuscular re-education and progress toward the following goals:    Plan of Care Expires:  06/02/25    Subjective     Chief Complaint: "Vince" in response to his name  Patient/Family Comments/goals: return to rehab  Pain/Comfort:  Pain Rating 1:  (unable to rate)  Pain Rating Post-Intervention 1:  (unable to rate)      Objective:     Communicated with RN prior to session.  Patient found HOB elevated with PureWick, peripheral IV, telemetry, pulse ox (continuous) upon PT entry to room. "     General Precautions: Standard, fall  Orthopedic Precautions: N/A  Braces: N/A  Respiratory Status: Room air     Functional Mobility:  Bed Mobility:     Rolling Left:  maximal assistance and of 2 persons  Rolling Right: maximal assistance and of 2 persons  Scooting: maximal assistance  Supine to Sit: maximal assistance and of 2 persons  Sit to Supine: maximal assistance and of 2 persons  Balance:   Sitting: fluctuates between CGA-to mod A. Promotes reaching outside JOHN to facilitate activation of core musculature. Following improvement of one-step commands with reaching for objects    AM-PAC 6 CLICK MOBILITY  Turning over in bed (including adjusting bedclothes, sheets and blankets)?: 2  Sitting down on and standing up from a chair with arms (e.g., wheelchair, bedside commode, etc.): 2  Moving from lying on back to sitting on the side of the bed?: 2  Moving to and from a bed to a chair (including a wheelchair)?: 2  Need to walk in hospital room?: 2  Climbing 3-5 steps with a railing?: 2  Basic Mobility Total Score: 12       Treatment & Education:  Patient educated on calling for assistance for any needs to improve overall safety awareness.  Patient educated on current level of function and progression towards therapeutic goals.  All items placed within reach, and notified on call light usage for assistance with any needs for fall prevention.  Co-treatment with OT due to patient's poor activity tolerance and medical complexity requiring skilled assistance from 2 therapists.    Patient left HOB elevated with all lines intact, call button in reach, and RN notified..    GOALS:   Multidisciplinary Problems       Physical Therapy Goals          Problem: Physical Therapy    Goal Priority Disciplines Outcome Interventions   Physical Therapy Goal     PT, PT/OT Progressing    Description: Goals to be met by: 2025     Patient will increase functional independence with mobility by performin. Supine to sit with  MInimal Assistance  2. Sit to supine with MInimal Assistance  3. Sit to stand transfer with Minimal Assistance  4. Bed to chair transfer with Minimal Assistance using LRAD  5. Gait  x 25 feet with Minimal Assistance using LRAD.   6. Lower extremity exercise program x15 reps per handout, with assistance as needed                         DME Justifications:   Vince requires a commode for home use because he is confined to a single room.  Sr. Vince Huffman has a mobility limitation that significantly impairs his ability to participate in one or more mobility related activities of daily living (MRADLs) such as toileting, feeding, dressing, grooming, and bathing in customary locations in the home.  The mobility limitation cannot be sufficiently resolved by the use of a cane or walker.   The use of a manual wheelchair will significantly improve the patients ability to participate in MRADLS and the patient will use it on regular basis in the home.  Sr. Vince Huffman has expressed his willingness to use a manual wheelchair in the home. Patients upper body strength is sufficient for propulsion.  He also has a caregiver who is available, willing, and able to provide assistance with the wheelchair when needed.     Vince's mobility limitation cannot be sufficiently resolved by the use of a cane. His functional mobility deficit can be sufficiently resolved with the use of a Rolling Walker. Patient's mobility limitation significantly impairs their ability to participate in one of more activities of daily living.  The use of a RW will significantly improve the patient's ability to participate in MRADLS and the patient will use it on regular basis in the home.    Time Tracking:     PT Received On: 05/12/25  PT Start Time: 1428     PT Stop Time: 1453  PT Total Time (min): 25 min     Billable Minutes: Therapeutic Activity 25       PT/PTA: PT     Number of PTA visits since last PT visit: 0     05/12/2025

## 2025-05-12 NOTE — SUBJECTIVE & OBJECTIVE
Subjective:     Interval History: See hospital course    Current Neurological Medications: See below    Current Medications[1]    Review of Systems   Unable to perform ROS: Mental status change     Objective:     Vital Signs (Most Recent):  Temp: 98.3 °F (36.8 °C) (05/12/25 1235)  Pulse: 99 (05/12/25 1235)  Resp: 14 (05/12/25 1235)  BP: 128/61 (05/12/25 1235)  SpO2: 98 % (05/12/25 1235) Vital Signs (24h Range):  Temp:  [97.3 °F (36.3 °C)-100.3 °F (37.9 °C)] 98.3 °F (36.8 °C)  Pulse:  [92-99] 99  Resp:  [14-18] 14  SpO2:  [98 %-100 %] 98 %  BP: (128-156)/(61-83) 128/61     Weight: 64.1 kg (141 lb 5 oz)  Body mass index is 19.71 kg/m².     Physical Exam  Vitals and nursing note reviewed.   Constitutional:       General: He is not in acute distress.     Comments:  Thin   HENT:      Head: Normocephalic and atraumatic.   Eyes:      Conjunctiva/sclera: Conjunctivae normal.   Pulmonary:      Effort: Pulmonary effort is normal. No respiratory distress.   Musculoskeletal:      Right lower leg: No edema.      Left lower leg: No edema.   Skin:     General: Skin is warm and dry.   Neurological:      Mental Status: He is alert.      Deep Tendon Reflexes:      Reflex Scores:       Bicep reflexes are 2+ on the right side and 2+ on the left side.       Brachioradialis reflexes are 2+ on the right side and 2+ on the left side.       Patellar reflexes are 2+ on the right side and 2+ on the left side.         NEUROLOGICAL EXAMINATION:     MENTAL STATUS   Speech: mute (Unable to assess orientation as patient is not speaking)  Level of consciousness: alert       Neuro exam limited due to lack of patient participation - not speaking nor following commands      CRANIAL NERVES        - L ptosis  - PERRLA     MOTOR EXAM   Muscle bulk: increased  Overall muscle tone: increased    REFLEXES     Reflexes   Right brachioradialis: 2+  Left brachioradialis: 2+  Right biceps: 2+  Left biceps: 2+  Right patellar: 2+  Left patellar:  2+      Significant Labs: All pertinent lab results from the past 24 hours have been reviewed.    Significant Imaging: I have reviewed and interpreted all pertinent imaging results/findings within the past 24 hours.       [1]   Current Facility-Administered Medications   Medication Dose Route Frequency Provider Last Rate Last Admin    acetaminophen tablet 1,000 mg  1,000 mg Oral Q8H PRN Zuly Wheat PA-C   1,000 mg at 05/11/25 1335    acetaminophen tablet 650 mg  650 mg Oral Q4H PRN Zuly Wheat PA-C   650 mg at 05/04/25 1453    ALPRAZolam tablet 0.5 mg  0.5 mg Oral BID PRN Zuly Wheat PA-C        amLODIPine tablet 10 mg  10 mg Oral Daily Juan F Ramírez MD   10 mg at 05/12/25 0737    carbidopa-levodopa  mg per tablet 2 tablet  2 tablet Oral TID Elian López MD   2 tablet at 05/12/25 0737    dextrose 50% injection 12.5 g  12.5 g Intravenous PRN Zuly Wheat PA-C        dextrose 50% injection 25 g  25 g Intravenous PRN Zuly Wheat PA-C        enoxaparin injection 40 mg  40 mg Subcutaneous Daily Zuly Wheat PA-C   40 mg at 05/11/25 1800    gadobutroL (GADAVIST) injection 7 mL  7 mL Intravenous ONCE PRN Arsenio Oglesby MD        glucagon (human recombinant) injection 1 mg  1 mg Intramuscular PRN Zuly Wheat PA-C        glucose chewable tablet 16 g  16 g Oral PRN Zuly Wheat PA-C        glucose chewable tablet 24 g  24 g Oral PRN Zuly Wheat PA-C        Immune Globulin G (IGG)-PRO-IGA 10 % injection (Privigen) 10 % injection 25 g  0.4 g/kg Intravenous Q24H Elian López MD 19 mL/hr at 05/11/25 1812 25 g at 05/11/25 1812    LIDOcaine HCL 10 mg/ml (1%) injection 5 mL  5 mL Other On Call Procedure Elian López MD        melatonin tablet 6 mg  6 mg Oral Nightly PRN Zuly Wheat PA-C        naloxone 0.4 mg/mL injection 0.02 mg  0.02 mg Intravenous PRN Jarret, Zuly, PA-C        ondansetron disintegrating tablet 8 mg  8 mg Oral Q8H PRN Zuly Wheat PA-C         polyethylene glycol packet 17 g  17 g Oral BID Zuly Wheat PA-C   17 g at 05/11/25 2029    prochlorperazine injection Soln 5 mg  5 mg Intravenous Q6H PRN Zuly Wheat PA-C        sodium chloride 0.9% flush 10 mL  10 mL Intravenous Q12H PRN Zuly Wheat PA-C

## 2025-05-12 NOTE — SUBJECTIVE & OBJECTIVE
Interval History:  Reports of poor oral intake, however drinks boost.  Sodium of 128 today.  No other acute issues or complaints at this time.  Sister in-law updated at bedside    Review of Systems   Unable to perform ROS: Other     Objective:     Vital Signs (Most Recent):  Temp: 100.3 °F (37.9 °C) (05/12/25 0745)  Pulse: 97 (05/12/25 0745)  Resp: 16 (05/12/25 0745)  BP: (!) 151/74 (05/12/25 0745)  SpO2: 98 % (05/12/25 0745) Vital Signs (24h Range):  Temp:  [97.3 °F (36.3 °C)-100.3 °F (37.9 °C)] 100.3 °F (37.9 °C)  Pulse:  [92-99] 97  Resp:  [16-18] 16  SpO2:  [98 %-100 %] 98 %  BP: (136-156)/(63-83) 151/74     Weight: 64.1 kg (141 lb 5 oz)  Body mass index is 19.71 kg/m².    Intake/Output Summary (Last 24 hours) at 5/12/2025 1039  Last data filed at 5/12/2025 0612  Gross per 24 hour   Intake 500 ml   Output 960 ml   Net -460 ml        Physical Exam  Constitutional:       General: He is not in acute distress.     Appearance: He is ill-appearing.   HENT:      Head: Normocephalic.      Right Ear: External ear normal.      Left Ear: External ear normal.      Nose: Nose normal.   Cardiovascular:      Rate and Rhythm: Normal rate.      Heart sounds: Normal heart sounds.   Pulmonary:      Breath sounds: Normal breath sounds.   Abdominal:      Palpations: Abdomen is soft.      Tenderness: There is no abdominal tenderness.   Musculoskeletal:      Right lower leg: No edema.      Left lower leg: No edema.      Comments: SCD   Skin:     General: Skin is warm.   Neurological:      General: No focal deficit present.      Mental Status: He is alert. improved       MELD 3.0: 13 at 5/6/2025  2:23 PM  MELD-Na: 9 at 5/6/2025  2:23 PM  Calculated from:  Serum Creatinine: 1.3 mg/dL at 5/6/2025  2:23 PM  Serum Sodium: 132 mmol/L at 5/6/2025  2:23 PM  Total Bilirubin: 0.5 mg/dL (Using min of 1 mg/dL) at 5/4/2025  3:28 AM  Serum Albumin: 3.2 g/dL at 5/4/2025  3:28 AM  INR(ratio): 1 at 5/5/2025  4:24 PM  Age at listing (hypothetical): 79  "years  Sex: Male at 5/6/2025  2:23 PM      Significant Labs:  CBC:  Recent Labs   Lab 05/11/25  0908   WBC 7.21   HGB 11.2*   HCT 33.2*        CMP:  Recent Labs   Lab 05/11/25  0908 05/12/25  0836   * 128*   K 3.8 3.9   CL 97 96   CO2 24 24   GLU 93 92   BUN 11 13   CREATININE 0.8 0.8   CALCIUM 9.1 9.3   PROT 7.9 8.5*   ALBUMIN 2.7* 2.7*   BILITOT 0.4 0.4   ALKPHOS 73 70   AST 22 23   ALT 11 <5*   ANIONGAP 9 8     PTINR:  No results for input(s): "INR" in the last 48 hours.    Significant Procedures:   Dobutamine Stress Test with Color Flow: No results found for this or any previous visit.      "

## 2025-05-12 NOTE — PLAN OF CARE
[facility] - [facility]  Discharge Reassessment    Discharge Plan A and Plan B have been determined by review of patient's clinical status, future medical and therapeutic needs, and coverage/benefits for post-acute care in coordination with multidisciplinary team members.     Primary Care Provider: Leland Porras MD    Expected Discharge Date: 5/13/2025    Reassessment (most recent)       Discharge Reassessment - 05/12/25 1209          Discharge Reassessment    Assessment Type Discharge Planning Reassessment (P)                      CM to call patients daughter  (Radha) and update on discharge plans. Patient to return to [facility] once medically ready. Patient will need transportation. PMR ordered and patient referred to [facility].  CM met with patient and family present or spoke via phone  to discuss any current  discharge planning.         Christi Angel RN  Case Management  [facility]  [phone]

## 2025-05-12 NOTE — SUBJECTIVE & OBJECTIVE
Interval History 5/12/2025:  Patient is seen for follow-up PM&R evaluation and recommendations:  Pt seen at bedside. Sister in law present. Pt not following commands consistently. Pt was only able to state name and year of birth. Not able to state date of birth correctly. Unable to verbalize situation and place. Pt remained calm through out exam.   HPI, Past Medical, Family, and Social History remains the same as documented in the initial encounter.    Scheduled Medications:    amLODIPine  10 mg Oral Daily    carbidopa-levodopa  mg  2 tablet Oral TID    diphenhydrAMINE  25 mg Intravenous Q24H    enoxparin  40 mg Subcutaneous Daily    Immune Globulin G (IGG)-PRO-IGA 10 % injection (Privigen)  0.4 g/kg Intravenous Q24H    polyethylene glycol  17 g Oral BID       Diagnostic Results: Labs: Reviewed    PRN Medications:   Current Facility-Administered Medications:     acetaminophen, 1,000 mg, Oral, Q8H PRN    acetaminophen, 650 mg, Oral, Q4H PRN    ALPRAZolam, 0.5 mg, Oral, BID PRN    dextrose 50%, 12.5 g, Intravenous, PRN    dextrose 50%, 25 g, Intravenous, PRN    gadobutroL, 7 mL, Intravenous, ONCE PRN    glucagon (human recombinant), 1 mg, Intramuscular, PRN    glucose, 16 g, Oral, PRN    glucose, 24 g, Oral, PRN    LIDOcaine HCL 10 mg/ml (1%), 5 mL, Other, On Call Procedure    melatonin, 6 mg, Oral, Nightly PRN    naloxone, 0.02 mg, Intravenous, PRN    ondansetron, 8 mg, Oral, Q8H PRN    prochlorperazine, 5 mg, Intravenous, Q6H PRN    sodium chloride 0.9%, 10 mL, Intravenous, Q12H PRN    Review of Systems   Unable to perform ROS: Other (Pt not following commands effectively.)   Constitutional:  Positive for activity change.   Musculoskeletal:  Positive for gait problem.   Neurological:  Positive for weakness.   Psychiatric/Behavioral:  Positive for confusion and decreased concentration.      Objective:     Vital Signs (Most Recent):  Temp: 98.3 °F (36.8 °C) (05/12/25 1235)  Pulse: 99 (05/12/25 1235)  Resp: 14  (05/12/25 1235)  BP: 128/61 (05/12/25 1235)  SpO2: 98 % (05/12/25 1235)    Vital Signs (24h Range):  Temp:  [97.3 °F (36.3 °C)-100.3 °F (37.9 °C)] 98.3 °F (36.8 °C)  Pulse:  [92-99] 99  Resp:  [14-18] 14  SpO2:  [98 %-100 %] 98 %  BP: (128-156)/(61-83) 128/61         Physical Exam  Vitals and nursing note reviewed.   Constitutional:       General: He is awake.   Eyes:      Extraocular Movements: Extraocular movements intact.   Pulmonary:      Effort: Pulmonary effort is normal. No respiratory distress.   Abdominal:      General: There is no distension.      Palpations: Abdomen is soft.   Musculoskeletal:      Cervical back: Normal range of motion and neck supple.      Comments: Unable to assess true ability to move extremities. Not following commands consistently.    Skin:     General: Skin is warm and dry.      Capillary Refill: Capillary refill takes 2 to 3 seconds.   Neurological:      General: No focal deficit present.      Mental Status: He is alert. He is confused.      GCS: GCS eye subscore is 4. GCS verbal subscore is 4. GCS motor subscore is 4.      Motor: Weakness present.      Coordination: Heel to Shin Test abnormal. Impaired rapid alternating movements.      Gait: Gait abnormal.   Psychiatric:         Mood and Affect: Mood normal.         Behavior: Behavior is cooperative.

## 2025-05-12 NOTE — PLAN OF CARE
Pt had no issues today. VSS. PIV patent.  Continue POC    Problem: Adult Inpatient Plan of Care  Goal: Plan of Care Review  Outcome: Progressing  Goal: Patient-Specific Goal (Individualized)  Outcome: Progressing  Goal: Absence of Hospital-Acquired Illness or Injury  Outcome: Progressing  Goal: Optimal Comfort and Wellbeing  Outcome: Progressing  Goal: Readiness for Transition of Care  Outcome: Progressing     Problem: Skin Injury Risk Increased  Goal: Skin Health and Integrity  Outcome: Progressing     Problem: Infection  Goal: Absence of Infection Signs and Symptoms  Outcome: Progressing

## 2025-05-12 NOTE — ASSESSMENT & PLAN NOTE
- reported per son  - recently started gluten free diet per recommendations from GI    5/12  Has poor oral intake, mild hyponatremia at 1:28 a.m. today.  Seems to be doing well with boost, we will add this to his diet.  IV fluids today with monitoring of BMP every 4-6 hours.

## 2025-05-12 NOTE — PROGRESS NOTES
[facility]  Physical Medicine & Rehab  Progress Note    Patient Name: Vince Huffman  MRN: [id number]  Admission Date: 4/30/2025  Length of Stay: 11 days  Attending Physician: Elian Morejon MD    Subjective:     Principal Problem:Encephalopathy    Hospital Course:   Per chart review,    PT-05/08    Functional Mobility:  Bed Mobility:     Rolling Right: moderate assistance  Scooting: moderate assistance  Supine to Sit: moderate assistance  Transfers:     Bed to Chair: maximal assistance with  hand-held assist and rolling walker  using  Stand Pivot  Gait: 3' with RW and Max A on first trial and 10' with HHA-Max A      PT- 05/06    Functional Mobility:  Bed Mobility:     Rolling Right: minimum assistance  Scooting: minimum assistance  Supine to Sit: minimum assistance  Transfers:     Sit to Stand:  moderate assistance and of 2 persons with hand-held assist  Bed to Chair: moderate assistance and of 2 persons with  hand-held assist  using  Stand Pivot  Gait: 12 steps with HHA-Mod A x2    Interval History 5/12/2025:  Patient is seen for follow-up PM&R evaluation and recommendations:  Pt seen at bedside. Sister in law present. Pt not following commands consistently. Pt was only able to state name and year of birth. Not able to state date of birth correctly. Unable to verbalize situation and place. Pt remained calm through out exam.   HPI, Past Medical, Family, and Social History remains the same as documented in the initial encounter.    Scheduled Medications:    amLODIPine  10 mg Oral Daily    carbidopa-levodopa  mg  2 tablet Oral TID    diphenhydrAMINE  25 mg Intravenous Q24H    enoxparin  40 mg Subcutaneous Daily    Immune Globulin G (IGG)-PRO-IGA 10 % injection (Privigen)  0.4 g/kg Intravenous Q24H    polyethylene glycol  17 g Oral BID       Diagnostic Results: Labs: Reviewed    PRN Medications:   Current Facility-Administered Medications:     acetaminophen, 1,000 mg, Oral, Q8H PRN     acetaminophen, 650 mg, Oral, Q4H PRN    ALPRAZolam, 0.5 mg, Oral, BID PRN    dextrose 50%, 12.5 g, Intravenous, PRN    dextrose 50%, 25 g, Intravenous, PRN    gadobutroL, 7 mL, Intravenous, ONCE PRN    glucagon (human recombinant), 1 mg, Intramuscular, PRN    glucose, 16 g, Oral, PRN    glucose, 24 g, Oral, PRN    LIDOcaine HCL 10 mg/ml (1%), 5 mL, Other, On Call Procedure    melatonin, 6 mg, Oral, Nightly PRN    naloxone, 0.02 mg, Intravenous, PRN    ondansetron, 8 mg, Oral, Q8H PRN    prochlorperazine, 5 mg, Intravenous, Q6H PRN    sodium chloride 0.9%, 10 mL, Intravenous, Q12H PRN    Review of Systems   Unable to perform ROS: Other (Pt not following commands effectively.)   Constitutional:  Positive for activity change.   Musculoskeletal:  Positive for gait problem.   Neurological:  Positive for weakness.   Psychiatric/Behavioral:  Positive for confusion and decreased concentration.      Objective:     Vital Signs (Most Recent):  Temp: 98.3 °F (36.8 °C) (05/12/25 1235)  Pulse: 99 (05/12/25 1235)  Resp: 14 (05/12/25 1235)  BP: 128/61 (05/12/25 1235)  SpO2: 98 % (05/12/25 1235)    Vital Signs (24h Range):  Temp:  [97.3 °F (36.3 °C)-100.3 °F (37.9 °C)] 98.3 °F (36.8 °C)  Pulse:  [92-99] 99  Resp:  [14-18] 14  SpO2:  [98 %-100 %] 98 %  BP: (128-156)/(61-83) 128/61         Physical Exam  Vitals and nursing note reviewed.   Constitutional:       General: He is awake.   Eyes:      Extraocular Movements: Extraocular movements intact.   Pulmonary:      Effort: Pulmonary effort is normal. No respiratory distress.   Abdominal:      General: There is no distension.      Palpations: Abdomen is soft.   Musculoskeletal:      Cervical back: Normal range of motion and neck supple.      Comments: Unable to assess true ability to move extremities. Not following commands consistently.    Skin:     General: Skin is warm and dry.      Capillary Refill: Capillary refill takes 2 to 3 seconds.   Neurological:      General: No focal  deficit present.      Mental Status: He is alert. He is confused.      GCS: GCS eye subscore is 4. GCS verbal subscore is 4. GCS motor subscore is 4.      Motor: Weakness present.      Coordination: Heel to Shin Test abnormal. Impaired rapid alternating movements.      Gait: Gait abnormal.   Psychiatric:         Mood and Affect: Mood normal.         Behavior: Behavior is cooperative.               Assessment/Plan:      * Encephalopathy  -Neurology following.  -On Empiric meningitic coverage with vancomycin, ceftriaxone, ampicillin, and acyclovir.  -Continue Carbidopa-Levodopa.  -S/P LP.  -CSF studies pending.   -Delirium  precautions.   -Camera sitter.   -05/12-S/P IVIG initiation per neurology. Continuing Sinemet. Will need final plan per neurology. Pt not following commands consistently.     Parkinsonism  -Continue Carbidopa/ Levodopa.     Generalized weakness  -PT/OT rec for high intensity.  -Would need rehab.     PM&R Recommendation:     At this time, the PM&R team has reviewed this patient's ongoing medical case including inpatient diagnosis, medical history, clinical examination, labs, vitals, current social and functional history. We will continue to follow pt at this time pending neurology's final plan, psychiatry consult and improvement in mental status and medical stability.       Angelina Juan NP  Department of Physical Medicine & Rehab   [facility] - [facility]

## 2025-05-13 LAB
AMPAR2 IGG CSF QL CBA IFA: NEGATIVE
AMPHIPHYSIN AB CSF QL IF: NEGATIVE
ANION GAP (OHS): 5 MMOL/L (ref 8–16)
ANNOTATION COMMENT IMP: NORMAL
BUN SERPL-MCNC: 18 MG/DL (ref 8–23)
CALCIUM SERPL-MCNC: 9 MG/DL (ref 8.7–10.5)
CASPR2 IGG CSF QL CBA IFA: NEGATIVE
CHLORIDE SERPL-SCNC: 95 MMOL/L (ref 95–110)
CO2 SERPL-SCNC: 27 MMOL/L (ref 23–29)
CREAT SERPL-MCNC: 0.9 MG/DL (ref 0.5–1.4)
CV2 AB CSF QL IF: NEGATIVE
DPPX IGG CSF QL CBA IFA: NEGATIVE
GABABR IGG CSF QL CBA IFA: NEGATIVE
GAD65 AB CSF-SCNC: 0 NMOL/L
GFAP ALPHA IGG CSF QL IF: NEGATIVE
GFR SERPLBLD CREATININE-BSD FMLA CKD-EPI: >60 ML/MIN/1.73/M2
GLIAL NUC TYPE 1 AB CSF QL IF: NEGATIVE
GLUCOSE SERPL-MCNC: 98 MG/DL (ref 70–110)
HU1 AB CSF QL IF: NEGATIVE
HU2 AB CSF QL IF: NEGATIVE
HU3 AB CSF QL IF: NEGATIVE
IGLON5 IGG CSF QL CBA IFA: NEGATIVE
IMMUNOLOGIST REVIEW: NORMAL
LGI1 IGG CSF QL CBA IFA: NEGATIVE
M MGLUR1 AB IFA, CSF: NEGATIVE
M NEUROCHONDRIN IFA, CSF: NEGATIVE
M SEPTIN-7 IFA, CSF: NEGATIVE
MAGNESIUM SERPL-MCNC: 1.6 MG/DL (ref 1.6–2.6)
NIF IGG CSF QL IF: NEGATIVE
NMDAR1 IGG CSF QL CBA IFA: NEGATIVE
PCA-1 AB CSF QL IF: NEGATIVE
PCA-2 AB CSF QL IF: NEGATIVE
PCA-TR AB CSF QL IF: NEGATIVE
PDE10A AB IFA, CSF: NEGATIVE
POTASSIUM SERPL-SCNC: 4 MMOL/L (ref 3.5–5.1)
SODIUM SERPL-SCNC: 127 MMOL/L (ref 136–145)
TRIM46 AB IFA, CSF: NEGATIVE

## 2025-05-13 PROCEDURE — 25000003 PHARM REV CODE 250: Performed by: INTERNAL MEDICINE

## 2025-05-13 PROCEDURE — 63600175 PHARM REV CODE 636 W HCPCS

## 2025-05-13 PROCEDURE — 25000003 PHARM REV CODE 250

## 2025-05-13 PROCEDURE — 20600001 HC STEP DOWN PRIVATE ROOM

## 2025-05-13 PROCEDURE — 83516 IMMUNOASSAY NONANTIBODY: CPT | Performed by: INTERNAL MEDICINE

## 2025-05-13 PROCEDURE — 83735 ASSAY OF MAGNESIUM: CPT | Performed by: INTERNAL MEDICINE

## 2025-05-13 PROCEDURE — 99233 SBSQ HOSP IP/OBS HIGH 50: CPT | Mod: ,,, | Performed by: PSYCHIATRY & NEUROLOGY

## 2025-05-13 PROCEDURE — 36415 COLL VENOUS BLD VENIPUNCTURE: CPT

## 2025-05-13 PROCEDURE — 86255 FLUORESCENT ANTIBODY SCREEN: CPT

## 2025-05-13 PROCEDURE — 82947 ASSAY GLUCOSE BLOOD QUANT: CPT | Performed by: INTERNAL MEDICINE

## 2025-05-13 PROCEDURE — 94761 N-INVAS EAR/PLS OXIMETRY MLT: CPT

## 2025-05-13 PROCEDURE — 36415 COLL VENOUS BLD VENIPUNCTURE: CPT | Performed by: INTERNAL MEDICINE

## 2025-05-13 RX ORDER — SODIUM CHLORIDE 9 MG/ML
INJECTION, SOLUTION INTRAVENOUS CONTINUOUS
Status: ACTIVE | OUTPATIENT
Start: 2025-05-13 | End: 2025-05-13

## 2025-05-13 RX ADMIN — CARBIDOPA AND LEVODOPA 2 TABLET: 25; 100 TABLET ORAL at 02:05

## 2025-05-13 RX ADMIN — POLYETHYLENE GLYCOL 3350 17 G: 17 POWDER, FOR SOLUTION ORAL at 07:05

## 2025-05-13 RX ADMIN — POLYETHYLENE GLYCOL 3350 17 G: 17 POWDER, FOR SOLUTION ORAL at 08:05

## 2025-05-13 RX ADMIN — CARBIDOPA AND LEVODOPA 2 TABLET: 25; 100 TABLET ORAL at 08:05

## 2025-05-13 RX ADMIN — AMLODIPINE BESYLATE 10 MG: 10 TABLET ORAL at 07:05

## 2025-05-13 RX ADMIN — SODIUM CHLORIDE: 0.9 INJECTION, SOLUTION INTRAVENOUS at 04:05

## 2025-05-13 RX ADMIN — CARBIDOPA AND LEVODOPA 2 TABLET: 25; 100 TABLET ORAL at 07:05

## 2025-05-13 RX ADMIN — ENOXAPARIN SODIUM 40 MG: 40 INJECTION SUBCUTANEOUS at 04:05

## 2025-05-13 NOTE — SUBJECTIVE & OBJECTIVE
"  Subjective:     Interval History: See hospital course    Current Neurological Medications: See below    Current Medications[1]    Review of Systems   Unable to perform ROS: Mental status change     Objective:     Vital Signs (Most Recent):  Temp: 98.8 °F (37.1 °C) (05/13/25 1215)  Pulse: 85 (05/13/25 0840)  Resp: 17 (05/13/25 1215)  BP: (!) 142/67 (05/13/25 1215)  SpO2: 96 % (05/13/25 0840) Vital Signs (24h Range):  Temp:  [98 °F (36.7 °C)-100.4 °F (38 °C)] 98.8 °F (37.1 °C)  Pulse:  [76-94] 85  Resp:  [14-22] 17  SpO2:  [94 %-100 %] 96 %  BP: (120-167)/(60-76) 142/67     Weight: 64.1 kg (141 lb 5 oz)  Body mass index is 19.71 kg/m².     Physical Exam  Vitals and nursing note reviewed.   Constitutional:       General: He is not in acute distress.     Comments:  Thin   HENT:      Head: Normocephalic and atraumatic.   Eyes:      Extraocular Movements: EOM normal.      Conjunctiva/sclera: Conjunctivae normal.   Pulmonary:      Effort: Pulmonary effort is normal. No respiratory distress.   Musculoskeletal:      Right lower leg: No edema.      Left lower leg: No edema.   Skin:     General: Skin is warm and dry.   Neurological:      Mental Status: He is alert.   Psychiatric:         Speech: Speech normal.          NEUROLOGICAL EXAMINATION:     MENTAL STATUS   Disoriented to person.   Disoriented to place. ("Second place")  Disoriented to time.   Speech: speech is normal (Unable to assess orientation as patient is not speaking)  Level of consciousness: alert    CRANIAL NERVES     CN III, IV, VI   Extraocular motions are normal.     CN VII   Facial expression full, symmetric.     CN XI   CN XI normal.        - L ptosis  - PERRLA     MOTOR EXAM   Muscle bulk: increased  Overall muscle tone: increased    GAIT AND COORDINATION        Bradykinetic bilaterally    Low amplitude, moderate frequency tremor in both hands        Significant Labs: All pertinent lab results from the past 24 hours have been reviewed.    Significant " Imaging: I have reviewed and interpreted all pertinent imaging results/findings within the past 24 hours.       [1]   Current Facility-Administered Medications   Medication Dose Route Frequency Provider Last Rate Last Admin    acetaminophen tablet 1,000 mg  1,000 mg Oral Q8H PRN Zuly Wheat PA-C   1,000 mg at 05/12/25 1554    acetaminophen tablet 650 mg  650 mg Oral Q4H PRN Zuly Wheat PA-C   650 mg at 05/04/25 1453    ALPRAZolam tablet 0.5 mg  0.5 mg Oral BID PRN Zuly Wheat PA-C        amLODIPine tablet 10 mg  10 mg Oral Daily Juan F Ramírez MD   10 mg at 05/13/25 0747    carbidopa-levodopa  mg per tablet 2 tablet  2 tablet Oral TID Elian López MD   2 tablet at 05/13/25 1401    dextrose 50% injection 12.5 g  12.5 g Intravenous PRN Zuly Wheat PA-C        dextrose 50% injection 25 g  25 g Intravenous PRN Zuly Wheat PA-C        enoxaparin injection 40 mg  40 mg Subcutaneous Daily Zuly Wheat PA-C   40 mg at 05/12/25 1722    gadobutroL (GADAVIST) injection 7 mL  7 mL Intravenous ONCE PRN Arsenio Oglesby MD        glucagon (human recombinant) injection 1 mg  1 mg Intramuscular PRN Zuly Wheat PA-C        glucose chewable tablet 16 g  16 g Oral PRN Zuly Wheat PA-C        glucose chewable tablet 24 g  24 g Oral PRN Zuly Wheat PA-C        LIDOcaine HCL 10 mg/ml (1%) injection 5 mL  5 mL Other On Call Procedure Elian López MD        melatonin tablet 6 mg  6 mg Oral Nightly PRN Zuly Wheat PA-C        naloxone 0.4 mg/mL injection 0.02 mg  0.02 mg Intravenous PRN Zuly Wheat PA-C        ondansetron disintegrating tablet 8 mg  8 mg Oral Q8H PRN Zuly Wheat PA-C        polyethylene glycol packet 17 g  17 g Oral BID Zuly Wheat PA-C   17 g at 05/13/25 0747    prochlorperazine injection Soln 5 mg  5 mg Intravenous Q6H PRN Zuly Wheat PA-C        sodium chloride 0.9% flush 10 mL  10 mL Intravenous Q12H PRN Zuly Wheat PA-C

## 2025-05-13 NOTE — PLAN OF CARE
Problem: Adult Inpatient Plan of Care  Goal: Plan of Care Review  Outcome: Progressing  Goal: Absence of Hospital-Acquired Illness or Injury  Outcome: Progressing  Goal: Optimal Comfort and Wellbeing  Outcome: Progressing     Problem: Skin Injury Risk Increased  Goal: Skin Health and Integrity  Outcome: Progressing     Problem: Infection  Goal: Absence of Infection Signs and Symptoms  Outcome: Progressing     Problem: Delirium  Goal: Improved Behavioral Control  Outcome: Progressing  Goal: Improved Sleep  Outcome: Progressing   Pt oriented to self. No PRNs given. Turned per facility policy. Call light within reach, safety measures in place, rounded per facility policy.

## 2025-05-13 NOTE — ASSESSMENT & PLAN NOTE
79 y.o.M with progressive acutely worsening confusion/tremors since 4/20. Previously AAOx4, now AAOx1  - neurology consulted, appreciate recommendations:  - MRI brain from last week was essentially non diagnostic  - Repeat MRI again without obvious new pathology   -working diagnosis is Parkinson's disease, as improving with Sinemet: iContinue two tabs TID  -LP concerning for possible meningitis. Continue Empiric treatment per Neurology recs   -EEG pending   - Delirium and fall precautions along with neuro checks  - PT/OT consult placed; return to [facility] upon discharge     5/8  Viral meningitis panel unremarkable.  Cultures no growth.  CSF with elevated proteins and white cell count.  Started on empiric antibiotics, vancomycin ceftriaxone and ampicillin, infectious disease consulted.  Follow up with Neurology recommendations.  Started on carbidopa levodopa.  Dispo plan acute rehab.  Mentation remains the same during this hospitalization A&O x1  5/9  Started on IVIG per Neurology.  EEG(f/u)  5/10  Continue with IVIG, follow up with Neurology.  EEG report noted  5/13  Completed IVIG course, follow up with Neurology. ? D/c back to acute rehab, able to follow one-step command.

## 2025-05-13 NOTE — PLAN OF CARE
Pt dx encephalopathy. Asleep, arousable to voice. Oriented to self only. Withdraws to tactile stimuli, follows simple commands. NAD. Bed alarm on. PIV to right forearm intact, secured and flushed. Explained current plan of care no evidence of learning.

## 2025-05-13 NOTE — ASSESSMENT & PLAN NOTE
79 year old male with history of Crohn's disease, SBO, hypothyroidism, and CAD  presenting from [facility] with encephalopathy. Unclear cause of encephalopathy at this time, though acute presentation with concurrent parkinsonism suggests autoimmune etiology. Motion artifact on MRI brain. On initial exam, was disoriented to place, time, and situation, minimally responsive and will answer with one word, asterixis in his upper extremities, axial rigidity/extremity/rigidity, masked facies, and intention tremor most notably in the left hand. No hyperreflexia. Ptosis of left eye though no extraocular movement abnormalities/pupillary discrepancy.     On my exam today, patient was speaking multi-word sentences, albeit was confused and disoriented. He continued to have increased tone and bradykinesia bilaterally as well as low amplitude, moderate frequency tremor of both hands.     DDX: autoimmune Parkinsonism +/-  superimposed catatonia or complex bereavement    LABS  Serum  Pending: Ma2 ab, [facility] movement disorders panel, Vit E  On Movement Disorders panel, not on autoimmune encephalopathy panel = CCPQ (formerly known as P/Q type VGCC,) GRAF1, Septin5, ITPR1, AP3B2, KLHL11  Dopamine-2 and [place]-3 not commercially available per lab  WNL: strongyloides IgG, Treponema, negative celiac dx serology, zinc, folate, B12, copper, B1, autoimmune encephalopathy panel    CSF  Pending: autoimmune encephalopathy panel  WNL: AFB, meningitis-encephalitis panel, VDRL, culture, glucose, LDH, cytology  Abnormal: protein (60), pleocytosis (after correction for elevated RBC)    Recommendations:  - S/p 5-day course of IVIG (EOT 5/12/2025)  - F/u pending labs as above  - Continue Sinemet 25 -100 mg 2 tablets TID  - Psychiatry consult - concern for component of catatonia vs complex bereavement  - Delirium and fall precautions     We will continue to follow. Please reach out with any questions.

## 2025-05-13 NOTE — ASSESSMENT & PLAN NOTE
- reported per son  - recently started gluten free diet per recommendations from GI    5/12  Has poor oral intake, mild hyponatremia at 128. today.  Seems to be doing well with boost, we will add this to his diet.  IV fluids today with monitoring of BMP every 4-6 hours.

## 2025-05-13 NOTE — SUBJECTIVE & OBJECTIVE
Interval History:  Sodium improved, awaiting a.m. labs.  Completed IVIG course.  No acute events overnight.  Seen sleeping this morning and awakeble    Review of Systems   Unable to perform ROS: Other     Objective:     Vital Signs (Most Recent):  Temp: 98.2 °F (36.8 °C) (05/13/25 0840)  Pulse: 85 (05/13/25 0840)  Resp: 19 (05/13/25 0840)  BP: 134/66 (05/13/25 0840)  SpO2: 96 % (05/13/25 0840) Vital Signs (24h Range):  Temp:  [98 °F (36.7 °C)-100.4 °F (38 °C)] 98.2 °F (36.8 °C)  Pulse:  [76-99] 85  Resp:  [14-22] 19  SpO2:  [94 %-100 %] 96 %  BP: (120-167)/(60-76) 134/66     Weight: 64.1 kg (141 lb 5 oz)  Body mass index is 19.71 kg/m².    Intake/Output Summary (Last 24 hours) at 5/13/2025 0950  Last data filed at 5/12/2025 1803  Gross per 24 hour   Intake 1205.71 ml   Output 250 ml   Net 955.71 ml        Physical Exam  Constitutional:       General: He is not in acute distress.     Appearance: He is ill-appearing.   HENT:      Head: Normocephalic.      Right Ear: External ear normal.      Left Ear: External ear normal.      Nose: Nose normal.   Cardiovascular:      Rate and Rhythm: Normal rate.      Heart sounds: Normal heart sounds.   Pulmonary:      Breath sounds: Normal breath sounds.   Abdominal:      Palpations: Abdomen is soft.      Tenderness: There is no abdominal tenderness.   Musculoskeletal:      Right lower leg: No edema.      Left lower leg: No edema.      Comments: SCD   Skin:     General: Skin is warm.   Neurological:      General: No focal deficit present.      Mental Status: He is alert. improved       MELD 3.0: 13 at 5/6/2025  2:23 PM  MELD-Na: 9 at 5/6/2025  2:23 PM  Calculated from:  Serum Creatinine: 1.3 mg/dL at 5/6/2025  2:23 PM  Serum Sodium: 132 mmol/L at 5/6/2025  2:23 PM  Total Bilirubin: 0.5 mg/dL (Using min of 1 mg/dL) at 5/4/2025  3:28 AM  Serum Albumin: 3.2 g/dL at 5/4/2025  3:28 AM  INR(ratio): 1 at 5/5/2025  4:24 PM  Age at listing (hypothetical): 79 years  Sex: Male at 5/6/2025   "2:23 PM      Significant Labs:  CBC:  Recent Labs   Lab 05/12/25  1629   WBC 6.18   HGB 11.4*   HCT 33.8*        CMP:  Recent Labs   Lab 05/12/25  0836 05/12/25  1629   * 131*   K 3.9 4.3   CL 96 98   CO2 24 25   GLU 92 89   BUN 13 18   CREATININE 0.8 1.0   CALCIUM 9.3 8.9   PROT 8.5*  --    ALBUMIN 2.7*  --    BILITOT 0.4  --    ALKPHOS 70  --    AST 23  --    ALT <5*  --    ANIONGAP 8 8     PTINR:  No results for input(s): "INR" in the last 48 hours.    Significant Procedures:   Dobutamine Stress Test with Color Flow: No results found for this or any previous visit.      "

## 2025-05-13 NOTE — PROGRESS NOTES
"[facility] - [facility]  Neurology  Progress Note    Patient Name: Vince Huffman  MRN: [id number]  Admission Date: 4/30/2025  Hospital Length of Stay: 12 days  Code Status: Full Code   Attending Provider: Elian Morejon MD  Primary Care Physician: Leland Porras MD   Principal Problem:Encephalopathy    HPI:   Vince Huffman is a 79 year old male with history of chron's disease, SBO, hypothyroidism, and CAD  presenting from [facility] with encephalopathy. He initially presented to [facility] for impaired mobility and difficulty with ADL's 2/2 generalized weakness. He was reportedly found confused today A&O x 1 (said the year is "1895") but is normally A&O x4. At this time he also expressed generalized abdominal pain. Recent ultrasounds of the lower extremity did not reveal DVT. MRI from last week was non diagnostic. UA unremarkable and CXR normal. CT abdomen and pelvis reveals wall thickening of the distal ileum consistent with possible infectious ileitis. Neurology consulted for further evaluation of encephalopathy.     Overview/Hospital Course:  05/12/2025 - IVIG Day 5/5. Exam worsened compared to yesterday (no longer speaking.)   05/13/2025 - Initial exam early this morning consistent with exam yesterday. However, when assessed by team closed to lunch time was talkative and saying multiple-word sentences (albeit still confused and perseverative.)         Subjective:     Interval History: See hospital course    Current Neurological Medications: See below    Current Medications[1]    Review of Systems   Unable to perform ROS: Mental status change     Objective:     Vital Signs (Most Recent):  Temp: 98.8 °F (37.1 °C) (05/13/25 1215)  Pulse: 85 (05/13/25 0840)  Resp: 17 (05/13/25 1215)  BP: (!) 142/67 (05/13/25 1215)  SpO2: 96 % (05/13/25 0840) Vital Signs (24h Range):  Temp:  [98 °F (36.7 °C)-100.4 °F (38 °C)] 98.8 °F (37.1 °C)  Pulse:  [76-94] 85  Resp:  [14-22] 17  SpO2:  [94 %-100 %] 96 %  BP: " "(120-167)/(60-76) 142/67     Weight: 64.1 kg (141 lb 5 oz)  Body mass index is 19.71 kg/m².     Physical Exam  Vitals and nursing note reviewed.   Constitutional:       General: He is not in acute distress.     Comments:  Thin   HENT:      Head: Normocephalic and atraumatic.   Eyes:      Extraocular Movements: EOM normal.      Conjunctiva/sclera: Conjunctivae normal.   Pulmonary:      Effort: Pulmonary effort is normal. No respiratory distress.   Musculoskeletal:      Right lower leg: No edema.      Left lower leg: No edema.   Skin:     General: Skin is warm and dry.   Neurological:      Mental Status: He is alert.   Psychiatric:         Speech: Speech normal.          NEUROLOGICAL EXAMINATION:     MENTAL STATUS   Disoriented to person.   Disoriented to place. ("Second place")  Disoriented to time.   Speech: speech is normal (Unable to assess orientation as patient is not speaking)  Level of consciousness: alert    CRANIAL NERVES     CN III, IV, VI   Extraocular motions are normal.     CN VII   Facial expression full, symmetric.     CN XI   CN XI normal.        - L ptosis  - PERRLA     MOTOR EXAM   Muscle bulk: increased  Overall muscle tone: increased    GAIT AND COORDINATION        Bradykinetic bilaterally    Low amplitude, moderate frequency tremor in both hands        Significant Labs: All pertinent lab results from the past 24 hours have been reviewed.    Significant Imaging: I have reviewed and interpreted all pertinent imaging results/findings within the past 24 hours.    Assessment and Plan:     * Encephalopathy  79 year old male with history of Crohn's disease, SBO, hypothyroidism, and CAD  presenting from [facility] with encephalopathy. Unclear cause of encephalopathy at this time, though acute presentation with concurrent parkinsonism suggests autoimmune etiology. Motion artifact on MRI brain. On initial exam, was disoriented to place, time, and situation, minimally responsive and will answer with one " word, asterixis in his upper extremities, axial rigidity/extremity/rigidity, masked facies, and intention tremor most notably in the left hand. No hyperreflexia. Ptosis of left eye though no extraocular movement abnormalities/pupillary discrepancy.     On my exam today, patient was speaking multi-word sentences, albeit was confused and disoriented. He continued to have increased tone and bradykinesia bilaterally as well as low amplitude, moderate frequency tremor of both hands.     DDX: autoimmune Parkinsonism +/-  superimposed catatonia or complex bereavement    LABS  Serum  Pending: Ma2 ab, [facility] movement disorders panel, Vit E  On Movement Disorders panel, not on autoimmune encephalopathy panel = CCPQ (formerly known as P/Q type VGCC,) GRAF1, Septin5, ITPR1, AP3B2, KLHL11  Dopamine-2 and [place]-3 not commercially available per lab  WNL: strongyloides IgG, Treponema, negative celiac dx serology, zinc, folate, B12, copper, B1, autoimmune encephalopathy panel    CSF  Pending: autoimmune encephalopathy panel  WNL: AFB, meningitis-encephalitis panel, VDRL, culture, glucose, LDH, cytology  Abnormal: protein (60), pleocytosis (after correction for elevated RBC)    Recommendations:  - S/p 5-day course of IVIG (EOT 5/12/2025)  - F/u pending labs as above  - Continue Sinemet 25 -100 mg 2 tablets TID  - Psychiatry consult - concern for component of catatonia vs complex bereavement  - Delirium and fall precautions     We will continue to follow. Please reach out with any questions.         VTE Risk Mitigation (From admission, onward)           Ordered     enoxaparin injection 40 mg  Daily         05/01/25 0827     IP VTE HIGH RISK PATIENT  Once         05/01/25 0827     Place sequential compression device  Until discontinued         05/01/25 0827                    Abdirashid Collier MD  Neurology  [facility] - Acute Medical Stepdown       [1]   Current Facility-Administered Medications   Medication Dose Route Frequency  Provider Last Rate Last Admin    acetaminophen tablet 1,000 mg  1,000 mg Oral Q8H PRN Zuly Wheat PA-C   1,000 mg at 05/12/25 1554    acetaminophen tablet 650 mg  650 mg Oral Q4H PRN Zuly Wheat PA-C   650 mg at 05/04/25 1453    ALPRAZolam tablet 0.5 mg  0.5 mg Oral BID PRN Zuly Wheat PA-C        amLODIPine tablet 10 mg  10 mg Oral Daily Juan F Ramírez MD   10 mg at 05/13/25 0747    carbidopa-levodopa  mg per tablet 2 tablet  2 tablet Oral TID Elian López MD   2 tablet at 05/13/25 1401    dextrose 50% injection 12.5 g  12.5 g Intravenous PRN Zuly Wheat PA-C        dextrose 50% injection 25 g  25 g Intravenous PRN Zuly Wheat PA-C        enoxaparin injection 40 mg  40 mg Subcutaneous Daily Zuly Wheat PA-C   40 mg at 05/12/25 1722    gadobutroL (GADAVIST) injection 7 mL  7 mL Intravenous ONCE PRN Arsenio Oglesby MD        glucagon (human recombinant) injection 1 mg  1 mg Intramuscular PRN Zuly Wheat PA-C        glucose chewable tablet 16 g  16 g Oral PRN Zuly Wheat PA-C        glucose chewable tablet 24 g  24 g Oral PRN Zuly Wheat PA-C        LIDOcaine HCL 10 mg/ml (1%) injection 5 mL  5 mL Other On Call Procedure Elian López MD        melatonin tablet 6 mg  6 mg Oral Nightly PRN Zuly Wheat PA-C        naloxone 0.4 mg/mL injection 0.02 mg  0.02 mg Intravenous PRN Zuly Wheat PA-C        ondansetron disintegrating tablet 8 mg  8 mg Oral Q8H PRN Zuly Wheat PA-C        polyethylene glycol packet 17 g  17 g Oral BID Zuly Wheat PA-C   17 g at 05/13/25 0747    prochlorperazine injection Soln 5 mg  5 mg Intravenous Q6H PRN Zuly Wheat PA-C        sodium chloride 0.9% flush 10 mL  10 mL Intravenous Q12H PRN Zuly Wheat PA-C

## 2025-05-13 NOTE — ASSESSMENT & PLAN NOTE
Hyponatremia is likely due to Dehydration/hypovolemia. The patient's most recent sodium results are listed below.  Recent Labs     05/11/25  0908 05/12/25  0836 05/12/25  1629   * 128* 131*     Maxime  - Monitor sodium Daily.   - Patient hyponatremia is stable

## 2025-05-13 NOTE — PROGRESS NOTES
[facility] - [facility] Medicine  Progress Note    Patient Name: Vince Huffman  MRN: [id number]  Patient Class: IP- Inpatient   Admission Date: 4/30/2025  Length of Stay: 12 days  Attending Physician: Elian Morejon MD  Primary Care Provider: Leland Porras MD        Subjective     Principal Problem:Encephalopathy        HPI:  78 yo M w/HTN, hypothyroid, crohn's disease, CAD, hx of SBO, presenting with AMS from [facility]. Patient was sent in from [facility] for progressively worsening cognitive function. Patient is A&O x1 at his baseline. Patient and his daughter endorse mild confusion. Which has been progressive.    Patient has not had any recent falls. Endorsed mild suprapubic abdominal pain. No UTI. Mild inflammation on CT, not unexpected in the setting of Crohn's.     Overview/Hospital Course:  Vince Huffman is a 79 y.o. M who was admitted to  for further evaluation of worsening AMS per rehab facility. AAOx1 on admission, normally AAOx4 prior to 4/20 per son. UA negative. CXR clear. CT abd/pelvis with wall thickening of distal ileum, inflammatory infectious ileitis are considerations, fecal distention of the rectum, impaction not excluded. Discussed with GI, anticipated these are chronic findings. CRP negative. Will give enema and monitor for improvement. Patient with successful BM. Neurology consulted: MRI brain ordered (no acute findings, motion artifact), extended EEG, ammonia levels. Worsening mental status on 5/2, medicine team consulted for LP which was unsuccessful. More alert on 5/3 and answering yes/no questions when redirected. Neurology recommending carbidopa-levodopa trial, EEG, and attempting another LP.       Symptoms improved with increasing doses of Carbidopa/Levodopa. LP concerning for meningitis so he was started on Empiric treatment on 5/6/25.     5/9  Discussion held with both Neurology and Infectious Disease, Infectious Disease does not believe patient has  any infectious etiology and as such recommended discontinuing antimicrobials.  Neurology started IVIG for presumed autoimmune process    Interval History:  Sodium improved, awaiting a.m. labs.  Completed IVIG course.  No acute events overnight.  Seen sleeping this morning and awakeble    Review of Systems   Unable to perform ROS: Other     Objective:     Vital Signs (Most Recent):  Temp: 98.2 °F (36.8 °C) (05/13/25 0840)  Pulse: 85 (05/13/25 0840)  Resp: 19 (05/13/25 0840)  BP: 134/66 (05/13/25 0840)  SpO2: 96 % (05/13/25 0840) Vital Signs (24h Range):  Temp:  [98 °F (36.7 °C)-100.4 °F (38 °C)] 98.2 °F (36.8 °C)  Pulse:  [76-99] 85  Resp:  [14-22] 19  SpO2:  [94 %-100 %] 96 %  BP: (120-167)/(60-76) 134/66     Weight: 64.1 kg (141 lb 5 oz)  Body mass index is 19.71 kg/m².    Intake/Output Summary (Last 24 hours) at 5/13/2025 0950  Last data filed at 5/12/2025 1803  Gross per 24 hour   Intake 1205.71 ml   Output 250 ml   Net 955.71 ml        Physical Exam  Constitutional:       General: He is not in acute distress.     Appearance: He is ill-appearing.   HENT:      Head: Normocephalic.      Right Ear: External ear normal.      Left Ear: External ear normal.      Nose: Nose normal.   Cardiovascular:      Rate and Rhythm: Normal rate.      Heart sounds: Normal heart sounds.   Pulmonary:      Breath sounds: Normal breath sounds.   Abdominal:      Palpations: Abdomen is soft.      Tenderness: There is no abdominal tenderness.   Musculoskeletal:      Right lower leg: No edema.      Left lower leg: No edema.      Comments: SCD   Skin:     General: Skin is warm.   Neurological:      General: No focal deficit present.      Mental Status: He is alert. improved       MELD 3.0: 13 at 5/6/2025  2:23 PM  MELD-Na: 9 at 5/6/2025  2:23 PM  Calculated from:  Serum Creatinine: 1.3 mg/dL at 5/6/2025  2:23 PM  Serum Sodium: 132 mmol/L at 5/6/2025  2:23 PM  Total Bilirubin: 0.5 mg/dL (Using min of 1 mg/dL) at 5/4/2025  3:28 AM  Serum  "Albumin: 3.2 g/dL at 5/4/2025  3:28 AM  INR(ratio): 1 at 5/5/2025  4:24 PM  Age at listing (hypothetical): 79 years  Sex: Male at 5/6/2025  2:23 PM      Significant Labs:  CBC:  Recent Labs   Lab 05/12/25  1629   WBC 6.18   HGB 11.4*   HCT 33.8*        CMP:  Recent Labs   Lab 05/12/25  0836 05/12/25  1629   * 131*   K 3.9 4.3   CL 96 98   CO2 24 25   GLU 92 89   BUN 13 18   CREATININE 0.8 1.0   CALCIUM 9.3 8.9   PROT 8.5*  --    ALBUMIN 2.7*  --    BILITOT 0.4  --    ALKPHOS 70  --    AST 23  --    ALT <5*  --    ANIONGAP 8 8     PTINR:  No results for input(s): "INR" in the last 48 hours.    Significant Procedures:   Dobutamine Stress Test with Color Flow: No results found for this or any previous visit.          Assessment & Plan  Encephalopathy  79 y.o.M with progressive acutely worsening confusion/tremors since 4/20. Previously AAOx4, now AAOx1  - neurology consulted, appreciate recommendations:  - MRI brain from last week was essentially non diagnostic  - Repeat MRI again without obvious new pathology   -working diagnosis is Parkinson's disease, as improving with Sinemet: iContinue two tabs TID  -LP concerning for possible meningitis. Continue Empiric treatment per Neurology recs   -EEG pending   - Delirium and fall precautions along with neuro checks  - PT/OT consult placed; return to [facility] upon discharge     5/8  Viral meningitis panel unremarkable.  Cultures no growth.  CSF with elevated proteins and white cell count.  Started on empiric antibiotics, vancomycin ceftriaxone and ampicillin, infectious disease consulted.  Follow up with Neurology recommendations.  Started on carbidopa levodopa.  Dispo plan acute rehab.  Mentation remains the same during this hospitalization A&O x1  5/9  Started on IVIG per Neurology.  EEG(f/u)  5/10  Continue with IVIG, follow up with Neurology.  EEG report noted  5/13  Completed IVIG course, follow up with Neurology. ? D/c back to acute rehab, able to " "follow one-step command.  Crohn's disease  - Per CT scan "thickening of the distal ileum to the surgical anastomosis with the large bowel. Inflammatory infectious ileitis are considerations. Recommend clinical correlation and follow-up. "  -CRP negative. No concern for flare.   -GI without concerns of confusion being caused by sterlara, confirmed with pharmacy     Generalized weakness  - sent from SNF  - progressive worsening weakness per son   - PT/OT ordered. Will need placement at discharge    Unintended weight loss  - reported per son  - recently started gluten free diet per recommendations from GI    5/12  Has poor oral intake, mild hyponatremia at 128. today.  Seems to be doing well with boost, we will add this to his diet.  IV fluids today with monitoring of BMP every 4-6 hours.    Hyponatremia  Hyponatremia is likely due to Dehydration/hypovolemia. The patient's most recent sodium results are listed below.  Recent Labs     05/11/25  0908 05/12/25  0836 05/12/25  1629   * 128* 131*     Maxime  - Monitor sodium Daily.   - Patient hyponatremia is stable    Essential hypertension  Patient's blood pressure range in the last 24 hours was: BP  Min: 120/61  Max: 167/76.The patient's inpatient anti-hypertensive regimen is listed below:  Current Antihypertensives  amLODIPine tablet 10 mg, Daily, Oral    Plan  - BP is controlled, no changes needed to their regimen    Parkinsonism  Suspect symptoms are at least partially due to Parkinson's disease. Continue Sinemet as above.     CAD (coronary artery disease)  y.   AMS (altered mental status)      VTE Risk Mitigation (From admission, onward)           Ordered     enoxaparin injection 40 mg  Daily         05/01/25 0827     IP VTE HIGH RISK PATIENT  Once         05/01/25 0827     Place sequential compression device  Until discontinued         05/01/25 0827                    Discharge Planning   ERNIE: 5/15/2025     Code Status: Full Code   Medical Readiness for Discharge " Date:   Discharge Plan A: Rehab   Discharge Delays: None known at this time            Please place Justification for DME        Elian Morejon MD  Department of Hospital Medicine   [facility] - Acute Medical Stepdown

## 2025-05-13 NOTE — PLAN OF CARE
[facility] - Acute Medical Stepdown  Discharge Reassessment    Discharge Plan A and Plan B have been determined by review of patient's clinical status, future medical and therapeutic needs, and coverage/benefits for post-acute care in coordination with multidisciplinary team members.     Primary Care Provider: Leland Porras MD    Expected Discharge Date: 5/15/2025    Reassessment (most recent)       Discharge Reassessment - 05/12/25 1209          Discharge Reassessment    Assessment Type Discharge Planning Reassessment     Did the patient's condition or plan change since previous assessment? No     Discharge Plan discussed with: Adult children   Dr Radha Huffman    Communicated ERNIE with patient/caregiver Yes     DME Needed Upon Discharge  other (see comments)     Transition of Care Barriers Mobility     Why the patient remains in the hospital Requires continued medical care        Post-Acute Status    Discharge Delays None known at this time (P)                    CM spoke with patients daughter  (Radha)  via phone and update on discharge plans. Patient to return to [facility] once medically ready due to abnormal Na levels of 128 , IVFs ordered and patient remains confused. . Patient will need transportation. PMR ordered and patient referred to [facility].    UPDATE 5/13/25 0928 am  Patients daughter (Radha) confirmed speaking with patients attending and deferred all medical decisions be deferred to the patients daughter Dr. Radha Huffman.       Christi Angel RN  Case Management  [facility]  [phone]

## 2025-05-13 NOTE — CARE UPDATE
Nurse reports that oral intake is better than yesterday.  We will do conservative IV fluids 500 cc normal saline

## 2025-05-13 NOTE — PLAN OF CARE
Pt status unchanged. Poor appetite. Fully awake in intervals. Son at bedside. IVF started as ordered. Reinforced current careplan  with son. Safety maintained. Bed alarm on.

## 2025-05-13 NOTE — ASSESSMENT & PLAN NOTE
Patient's blood pressure range in the last 24 hours was: BP  Min: 120/61  Max: 167/76.The patient's inpatient anti-hypertensive regimen is listed below:  Current Antihypertensives  amLODIPine tablet 10 mg, Daily, Oral    Plan  - BP is controlled, no changes needed to their regimen

## 2025-05-14 PROBLEM — F32.2 MAJOR DEPRESSIVE DISORDER, SINGLE EPISODE, SEVERE WITHOUT PSYCHOSIS: Status: ACTIVE | Noted: 2025-05-14

## 2025-05-14 LAB
ABSOLUTE EOSINOPHIL (OHS): 0.03 K/UL
ABSOLUTE MONOCYTE (OHS): 0.76 K/UL (ref 0.3–1)
ABSOLUTE NEUTROPHIL COUNT (OHS): 3.67 K/UL (ref 1.8–7.7)
ALLENS TEST: ABNORMAL
ANION GAP (OHS): 5 MMOL/L (ref 8–16)
ANION GAP (OHS): 9 MMOL/L (ref 8–16)
BASOPHILS # BLD AUTO: 0.02 K/UL
BASOPHILS NFR BLD AUTO: 0.4 %
BUN SERPL-MCNC: 18 MG/DL (ref 8–23)
BUN SERPL-MCNC: 19 MG/DL (ref 8–23)
CALCIUM SERPL-MCNC: 8.8 MG/DL (ref 8.7–10.5)
CALCIUM SERPL-MCNC: 8.9 MG/DL (ref 8.7–10.5)
CHLORIDE SERPL-SCNC: 93 MMOL/L (ref 95–110)
CHLORIDE SERPL-SCNC: 94 MMOL/L (ref 95–110)
CO2 SERPL-SCNC: 23 MMOL/L (ref 23–29)
CO2 SERPL-SCNC: 25 MMOL/L (ref 23–29)
CREAT SERPL-MCNC: 0.8 MG/DL (ref 0.5–1.4)
CREAT SERPL-MCNC: 0.8 MG/DL (ref 0.5–1.4)
DELSYS: ABNORMAL
ERYTHROCYTE [DISTWIDTH] IN BLOOD BY AUTOMATED COUNT: 11.5 % (ref 11.5–14.5)
FIO2: 21
GFR SERPLBLD CREATININE-BSD FMLA CKD-EPI: >60 ML/MIN/1.73/M2
GFR SERPLBLD CREATININE-BSD FMLA CKD-EPI: >60 ML/MIN/1.73/M2
GLUCOSE SERPL-MCNC: 94 MG/DL (ref 70–110)
GLUCOSE SERPL-MCNC: 94 MG/DL (ref 70–110)
HCO3 UR-SCNC: 24 MMOL/L (ref 24–28)
HCT VFR BLD AUTO: 32.2 % (ref 40–54)
HCT VFR BLD CALC: 34 %PCV (ref 36–54)
HGB BLD-MCNC: 11.1 GM/DL (ref 14–18)
IMM GRANULOCYTES # BLD AUTO: 0.01 K/UL (ref 0–0.04)
IMM GRANULOCYTES NFR BLD AUTO: 0.2 % (ref 0–0.5)
LYMPHOCYTES # BLD AUTO: 0.76 K/UL (ref 1–4.8)
MAGNESIUM SERPL-MCNC: 1.5 MG/DL (ref 1.6–2.6)
MCH RBC QN AUTO: 31.6 PG (ref 27–31)
MCHC RBC AUTO-ENTMCNC: 34.5 G/DL (ref 32–36)
MCV RBC AUTO: 92 FL (ref 82–98)
MODE: ABNORMAL
NUCLEATED RBC (/100WBC) (OHS): 0 /100 WBC
OSMOLALITY SERPL: 279 MOSM/KG (ref 280–300)
OSMOLALITY UR: 550 MOSM/KG (ref 50–1200)
PCO2 BLDA: 38.2 MMHG (ref 35–45)
PH SMN: 7.41 [PH] (ref 7.35–7.45)
PLATELET # BLD AUTO: 213 K/UL (ref 150–450)
PMV BLD AUTO: 10.3 FL (ref 9.2–12.9)
PO2 BLDA: 72 MMHG (ref 40–60)
POC BE: -1 MMOL/L (ref -2–2)
POC IONIZED CALCIUM: 1.26 MMOL/L (ref 1.06–1.42)
POC SATURATED O2: 94 % (ref 95–100)
POC TCO2: 25 MMOL/L (ref 24–29)
POTASSIUM BLD-SCNC: 3.8 MMOL/L (ref 3.5–5.1)
POTASSIUM SERPL-SCNC: 3.8 MMOL/L (ref 3.5–5.1)
POTASSIUM SERPL-SCNC: 4.2 MMOL/L (ref 3.5–5.1)
RBC # BLD AUTO: 3.51 M/UL (ref 4.6–6.2)
RELATIVE EOSINOPHIL (OHS): 0.6 %
RELATIVE LYMPHOCYTE (OHS): 14.5 % (ref 18–48)
RELATIVE MONOCYTE (OHS): 14.5 % (ref 4–15)
RELATIVE NEUTROPHIL (OHS): 69.8 % (ref 38–73)
SAMPLE: ABNORMAL
SITE: ABNORMAL
SODIUM BLD-SCNC: 127 MMOL/L (ref 136–145)
SODIUM SERPL-SCNC: 124 MMOL/L (ref 136–145)
SODIUM SERPL-SCNC: 125 MMOL/L (ref 136–145)
SODIUM UR-SCNC: 68 MMOL/L (ref 20–250)
WBC # BLD AUTO: 5.25 K/UL (ref 3.9–12.7)

## 2025-05-14 PROCEDURE — 83735 ASSAY OF MAGNESIUM: CPT | Performed by: INTERNAL MEDICINE

## 2025-05-14 PROCEDURE — 63600175 PHARM REV CODE 636 W HCPCS: Performed by: INTERNAL MEDICINE

## 2025-05-14 PROCEDURE — 82310 ASSAY OF CALCIUM: CPT | Performed by: INTERNAL MEDICINE

## 2025-05-14 PROCEDURE — 83930 ASSAY OF BLOOD OSMOLALITY: CPT | Performed by: INTERNAL MEDICINE

## 2025-05-14 PROCEDURE — 25000003 PHARM REV CODE 250: Performed by: INTERNAL MEDICINE

## 2025-05-14 PROCEDURE — 84300 ASSAY OF URINE SODIUM: CPT | Performed by: INTERNAL MEDICINE

## 2025-05-14 PROCEDURE — 97110 THERAPEUTIC EXERCISES: CPT

## 2025-05-14 PROCEDURE — 63600175 PHARM REV CODE 636 W HCPCS

## 2025-05-14 PROCEDURE — 90792 PSYCH DIAG EVAL W/MED SRVCS: CPT | Mod: GC,,, | Performed by: PSYCHIATRY & NEUROLOGY

## 2025-05-14 PROCEDURE — 82803 BLOOD GASES ANY COMBINATION: CPT

## 2025-05-14 PROCEDURE — 99900035 HC TECH TIME PER 15 MIN (STAT)

## 2025-05-14 PROCEDURE — 97530 THERAPEUTIC ACTIVITIES: CPT | Mod: CQ

## 2025-05-14 PROCEDURE — 85025 COMPLETE CBC W/AUTO DIFF WBC: CPT | Performed by: INTERNAL MEDICINE

## 2025-05-14 PROCEDURE — 36415 COLL VENOUS BLD VENIPUNCTURE: CPT | Performed by: INTERNAL MEDICINE

## 2025-05-14 PROCEDURE — 99223 1ST HOSP IP/OBS HIGH 75: CPT | Mod: GC,,, | Performed by: INTERNAL MEDICINE

## 2025-05-14 PROCEDURE — 25000003 PHARM REV CODE 250

## 2025-05-14 PROCEDURE — 83935 ASSAY OF URINE OSMOLALITY: CPT | Performed by: INTERNAL MEDICINE

## 2025-05-14 PROCEDURE — 20600001 HC STEP DOWN PRIVATE ROOM

## 2025-05-14 RX ORDER — MAGNESIUM SULFATE HEPTAHYDRATE 40 MG/ML
2 INJECTION, SOLUTION INTRAVENOUS ONCE
Status: COMPLETED | OUTPATIENT
Start: 2025-05-14 | End: 2025-05-14

## 2025-05-14 RX ORDER — MIRTAZAPINE 7.5 MG/1
7.5 TABLET, FILM COATED ORAL NIGHTLY
Status: DISCONTINUED | OUTPATIENT
Start: 2025-05-14 | End: 2025-05-19

## 2025-05-14 RX ORDER — SODIUM CHLORIDE 9 MG/ML
INJECTION, SOLUTION INTRAVENOUS CONTINUOUS
Status: DISCONTINUED | OUTPATIENT
Start: 2025-05-14 | End: 2025-05-14

## 2025-05-14 RX ADMIN — POLYETHYLENE GLYCOL 3350 17 G: 17 POWDER, FOR SOLUTION ORAL at 09:05

## 2025-05-14 RX ADMIN — SODIUM CHLORIDE: 9 INJECTION, SOLUTION INTRAVENOUS at 01:05

## 2025-05-14 RX ADMIN — CARBIDOPA AND LEVODOPA 2 TABLET: 25; 100 TABLET ORAL at 03:05

## 2025-05-14 RX ADMIN — ALPRAZOLAM 0.5 MG: 0.5 TABLET ORAL at 04:05

## 2025-05-14 RX ADMIN — ENOXAPARIN SODIUM 40 MG: 40 INJECTION SUBCUTANEOUS at 03:05

## 2025-05-14 RX ADMIN — MIRTAZAPINE 7.5 MG: 7.5 TABLET, FILM COATED ORAL at 09:05

## 2025-05-14 RX ADMIN — CARBIDOPA AND LEVODOPA 2 TABLET: 25; 100 TABLET ORAL at 09:05

## 2025-05-14 RX ADMIN — POLYETHYLENE GLYCOL 3350 17 G: 17 POWDER, FOR SOLUTION ORAL at 08:05

## 2025-05-14 RX ADMIN — AMLODIPINE BESYLATE 10 MG: 10 TABLET ORAL at 08:05

## 2025-05-14 RX ADMIN — CARBIDOPA AND LEVODOPA 2 TABLET: 25; 100 TABLET ORAL at 08:05

## 2025-05-14 RX ADMIN — MAGNESIUM SULFATE HEPTAHYDRATE 2 G: 40 INJECTION, SOLUTION INTRAVENOUS at 01:05

## 2025-05-14 NOTE — ASSESSMENT & PLAN NOTE
"- Per CT scan "thickening of the distal ileum to the surgical anastomosis with the large bowel. Inflammatory infectious ileitis are considerations. Recommend clinical correlation and follow-up. "  -CRP negative. No concern for flare.   -GI without concerns of confusion being caused by sterlara, confirmed with pharmacy     " Lyrica Approved    Filling Pharmacy: [facility]  Additional Information: Pharmacy contacted - received paid claim.  Pharmacy will contact patient when medication is ready to be picked up.

## 2025-05-14 NOTE — PROGRESS NOTES
"  [facility] - Acute Medical Stepdown  Adult Nutrition  Consult Note    SUMMARY     Recommendations  1. Continue diet of Regular, Gluten Free, Boost Plus all meals.   2. Document PO inkake in flow sheets.   3. Weekly weights documented in flow sheets  .  Goals:   Meet % of EEN/EPN by next RD follow-up  Weight maintenance through admission.  Nutrition Goal Status: progressing towards goal  Communication of RD Recs:  (POC)    Nutrition Discharge Planning     Nutrition Discharge Planning: General healthy diet, Oral supplement regimen (comments)  Oral supplement regimen (comments): ONS of choice PRN      Reason for Assessment    Reason For Assessment: RD follow-up  Diagnosis: other (see comments) (Encephalopathy)  General Information Comments: Patient assessed for RD follow up. Diet remains Regular, Gluten Free Boost Plus all meals. PO intake is improving, but remains inadquate. No new weight in flow sheet since 5/6. Pts. chemistry panel has shown hyponatremia trending. LBM 5/13/25. RD team following.    Nutrition/Diet History    Spiritual, Cultural Beliefs, Taoist Practices, Values that Affect Care: no  Food Allergies: other (see comments) (Gluten)  Factors Affecting Nutritional Intake: decreased appetite    Anthropometrics    Height: 5' 11" (180.3 cm)  Height (inches): 71 in  Height Method: Stated  Weight: 64.1 kg (141 lb 5 oz)  Weight (lb): 141.32 lb  Weight Method: Bed Scale  Ideal Body Weight (IBW), Male: 172 lb  % Ideal Body Weight, Male (lb): 80.75 %  BMI (Calculated): 19.7  BMI Grade: less than 23 (older than 65 years) - underweight       Lab/Procedures/Meds    Pertinent Labs Reviewed: reviewed  Pertinent Labs Comments: 5/14/25: H/Hn11.1/32.2L, Mag 1.5L 5/13/25: Na 127L  Pertinent Medications Reviewed: reviewed  Pertinent Medications Comments: Amlodipine, Enoxaparin, Polyethylene glycol    Estimated/Assessed Needs    Weight Used For Calorie Calculations: 63 kg (138 lb 14.2 oz)  Energy Calorie Requirements " (kcal): 1646 kcal/day  Energy Need Method: Candler-St Jeor (x 1.2)  Protein Requirements: 82 g (1.3 g/kg)  Weight Used For Protein Calculations: 63 kg (138 lb 14.2 oz)  Fluid Requirements (mL): 1 mL/kcal or per MD  Estimated Fluid Requirement Method: RDA Method  RDA Method (mL): 1646         Nutrition Prescription Ordered    Current Diet Order: Regular Gluten Free  Oral Nutrition Supplement: Boost Plus all meals    Evaluation of Received Nutrient/Fluid Intake    I/O: - 1.6 L since admit  Comments: LBM 5/1  % Intake of Estimated Energy Needs: 50 %  % Meal Intake: 50 %    PES Statement  Underweight related to Other (comment) (Pt report of recent 24-33 lb weight loss per MST) as evidenced by BMI  Status: New    Nutrition Risk    Level of Risk/Frequency of Follow-up: moderate       Monitor and Evaluation    Monitor and Evaluation: Energy intake, Food and beverage intake, Protein intake, Diet order, Weight       Nutrition Follow-Up    RD Follow-up?: Yes

## 2025-05-14 NOTE — HPI
Vince is a pleasant 78 yo man w pmhx significant for HTN, hypothyroid, crohn's disease, CAD, hx of SBO, here for encephalopathy/confusion from rehab, A/Ox1, was suspected to have rectal impaction and given enema w subsequent BM, Neurology trialed carbidopa-levodopa, EEG, LP which was concerning for meningitis so started on treatment 5/6, however, later decided not infectious and so IVIG given for possible AI process, now completed 5 d w minimal improvement in cognitive function. Intermittently hyponatremic treated w IVF, Na 124 today and Nephrology consulted. Serum osm 279, urine osm 550, urine sodium pending.

## 2025-05-14 NOTE — MEDICAL/APP STUDENT
"CONSULTATION LIAISON PSYCHIATRY INITIAL EVALUATION    Patient Name: Vince Huffman  MRN: [id number]  Patient Class: IP- Inpatient  Admission Date: 4/30/2025  Attending Physician: Elian Morejon MD      SUBJECTIVE:   Vince Huffman is a 79 y.o. male with no past psychiatric history and past medical history of HTN, Crohn's, CAD, hypothyroid admitted to the hospital for generalized weakness and encephalopathy    Psychiatry consulted for "complex bereavement, concern for catatonia- per neuro"     Upon initiation of interview, pt was laying in bed and not in any acute distress. Patient is oriented to person only. When asked how is mood is, he replied with "pretty good." Patient was minimally responsive to other questions, only responding with one word answers or not at all. There seems to be a volitional component to this since after patient's son and sister in law arrived at bedside, they were able to elicit more responses from patient. Patient cooperated with some tests, e.g. was able to spell WORLD forwards but unable to spell it backwards.    Would not hold up 2 fingers but could squeeze fingers bilaterally. Pt was resistant to arm extension but not flexion on active/passive ROM--asked if pt was cold, patient nodded.    As interview progressed, pt's responsiveness fluctuated. For example, initially pt responded that he had "four" children, but ~20mins later when the same question was repeated he would not answer. (Collateral history states pt has two children).       Collateral:   Yes - Spoke with son Vince and sister in law Carol in the room. Vince lives with the pt. Son states that over the past 6 months, pt has been steadily become more inactive and losing interest in hobbies. At a GP appointment 6 months ago with Dr. Porras, pt reported depression and anxiety for the first time. Following pt's dx of gluten intolerance this year, son says that pt lost many of his favorite foods (e.g. donuts). Son states that these " "days, pt will get up at 11am, sit in his recliner until 7pm, then go to bed with minimal activity throughout the day. Pt has been sleeping more and losing weight; son reports 45lb weight loss over the last 6 months. Son did not notice any AVH/SI/HI.    Also spoke with pt's sister Luiz over the phone [phone]. Luiz, who lives in [place], states that pt has grown more withdrawn ever since his wife  in 2024. Luiz states that the wife's passing was very prolonged and painful, and for 8 months the patient would drive hours to/from the hospital every day.     Pt visited Luiz 3 more times following wife's passing--once in Dec 2024, once in 2025 (anniversary of wife's passing), and once in 2025 (right before his first presentation to [facility] ED). Luiz reports that at each visit, pt became progressively more withdrawn and inactive. After 2024, Luiz states that pt would stop initiating phone calls. She states that the most recent visit was "the worst." According to the sister, pt used to regularly joke around and help out with chores and cooking when he visited. This time when he visited in April, pt refused to do anything--he had shown up unannounced with no clothes/toiletries or gluten-free food packed, so Luiz had to go out and buy everything for him. When she returned from the grocery store, pt was still sitting in the same chair and had not moved. Daughter later reported that the pt seemed to have difficulty word-finding. All this prompted them to send him to ED.    Per Luiz: "I don't he [the patient] wants to live anymore". He has lost interest in all of his usual activities (horses, baseball, gambling). Luiz denies SI in the patient.    Psychiatric Review of Systems:  sleep: yes, per family, sleeping more w/in last 6 months  appetite: yes, per family, poor appetite since diagnosed with gluten allergy  weight: yes, 45 lb weight loss last 6 months  energy/anergy: " yes  interest/pleasure/anhedonia: yes, per family, lost interest in gambling and baseball  somatic symptoms: yes  libido: yes  anxiety/panic: yes  guilty/hopelessness: no  concentration: yes  S.I.B.s/risky behavior: no    Medical Review Of Systems:  Review of systems not assessed    Psychiatric History:  Previous Medication Trials: No  Previous Psychiatric Hospitalizations: No  Previous suicide attempts: No per son  Current/active homicidal ideation/plan/intent: No per son  History of threats/arrests associated with violent conduct - No  Access to firearms/lethal weapons - No per sister in law  Family Psychiatric History: Yes, mother had Alzheimer's per son  Outpatient Psychiatrist: No per son  Outpatient Therapist: No per son    Social History:  Marital Status:  - wife passed away last year in Feb  Children: 2   Employment Status: retired   Education: unknown  Special Ed: unknown  Housing Status: Yes - lives with son  Developmental History: Not Assessed  History of Abuse: No    Substance Abuse History:  Recreational Drugs: No per son  Use of Alcohol: No per son  Rehab History: No per son  Tobacco Use: No per son  Use of Caffeine: Yes - drinks tea daily per son  Use of OTC: No  Is the patient aware of the biomedical complications associated with substance abuse and mental illness? No  Legal consequences of chemical use: Not    Legal History:  Past Charges/Incarcerations: No  Pending Charges: No    Psychosocial Factors:  Stressors: Bereavement from wife's passing  Functioning Relationships: good support system      OBJECTIVE     Vital Signs:  Temp:  [98.2 °F (36.8 °C)-100.1 °F (37.8 °C)]   Pulse:  []   Resp:  [17-20]   BP: (142-170)/(67-92)   SpO2:  [96 %-100 %]     Mental Status Exam:  General Appearance: appears stated age, dressed in hospital garb, lying in bed, thin and gaunt  Behavior: reluctant to participate, minimal responses  Involuntary Movements and Motor Activity: no  "abnormal involuntary movements noted  Gait and Station: unable to assess - patient lying down or seated  Speech and Language: responds to questions minimally/briefly  Mood: "Pretty good"  Affect: blunted  Thought Process and Associations: Unable to Assess  Perceptual Disturbances: denies hallucinations  Thought Content and Perceptions:: no suicidal or homicidal ideation, no auditory or visual hallucinations, no paranoid ideation, no ideas of reference, no evidence of delusions or psychosis  Sensorium and Orientation: oriented to person only  Recent and Remote Memory: impaired  Attention and Concentration: able to spell WORLD forwards, unable to spell WORLD backwards  Fund of Knowledge: limited  Insight: limited  Judgment: limited    CAM ICU positive? 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 Minimize use of PHYSICAL restraints OR have periods of being out of physical restraints if possible.  Keep window shades open and room lit during day and room dim at night in order to promote normal sleep-wake cycles  Encourage family at bedside. [place] patient often to situation, location, date.  Continue to Limit or Discontinue use of Narcotics, Benzos and Anti-cholinergic medications as they may worsen delirium.  Continue medical workup for causative etiology of Delirium.     Risk Assessment / Legal Status  Patient does not meet criteria for PEC or involuntary inpatient psychiatric admission at this time. Recommend to rescind PEC if one was placed. Patient is not currently an imminent danger to self " or others and is not gravely disabled due to a psychiatric illness.    Follow-up:  Will follow-up while in house.    Disposition:   Defer to primary team.    Please contact ON CALL psychiatry service (24/7) for any acute issues that may arise.    Jareth Moore, MS3  Pedro Vaz, MS4   Psychiatry  [facility]          --------------------------------------------------------------------------------------------------------------------------------------------------------------------------------------------------------------------------------------    CONTINUED OBJECTIVE clinical data & findings reviewed and noted for above decision making    Current Medications:   Scheduled Meds:    amLODIPine  10 mg Oral Daily    carbidopa-levodopa  mg  2 tablet Oral TID    enoxparin  40 mg Subcutaneous Daily    polyethylene glycol  17 g Oral BID     PRN Meds:   Current Facility-Administered Medications:     acetaminophen, 1,000 mg, Oral, Q8H PRN    acetaminophen, 650 mg, Oral, Q4H PRN    ALPRAZolam, 0.5 mg, Oral, BID PRN    dextrose 50%, 12.5 g, Intravenous, PRN    dextrose 50%, 25 g, Intravenous, PRN    gadobutroL, 7 mL, Intravenous, ONCE PRN    glucagon (human recombinant), 1 mg, Intramuscular, PRN    glucose, 16 g, Oral, PRN    glucose, 24 g, Oral, PRN    LIDOcaine HCL 10 mg/ml (1%), 5 mL, Other, On Call Procedure    melatonin, 6 mg, Oral, Nightly PRN    naloxone, 0.02 mg, Intravenous, PRN    ondansetron, 8 mg, Oral, Q8H PRN    prochlorperazine, 5 mg, Intravenous, Q6H PRN    sodium chloride 0.9%, 10 mL, Intravenous, Q12H PRN    Allergies:   Review of patient's allergies indicates:   Allergen Reactions    Gluten Diarrhea    Lactose        Vitals  Vitals:    05/14/25 0746   BP: (!) 170/92   Pulse: 82   Resp: 18   Temp: 98.9 °F (37.2 °C)       Labs/Imaging/Studies:  Recent Results (from the past 24 hours)   Basic metabolic panel    Collection Time: 05/13/25  9:48 AM   Result Value Ref Range    Sodium 127  (L) 136 - 145 mmol/L    Potassium 4.0 3.5 - 5.1 mmol/L    Chloride 95 95 - 110 mmol/L    CO2 27 23 - 29 mmol/L    Glucose 98 70 - 110 mg/dL    BUN 18 8 - 23 mg/dL    Creatinine 0.9 0.5 - 1.4 mg/dL    Calcium 9.0 8.7 - 10.5 mg/dL    Anion Gap 5 (L) 8 - 16 mmol/L    eGFR >60 >60 mL/min/1.73/m2   Magnesium    Collection Time: 05/13/25  9:48 AM   Result Value Ref Range    Magnesium  1.6 1.6 - 2.6 mg/dL     Imaging Results               CT Abdomen Pelvis With IV Contrast NO Oral Contrast (Final result)  Result time 04/30/25 15:46:15      Final result by Russell Ba MD (04/30/25 15:46:15)                   Impression:      1. Wall thickening of the distal ileum to the surgical anastomosis with the large bowel.  Inflammatory infectious ileitis are considerations.  Recommend clinical correlation and follow-up.  2. Constipation.  There is fecal distention of the rectum.  No fecal impaction is not excluded.  3. Bilateral renal cysts and hepatic cysts.  4. Nonobstructing nephrolithiasis of the right kidney.  5. Nondistention of the urinary bladder.  Mild wall thickening is not excluded.  6. Diverticulosis of the sigmoid colon.  No evidence of acute diverticulitis.  7. This report was flagged in Epic as abnormal.      Electronically signed by: Russell Ba  Date:    04/30/2025  Time:    15:46               Narrative:    EXAMINATION:  CT ABDOMEN PELVIS WITH IV CONTRAST    CLINICAL HISTORY:  LLQ abdominal pain;RLQ abdominal pain (Age >= 14y);    TECHNIQUE:  Low dose axial images, sagittal and coronal reformations were obtained from the lung bases to the pubic symphysis following the IV administration of 75 mL of Omnipaque 350 .  Oral contrast was not administered.    COMPARISON:  11/05/2018    FINDINGS:  Abdomen:    - Lower thorax:    - Lung bases: No infiltrates and no nodules.    - Liver: Multiple small hepatic cysts.    - Gallbladder: No calcified gallstones.    - Bile Ducts: No evidence of intra or extra hepatic  biliary ductal dilation.    - Spleen: Negative.    - Kidneys: Multiple bilateral simple renal cysts.  Single nonobstructing stone in the right kidney.  No hydronephrosis or hydroureter bilaterally.    - Adrenals: Unremarkable.    - Pancreas: No mass or peripancreatic fat stranding.    - Retroperitoneum:  No significant adenopathy.    - Vascular: Mild ectasia of the distal aorta with no evidence of aneurysm.    - Abdominal wall:  Unremarkable.    Pelvis:    Urinary bladder is incompletely distended with possible mild wall thickening.    Postop changes at the ileocecal junction.  There is wall thickening noted to the distal ileum to the surgical anastomosis.  Inflammatory or infectious ileitis are considerations.  Follow-up recommended.    Bowel/Mesentery:    No evidence of bowel obstruction.  Moderate retained feces in the colon.    Fecal distention of the rectum.  Impaction is not excluded.    Mild diverticulosis of the sigmoid colon.  No evidence of acute diverticulitis.    Bones:  No acute osseous abnormality and no suspicious lytic or blastic lesion.                                       CT Head Without Contrast (Final result)  Result time 04/30/25 14:49:11      Final result by Ish Carrillo MD (04/30/25 14:49:11)                   Impression:      No evidence for acute intracranial pathology.      Electronically signed by: Ish Carrillo  Date:    04/30/2025  Time:    14:49               Narrative:    EXAMINATION:  CT HEAD WITHOUT CONTRAST    CLINICAL HISTORY:  Mental status change, unknown cause;    TECHNIQUE:  Low dose axial images were obtained through the head.  Coronal and sagittal reformations were also performed. Contrast was not administered.    COMPARISON:  MRI brain without contrast 04/24/2025, CT head without contrast 04/24/2025.    FINDINGS:  Ventricles are stable in size without evidence for new or worsening hydrocephalus.  No new or enlarging extra-axial blood or fluid collections.    Brain parenchyma  appears unchanged.  Scattered supratentorial hypoattenuation, overall nonspecific, but can be seen in setting of microvascular ischemic change.  No parenchymal mass, edema, hemorrhage, or acute major vascular territory infarct.    No calvarial or skull base fracture or osseous destructive lesion.  Paranasal sinuses and mastoid air cells are essentially clear.                                       X-Ray Chest AP Portable (Final result)  Result time 04/30/25 12:49:39      Final result by Matti Cote MD (04/30/25 12:49:39)                   Impression:      See above      Electronically signed by: Matti Cote MD  Date:    04/30/2025  Time:    12:49               Narrative:    EXAMINATION:  XR CHEST AP PORTABLE    CLINICAL HISTORY:  Altered mental status, unspecified    TECHNIQUE:  Single frontal view of the chest was performed.    COMPARISON:  No 04/23/2025 ne    FINDINGS:  Heart size normal.  The tracheal slightly deviates to the right at the thoracic inlet probably related to enlarged thyroid gland.  Minimal subsegmental atelectatic changes noted at the right lung base.  Lungs are otherwise clear.  No pleural effusion.

## 2025-05-14 NOTE — PLAN OF CARE
Problem: Adult Inpatient Plan of Care  Goal: Plan of Care Review  Outcome: Progressing  Goal: Patient-Specific Goal (Individualized)  Outcome: Progressing  Goal: Absence of Hospital-Acquired Illness or Injury  Outcome: Progressing  Goal: Optimal Comfort and Wellbeing  Outcome: Progressing  Goal: Readiness for Transition of Care  Outcome: Progressing     Problem: Skin Injury Risk Increased  Goal: Skin Health and Integrity  Outcome: Progressing     Problem: Infection  Goal: Absence of Infection Signs and Symptoms  Outcome: Progressing     Problem: Delirium  Goal: Optimal Coping  Outcome: Progressing  Goal: Improved Behavioral Control  Outcome: Progressing  Goal: Improved Attention and Thought Clarity  Outcome: Progressing  Goal: Improved Sleep  Outcome: Progressing

## 2025-05-14 NOTE — PT/OT/SLP PROGRESS
Occupational Therapy   Co-Treatment  Co-treatment performed due to patient's multiple deficits requiring two skilled therapists to appropriately and safely assess patient's strength and endurance while facilitating functional tasks in addition to accommodating for patient's activity tolerance.        Name: Vince Huffman  MRN: [id number]  Admitting Diagnosis:  Encephalopathy       Recommendations:     Discharge Recommendations: High Intensity Therapy  Discharge Equipment Recommendations:  bedside commode, walker, rolling, wheelchair  Barriers to discharge:  Decreased caregiver support    Assessment:     Vince Huffman is a 79 y.o. male with a medical diagnosis of Encephalopathy.  He presents with the following performance deficits affecting function are weakness, impaired endurance, impaired self care skills, impaired functional mobility, impaired balance, decreased safety awareness, decreased lower extremity function, decreased upper extremity function, abnormal tone, decreased ROM, impaired fine motor.     Pt agreeable to therapy and tolerated fairly. Pt was cooperative and demonstrated good participation throughout therapy session. However, pt remains limited in ADLs, functional mobility, and functional transfers and is currently not performing tasks at [facility].     Pt would continue to benefit from skilled OT services to maximize functional independence with ADLs and functional mobility, reduce caregiver burden, and facilitate safe discharge in the least restrictive environment.      Rehab Prognosis:  Good; patient would benefit from acute skilled OT services to address these deficits and reach maximum level of function.       Plan:     Patient to be seen 4 x/week to address the above listed problems via self-care/home management, therapeutic activities, therapeutic exercises  Plan of Care Expires: 05/24/25  Plan of Care Reviewed with:  (sis in-law)    Subjective     Chief Complaint: none reported  Patient/Family  Comments/goals: pt agreeable to therapy session  Pain/Comfort:  Pain Rating 1: 0/10    Objective:     Communicated with: nurse prior to session.  Patient found HOB elevated with peripheral IV, telemetry, pulse ox (continuous), bed alarm, Condom Catheter upon OT entry to room.    General Precautions: Standard, fall    Orthopedic Precautions:N/A  Braces: N/A  Respiratory Status: Room air     Occupational Performance:     Bed Mobility:  (additional time & assistance required due to involuntary increased resistance)  Patient completed Scooting to EOB with total assistance and 2 persons    Patient completed Supine to Sit with maximal assistance and 2 persons    Patient completed Sit to Supine with total assistance and 2 persons     Functional Mobility/Transfers:  Patient completed 2 Sit <> Stand Transfers (from EOB) with maximal assistance and of 2 persons with  hand-held assist   Pt requires strict reinforcement at the knees to minimize buckling and drifting to the L    Functional Mobility: Pt unable to take steps due to insufficient B LE strength and overall tolerance    Activities of Daily Living:  Feeding:  minimum assistance to initiate grasping of cup and releasing cup after drinking water    Lower Body Dressing: total assistance to don socks while long sitting in bed with HOB elevated    AMPAC 6 Click ADL: 11    Treatment & Education:  - Trunk control exercise to achieve midline. Pt required verbal/tactile/visual cues in addition to physical assistance to work towards sitting from max A x2 -> min A of 1.  -Education on task modification to maximize safety and (I) during bed mobility, ADLs and mobility/transfers  -Education on importance of OOB activity to improve/maintain overall strength, activity tolerance and promote recovery  -Pt educated to call for assistance and to transfer with hospital staff only  -Provided education regarding OT POC with pt verbalizing understanding.  Pt had no further questions & when  asked whether there were any concerns pt reported none.     Patient left HOB elevated with all lines intact, call button in reach, bed alarm on, and sis in-law present    GOALS:   Multidisciplinary Problems       Occupational Therapy Goals          Problem: Occupational Therapy    Goal Priority Disciplines Outcome Interventions   Occupational Therapy Goal     OT, PT/OT Progressing    Description: Goals to be met by: 5/24/25     Patient will increase functional independence with ADLs by performing:    UE Dressing with Contact Guard Assistance.  LE Dressing with Minimal Assistance.  Grooming while bedside chair with Minimal Assistance.  Toileting from bedside commode with Minimal Assistance for hygiene and clothing management.   Sitting at edge of bed x5 minutes with Contact Guard Assistance for upright balance.  Rolling to Bilateral with Minimal Assistance.   Supine to sit with Minimal Assistance.  Step transfer with Moderate Assistance  Toilet transfer to bedside commode with Moderate Assistance.                         DME Justifications:   Vince requires a commode for home use because he is confined to a single room.  Sr. Vince Huffman has a mobility limitation that significantly impairs his ability to participate in one or more mobility related activities of daily living (MRADLs) such as toileting, feeding, dressing, grooming, and bathing in customary locations in the home.  The mobility limitation cannot be sufficiently resolved by the use of a cane or walker.   The use of a manual wheelchair will significantly improve the patients ability to participate in MRADLS and the patient will use it on regular basis in the home.  Sr. Vince Huffman has expressed his willingness to use a manual wheelchair in the home. Patients upper body strength is sufficient for propulsion.  He also has a caregiver who is available, willing, and able to provide assistance with the wheelchair when needed.      Time Tracking:     OT  Date of Treatment: 05/14/25  OT Start Time: 0946  OT Stop Time: 1014  OT Total Time (min): 28 min    Billable Minutes:Therapeutic Exercise 28    OT/PAUL: OT          5/14/2025

## 2025-05-14 NOTE — PLAN OF CARE
Problem: Adult Inpatient Plan of Care  Goal: Plan of Care Review  Outcome: Progressing  Goal: Absence of Hospital-Acquired Illness or Injury  Outcome: Progressing  Goal: Optimal Comfort and Wellbeing  Outcome: Progressing  Goal: Readiness for Transition of Care  Outcome: Progressing     Problem: Skin Injury Risk Increased  Goal: Skin Health and Integrity  Outcome: Progressing     Problem: Infection  Goal: Absence of Infection Signs and Symptoms  Outcome: Progressing     Problem: Delirium  Goal: Improved Sleep  Outcome: Progressing  Pt oriented to person only. Pt on RA. Fluids infusing at 75ml/hr. No PRNs given. Call light within reach, safety measures in place, rounded per facility policy.

## 2025-05-14 NOTE — ASSESSMENT & PLAN NOTE
79 y.o.M with progressive acutely worsening confusion/tremors since 4/20. Previously AAOx4, now AAOx1  - neurology consulted, appreciate recommendations:  - MRI brain from last week was essentially non diagnostic  - Repeat MRI again without obvious new pathology   -working diagnosis is Parkinson's disease, as improving with Sinemet: iContinue two tabs TID  -LP concerning for possible meningitis. Continue Empiric treatment per Neurology recs   -EEG pending   - Delirium and fall precautions along with neuro checks  - PT/OT consult placed; return to [facility] upon discharge     5/8  Viral meningitis panel unremarkable.  Cultures no growth.  CSF with elevated proteins and white cell count.  Started on empiric antibiotics, vancomycin ceftriaxone and ampicillin, infectious disease consulted.  Follow up with Neurology recommendations.  Started on carbidopa levodopa.  Dispo plan acute rehab.  Mentation remains the same during this hospitalization A&O x1  5/9  Started on IVIG per Neurology.  EEG(f/u)  5/10  Continue with IVIG, follow up with Neurology.  EEG report noted  5/14  Completed IVIG course, follow up with Neurology. ? D/c back to acute rehab, if mentation improves

## 2025-05-14 NOTE — CARE UPDATE
Sodium of 124 this morning.  Etiology likely hypovolemic hypotonic hyponatremia secondary to poor oral intake.  Recent days sodium has been responsive to IV fluids.  Per literature review, we will start with 0.9% IV normal saline and repeat BMP in 4 hours.  Serum and Urine osmolality,  urine sodium.  Nephrology consult.  Spoke with daughter, she is okay with NG tube placement and feeds.  Reached out to nutrition

## 2025-05-14 NOTE — SUBJECTIVE & OBJECTIVE
Interval History:  Sodium of 124 this morning, See plan of care note.  Mentation the same    Review of Systems   Unable to perform ROS: Other     Objective:     Vital Signs (Most Recent):  Temp: 98.7 °F (37.1 °C) (05/14/25 1207)  Pulse: 89 (05/14/25 1207)  Resp: 18 (05/14/25 1207)  BP: (!) 157/70 (05/14/25 1207)  SpO2: 98 % (05/14/25 1207) Vital Signs (24h Range):  Temp:  [98.2 °F (36.8 °C)-100.1 °F (37.8 °C)] 98.7 °F (37.1 °C)  Pulse:  [] 89  Resp:  [18-20] 18  SpO2:  [96 %-100 %] 98 %  BP: (142-170)/(69-92) 157/70     Weight: 64.1 kg (141 lb 5 oz)  Body mass index is 19.71 kg/m².    Intake/Output Summary (Last 24 hours) at 5/14/2025 1231  Last data filed at 5/13/2025 1636  Gross per 24 hour   Intake 240 ml   Output 200 ml   Net 40 ml        Physical Exam  Constitutional:       General: He is not in acute distress.     Appearance: He is ill-appearing.   HENT:      Head: Normocephalic.      Right Ear: External ear normal.      Left Ear: External ear normal.      Nose: Nose normal.   Cardiovascular:      Rate and Rhythm: Normal rate.      Heart sounds: Normal heart sounds.   Pulmonary:      Breath sounds: Normal breath sounds.   Abdominal:      Palpations: Abdomen is soft.      Tenderness: There is no abdominal tenderness.   Musculoskeletal:      Right lower leg: No edema.      Left lower leg: No edema.      Comments: SCD   Skin:     General: Skin is warm.   Neurological:      General: No focal deficit present.      Mental Status: He is alert. improved       MELD 3.0: 13 at 5/6/2025  2:23 PM  MELD-Na: 9 at 5/6/2025  2:23 PM  Calculated from:  Serum Creatinine: 1.3 mg/dL at 5/6/2025  2:23 PM  Serum Sodium: 132 mmol/L at 5/6/2025  2:23 PM  Total Bilirubin: 0.5 mg/dL (Using min of 1 mg/dL) at 5/4/2025  3:28 AM  Serum Albumin: 3.2 g/dL at 5/4/2025  3:28 AM  INR(ratio): 1 at 5/5/2025  4:24 PM  Age at listing (hypothetical): 79 years  Sex: Male at 5/6/2025  2:23 PM      Significant Labs:  CBC:  Recent Labs   Lab  "05/12/25  1629 05/14/25  0817   WBC 6.18 5.25   HGB 11.4* 11.1*   HCT 33.8* 32.2*    213     CMP:  Recent Labs   Lab 05/12/25  1629 05/13/25  0948 05/14/25  0817   * 127* 124*   K 4.3 4.0 3.8   CL 98 95 94*   CO2 25 27 25   GLU 89 98 94   BUN 18 18 18   CREATININE 1.0 0.9 0.8   CALCIUM 8.9 9.0 8.9   ANIONGAP 8 5* 5*     PTINR:  No results for input(s): "INR" in the last 48 hours.    Significant Procedures:   Dobutamine Stress Test with Color Flow: No results found for this or any previous visit.      "

## 2025-05-14 NOTE — CONSULTS
"CONSULTATION LIAISON PSYCHIATRY INITIAL EVALUATION    Patient Name: Vince Huffman  MRN: [id number]  Patient Class: IP- Inpatient  Admission Date: 4/30/2025  Attending Physician: Elian Morejon MD      SUBJECTIVE:   Vince Huffman is a 79 y.o. male with no past psychiatric history & past pertinent medical history of Chrohn's disease, SBO, Hypothyroidism, and CAD presents to the ED/admitted to the hospital for generalized weakness and altered mental status.    Psychiatry consulted for concern for complex bereavement vs. catatonia    Upon initiation of interview, pt was lying in bed with his sister-in-law at bedside. Pt is somnolent in appearance and keeps his eyes closed for most of the assessment. His speech is minimal and soft, often not responding to questions at all or only with 1-word answers. Responded to many questions by nodding or shaking head.     When asked if I could ask him some questions he answers, "sure." He is oriented to self only. Does not respond to other orientation questions. He does not respond to CAM-ICU questions. Attempted to gauge his mood and asked him if he was depressed or sad, but he does not respond. There appeared to be a volitional component to this, as when his sister-in-law asked him to state her name, he answers correctly with her name. In general, he was more willing to cooperate with requests by his son Vince and sister-in-law Carol in the room. Cooperated with some tests, e.g. was able to spell WORLD forwards but became mute when asked to spell backwards.   As interview progressed, pt's responsiveness fluctuated. For example, initially pt responded that he had "four" children, but ~20mins later when the same question was repeated he would not answer. (Collateral history states pt has two children). Pt was able to track with his eyes. Patient's affect noticeably brightened when he was showed a picture of him and his cousin as a child.     Collateral: Yes     Sister-in-law " "(Carol): Patient's wife has been  for over a year. Pt seemed to really decline mentally and physically after he suffered a fall in late April. He will take sips of Boost shakes if she offers it to him. She is planning on assisting him in drinking 3 Boosts today before she leaves. She denies that pt has any past psychiatric history, diagnoses, hospitalizations, or medication trials.     Son (Vince): Lives with the pt. States that over the past 6 months, pt has been steadily become more inactive and losing interest in hobbies. At a GP appointment 6 months ago with Dr. Porras, pt reported depression and anxiety for the first time. Following pt's dx of gluten intolerance this year, son says that pt lost many of his favorite foods (e.g. donuts). Son states that these days, pt will get up at 11am, sit in his recliner until 7pm, then go to bed with minimal activity throughout the day. Pt has been sleeping more, eating less, and losing weight; son reports 45lb weight loss over the last 6 months. Son did not notice any AVH/SI/HI.     Sister (Luiz): She lives in [place]. States that pt has grown more withdrawn ever since his wife  in 2024. States that the wife's passing was very prolonged and painful, and for 8 months the patient would drive hours to/from the hospital every day.   Pt visited Luiz 3 more times following wife's passing--once in Dec 2024, once in 2025 (anniversary of wife's passing), and once in 2025 (right before his first presentation to [facility] ED). Reports that at each visit, pt became progressively more withdrawn and inactive. After 2024, states that pt would stop initiating phone calls. She states that the most recent visit was "the worst." According to the sister, pt used to regularly joke around and help out with chores and cooking when he visited. This time when he visited in April, pt refused to do anything--he had shown up unannounced with no " "clothes/toiletries or gluten-free food packed, so Luiz had to go out and buy everything for him. When she returned from the grocery store, pt was still sitting in the same chair and had not moved. Daughter later reported that the pt seemed to have difficulty word-finding. All this prompted them to send him to ED.  Per Luiz: "I don't think he [the patient] wants to live anymore". He has lost interest in all of his usual activities (horses, baseball, gambling). Luiz denies any active SI statements ever made by the patient.    Daughter (Radha): Attempted to contact @4226, received no answer, left VM    Psychiatric Review of Systems:  sleep: yes  appetite: yes - decreased   weight: yes - significant weight loss  energy/anergy: yes - decreased  interest/pleasure/anhedonia: yes - decreased  somatic symptoms: MARIA DEL ROSARIO - denies pain  libido: MARIA DEL ROSARIO  anxiety/panic: no  guilty/hopelessness: MARIA DEL ROSARIO  concentration: yes - objectively impaired   S.I.B.s/risky behavior: no    Medical Review Of Systems:  Behavioral/Psych: no auditory or visual hallucinations, no suicidal ideation, no homocidal ideation, positive for decreased appetite, fatigue, loss of interest in favorite activities, and concern for depressed mood, negative for aggressive behavior, anxiety, behavior problems, excessive alcohol consumption, illegal drug usage, increased appetite, irritability, repetitive activity, and tobacco use    *Information obtained via collateral  Psychiatric History:  Previous Medication Trials: No  Previous Psychiatric Hospitalizations: No  Previous suicide attempts: No  Current/active homicidal ideation/plan/intent: No  History of threats/arrests associated with violent conduct - No  Access to firearms/lethal weapons - No  Family Psychiatric History: Mother - Alzheimer's   Outpatient Psychiatrist: No  Outpatient Therapist: No    Social History:  Marital Status:   Children: 2   Employment Status: retired   Education: " "high school diploma/GED  Special Ed: no  Housing Status: Yes - with son   Developmental History: MARIA DEL ROSARIO  History of Abuse: No    Substance Abuse History:  Recreational Drugs: denies  Use of Alcohol: denies  Rehab History: No  Tobacco Use: No  Use of Caffeine: MARIA DEL ROSARIO  Use of OTC: MARIA DEL ROSARIO  Legal consequences of chemical use: No    Legal History:  Past Charges/Incarcerations: No  Pending Charges: No    Psychosocial Factors:  Stressors: health and death of wife.   Functioning Relationships: good support system, good relationship with children       OBJECTIVE     Vital Signs:  Temp:  [98.2 °F (36.8 °C)-100.1 °F (37.8 °C)]   Pulse:  []   Resp:  [17-20]   BP: (142-170)/(67-92)   SpO2:  [96 %-100 %]     Mental Status Exam:  General Appearance: appears stated age, dressed in hospital garb, lying in bed, in no acute distress  Behavior: reluctant to participate, minimal responses, poor eye contact  Involuntary Movements and Motor Activity: concern for psychomotor retardation  Gait and Station: unable to assess - patient lying down or seated  Speech and Language: decreased spontaneity, responds to questions minimally/briefly  Mood: "good"  Affect: mood-incongruent, blunted  Thought Process and Associations: linear  Perceptual Disturbances: no objective RIS  Thought Content and Perceptions:: no suicidal ideation, no homicidal ideation, no hallucinations per collateral  Sensorium and Orientation: oriented to person only, somnolent   Recent and Remote Memory: Unable to  Formally Assess, Unwilling to Participate in Formal Testing  Attention and Concentration: attentive to conversation, able to spell WORLD forwards, unable to spell WORLD backwards  Fund of Knowledge: Unable to Formally Assess, Unwilling to Participate in Formal Testing  Insight: limited/partial awareness of illness  Judgment: compliant with health provider's recommendations and instructions    CAM ICU positive? Unable to fully assess      ASSESSMENT & RECOMMENDATIONS "   Vince Huffman is a 79 year old male with no past psychiatric history & past pertinent medical history of Chrohn's disease, SBO, Hypothyroidism, and CAD who presented to the ED/admitted to the hospital on 4/30/25 for generalized weakness and altered mental status. After gathering collateral, it appears that pt has had a gradual decline in his mental status over the past several months, becoming more withdrawal and anhedonic over time. There is low concern for catatonia at this time - Ministerio Alcocer performed today and scored 4 (given points for immobility and mutism). The immobility and mutism seem to have a volitional component to them, and appear to be more in line with psychomotor retardation, potentially from depression given the history that his wife passed away in February 2024 and preceded his decline. Recommend trial of antidepressant, specifically Remeron, to address his poor intake and substantial weight loss. Would defer benzodiazepine trial at this time for fear that it may make his mental status worse.     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 Consider adding stool softener to bowel regimen, as last documented bowel movement was on 5/9/25 and constipation may be contributing to altered mental status      Delirium Behavior Management  PLEASE utilize PRN meds first for agitation. Minimize use of PHYSICAL restraints OR have periods of being out of physical restraints if possible.  Keep window shades open and room lit during day and room dim at night in order to promote  normal sleep-wake cycles  Encourage family at bedside. [place] patient often to situation, location, date.  Continue to Limit or Discontinue use of Narcotics, Benzos and Anti-cholinergic medications as they may worsen delirium.  Continue medical workup for causative etiology of Delirium.     Risk Assessment / Legal Status  Patient does not meet criteria for PEC or involuntary inpatient psychiatric admission at this time. Recommend to rescind PEC if one was placed. Patient is not currently an imminent danger to self or others and is not gravely disabled due to a psychiatric illness.    Follow-up:  Will follow-up while in house.    Disposition:   Defer to primary team.      Please contact ON CALL psychiatry service (24/7) for any acute issues that may arise.    Dr. Obinna Deng   Psychiatry  [facility]  5/14/2025 10:45 AM        --------------------------------------------------------------------------------------------------------------------------------------------------------------------------------------------------------------------------------------    CONTINUED OBJECTIVE clinical data & findings reviewed and noted for above decision making    Current Medications:   Scheduled Meds:    amLODIPine  10 mg Oral Daily    carbidopa-levodopa  mg  2 tablet Oral TID    enoxparin  40 mg Subcutaneous Daily    polyethylene glycol  17 g Oral BID     PRN Meds:   Current Facility-Administered Medications:     acetaminophen, 1,000 mg, Oral, Q8H PRN    acetaminophen, 650 mg, Oral, Q4H PRN    ALPRAZolam, 0.5 mg, Oral, BID PRN    dextrose 50%, 12.5 g, Intravenous, PRN    dextrose 50%, 25 g, Intravenous, PRN    gadobutroL, 7 mL, Intravenous, ONCE PRN    glucagon (human recombinant), 1 mg, Intramuscular, PRN    glucose, 16 g, Oral, PRN    glucose, 24 g, Oral, PRN    LIDOcaine HCL 10 mg/ml (1%), 5 mL, Other, On Call Procedure    melatonin, 6 mg, Oral, Nightly PRN    naloxone, 0.02 mg, Intravenous, PRN     ondansetron, 8 mg, Oral, Q8H PRN    prochlorperazine, 5 mg, Intravenous, Q6H PRN    sodium chloride 0.9%, 10 mL, Intravenous, Q12H PRN    Allergies:   Review of patient's allergies indicates:   Allergen Reactions    Gluten Diarrhea    Lactose        Vitals  Vitals:    05/14/25 0746   BP: (!) 170/92   Pulse: 82   Resp: 18   Temp: 98.9 °F (37.2 °C)       Labs/Imaging/Studies:  Recent Results (from the past 24 hours)   Basic metabolic panel    Collection Time: 05/14/25  8:17 AM   Result Value Ref Range    Sodium 124 (L) 136 - 145 mmol/L    Potassium 3.8 3.5 - 5.1 mmol/L    Chloride 94 (L) 95 - 110 mmol/L    CO2 25 23 - 29 mmol/L    Glucose 94 70 - 110 mg/dL    BUN 18 8 - 23 mg/dL    Creatinine 0.8 0.5 - 1.4 mg/dL    Calcium 8.9 8.7 - 10.5 mg/dL    Anion Gap 5 (L) 8 - 16 mmol/L    eGFR >60 >60 mL/min/1.73/m2   Magnesium    Collection Time: 05/14/25  8:17 AM   Result Value Ref Range    Magnesium  1.5 (L) 1.6 - 2.6 mg/dL   CBC with Differential    Collection Time: 05/14/25  8:17 AM   Result Value Ref Range    WBC 5.25 3.90 - 12.70 K/uL    RBC 3.51 (L) 4.60 - 6.20 M/uL    HGB 11.1 (L) 14.0 - 18.0 gm/dL    HCT 32.2 (L) 40.0 - 54.0 %    MCV 92 82 - 98 fL    MCH 31.6 (H) 27.0 - 31.0 pg    MCHC 34.5 32.0 - 36.0 g/dL    RDW 11.5 11.5 - 14.5 %    Platelet Count 213 150 - 450 K/uL    MPV 10.3 9.2 - 12.9 fL    Nucleated RBC 0 <=0 /100 WBC    Neut % 69.8 38 - 73 %    Lymph % 14.5 (L) 18 - 48 %    Mono % 14.5 4 - 15 %    Eos % 0.6 <=8 %    Basophil % 0.4 <=1.9 %    Imm Grans % 0.2 0.0 - 0.5 %    Neut # 3.67 1.8 - 7.7 K/uL    Lymph # 0.76 (L) 1 - 4.8 K/uL    Mono # 0.76 0.3 - 1 K/uL    Eos # 0.03 <=0.5 K/uL    Baso # 0.02 <=0.2 K/uL    Imm Grans # 0.01 0.00 - 0.04 K/uL     Imaging Results               CT Abdomen Pelvis With IV Contrast NO Oral Contrast (Final result)  Result time 04/30/25 15:46:15      Final result by Russell Ba MD (04/30/25 15:46:15)                   Impression:      1. Wall thickening of the distal ileum to  the surgical anastomosis with the large bowel.  Inflammatory infectious ileitis are considerations.  Recommend clinical correlation and follow-up.  2. Constipation.  There is fecal distention of the rectum.  No fecal impaction is not excluded.  3. Bilateral renal cysts and hepatic cysts.  4. Nonobstructing nephrolithiasis of the right kidney.  5. Nondistention of the urinary bladder.  Mild wall thickening is not excluded.  6. Diverticulosis of the sigmoid colon.  No evidence of acute diverticulitis.  7. This report was flagged in Epic as abnormal.      Electronically signed by: Russell Humphries  Date:    04/30/2025  Time:    15:46               Narrative:    EXAMINATION:  CT ABDOMEN PELVIS WITH IV CONTRAST    CLINICAL HISTORY:  LLQ abdominal pain;RLQ abdominal pain (Age >= 14y);    TECHNIQUE:  Low dose axial images, sagittal and coronal reformations were obtained from the lung bases to the pubic symphysis following the IV administration of 75 mL of Omnipaque 350 .  Oral contrast was not administered.    COMPARISON:  11/05/2018    FINDINGS:  Abdomen:    - Lower thorax:    - Lung bases: No infiltrates and no nodules.    - Liver: Multiple small hepatic cysts.    - Gallbladder: No calcified gallstones.    - Bile Ducts: No evidence of intra or extra hepatic biliary ductal dilation.    - Spleen: Negative.    - Kidneys: Multiple bilateral simple renal cysts.  Single nonobstructing stone in the right kidney.  No hydronephrosis or hydroureter bilaterally.    - Adrenals: Unremarkable.    - Pancreas: No mass or peripancreatic fat stranding.    - Retroperitoneum:  No significant adenopathy.    - Vascular: Mild ectasia of the distal aorta with no evidence of aneurysm.    - Abdominal wall:  Unremarkable.    Pelvis:    Urinary bladder is incompletely distended with possible mild wall thickening.    Postop changes at the ileocecal junction.  There is wall thickening noted to the distal ileum to the surgical anastomosis.  Inflammatory  or infectious ileitis are considerations.  Follow-up recommended.    Bowel/Mesentery:    No evidence of bowel obstruction.  Moderate retained feces in the colon.    Fecal distention of the rectum.  Impaction is not excluded.    Mild diverticulosis of the sigmoid colon.  No evidence of acute diverticulitis.    Bones:  No acute osseous abnormality and no suspicious lytic or blastic lesion.                                       CT Head Without Contrast (Final result)  Result time 04/30/25 14:49:11      Final result by Ish Carrillo MD (04/30/25 14:49:11)                   Impression:      No evidence for acute intracranial pathology.      Electronically signed by: Ish Carrillo  Date:    04/30/2025  Time:    14:49               Narrative:    EXAMINATION:  CT HEAD WITHOUT CONTRAST    CLINICAL HISTORY:  Mental status change, unknown cause;    TECHNIQUE:  Low dose axial images were obtained through the head.  Coronal and sagittal reformations were also performed. Contrast was not administered.    COMPARISON:  MRI brain without contrast 04/24/2025, CT head without contrast 04/24/2025.    FINDINGS:  Ventricles are stable in size without evidence for new or worsening hydrocephalus.  No new or enlarging extra-axial blood or fluid collections.    Brain parenchyma appears unchanged.  Scattered supratentorial hypoattenuation, overall nonspecific, but can be seen in setting of microvascular ischemic change.  No parenchymal mass, edema, hemorrhage, or acute major vascular territory infarct.    No calvarial or skull base fracture or osseous destructive lesion.  Paranasal sinuses and mastoid air cells are essentially clear.                                       X-Ray Chest AP Portable (Final result)  Result time 04/30/25 12:49:39      Final result by Matti Cote MD (04/30/25 12:49:39)                   Impression:      See above      Electronically signed by: Matti Cote MD  Date:    04/30/2025  Time:    12:49                Narrative:    EXAMINATION:  XR CHEST AP PORTABLE    CLINICAL HISTORY:  Altered mental status, unspecified    TECHNIQUE:  Single frontal view of the chest was performed.    COMPARISON:  No 04/23/2025 ne    FINDINGS:  Heart size normal.  The tracheal slightly deviates to the right at the thoracic inlet probably related to enlarged thyroid gland.  Minimal subsegmental atelectatic changes noted at the right lung base.  Lungs are otherwise clear.  No pleural effusion.

## 2025-05-14 NOTE — ASSESSMENT & PLAN NOTE
Hyponatremia is likely due to Dehydration/hypovolemia. The patient's most recent sodium results are listed below.  Recent Labs     05/12/25  1629 05/13/25  0948 05/14/25  0817   * 127* 124*     Maxime  - Monitor sodium Daily.   - Patient hyponatremia is stable

## 2025-05-14 NOTE — ASSESSMENT & PLAN NOTE
Patient's blood pressure range in the last 24 hours was: BP  Min: 142/69  Max: 170/92.The patient's inpatient anti-hypertensive regimen is listed below:  Current Antihypertensives  amLODIPine tablet 10 mg, Daily, Oral    Plan  - BP is controlled, no changes needed to their regimen

## 2025-05-14 NOTE — NURSING
Pt oriented to self only. NAD. VSS on room air. Na 124 and Mg 1.5, replacements given. PRN Xanag given for anxiety. NGT insertion attempted 3x with charge RN but was unsuccessful; med team notified. Safety measures in place. No concerns voiced at this time. Call light in reach.

## 2025-05-14 NOTE — CARE UPDATE
Serum osmolality and urine osmolality noted.  Nephrology's input also appreciated.  We will discontinue IV fluids for now and fluid restrict due to concerns for possible SIADH.  Follow up with afternoon BMP

## 2025-05-14 NOTE — CONSULTS
[facility]  Nephrology  Consult Note    Patient Name: Vince Huffman  MRN: [id number]  Admission Date: 4/30/2025  Hospital Length of Stay: 13 days  Attending Provider: Elian Morejon MD   Primary Care Physician: Leland Porras MD  Principal Problem:Encephalopathy    Inpatient consult to Nephrology  Consult performed by: Scott Domingo MD  Consult ordered by: Elian Morejon MD  Reason for consult: hyponatremia        Subjective:     HPI: Vince is a pleasant 80 yo man w pmhx significant for HTN, hypothyroid, crohn's disease, CAD, hx of SBO, here for encephalopathy/confusion from rehab, A/Ox1, was suspected to have rectal impaction and given enema w subsequent BM, Neurology trialed carbidopa-levodopa, EEG, LP which was concerning for meningitis so started on treatment 5/6, however, later decided not infectious and so IVIG given for possible AI process, now completed 5 d w minimal improvement in cognitive function. Intermittently hyponatremic treated w IVF, Na 124 today and Nephrology consulted. Serum osm 279, urine osm 550, urine sodium pending.     Past Medical History:   Diagnosis Date    Ankylosing spondylitis of unspecified sites in spine     Anxiety disorder, unspecified     BMI 21.0-21.9, adult 04/14/2025    Crohn's disease     Gout, unspecified     Heart disease     History of skin cancer 2001    squamous cell carcinoma left cheek    Hypertension     Hypothyroidism, unspecified     Psoriatic arthritis        Past Surgical History:   Procedure Laterality Date    ANGIOGRAM, CORONARY, WITH LEFT HEART CATHETERIZATION      APPENDECTOMY      APPENDECTOMY  2007    COLONOSCOPY N/A     ENDOSCOPY N/A     RIGHT HEMICOLECTOMY  2013    SMALL INTESTINE SURGERY  2007    30.5 cm of small bowel removed       Review of patient's allergies indicates:   Allergen Reactions    Gluten Diarrhea    Lactose      Current Facility-Administered Medications   Medication Frequency    0.9% NaCl infusion Continuous     acetaminophen tablet 1,000 mg Q8H PRN    acetaminophen tablet 650 mg Q4H PRN    ALPRAZolam tablet 0.5 mg BID PRN    amLODIPine tablet 10 mg Daily    carbidopa-levodopa  mg per tablet 2 tablet TID    dextrose 50% injection 12.5 g PRN    dextrose 50% injection 25 g PRN    enoxaparin injection 40 mg Daily    gadobutroL (GADAVIST) injection 7 mL ONCE PRN    glucagon (human recombinant) injection 1 mg PRN    glucose chewable tablet 16 g PRN    glucose chewable tablet 24 g PRN    LIDOcaine HCL 10 mg/ml (1%) injection 5 mL On Call Procedure    melatonin tablet 6 mg Nightly PRN    naloxone 0.4 mg/mL injection 0.02 mg PRN    ondansetron disintegrating tablet 8 mg Q8H PRN    polyethylene glycol packet 17 g BID    prochlorperazine injection Soln 5 mg Q6H PRN    sodium chloride 0.9% flush 10 mL Q12H PRN     Family History    None       Tobacco Use    Smoking status: Never    Smokeless tobacco: Never   Substance and Sexual Activity    Alcohol use: No    Drug use: No    Sexual activity: Never     Partners: Female     Review of Systems   Reason unable to perform ROS: nonverbal.     Objective:     Vital Signs (Most Recent):  Temp: 98.7 °F (37.1 °C) (05/14/25 1207)  Pulse: 89 (05/14/25 1207)  Resp: 18 (05/14/25 1207)  BP: (!) 157/70 (05/14/25 1207)  SpO2: 98 % (05/14/25 1207) Vital Signs (24h Range):  Temp:  [98.2 °F (36.8 °C)-100.1 °F (37.8 °C)] 98.7 °F (37.1 °C)  Pulse:  [] 89  Resp:  [18-20] 18  SpO2:  [97 %-100 %] 98 %  BP: (145-170)/(69-92) 157/70     Weight: 64.1 kg (141 lb 5 oz) (05/06/25 0634)  Body mass index is 19.71 kg/m².  Body surface area is 1.79 meters squared.    I/O last 3 completed shifts:  In: 240 [P.O.:240]  Out: 200 [Urine:200]     Physical Exam  Constitutional:       General: He is not in acute distress.     Appearance: He is normal weight.   Cardiovascular:      Rate and Rhythm: Normal rate and regular rhythm.      Heart sounds: Normal heart sounds.   Pulmonary:      Breath sounds: Normal breath  sounds.   Abdominal:      Palpations: Abdomen is soft.   Musculoskeletal:         General: No swelling.   Skin:     General: Skin is warm.   Neurological:      Mental Status: He is alert. He is disoriented.          Significant Labs:  CBC:   Recent Labs   Lab 05/14/25  0817   WBC 5.25   RBC 3.51*   HGB 11.1*   HCT 32.2*      MCV 92   MCH 31.6*   MCHC 34.5     CMP:   Recent Labs   Lab 05/12/25  0836 05/12/25  1629 05/14/25  0817   GLU 92   < > 94   CALCIUM 9.3   < > 8.9   ALBUMIN 2.7*  --   --    PROT 8.5*  --   --    *   < > 124*   K 3.9   < > 3.8   CO2 24   < > 25   CL 96   < > 94*   BUN 13   < > 18   CREATININE 0.8   < > 0.8   ALKPHOS 70  --   --    ALT <5*  --   --    AST 23  --   --    BILITOT 0.4  --   --     < > = values in this interval not displayed.     All labs within the past 24 hours have been reviewed.  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   -on Sinement per neurology, started 5/5. IVIG from 5/8-5/12  -serum osm 279, urine osm 550, urine na 68. Will check istat Na since serum osm is borderline (IVIG is associated w pseudohyponatremia)  -these urine studies are consistent w SIADH, so I would recommend discontinue IVF and fluid restrict 1 L and trend BMP. Unknown cause of SIADH at this time.   -please check TSH and FT4    Thank you for your consult. I will follow-up with patient. Please contact us if you have any additional questions.    Scott Domingo MD  Nephrology  [facility]

## 2025-05-14 NOTE — PLAN OF CARE
Recommendations  1. Continue diet of Regular, Gluten Free, Boost Plus all meals.   2. Document PO inkake in flow sheets.   3. Weekly weights documented in flow sheets  .  Goals:   Meet % of EEN/EPN by next RD follow-up  Weight maintenance through admission.  Nutrition Goal Status: progressing towards goal

## 2025-05-14 NOTE — SUBJECTIVE & OBJECTIVE
Past Medical History:   Diagnosis Date    Ankylosing spondylitis of unspecified sites in spine     Anxiety disorder, unspecified     BMI 21.0-21.9, adult 04/14/2025    Crohn's disease     Gout, unspecified     Heart disease     History of skin cancer 2001    squamous cell carcinoma left cheek    Hypertension     Hypothyroidism, unspecified     Psoriatic arthritis        Past Surgical History:   Procedure Laterality Date    ANGIOGRAM, CORONARY, WITH LEFT HEART CATHETERIZATION      APPENDECTOMY      APPENDECTOMY  2007    COLONOSCOPY N/A     ENDOSCOPY N/A     RIGHT HEMICOLECTOMY  2013    SMALL INTESTINE SURGERY  2007    30.5 cm of small bowel removed       Review of patient's allergies indicates:   Allergen Reactions    Gluten Diarrhea    Lactose      Current Facility-Administered Medications   Medication Frequency    0.9% NaCl infusion Continuous    acetaminophen tablet 1,000 mg Q8H PRN    acetaminophen tablet 650 mg Q4H PRN    ALPRAZolam tablet 0.5 mg BID PRN    amLODIPine tablet 10 mg Daily    carbidopa-levodopa  mg per tablet 2 tablet TID    dextrose 50% injection 12.5 g PRN    dextrose 50% injection 25 g PRN    enoxaparin injection 40 mg Daily    gadobutroL (GADAVIST) injection 7 mL ONCE PRN    glucagon (human recombinant) injection 1 mg PRN    glucose chewable tablet 16 g PRN    glucose chewable tablet 24 g PRN    LIDOcaine HCL 10 mg/ml (1%) injection 5 mL On Call Procedure    melatonin tablet 6 mg Nightly PRN    naloxone 0.4 mg/mL injection 0.02 mg PRN    ondansetron disintegrating tablet 8 mg Q8H PRN    polyethylene glycol packet 17 g BID    prochlorperazine injection Soln 5 mg Q6H PRN    sodium chloride 0.9% flush 10 mL Q12H PRN     Family History    None       Tobacco Use    Smoking status: Never    Smokeless tobacco: Never   Substance and Sexual Activity    Alcohol use: No    Drug use: No    Sexual activity: Never     Partners: Female     Review of Systems   Reason unable to perform ROS: nonverbal.      Objective:     Vital Signs (Most Recent):  Temp: 98.7 °F (37.1 °C) (05/14/25 1207)  Pulse: 89 (05/14/25 1207)  Resp: 18 (05/14/25 1207)  BP: (!) 157/70 (05/14/25 1207)  SpO2: 98 % (05/14/25 1207) Vital Signs (24h Range):  Temp:  [98.2 °F (36.8 °C)-100.1 °F (37.8 °C)] 98.7 °F (37.1 °C)  Pulse:  [] 89  Resp:  [18-20] 18  SpO2:  [97 %-100 %] 98 %  BP: (145-170)/(69-92) 157/70     Weight: 64.1 kg (141 lb 5 oz) (05/06/25 0634)  Body mass index is 19.71 kg/m².  Body surface area is 1.79 meters squared.    I/O last 3 completed shifts:  In: 240 [P.O.:240]  Out: 200 [Urine:200]     Physical Exam  Constitutional:       General: He is not in acute distress.     Appearance: He is normal weight.   Cardiovascular:      Rate and Rhythm: Normal rate and regular rhythm.      Heart sounds: Normal heart sounds.   Pulmonary:      Breath sounds: Normal breath sounds.   Abdominal:      Palpations: Abdomen is soft.   Musculoskeletal:         General: No swelling.   Skin:     General: Skin is warm.   Neurological:      Mental Status: He is alert. He is disoriented.          Significant Labs:  CBC:   Recent Labs   Lab 05/14/25  0817   WBC 5.25   RBC 3.51*   HGB 11.1*   HCT 32.2*      MCV 92   MCH 31.6*   MCHC 34.5     CMP:   Recent Labs   Lab 05/12/25  0836 05/12/25  1629 05/14/25  0817   GLU 92   < > 94   CALCIUM 9.3   < > 8.9   ALBUMIN 2.7*  --   --    PROT 8.5*  --   --    *   < > 124*   K 3.9   < > 3.8   CO2 24   < > 25   CL 96   < > 94*   BUN 13   < > 18   CREATININE 0.8   < > 0.8   ALKPHOS 70  --   --    ALT <5*  --   --    AST 23  --   --    BILITOT 0.4  --   --     < > = values in this interval not displayed.     All labs within the past 24 hours have been reviewed.

## 2025-05-14 NOTE — ASSESSMENT & PLAN NOTE
"79 year old male with history of Crohn's disease, SBO, hypothyroidism, and CAD  presenting from [facility] with encephalopathy. Unclear cause of encephalopathy at this time, though acute presentation with concurrent parkinsonism suggests autoimmune etiology. Motion artifact on MRI brain. On initial exam, was disoriented to place, time, and situation, minimally responsive and will answer with one word, asterixis in his upper extremities, axial rigidity/extremity/rigidity, masked facies, and intention tremor most notably in the left hand. No hyperreflexia. Ptosis of left eye though no extraocular movement abnormalities/pupillary discrepancy.     On my exam today, patient was perseverating and only responding "no" and "[facility]." He continued to have increased tone and bradykinesia bilaterally as well as low amplitude, moderate frequency tremor of both hands.     DDX: autoimmune Parkinsonism +/-  superimposed catatonia or complex bereavement    LABS  Serum  Pending: Ma2 ab, [facility] movement disorders panel, Vit E  On Movement Disorders panel, not on autoimmune encephalopathy panel = CCPQ (formerly known as P/Q type VGCC,) GRAF1, Septin5, ITPR1, AP3B2, KLHL11  Dopamine-2 and [place]-3 not commercially available per lab  WNL: strongyloides IgG, Treponema, negative celiac dx serology, zinc, folate, B12, copper, B1, autoimmune encephalopathy panel    CSF  Pending: None  WNL: AFB, meningitis-encephalitis panel, VDRL, culture, glucose, LDH, cytology, autoimmune encephalopathy panel  Abnormal: protein (60), pleocytosis (after correction for elevated RBC)    Recommendations:  - Repeat MRI Brain w/wo contrast with sedation (prior MRIs were motion degraded)  - S/p 5-day course of IVIG (EOT 5/12/2025)  - F/u pending labs as above  - Continue Sinemet 25 -100 mg 2 tablets TID  - Appreciate Psychiatry assistance  - Delirium and fall precautions     We will continue to follow. Please reach out with any questions.   "

## 2025-05-14 NOTE — MEDICAL/APP STUDENT
"CONSULTATION LIAISON PSYCHIATRY INITIAL EVALUATION    Patient Name: Vince Huffman  MRN: [id number]  Patient Class: IP- Inpatient  Admission Date: 4/30/2025  Attending Physician: Elian Morejon MD      SUBJECTIVE:   Vince Huffman is a 79 y.o. male with no known past psychiatric history & past pertinent medical history of chron's disease, SBO, hypothyroidism, and CAD  presents to the ED/admitted to the hospital for encephalopathy.    Psychiatry consulted for "complex bereavement, concern for catatonia- per neuro "    Upon initiation of interview, pt was alert, laying in bed, coughing for about 30 secs as we entered the room. Offered pt water, which relieved his cough. Pt subsequently expressed subjectively good mood, but was giving mostly one-word answers to questions, unable or unwilling to elaborate further. Responded to many questions by nodding or shaking head. Oriented to self only. Cooperated with some tests, e.g. was able to spell WORLD forwards but became mute when asked to spell backwards.     Would not hold up 2 fingers. Pt was resistant to arm extension but not flexion on active/passive ROM--asked if pt was cold, patient nodded.    As interview progressed, pt's responsiveness waned. For example, initially pt responded that he had "four" children, but ~20mins later when the same question was repeated he would not answer. (Collateral history states pt has two children). Pt appeared to be more willing to cooperate with requests by his son Vince and sister-in-law Carol in the room.      Collateral:   Yes - Spoke with son Vince and sister in law Carol in the room. Vince lives with the pt. Son states that over the past 6 months, pt has been steadily become more inactive and losing interest in hobbies. At a GP appointment 6 months ago with Dr. Porras, pt reported depression and anxiety for the first time. Following pt's dx of gluten intolerance this year, son says that pt lost many of his favorite foods (e.g. " "donuts). Son states that these days, pt will get up at 11am, sit in his recliner until 7pm, then go to bed with minimal activity throughout the day. Pt has been sleeping more, eating less, and losing weight; son reports 45lb weight loss over the last 6 months. Son did not notice any AVH/SI/HI.    Also spoke with pt's sister Luiz over the phone [phone]. Luiz, who lives in [place], states that pt has grown more withdrawn ever since his wife  in 2024. Luiz states that the wife's passing was very prolonged and painful, and for 8 months the patient would drive hours to/from the hospital every day.     Pt visited Luiz 3 more times following wife's passing--once in Dec 2024, once in 2025 (anniversary of wife's passing), and once in 2025 (right before his first presentation to [facility] ED). Luiz reports that at each visit, pt became progressively more withdrawn and inactive. After 2024, Luiz states that pt would stop initiating phone calls. She states that the most recent visit was "the worst." According to the sister, pt used to regularly joke around and help out with chores and cooking when he visited. This time when he visited in April, pt refused to do anything--he had shown up unannounced with no clothes/toiletries or gluten-free food packed, so Luiz had to go out and buy everything for him. When she returned from the grocery store, pt was still sitting in the same chair and had not moved. Daughter later reported that the pt seemed to have difficulty word-finding. All this prompted them to send him to ED.    Per Luiz: "I don't he [the patient] wants to live anymore". He has lost interest in all of his usual activities (horses, baseball, gambling). Luiz denies SI in the patient.    Psychiatric Review of Systems:  sleep: yes  appetite: yes  weight: yes  energy/anergy: yes  interest/pleasure/anhedonia: yes  somatic symptoms: yes  libido: yes  anxiety/panic: " "yes  guilty/hopelessness: no  concentration: yes  S.I.B.s/risky behavior: no    Medical Review Of Systems:  ROS not performed.    Psychiatric History:  Previous Medication Trials: No  Previous Psychiatric Hospitalizations: No  Previous suicide attempts: No  Current/active homicidal ideation/plan/intent: No  History of threats/arrests associated with violent conduct - No  Access to firearms/lethal weapons - {YES **/NO:[id number]}  Family Psychiatric History: Mother - Alzheimer's  Outpatient Psychiatrist: No  Outpatient Therapist: No    Social History:  Marital Status:   Children: 2   Employment Status: retired  Education: ***  Special Ed: no  Housing Status: Yes - lives with son  Developmental History: Not Assessed  History of Abuse: No    Substance Abuse History:  Recreational Drugs: denied  Use of Alcohol: denied  Rehab History: No  Tobacco Use: No  Use of Caffeine: No  Use of OTC: No  Is the patient aware of the biomedical complications associated with substance abuse and mental illness? Yes - aware  Legal consequences of chemical use: Yes - aware    Legal History:  Past Charges/Incarcerations: No  Pending Charges: No    Psychosocial Factors:  Stressors: bereavement   Functioning Relationships: good support system and good relationship with children      OBJECTIVE     Vital Signs:  Temp:  [98.2 °F (36.8 °C)-100.1 °F (37.8 °C)]   Pulse:  []   Resp:  [17-20]   BP: (142-170)/(67-92)   SpO2:  [96 %-100 %]     Mental Status Exam:  General Appearance: appears stated age, well developed and nourished, adequately groomed and appropriately dressed, in no acute distress  Behavior: reluctant to participate, minimal responses  Involuntary Movements and Motor Activity: no abnormal involuntary movements noted  Gait and Station: unable to assess - patient lying down or seated  Speech and Language: paucity of speech, soft, responds to questions minimally/briefly  Mood: "good"  Affect: constricted  Thought Process and " Associations: linear  Perceptual Disturbances: denies hallucinations  Thought Content and Perceptions:: no suicidal or homicidal ideation, no auditory or visual hallucinations, no paranoid ideation, no ideas of reference, no evidence of delusions or psychosis  Sensorium and Orientation: oriented to person only  Recent and Remote Memory: impaired  Attention and Concentration: able to spell WORLD forwards, unable to spell WORLD backwards  Fund of Knowledge: unable to assess, poor effort given during testing  Insight: limited  Judgment: impaired    CAM ICU positive? 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  Can also consider ECT if no improvement       PRN Medication(s):  None at this time      Other Recommendations (labs, imaging, further consults, etc.):  Consider EEG if not already done      Delirium Behavior Management  PLEASE utilize PRN meds first for agitation. Minimize use of PHYSICAL restraints OR have periods of being out of physical restraints if possible.  Keep window shades open and room lit during day and room dim at night in order to promote normal sleep-wake cycles  Encourage family at bedside. [place] patient often to situation, location, date.  Continue to Limit or Discontinue use of Narcotics, Benzos and Anti-cholinergic medications as they may worsen delirium.  Continue medical workup for causative etiology of Delirium.     Risk Assessment / Legal Status  Patient does not meet criteria for PEC or involuntary inpatient psychiatric admission at this time. Recommend to rescind PEC if one was placed. Patient is not currently an imminent danger to self or others and is not gravely disabled due to a psychiatric  illness.    Follow-up:  Will follow-up while in house.    Disposition:   Defer to primary team.    Please contact ON CALL psychiatry service (24/7) for any acute issues that may arise.    Pedro Vaz, MS4  Jareth Moore, MS3   Psychiatry  [facility]  5/14/2025 10:14 AM        --------------------------------------------------------------------------------------------------------------------------------------------------------------------------------------------------------------------------------------    CONTINUED OBJECTIVE clinical data & findings reviewed and noted for above decision making    Current Medications:   Scheduled Meds:    amLODIPine  10 mg Oral Daily    carbidopa-levodopa  mg  2 tablet Oral TID    enoxparin  40 mg Subcutaneous Daily    polyethylene glycol  17 g Oral BID     PRN Meds:   Current Facility-Administered Medications:     acetaminophen, 1,000 mg, Oral, Q8H PRN    acetaminophen, 650 mg, Oral, Q4H PRN    ALPRAZolam, 0.5 mg, Oral, BID PRN    dextrose 50%, 12.5 g, Intravenous, PRN    dextrose 50%, 25 g, Intravenous, PRN    gadobutroL, 7 mL, Intravenous, ONCE PRN    glucagon (human recombinant), 1 mg, Intramuscular, PRN    glucose, 16 g, Oral, PRN    glucose, 24 g, Oral, PRN    LIDOcaine HCL 10 mg/ml (1%), 5 mL, Other, On Call Procedure    melatonin, 6 mg, Oral, Nightly PRN    naloxone, 0.02 mg, Intravenous, PRN    ondansetron, 8 mg, Oral, Q8H PRN    prochlorperazine, 5 mg, Intravenous, Q6H PRN    sodium chloride 0.9%, 10 mL, Intravenous, Q12H PRN    Allergies:   Review of patient's allergies indicates:   Allergen Reactions    Gluten Diarrhea    Lactose        Vitals  Vitals:    05/14/25 0746   BP: (!) 170/92   Pulse: 82   Resp: 18   Temp: 98.9 °F (37.2 °C)       Labs/Imaging/Studies:  Recent Results (from the past 24 hours)   Basic metabolic panel    Collection Time: 05/14/25  8:17 AM   Result Value Ref Range    Sodium 124 (L) 136 - 145 mmol/L    Potassium 3.8 3.5 -  5.1 mmol/L    Chloride 94 (L) 95 - 110 mmol/L    CO2 25 23 - 29 mmol/L    Glucose 94 70 - 110 mg/dL    BUN 18 8 - 23 mg/dL    Creatinine 0.8 0.5 - 1.4 mg/dL    Calcium 8.9 8.7 - 10.5 mg/dL    Anion Gap 5 (L) 8 - 16 mmol/L    eGFR >60 >60 mL/min/1.73/m2   Magnesium    Collection Time: 05/14/25  8:17 AM   Result Value Ref Range    Magnesium  1.5 (L) 1.6 - 2.6 mg/dL   CBC with Differential    Collection Time: 05/14/25  8:17 AM   Result Value Ref Range    WBC 5.25 3.90 - 12.70 K/uL    RBC 3.51 (L) 4.60 - 6.20 M/uL    HGB 11.1 (L) 14.0 - 18.0 gm/dL    HCT 32.2 (L) 40.0 - 54.0 %    MCV 92 82 - 98 fL    MCH 31.6 (H) 27.0 - 31.0 pg    MCHC 34.5 32.0 - 36.0 g/dL    RDW 11.5 11.5 - 14.5 %    Platelet Count 213 150 - 450 K/uL    MPV 10.3 9.2 - 12.9 fL    Nucleated RBC 0 <=0 /100 WBC    Neut % 69.8 38 - 73 %    Lymph % 14.5 (L) 18 - 48 %    Mono % 14.5 4 - 15 %    Eos % 0.6 <=8 %    Basophil % 0.4 <=1.9 %    Imm Grans % 0.2 0.0 - 0.5 %    Neut # 3.67 1.8 - 7.7 K/uL    Lymph # 0.76 (L) 1 - 4.8 K/uL    Mono # 0.76 0.3 - 1 K/uL    Eos # 0.03 <=0.5 K/uL    Baso # 0.02 <=0.2 K/uL    Imm Grans # 0.01 0.00 - 0.04 K/uL     Imaging Results               CT Abdomen Pelvis With IV Contrast NO Oral Contrast (Final result)  Result time 04/30/25 15:46:15      Final result by Russell Ba MD (04/30/25 15:46:15)                   Impression:      1. Wall thickening of the distal ileum to the surgical anastomosis with the large bowel.  Inflammatory infectious ileitis are considerations.  Recommend clinical correlation and follow-up.  2. Constipation.  There is fecal distention of the rectum.  No fecal impaction is not excluded.  3. Bilateral renal cysts and hepatic cysts.  4. Nonobstructing nephrolithiasis of the right kidney.  5. Nondistention of the urinary bladder.  Mild wall thickening is not excluded.  6. Diverticulosis of the sigmoid colon.  No evidence of acute diverticulitis.  7. This report was flagged in Epic as  abnormal.      Electronically signed by: Russell Humphries  Date:    04/30/2025  Time:    15:46               Narrative:    EXAMINATION:  CT ABDOMEN PELVIS WITH IV CONTRAST    CLINICAL HISTORY:  LLQ abdominal pain;RLQ abdominal pain (Age >= 14y);    TECHNIQUE:  Low dose axial images, sagittal and coronal reformations were obtained from the lung bases to the pubic symphysis following the IV administration of 75 mL of Omnipaque 350 .  Oral contrast was not administered.    COMPARISON:  11/05/2018    FINDINGS:  Abdomen:    - Lower thorax:    - Lung bases: No infiltrates and no nodules.    - Liver: Multiple small hepatic cysts.    - Gallbladder: No calcified gallstones.    - Bile Ducts: No evidence of intra or extra hepatic biliary ductal dilation.    - Spleen: Negative.    - Kidneys: Multiple bilateral simple renal cysts.  Single nonobstructing stone in the right kidney.  No hydronephrosis or hydroureter bilaterally.    - Adrenals: Unremarkable.    - Pancreas: No mass or peripancreatic fat stranding.    - Retroperitoneum:  No significant adenopathy.    - Vascular: Mild ectasia of the distal aorta with no evidence of aneurysm.    - Abdominal wall:  Unremarkable.    Pelvis:    Urinary bladder is incompletely distended with possible mild wall thickening.    Postop changes at the ileocecal junction.  There is wall thickening noted to the distal ileum to the surgical anastomosis.  Inflammatory or infectious ileitis are considerations.  Follow-up recommended.    Bowel/Mesentery:    No evidence of bowel obstruction.  Moderate retained feces in the colon.    Fecal distention of the rectum.  Impaction is not excluded.    Mild diverticulosis of the sigmoid colon.  No evidence of acute diverticulitis.    Bones:  No acute osseous abnormality and no suspicious lytic or blastic lesion.                                       CT Head Without Contrast (Final result)  Result time 04/30/25 14:49:11      Final result by Ish Carrillo MD  (04/30/25 14:49:11)                   Impression:      No evidence for acute intracranial pathology.      Electronically signed by: Ish Carrillo  Date:    04/30/2025  Time:    14:49               Narrative:    EXAMINATION:  CT HEAD WITHOUT CONTRAST    CLINICAL HISTORY:  Mental status change, unknown cause;    TECHNIQUE:  Low dose axial images were obtained through the head.  Coronal and sagittal reformations were also performed. Contrast was not administered.    COMPARISON:  MRI brain without contrast 04/24/2025, CT head without contrast 04/24/2025.    FINDINGS:  Ventricles are stable in size without evidence for new or worsening hydrocephalus.  No new or enlarging extra-axial blood or fluid collections.    Brain parenchyma appears unchanged.  Scattered supratentorial hypoattenuation, overall nonspecific, but can be seen in setting of microvascular ischemic change.  No parenchymal mass, edema, hemorrhage, or acute major vascular territory infarct.    No calvarial or skull base fracture or osseous destructive lesion.  Paranasal sinuses and mastoid air cells are essentially clear.                                       X-Ray Chest AP Portable (Final result)  Result time 04/30/25 12:49:39      Final result by Matti Cote MD (04/30/25 12:49:39)                   Impression:      See above      Electronically signed by: Matti Cote MD  Date:    04/30/2025  Time:    12:49               Narrative:    EXAMINATION:  XR CHEST AP PORTABLE    CLINICAL HISTORY:  Altered mental status, unspecified    TECHNIQUE:  Single frontal view of the chest was performed.    COMPARISON:  No 04/23/2025 ne    FINDINGS:  Heart size normal.  The tracheal slightly deviates to the right at the thoracic inlet probably related to enlarged thyroid gland.  Minimal subsegmental atelectatic changes noted at the right lung base.  Lungs are otherwise clear.  No pleural effusion.

## 2025-05-14 NOTE — PT/OT/SLP PROGRESS
Physical Therapy Treatment    Patient Name:  Vince Huffman   MRN:  [id number]    Recommendations:     Discharge Recommendations: High Intensity Therapy  Discharge Equipment Recommendations: bedside commode, walker, rolling, wheelchair  Barriers to discharge: Decreased caregiver support    Assessment:     Vince Huffman is a 79 y.o. male admitted with a medical diagnosis of Encephalopathy.  He presents with the following impairments/functional limitations: weakness, impaired endurance, impaired self care skills, impaired functional mobility, impaired balance, impaired cognition, decreased coordination, decreased upper extremity function, decreased lower extremity function, decreased safety awareness, decreased ROM, impaired skin, impaired cardiopulmonary response to activity ;pt with slight improvement in mobility today, able to sit up EOB w/ CGA/SBA at times (mostly req'd min/modA for static sitting balance), and standing attempts perf'd w/ max.A x 2. Pt awake/alert and able to follow ~50% commands.    Rehab Prognosis: Good; patient would benefit from acute skilled PT services to address these deficits and reach maximum level of function.    Recent Surgery: * No surgery found *      Plan:     During this hospitalization, patient to be seen 4 x/week to address the identified rehab impairments via gait training, therapeutic activities, therapeutic exercises, neuromuscular re-education and progress toward the following goals:    Plan of Care Expires:  06/02/25    Subjective     Chief Complaint: none stated  Patient/Family Comments/goals: pt awake/alert, smiling at times, agreeable to session, one word responses at times.   Pain/Comfort:  Pain Rating 1:  (pt did not voice c/o's pain)      Objective:     Communicated with nurse prior to session.  Patient found HOB elevated with peripheral IV, telemetry, pulse ox (continuous), bed alarm, Condom Catheter upon PT entry to room.     General Precautions: Standard,  fall  Orthopedic Precautions: N/A  Braces: N/A  Respiratory Status: Room air     Functional Mobility:  Bed Mobility:     Scooting: total assistance, of 2 persons, and in supine  Supine to Sit: maximal assistance and of 2 persons  Sit to Supine: total assistance and of 2 persons  Transfers:     Sit to Stand:  maximal assistance and of 2 persons with hand-held assist      AM-PAC 6 CLICK MOBILITY  Turning over in bed (including adjusting bedclothes, sheets and blankets)?: 2  Sitting down on and standing up from a chair with arms (e.g., wheelchair, bedside commode, etc.): 2  Moving from lying on back to sitting on the side of the bed?: 2  Moving to and from a bed to a chair (including a wheelchair)?: 2  Need to walk in hospital room?: 1  Climbing 3-5 steps with a railing?: 1  Basic Mobility Total Score: 10       Treatment & Education:  Pt perf'd AAROM supine ex's of titus, AP's x 10 ea.   Co-tx w/ OT for safety , pt sat up EOB ~20 min , cueing for upright posture and midline orientation , pt w/ post lean and R sided lean during session.   Worked on sit to stand t/f's 2x's w/ HHA/max.A x 2 for ~:10-:15 sec. Each, blocking B knees as well.     Patient left HOB elevated with all lines intact, call button in reach, bed alarm on, nruse notified, and ROMAN present..    GOALS:   Multidisciplinary Problems       Physical Therapy Goals          Problem: Physical Therapy    Goal Priority Disciplines Outcome Interventions   Physical Therapy Goal     PT, PT/OT Progressing    Description: Goals to be met by: 2025     Patient will increase functional independence with mobility by performin. Supine to sit with MInimal Assistance  2. Sit to supine with MInimal Assistance  3. Sit to stand transfer with Minimal Assistance  4. Bed to chair transfer with Minimal Assistance using LRAD  5. Gait  x 25 feet with Minimal Assistance using LRAD.   6. Lower extremity exercise program x15 reps per handout, with assistance as needed                          DME Justifications:  Sr. Vince Huffman has a mobility limitation that significantly impairs his ability to participate in one or more mobility related activities of daily living (MRADLs) such as toileting, feeding, dressing, grooming, and bathing in customary locations in the home.  The mobility limitation cannot be sufficiently resolved by the use of a cane or walker.   The use of a manual wheelchair will significantly improve the patients ability to participate in MRADLS and the patient will use it on regular basis in the home.  Sr. Vince Huffman has expressed his willingness to use a manual wheelchair in the home. Patients upper body strength is sufficient for propulsion.  He also has a caregiver who is available, willing, and able to provide assistance with the wheelchair when needed.      Time Tracking:     PT Received On: 05/14/25  PT Start Time: 1214     PT Stop Time: 1248  PT Total Time (min): 34 min     Billable Minutes: Therapeutic Activity 34    Treatment Type: Treatment  PT/PTA: PTA     Number of PTA visits since last PT visit: 1 05/14/2025

## 2025-05-14 NOTE — ASSESSMENT & PLAN NOTE
- reported per son  - recently started gluten free diet per recommendations from GI    5/12  Has poor oral intake, mild hyponatremia at 128. today.  Seems to be doing well with boost, we will add this to his diet.  IV fluids today with monitoring of BMP every 4-6 hours.  5/14  Sodium of 124 this morning.  See plan of care note.  Spoke with daughter, she is okay with NG tube placement with feeds

## 2025-05-14 NOTE — PROGRESS NOTES
[facility] - [facility] Medicine  Progress Note    Patient Name: Vince Huffman  MRN: [id number]  Patient Class: IP- Inpatient   Admission Date: 4/30/2025  Length of Stay: 13 days  Attending Physician: Elian Morejon MD  Primary Care Provider: Leland Porras MD        Subjective     Principal Problem:Encephalopathy        HPI:  78 yo M w/HTN, hypothyroid, crohn's disease, CAD, hx of SBO, presenting with AMS from [facility]. Patient was sent in from [facility] for progressively worsening cognitive function. Patient is A&O x1 at his baseline. Patient and his daughter endorse mild confusion. Which has been progressive.    Patient has not had any recent falls. Endorsed mild suprapubic abdominal pain. No UTI. Mild inflammation on CT, not unexpected in the setting of Crohn's.     Overview/Hospital Course:  Vince Huffman is a 79 y.o. M who was admitted to  for further evaluation of worsening AMS per rehab facility. AAOx1 on admission, normally AAOx4 prior to 4/20 per son. UA negative. CXR clear. CT abd/pelvis with wall thickening of distal ileum, inflammatory infectious ileitis are considerations, fecal distention of the rectum, impaction not excluded. Discussed with GI, anticipated these are chronic findings. CRP negative. Will give enema and monitor for improvement. Patient with successful BM. Neurology consulted: MRI brain ordered (no acute findings, motion artifact), extended EEG, ammonia levels. Worsening mental status on 5/2, medicine team consulted for LP which was unsuccessful. More alert on 5/3 and answering yes/no questions when redirected. Neurology recommending carbidopa-levodopa trial, EEG, and attempting another LP.       Symptoms improved with increasing doses of Carbidopa/Levodopa. LP concerning for meningitis so he was started on Empiric treatment on 5/6/25.     5/9  Discussion held with both Neurology and Infectious Disease, Infectious Disease does not believe patient has  any infectious etiology and as such recommended discontinuing antimicrobials.  Neurology started IVIG for presumed autoimmune process  5/14  Completed 5 day course of IVIG. Minimal improvement in cognition. Had bouts of poor oral intake, only drank boost.  Had intermittent hyponatremia responding to IV fluids.  On 05/14 am sodium 124.  Nephrology was consulted, investigations ordered and sodium corrected with close electrolyte monitoring.  Psych was consulted by the request of the neurologist     Interval History:  Sodium of 124 this morning, See plan of care note.  Mentation the same    Review of Systems   Unable to perform ROS: Other     Objective:     Vital Signs (Most Recent):  Temp: 98.7 °F (37.1 °C) (05/14/25 1207)  Pulse: 89 (05/14/25 1207)  Resp: 18 (05/14/25 1207)  BP: (!) 157/70 (05/14/25 1207)  SpO2: 98 % (05/14/25 1207) Vital Signs (24h Range):  Temp:  [98.2 °F (36.8 °C)-100.1 °F (37.8 °C)] 98.7 °F (37.1 °C)  Pulse:  [] 89  Resp:  [18-20] 18  SpO2:  [96 %-100 %] 98 %  BP: (142-170)/(69-92) 157/70     Weight: 64.1 kg (141 lb 5 oz)  Body mass index is 19.71 kg/m².    Intake/Output Summary (Last 24 hours) at 5/14/2025 1231  Last data filed at 5/13/2025 1636  Gross per 24 hour   Intake 240 ml   Output 200 ml   Net 40 ml        Physical Exam  Constitutional:       General: He is not in acute distress.     Appearance: He is ill-appearing.   HENT:      Head: Normocephalic.      Right Ear: External ear normal.      Left Ear: External ear normal.      Nose: Nose normal.   Cardiovascular:      Rate and Rhythm: Normal rate.      Heart sounds: Normal heart sounds.   Pulmonary:      Breath sounds: Normal breath sounds.   Abdominal:      Palpations: Abdomen is soft.      Tenderness: There is no abdominal tenderness.   Musculoskeletal:      Right lower leg: No edema.      Left lower leg: No edema.      Comments: SCD   Skin:     General: Skin is warm.   Neurological:      General: No focal deficit present.       "Mental Status: He is alert. improved       MELD 3.0: 13 at 5/6/2025  2:23 PM  MELD-Na: 9 at 5/6/2025  2:23 PM  Calculated from:  Serum Creatinine: 1.3 mg/dL at 5/6/2025  2:23 PM  Serum Sodium: 132 mmol/L at 5/6/2025  2:23 PM  Total Bilirubin: 0.5 mg/dL (Using min of 1 mg/dL) at 5/4/2025  3:28 AM  Serum Albumin: 3.2 g/dL at 5/4/2025  3:28 AM  INR(ratio): 1 at 5/5/2025  4:24 PM  Age at listing (hypothetical): 79 years  Sex: Male at 5/6/2025  2:23 PM      Significant Labs:  CBC:  Recent Labs   Lab 05/12/25  1629 05/14/25  0817   WBC 6.18 5.25   HGB 11.4* 11.1*   HCT 33.8* 32.2*    213     CMP:  Recent Labs   Lab 05/12/25  1629 05/13/25  0948 05/14/25  0817   * 127* 124*   K 4.3 4.0 3.8   CL 98 95 94*   CO2 25 27 25   GLU 89 98 94   BUN 18 18 18   CREATININE 1.0 0.9 0.8   CALCIUM 8.9 9.0 8.9   ANIONGAP 8 5* 5*     PTINR:  No results for input(s): "INR" in the last 48 hours.    Significant Procedures:   Dobutamine Stress Test with Color Flow: No results found for this or any previous visit.          Assessment & Plan  Encephalopathy  79 y.o.M with progressive acutely worsening confusion/tremors since 4/20. Previously AAOx4, now AAOx1  - neurology consulted, appreciate recommendations:  - MRI brain from last week was essentially non diagnostic  - Repeat MRI again without obvious new pathology   -working diagnosis is Parkinson's disease, as improving with Sinemet: iContinue two tabs TID  -LP concerning for possible meningitis. Continue Empiric treatment per Neurology recs   -EEG pending   - Delirium and fall precautions along with neuro checks  - PT/OT consult placed; return to [facility] upon discharge     5/8  Viral meningitis panel unremarkable.  Cultures no growth.  CSF with elevated proteins and white cell count.  Started on empiric antibiotics, vancomycin ceftriaxone and ampicillin, infectious disease consulted.  Follow up with Neurology recommendations.  Started on carbidopa levodopa.  Dispo plan " "acute rehab.  Mentation remains the same during this hospitalization A&O x1  5/9  Started on IVIG per Neurology.  EEG(f/u)  5/10  Continue with IVIG, follow up with Neurology.  EEG report noted  5/14  Completed IVIG course, follow up with Neurology. ? D/c back to acute rehab, if mentation improves  Crohn's disease  - Per CT scan "thickening of the distal ileum to the surgical anastomosis with the large bowel. Inflammatory infectious ileitis are considerations. Recommend clinical correlation and follow-up. "  -CRP negative. No concern for flare.   -GI without concerns of confusion being caused by sterlara, confirmed with pharmacy     Generalized weakness  - sent from SNF  - progressive worsening weakness per son   - PT/OT ordered. Will need placement at discharge    Unintended weight loss  - reported per son  - recently started gluten free diet per recommendations from GI    5/12  Has poor oral intake, mild hyponatremia at 128. today.  Seems to be doing well with boost, we will add this to his diet.  IV fluids today with monitoring of BMP every 4-6 hours.  5/14  Sodium of 124 this morning.  See plan of care note.  Spoke with daughter, she is okay with NG tube placement with feeds    Hyponatremia  Hyponatremia is likely due to Dehydration/hypovolemia. The patient's most recent sodium results are listed below.  Recent Labs     05/12/25  1629 05/13/25  0948 05/14/25  0817   * 127* 124*     Maxime  - Monitor sodium Daily.   - Patient hyponatremia is stable    Essential hypertension  Patient's blood pressure range in the last 24 hours was: BP  Min: 142/69  Max: 170/92.The patient's inpatient anti-hypertensive regimen is listed below:  Current Antihypertensives  amLODIPine tablet 10 mg, Daily, Oral    Plan  - BP is controlled, no changes needed to their regimen    Parkinsonism  Suspect symptoms are at least partially due to Parkinson's disease. Continue Sinemet as above.     CAD (coronary artery disease)  y.   AMS " (altered mental status)      VTE Risk Mitigation (From admission, onward)           Ordered     enoxaparin injection 40 mg  Daily         05/01/25 0827     IP VTE HIGH RISK PATIENT  Once         05/01/25 0827     Place sequential compression device  Until discontinued         05/01/25 0827                    Discharge Planning   ERNIE: 5/16/2025     Code Status: Full Code   Medical Readiness for Discharge Date:   Discharge Plan A: Rehab   Discharge Delays: None known at this time            Please place Justification for DME        Elian Morejon MD  Department of Hospital Medicine   [facility] - Acute Medical Stepdown

## 2025-05-15 ENCOUNTER — ANESTHESIA EVENT (OUTPATIENT)
Dept: ENDOSCOPY | Facility: HOSPITAL | Age: 80
End: 2025-05-15
Payer: MEDICARE

## 2025-05-15 ENCOUNTER — ANESTHESIA (OUTPATIENT)
Dept: ENDOSCOPY | Facility: HOSPITAL | Age: 80
End: 2025-05-15
Payer: MEDICARE

## 2025-05-15 PROBLEM — E83.42 HYPOMAGNESEMIA: Status: RESOLVED | Noted: 2025-05-15 | Resolved: 2025-05-15

## 2025-05-15 PROBLEM — D64.9 ANEMIA: Status: ACTIVE | Noted: 2025-05-15

## 2025-05-15 PROBLEM — E83.42 HYPOMAGNESEMIA: Status: ACTIVE | Noted: 2025-05-15

## 2025-05-15 LAB
ABSOLUTE EOSINOPHIL (OHS): 0.05 K/UL
ABSOLUTE MONOCYTE (OHS): 0.93 K/UL (ref 0.3–1)
ABSOLUTE NEUTROPHIL COUNT (OHS): 4.52 K/UL (ref 1.8–7.7)
ALBUMIN SERPL BCP-MCNC: 2.6 G/DL (ref 3.5–5.2)
ALP SERPL-CCNC: 64 UNIT/L (ref 40–150)
ALT SERPL W/O P-5'-P-CCNC: <5 UNIT/L (ref 10–44)
ANION GAP (OHS): 6 MMOL/L (ref 8–16)
AST SERPL-CCNC: 31 UNIT/L (ref 11–45)
BASOPHILS # BLD AUTO: 0.02 K/UL
BASOPHILS NFR BLD AUTO: 0.3 %
BILIRUB SERPL-MCNC: 0.4 MG/DL (ref 0.1–1)
BUN SERPL-MCNC: 17 MG/DL (ref 8–23)
CALCIUM SERPL-MCNC: 9.2 MG/DL (ref 8.7–10.5)
CHLORIDE SERPL-SCNC: 96 MMOL/L (ref 95–110)
CO2 SERPL-SCNC: 26 MMOL/L (ref 23–29)
CREAT SERPL-MCNC: 0.9 MG/DL (ref 0.5–1.4)
ERYTHROCYTE [DISTWIDTH] IN BLOOD BY AUTOMATED COUNT: 11.5 % (ref 11.5–14.5)
GFR SERPLBLD CREATININE-BSD FMLA CKD-EPI: >60 ML/MIN/1.73/M2
GLUCOSE SERPL-MCNC: 92 MG/DL (ref 70–110)
HCT VFR BLD AUTO: 30 % (ref 40–54)
HGB BLD-MCNC: 10.5 GM/DL (ref 14–18)
IMM GRANULOCYTES # BLD AUTO: 0.02 K/UL (ref 0–0.04)
IMM GRANULOCYTES NFR BLD AUTO: 0.3 % (ref 0–0.5)
LYMPHOCYTES # BLD AUTO: 1.21 K/UL (ref 1–4.8)
MAGNESIUM SERPL-MCNC: 2 MG/DL (ref 1.6–2.6)
MCH RBC QN AUTO: 32 PG (ref 27–31)
MCHC RBC AUTO-ENTMCNC: 35 G/DL (ref 32–36)
MCV RBC AUTO: 92 FL (ref 82–98)
NUCLEATED RBC (/100WBC) (OHS): 0 /100 WBC
PHOSPHATE SERPL-MCNC: 2.7 MG/DL (ref 2.7–4.5)
PLATELET # BLD AUTO: 210 K/UL (ref 150–450)
PMV BLD AUTO: 9.7 FL (ref 9.2–12.9)
POTASSIUM SERPL-SCNC: 3.7 MMOL/L (ref 3.5–5.1)
PROT SERPL-MCNC: 7.9 GM/DL (ref 6–8.4)
RBC # BLD AUTO: 3.28 M/UL (ref 4.6–6.2)
RELATIVE EOSINOPHIL (OHS): 0.7 %
RELATIVE LYMPHOCYTE (OHS): 17.9 % (ref 18–48)
RELATIVE MONOCYTE (OHS): 13.8 % (ref 4–15)
RELATIVE NEUTROPHIL (OHS): 67 % (ref 38–73)
SODIUM SERPL-SCNC: 128 MMOL/L (ref 136–145)
T4 FREE SERPL-MCNC: 0.96 NG/DL (ref 0.71–1.51)
TSH SERPL-ACNC: 0.71 UIU/ML (ref 0.4–4)
WBC # BLD AUTO: 6.75 K/UL (ref 3.9–12.7)

## 2025-05-15 PROCEDURE — 63600175 PHARM REV CODE 636 W HCPCS

## 2025-05-15 PROCEDURE — 97530 THERAPEUTIC ACTIVITIES: CPT | Mod: CQ

## 2025-05-15 PROCEDURE — 84439 ASSAY OF FREE THYROXINE: CPT | Performed by: STUDENT IN AN ORGANIZED HEALTH CARE EDUCATION/TRAINING PROGRAM

## 2025-05-15 PROCEDURE — 84100 ASSAY OF PHOSPHORUS: CPT | Performed by: STUDENT IN AN ORGANIZED HEALTH CARE EDUCATION/TRAINING PROGRAM

## 2025-05-15 PROCEDURE — 36415 COLL VENOUS BLD VENIPUNCTURE: CPT | Performed by: STUDENT IN AN ORGANIZED HEALTH CARE EDUCATION/TRAINING PROGRAM

## 2025-05-15 PROCEDURE — 20600001 HC STEP DOWN PRIVATE ROOM

## 2025-05-15 PROCEDURE — 25000003 PHARM REV CODE 250: Performed by: INTERNAL MEDICINE

## 2025-05-15 PROCEDURE — 83735 ASSAY OF MAGNESIUM: CPT | Performed by: STUDENT IN AN ORGANIZED HEALTH CARE EDUCATION/TRAINING PROGRAM

## 2025-05-15 PROCEDURE — 80053 COMPREHEN METABOLIC PANEL: CPT | Performed by: STUDENT IN AN ORGANIZED HEALTH CARE EDUCATION/TRAINING PROGRAM

## 2025-05-15 PROCEDURE — 85025 COMPLETE CBC W/AUTO DIFF WBC: CPT | Performed by: STUDENT IN AN ORGANIZED HEALTH CARE EDUCATION/TRAINING PROGRAM

## 2025-05-15 PROCEDURE — 25000003 PHARM REV CODE 250

## 2025-05-15 PROCEDURE — 99232 SBSQ HOSP IP/OBS MODERATE 35: CPT | Mod: GC,,, | Performed by: INTERNAL MEDICINE

## 2025-05-15 PROCEDURE — 84443 ASSAY THYROID STIM HORMONE: CPT | Performed by: STUDENT IN AN ORGANIZED HEALTH CARE EDUCATION/TRAINING PROGRAM

## 2025-05-15 PROCEDURE — 99233 SBSQ HOSP IP/OBS HIGH 50: CPT | Mod: ,,, | Performed by: PSYCHIATRY & NEUROLOGY

## 2025-05-15 PROCEDURE — 99233 SBSQ HOSP IP/OBS HIGH 50: CPT | Mod: GC,,, | Performed by: PSYCHIATRY & NEUROLOGY

## 2025-05-15 PROCEDURE — 97110 THERAPEUTIC EXERCISES: CPT

## 2025-05-15 RX ADMIN — AMLODIPINE BESYLATE 10 MG: 10 TABLET ORAL at 09:05

## 2025-05-15 RX ADMIN — POLYETHYLENE GLYCOL 3350 17 G: 17 POWDER, FOR SOLUTION ORAL at 09:05

## 2025-05-15 RX ADMIN — CARBIDOPA AND LEVODOPA 2 TABLET: 25; 100 TABLET ORAL at 08:05

## 2025-05-15 RX ADMIN — CARBIDOPA AND LEVODOPA 2 TABLET: 25; 100 TABLET ORAL at 05:05

## 2025-05-15 RX ADMIN — ENOXAPARIN SODIUM 40 MG: 40 INJECTION SUBCUTANEOUS at 05:05

## 2025-05-15 RX ADMIN — MIRTAZAPINE 7.5 MG: 7.5 TABLET, FILM COATED ORAL at 08:05

## 2025-05-15 RX ADMIN — CARBIDOPA AND LEVODOPA 2 TABLET: 25; 100 TABLET ORAL at 09:05

## 2025-05-15 NOTE — PLAN OF CARE
Patient had a uneventful shift. NPO at [facility] for MRI of brain tomorrow 5/16, with anesthesia. Pt is Aox1, bedbound, worked with PT/OT today. 1:1 feeder. No NGT, encourages oral feeds. Bed low , condom cath. No issues noted and no other changes made to the plan of care         Problem: Adult Inpatient Plan of Care  Goal: Plan of Care Review  Outcome: Progressing  Goal: Patient-Specific Goal (Individualized)  Outcome: Progressing  Goal: Absence of Hospital-Acquired Illness or Injury  Outcome: Progressing  Goal: Optimal Comfort and Wellbeing  Outcome: Progressing  Goal: Readiness for Transition of Care  Outcome: Progressing     Problem: Skin Injury Risk Increased  Goal: Skin Health and Integrity  Outcome: Progressing     Problem: Infection  Goal: Absence of Infection Signs and Symptoms  Outcome: Progressing     Problem: Delirium  Goal: Optimal Coping  Outcome: Progressing  Goal: Improved Behavioral Control  Outcome: Progressing  Goal: Improved Attention and Thought Clarity  Outcome: Progressing  Goal: Improved Sleep  Outcome: Progressing

## 2025-05-15 NOTE — PROGRESS NOTES
"  [facility]   DEPARTMENT OF PSYCHIATRY     IDENTIFIERS & DEMOGRAPHICS:     ENCOUNTER: subsequent    -- PATIENT IDENTIFIERS: Vince Huffman  [id number]  1945  79 y.o.  male  -- LOCATION OF PATIENT: unit (med/surg)    -- SOURCES OF INFORMATION: PATIENT, EHR/chart  -- ENCOUNTER PROVIDER: Obinna Deng MD        PRESENTATION:   History of Present Illness   **  OVERVIEW OF THE HPI:    Vince Huffmna is a 79 y.o. male with no past psychiatric history & past pertinent medical history of Chrohn's disease, SBO, Hypothyroidism, and CAD presents to the ED/admitted to the hospital for generalized weakness and altered mental status.      Psychiatry consulted for concern for complex bereavement vs. catatonia        SUBJECTIVE/CURRENT FINDINGS:    NAEON. Pt has been adherent with scheduled medications. Started on Remeron 7.5mg last night. Received PRN Alprazolam 0.5mg yesterday afternoon for reported anxiety.     Pt greeted at bedside. He is more awake and interactive today compared to yesterday. He responds "hey" when greeted and says he is feeling "good." He is more expressive and states he is hungry. He begins eating eggs and potatoes with assistance of nursing staff.   Reports that he did not sleep well last night. He is agreeable to continuing Remeron and we discussed that it may take some time to fully take effect. Pt thanks this provider at the end of the assessment.     REVIEW OF SYSTEMS:     Y   Sleep Disturbance/Disruption  +insomnia     Y   Appetite/Weight Change  +decreased appetite, +unintentional weight loss     Y   Depressive Symptomatology  +depressed mood, +anhedonia     N   Psychosis    Regarding the current presentation, no other significant issues or complaints are voiced or known at this time.      ADD-ON PSYCHOTHERAPY:     N/A         HISTORY:   Patient Information   **  HISTORY    The patient's past medical history has been reviewed and updated as appropriate within the " "electronic health record system.  See PROBLEM LIST & HISTORY for details.    Scheduled and PRN Medications: The electronic chart was reviewed and updated as appropriate.  See MEDCARD for details.    Allergies:  Gluten and Lactose      EXAMINATION:   Objective   **  VITALS:  /66 (BP Location: Left arm, Patient Position: Lying)   Pulse 69   Temp 97.7 °F (36.5 °C) (Axillary)   Resp 17   Ht 5' 11" (1.803 m)   Wt 64.1 kg (141 lb 5 oz)   SpO2 100%   BMI 19.71 kg/m²     Appearance: appears stated age, recent weight loss  appropriately dressed, adequately groomed, in no apparent distress    Behavior & Attitude: participative, under good behavioral control, able to redirect, appropriate eye contact  calm, cooperative    Movements & Motor Activity: +psychomotor retardation, mild tremor  no psychomotor agitation  Speech & Language: non spontaneous, minimal  Mood: "good"  Affect: constricted  more reactive today than yesterday  Thought Process & Associations: organized, relevant  no loosening of associations    Thought Content & Perceptions: no delusions, no paranoid ideation, no ideas of reference, no grandiosity, no hyperreligiosity, no hallucinations, no responding to internal stimuli    Sensorium: awake, alert  Orientation: oriented to person  not oriented to place, not oriented to time, not oriented to situation    Attention & Concentration: intact  attentive to conversation, not easily distracted    Fund of Knowledge: intact, vocabulary proficient    Insight: intact, good  demonstrates sufficient awareness of condition/situation    Judgment: intact, good  heeds instructions/advice        RISK & REGULATORY:   Impression   **   RISK PARAMETERS:     N   Suicidal Ideation/Threats   N   Suicide Attempts/Gestures   N   Homicidal Ideation/Threats   N   Homicidal Behavior   N   Non-Suicidal Self-Injurious Behavior   N   Perpetrated Violence     NOTE: RISK PARAMETERS are " current to the encounter/episode  NOT inclusive of past history.      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 After gathering collateral, it appears that pt has had a gradual decline in his mental status over the past several months, becoming more withdrawal and anhedonic over time. There is low concern for catatonia at this time - Ministerio Alcocer performed today and scored 4 (given points for immobility and mutism). The immobility and mutism seem to have a volitional component to them, and appear to be more in line with psychomotor retardation, potentially from depression given the history that his wife passed away in February 2024 and preceded his decline. Recommend trial of antidepressant, specifically Remeron, to address his poor intake and substantial weight loss. Would defer benzodiazepine trial at this time for fear that it may make his mental status worse.     -- PLAN (goals  recommentations):     - Slow correction of underlying Hyponatremia (sodium is 128 today)  - Strict I/Os. Consider adding stool softener to bowel regimen and obtaining KUB to rule out constipation as contributing to AMS  - Obtain EEG to rule out delirium as contributing to AMS    See below for pertinent laboratory and radiographic findings.      CHART REVIEW:  available documentation has been reviewed, and pertinent elements of the chart have been incorporated into this evaluation where appropriate.       KEY & LINKS:        Y  = yes/endorses     N  = no/denies     U  = unknown/unable to assess    ADHD   AIMS   AUDIT   AUDIT-C   C-SSRS (Screen)   C-SSRS (Short)   C-SSRS (Full)   DAST   DAST-10   NASIR-7   MoCA   PCL-5   PHQ-9   BAHMAN   YMRS      PSYCHIATRIC SCREENINGS:       DIAGNOSTIC TESTING:   Results   **   Glu 92  5/15/2025  Li *   *  TSH 0.710  5/15/2025    HgA1c *   *  VPA *   *   FT4 0.96  5/15/2025    Na 128 (L)  5/15/2025  CLZ *   *  WBC 6.75  5/15/2025    Cr 0.9  5/15/2025  ANC *   *   Hgb 10.5 (L)  5/15/2025     BUN 17  5/15/2025  Trop I *   *  HCT 30.0 (L)  5/15/2025     GFR >60  5/15/2025   CPK 17 (L)  5/3/2025    5/15/2025     Alb 2.6 (L)  5/15/2025   PRL *   *  B12 456  5/3/2025     T Bili 0.4  5/15/2025  Chol *   *  B9 16.5  5/3/2025    ALP 64  5/15/2025  TGs *   *  B1 *   *    AST 31  5/15/2025  HDL *   *  Vit D *   *     ALT <5 (L)  5/15/2025  LDL *   *  HIV Negative  4/23/2025     INR 1.0  5/5/2025  Nicole *   *   Hep C Negative  4/23/2025    GGT *   *  Lip *   *  RPR *   *    MCV 92  5/15/2025   NH4 44  5/1/2025  UPT *   *      PETH *   *  THC Negative  5/2/2025    ETOH *   *  JON Negative  5/2/2025    EtG *   *  AMP Negative  5/2/2025    ALC <10  5/2/2025  OPI Negative  5/2/2025    BZO *   *  MTD *   *     BAR Negative  5/2/2025  BUP *   *    PCP Negative  5/2/2025  FEN *   *     Results for orders placed or performed during the hospital encounter of 04/30/25   EKG 12-lead    Collection Time: 05/01/25  3:25 PM   Result Value Ref Range    QRS Duration 78 ms    OHS QTC Calculation 461 ms    Narrative    Test Reason : R00.0,    Vent. Rate : 128 BPM     Atrial Rate : 128 BPM     P-R Int : 144 ms          QRS Dur :  78 ms      QT Int : 316 ms       P-R-T Axes :  50 -57  63 degrees    QTcB  Int : 461 ms    Sinus tachycardia  Left anterior fascicular block  Minimal voltage criteria for LVH, may be normal variant ( [place] product )  Abnormal ECG  When compared with ECG of 30-Apr-2025 12:09,  No significant change was found  Confirmed by Jamir Joe ([id number]) on 5/1/2025 4:14:39 PM    Referred By: AAAREFERRAL SELF           Confirmed By: Jamir Joe     Results for orders placed or performed during the hospital encounter of 04/30/25   MRI Brain Without Contrast    Narrative    EXAMINATION:  MRI BRAIN WITHOUT CONTRAST    CLINICAL HISTORY:  Mental status change, unknown cause.    TECHNIQUE:  Multiplanar multisequence MR imaging of the brain was performed without contrast.    COMPARISON:  CT head 04/30/2025, MRI brain 04/24/2025    FINDINGS:  Significantly degraded exam by motion artifact.    Diffuse cerebral volume loss with prominent sulci and ventricles.    No diffusion restriction to suggest acute ischemic infarct.  No gradient susceptibility to suggest hemorrhage.    Visualized T2 skull base vascular flow void is preserved.      Impression    Significantly degraded exam by motion artifact.  Allowing for this, no evidence of acute ischemic infarct or hemorrhage.    Electronically signed by resident: Misty Medrano  Date:    05/01/2025  Time:    19:43    Electronically signed by: Aubrey Mitchell MD  Date:    05/01/2025  Time:    19:58   CT Head Without Contrast    Narrative    EXAMINATION:  CT HEAD WITHOUT CONTRAST    CLINICAL HISTORY:  Mental status change, unknown cause;    TECHNIQUE:  Low dose axial images were obtained through the head.  Coronal and sagittal reformations were also performed. Contrast was not administered.    COMPARISON:  MRI brain without contrast 04/24/2025, CT head without contrast 04/24/2025.    FINDINGS:  Ventricles are stable in size without evidence for new or worsening hydrocephalus.  No new or enlarging extra-axial blood or fluid collections.    Brain parenchyma appears  unchanged.  Scattered supratentorial hypoattenuation, overall nonspecific, but can be seen in setting of microvascular ischemic change.  No parenchymal mass, edema, hemorrhage, or acute major vascular territory infarct.    No calvarial or skull base fracture or osseous destructive lesion.  Paranasal sinuses and mastoid air cells are essentially clear.      Impression    No evidence for acute intracranial pathology.      Electronically signed by: Ish Carrillo  Date:    04/30/2025  Time:    14:49          [facility] ACUTE MEDICAL STEPDOW*          Obinna Deng MD  [facility]   05/15/2025

## 2025-05-15 NOTE — ANESTHESIA PREPROCEDURE EVALUATION
[facility]  Anesthesia Pre-Operative Evaluation   05/15/2025        Vince Huffman, 1945  [id number]  Procedure(s) (LRB):  MRI (Magnetic Resonance Imagine) (N/A)    Subjective    Vince Huffman is a 79 y.o. male w/ a significant PMHx of Crohn's, CAD, HTN, MDD, Anemia admitted with worsening encephalopathy. Receiving IVIG.    Patient now presents for above procedure(s).     Level of Care: Stepdown  Hemodynamics: No vasopressor or inotropic support.  Respiratory Status: Room air  NPO: 5/16/25 0000    Consent obtained via phone from daughter, Radha Huffman [phone] ; witnessed by 2 RNs    ECHO:  No results found for this or any previous visit.      Prev Airway: None documented.    LDA:        Peripheral IV - Single Lumen 05/06/25 1800 22 G Distal;Posterior;Right Forearm (Active)   Site Assessment Clean;Dry;Intact 05/15/25 0800   Line Securement Device Secured with sutureless device 05/14/25 2030   Extremity Assessment Distal to IV No abnormal discoloration 05/15/25 0410   Line Status Saline locked 05/15/25 0800   Dressing Status Clean;Dry;Intact 05/15/25 0800   Dressing Intervention Integrity maintained 05/15/25 0800   Dressing Change Due 05/14/25 05/13/25 1416   Site Change Due 05/14/25 05/13/25 0701   Reason Not Rotated Not due 05/15/25 0800   Number of days: 8       Male External Urinary Catheter 04/30/25 1856 (Active)   Collection Container Urimeter 05/15/25 0800   Securement Method secured to top of thigh w/ adhesive device 05/15/25 0410   Skin no redness;no breakdown 05/15/25 0410   Tolerance no signs/symptoms of discomfort 05/15/25 0410   Output (mL) 600 mL 05/14/25 1300   Catheter Change Date 05/14/25 05/14/25 1300   Catheter Change Time 1300 05/14/25 1300   Number of days: 14       Drips: None documented.      Problem List[1]    Review of patient's allergies indicates:   Allergen Reactions    Gluten Diarrhea    Lactose        Current Inpatient Medications:    amLODIPine  10 mg Oral  Daily    carbidopa-levodopa  mg  2 tablet Oral TID    enoxparin  40 mg Subcutaneous Daily    mirtazapine  7.5 mg Oral QHS    polyethylene glycol  17 g Oral BID       Medications Ordered Prior to Encounter[2]    Past Surgical History:   Procedure Laterality Date    ANGIOGRAM, CORONARY, WITH LEFT HEART CATHETERIZATION      APPENDECTOMY      APPENDECTOMY  2007    COLONOSCOPY N/A     ENDOSCOPY N/A     RIGHT HEMICOLECTOMY  2013    SMALL INTESTINE SURGERY  2007    30.5 cm of small bowel removed       Social History:  Tobacco Use: Low Risk  (4/30/2025)    Patient History     Smoking Tobacco Use: Never     Smokeless Tobacco Use: Never     Passive Exposure: Not on file   Recent Concern: Tobacco Use - Medium Risk (4/25/2025)    Received from [facility]    Patient History     Smoking Tobacco Use: Former     Smokeless Tobacco Use: Never     Passive Exposure: Not on file       Alcohol Use: Not At Risk (5/2/2025)    AUDIT-C     Frequency of Alcohol Consumption: Never     Average Number of Drinks: Patient does not drink     Frequency of Binge Drinking: Never       Objective    Vital Signs Range:  BMI Readings from Last 1 Encounters:   05/06/25 19.71 kg/m²       Temp:  [35.9 °C (96.7 °F)-36.7 °C (98.1 °F)]   Pulse:  [69-78]   Resp:  [17-18]   BP: (129-135)/(66-73)   SpO2:  [100 %]        Significant Labs:        Component Value Date/Time    WBC 6.75 05/15/2025 0455    WBC 6.42 04/14/2025 1438    HGB 10.5 (L) 05/15/2025 0455    HGB 10.4 (L) 04/14/2025 1438    HCT 30.0 (L) 05/15/2025 0455    HCT 34 (L) 05/14/2025 1637     05/15/2025 0455     04/14/2025 1438     (L) 05/15/2025 0455     04/14/2025 1438    K 3.7 05/15/2025 0455    K 3.3 (L) 04/14/2025 1438    CL 96 05/15/2025 0455     04/14/2025 1438    CO2 26 05/15/2025 0455    CO2 27 04/14/2025 1438    GLU 92 05/15/2025 0455     11/06/2018 0452    BUN 17 05/15/2025 0455    BUN 13.7 04/14/2025 1438    CREATININE 0.9 05/15/2025 0455     CREATININE 1.43 (H) 04/14/2025 1438    MG 2.0 05/15/2025 0455    PHOS 2.7 05/15/2025 0455    PHOS 2.6 (L) 01/27/2013 1945    CALCIUM 9.2 05/15/2025 0455    CALCIUM 9.2 04/14/2025 1438    ALBUMIN 2.6 (L) 05/15/2025 0455    ALBUMIN 3.7 04/14/2025 1438    PROT 7.9 05/15/2025 0455    PROT 6.9 11/06/2018 0452    ALKPHOS 64 05/15/2025 0455    ALKPHOS 62 11/06/2018 0452    BILITOT 0.4 05/15/2025 0455    BILITOT 0.8 11/06/2018 0452    AST 31 05/15/2025 0455    AST 14 04/14/2025 1438    ALT <5 (L) 05/15/2025 0455    ALT 24 11/06/2018 0452    INR 1.0 05/05/2025 1624    INR 1.0 01/27/2013 2035        Please see Results Review for additional labs.     Diagnostic Studies: All relevant studies, reviewed.      EKG:   Results for orders placed or performed during the hospital encounter of 04/30/25   EKG 12-lead    Collection Time: 05/01/25  3:25 PM   Result Value Ref Range    QRS Duration 78 ms    [facility] QTC Calculation 461 ms    Narrative    Test Reason : R00.0,    Vent. Rate : 128 BPM     Atrial Rate : 128 BPM     P-R Int : 144 ms          QRS Dur :  78 ms      QT Int : 316 ms       P-R-T Axes :  50 -57  63 degrees    QTcB Int : 461 ms    Sinus tachycardia  Left anterior fascicular block  Minimal voltage criteria for LVH, may be normal variant ( Adriano product )  Abnormal ECG  When compared with ECG of 30-Apr-2025 12:09,  No significant change was found  Confirmed by Jamir Joe ([id number]) on 5/1/2025 4:14:39 PM    Referred By: AAAREFERRAL SELF           Confirmed By: Jamir Joe                                                                                                                  05/15/2025  Vince Huffman is a 79 y.o., male.      Pre-op Assessment    I have reviewed the Patient Summary Reports.     I have reviewed the Nursing Notes. I have reviewed the NPO Status.   I have reviewed the Medications.   Prednisone    Review of Systems  Anesthesia Hx:  No problems with previous Anesthesia   History of prior surgery of  interest to airway management or planning:          Denies Family Hx of Anesthesia complications.    Denies Personal Hx of Anesthesia complications.                    Cardiovascular:  Exercise tolerance: good   Hypertension   CAD    Denies CABG/stent.     Denies CHF.    no hyperlipidemia   ECG has been reviewed.                            Pulmonary:    Denies COPD.  Denies Asthma.     Denies Sleep Apnea.                Renal/:   Denies Chronic Renal Disease.                Hepatic/GI:   PUD,   Denies GERD.   Crohns             Neurological:    Denies CVA.    Denies Headaches. Denies Seizures.    Encephalopathy                            Endocrine:  Denies Diabetes. Hypothyroidism        Denies Obesity / BMI > 30  Psych:  Denies Psychiatric History.  depression                Physical Exam  General: Cachexia, Lethargic, Somnolent and Confusion    Airway:  Mallampati: unable to assess   Mouth Opening: Normal  TM Distance: Normal  Tongue: Normal  Neck ROM: Normal ROM    Dental:  Intact        Anesthesia Plan  Type of Anesthesia, risks & benefits discussed:    Anesthesia Type: Gen ETT, Gen Supraglottic Airway, Gen Natural Airway  Intra-op Monitoring Plan: Standard ASA Monitors  Post Op Pain Control Plan: multimodal analgesia and IV/PO Opioids PRN  Induction:  IV  Airway Plan: Direct and Video, Post-Induction  Informed Consent: Informed consent signed with the Patient representative and all parties understand the risks and agree with anesthesia plan.  All questions answered. Patient consented to blood products? Yes  ASA Score: 3  Day of Surgery Review of History & Physical: H&P Update referred to the surgeon/provider.    Ready For Surgery From Anesthesia Perspective.     .           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     Current Outpatient Medications on File Prior to Encounter   Medication Sig Dispense Refill    acetaminophen (TYLENOL) 500 MG tablet Take 500 mg by mouth every 6 (six) hours as needed.      ALPRAZolam (XANAX XR) 0.5 MG Tb24 Take 0.5 mg by mouth once daily.      amLODIPine (NORVASC) 10 MG tablet Take 1 tablet (10 mg total) by mouth once daily.      ustekinumab (STELARA) 90 mg/mL Syrg syringe Inject 1 mL (90 mg total) into the skin every 8 weeks. 2 mL 2

## 2025-05-15 NOTE — PT/OT/SLP PROGRESS
Occupational Therapy   Co-Treatment  Co-treatment performed due to patient's multiple deficits requiring two skilled therapists to appropriately and safely assess patient's strength and endurance while facilitating functional tasks in addition to accommodating for patient's activity tolerance.        Name: Vince Huffman  MRN: [id number]  Admitting Diagnosis:  Acute encephalopathy       Recommendations:     Discharge Recommendations: High Intensity Therapy  Discharge Equipment Recommendations:  bedside commode, walker, rolling, wheelchair  Barriers to discharge:  Decreased caregiver support    Assessment:     Vince Huffman is a 79 y.o. male with a medical diagnosis of Acute encephalopathy.  He presents with the following performance deficits affecting function are weakness, impaired endurance, visual deficits, impaired functional mobility, decreased lower extremity function, decreased upper extremity function, abnormal tone, decreased ROM, impaired fine motor, impaired self care skills, impaired balance, impaired cognition.     Pt agreeable to therapy and tolerated fairly. Pt began therapy session with great improvements with vocalization and sitting balance. However, as the session went on, pt began to demonstrate extensor tone posture in UE &LB which increased need of OT/PT assistance's with sitting balance, sit<->stand & transfers.    Pt would continue to benefit from skilled OT services to maximize functional independence with ADLs and functional mobility, reduce caregiver burden, and facilitate safe discharge in the least restrictive environment.      Rehab Prognosis:  Good; patient would benefit from acute skilled OT services to address these deficits and reach maximum level of function.       Plan:     Patient to be seen 4 x/week to address the above listed problems via self-care/home management, therapeutic activities, neuromuscular re-education  Plan of Care Expires: 05/24/25  Plan of Care Reviewed with:   "(sis in-law)    Subjective     Chief Complaint: none reported  Patient/Family Comments/goals: pt responded "yes" to if he wanted to stand and walk again  Pain/Comfort:  Pain Rating 1: 0/10    Objective:     Communicated with: nurse prior to session.  Patient found HOB elevated with peripheral IV, telemetry, pulse ox (continuous), Condom Catheter upon OT entry to room.    General Precautions: Standard, fall    Orthopedic Precautions:N/A  Braces: N/A  Respiratory Status: Room air     Occupational Performance:     Bed Mobility:    Patient completed Scooting to EOB with maximal assistance and 2 persons    Patient completed Supine to Sit with maximal assistance and 2 persons    Patient completed Sit to Supine with maximal assistance     Functional Mobility/Transfers:  Patient completed 3 Sit <> Stand Transfers with maximal assistance and of 2 persons via gait belt assist    Patient completed Bed <> Chair Transfer using Squat pivot Transfer technique with maximal assistance and of 2 persons with via gait belt assist    Functional Mobility: pt unable to take steps due to insufficient B LE strength    Activities of Daily Living:  Upper Body Dressing: total assistance to drape gown over back while seated EOB    Lower Body Dressing: total assistance to don socks    AMPAC 6 Click ADL: 11    Treatment & Education:  Therapeutic exercises: pt completed the following UE exercises to maintain/improve UE ROM, strength, gross coordination and overall activity tolerance required for participation/independence with ADLs, IADLs & functional mobility/transfers  - AAROM ROM of shoulder flexion  - AAROM unilateral chest press (with emphasis on elbow ext)  - AAROM elbow flex/ext   -Pt required a lot of verbal/tactile cues to relax UE  -Education on task modification to maximize safety and (I) during bed mobility, ADLs/IADLs and mobility/transfers  -Education on importance of OOB activity to improve/maintain overall strength, activity " tolerance and promote recovery  -Pt educated to call for assistance and to transfer with hospital staff only  -Provided education regarding role of OT & POC with pt verbalizing understanding.   Pt had no further questions & when asked whether there were any concerns pt reported none.     Patient left HOB elevated with all lines intact, call button in reach, and bed alarm on    GOALS:   Multidisciplinary Problems       Occupational Therapy Goals          Problem: Occupational Therapy    Goal Priority Disciplines Outcome Interventions   Occupational Therapy Goal     OT, PT/OT Progressing    Description: Goals to be met by: 5/24/25     Patient will increase functional independence with ADLs by performing:    UE Dressing with Contact Guard Assistance.  LE Dressing with Minimal Assistance.  Grooming while bedside chair with Minimal Assistance.  Toileting from bedside commode with Minimal Assistance for hygiene and clothing management.   Sitting at edge of bed x5 minutes with Contact Guard Assistance for upright balance.  Rolling to Bilateral with Minimal Assistance.   Supine to sit with Minimal Assistance.  Step transfer with Moderate Assistance  Toilet transfer to bedside commode with Moderate Assistance.                         DME Justifications:   Vince requires a commode for home use because he is confined to a single room.  Sr. Vince Huffman has a mobility limitation that significantly impairs his ability to participate in one or more mobility related activities of daily living (MRADLs) such as toileting, feeding, dressing, grooming, and bathing in customary locations in the home.  The mobility limitation cannot be sufficiently resolved by the use of a cane or walker.   The use of a manual wheelchair will significantly improve the patients ability to participate in MRADLS and the patient will use it on regular basis in the home.  Sr. Vince Huffman has expressed his willingness to use a manual wheelchair in the  home. Patients upper body strength is sufficient for propulsion.  He also has a caregiver who is available, willing, and able to provide assistance with the wheelchair when needed.      Time Tracking:     OT Date of Treatment: 05/15/25  OT Start Time: 1013  OT Stop Time: 1048  OT Total Time (min): 35 min    Billable Minutes:Therapeutic Exercise 35    OT/PAUL: OT          5/15/2025

## 2025-05-15 NOTE — PROGRESS NOTES
"  [facility] - Acute Medical Stepdown  Adult Nutrition  Consult Note    SUMMARY     Recommendations  1. Continuous tube feeds of Jevity 1.5 with a goal rate of 45 ml/hr x 24 hours to meet > 75% of kcal/protein needs. Provides:    - Total volume: 1080   - Kcal: 1620   - Protein 73gm   - Free water: 840 mL.     Additional free fluid flushes per MD. Patient currently has 1200cc fluid restriction.     2. Monitor for s/s TF intolerance.  Goals: Meet % of EEN/EPN by next RD follow-up  Nutrition Goal Status: new  Communication of RD Recs:  (POC)    Nutrition Discharge Planning     Nutrition Discharge Planning: Too early to determine, pending clinical course  Oral supplement regimen (comments): ONS of choice PRN    Assessment and Plan    No new Assessment & Plan notes have been filed under this hospital service since the last note was generated.  Service: Nutrition         Reason for Assessment    Reason For Assessment: new tube feeding  Diagnosis: other (see comments) (Encephalopathy)  General Information Comments: Request for TF recommendations received. Recommend continuous tube feeds of Jevity 1.5 with a goal rate of 45 ml/hr x 24 hours to meet > 75% of kcal/protein needs - provides Total volume: 1080 -Kcal: 1620 - Protein 73gm  Free water: 840 mL. Additional free fluid flushes per MD. Patient currently has 1200cc fluid restriction.    Nutrition/Diet History    Spiritual, Cultural Beliefs, Voodoo Practices, Values that Affect Care: no  Food Allergies: other (see comments) (Gluten)  Factors Affecting Nutritional Intake: decreased appetite    Anthropometrics    Height: 5' 11" (180.3 cm)  Height (inches): 71 in  Height Method: Stated  Weight: 64.1 kg (141 lb 5 oz)  Weight (lb): 141.32 lb  Weight Method: Bed Scale  Ideal Body Weight (IBW), Male: 172 lb  % Ideal Body Weight, Male (lb): 80.75 %  BMI (Calculated): 19.7  BMI Grade: less than 23 (older than 65 years) - underweight       Lab/Procedures/Meds    Pertinent " Labs Reviewed: reviewed  Pertinent Labs Comments: 5/14/25: H/Hn11.1/32.2L, Mag 1.5L 5/13/25: Na 127L  Pertinent Medications Reviewed: reviewed  Pertinent Medications Comments: Amlodipine, Enoxaparin, Polyethylene glycol    Estimated/Assessed Needs    Weight Used For Calorie Calculations: 63 kg (138 lb 14.2 oz)  Energy Calorie Requirements (kcal): 1646 kcal/day  Energy Need Method: Canyon-St Jeor (x 1.2)  Protein Requirements: 82 g (1.3 g/kg)  Weight Used For Protein Calculations: 63 kg (138 lb 14.2 oz)  Fluid Requirements (mL): 1 mL/kcal or per MD  Estimated Fluid Requirement Method: RDA Method  RDA Method (mL): 1646         Nutrition Prescription Ordered    Current Diet Order: Regular Gluten Free  Oral Nutrition Supplement: Boost Plus all meals    Evaluation of Received Nutrient/Fluid Intake    I/O: - 1.6 L since admit  Comments: LBM 5/1  % Intake of Estimated Energy Needs: Other: 0%  % Meal Intake: 0%    PES Statement  Underweight related to Other (comment) (Pt report of recent 24-33 lb weight loss per MST) as evidenced by BMI  Status: New    Nutrition Risk    Level of Risk/Frequency of Follow-up: moderate       Monitor and Evaluation    Monitor and Evaluation: Energy intake, Food and beverage intake, Protein intake, Diet order, Weight       Nutrition Follow-Up    RD Follow-up?: Yes

## 2025-05-15 NOTE — PLAN OF CARE
Recommendations  1. Continuous tube feeds of Jevity 1.5 with a goal rate of 45 ml/hr x 24 hours to meet > 75% of kcal/protein needs. Provides:    - Total volume: 1080   - Kcal: 1620   - Protein 73gm   - Free water: 840 mL.     Additional free fluid flushes per MD. Patient currently has 1200cc fluid restriction.     2. Monitor for s/s TF intolerance.  Goals: Meet % of EEN/EPN by next RD follow-up  Nutrition Goal Status: new

## 2025-05-15 NOTE — PLAN OF CARE
[facility] - Acute Medical Stepdown  Discharge Reassessment    Discharge Plan A and Plan B have been determined by review of patient's clinical status, future medical and therapeutic needs, and coverage/benefits for post-acute care in coordination with multidisciplinary team members.     Primary Care Provider: Leland Porras MD    Expected Discharge Date:  05/20/25    Reassessment (most recent)       Discharge Reassessment - 05/15/25 0847          Discharge Reassessment    Assessment Type Discharge Planning Reassessment     Did the patient's condition or plan change since previous assessment? No     Discharge Plan discussed with: Adult children     Name(s) and Number(s) Radha Huffman (Daughter)  [phone] (Mobile)     Communicated ERNIE with patient/caregiver Yes     Discharge Plan A Rehab     Discharge Plan B Rehab     DME Needed Upon Discharge  walker, rolling;wheelchair;bedside commode (P)      Transition of Care Barriers None (P)      Why the patient remains in the hospital Requires continued medical care (P)         Post-Acute Status    Post-Acute Authorization Placement (P)      Post-Acute Placement Status Set-up Complete/Auth obtained (P)      Coverage r: MEDICARE - MEDICARE PART A & B - (P)      Hospital Resources/Appts/Education Provided Provided patient/caregiver with written discharge plan information (P)      Patient choice form signed by patient/caregiver List with quality metrics by geographic area provided;List from System Post-Acute Care;List from CMS Compare (P)      Discharge Delays None known at this time (P)                        CM met with patient and spoke  patients daughter (Radha OGLESBY) via phone [phone]  to discuss any current  discharge planning.  Patient is not medically ready, Neuro reassessing for IVIG and nephrology consulted for possible r/o SIADH.  Psych consulted and recommends delirium behavior management. Once medically [facility]        Christi Angel RN  Case Management  [facility]  [phone]

## 2025-05-15 NOTE — MEDICAL/APP STUDENT
"CONSULTATION LIAISON PSYCHIATRY PROGRESS NOTE    Patient Name: Vince Huffman  MRN: [id number]  Patient Class: IP- Inpatient  Admission Date: 4/30/2025  Attending Physician: Zina Tarango MD      SUBJECTIVE:   Vince Huffman is a 79 y.o. male with no past psychiatric history & past pertinent medical history of Crohn's, HTN, CAD, hypothyroid admitted to the hospital for acute encephalopathy.    Psychiatry consulted for "complex bereavement, concern for catatonia- per neuro"     Interval History:     Today, pt was laying in bed with left hand resting on his head. He appeared drowsy. Pt was not verbally responsive when questioned about mood, orientation questions, or if he was in any pain. He was able to give a slight thumbs up when asked to do so. Breakfast tray at bedside did not look like it had been touched yet. Overall, compared to yesterday, pt's mentation has worsened.     On speaking with his nurse, there were no reported acute events overnight for pt. She stated that pt has been like that since she came in this morning. Family hasn't come in to visit yet.        OBJECTIVE:    Mental Status Exam:  General Appearance: appears stated age, adequately groomed, appropriately dressed, lying in bed  Behavior: unable to participate, uncooperative, poor eye contact  Involuntary Movements and Motor Activity: no abnormal involuntary movements noted  Gait and Station: unable to assess - patient lying down or seated  Speech and Language: mute  Mood: Did not respond  Affect: blunted  Thought Process and Associations: Unable to Assess  Perceptual Disturbances: Unable to Assess  Thought Content and Perceptions:: Unable to Assess  Sensorium and Orientation: drowsy  Recent and Remote Memory: Unable to  Formally Assess  Attention and Concentration: Unable to Formally Assess  Fund of Knowledge: Unable to Formally Assess  Insight: limited  Judgment: limited    CAM ICU positive? 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 Minimize use of PHYSICAL restraints OR have periods of being out of physical restraints if possible.  Keep window shades open and room lit during day and room dim at night in order to promote normal sleep-wake cycles  Encourage family at bedside. [place] patient often to situation, location, date.  Continue to Limit or Discontinue use of Narcotics, Benzos and Anti-cholinergic medications as they may worsen delirium.  Continue medical workup for causative etiology of Delirium.     Risk Assessment / Legal Status  Patient does not meet criteria for PEC or involuntary inpatient psychiatric admission at this time. Recommend to rescind PEC if one was placed. Patient is not currently an imminent danger to self or others and is not gravely disabled due to a psychiatric illness.    Follow-up:  Will follow-up while in house.    Disposition:   Defer to primary team.    Please contact ON CALL psychiatry service (24/7) for any acute issues that may arise.    Jareth Moore, MS3   Psychiatry  [facility]  5/15/2025 8:14 AM        --------------------------------------------------------------------------------------------------------------------------------------------------------------------------------------------------------------------------------------    CONTINUED OBJECTIVE clinical data & findings reviewed and noted for above decision making    Current Medications:   Scheduled Meds:    amLODIPine  10 mg Oral Daily    carbidopa-levodopa  mg  2 tablet Oral TID    enoxparin  40 mg Subcutaneous Daily     mirtazapine  7.5 mg Oral QHS    polyethylene glycol  17 g Oral BID     PRN Meds:   Current Facility-Administered Medications:     acetaminophen, 1,000 mg, Oral, Q8H PRN    acetaminophen, 650 mg, Oral, Q4H PRN    ALPRAZolam, 0.5 mg, Oral, BID PRN    dextrose 50%, 12.5 g, Intravenous, PRN    dextrose 50%, 25 g, Intravenous, PRN    gadobutroL, 7 mL, Intravenous, ONCE PRN    glucagon (human recombinant), 1 mg, Intramuscular, PRN    glucose, 16 g, Oral, PRN    glucose, 24 g, Oral, PRN    LIDOcaine HCL 10 mg/ml (1%), 5 mL, Other, On Call Procedure    melatonin, 6 mg, Oral, Nightly PRN    naloxone, 0.02 mg, Intravenous, PRN    ondansetron, 8 mg, Oral, Q8H PRN    prochlorperazine, 5 mg, Intravenous, Q6H PRN    sodium chloride 0.9%, 10 mL, Intravenous, Q12H PRN    Allergies:   Review of patient's allergies indicates:   Allergen Reactions    Gluten Diarrhea    Lactose        Vitals  Vitals:    05/15/25 0748   BP: 135/66   Pulse: 69   Resp: 17   Temp: 97.7 °F (36.5 °C)       Labs/Imaging/Studies:  Recent Results (from the past 24 hours)   Basic metabolic panel    Collection Time: 05/14/25  8:17 AM   Result Value Ref Range    Sodium 124 (L) 136 - 145 mmol/L    Potassium 3.8 3.5 - 5.1 mmol/L    Chloride 94 (L) 95 - 110 mmol/L    CO2 25 23 - 29 mmol/L    Glucose 94 70 - 110 mg/dL    BUN 18 8 - 23 mg/dL    Creatinine 0.8 0.5 - 1.4 mg/dL    Calcium 8.9 8.7 - 10.5 mg/dL    Anion Gap 5 (L) 8 - 16 mmol/L    eGFR >60 >60 mL/min/1.73/m2   Magnesium    Collection Time: 05/14/25  8:17 AM   Result Value Ref Range    Magnesium  1.5 (L) 1.6 - 2.6 mg/dL   CBC with Differential    Collection Time: 05/14/25  8:17 AM   Result Value Ref Range    WBC 5.25 3.90 - 12.70 K/uL    RBC 3.51 (L) 4.60 - 6.20 M/uL    HGB 11.1 (L) 14.0 - 18.0 gm/dL    HCT 32.2 (L) 40.0 - 54.0 %    MCV 92 82 - 98 fL    MCH 31.6 (H) 27.0 - 31.0 pg    MCHC 34.5 32.0 - 36.0 g/dL    RDW 11.5 11.5 - 14.5 %    Platelet Count 213 150 - 450 K/uL    MPV 10.3 9.2 - 12.9 fL     Nucleated RBC 0 <=0 /100 WBC    Neut % 69.8 38 - 73 %    Lymph % 14.5 (L) 18 - 48 %    Mono % 14.5 4 - 15 %    Eos % 0.6 <=8 %    Basophil % 0.4 <=1.9 %    Imm Grans % 0.2 0.0 - 0.5 %    Neut # 3.67 1.8 - 7.7 K/uL    Lymph # 0.76 (L) 1 - 4.8 K/uL    Mono # 0.76 0.3 - 1 K/uL    Eos # 0.03 <=0.5 K/uL    Baso # 0.02 <=0.2 K/uL    Imm Grans # 0.01 0.00 - 0.04 K/uL   Osmolality, Serum    Collection Time: 05/14/25 12:54 PM   Result Value Ref Range    Osmolality 279 (L) 280 - 300 mOsm/kg   Sodium, urine, random    Collection Time: 05/14/25  1:17 PM   Result Value Ref Range    Urine Sodium 68 20 - 250 mmol/L   Osmolality, urine    Collection Time: 05/14/25  1:17 PM   Result Value Ref Range    Urine Osmolality 550 50 - 1,200 mOsm/kg   ISTAT PROCEDURE    Collection Time: 05/14/25  4:37 PM   Result Value Ref Range    POC PH 7.406 7.35 - 7.45    POC PCO2 38.2 35 - 45 mmHg    POC PO2 72 (H) 40 - 60 mmHg    POC HCO3 24.0 24 - 28 mmol/L    POC BE -1 -2 to 2 mmol/L    POC SATURATED O2 94 95 - 100 %    POC Sodium 127 (L) 136 - 145 mmol/L    POC Potassium 3.8 3.5 - 5.1 mmol/L    POC TCO2 25 24 - 29 mmol/L    POC Ionized Calcium 1.26 1.06 - 1.42 mmol/L    POC Hematocrit 34 (L) 36 - 54 %PCV    Sample VENOUS     Site Other     Allens Test N/A     DelSys Room Air     Mode SPONT     FiO2 21    Basic metabolic panel    Collection Time: 05/14/25  6:15 PM   Result Value Ref Range    Sodium 125 (L) 136 - 145 mmol/L    Potassium 4.2 3.5 - 5.1 mmol/L    Chloride 93 (L) 95 - 110 mmol/L    CO2 23 23 - 29 mmol/L    Glucose 94 70 - 110 mg/dL    BUN 19 8 - 23 mg/dL    Creatinine 0.8 0.5 - 1.4 mg/dL    Calcium 8.8 8.7 - 10.5 mg/dL    Anion Gap 9 8 - 16 mmol/L    eGFR >60 >60 mL/min/1.73/m2   TSH    Collection Time: 05/15/25  4:55 AM   Result Value Ref Range    TSH 0.710 0.400 - 4.000 uIU/mL   T4, Free    Collection Time: 05/15/25  4:55 AM   Result Value Ref Range    Free T4 0.96 0.71 - 1.51 ng/dL   Magnesium    Collection Time: 05/15/25  4:55 AM    Result Value Ref Range    Magnesium  2.0 1.6 - 2.6 mg/dL   Phosphorus    Collection Time: 05/15/25  4:55 AM   Result Value Ref Range    Phosphorus Level 2.7 2.7 - 4.5 mg/dL   Comprehensive Metabolic Panel    Collection Time: 05/15/25  4:55 AM   Result Value Ref Range    Sodium 128 (L) 136 - 145 mmol/L    Potassium 3.7 3.5 - 5.1 mmol/L    Chloride 96 95 - 110 mmol/L    CO2 26 23 - 29 mmol/L    Glucose 92 70 - 110 mg/dL    BUN 17 8 - 23 mg/dL    Creatinine 0.9 0.5 - 1.4 mg/dL    Calcium 9.2 8.7 - 10.5 mg/dL    Protein Total 7.9 6.0 - 8.4 gm/dL    Albumin 2.6 (L) 3.5 - 5.2 g/dL    Bilirubin Total 0.4 0.1 - 1.0 mg/dL    ALP 64 40 - 150 unit/L    AST 31 11 - 45 unit/L    ALT <5 (L) 10 - 44 unit/L    Anion Gap 6 (L) 8 - 16 mmol/L    eGFR >60 >60 mL/min/1.73/m2   CBC with Differential    Collection Time: 05/15/25  4:55 AM   Result Value Ref Range    WBC 6.75 3.90 - 12.70 K/uL    RBC 3.28 (L) 4.60 - 6.20 M/uL    HGB 10.5 (L) 14.0 - 18.0 gm/dL    HCT 30.0 (L) 40.0 - 54.0 %    MCV 92 82 - 98 fL    MCH 32.0 (H) 27.0 - 31.0 pg    MCHC 35.0 32.0 - 36.0 g/dL    RDW 11.5 11.5 - 14.5 %    Platelet Count 210 150 - 450 K/uL    MPV 9.7 9.2 - 12.9 fL    Nucleated RBC 0 <=0 /100 WBC    Neut % 67.0 38 - 73 %    Lymph % 17.9 (L) 18 - 48 %    Mono % 13.8 4 - 15 %    Eos % 0.7 <=8 %    Basophil % 0.3 <=1.9 %    Imm Grans % 0.3 0.0 - 0.5 %    Neut # 4.52 1.8 - 7.7 K/uL    Lymph # 1.21 1 - 4.8 K/uL    Mono # 0.93 0.3 - 1 K/uL    Eos # 0.05 <=0.5 K/uL    Baso # 0.02 <=0.2 K/uL    Imm Grans # 0.02 0.00 - 0.04 K/uL     Imaging Results               CT Abdomen Pelvis With IV Contrast NO Oral Contrast (Final result)  Result time 04/30/25 15:46:15      Final result by Russell Ba MD (04/30/25 15:46:15)                   Impression:      1. Wall thickening of the distal ileum to the surgical anastomosis with the large bowel.  Inflammatory infectious ileitis are considerations.  Recommend clinical correlation and follow-up.  2. Constipation.   There is fecal distention of the rectum.  No fecal impaction is not excluded.  3. Bilateral renal cysts and hepatic cysts.  4. Nonobstructing nephrolithiasis of the right kidney.  5. Nondistention of the urinary bladder.  Mild wall thickening is not excluded.  6. Diverticulosis of the sigmoid colon.  No evidence of acute diverticulitis.  7. This report was flagged in Epic as abnormal.      Electronically signed by: Russell Humphries  Date:    04/30/2025  Time:    15:46               Narrative:    EXAMINATION:  CT ABDOMEN PELVIS WITH IV CONTRAST    CLINICAL HISTORY:  LLQ abdominal pain;RLQ abdominal pain (Age >= 14y);    TECHNIQUE:  Low dose axial images, sagittal and coronal reformations were obtained from the lung bases to the pubic symphysis following the IV administration of 75 mL of Omnipaque 350 .  Oral contrast was not administered.    COMPARISON:  11/05/2018    FINDINGS:  Abdomen:    - Lower thorax:    - Lung bases: No infiltrates and no nodules.    - Liver: Multiple small hepatic cysts.    - Gallbladder: No calcified gallstones.    - Bile Ducts: No evidence of intra or extra hepatic biliary ductal dilation.    - Spleen: Negative.    - Kidneys: Multiple bilateral simple renal cysts.  Single nonobstructing stone in the right kidney.  No hydronephrosis or hydroureter bilaterally.    - Adrenals: Unremarkable.    - Pancreas: No mass or peripancreatic fat stranding.    - Retroperitoneum:  No significant adenopathy.    - Vascular: Mild ectasia of the distal aorta with no evidence of aneurysm.    - Abdominal wall:  Unremarkable.    Pelvis:    Urinary bladder is incompletely distended with possible mild wall thickening.    Postop changes at the ileocecal junction.  There is wall thickening noted to the distal ileum to the surgical anastomosis.  Inflammatory or infectious ileitis are considerations.  Follow-up recommended.    Bowel/Mesentery:    No evidence of bowel obstruction.  Moderate retained feces in the  colon.    Fecal distention of the rectum.  Impaction is not excluded.    Mild diverticulosis of the sigmoid colon.  No evidence of acute diverticulitis.    Bones:  No acute osseous abnormality and no suspicious lytic or blastic lesion.                                       CT Head Without Contrast (Final result)  Result time 04/30/25 14:49:11      Final result by Ish Carrillo MD (04/30/25 14:49:11)                   Impression:      No evidence for acute intracranial pathology.      Electronically signed by: Ish Carrillo  Date:    04/30/2025  Time:    14:49               Narrative:    EXAMINATION:  CT HEAD WITHOUT CONTRAST    CLINICAL HISTORY:  Mental status change, unknown cause;    TECHNIQUE:  Low dose axial images were obtained through the head.  Coronal and sagittal reformations were also performed. Contrast was not administered.    COMPARISON:  MRI brain without contrast 04/24/2025, CT head without contrast 04/24/2025.    FINDINGS:  Ventricles are stable in size without evidence for new or worsening hydrocephalus.  No new or enlarging extra-axial blood or fluid collections.    Brain parenchyma appears unchanged.  Scattered supratentorial hypoattenuation, overall nonspecific, but can be seen in setting of microvascular ischemic change.  No parenchymal mass, edema, hemorrhage, or acute major vascular territory infarct.    No calvarial or skull base fracture or osseous destructive lesion.  Paranasal sinuses and mastoid air cells are essentially clear.                                       X-Ray Chest AP Portable (Final result)  Result time 04/30/25 12:49:39      Final result by Matti Cote MD (04/30/25 12:49:39)                   Impression:      See above      Electronically signed by: Matti Cote MD  Date:    04/30/2025  Time:    12:49               Narrative:    EXAMINATION:  XR CHEST AP PORTABLE    CLINICAL HISTORY:  Altered mental status, unspecified    TECHNIQUE:  Single frontal view of the chest was  performed.    COMPARISON:  No 04/23/2025 ne    FINDINGS:  Heart size normal.  The tracheal slightly deviates to the right at the thoracic inlet probably related to enlarged thyroid gland.  Minimal subsegmental atelectatic changes noted at the right lung base.  Lungs are otherwise clear.  No pleural effusion.

## 2025-05-15 NOTE — PROGRESS NOTES
[facility] - [facility]  Nephrology  Progress Note    Patient Name: Vince Huffman  MRN: [id number]  Admission Date: 4/30/2025  Hospital Length of Stay: 14 days  Attending Provider: Zina Tarango MD   Primary Care Physician: Leland Porras MD  Principal Problem:Acute encephalopathy    Subjective:     HPI: Vince is a pleasant 80 yo man w pmhx significant for HTN, hypothyroid, crohn's disease, CAD, hx of SBO, here for encephalopathy/confusion from rehab, A/Ox1, was suspected to have rectal impaction and given enema w subsequent BM, Neurology trialed carbidopa-levodopa, EEG, LP which was concerning for meningitis so started on treatment 5/6, however, later decided not infectious and so IVIG given for possible AI process, now completed 5 d w minimal improvement in cognitive function. Intermittently hyponatremic treated w IVF, Na 124 today and Nephrology consulted. Serum osm 279, urine osm 550, urine sodium pending.     Interval History:   No acute overnight events, patient remain confused to time place and person, ate his breakfast this morning.  Stable vital signs overnight, blood pressure 135/66, sodium 128 improved from 01/25 yesterday, potassium 3.7, CO2 26, serum creatinine 0.9, urine output 1.2 L/24 hours    Review of patient's allergies indicates:   Allergen Reactions    Gluten Diarrhea    Lactose      Current Facility-Administered Medications   Medication Frequency    acetaminophen tablet 1,000 mg Q8H PRN    acetaminophen tablet 650 mg Q4H PRN    ALPRAZolam tablet 0.5 mg BID PRN    amLODIPine tablet 10 mg Daily    carbidopa-levodopa  mg per tablet 2 tablet TID    dextrose 50% injection 12.5 g PRN    dextrose 50% injection 25 g PRN    enoxaparin injection 40 mg Daily    gadobutroL (GADAVIST) injection 7 mL ONCE PRN    glucagon (human recombinant) injection 1 mg PRN    glucose chewable tablet 16 g PRN    glucose chewable tablet 24 g PRN    LIDOcaine HCL 10 mg/ml (1%) injection 5 mL On Call  Procedure    melatonin tablet 6 mg Nightly PRN    mirtazapine tablet 7.5 mg QHS    naloxone 0.4 mg/mL injection 0.02 mg PRN    ondansetron disintegrating tablet 8 mg Q8H PRN    polyethylene glycol packet 17 g BID    prochlorperazine injection Soln 5 mg Q6H PRN    sodium chloride 0.9% flush 10 mL Q12H PRN       Objective:     Vital Signs (Most Recent):  Temp: 96.7 °F (35.9 °C) (05/15/25 1135)  Pulse: 78 (05/15/25 1135)  Resp: 18 (05/15/25 1135)  BP: 131/70 (05/15/25 1135)  SpO2: 100 % (05/15/25 1135) Vital Signs (24h Range):  Temp:  [96.7 °F (35.9 °C)-99.6 °F (37.6 °C)] 96.7 °F (35.9 °C)  Pulse:  [] 78  Resp:  [14-20] 18  SpO2:  [94 %-100 %] 100 %  BP: (131-165)/(66-80) 131/70     Weight: 64.1 kg (141 lb 5 oz) (05/06/25 0634)  Body mass index is 19.71 kg/m².  Body surface area is 1.79 meters squared.    I/O last 3 completed shifts:  In: 791.8 [P.O.:400; I.V.:391.8]  Out: 1200 [Urine:1200]     Physical Exam  Constitutional:       Appearance: He is ill-appearing. He is not toxic-appearing.   HENT:      Head: Normocephalic and atraumatic.   Eyes:      Extraocular Movements: Extraocular movements intact.   Cardiovascular:      Rate and Rhythm: Normal rate.      Heart sounds: No murmur heard.  Pulmonary:      Effort: No respiratory distress.      Breath sounds: No wheezing or rales.   Abdominal:      General: There is no distension.      Tenderness: There is no abdominal tenderness. There is no guarding.   Musculoskeletal:      Right lower leg: No edema.      Left lower leg: No edema.   Skin:     Coloration: Skin is pale. Skin is not jaundiced.   Neurological:      Mental Status: He is disoriented.          Significant Labs:  ABGs:   Recent Labs   Lab 05/14/25  1637   PH 7.406   PCO2 38.2   HCO3 24.0   POCSATURATED 94   BE -1     CBC:   Recent Labs   Lab 05/15/25  5485   WBC 6.75   RBC 3.28*   HGB 10.5*   HCT 30.0*      MCV 92   MCH 32.0*   MCHC 35.0     CMP:   Recent Labs   Lab 05/15/25  0030   GLU 92    CALCIUM 9.2   ALBUMIN 2.6*   PROT 7.9   *   K 3.7   CO2 26   CL 96   BUN 17   CREATININE 0.9   ALKPHOS 64   ALT <5*   AST 31   BILITOT 0.4     LFTs:   Recent Labs   Lab 05/15/25  0455   ALT <5*   AST 31   ALKPHOS 64   BILITOT 0.4   PROT 7.9   ALBUMIN 2.6*       Assessment/Plan:     Endocrine  Hyponatremia  Problem list    Hypo-osmolar hyponatremia - 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 IVIG from 5/8-5/12  Serum osm 279, urine osm 550, urine na 68. 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 Unknown cause of SIADH at this time.   We will continue to monitor    Please check TSH and FT4             Thank you for your consult. I will follow-up with patient. Please contact us if you have any additional questions.    Hector Herrera MD  Nephrology  [facility] Stepdown

## 2025-05-15 NOTE — SUBJECTIVE & OBJECTIVE
"  Subjective:     Interval History: See hospital course    Current Neurological Medications: See below    Current Medications[1]    Review of Systems   Unable to perform ROS: Mental status change     Objective:     Vital Signs (Most Recent):  Temp: 98.1 °F (36.7 °C) (05/15/25 1528)  Pulse: 77 (05/15/25 1528)  Resp: 17 (05/15/25 1528)  BP: 129/73 (05/15/25 1528)  SpO2: 100 % (05/15/25 1528) Vital Signs (24h Range):  Temp:  [96.7 °F (35.9 °C)-99.6 °F (37.6 °C)] 98.1 °F (36.7 °C)  Pulse:  [] 77  Resp:  [14-20] 17  SpO2:  [94 %-100 %] 100 %  BP: (129-165)/(66-80) 129/73     Weight: 64.1 kg (141 lb 5 oz)  Body mass index is 19.71 kg/m².     Physical Exam  Vitals and nursing note reviewed.   Constitutional:       General: He is not in acute distress.     Comments:  Thin   HENT:      Head: Normocephalic and atraumatic.   Eyes:      Extraocular Movements: EOM normal.      Conjunctiva/sclera: Conjunctivae normal.   Pulmonary:      Effort: Pulmonary effort is normal. No respiratory distress.   Musculoskeletal:      Right lower leg: No edema.      Left lower leg: No edema.   Skin:     General: Skin is warm and dry.   Neurological:      Mental Status: He is alert.          NEUROLOGICAL EXAMINATION:     MENTAL STATUS   Disoriented to person.   Oriented to place. ("[facility]")  Disoriented to time.   Level of consciousness: alert       Perseverating on "[facility]" and "no"     CRANIAL NERVES     CN III, IV, VI   Extraocular motions are normal.     CN VII   Facial expression full, symmetric.     CN XI   CN XI normal.        - L ptosis  - PERRLA     MOTOR EXAM   Muscle bulk: increased  Overall muscle tone: increased    GAIT AND COORDINATION        Bradykinetic bilaterally    Low amplitude, moderate frequency tremor in both hands        Significant Labs: All pertinent lab results from the past 24 hours have been reviewed.    Significant Imaging: I have reviewed and interpreted all pertinent imaging results/findings within the past " 24 hours.       [1]   Current Facility-Administered Medications   Medication Dose Route Frequency Provider Last Rate Last Admin    acetaminophen tablet 1,000 mg  1,000 mg Oral Q8H PRN Zuly Wheat PA-C   1,000 mg at 05/12/25 1554    acetaminophen tablet 650 mg  650 mg Oral Q4H PRN Zuly Wheat PA-C   650 mg at 05/04/25 1453    ALPRAZolam tablet 0.5 mg  0.5 mg Oral BID PRN Zuly Wheat PA-C   0.5 mg at 05/14/25 1626    amLODIPine tablet 10 mg  10 mg Oral Daily Juan F Ramírez MD   10 mg at 05/15/25 0927    carbidopa-levodopa  mg per tablet 2 tablet  2 tablet Oral TID Elian López MD   2 tablet at 05/15/25 0927    dextrose 50% injection 12.5 g  12.5 g Intravenous PRN Zuly Wheat PA-C        dextrose 50% injection 25 g  25 g Intravenous PRN Zuly Wheat PA-C        enoxaparin injection 40 mg  40 mg Subcutaneous Daily Zuly Wheat PA-C   40 mg at 05/14/25 1548    gadobutroL (GADAVIST) injection 7 mL  7 mL Intravenous ONCE PRN Arsenio Oglesby MD        glucagon (human recombinant) injection 1 mg  1 mg Intramuscular PRN Zuly Wheat PA-C        glucose chewable tablet 16 g  16 g Oral PRN Zuly Wheat PA-C        glucose chewable tablet 24 g  24 g Oral PRN Zuly Wheat PA-C        LIDOcaine HCL 10 mg/ml (1%) injection 5 mL  5 mL Other On Call Procedure Elian López MD        melatonin tablet 6 mg  6 mg Oral Nightly PRN Zuly Wheat PA-C        mirtazapine tablet 7.5 mg  7.5 mg Oral QHS Elian Morejon MD   7.5 mg at 05/14/25 2111    naloxone 0.4 mg/mL injection 0.02 mg  0.02 mg Intravenous PRN Zuly Wheat PA-C        ondansetron disintegrating tablet 8 mg  8 mg Oral Q8H PRN Zuly Wheat PA-C        polyethylene glycol packet 17 g  17 g Oral BID Zuly Wheat PA-C   17 g at 05/15/25 0929    prochlorperazine injection Soln 5 mg  5 mg Intravenous Q6H PRN Zuly Wheat PA-C        sodium chloride 0.9% flush 10 mL  10 mL Intravenous Q12H PRN Zuly Wheat,  ARIK

## 2025-05-15 NOTE — ASSESSMENT & PLAN NOTE
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 IVIG from 5/8-5/12  Serum osm 279, urine osm 550, urine na 68. 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 Unknown cause of SIADH at this time.     Please check TSH and FT4

## 2025-05-15 NOTE — SUBJECTIVE & OBJECTIVE
Interval History:   No acute overnight events, patient remain confused to time place and person, ate his breakfast this morning.  Stable vital signs overnight, blood pressure 135/66, sodium 128 improved from 01/25 yesterday, potassium 3.7, CO2 26, serum creatinine 0.9, urine output 1.2 L/24 hours    Review of patient's allergies indicates:   Allergen Reactions    Gluten Diarrhea    Lactose      Current Facility-Administered Medications   Medication Frequency    acetaminophen tablet 1,000 mg Q8H PRN    acetaminophen tablet 650 mg Q4H PRN    ALPRAZolam tablet 0.5 mg BID PRN    amLODIPine tablet 10 mg Daily    carbidopa-levodopa  mg per tablet 2 tablet TID    dextrose 50% injection 12.5 g PRN    dextrose 50% injection 25 g PRN    enoxaparin injection 40 mg Daily    gadobutroL (GADAVIST) injection 7 mL ONCE PRN    glucagon (human recombinant) injection 1 mg PRN    glucose chewable tablet 16 g PRN    glucose chewable tablet 24 g PRN    LIDOcaine HCL 10 mg/ml (1%) injection 5 mL On Call Procedure    melatonin tablet 6 mg Nightly PRN    mirtazapine tablet 7.5 mg QHS    naloxone 0.4 mg/mL injection 0.02 mg PRN    ondansetron disintegrating tablet 8 mg Q8H PRN    polyethylene glycol packet 17 g BID    prochlorperazine injection Soln 5 mg Q6H PRN    sodium chloride 0.9% flush 10 mL Q12H PRN       Objective:     Vital Signs (Most Recent):  Temp: 96.7 °F (35.9 °C) (05/15/25 1135)  Pulse: 78 (05/15/25 1135)  Resp: 18 (05/15/25 1135)  BP: 131/70 (05/15/25 1135)  SpO2: 100 % (05/15/25 1135) Vital Signs (24h Range):  Temp:  [96.7 °F (35.9 °C)-99.6 °F (37.6 °C)] 96.7 °F (35.9 °C)  Pulse:  [] 78  Resp:  [14-20] 18  SpO2:  [94 %-100 %] 100 %  BP: (131-165)/(66-80) 131/70     Weight: 64.1 kg (141 lb 5 oz) (05/06/25 0634)  Body mass index is 19.71 kg/m².  Body surface area is 1.79 meters squared.    I/O last 3 completed shifts:  In: 791.8 [P.O.:400; I.V.:391.8]  Out: 1200 [Urine:1200]     Physical Exam  Constitutional:        Appearance: He is ill-appearing. He is not toxic-appearing.   HENT:      Head: Normocephalic and atraumatic.   Eyes:      Extraocular Movements: Extraocular movements intact.   Cardiovascular:      Rate and Rhythm: Normal rate.      Heart sounds: No murmur heard.  Pulmonary:      Effort: No respiratory distress.      Breath sounds: No wheezing or rales.   Abdominal:      General: There is no distension.      Tenderness: There is no abdominal tenderness. There is no guarding.   Musculoskeletal:      Right lower leg: No edema.      Left lower leg: No edema.   Skin:     Coloration: Skin is pale. Skin is not jaundiced.   Neurological:      Mental Status: He is disoriented.          Significant Labs:  ABGs:   Recent Labs   Lab 05/14/25  1637   PH 7.406   PCO2 38.2   HCO3 24.0   POCSATURATED 94   BE -1     CBC:   Recent Labs   Lab 05/15/25  0455   WBC 6.75   RBC 3.28*   HGB 10.5*   HCT 30.0*      MCV 92   MCH 32.0*   MCHC 35.0     CMP:   Recent Labs   Lab 05/15/25  0455   GLU 92   CALCIUM 9.2   ALBUMIN 2.6*   PROT 7.9   *   K 3.7   CO2 26   CL 96   BUN 17   CREATININE 0.9   ALKPHOS 64   ALT <5*   AST 31   BILITOT 0.4     LFTs:   Recent Labs   Lab 05/15/25  0455   ALT <5*   AST 31   ALKPHOS 64   BILITOT 0.4   PROT 7.9   ALBUMIN 2.6*

## 2025-05-15 NOTE — PROGRESS NOTES
"[facility] - [facility]  Neurology  Progress Note    Patient Name: Vince Huffman  MRN: [id number]  Admission Date: 4/30/2025  Hospital Length of Stay: 14 days  Code Status: Full Code   Attending Provider: Zina Tarango MD  Primary Care Physician: Leland Porras MD   Principal Problem:Acute encephalopathy    HPI:   Vince Huffman is a 79 year old male with history of chron's disease, SBO, hypothyroidism, and CAD  presenting from [facility] with encephalopathy. He initially presented to [facility] for impaired mobility and difficulty with ADL's 2/2 generalized weakness. He was reportedly found confused today A&O x 1 (said the year is "1895") but is normally A&O x4. At this time he also expressed generalized abdominal pain. Recent ultrasounds of the lower extremity did not reveal DVT. MRI from last week was non diagnostic. UA unremarkable and CXR normal. CT abdomen and pelvis reveals wall thickening of the distal ileum consistent with possible infectious ileitis. Neurology consulted for further evaluation of encephalopathy.     Overview/Hospital Course:  05/12/2025 - IVIG Day 5/5. Exam worsened compared to yesterday (no longer speaking.)   05/13/2025 - Initial exam early this morning consistent with exam yesterday. However, when assessed by team closed to lunch time was talkative and saying multiple-word sentences (albeit still confused and perseverative.)   05/15/2025 - Patient continues having waxing-and-waning symptoms. Autoimmune encephalopathy serum and CSF panels unrevealing. Plan for repeat MRI with sedation as prior MRIs were motion degraded.         Subjective:     Interval History: See hospital course    Current Neurological Medications: See below    Current Medications[1]    Review of Systems   Unable to perform ROS: Mental status change     Objective:     Vital Signs (Most Recent):  Temp: 98.1 °F (36.7 °C) (05/15/25 1528)  Pulse: 77 (05/15/25 1528)  Resp: 17 (05/15/25 1528)  BP: 129/73 " "(05/15/25 1528)  SpO2: 100 % (05/15/25 1528) Vital Signs (24h Range):  Temp:  [96.7 °F (35.9 °C)-99.6 °F (37.6 °C)] 98.1 °F (36.7 °C)  Pulse:  [] 77  Resp:  [14-20] 17  SpO2:  [94 %-100 %] 100 %  BP: (129-165)/(66-80) 129/73     Weight: 64.1 kg (141 lb 5 oz)  Body mass index is 19.71 kg/m².     Physical Exam  Vitals and nursing note reviewed.   Constitutional:       General: He is not in acute distress.     Comments:  Thin   HENT:      Head: Normocephalic and atraumatic.   Eyes:      Extraocular Movements: EOM normal.      Conjunctiva/sclera: Conjunctivae normal.   Pulmonary:      Effort: Pulmonary effort is normal. No respiratory distress.   Musculoskeletal:      Right lower leg: No edema.      Left lower leg: No edema.   Skin:     General: Skin is warm and dry.   Neurological:      Mental Status: He is alert.          NEUROLOGICAL EXAMINATION:     MENTAL STATUS   Disoriented to person.   Oriented to place. ("[facility]")  Disoriented to time.   Level of consciousness: alert       Perseverating on "[facility]" and "no"     CRANIAL NERVES     CN III, IV, VI   Extraocular motions are normal.     CN VII   Facial expression full, symmetric.     CN XI   CN XI normal.        - L ptosis  - PERRLA     MOTOR EXAM   Muscle bulk: increased  Overall muscle tone: increased    GAIT AND COORDINATION        Bradykinetic bilaterally    Low amplitude, moderate frequency tremor in both hands        Significant Labs: All pertinent lab results from the past 24 hours have been reviewed.    Significant Imaging: I have reviewed and interpreted all pertinent imaging results/findings within the past 24 hours.    Assessment and Plan:     * Acute encephalopathy  79 year old male with history of Crohn's disease, SBO, hypothyroidism, and CAD  presenting from [facility] with encephalopathy. Unclear cause of encephalopathy at this time, though acute presentation with concurrent parkinsonism suggests autoimmune etiology. Motion artifact on MRI " "brain. On initial exam, was disoriented to place, time, and situation, minimally responsive and will answer with one word, asterixis in his upper extremities, axial rigidity/extremity/rigidity, masked facies, and intention tremor most notably in the left hand. No hyperreflexia. Ptosis of left eye though no extraocular movement abnormalities/pupillary discrepancy.     On my exam today, patient was perseverating and only responding "no" and "[facility]." He continued to have increased tone and bradykinesia bilaterally as well as low amplitude, moderate frequency tremor of both hands.     DDX: autoimmune Parkinsonism +/-  superimposed catatonia or complex bereavement    LABS  Serum  Pending: Ma2 ab, [facility] movement disorders panel, Vit E  On Movement Disorders panel, not on autoimmune encephalopathy panel = CCPQ (formerly known as P/Q type VGCC,) GRAF1, Septin5, ITPR1, AP3B2, KLHL11  Dopamine-2 and [place]-3 not commercially available per lab  WNL: strongyloides IgG, Treponema, negative celiac dx serology, zinc, folate, B12, copper, B1, autoimmune encephalopathy panel    CSF  Pending: None  WNL: AFB, meningitis-encephalitis panel, VDRL, culture, glucose, LDH, cytology, autoimmune encephalopathy panel  Abnormal: protein (60), pleocytosis (after correction for elevated RBC)    Recommendations:  - Repeat MRI Brain w/wo contrast with sedation (prior MRIs were motion degraded)  - S/p 5-day course of IVIG (EOT 5/12/2025)  - F/u pending labs as above  - Continue Sinemet 25 -100 mg 2 tablets TID  - Appreciate Psychiatry assistance  - Delirium and fall precautions     We will continue to follow. Please reach out with any questions.         VTE Risk Mitigation (From admission, onward)           Ordered     enoxaparin injection 40 mg  Daily         05/01/25 0827     IP VTE HIGH RISK PATIENT  Once         05/01/25 0827     Place sequential compression device  Until discontinued         05/01/25 0827                    Abdirashid Collier, " MD  Neurology  [facility]       [1]   Current Facility-Administered Medications   Medication Dose Route Frequency Provider Last Rate Last Admin    acetaminophen tablet 1,000 mg  1,000 mg Oral Q8H PRN Zuly Wheat PA-C   1,000 mg at 05/12/25 1554    acetaminophen tablet 650 mg  650 mg Oral Q4H PRN Zuly Wheat PA-C   650 mg at 05/04/25 1453    ALPRAZolam tablet 0.5 mg  0.5 mg Oral BID PRN Zuly Wheat PA-C   0.5 mg at 05/14/25 1626    amLODIPine tablet 10 mg  10 mg Oral Daily Juan F Ramírez MD   10 mg at 05/15/25 0927    carbidopa-levodopa  mg per tablet 2 tablet  2 tablet Oral TID Elian López MD   2 tablet at 05/15/25 0927    dextrose 50% injection 12.5 g  12.5 g Intravenous PRN Zuly Wheat PA-C        dextrose 50% injection 25 g  25 g Intravenous PRN Zuly Wheat PA-C        enoxaparin injection 40 mg  40 mg Subcutaneous Daily Zuly Wheat PA-C   40 mg at 05/14/25 1548    gadobutroL (GADAVIST) injection 7 mL  7 mL Intravenous ONCE PRN Arsenio Oglesby MD        glucagon (human recombinant) injection 1 mg  1 mg Intramuscular PRN Zuly Wheat PA-C        glucose chewable tablet 16 g  16 g Oral PRN Zuly Wheat PA-C        glucose chewable tablet 24 g  24 g Oral PRN Zuly Wheat PA-C        LIDOcaine HCL 10 mg/ml (1%) injection 5 mL  5 mL Other On Call Procedure Elian López MD        melatonin tablet 6 mg  6 mg Oral Nightly PRN Zuly Wheat PA-C        mirtazapine tablet 7.5 mg  7.5 mg Oral QHS Elian Morejon MD   7.5 mg at 05/14/25 2111    naloxone 0.4 mg/mL injection 0.02 mg  0.02 mg Intravenous PRN Zuly Wheat PA-C        ondansetron disintegrating tablet 8 mg  8 mg Oral Q8H PRN Jarret, Zuly, PA-C        polyethylene glycol packet 17 g  17 g Oral BID Zuly Wheat PA-C   17 g at 05/15/25 0929    prochlorperazine injection Soln 5 mg  5 mg Intravenous Q6H PRN Zuly Wheat PA-C        sodium chloride 0.9% flush 10 mL  10 mL  Intravenous Q12H PRN Zuly Wheat PA-C

## 2025-05-15 NOTE — PT/OT/SLP PROGRESS
Physical Therapy Treatment/Co-Treatment with O.T.    Patient Name:  Vince Huffman   MRN:  [id number]    Recommendations:     Discharge Recommendations: High Intensity Therapy  Discharge Equipment Recommendations: bedside commode, walker, rolling, wheelchair  Barriers to discharge: Inaccessible home and Decreased caregiver support    Assessment:     Vince Huffman is a 79 y.o. male admitted with a medical diagnosis of Acute encephalopathy.  He presents with the following impairments/functional limitations: weakness, impaired endurance, impaired self care skills, impaired functional mobility, gait instability, impaired balance, decreased coordination, decreased lower extremity function, decreased safety awareness, decreased upper extremity function, decreased ROM, impaired fine motor, impaired cardiopulmonary response to activity, impaired joint extensibility . Patient continues to appear rigid, but able to flex trunk and limbs with repetitions and stretching. Patient also has a tendency to lean to right in sitting.    Rehab Prognosis: Fair; patient would benefit from acute skilled PT services to address these deficits and reach maximum level of function.    Recent Surgery: * No surgery found *      Plan:     During this hospitalization, patient to be seen 4 x/week to address the identified rehab impairments via gait training, therapeutic activities, therapeutic exercises, neuromuscular re-education and progress toward the following goals:    Plan of Care Expires:  06/02/25    Subjective     Chief Complaint: fatigue  Patient/Family Comments/goals: none stated  Pain/Comfort:  Pain Rating 1: 0/10  Pain Rating Post-Intervention 1: 0/10      Objective:     Communicated with NSG prior to session.  Patient found HOB elevated with telemetry, Condom Catheter, SCD, pulse ox (continuous) upon PT entry to room.     General Precautions: Standard, fall  Orthopedic Precautions: N/A  Braces: N/A  Respiratory Status: Room air      Functional Mobility:  Bed Mobility:     Rolling Right: maximal assistance  Scooting: total assistance and of 2 persons  Supine to Sit: maximal assistance and of 2 persons  Sit to Supine: maximal assistance and of 2 persons  Transfers:     Sit to Stand:  maximal assistance and of 2 persons with hand-held assist  Bed to Chair: maximal assistance, total assistance, and of 2 persons with  no AD  using  Squat Pivot      AM-PAC 6 CLICK MOBILITY  Turning over in bed (including adjusting bedclothes, sheets and blankets)?: 2  Sitting down on and standing up from a chair with arms (e.g., wheelchair, bedside commode, etc.): 2  Moving from lying on back to sitting on the side of the bed?: 2  Moving to and from a bed to a chair (including a wheelchair)?: 2  Need to walk in hospital room?: 1  Climbing 3-5 steps with a railing?: 1  Basic Mobility Total Score: 10       Treatment & Education:  Co-Treatment with O.T. Patient sat at EOB with close to min assistance. Anterior lean stretch to break extensor tone muscle synergy. Static standing balance with B HHA with max of  2 x 20 secs. Squat pivot transfer from bed<>bedside chair with max of 2. Transfer back to bed and repositioned for comfort. Placed a pillow on the R side to decrease lateral lean and another one to elevate B heels to prevent Decubitus. Donned SCDs after treatment.    Patient left HOB elevated with all lines intact and call button in reach..    GOALS:   Multidisciplinary Problems       Physical Therapy Goals          Problem: Physical Therapy    Goal Priority Disciplines Outcome Interventions   Physical Therapy Goal     PT, PT/OT Progressing    Description: Goals to be met by: 2025     Patient will increase functional independence with mobility by performin. Supine to sit with MInimal Assistance  2. Sit to supine with MInimal Assistance  3. Sit to stand transfer with Minimal Assistance  4. Bed to chair transfer with Minimal Assistance using LRAD  5.  Gait  x 25 feet with Minimal Assistance using LRAD.   6. Lower extremity exercise program x15 reps per handout, with assistance as needed                         DME Justifications:   Vince requires a commode for home use because he is confined to a single room.  Sr. Vince Huffman has a mobility limitation that significantly impairs his ability to participate in one or more mobility related activities of daily living (MRADLs) such as toileting, feeding, dressing, grooming, and bathing in customary locations in the home.  The mobility limitation cannot be sufficiently resolved by the use of a cane or walker.   The use of a manual wheelchair will significantly improve the patients ability to participate in MRADLS and the patient will use it on regular basis in the home.  Sr. Vnice Huffman has expressed his willingness to use a manual wheelchair in the home. Patients upper body strength is sufficient for propulsion.  He also has a caregiver who is available, willing, and able to provide assistance with the wheelchair when needed.     Vince's mobility limitation cannot be sufficiently resolved by the use of a cane. His functional mobility deficit can be sufficiently resolved with the use of a Rolling Walker. Patient's mobility limitation significantly impairs their ability to participate in one of more activities of daily living.  The use of a RW will significantly improve the patient's ability to participate in MRADLS and the patient will use it on regular basis in the home.    Time Tracking:     PT Received On: 05/15/25  PT Start Time: 1014     PT Stop Time: 1045  PT Total Time (min): 31 min     Billable Minutes: Therapeutic Activity 31    Treatment Type: Treatment  PT/PTA: PTA     Number of PTA visits since last PT visit: 2     05/15/2025

## 2025-05-16 LAB
25(OH)D3+25(OH)D2 SERPL-MCNC: 31 NG/ML (ref 30–96)
ABSOLUTE EOSINOPHIL (OHS): 0.04 K/UL
ABSOLUTE MONOCYTE (OHS): 0.98 K/UL (ref 0.3–1)
ABSOLUTE NEUTROPHIL COUNT (OHS): 4.31 K/UL (ref 1.8–7.7)
ALBUMIN SERPL BCP-MCNC: 2.7 G/DL (ref 3.5–5.2)
ALP SERPL-CCNC: 74 UNIT/L (ref 40–150)
ALT SERPL W/O P-5'-P-CCNC: 12 UNIT/L (ref 10–44)
ANION GAP (OHS): 8 MMOL/L (ref 8–16)
AST SERPL-CCNC: 39 UNIT/L (ref 11–45)
BASOPHILS # BLD AUTO: 0.03 K/UL
BASOPHILS NFR BLD AUTO: 0.5 %
BILIRUB SERPL-MCNC: 0.4 MG/DL (ref 0.1–1)
BUN SERPL-MCNC: 15 MG/DL (ref 8–23)
CALCIUM SERPL-MCNC: 9.5 MG/DL (ref 8.7–10.5)
CHLORIDE SERPL-SCNC: 97 MMOL/L (ref 95–110)
CO2 SERPL-SCNC: 24 MMOL/L (ref 23–29)
CREAT SERPL-MCNC: 1 MG/DL (ref 0.5–1.4)
ERYTHROCYTE [DISTWIDTH] IN BLOOD BY AUTOMATED COUNT: 11.7 % (ref 11.5–14.5)
GFR SERPLBLD CREATININE-BSD FMLA CKD-EPI: >60 ML/MIN/1.73/M2
GLUCOSE SERPL-MCNC: 91 MG/DL (ref 70–110)
HCT VFR BLD AUTO: 34.3 % (ref 40–54)
HGB BLD-MCNC: 11.9 GM/DL (ref 14–18)
IMM GRANULOCYTES # BLD AUTO: 0.01 K/UL (ref 0–0.04)
IMM GRANULOCYTES NFR BLD AUTO: 0.2 % (ref 0–0.5)
INR PPP: 1 (ref 0.8–1.2)
LYMPHOCYTES # BLD AUTO: 0.95 K/UL (ref 1–4.8)
MAGNESIUM SERPL-MCNC: 1.8 MG/DL (ref 1.6–2.6)
MCH RBC QN AUTO: 32.2 PG (ref 27–31)
MCHC RBC AUTO-ENTMCNC: 34.7 G/DL (ref 32–36)
MCV RBC AUTO: 93 FL (ref 82–98)
NUCLEATED RBC (/100WBC) (OHS): 0 /100 WBC
PHOSPHATE SERPL-MCNC: 2.9 MG/DL (ref 2.7–4.5)
PLATELET # BLD AUTO: 228 K/UL (ref 150–450)
PMV BLD AUTO: 10 FL (ref 9.2–12.9)
POTASSIUM SERPL-SCNC: 4.1 MMOL/L (ref 3.5–5.1)
PROT SERPL-MCNC: 8.5 GM/DL (ref 6–8.4)
PROTHROMBIN TIME: 11 SECONDS (ref 9–12.5)
RBC # BLD AUTO: 3.7 M/UL (ref 4.6–6.2)
RELATIVE EOSINOPHIL (OHS): 0.6 %
RELATIVE LYMPHOCYTE (OHS): 15 % (ref 18–48)
RELATIVE MONOCYTE (OHS): 15.5 % (ref 4–15)
RELATIVE NEUTROPHIL (OHS): 68.2 % (ref 38–73)
SODIUM SERPL-SCNC: 129 MMOL/L (ref 136–145)
THYROGLOB AB SERPL IA-ACNC: 23.9 IU/ML
THYROPEROXIDASE IGG SERPL-ACNC: 64.8 IU/ML
WBC # BLD AUTO: 6.32 K/UL (ref 3.9–12.7)

## 2025-05-16 PROCEDURE — 25000003 PHARM REV CODE 250: Performed by: INTERNAL MEDICINE

## 2025-05-16 PROCEDURE — 25000003 PHARM REV CODE 250

## 2025-05-16 PROCEDURE — 97530 THERAPEUTIC ACTIVITIES: CPT

## 2025-05-16 PROCEDURE — 99232 SBSQ HOSP IP/OBS MODERATE 35: CPT | Mod: GC,,, | Performed by: INTERNAL MEDICINE

## 2025-05-16 PROCEDURE — 83655 ASSAY OF LEAD: CPT

## 2025-05-16 PROCEDURE — 86376 MICROSOMAL ANTIBODY EACH: CPT

## 2025-05-16 PROCEDURE — 86800 THYROGLOBULIN ANTIBODY: CPT

## 2025-05-16 PROCEDURE — 36415 COLL VENOUS BLD VENIPUNCTURE: CPT | Performed by: STUDENT IN AN ORGANIZED HEALTH CARE EDUCATION/TRAINING PROGRAM

## 2025-05-16 PROCEDURE — 36415 COLL VENOUS BLD VENIPUNCTURE: CPT

## 2025-05-16 PROCEDURE — 99233 SBSQ HOSP IP/OBS HIGH 50: CPT | Mod: GC,,, | Performed by: STUDENT IN AN ORGANIZED HEALTH CARE EDUCATION/TRAINING PROGRAM

## 2025-05-16 PROCEDURE — 83735 ASSAY OF MAGNESIUM: CPT | Performed by: STUDENT IN AN ORGANIZED HEALTH CARE EDUCATION/TRAINING PROGRAM

## 2025-05-16 PROCEDURE — 84100 ASSAY OF PHOSPHORUS: CPT | Performed by: STUDENT IN AN ORGANIZED HEALTH CARE EDUCATION/TRAINING PROGRAM

## 2025-05-16 PROCEDURE — 85025 COMPLETE CBC W/AUTO DIFF WBC: CPT | Performed by: STUDENT IN AN ORGANIZED HEALTH CARE EDUCATION/TRAINING PROGRAM

## 2025-05-16 PROCEDURE — 85610 PROTHROMBIN TIME: CPT | Performed by: STUDENT IN AN ORGANIZED HEALTH CARE EDUCATION/TRAINING PROGRAM

## 2025-05-16 PROCEDURE — 83884 ASSAY NEURFLMNT LIGHT CHAIN: CPT

## 2025-05-16 PROCEDURE — 80053 COMPREHEN METABOLIC PANEL: CPT | Performed by: STUDENT IN AN ORGANIZED HEALTH CARE EDUCATION/TRAINING PROGRAM

## 2025-05-16 PROCEDURE — 20600001 HC STEP DOWN PRIVATE ROOM

## 2025-05-16 PROCEDURE — 82306 VITAMIN D 25 HYDROXY: CPT

## 2025-05-16 PROCEDURE — 25000003 PHARM REV CODE 250: Performed by: STUDENT IN AN ORGANIZED HEALTH CARE EDUCATION/TRAINING PROGRAM

## 2025-05-16 PROCEDURE — 63600175 PHARM REV CODE 636 W HCPCS

## 2025-05-16 RX ORDER — LANOLIN ALCOHOL/MO/W.PET/CERES
1000 CREAM (GRAM) TOPICAL DAILY
Status: DISCONTINUED | OUTPATIENT
Start: 2025-05-16 | End: 2025-06-02 | Stop reason: HOSPADM

## 2025-05-16 RX ADMIN — MIRTAZAPINE 7.5 MG: 7.5 TABLET, FILM COATED ORAL at 08:05

## 2025-05-16 RX ADMIN — AMLODIPINE BESYLATE 10 MG: 10 TABLET ORAL at 09:05

## 2025-05-16 RX ADMIN — CARBIDOPA AND LEVODOPA 2 TABLET: 25; 100 TABLET ORAL at 08:05

## 2025-05-16 RX ADMIN — CYANOCOBALAMIN TAB 1000 MCG 1000 MCG: 1000 TAB at 08:05

## 2025-05-16 RX ADMIN — CARBIDOPA AND LEVODOPA 2 TABLET: 25; 100 TABLET ORAL at 02:05

## 2025-05-16 RX ADMIN — ENOXAPARIN SODIUM 40 MG: 40 INJECTION SUBCUTANEOUS at 05:05

## 2025-05-16 RX ADMIN — CARBIDOPA AND LEVODOPA 2 TABLET: 25; 100 TABLET ORAL at 09:05

## 2025-05-16 RX ADMIN — POLYETHYLENE GLYCOL 3350 17 G: 17 POWDER, FOR SOLUTION ORAL at 09:05

## 2025-05-16 RX ADMIN — POLYETHYLENE GLYCOL 3350 17 G: 17 POWDER, FOR SOLUTION ORAL at 08:05

## 2025-05-16 NOTE — SUBJECTIVE & OBJECTIVE
Subjective:     Interval History: See hospital course    Current Neurological Medications: See below    Current Medications[1]    Review of Systems   Unable to perform ROS: Mental status change     Objective:     Vital Signs (Most Recent):  Temp: 98.5 °F (36.9 °C) (05/16/25 1630)  Pulse: 86 (05/16/25 1630)  Resp: 18 (05/16/25 1630)  BP: (!) 143/74 (05/16/25 1630)  SpO2: 98 % (05/16/25 1630) Vital Signs (24h Range):  Temp:  [98 °F (36.7 °C)-98.6 °F (37 °C)] 98.5 °F (36.9 °C)  Pulse:  [] 86  Resp:  [18-21] 18  SpO2:  [96 %-100 %] 98 %  BP: (143-169)/(62-85) 143/74     Weight: 64.1 kg (141 lb 5 oz)  Body mass index is 19.71 kg/m².     Physical Exam  Vitals and nursing note reviewed.   Constitutional:       General: He is not in acute distress.     Comments:  Thin   HENT:      Head: Normocephalic and atraumatic.   Eyes:      Extraocular Movements: EOM normal.      Conjunctiva/sclera: Conjunctivae normal.   Pulmonary:      Effort: Pulmonary effort is normal. No respiratory distress.   Musculoskeletal:      Right lower leg: No edema.      Left lower leg: No edema.   Skin:     General: Skin is warm and dry.   Neurological:      Mental Status: He is alert.          NEUROLOGICAL EXAMINATION:     MENTAL STATUS   Oriented to person.   Oriented to place.   Disoriented to time.   Level of consciousness: alert       Perseverating     CRANIAL NERVES     CN III, IV, VI   Extraocular motions are normal.     CN VII   Facial expression full, symmetric.     CN XI   CN XI normal.        - L ptosis  - PERRLA     MOTOR EXAM   Muscle bulk: increased  Overall muscle tone: increased    GAIT AND COORDINATION        Bradykinetic bilaterally    Low amplitude, moderate frequency tremor in both hands        Significant Labs: All pertinent lab results from the past 24 hours have been reviewed.    Significant Imaging: I have reviewed and interpreted all pertinent imaging results/findings within the past 24 hours.       [1]   Current  Facility-Administered Medications   Medication Dose Route Frequency Provider Last Rate Last Admin    acetaminophen tablet 1,000 mg  1,000 mg Oral Q8H PRN Zuly Wheat PA-C   1,000 mg at 05/12/25 1554    acetaminophen tablet 650 mg  650 mg Oral Q4H PRN Zuly Wheat PA-C   650 mg at 05/04/25 1453    ALPRAZolam tablet 0.5 mg  0.5 mg Oral BID PRN Zuly Wheat PA-C   0.5 mg at 05/14/25 1626    amLODIPine tablet 10 mg  10 mg Oral Daily Juan F Ramírez MD   10 mg at 05/16/25 0958    carbidopa-levodopa  mg per tablet 2 tablet  2 tablet Oral TID Elian López MD   2 tablet at 05/16/25 1431    dextrose 50% injection 12.5 g  12.5 g Intravenous PRN Zuly Wheat PA-C        dextrose 50% injection 25 g  25 g Intravenous PRN Zuly Wheat PA-C        enoxaparin injection 40 mg  40 mg Subcutaneous Daily Zuly Wheat PA-C   40 mg at 05/15/25 1730    gadobutroL (GADAVIST) injection 7 mL  7 mL Intravenous ONCE PRN Arsenio Oglesby MD        glucagon (human recombinant) injection 1 mg  1 mg Intramuscular PRN Zuly Wheat PA-C        glucose chewable tablet 16 g  16 g Oral PRN Zuly Wheat PA-C        glucose chewable tablet 24 g  24 g Oral PRN Zuly Wheat PA-C        LIDOcaine HCL 10 mg/ml (1%) injection 5 mL  5 mL Other On Call Procedure Elian López MD        melatonin tablet 6 mg  6 mg Oral Nightly PRN Zuly Wheat PA-C        mirtazapine tablet 7.5 mg  7.5 mg Oral QHS Elian Morejon MD   7.5 mg at 05/15/25 2034    naloxone 0.4 mg/mL injection 0.02 mg  0.02 mg Intravenous PRN Zuly Wheat PA-C        ondansetron disintegrating tablet 8 mg  8 mg Oral Q8H PRN Zuly Wheat PA-C        polyethylene glycol packet 17 g  17 g Oral BID Zuly Wheat PA-C   17 g at 05/16/25 0958    prochlorperazine injection Soln 5 mg  5 mg Intravenous Q6H PRN Zuly Wheat PA-C        sodium chloride 0.9% flush 10 mL  10 mL Intravenous Q12H PRN Zuly Wheat PA-C

## 2025-05-16 NOTE — ASSESSMENT & PLAN NOTE
Anemia is likely due to acute on chronic illness. Most recent hemoglobin and hematocrit are listed below.  Recent Labs     05/14/25  0817 05/14/25  1637 05/15/25  0455   HGB 11.1*  --  10.5*   HCT 32.2* 34* 30.0*     Plan  - Monitor serial CBC: Daily  - Transfuse PRBC if patient becomes hemodynamically unstable, symptomatic or H/H drops below 7/21.  - Patient has not received any PRBC transfusions to date  - Patient's anemia is currently stable  -

## 2025-05-16 NOTE — ASSESSMENT & PLAN NOTE
Patient has single episode of depression which is severe and is currently uncontrolled. Will hold anti-depressant medications. We will consult psychiatry at this time. Patient does not display psychosis at this time. Continue to monitor closely and adjust plan of care as needed.

## 2025-05-16 NOTE — ASSESSMENT & PLAN NOTE
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 IVIG from 5/8-5/12  Serum osm 279, urine osm 550, urine na 68.   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 Unknown cause of SIADH at this time.   Hyponatremia could possibly be related to anti parkinsonian medication (improving with fluid restriction)  Normal TSH, T4

## 2025-05-16 NOTE — PROGRESS NOTES
[facility] - Acute [facility] Stepdown  Nephrology  Progress Note    Patient Name: Vince Huffman  MRN: [id number]  Admission Date: 4/30/2025  Hospital Length of Stay: 15 days  Attending Provider: Zina Tarango MD   Primary Care Physician: Leland Porras MD  Principal Problem:Acute encephalopathy    Subjective:     HPI: Vince is a pleasant 80 yo man w pmhx significant for HTN, hypothyroid, crohn's disease, CAD, hx of SBO, here for encephalopathy/confusion from rehab, A/Ox1, was suspected to have rectal impaction and given enema w subsequent BM, Neurology trialed carbidopa-levodopa, EEG, LP which was concerning for meningitis so started on treatment 5/6, however, later decided not infectious and so IVIG given for possible AI process, now completed 5 d w minimal improvement in cognitive function. Intermittently hyponatremic treated w IVF, Na 124 today and Nephrology consulted. Serum osm 279, urine osm 550, urine sodium pending.     Interval History:     No acute overnight events, remain altered, not responsive, blood pressure 157/85, sodium improved (129) up from 128, potassium 4.1, CO2 24, serum creatinine 1.  Urine output 300 cc/24 hours    Review of patient's allergies indicates:   Allergen Reactions    Gluten Diarrhea    Lactose      Current Facility-Administered Medications   Medication Frequency    acetaminophen tablet 1,000 mg Q8H PRN    acetaminophen tablet 650 mg Q4H PRN    ALPRAZolam tablet 0.5 mg BID PRN    amLODIPine tablet 10 mg Daily    carbidopa-levodopa  mg per tablet 2 tablet TID    dextrose 50% injection 12.5 g PRN    dextrose 50% injection 25 g PRN    enoxaparin injection 40 mg Daily    gadobutroL (GADAVIST) injection 7 mL ONCE PRN    glucagon (human recombinant) injection 1 mg PRN    glucose chewable tablet 16 g PRN    glucose chewable tablet 24 g PRN    LIDOcaine HCL 10 mg/ml (1%) injection 5 mL On Call Procedure    melatonin tablet 6 mg Nightly PRN    mirtazapine tablet 7.5 mg QHS     naloxone 0.4 mg/mL injection 0.02 mg PRN    ondansetron disintegrating tablet 8 mg Q8H PRN    polyethylene glycol packet 17 g BID    prochlorperazine injection Soln 5 mg Q6H PRN    sodium chloride 0.9% flush 10 mL Q12H PRN       Objective:     Vital Signs (Most Recent):  Temp: 98.5 °F (36.9 °C) (05/16/25 0649)  Pulse: 95 (05/16/25 0649)  Resp: 18 (05/16/25 0649)  BP: (!) 157/85 (05/16/25 0649)  SpO2: 96 % (05/16/25 0649) Vital Signs (24h Range):  Temp:  [96.7 °F (35.9 °C)-98.6 °F (37 °C)] 98.5 °F (36.9 °C)  Pulse:  [] 95  Resp:  [17-21] 18  SpO2:  [96 %-100 %] 96 %  BP: (129-169)/(62-85) 157/85     Weight: 64.1 kg (141 lb 5 oz) (05/06/25 0634)  Body mass index is 19.71 kg/m².  Body surface area is 1.79 meters squared.    I/O last 3 completed shifts:  In: -   Out: 900 [Urine:900]     Physical Exam  Constitutional:       Appearance: He is ill-appearing. He is not toxic-appearing.   HENT:      Head: Normocephalic and atraumatic.      Mouth/Throat:      Mouth: Mucous membranes are dry.   Cardiovascular:      Rate and Rhythm: Normal rate.      Heart sounds: No murmur heard.  Pulmonary:      Effort: No respiratory distress.      Breath sounds: No wheezing or rales.   Abdominal:      General: There is no distension.      Tenderness: There is no abdominal tenderness. There is no guarding.   Musculoskeletal:      Right lower leg: No edema.      Left lower leg: No edema.   Skin:     Coloration: Skin is pale. Skin is not jaundiced.   Neurological:      Mental Status: He is disoriented.          Significant Labs:  ABGs:   Recent Labs   Lab 05/14/25  1637   PH 7.406   PCO2 38.2   HCO3 24.0   POCSATURATED 94   BE -1     CBC:   Recent Labs   Lab 05/16/25  0544   WBC 6.32   RBC 3.70*   HGB 11.9*   HCT 34.3*      MCV 93   MCH 32.2*   MCHC 34.7     CMP:   Recent Labs   Lab 05/16/25  0544   GLU 91   CALCIUM 9.5   ALBUMIN 2.7*   PROT 8.5*   *   K 4.1   CO2 24   CL 97   BUN 15   CREATININE 1.0   ALKPHOS 74   ALT 12    AST 39   BILITOT 0.4     LFTs:   Recent Labs   Lab 05/16/25  0544   ALT 12   AST 39   ALKPHOS 74   BILITOT 0.4   PROT 8.5*   ALBUMIN 2.7*       Assessment/Plan:     Endocrine  Hyponatremia  Problem list    Hypo-osmolar hyponatremia - 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 IVIG from 5/8-5/12  Serum osm 279, urine osm 550, urine na 68.   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 Unknown cause of SIADH at this time.   Hyponatremia could possibly be related to anti parkinsonian medication carbidopa/ levodopa (improving with fluid restriction)  Normal TSH, T4                 Thank you for your consult. I will follow-up with patient. Please contact us if you have any additional questions.    Hector Herrera MD  Nephrology  [facility] Step[facility]

## 2025-05-16 NOTE — PLAN OF CARE
Problem: Occupational Therapy  Goal: Occupational Therapy Goal  Description: Goals to be met by: 5/24/25     Patient will increase functional independence with ADLs by performing:    UE Dressing with Contact Guard Assistance.  LE Dressing with Minimal Assistance.  Grooming while bedside chair with Minimal Assistance.  Toileting from bedside commode with Minimal Assistance for hygiene and clothing management.   Sitting at edge of bed x5 minutes with Contact Guard Assistance for upright balance.  Rolling to Bilateral with Minimal Assistance.   Supine to sit with Minimal Assistance.  Step transfer with Moderate Assistance  Toilet transfer to bedside commode with Moderate Assistance.    Outcome: Progressing     Goals remain appropriate.

## 2025-05-16 NOTE — ASSESSMENT & PLAN NOTE
Patient's blood pressure range in the last 24 hours was: BP  Min: 129/73  Max: 156/77.The patient's inpatient anti-hypertensive regimen is listed below:  Current Antihypertensives  amLODIPine tablet 10 mg, Daily, Oral    Plan  - BP is controlled, no changes needed to their regimen

## 2025-05-16 NOTE — PROGRESS NOTES
[facility] - [facility] Medicine  Progress Note    Patient Name: Vince Huffman  MRN: [id number]  Patient Class: IP- Inpatient   Admission Date: 4/30/2025  Length of Stay: 14 days  Attending Physician: Zina Tarango MD  Primary Care Provider: Leland Porras MD        Subjective     Principal Problem:Acute encephalopathy        HPI:  78 yo M w/HTN, hypothyroid, crohn's disease, CAD, hx of SBO, presenting with AMS from [facility]. Patient was sent in from [facility] for progressively worsening cognitive function. Patient is A&O x1 at his baseline. Patient and his daughter endorse mild confusion. Which has been progressive.    Patient has not had any recent falls. Endorsed mild suprapubic abdominal pain. No UTI. Mild inflammation on CT, not unexpected in the setting of Crohn's.     Overview/Hospital Course:  Vince Huffman is a 79 y.o. M who was admitted to  for further evaluation of worsening AMS per rehab facility. AAOx1 on admission, normally AAOx4 prior to 4/20 per son. UA negative. CXR clear. CT abd/pelvis with wall thickening of distal ileum, inflammatory infectious ileitis are considerations, fecal distention of the rectum, impaction not excluded. Discussed with GI, anticipated these are chronic findings. CRP negative. Will give enema and monitor for improvement. Patient with successful BM. Neurology consulted: MRI brain ordered (no acute findings, motion artifact), extended EEG, ammonia levels. Worsening mental status on 5/2, medicine team consulted for LP which was unsuccessful. More alert on 5/3 and answering yes/no questions when redirected. Neurology recommending carbidopa-levodopa trial, EEG, and attempting another LP.     Symptoms improved with increasing doses of Carbidopa/Levodopa. LP concerning for meningitis so he was started on Empiric treatment on 5/6/25.     5/9 -Discussion held with both Neurology and Infectious Disease, Infectious Disease does not believe patient  has any infectious etiology and as such recommended discontinuing antimicrobials.  Neurology started IVIG for presumed autoimmune process    5/14- Completed 5 day course of IVIG. Minimal improvement in cognition. Had bouts of poor oral intake, only drank boost.  Had intermittent hyponatremia responding to IV fluids.  On 05/14 am sodium 124.  Nephrology was consulted, investigations ordered and sodium corrected with close electrolyte monitoring.  Psych was consulted by the request of the neurologist     5/15- hyponatremia improved after stopping fluids. NG tube attempted thrice at bedside however not able to be placed. Able to tolerate oral intake however very limited intake. Neuro recommended repeat MRI with sedation as previous had artifact. Anesthesia notified to arrange with sedation.      Interval History:    Sodium of 128 today. Remains non verbal. Nods occasionally however was more responsive for nursing staff today. Mentation seems to wax and wane.    Review of Systems   Unable to perform ROS: Other     Objective:     Vital Signs (Most Recent):  Temp: 98.6 °F (37 °C) (05/15/25 1918)  Pulse: 104 (05/15/25 1918)  Resp: (!) 21 (05/15/25 1918)  BP: (!) 156/77 (05/15/25 1918)  SpO2: 99 % (05/15/25 1918) Vital Signs (24h Range):  Temp:  [96.7 °F (35.9 °C)-99.1 °F (37.3 °C)] 98.6 °F (37 °C)  Pulse:  [] 104  Resp:  [15-21] 21  SpO2:  [94 %-100 %] 99 %  BP: (129-156)/(66-80) 156/77     Weight: 64.1 kg (141 lb 5 oz)  Body mass index is 19.71 kg/m².    Intake/Output Summary (Last 24 hours) at 5/15/2025 2210  Last data filed at 5/15/2025 0413  Gross per 24 hour   Intake --   Output 600 ml   Net -600 ml        Physical Exam  Constitutional:       General: He is not in acute distress.     Appearance: He is ill-appearing.   HENT:      Head: Normocephalic.      Right Ear: External ear normal.      Left Ear: External ear normal.      Nose: Nose normal.   Cardiovascular:      Rate and Rhythm: Normal rate.      Heart  sounds: Normal heart sounds.   Pulmonary:      Breath sounds: Normal breath sounds.   Abdominal:      Palpations: Abdomen is soft.      Tenderness: There is no abdominal tenderness.   Musculoskeletal:      Right lower leg: No edema.      Left lower leg: No edema.      Comments: SCD   Skin:     General: Skin is warm.   Neurological:      General: No focal deficit present.      Mental Status: He is alert however no meaningful response and remained non verbal for exam today    Significant Labs: All pertinent labs within the past 24 hours have been reviewed.      Significant Imaging: I have reviewed all pertinent imaging results/findings within the past 24 hours            Assessment & Plan  Acute encephalopathy  AMS (altered mental status)  79 y.o.M with progressive acutely worsening confusion/tremors since 4/20. Previously AAOx4, now AAOx1  - neurology consulted, appreciate recommendations:  - MRI brain from last week was essentially non diagnostic  - Repeat MRI again without obvious new pathology   -working diagnosis is Parkinson's disease, as improving with Sinemet: iContinue two tabs TID  -LP concerning for possible meningitis. Continue Empiric treatment per Neurology recs   -EEG pending   - Delirium and fall precautions along with neuro checks  - PT/OT consult placed; return to [facility] upon discharge     5/8- Viral meningitis panel unremarkable.  Cultures no growth.  CSF with elevated proteins and white cell count.  Started on empiric antibiotics, vancomycin ceftriaxone and ampicillin, infectious disease consulted.  Follow up with Neurology recommendations.  Started on carbidopa levodopa.  Dispo plan acute rehab.  Mentation remains the same during this hospitalization A&O x1    5/9 -Started on IVIG per Neurology.  EEG(f/u)    5/10-Continue with IVIG, follow up with Neurology.  EEG report noted    5/14 - Completed IVIG course, follow up with Neurology. ? D/c back to acute rehab, if mentation improves    5/15-  "mentation remains unchanged, appreciate neuro and psychiatry recommendations. Hyponatremia improving. repeat MRI with sedation 5/16 as previous had artifact. Anesthesia notified to arrange with sedation.  Crohn's disease  - Per CT scan "thickening of the distal ileum to the surgical anastomosis with the large bowel. Inflammatory infectious ileitis are considerations. Recommend clinical correlation and follow-up. "  -CRP negative. No concern for flare.   -GI without concerns of confusion being caused by sterlara, confirmed with pharmacy     Generalized weakness  - sent from SNF  - progressive worsening weakness per son   - PT/OT ordered. Will need placement at discharge    Unintended weight loss  - reported per son  - recently started gluten free diet per recommendations from GI    Hyponatremia  Hyponatremia is likely due to Dehydration/hypovolemia. The patient's most recent sodium results are listed below.  Recent Labs     05/14/25  0817 05/14/25  1815 05/15/25  0455   * 125* 128*     Maxime  - Monitor sodium Daily.   - Patient hyponatremia is stable  - nephro recs appreciated, felt 2/2 SIADH    Essential hypertension  Patient's blood pressure range in the last 24 hours was: BP  Min: 129/73  Max: 156/77.The patient's inpatient anti-hypertensive regimen is listed below:  Current Antihypertensives  amLODIPine tablet 10 mg, Daily, Oral    Plan  - BP is controlled, no changes needed to their regimen    Parkinsonism  Suspect symptoms are at least partially due to Parkinson's disease. Continue Sinemet as above.     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 Will hold anti-depressant medications. We will consult psychiatry at this time. Patient does not display psychosis at this time. Continue to monitor closely and adjust plan of care as needed.      Anemia  Anemia is likely " due to acute on chronic illness. Most recent hemoglobin and hematocrit are listed below.  Recent Labs     05/14/25  0817 05/14/25  1637 05/15/25  0455   HGB 11.1*  --  10.5*   HCT 32.2* 34* 30.0*     Plan  - Monitor serial CBC: Daily  - Transfuse PRBC if patient becomes hemodynamically unstable, symptomatic or H/H drops below 7/21.  - Patient has not received any PRBC transfusions to date  - Patient's anemia is currently stable  -  Hypomagnesemia (Resolved: 5/15/2025)       VTE Risk Mitigation (From admission, onward)           Ordered     enoxaparin injection 40 mg  Daily         05/01/25 0827     IP VTE HIGH RISK PATIENT  Once         05/01/25 0827     Place sequential compression device  Until discontinued         05/01/25 0827                    Discharge Planning   ERNIE: 5/20/2025     Code Status: Full Code   Medical Readiness for Discharge Date:   Discharge Plan A: Rehab   Discharge Delays: None known at this time            Please place Justification for DME        Zina Tarango MD  Department of Hospital Medicine   [facility] - Acute Medical Stepdown

## 2025-05-16 NOTE — ASSESSMENT & PLAN NOTE
79 year old male with history of Crohn's disease, SBO, hypothyroidism, and CAD  presenting from [facility] with encephalopathy. Unclear cause of encephalopathy at this time, though acute presentation with concurrent parkinsonism suggests autoimmune etiology. Motion artifact on MRI brain. On initial exam, was disoriented to place, time, and situation, minimally responsive and will answer with one word, asterixis in his upper extremities, axial rigidity/extremity/rigidity, masked facies, and intention tremor most notably in the left hand. No hyperreflexia. Ptosis of left eye though no extraocular movement abnormalities/pupillary discrepancy.     On my exam today, patient was AO x 2 but continues perseverating.    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 There have been reported cases of SREAT with normal fT4, so normal level from yesterday does not exclude this diagnosis.     LABS  Serum  Pending: Ma2 ab, [facility] movement disorders panel, Vit E, NFL, heavy metals   On Movement Disorders panel, not on autoimmune encephalopathy panel = CCPQ (formerly known as P/Q type VGCC,) GRAF1, Septin5, ITPR1, AP3B2, KLHL11  Dopamine-2 and [place]-3 not commercially available per lab  WNL: strongyloides IgG, Treponema, negative celiac dx serology, zinc, folate, B12, copper, B1, autoimmune encephalopathy panel, HIV, Vit D  Abnormal: elevated anti-thyroglobulin & anti-TPO    CSF  Pending: None  WNL: AFB, meningitis-encephalitis panel, VDRL, culture, glucose, LDH, cytology, autoimmune encephalopathy panel  Abnormal: protein (60), pleocytosis (after correction for elevated  RBC)    Recommendations:  - Repeat MRI Brain w/wo contrast with sedation (prior MRIs were motion degraded)  - Plan for 5-day course of IVMP to start tomorrow  - Endocrinology consult for likely SREAT  - Start B12 repletion - 1000 mcg po daily  - S/p 5-day course of IVIG (EOT 5/12/2025)  - F/u pending labs as above  - Continue Sinemet 25 -100 mg 2 tablets TID  - Appreciate Psychiatry assistance  - Delirium and fall precautions     We will continue to follow. Please reach out with any questions.

## 2025-05-16 NOTE — SUBJECTIVE & OBJECTIVE
Interval History:    Sodium of 128 today. Remains non verbal. Nods occasionally however was more responsive for nursing staff today. Mentation seems to wax and wane.    Review of Systems   Unable to perform ROS: Other     Objective:     Vital Signs (Most Recent):  Temp: 98.6 °F (37 °C) (05/15/25 1918)  Pulse: 104 (05/15/25 1918)  Resp: (!) 21 (05/15/25 1918)  BP: (!) 156/77 (05/15/25 1918)  SpO2: 99 % (05/15/25 1918) Vital Signs (24h Range):  Temp:  [96.7 °F (35.9 °C)-99.1 °F (37.3 °C)] 98.6 °F (37 °C)  Pulse:  [] 104  Resp:  [15-21] 21  SpO2:  [94 %-100 %] 99 %  BP: (129-156)/(66-80) 156/77     Weight: 64.1 kg (141 lb 5 oz)  Body mass index is 19.71 kg/m².    Intake/Output Summary (Last 24 hours) at 5/15/2025 2210  Last data filed at 5/15/2025 0413  Gross per 24 hour   Intake --   Output 600 ml   Net -600 ml        Physical Exam  Constitutional:       General: He is not in acute distress.     Appearance: He is ill-appearing.   HENT:      Head: Normocephalic.      Right Ear: External ear normal.      Left Ear: External ear normal.      Nose: Nose normal.   Cardiovascular:      Rate and Rhythm: Normal rate.      Heart sounds: Normal heart sounds.   Pulmonary:      Breath sounds: Normal breath sounds.   Abdominal:      Palpations: Abdomen is soft.      Tenderness: There is no abdominal tenderness.   Musculoskeletal:      Right lower leg: No edema.      Left lower leg: No edema.      Comments: SCD   Skin:     General: Skin is warm.   Neurological:      General: No focal deficit present.      Mental Status: He is alert however no meaningful response and remained non verbal for exam today    Significant Labs: All pertinent labs within the past 24 hours have been reviewed.      Significant Imaging: I have reviewed all pertinent imaging results/findings within the past 24 hours

## 2025-05-16 NOTE — MEDICAL/APP STUDENT
"CONSULTATION LIAISON PSYCHIATRY PROGRESS NOTE    Patient Name: Vince Huffman  MRN: [id number]  Patient Class: IP- Inpatient  Admission Date: 4/30/2025  Attending Physician: Zina Tarango MD      SUBJECTIVE:   Vince Huffman is a 79 y.o. male with no past psychiatric history & past pertinent medical history of Crohn's, HTN, CAD, hypothyroid admitted to the hospital for acute encephalopathy.     Psychiatry consulted for "complex bereavement, concern for catatonia- per neuro"     Interval History:     Upon psychiatric interview today, pt was laying in bed. He was alert and comfortable. Pt responded with one word answer like "good" and "sure" when asked about mood and consent to do some tests. Pt was able to follow commands such as squeezing my fingers and raising up 1 finger bilaterally. He was unable to raise 2 fingers or arms. Pt made ambivalent facial expressions when asked about his appetite and if he wanted to eat or drink anything. Pt denies being in any pain.      OBJECTIVE    Vital Signs:  Temp:  [98.1 °F (36.7 °C)-98.6 °F (37 °C)]   Pulse:  []   Resp:  [17-21]   BP: (129-169)/(62-85)   SpO2:  [96 %-100 %]     Mental Status Exam:  General Appearance: appears stated age, well developed and nourished, adequately groomed and appropriately dressed, in no acute distress  Behavior: cooperative, pleasant, appropriate eye-contact, minimal responses  Involuntary Movements and Motor Activity: no abnormal involuntary movements noted; no tics, no tremors, no akathisia, no dystonia, no evidence of tardive dyskinesia; no psychomotor agitation or retardation  Gait and Station: unable to assess - patient lying down or seated  Speech and Language: responds to questions minimally/briefly  Mood: "Good"  Affect: normal, euthymic, reactive, full-range, mood-congruent, appropriate to situation and context  Thought Process and Associations: Unable to Assess  Perceptual Disturbances: denies hallucinations  Thought Content and " Perceptions:: no suicidal or homicidal ideation, no auditory or visual hallucinations, no paranoid ideation, no ideas of reference, no evidence of delusions or psychosis  Sensorium and Orientation: oriented to person only  Recent and Remote Memory: Unable to  Formally Assess  Attention and Concentration: Unable to Formally Assess  Fund of Knowledge: Unable to Formally Assess  Insight: limited  Judgment: limited    CAM ICU positive? 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 Minimize use of PHYSICAL restraints OR have periods of being out of physical restraints if possible.  Keep window shades open and room lit during day and room dim at night in order to promote normal sleep-wake cycles  Encourage family at bedside. [place] patient often to situation, location, date.  Continue to Limit or Discontinue use of Narcotics, Benzos and Anti-cholinergic medications as they may worsen delirium.  Continue medical workup for causative etiology of Delirium.     Risk Assessment / Legal Status  Patient does not meet criteria for PEC or involuntary inpatient psychiatric admission at this time. Recommend to rescind PEC if one was placed. Patient is not currently an imminent danger to self or others and is not gravely disabled due to a psychiatric illness.    Follow-up:  Will follow-up while in house.    Disposition:   Defer to primary team.    Please contact ON CALL psychiatry service (24/7) for any acute issues that may arise.    Jareth Moore MS3   Psychiatry  [facility]  5/16/2025 12:09  PM        --------------------------------------------------------------------------------------------------------------------------------------------------------------------------------------------------------------------------------------    CONTINUED OBJECTIVE clinical data & findings reviewed and noted for above decision making    Current Medications:   Scheduled Meds:    amLODIPine  10 mg Oral Daily    carbidopa-levodopa  mg  2 tablet Oral TID    enoxparin  40 mg Subcutaneous Daily    mirtazapine  7.5 mg Oral QHS    polyethylene glycol  17 g Oral BID     PRN Meds:   Current Facility-Administered Medications:     acetaminophen, 1,000 mg, Oral, Q8H PRN    acetaminophen, 650 mg, Oral, Q4H PRN    ALPRAZolam, 0.5 mg, Oral, BID PRN    dextrose 50%, 12.5 g, Intravenous, PRN    dextrose 50%, 25 g, Intravenous, PRN    gadobutroL, 7 mL, Intravenous, ONCE PRN    glucagon (human recombinant), 1 mg, Intramuscular, PRN    glucose, 16 g, Oral, PRN    glucose, 24 g, Oral, PRN    LIDOcaine HCL 10 mg/ml (1%), 5 mL, Other, On Call Procedure    melatonin, 6 mg, Oral, Nightly PRN    naloxone, 0.02 mg, Intravenous, PRN    ondansetron, 8 mg, Oral, Q8H PRN    prochlorperazine, 5 mg, Intravenous, Q6H PRN    sodium chloride 0.9%, 10 mL, Intravenous, Q12H PRN    Allergies:   Review of patient's allergies indicates:   Allergen Reactions    Gluten Diarrhea    Lactose        Vitals  Vitals:    05/16/25 0649   BP: (!) 157/85   Pulse: 95   Resp: 18   Temp: 98.5 °F (36.9 °C)       Labs/Imaging/Studies:  Recent Results (from the past 24 hours)   Magnesium    Collection Time: 05/16/25  5:44 AM   Result Value Ref Range    Magnesium  1.8 1.6 - 2.6 mg/dL   Phosphorus    Collection Time: 05/16/25  5:44 AM   Result Value Ref Range    Phosphorus Level 2.9 2.7 - 4.5 mg/dL   Comprehensive Metabolic Panel    Collection Time: 05/16/25  5:44 AM   Result Value Ref Range    Sodium 129 (L) 136 - 145 mmol/L    Potassium 4.1 3.5 - 5.1 mmol/L    Chloride  97 95 - 110 mmol/L    CO2 24 23 - 29 mmol/L    Glucose 91 70 - 110 mg/dL    BUN 15 8 - 23 mg/dL    Creatinine 1.0 0.5 - 1.4 mg/dL    Calcium 9.5 8.7 - 10.5 mg/dL    Protein Total 8.5 (H) 6.0 - 8.4 gm/dL    Albumin 2.7 (L) 3.5 - 5.2 g/dL    Bilirubin Total 0.4 0.1 - 1.0 mg/dL    ALP 74 40 - 150 unit/L    AST 39 11 - 45 unit/L    ALT 12 10 - 44 unit/L    Anion Gap 8 8 - 16 mmol/L    eGFR >60 >60 mL/min/1.73/m2   Protime-INR    Collection Time: 05/16/25  5:44 AM   Result Value Ref Range    PT 11.0 9.0 - 12.5 seconds    INR 1.0 0.8 - 1.2   CBC with Differential    Collection Time: 05/16/25  5:44 AM   Result Value Ref Range    WBC 6.32 3.90 - 12.70 K/uL    RBC 3.70 (L) 4.60 - 6.20 M/uL    HGB 11.9 (L) 14.0 - 18.0 gm/dL    HCT 34.3 (L) 40.0 - 54.0 %    MCV 93 82 - 98 fL    MCH 32.2 (H) 27.0 - 31.0 pg    MCHC 34.7 32.0 - 36.0 g/dL    RDW 11.7 11.5 - 14.5 %    Platelet Count 228 150 - 450 K/uL    MPV 10.0 9.2 - 12.9 fL    Nucleated RBC 0 <=0 /100 WBC    Neut % 68.2 38 - 73 %    Lymph % 15.0 (L) 18 - 48 %    Mono % 15.5 (H) 4 - 15 %    Eos % 0.6 <=8 %    Basophil % 0.5 <=1.9 %    Imm Grans % 0.2 0.0 - 0.5 %    Neut # 4.31 1.8 - 7.7 K/uL    Lymph # 0.95 (L) 1 - 4.8 K/uL    Mono # 0.98 0.3 - 1 K/uL    Eos # 0.04 <=0.5 K/uL    Baso # 0.03 <=0.2 K/uL    Imm Grans # 0.01 0.00 - 0.04 K/uL     Imaging Results               CT Abdomen Pelvis With IV Contrast NO Oral Contrast (Final result)  Result time 04/30/25 15:46:15      Final result by Russell Ba MD (04/30/25 15:46:15)                   Impression:      1. Wall thickening of the distal ileum to the surgical anastomosis with the large bowel.  Inflammatory infectious ileitis are considerations.  Recommend clinical correlation and follow-up.  2. Constipation.  There is fecal distention of the rectum.  No fecal impaction is not excluded.  3. Bilateral renal cysts and hepatic cysts.  4. Nonobstructing nephrolithiasis of the right kidney.  5. Nondistention of the urinary  bladder.  Mild wall thickening is not excluded.  6. Diverticulosis of the sigmoid colon.  No evidence of acute diverticulitis.  7. This report was flagged in Epic as abnormal.      Electronically signed by: Russell Humphries  Date:    04/30/2025  Time:    15:46               Narrative:    EXAMINATION:  CT ABDOMEN PELVIS WITH IV CONTRAST    CLINICAL HISTORY:  LLQ abdominal pain;RLQ abdominal pain (Age >= 14y);    TECHNIQUE:  Low dose axial images, sagittal and coronal reformations were obtained from the lung bases to the pubic symphysis following the IV administration of 75 mL of Omnipaque 350 .  Oral contrast was not administered.    COMPARISON:  11/05/2018    FINDINGS:  Abdomen:    - Lower thorax:    - Lung bases: No infiltrates and no nodules.    - Liver: Multiple small hepatic cysts.    - Gallbladder: No calcified gallstones.    - Bile Ducts: No evidence of intra or extra hepatic biliary ductal dilation.    - Spleen: Negative.    - Kidneys: Multiple bilateral simple renal cysts.  Single nonobstructing stone in the right kidney.  No hydronephrosis or hydroureter bilaterally.    - Adrenals: Unremarkable.    - Pancreas: No mass or peripancreatic fat stranding.    - Retroperitoneum:  No significant adenopathy.    - Vascular: Mild ectasia of the distal aorta with no evidence of aneurysm.    - Abdominal wall:  Unremarkable.    Pelvis:    Urinary bladder is incompletely distended with possible mild wall thickening.    Postop changes at the ileocecal junction.  There is wall thickening noted to the distal ileum to the surgical anastomosis.  Inflammatory or infectious ileitis are considerations.  Follow-up recommended.    Bowel/Mesentery:    No evidence of bowel obstruction.  Moderate retained feces in the colon.    Fecal distention of the rectum.  Impaction is not excluded.    Mild diverticulosis of the sigmoid colon.  No evidence of acute diverticulitis.    Bones:  No acute osseous abnormality and no suspicious lytic or  blastic lesion.                                       CT Head Without Contrast (Final result)  Result time 04/30/25 14:49:11      Final result by Ish Carrillo MD (04/30/25 14:49:11)                   Impression:      No evidence for acute intracranial pathology.      Electronically signed by: Ish Carrillo  Date:    04/30/2025  Time:    14:49               Narrative:    EXAMINATION:  CT HEAD WITHOUT CONTRAST    CLINICAL HISTORY:  Mental status change, unknown cause;    TECHNIQUE:  Low dose axial images were obtained through the head.  Coronal and sagittal reformations were also performed. Contrast was not administered.    COMPARISON:  MRI brain without contrast 04/24/2025, CT head without contrast 04/24/2025.    FINDINGS:  Ventricles are stable in size without evidence for new or worsening hydrocephalus.  No new or enlarging extra-axial blood or fluid collections.    Brain parenchyma appears unchanged.  Scattered supratentorial hypoattenuation, overall nonspecific, but can be seen in setting of microvascular ischemic change.  No parenchymal mass, edema, hemorrhage, or acute major vascular territory infarct.    No calvarial or skull base fracture or osseous destructive lesion.  Paranasal sinuses and mastoid air cells are essentially clear.                                       X-Ray Chest AP Portable (Final result)  Result time 04/30/25 12:49:39      Final result by Matti Cote MD (04/30/25 12:49:39)                   Impression:      See above      Electronically signed by: Matti Cote MD  Date:    04/30/2025  Time:    12:49               Narrative:    EXAMINATION:  XR CHEST AP PORTABLE    CLINICAL HISTORY:  Altered mental status, unspecified    TECHNIQUE:  Single frontal view of the chest was performed.    COMPARISON:  No 04/23/2025 ne    FINDINGS:  Heart size normal.  The tracheal slightly deviates to the right at the thoracic inlet probably related to enlarged thyroid gland.  Minimal subsegmental atelectatic  changes noted at the right lung base.  Lungs are otherwise clear.  No pleural effusion.

## 2025-05-16 NOTE — ASSESSMENT & PLAN NOTE
79 y.o.M with progressive acutely worsening confusion/tremors since 4/20. Previously AAOx4, now AAOx1  - neurology consulted, appreciate recommendations:  - MRI brain from last week was essentially non diagnostic  - Repeat MRI again without obvious new pathology   -working diagnosis is Parkinson's disease, as improving with Sinemet: iContinue two tabs TID  -LP concerning for possible meningitis. Continue Empiric treatment per Neurology recs   -EEG pending   - Delirium and fall precautions along with neuro checks  - PT/OT consult placed; return to [facility] upon discharge     5/8- Viral meningitis panel unremarkable.  Cultures no growth.  CSF with elevated proteins and white cell count.  Started on empiric antibiotics, vancomycin ceftriaxone and ampicillin, infectious disease consulted.  Follow up with Neurology recommendations.  Started on carbidopa levodopa.  Dispo plan acute rehab.  Mentation remains the same during this hospitalization A&O x1    5/9 -Started on IVIG per Neurology.  EEG(f/u)    5/10-Continue with IVIG, follow up with Neurology.  EEG report noted    5/14 - Completed IVIG course, follow up with Neurology. ? D/c back to acute rehab, if mentation improves    5/15- mentation remains unchanged, appreciate neuro and psychiatry recommendations. Hyponatremia improving. repeat MRI with sedation 5/16 as previous had artifact. Anesthesia notified to arrange with sedation.

## 2025-05-16 NOTE — PROGRESS NOTES
CONSULTATION LIAISON PSYCHIATRY PROGRESS NOTE    Patient Name: Vince Huffman  MRN: [id number]  Patient Class: IP- Inpatient  Admission Date: 4/30/2025  Attending Physician: Zina Tarango MD      SUBJECTIVE:   Vince Huffman is a 79 y.o. male with no past psychiatric history & past pertinent medical history of Chrohn's disease, SBO, Hypothyroidism, and CAD presents to the ED/admitted to the hospital for generalized weakness and altered mental status.     Psychiatry consulted for concern for complex bereavement vs. catatonia     Interval History:   NAEON  Adherent with scheduled medications. Has received Mirtazapine 7.5mg the past 2 nights. No PRNs required for agitation or anxiety.   Planning for repeat MRIb today.     Upon psychiatric interview today, pt lying in bed. He is somnolent in appearance. He opens his eyes when greeted but is non-verbal for my assessment. He yawns frequently. He maintains good eye contact and is able to follow simple commands.        OBJECTIVE    Vital Signs:  Temp:  [98 °F (36.7 °C)-98.6 °F (37 °C)]   Pulse:  []   Resp:  [17-21]   BP: (129-169)/(62-85)   SpO2:  [96 %-100 %]     Mental Status Exam:  General Appearance: appears stated age, dressed in hospital garb, lying in bed, in no acute distress  Behavior: reluctant to participate, no verbal responses, appropriate eye contact  Involuntary Movements and Motor Activity: concern for psychomotor retardation  Gait and Station: unable to assess - patient lying down or seated  Speech and Language: mute  Mood: unable to assess  Affect: constricted  Thought Process and Associations: unable to assess  Perceptual Disturbances: no objective RIS  Thought Content and Perceptions:: no reported suicidal ideation, no homicidal ideation, no hallucinations per collateral  Sensorium and Orientation: unable to assess  Recent and Remote Memory: Unable to  Formally Assess, Unwilling to Participate in Formal Testing  Attention and Concentration: unable to  assess  Fund of Knowledge: Unable to Formally Assess, Unwilling to Participate in Formal Testing  Insight: limited/partial awareness of illness  Judgment: compliant with health provider's recommendations and instructions    CAM ICU positive? unable to assess      ASSESSMENT & RECOMMENDATIONS   Vince Huffman is a 79 year old male with no past psychiatric history & past pertinent medical history of Chrohn's disease, SBO, Hypothyroidism, and CAD who presented to the ED/admitted to the hospital on 4/30/25 for generalized weakness and altered mental status. After gathering collateral, it appears that pt has had a gradual decline in his mental status over the past several months, becoming more withdrawal and anhedonic over time. There is low concern for catatonia at this time. The immobility and mutism seem to have a volitional component to them, and appear to be more in line with psychomotor retardation, potentially from depression given the history that his wife passed away in February 2024 and preceded his decline. Recommend trial of antidepressant, specifically Remeron, to address his poor intake and substantial weight loss. Would defer benzodiazepine trial at this time for fear that it may make his mental status worse.       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 Consider adding stool softener to bowel regimen and obtaining KUB to rule out constipation contributing to altered mental status  Recommend EEG to rule out delirium        Delirium Behavior  Management  PLEASE utilize PRN meds first for agitation. Minimize use of PHYSICAL restraints OR have periods of being out of physical restraints if possible.  Keep window shades open and room lit during day and room dim at night in order to promote normal sleep-wake cycles  Encourage family at bedside. [place] patient often to situation, location, date.  Continue to Limit or Discontinue use of Narcotics, Benzos and Anti-cholinergic medications as they may worsen delirium.  Continue medical workup for causative etiology of Delirium.      Risk Assessment / Legal Status  Patient does not meet criteria for PEC or involuntary inpatient psychiatric admission at this time. Recommend to rescind PEC if one was placed. Patient is not currently an imminent danger to self or others and is not gravely disabled due to a psychiatric illness.     Follow-up:  Will follow-up while in house.     Disposition:   Defer to primary team.    Please contact ON CALL psychiatry service (24/7) for any acute issues that may arise.    Dr. Obinna Deng   Psychiatry  [facility]  5/16/2025 1:41 PM        --------------------------------------------------------------------------------------------------------------------------------------------------------------------------------------------------------------------------------------    CONTINUED OBJECTIVE clinical data & findings reviewed and noted for above decision making    Current Medications:   Scheduled Meds:    amLODIPine  10 mg Oral Daily    carbidopa-levodopa  mg  2 tablet Oral TID    enoxparin  40 mg Subcutaneous Daily    mirtazapine  7.5 mg Oral QHS    polyethylene glycol  17 g Oral BID     PRN Meds:   Current Facility-Administered Medications:     acetaminophen, 1,000 mg, Oral, Q8H PRN    acetaminophen, 650 mg, Oral, Q4H PRN    ALPRAZolam, 0.5 mg, Oral, BID PRN    dextrose 50%, 12.5 g, Intravenous, PRN    dextrose 50%, 25 g, Intravenous, PRN    gadobutroL, 7  mL, Intravenous, ONCE PRN    glucagon (human recombinant), 1 mg, Intramuscular, PRN    glucose, 16 g, Oral, PRN    glucose, 24 g, Oral, PRN    LIDOcaine HCL 10 mg/ml (1%), 5 mL, Other, On Call Procedure    melatonin, 6 mg, Oral, Nightly PRN    naloxone, 0.02 mg, Intravenous, PRN    ondansetron, 8 mg, Oral, Q8H PRN    prochlorperazine, 5 mg, Intravenous, Q6H PRN    sodium chloride 0.9%, 10 mL, Intravenous, Q12H PRN    Allergies:   Review of patient's allergies indicates:   Allergen Reactions    Gluten Diarrhea    Lactose        Vitals  Vitals:    05/16/25 1215   BP: (!) 153/80   Pulse: 106   Resp: 18   Temp: 98 °F (36.7 °C)       Labs/Imaging/Studies:  Recent Results (from the past 24 hours)   Magnesium    Collection Time: 05/16/25  5:44 AM   Result Value Ref Range    Magnesium  1.8 1.6 - 2.6 mg/dL   Phosphorus    Collection Time: 05/16/25  5:44 AM   Result Value Ref Range    Phosphorus Level 2.9 2.7 - 4.5 mg/dL   Comprehensive Metabolic Panel    Collection Time: 05/16/25  5:44 AM   Result Value Ref Range    Sodium 129 (L) 136 - 145 mmol/L    Potassium 4.1 3.5 - 5.1 mmol/L    Chloride 97 95 - 110 mmol/L    CO2 24 23 - 29 mmol/L    Glucose 91 70 - 110 mg/dL    BUN 15 8 - 23 mg/dL    Creatinine 1.0 0.5 - 1.4 mg/dL    Calcium 9.5 8.7 - 10.5 mg/dL    Protein Total 8.5 (H) 6.0 - 8.4 gm/dL    Albumin 2.7 (L) 3.5 - 5.2 g/dL    Bilirubin Total 0.4 0.1 - 1.0 mg/dL    ALP 74 40 - 150 unit/L    AST 39 11 - 45 unit/L    ALT 12 10 - 44 unit/L    Anion Gap 8 8 - 16 mmol/L    eGFR >60 >60 mL/min/1.73/m2   Protime-INR    Collection Time: 05/16/25  5:44 AM   Result Value Ref Range    PT 11.0 9.0 - 12.5 seconds    INR 1.0 0.8 - 1.2   CBC with Differential    Collection Time: 05/16/25  5:44 AM   Result Value Ref Range    WBC 6.32 3.90 - 12.70 K/uL    RBC 3.70 (L) 4.60 - 6.20 M/uL    HGB 11.9 (L) 14.0 - 18.0 gm/dL    HCT 34.3 (L) 40.0 - 54.0 %    MCV 93 82 - 98 fL    MCH 32.2 (H) 27.0 - 31.0 pg    MCHC 34.7 32.0 - 36.0 g/dL    RDW 11.7  11.5 - 14.5 %    Platelet Count 228 150 - 450 K/uL    MPV 10.0 9.2 - 12.9 fL    Nucleated RBC 0 <=0 /100 WBC    Neut % 68.2 38 - 73 %    Lymph % 15.0 (L) 18 - 48 %    Mono % 15.5 (H) 4 - 15 %    Eos % 0.6 <=8 %    Basophil % 0.5 <=1.9 %    Imm Grans % 0.2 0.0 - 0.5 %    Neut # 4.31 1.8 - 7.7 K/uL    Lymph # 0.95 (L) 1 - 4.8 K/uL    Mono # 0.98 0.3 - 1 K/uL    Eos # 0.04 <=0.5 K/uL    Baso # 0.03 <=0.2 K/uL    Imm Grans # 0.01 0.00 - 0.04 K/uL     Imaging Results               CT Abdomen Pelvis With IV Contrast NO Oral Contrast (Final result)  Result time 04/30/25 15:46:15      Final result by Russell Ba MD (04/30/25 15:46:15)                   Impression:      1. Wall thickening of the distal ileum to the surgical anastomosis with the large bowel.  Inflammatory infectious ileitis are considerations.  Recommend clinical correlation and follow-up.  2. Constipation.  There is fecal distention of the rectum.  No fecal impaction is not excluded.  3. Bilateral renal cysts and hepatic cysts.  4. Nonobstructing nephrolithiasis of the right kidney.  5. Nondistention of the urinary bladder.  Mild wall thickening is not excluded.  6. Diverticulosis of the sigmoid colon.  No evidence of acute diverticulitis.  7. This report was flagged in Epic as abnormal.      Electronically signed by: Russell Ba  Date:    04/30/2025  Time:    15:46               Narrative:    EXAMINATION:  CT ABDOMEN PELVIS WITH IV CONTRAST    CLINICAL HISTORY:  LLQ abdominal pain;RLQ abdominal pain (Age >= 14y);    TECHNIQUE:  Low dose axial images, sagittal and coronal reformations were obtained from the lung bases to the pubic symphysis following the IV administration of 75 mL of Omnipaque 350 .  Oral contrast was not administered.    COMPARISON:  11/05/2018    FINDINGS:  Abdomen:    - Lower thorax:    - Lung bases: No infiltrates and no nodules.    - Liver: Multiple small hepatic cysts.    - Gallbladder: No calcified gallstones.    - Bile Ducts:  No evidence of intra or extra hepatic biliary ductal dilation.    - Spleen: Negative.    - Kidneys: Multiple bilateral simple renal cysts.  Single nonobstructing stone in the right kidney.  No hydronephrosis or hydroureter bilaterally.    - Adrenals: Unremarkable.    - Pancreas: No mass or peripancreatic fat stranding.    - Retroperitoneum:  No significant adenopathy.    - Vascular: Mild ectasia of the distal aorta with no evidence of aneurysm.    - Abdominal wall:  Unremarkable.    Pelvis:    Urinary bladder is incompletely distended with possible mild wall thickening.    Postop changes at the ileocecal junction.  There is wall thickening noted to the distal ileum to the surgical anastomosis.  Inflammatory or infectious ileitis are considerations.  Follow-up recommended.    Bowel/Mesentery:    No evidence of bowel obstruction.  Moderate retained feces in the colon.    Fecal distention of the rectum.  Impaction is not excluded.    Mild diverticulosis of the sigmoid colon.  No evidence of acute diverticulitis.    Bones:  No acute osseous abnormality and no suspicious lytic or blastic lesion.                                       CT Head Without Contrast (Final result)  Result time 04/30/25 14:49:11      Final result by Ish Carrillo MD (04/30/25 14:49:11)                   Impression:      No evidence for acute intracranial pathology.      Electronically signed by: Ish Carrillo  Date:    04/30/2025  Time:    14:49               Narrative:    EXAMINATION:  CT HEAD WITHOUT CONTRAST    CLINICAL HISTORY:  Mental status change, unknown cause;    TECHNIQUE:  Low dose axial images were obtained through the head.  Coronal and sagittal reformations were also performed. Contrast was not administered.    COMPARISON:  MRI brain without contrast 04/24/2025, CT head without contrast 04/24/2025.    FINDINGS:  Ventricles are stable in size without evidence for new or worsening hydrocephalus.  No new or enlarging extra-axial blood or  fluid collections.    Brain parenchyma appears unchanged.  Scattered supratentorial hypoattenuation, overall nonspecific, but can be seen in setting of microvascular ischemic change.  No parenchymal mass, edema, hemorrhage, or acute major vascular territory infarct.    No calvarial or skull base fracture or osseous destructive lesion.  Paranasal sinuses and mastoid air cells are essentially clear.                                       X-Ray Chest AP Portable (Final result)  Result time 04/30/25 12:49:39      Final result by Matti Cote MD (04/30/25 12:49:39)                   Impression:      See above      Electronically signed by: Matti Cote MD  Date:    04/30/2025  Time:    12:49               Narrative:    EXAMINATION:  XR CHEST AP PORTABLE    CLINICAL HISTORY:  Altered mental status, unspecified    TECHNIQUE:  Single frontal view of the chest was performed.    COMPARISON:  No 04/23/2025 ne    FINDINGS:  Heart size normal.  The tracheal slightly deviates to the right at the thoracic inlet probably related to enlarged thyroid gland.  Minimal subsegmental atelectatic changes noted at the right lung base.  Lungs are otherwise clear.  No pleural effusion.

## 2025-05-16 NOTE — PROGRESS NOTES
"[facility] - Acute [facility]  Neurology  Progress Note    Patient Name: Vince Huffman  MRN: [id number]  Admission Date: 4/30/2025  Hospital Length of Stay: 15 days  Code Status: Full Code   Attending Provider: Zina Tarango MD  Primary Care Physician: Leland Porras MD   Principal Problem:Acute encephalopathy    HPI:   Vince Huffman is a 79 year old male with history of chron's disease, SBO, hypothyroidism, and CAD  presenting from [facility] with encephalopathy. He initially presented to [facility] for impaired mobility and difficulty with ADL's 2/2 generalized weakness. He was reportedly found confused today A&O x 1 (said the year is "1895") but is normally A&O x4. At this time he also expressed generalized abdominal pain. Recent ultrasounds of the lower extremity did not reveal DVT. MRI from last week was non diagnostic. UA unremarkable and CXR normal. CT abdomen and pelvis reveals wall thickening of the distal ileum consistent with possible infectious ileitis. Neurology consulted for further evaluation of encephalopathy.     Overview/Hospital Course:  05/12/2025 - IVIG Day 5/5. Exam worsened compared to yesterday (no longer speaking.)   05/13/2025 - Initial exam early this morning consistent with exam yesterday. However, when assessed by team closed to lunch time was talkative and saying multiple-word sentences (albeit still confused and perseverative.)   05/15/2025 - Patient continues having waxing-and-waning symptoms. Autoimmune encephalopathy serum and CSF panels unrevealing. Plan for repeat MRI with sedation as prior MRIs were motion degraded.   05/16/2025 - Sedated MRI delayed until tomorrow.         Subjective:     Interval History: See hospital course    Current Neurological Medications: See below    Current Medications[1]    Review of Systems   Unable to perform ROS: Mental status change     Objective:     Vital Signs (Most Recent):  Temp: 98.5 °F (36.9 °C) (05/16/25 1630)  Pulse: 86 " (05/16/25 1630)  Resp: 18 (05/16/25 1630)  BP: (!) 143/74 (05/16/25 1630)  SpO2: 98 % (05/16/25 1630) Vital Signs (24h Range):  Temp:  [98 °F (36.7 °C)-98.6 °F (37 °C)] 98.5 °F (36.9 °C)  Pulse:  [] 86  Resp:  [18-21] 18  SpO2:  [96 %-100 %] 98 %  BP: (143-169)/(62-85) 143/74     Weight: 64.1 kg (141 lb 5 oz)  Body mass index is 19.71 kg/m².     Physical Exam  Vitals and nursing note reviewed.   Constitutional:       General: He is not in acute distress.     Comments:  Thin   HENT:      Head: Normocephalic and atraumatic.   Eyes:      Extraocular Movements: EOM normal.      Conjunctiva/sclera: Conjunctivae normal.   Pulmonary:      Effort: Pulmonary effort is normal. No respiratory distress.   Musculoskeletal:      Right lower leg: No edema.      Left lower leg: No edema.   Skin:     General: Skin is warm and dry.   Neurological:      Mental Status: He is alert.          NEUROLOGICAL EXAMINATION:     MENTAL STATUS   Oriented to person.   Oriented to place.   Disoriented to time.   Level of consciousness: alert       Perseverating     CRANIAL NERVES     CN III, IV, VI   Extraocular motions are normal.     CN VII   Facial expression full, symmetric.     CN XI   CN XI normal.        - L ptosis  - PERRLA     MOTOR EXAM   Muscle bulk: increased  Overall muscle tone: increased    GAIT AND COORDINATION        Bradykinetic bilaterally    Low amplitude, moderate frequency tremor in both hands        Significant Labs: All pertinent lab results from the past 24 hours have been reviewed.    Significant Imaging: I have reviewed and interpreted all pertinent imaging results/findings within the past 24 hours.    Assessment and Plan:     * Acute encephalopathy  79 year old male with history of Crohn's disease, SBO, hypothyroidism, and CAD  presenting from [facility] with encephalopathy. Unclear cause of encephalopathy at this time, though acute presentation with concurrent parkinsonism suggests autoimmune etiology. Motion  artifact on MRI brain. On initial exam, was disoriented to place, time, and situation, minimally responsive and will answer with one word, asterixis in his upper extremities, axial rigidity/extremity/rigidity, masked facies, and intention tremor most notably in the left hand. No hyperreflexia. Ptosis of left eye though no extraocular movement abnormalities/pupillary discrepancy.     On my exam today, patient was AO x 2 but continues perseverating.    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 There have been reported cases of SREAT with normal fT4, so normal level from yesterday does not exclude this diagnosis.     LABS  Serum  Pending: Ma2 ab, [facility] movement disorders panel, Vit E, NFL, heavy metals   On Movement Disorders panel, not on autoimmune encephalopathy panel = CCPQ (formerly known as P/Q type VGCC,) GRAF1, Septin5, ITPR1, AP3B2, KLHL11  Dopamine-2 and [place]-3 not commercially available per lab  WNL: strongyloides IgG, Treponema, negative celiac dx serology, zinc, folate, B12, copper, B1, autoimmune encephalopathy panel, HIV, Vit D  Abnormal: elevated anti-thyroglobulin & anti-TPO    CSF  Pending: None  WNL: AFB, meningitis-encephalitis panel, VDRL, culture, glucose, LDH, cytology, autoimmune encephalopathy panel  Abnormal: protein (60), pleocytosis (after correction for elevated RBC)    Recommendations:  - Repeat MRI Brain w/wo contrast with sedation (prior MRIs were motion degraded)  - Plan for 5-day course of IVMP to start tomorrow  - Endocrinology consult for likely SREAT  - Start B12 repletion - 1000 mcg po daily  - S/p 5-day course of IVIG (EOT 5/12/2025)  -  F/u pending labs as above  - Continue Sinemet 25 -100 mg 2 tablets TID  - Appreciate Psychiatry assistance  - Delirium and fall precautions     We will continue to follow. Please reach out with any questions.         VTE Risk Mitigation (From admission, onward)           Ordered     enoxaparin injection 40 mg  Daily         05/01/25 0827     IP VTE HIGH RISK PATIENT  Once         05/01/25 0827     Place sequential compression device  Until discontinued         05/01/25 0827                    Abdirashid Collier MD  Neurology  [facility] - Acute Medical Stepdown       [1]   Current Facility-Administered Medications   Medication Dose Route Frequency Provider Last Rate Last Admin    acetaminophen tablet 1,000 mg  1,000 mg Oral Q8H PRN JarretZuly chamorro PA-C   1,000 mg at 05/12/25 1554    acetaminophen tablet 650 mg  650 mg Oral Q4H PRN Zuly Wheat PA-C   650 mg at 05/04/25 1453    ALPRAZolam tablet 0.5 mg  0.5 mg Oral BID PRN Zuly Wheat PA-C   0.5 mg at 05/14/25 1626    amLODIPine tablet 10 mg  10 mg Oral Daily Juan F Ramírez MD   10 mg at 05/16/25 0958    carbidopa-levodopa  mg per tablet 2 tablet  2 tablet Oral TID Elian López MD   2 tablet at 05/16/25 1431    dextrose 50% injection 12.5 g  12.5 g Intravenous PRN Zuly Wheat PA-SHAREE        dextrose 50% injection 25 g  25 g Intravenous PRN Zuly Wheat PA-C        enoxaparin injection 40 mg  40 mg Subcutaneous Daily Zuly Wheat PA-C   40 mg at 05/15/25 1730    gadobutroL (GADAVIST) injection 7 mL  7 mL Intravenous ONCE PRN Arsenio Oglesby MD        glucagon (human recombinant) injection 1 mg  1 mg Intramuscular PRN Zuly Wheat PA-C        glucose chewable tablet 16 g  16 g Oral PRN JarretZuly chamorro PA-SHAREE        glucose chewable tablet 24 g  24 g Oral PRN JarretZuly chamorro PA-C        LIDOcaine HCL 10 mg/ml (1%) injection 5 mL  5 mL Other On Call Procedure Elian López MD        melatonin tablet 6 mg  6 mg Oral Nightly PRN Jarret,  ARIK Caruso        mirtazapine tablet 7.5 mg  7.5 mg Oral QHS Elian Morejon MD   7.5 mg at 05/15/25 2034    naloxone 0.4 mg/mL injection 0.02 mg  0.02 mg Intravenous PRN Zuly Wheat PA-C        ondansetron disintegrating tablet 8 mg  8 mg Oral Q8H PRN Zuly Wheat PA-C        polyethylene glycol packet 17 g  17 g Oral BID Zuly Wheat PA-C   17 g at 05/16/25 0958    prochlorperazine injection Soln 5 mg  5 mg Intravenous Q6H PRN Zuly Wheat PA-C        sodium chloride 0.9% flush 10 mL  10 mL Intravenous Q12H PRN Zuly Wheat PA-C

## 2025-05-16 NOTE — SUBJECTIVE & OBJECTIVE
Interval History:     No acute overnight events, remain altered, not responsive, blood pressure 157/85, sodium improved (129) up from 128, potassium 4.1, CO2 24, serum creatinine 1.  Urine output 300 cc/24 hours    Review of patient's allergies indicates:   Allergen Reactions    Gluten Diarrhea    Lactose      Current Facility-Administered Medications   Medication Frequency    acetaminophen tablet 1,000 mg Q8H PRN    acetaminophen tablet 650 mg Q4H PRN    ALPRAZolam tablet 0.5 mg BID PRN    amLODIPine tablet 10 mg Daily    carbidopa-levodopa  mg per tablet 2 tablet TID    dextrose 50% injection 12.5 g PRN    dextrose 50% injection 25 g PRN    enoxaparin injection 40 mg Daily    gadobutroL (GADAVIST) injection 7 mL ONCE PRN    glucagon (human recombinant) injection 1 mg PRN    glucose chewable tablet 16 g PRN    glucose chewable tablet 24 g PRN    LIDOcaine HCL 10 mg/ml (1%) injection 5 mL On Call Procedure    melatonin tablet 6 mg Nightly PRN    mirtazapine tablet 7.5 mg QHS    naloxone 0.4 mg/mL injection 0.02 mg PRN    ondansetron disintegrating tablet 8 mg Q8H PRN    polyethylene glycol packet 17 g BID    prochlorperazine injection Soln 5 mg Q6H PRN    sodium chloride 0.9% flush 10 mL Q12H PRN       Objective:     Vital Signs (Most Recent):  Temp: 98.5 °F (36.9 °C) (05/16/25 0649)  Pulse: 95 (05/16/25 0649)  Resp: 18 (05/16/25 0649)  BP: (!) 157/85 (05/16/25 0649)  SpO2: 96 % (05/16/25 0649) Vital Signs (24h Range):  Temp:  [96.7 °F (35.9 °C)-98.6 °F (37 °C)] 98.5 °F (36.9 °C)  Pulse:  [] 95  Resp:  [17-21] 18  SpO2:  [96 %-100 %] 96 %  BP: (129-169)/(62-85) 157/85     Weight: 64.1 kg (141 lb 5 oz) (05/06/25 0634)  Body mass index is 19.71 kg/m².  Body surface area is 1.79 meters squared.    I/O last 3 completed shifts:  In: -   Out: 900 [Urine:900]     Physical Exam  Constitutional:       Appearance: He is ill-appearing. He is not toxic-appearing.   HENT:      Head: Normocephalic and atraumatic.       Mouth/Throat:      Mouth: Mucous membranes are dry.   Cardiovascular:      Rate and Rhythm: Normal rate.      Heart sounds: No murmur heard.  Pulmonary:      Effort: No respiratory distress.      Breath sounds: No wheezing or rales.   Abdominal:      General: There is no distension.      Tenderness: There is no abdominal tenderness. There is no guarding.   Musculoskeletal:      Right lower leg: No edema.      Left lower leg: No edema.   Skin:     Coloration: Skin is pale. Skin is not jaundiced.   Neurological:      Mental Status: He is disoriented.          Significant Labs:  ABGs:   Recent Labs   Lab 05/14/25  1637   PH 7.406   PCO2 38.2   HCO3 24.0   POCSATURATED 94   BE -1     CBC:   Recent Labs   Lab 05/16/25  0544   WBC 6.32   RBC 3.70*   HGB 11.9*   HCT 34.3*      MCV 93   MCH 32.2*   MCHC 34.7     CMP:   Recent Labs   Lab 05/16/25  0544   GLU 91   CALCIUM 9.5   ALBUMIN 2.7*   PROT 8.5*   *   K 4.1   CO2 24   CL 97   BUN 15   CREATININE 1.0   ALKPHOS 74   ALT 12   AST 39   BILITOT 0.4     LFTs:   Recent Labs   Lab 05/16/25  0544   ALT 12   AST 39   ALKPHOS 74   BILITOT 0.4   PROT 8.5*   ALBUMIN 2.7*

## 2025-05-16 NOTE — PT/OT/SLP PROGRESS
"Occupational Therapy   Treatment    Name: Vince Huffman  MRN: [id number]  Admitting Diagnosis:  Acute encephalopathy       Recommendations:     Discharge Recommendations: High Intensity Therapy  Discharge Equipment Recommendations:  wheelchair, walker, rolling  Barriers to discharge:  Decreased caregiver support    Assessment:     Vince Huffman is a 79 y.o. male with a medical diagnosis of Acute encephalopathy.  He presents with fair participation and motivation. Patient demonstrated improved command following compared to previous sessions. Performance deficits affecting function are weakness, impaired endurance, impaired self care skills, impaired functional mobility, gait instability, impaired balance, decreased safety awareness, impaired cardiopulmonary response to activity, decreased lower extremity function, decreased upper extremity function, impaired cognition, decreased coordination. Patient would benefit from continued skilled acute OT 4/wk to improve functional mobility, increase independence with ADLs, and address established goals.  Recommending  high intensity once medically appropriate for discharge to increase maximal independence, reduce burden of care, and ensure safety.       Rehab Prognosis:  Fair; patient would benefit from acute skilled OT services to address these deficits and reach maximum level of function.       Plan:     Patient to be seen 4 x/week to address the above listed problems via self-care/home management, therapeutic activities, therapeutic exercises, neuromuscular re-education, cognitive retraining  Plan of Care Expires: 05/24/25  Plan of Care Reviewed with: patient    Subjective     Chief Complaint: "It's the strangest thing, you know that?"  Patient/Family Comments/goals: Increase independence with ADLs  Pain/Comfort:  Pain Rating 1:  (did not report, demonstrated pain response with movement to LLE)  Location - Side 1: Left  Location - Orientation 1: generalized  Location 1: " leg  Pain Addressed 1: Reposition, Distraction  Pain Rating Post-Intervention 1:  (did not report, demonstrated pain response with movement to LLE)    Objective:     Communicated with: RN prior to session.  Patient found supine with pulse ox (continuous), Condom Catheter, bed alarm, SCD (family present) upon OT entry to room.    General Precautions: Standard, fall, NPO    Orthopedic Precautions:N/A  Braces: N/A  Respiratory Status: Room air     Occupational Performance:     Bed Mobility:    Patient completed Scooting/Bridging with maximal assistance to scoot back seated at EOB and draw sheet transfer to scoot up HOB.  Patient completed Supine to Sit with maximal assistance and 2 persons x 2 trials.  Patient completed Sit to Supine with maximal assistance and 2 persons x 2 trials.    Functional Mobility/Transfers:  Patient completed Sit <> Stand Transfer with moderate assistance and of 2 persons  with  rolling walker x 3 trials with L foot block. Patient unable to achieve fully upright position.     Activities of Daily Living:  Grooming: moderate assistance to wash face seated at EOB.  Upper Body Dressing: total assistance to don gown around back seated at EOB.      [facility] 6 Click ADL: 11    Treatment & Education:  Patient participated in BUE shoulder forward flexion x 10 reps and chest press and curls x 3 reps each.     Seated at EOB, patient required assistance ranging from Max A -CGA for dynamic and static sitting balance due to significant posterior lean.    Patient able to follow 2/3 simple verbal commands seated at EOB. Once supine and with glasses donned, pt able to follow 3rd simple command with demonstration.     Provided family education regarding command following, orientation, and delirium precautions.     Pt had no further questions & when asked whether there were any concerns pt reported none.    Patient left supine with all lines intact, call button in reach, bed alarm on, RN notified, and family  present    GOALS:   Multidisciplinary Problems       Occupational Therapy Goals          Problem: Occupational Therapy    Goal Priority Disciplines Outcome Interventions   Occupational Therapy Goal     OT, PT/OT Progressing    Description: Goals to be met by: 5/24/25     Patient will increase functional independence with ADLs by performing:    UE Dressing with Contact Guard Assistance.  LE Dressing with Minimal Assistance.  Grooming while bedside chair with Minimal Assistance.  Toileting from bedside commode with Minimal Assistance for hygiene and clothing management.   Sitting at edge of bed x5 minutes with Contact Guard Assistance for upright balance.  Rolling to Bilateral with Minimal Assistance.   Supine to sit with Minimal Assistance.  Step transfer with Moderate Assistance  Toilet transfer to bedside commode with Moderate Assistance.                         DME Justifications:   Vince's mobility limitation cannot be sufficiently resolved by the use of a cane. His functional mobility deficit can be sufficiently resolved with the use of a Rolling Walker. Patient's mobility limitation significantly impairs their ability to participate in one of more activities of daily living.  The use of a RW will significantly improve the patient's ability to participate in MRADLS and the patient will use it on regular basis in the home.    Time Tracking:     OT Date of Treatment: 05/16/25  OT Start Time: 1141  OT Stop Time: 1222  OT Total Time (min): 41 min    Billable Minutes:Therapeutic Activity 41    OT/PAUL: OT          5/16/2025

## 2025-05-16 NOTE — ASSESSMENT & PLAN NOTE
Hyponatremia is likely due to Dehydration/hypovolemia. The patient's most recent sodium results are listed below.  Recent Labs     05/14/25  0817 05/14/25  1815 05/15/25  0455   * 125* 128*     Maxime  - Monitor sodium Daily.   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

## 2025-05-16 NOTE — NURSING
Pt oriented to self only, intermittently follows command. No s/s of any pain. Neuro unchanged throughout the shift. OT worked with pt at the bedside. Pt participated well. Condom cath in place. Pt turned frequently. Waffle overlay in use. Anesthesia contacted regarding MRI with Anesthesia ordered on 5/15. Advised MRI would likely be done on 5/17. Dr. Tarango notified. Diet ordered. Pt will be NPO after MN tonight. No acute events this shift. Safety measures in place. Call light in reach.

## 2025-05-17 PROBLEM — E03.9 HYPOTHYROID: Status: ACTIVE | Noted: 2025-05-17

## 2025-05-17 LAB
ABSOLUTE EOSINOPHIL (OHS): 0.04 K/UL
ABSOLUTE MONOCYTE (OHS): 0.93 K/UL (ref 0.3–1)
ABSOLUTE NEUTROPHIL COUNT (OHS): 3.63 K/UL (ref 1.8–7.7)
ALBUMIN SERPL BCP-MCNC: 2.6 G/DL (ref 3.5–5.2)
ALP SERPL-CCNC: 76 UNIT/L (ref 40–150)
ALT SERPL W/O P-5'-P-CCNC: 10 UNIT/L (ref 10–44)
ANION GAP (OHS): 7 MMOL/L (ref 8–16)
AST SERPL-CCNC: 44 UNIT/L (ref 11–45)
BASOPHILS # BLD AUTO: 0.02 K/UL
BASOPHILS NFR BLD AUTO: 0.4 %
BILIRUB SERPL-MCNC: 0.4 MG/DL (ref 0.1–1)
BUN SERPL-MCNC: 18 MG/DL (ref 8–23)
CALCIUM SERPL-MCNC: 9.6 MG/DL (ref 8.7–10.5)
CHLORIDE SERPL-SCNC: 98 MMOL/L (ref 95–110)
CO2 SERPL-SCNC: 26 MMOL/L (ref 23–29)
CREAT SERPL-MCNC: 1 MG/DL (ref 0.5–1.4)
ERYTHROCYTE [DISTWIDTH] IN BLOOD BY AUTOMATED COUNT: 11.7 % (ref 11.5–14.5)
GFR SERPLBLD CREATININE-BSD FMLA CKD-EPI: >60 ML/MIN/1.73/M2
GLUCOSE SERPL-MCNC: 87 MG/DL (ref 70–110)
HCT VFR BLD AUTO: 34.4 % (ref 40–54)
HGB BLD-MCNC: 11.8 GM/DL (ref 14–18)
IMM GRANULOCYTES # BLD AUTO: 0.01 K/UL (ref 0–0.04)
IMM GRANULOCYTES NFR BLD AUTO: 0.2 % (ref 0–0.5)
INR PPP: 1 (ref 0.8–1.2)
LYMPHOCYTES # BLD AUTO: 0.83 K/UL (ref 1–4.8)
MAGNESIUM SERPL-MCNC: 1.8 MG/DL (ref 1.6–2.6)
MCH RBC QN AUTO: 32 PG (ref 27–31)
MCHC RBC AUTO-ENTMCNC: 34.3 G/DL (ref 32–36)
MCV RBC AUTO: 93 FL (ref 82–98)
NUCLEATED RBC (/100WBC) (OHS): 0 /100 WBC
PHOSPHATE SERPL-MCNC: 3.1 MG/DL (ref 2.7–4.5)
PLATELET # BLD AUTO: 226 K/UL (ref 150–450)
PMV BLD AUTO: 10.5 FL (ref 9.2–12.9)
POCT GLUCOSE: 185 MG/DL (ref 70–110)
POCT GLUCOSE: 197 MG/DL (ref 70–110)
POTASSIUM SERPL-SCNC: 4.2 MMOL/L (ref 3.5–5.1)
PROT SERPL-MCNC: 8.1 GM/DL (ref 6–8.4)
PROTHROMBIN TIME: 11.1 SECONDS (ref 9–12.5)
RBC # BLD AUTO: 3.69 M/UL (ref 4.6–6.2)
RELATIVE EOSINOPHIL (OHS): 0.7 %
RELATIVE LYMPHOCYTE (OHS): 15.2 % (ref 18–48)
RELATIVE MONOCYTE (OHS): 17 % (ref 4–15)
RELATIVE NEUTROPHIL (OHS): 66.5 % (ref 38–73)
SODIUM SERPL-SCNC: 131 MMOL/L (ref 136–145)
WBC # BLD AUTO: 5.46 K/UL (ref 3.9–12.7)

## 2025-05-17 PROCEDURE — 25000003 PHARM REV CODE 250: Performed by: INTERNAL MEDICINE

## 2025-05-17 PROCEDURE — 63600175 PHARM REV CODE 636 W HCPCS: Mod: JZ,TB

## 2025-05-17 PROCEDURE — 25000003 PHARM REV CODE 250

## 2025-05-17 PROCEDURE — 99233 SBSQ HOSP IP/OBS HIGH 50: CPT | Mod: GC,,, | Performed by: STUDENT IN AN ORGANIZED HEALTH CARE EDUCATION/TRAINING PROGRAM

## 2025-05-17 PROCEDURE — 80053 COMPREHEN METABOLIC PANEL: CPT | Performed by: STUDENT IN AN ORGANIZED HEALTH CARE EDUCATION/TRAINING PROGRAM

## 2025-05-17 PROCEDURE — 99222 1ST HOSP IP/OBS MODERATE 55: CPT | Mod: GC,,, | Performed by: STUDENT IN AN ORGANIZED HEALTH CARE EDUCATION/TRAINING PROGRAM

## 2025-05-17 PROCEDURE — 84100 ASSAY OF PHOSPHORUS: CPT | Performed by: STUDENT IN AN ORGANIZED HEALTH CARE EDUCATION/TRAINING PROGRAM

## 2025-05-17 PROCEDURE — 71000033 HC RECOVERY, INTIAL HOUR

## 2025-05-17 PROCEDURE — 99232 SBSQ HOSP IP/OBS MODERATE 35: CPT | Mod: GC,,, | Performed by: STUDENT IN AN ORGANIZED HEALTH CARE EDUCATION/TRAINING PROGRAM

## 2025-05-17 PROCEDURE — 63600175 PHARM REV CODE 636 W HCPCS: Performed by: HOSPITALIST

## 2025-05-17 PROCEDURE — 85025 COMPLETE CBC W/AUTO DIFF WBC: CPT | Performed by: STUDENT IN AN ORGANIZED HEALTH CARE EDUCATION/TRAINING PROGRAM

## 2025-05-17 PROCEDURE — D9220A PRA ANESTHESIA: Mod: ANES,,, | Performed by: STUDENT IN AN ORGANIZED HEALTH CARE EDUCATION/TRAINING PROGRAM

## 2025-05-17 PROCEDURE — 25000003 PHARM REV CODE 250: Performed by: STUDENT IN AN ORGANIZED HEALTH CARE EDUCATION/TRAINING PROGRAM

## 2025-05-17 PROCEDURE — 83735 ASSAY OF MAGNESIUM: CPT | Performed by: STUDENT IN AN ORGANIZED HEALTH CARE EDUCATION/TRAINING PROGRAM

## 2025-05-17 PROCEDURE — 63600175 PHARM REV CODE 636 W HCPCS

## 2025-05-17 PROCEDURE — 85610 PROTHROMBIN TIME: CPT | Performed by: STUDENT IN AN ORGANIZED HEALTH CARE EDUCATION/TRAINING PROGRAM

## 2025-05-17 PROCEDURE — 63600175 PHARM REV CODE 636 W HCPCS: Performed by: NURSE ANESTHETIST, CERTIFIED REGISTERED

## 2025-05-17 PROCEDURE — 36415 COLL VENOUS BLD VENIPUNCTURE: CPT | Performed by: STUDENT IN AN ORGANIZED HEALTH CARE EDUCATION/TRAINING PROGRAM

## 2025-05-17 PROCEDURE — 20600001 HC STEP DOWN PRIVATE ROOM

## 2025-05-17 PROCEDURE — 25000003 PHARM REV CODE 250: Performed by: NURSE ANESTHETIST, CERTIFIED REGISTERED

## 2025-05-17 PROCEDURE — 71000039 HC RECOVERY, EACH ADD'L HOUR

## 2025-05-17 PROCEDURE — A9585 GADOBUTROL INJECTION: HCPCS | Performed by: HOSPITALIST

## 2025-05-17 PROCEDURE — 25000003 PHARM REV CODE 250: Performed by: HOSPITALIST

## 2025-05-17 PROCEDURE — 37000009 HC ANESTHESIA EA ADD 15 MINS

## 2025-05-17 PROCEDURE — 25500020 PHARM REV CODE 255: Performed by: HOSPITALIST

## 2025-05-17 PROCEDURE — D9220A PRA ANESTHESIA: Mod: CRNA,,, | Performed by: NURSE ANESTHETIST, CERTIFIED REGISTERED

## 2025-05-17 PROCEDURE — 37000008 HC ANESTHESIA 1ST 15 MINUTES

## 2025-05-17 RX ORDER — SODIUM CHLORIDE 0.9 % (FLUSH) 0.9 %
10 SYRINGE (ML) INJECTION
Status: DISCONTINUED | OUTPATIENT
Start: 2025-05-17 | End: 2025-06-02 | Stop reason: HOSPADM

## 2025-05-17 RX ORDER — THIAMINE HYDROCHLORIDE 100 MG/ML
400 INJECTION, SOLUTION INTRAMUSCULAR; INTRAVENOUS 3 TIMES DAILY
Status: COMPLETED | OUTPATIENT
Start: 2025-05-17 | End: 2025-05-19

## 2025-05-17 RX ORDER — LIDOCAINE HYDROCHLORIDE 10 MG/ML
1 INJECTION, SOLUTION EPIDURAL; INFILTRATION; INTRACAUDAL; PERINEURAL ONCE
Status: DISCONTINUED | OUTPATIENT
Start: 2025-05-17 | End: 2025-05-17

## 2025-05-17 RX ORDER — THIAMINE HCL 100 MG
100 TABLET ORAL DAILY
Status: DISCONTINUED | OUTPATIENT
Start: 2025-05-20 | End: 2025-06-02 | Stop reason: HOSPADM

## 2025-05-17 RX ORDER — THIAMINE HCL 100 MG
100 TABLET ORAL DAILY
Status: DISCONTINUED | OUTPATIENT
Start: 2025-05-17 | End: 2025-05-17

## 2025-05-17 RX ORDER — GADOBUTROL 604.72 MG/ML
7 INJECTION INTRAVENOUS
Status: COMPLETED | OUTPATIENT
Start: 2025-05-17 | End: 2025-05-17

## 2025-05-17 RX ORDER — PANTOPRAZOLE SODIUM 40 MG/1
40 TABLET, DELAYED RELEASE ORAL DAILY
Status: DISCONTINUED | OUTPATIENT
Start: 2025-05-18 | End: 2025-05-19

## 2025-05-17 RX ORDER — PHENYLEPHRINE HYDROCHLORIDE 10 MG/ML
INJECTION INTRAVENOUS
Status: DISCONTINUED | OUTPATIENT
Start: 2025-05-17 | End: 2025-05-17

## 2025-05-17 RX ORDER — LIDOCAINE HYDROCHLORIDE 10 MG/ML
INJECTION, SOLUTION INTRAVENOUS
Status: DISCONTINUED | OUTPATIENT
Start: 2025-05-17 | End: 2025-05-17

## 2025-05-17 RX ORDER — INSULIN ASPART 100 [IU]/ML
0-5 INJECTION, SOLUTION INTRAVENOUS; SUBCUTANEOUS
Status: DISCONTINUED | OUTPATIENT
Start: 2025-05-17 | End: 2025-05-30

## 2025-05-17 RX ORDER — THIAMINE HYDROCHLORIDE 100 MG/ML
400 INJECTION, SOLUTION INTRAMUSCULAR; INTRAVENOUS 3 TIMES DAILY
Status: DISCONTINUED | OUTPATIENT
Start: 2025-05-17 | End: 2025-05-17

## 2025-05-17 RX ORDER — LORAZEPAM 2 MG/ML
2 INJECTION INTRAMUSCULAR
Status: DISCONTINUED | OUTPATIENT
Start: 2025-05-17 | End: 2025-06-02 | Stop reason: HOSPADM

## 2025-05-17 RX ORDER — LIDOCAINE HYDROCHLORIDE 10 MG/ML
1 INJECTION, SOLUTION INFILTRATION; PERINEURAL ONCE
Status: DISCONTINUED | OUTPATIENT
Start: 2025-05-17 | End: 2025-05-17

## 2025-05-17 RX ORDER — PROPOFOL 10 MG/ML
VIAL (ML) INTRAVENOUS CONTINUOUS PRN
Status: DISCONTINUED | OUTPATIENT
Start: 2025-05-17 | End: 2025-05-17

## 2025-05-17 RX ADMIN — PROPOFOL 100 MCG/KG/MIN: 10 INJECTION, EMULSION INTRAVENOUS at 09:05

## 2025-05-17 RX ADMIN — CARBIDOPA AND LEVODOPA 2 TABLET: 25; 100 TABLET ORAL at 08:05

## 2025-05-17 RX ADMIN — POLYETHYLENE GLYCOL 3350 17 G: 17 POWDER, FOR SOLUTION ORAL at 08:05

## 2025-05-17 RX ADMIN — SODIUM CHLORIDE 1000 MG: 9 INJECTION, SOLUTION INTRAVENOUS at 03:05

## 2025-05-17 RX ADMIN — CYANOCOBALAMIN TAB 1000 MCG 1000 MCG: 1000 TAB at 08:05

## 2025-05-17 RX ADMIN — LIDOCAINE HYDROCHLORIDE 60 MG: 10 INJECTION, SOLUTION INTRAVENOUS at 09:05

## 2025-05-17 RX ADMIN — AMLODIPINE BESYLATE 10 MG: 10 TABLET ORAL at 08:05

## 2025-05-17 RX ADMIN — CARBIDOPA AND LEVODOPA 2 TABLET: 25; 100 TABLET ORAL at 03:05

## 2025-05-17 RX ADMIN — MIRTAZAPINE 7.5 MG: 7.5 TABLET, FILM COATED ORAL at 08:05

## 2025-05-17 RX ADMIN — THIAMINE HYDROCHLORIDE 400 MG: 100 INJECTION, SOLUTION INTRAMUSCULAR; INTRAVENOUS at 08:05

## 2025-05-17 RX ADMIN — SODIUM CHLORIDE, SODIUM GLUCONATE, SODIUM ACETATE, POTASSIUM CHLORIDE, MAGNESIUM CHLORIDE, SODIUM PHOSPHATE, DIBASIC, AND POTASSIUM PHOSPHATE: .53; .5; .37; .037; .03; .012; .00082 INJECTION, SOLUTION INTRAVENOUS at 08:05

## 2025-05-17 RX ADMIN — ENOXAPARIN SODIUM 40 MG: 40 INJECTION SUBCUTANEOUS at 04:05

## 2025-05-17 RX ADMIN — GADOBUTROL 7 ML: 604.72 INJECTION INTRAVENOUS at 09:05

## 2025-05-17 RX ADMIN — PHENYLEPHRINE HYDROCHLORIDE 150 MCG: 10 INJECTION INTRAVENOUS at 09:05

## 2025-05-17 RX ADMIN — THIAMINE HYDROCHLORIDE 400 MG: 100 INJECTION, SOLUTION INTRAMUSCULAR; INTRAVENOUS at 04:05

## 2025-05-17 RX ADMIN — Medication 100 MG: at 08:05

## 2025-05-17 NOTE — ASSESSMENT & PLAN NOTE
Diagnosed with outside provider with hypothyroid, unsure of work up or etiology.  Was managed with levothyroxine 25 mcg since 2018, patient no longer takes this medication and family member unable to report timing of medication or when it was stopped.  Labs on this admission are within range

## 2025-05-17 NOTE — ASSESSMENT & PLAN NOTE
Hyponatremia is likely due to Dehydration/hypovolemia. The patient's most recent sodium results are listed below.  Recent Labs     05/14/25  1815 05/15/25  0455 05/16/25  0544   * 128* 129*     Maxime  - Monitor sodium Daily.   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

## 2025-05-17 NOTE — ASSESSMENT & PLAN NOTE
Patient's blood pressure range in the last 24 hours was: BP  Min: 89/58  Max: 169/76.The patient's inpatient anti-hypertensive regimen is listed below:  Current Antihypertensives  amLODIPine tablet 10 mg, Daily, Oral    Plan  - BP is controlled, no changes needed to their regimen

## 2025-05-17 NOTE — ASSESSMENT & PLAN NOTE
Patient's blood pressure range in the last 24 hours was: BP  Min: 143/74  Max: 169/76.The patient's inpatient anti-hypertensive regimen is listed below:  Current Antihypertensives  amLODIPine tablet 10 mg, Daily, Oral    Plan  - BP is controlled, no changes needed to their regimen

## 2025-05-17 NOTE — PROGRESS NOTES
[facility]  Progress Note    Patient Name: Vince Huffman  MRN: [id number]  Patient Class: IP- Inpatient   Admission Date: 4/30/2025  Length of Stay: 15 days  Attending Physician: Zina Tarango MD  Primary Care Provider: Leland Porras MD        Subjective     Principal Problem:Acute encephalopathy        HPI:  78 yo M w/HTN, hypothyroid, crohn's disease, CAD, hx of SBO, presenting with AMS from [facility]. Patient was sent in from [facility] for progressively worsening cognitive function. Patient is A&O x1 at his baseline. Patient and his daughter endorse mild confusion. Which has been progressive.    Patient has not had any recent falls. Endorsed mild suprapubic abdominal pain. No UTI. Mild inflammation on CT, not unexpected in the setting of Crohn's.     Overview/Hospital Course:   79 y.o. M who was admitted to  for further evaluation of worsening AMS per rehab facility. AAOx1 on admission, normally AAOx4 prior to 4/20 per son. UA negative. CXR clear. CT abd/pelvis with wall thickening of distal ileum, inflammatory infectious ileitis are considerations, fecal distention of the rectum, impaction not excluded. Discussed with GI, anticipated these are chronic findings. CRP negative. Will give enema and monitor for improvement. Patient with successful BM. Neurology consulted: MRI brain ordered (no acute findings, motion artifact), extended EEG, ammonia levels. Worsening mental status on 5/2, medicine team consulted for LP which was unsuccessful. More alert on 5/3 and answering yes/no questions when redirected. Neurology recommending carbidopa-levodopa trial, EEG, and attempting another LP.     Symptoms improved with increasing doses of Carbidopa/Levodopa. LP concerning for meningitis so he was started on Empiric treatment on 5/6/25.     5/9 -Discussion held with both Neurology and Infectious Disease, Infectious Disease does not believe patient has any infectious  etiology and as such recommended discontinuing antimicrobials.  Neurology started IVIG for presumed autoimmune process    5/14- Completed 5 day course of IVIG. Minimal improvement in cognition. Had bouts of poor oral intake, only drank boost.  Had intermittent hyponatremia responding to IV fluids.  On 05/14 am sodium 124.  Nephrology was consulted, investigations ordered and sodium corrected with close electrolyte monitoring.  Psych was consulted by the request of the neurologist     5/15- hyponatremia improved after stopping fluids. NG tube attempted thrice at bedside however not able to be placed. Able to tolerate oral intake however very limited intake. Neuro recommended repeat MRI with sedation as previous had artifact. Anesthesia notified to arrange with sedation.    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 Endocrine consulted. Neurology planning 5 day course of IVMP starting 5/17. B12 1000 mcg po started.    Interval History:      Worked with OT today. Follows command intermittently.     Review of Systems   Unable to perform ROS: Other     Objective:     Vital Signs (Most Recent):  Temp: 98.5 °F (36.9 °C) (05/16/25 1630)  Pulse: 86 (05/16/25 1630)  Resp: 18 (05/16/25 1630)  BP: (!) 143/74 (05/16/25 1630)  SpO2: 98 % (05/16/25 1630) Vital Signs (24h Range):  Temp:  [98 °F (36.7 °C)-98.5 °F (36.9 °C)] 98.5 °F (36.9 °C)  Pulse:  [] 86  Resp:  [18-21] 18  SpO2:  [96 %-100 %] 98 %  BP: (143-169)/(62-85) 143/74     Weight: 64.1 kg (141 lb 5 oz)  Body mass index is 19.71 kg/m².    Intake/Output Summary (Last 24 hours) at 5/16/2025 1937  Last data filed at 5/16/2025 1722  Gross per 24 hour   Intake 240 ml   Output 375 ml   Net -135 ml        Physical Exam  Constitutional:       General: He is not in acute distress.     Appearance: He is ill-appearing.   HENT:      Head: Normocephalic.      Right Ear: External ear normal.      Left Ear: External ear  normal.      Nose: Nose normal.   Cardiovascular:      Rate and Rhythm: Normal rate.      Heart sounds: Normal heart sounds.   Pulmonary:      Breath sounds: Normal breath sounds.   Abdominal:      Palpations: Abdomen is soft.      Tenderness: There is no abdominal tenderness.   Musculoskeletal:      Right lower leg: No edema.      Left lower leg: No edema.      Comments: SCD   Skin:     General: Skin is warm.   Neurological:      General: No focal deficit present.      Mental Status: He is alert however intermittently follows command.     Significant Labs: All pertinent labs within the past 24 hours have been reviewed.      Significant Imaging: I have reviewed all pertinent imaging results/findings within the past 24 hours        Physical Exam      Assessment & Plan  Acute encephalopathy  AMS (altered mental status)  79 y.o.M with progressive acutely worsening confusion/tremors since 4/20. Previously AAOx4, now AAOx1  - neurology consulted, appreciate recommendations:  - MRI brain from last week was essentially non diagnostic  - Repeat MRI again without obvious new pathology   -working diagnosis is Parkinson's disease, as improving with Sinemet: iContinue two tabs TID  -LP concerning for possible meningitis. Continue Empiric treatment per Neurology recs   -EEG pending   - Delirium and fall precautions along with neuro checks  - PT/OT consult placed; return to [facility] upon discharge     5/8- Viral meningitis panel unremarkable.  Cultures no growth.  CSF with elevated proteins and white cell count.  Started on empiric antibiotics, vancomycin ceftriaxone and ampicillin, infectious disease consulted.  Follow up with Neurology recommendations.  Started on carbidopa levodopa.  Dispo plan acute rehab.  Mentation remains the same during this hospitalization A&O x1    5/9 -Started on IVIG per Neurology.  EEG(f/u)    5/10-Continue with IVIG, follow up with Neurology.  EEG report noted    5/14 - Completed IVIG course,  "follow up with Neurology. ? D/c back to acute rehab, if mentation improves    5/15- mentation remains unchanged, appreciate neuro and psychiatry recommendations. Hyponatremia improving. repeat MRI with sedation 5/16 as previous had artifact. Anesthesia notified to arrange with sedation.    5/16- MRI remains pending.  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 Endocrine consulted. Neurology planning 5 day course of IVMP starting 5/17. B12 1000 mcg po started.  Crohn's disease  - Per CT scan "thickening of the distal ileum to the surgical anastomosis with the large bowel. Inflammatory infectious ileitis are considerations. Recommend clinical correlation and follow-up. "  -CRP negative. No concern for flare.   -GI without concerns of confusion being caused by sterlara, confirmed with pharmacy     Generalized weakness  - sent from SNF  - progressive worsening weakness per son   - PT/OT ordered. Will need placement at discharge    Unintended weight loss  - reported per son  - recently started gluten free diet per recommendations from GI    Hyponatremia  Hyponatremia is likely due to Dehydration/hypovolemia. The patient's most recent sodium results are listed below.  Recent Labs     05/14/25  1815 05/15/25  0455 05/16/25  0544   * 128* 129*     Maxime  - Monitor sodium Daily.   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 Continue Sinemet as above.     CAD (coronary artery disease)    Major depressive disorder, single episode, severe without " psychosis  Patient has single episode of depression which is severe and is currently uncontrolled. Will hold anti-depressant medications. We will consult psychiatry at this time. Patient does not display psychosis at this time. Continue to monitor closely and adjust plan of care as needed.      Anemia  Anemia is likely due to acute on chronic illness. Most recent hemoglobin and hematocrit are listed below.  Recent Labs     05/14/25  0817 05/14/25  1637 05/15/25  0455 05/16/25  0544   HGB 11.1*  --  10.5* 11.9*   HCT 32.2* 34* 30.0* 34.3*     Plan  - Monitor serial CBC: Daily  - Transfuse PRBC if patient becomes hemodynamically unstable, symptomatic or H/H drops below 7/21.  - Patient has not received any PRBC transfusions to date  - Patient's anemia is currently stable  -  VTE Risk Mitigation (From admission, onward)           Ordered     enoxaparin injection 40 mg  Daily         05/01/25 0827     IP VTE HIGH RISK PATIENT  Once         05/01/25 0827     Place sequential compression device  Until discontinued         05/01/25 0827                    Discharge Planning   ERNIE: 5/20/2025     Code Status: Full Code   Medical Readiness for Discharge Date:   Discharge Plan A: Rehab   Discharge Delays: None known at this time            Please place Justification for DME        Zina Tarango MD  Department of Hospital Medicine   [facility] - [facility]

## 2025-05-17 NOTE — SUBJECTIVE & OBJECTIVE
Interval History: No acute events overnight.  Went for MRI with sedation today.  Discussed with Neuro.  Tentative plan for repeat LP, possibly IV Solumedrol after.  Might need ID re-eval for potential meningitis.    Review of Systems   Unable to perform ROS: Patient nonverbal   Respiratory:  Positive for cough.      Objective:     Vital Signs (Most Recent):  Temp: 98 °F (36.7 °C) (05/17/25 1121)  Pulse: 85 (05/17/25 1121)  Resp: 18 (05/17/25 1121)  BP: 138/65 (05/17/25 1121)  SpO2: 100 % (05/17/25 1121) Vital Signs (24h Range):  Temp:  [97 °F (36.1 °C)-99.1 °F (37.3 °C)] 98 °F (36.7 °C)  Pulse:  [] 85  Resp:  [13-18] 18  SpO2:  [97 %-100 %] 100 %  BP: ()/(58-80) 138/65     Weight: 64.1 kg (141 lb 5 oz)  Body mass index is 19.71 kg/m².    Intake/Output Summary (Last 24 hours) at 5/17/2025 1139  Last data filed at 5/17/2025 0624  Gross per 24 hour   Intake 120 ml   Output 775 ml   Net -655 ml      Physical Exam  Constitutional:       Appearance: He is ill-appearing.   HENT:      Head: Normocephalic and atraumatic.   Cardiovascular:      Rate and Rhythm: Normal rate and regular rhythm.      Heart sounds: No murmur heard.  Pulmonary:      Effort: Pulmonary effort is normal. No respiratory distress.      Breath sounds: Normal breath sounds. No wheezing or rales.      Comments: Actively coughing  Abdominal:      General: There is no distension.      Palpations: Abdomen is soft.      Tenderness: There is no abdominal tenderness.   Musculoskeletal:         General: No deformity.   Skin:     General: Skin is warm and dry.      Coloration: Skin is pale.   Neurological:      Comments: Nonverbal         MELD 3.0: 13 at 5/17/2025  4:14 AM  MELD-Na: 6 at 5/17/2025  4:14 AM  Calculated from:  Serum Creatinine: 1 mg/dL at 5/17/2025  4:14 AM  Serum Sodium: 131 mmol/L at 5/17/2025  4:14 AM  Total Bilirubin: 0.4 mg/dL (Using min of 1 mg/dL) at 5/17/2025  4:14 AM  Serum Albumin: 2.6 g/dL at 5/17/2025  4:14 AM  INR(ratio): 1  at 5/17/2025  4:14 AM  Age at listing (hypothetical): 79 years  Sex: Male at 5/17/2025  4:14 AM      Significant Labs:  CBC:  Recent Labs   Lab 05/16/25  0544 05/17/25  0414   WBC 6.32 5.46   HGB 11.9* 11.8*   HCT 34.3* 34.4*    226     CMP:  Recent Labs   Lab 05/16/25 0544 05/17/25  0414   * 131*   K 4.1 4.2   CL 97 98   CO2 24 26   GLU 91 87   BUN 15 18   CREATININE 1.0 1.0   CALCIUM 9.5 9.6   PROT 8.5* 8.1   ALBUMIN 2.7* 2.6*   BILITOT 0.4 0.4   ALKPHOS 74 76   AST 39 44   ALT 12 10   ANIONGAP 8 7*     PTINR:  Recent Labs   Lab 05/16/25 0544 05/17/25  0414   INR 1.0 1.0

## 2025-05-17 NOTE — PROGRESS NOTES
Adam Amezquita - [facility] Medicine  Progress Note    Patient Name: Vince Huffman  MRN: [id number]  Patient Class: IP- Inpatient   Admission Date: 4/30/2025  Length of Stay: 16 days  Attending Physician: Saadia Quiñones MD  Primary Care Provider: Leland Porras MD        Subjective     Principal Problem:Acute encephalopathy        HPI:  By Dr. Juan F Ramírez MD    80 yo M w/HTN, hypothyroid, crohn's disease, CAD, hx of SBO, presenting with AMS from [facility]. Patient was sent in from [facility] for progressively worsening cognitive function. Patient is A&O x1 at his baseline. Patient and his daughter endorse mild confusion. Which has been progressive.    Patient has not had any recent falls. Endorsed mild suprapubic abdominal pain. No UTI. Mild inflammation on CT, not unexpected in the setting of Crohn's.     Overview/Hospital Course:  Mr. Huffman was admitted to  for further evaluation of worsening AMS per rehab facility. AAOx1 on admission, normally AAOx4 prior to 4/20 per son. UA negative. CXR clear. CT abd/pelvis with wall thickening of distal ileum, inflammatory infectious ileitis are considerations, fecal distention of the rectum, impaction not excluded. Discussed with GI, anticipated these are chronic findings. CRP negative. Will give enema and monitor for improvement. Patient with successful BM. Neurology consulted: MRI brain ordered (no acute findings, motion artifact), extended EEG, ammonia levels. Worsening mental status on 5/2, medicine team consulted for LP which was unsuccessful. More alert on 5/3 and answering yes/no questions when redirected. Neurology recommending carbidopa-levodopa trial, EEG, and attempting another LP.  Symptoms improved with increasing doses of Carbidopa/Levodopa. LP concerning for meningitis so he was started on Empiric treatment on 5/6/25. Discussion held with both Neurology and Infectious Disease, Infectious Disease does not believe patient  has any infectious etiology and as such recommended discontinuing antimicrobials.  Neurology started IVIG for presumed autoimmune process.  Completed 5 day course of IVIG 5/14. Minimal improvement in cognition. Had bouts of poor oral intake, only drank boost.  Had intermittent hyponatremia responding to IV fluids.  On 05/14 am Sodium 124.  Nephrology was consulted, investigations ordered and sodium corrected with close electrolyte monitoring.  Psych was consulted by the request of the neurologist. Hyponatremia improved after stopping fluids. NG tube attempted thrice at bedside however not able to be placed. Able to tolerate oral intake however very limited intake. Neuro recommended repeat MRI with sedation as previous had artifact. Anesthesia notified to arrange with sedation which was done on 5/17.  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 Endocrine consulted. Neurology planning 5 day course of IV Solumedrol starting 5/17.   He was started on Vitamin B12 and Thiamine replacement.    Interval History: No acute events overnight.  Went for MRI with sedation today.  Discussed with Neuro.  Tentative plan for repeat LP, possibly IV Solumedrol after.  Might need ID re-eval for potential meningitis.    Review of Systems   Unable to perform ROS: Patient nonverbal   Respiratory:  Positive for cough.      Objective:     Vital Signs (Most Recent):  Temp: 98 °F (36.7 °C) (05/17/25 1121)  Pulse: 85 (05/17/25 1121)  Resp: 18 (05/17/25 1121)  BP: 138/65 (05/17/25 1121)  SpO2: 100 % (05/17/25 1121) Vital Signs (24h Range):  Temp:  [97 °F (36.1 °C)-99.1 °F (37.3 °C)] 98 °F (36.7 °C)  Pulse:  [] 85  Resp:  [13-18] 18  SpO2:  [97 %-100 %] 100 %  BP: ()/(58-80) 138/65     Weight: 64.1 kg (141 lb 5 oz)  Body mass index is 19.71 kg/m².    Intake/Output Summary (Last 24 hours) at 5/17/2025 1138  Last data filed at 5/17/2025 0624  Gross per 24 hour   Intake 120 ml    Output 775 ml   Net -655 ml      Physical Exam  Constitutional:       Appearance: He is ill-appearing.   HENT:      Head: Normocephalic and atraumatic.   Cardiovascular:      Rate and Rhythm: Normal rate and regular rhythm.      Heart sounds: No murmur heard.  Pulmonary:      Effort: Pulmonary effort is normal. No respiratory distress.      Breath sounds: Normal breath sounds. No wheezing or rales.      Comments: Actively coughing  Abdominal:      General: There is no distension.      Palpations: Abdomen is soft.      Tenderness: There is no abdominal tenderness.   Musculoskeletal:         General: No deformity.   Skin:     General: Skin is warm and dry.      Coloration: Skin is pale.   Neurological:      Comments: Nonverbal         MELD 3.0: 13 at 5/17/2025  4:14 AM  MELD-Na: 6 at 5/17/2025  4:14 AM  Calculated from:  Serum Creatinine: 1 mg/dL at 5/17/2025  4:14 AM  Serum Sodium: 131 mmol/L at 5/17/2025  4:14 AM  Total Bilirubin: 0.4 mg/dL (Using min of 1 mg/dL) at 5/17/2025  4:14 AM  Serum Albumin: 2.6 g/dL at 5/17/2025  4:14 AM  INR(ratio): 1 at 5/17/2025  4:14 AM  Age at listing (hypothetical): 79 years  Sex: Male at 5/17/2025  4:14 AM      Significant Labs:  CBC:  Recent Labs   Lab 05/16/25  0544 05/17/25  0414   WBC 6.32 5.46   HGB 11.9* 11.8*   HCT 34.3* 34.4*    226     CMP:  Recent Labs   Lab 05/16/25  0544 05/17/25  0414   * 131*   K 4.1 4.2   CL 97 98   CO2 24 26   GLU 91 87   BUN 15 18   CREATININE 1.0 1.0   CALCIUM 9.5 9.6   PROT 8.5* 8.1   ALBUMIN 2.7* 2.6*   BILITOT 0.4 0.4   ALKPHOS 74 76   AST 39 44   ALT 12 10   ANIONGAP 8 7*     PTINR:  Recent Labs   Lab 05/16/25  0544 05/17/25  0414   INR 1.0 1.0         Assessment & Plan  Acute encephalopathy  AMS (altered mental status)  Progressive acutely worsening confusion/tremors since 4/20. Previously AAOx4, now AAOx1  Neurology consulted: MRI brain ordered (no acute findings, motion artifact), extended EEG, ammonia levels.   Worsening  "mental status on 5/2, medicine team consulted for LP which was unsuccessful.   Neurology recommending carbidopa-levodopa trial, EEG, and attempting another LP.    Symptoms improved with increasing doses of Carbidopa/Levodopa.   LP concerning for meningitis so he was started on Empiric treatment on 5/6/25, viral panel negative  Discussion held with both Neurology and Infectious Disease, Infectious Disease does not believe patient has any infectious etiology and as such recommended discontinuing antimicrobials.    Neurology started IVIG for presumed autoimmune process.  Completed 5 day course of IVIG 5/14. Minimal improvement in cognition.   Neuro recommended repeat MRI with sedation as previous had artifact. Anesthesia notified to arrange with sedation which was done on 5/17.    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 Endocrine consulted. Neurology planning 5 day course of IV Solumedrol starting 5/17.     Continue Vitamin B12 and Thiamine replacement.  Crohn's disease  - Per CT scan "thickening of the distal ileum to the surgical anastomosis with the large bowel. Inflammatory infectious ileitis are considerations. Recommend clinical correlation and follow-up. "  -CRP negative. No concern for flare.   -GI without concerns of confusion being caused by sterlara, confirmed with pharmacy     Generalized weakness  - Sent from SNF  - Progressive worsening weakness per son   - PT/OT ordered. Will need placement at discharge    Unintended weight loss  - Reported per son  - Recently started gluten free diet per recommendations from GI    Hyponatremia  Hyponatremia is likely due to Dehydration/hypovolemia. The patient's most recent sodium results are listed below.  Recent Labs     05/15/25  0455 05/16/25  0544 05/17/25  0414   * 129* 131*     Maxime  - Monitor sodium Daily.   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 Continue Sinemet as above.     CAD (coronary artery disease)  History noted.   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 Will hold anti-depressant medications. We will consult psychiatry at this time. Patient does not display psychosis at this time. Continue to monitor closely and adjust plan of care as needed.      Anemia  Anemia is likely due to acute on chronic illness. Most recent hemoglobin and hematocrit are listed below.  Recent Labs     05/15/25  0455 05/16/25  0544 05/17/25  0414   HGB 10.5* 11.9* 11.8*   HCT 30.0* 34.3* 34.4*     Plan  - Monitor serial CBC: Daily  - Transfuse PRBC if patient becomes hemodynamically unstable, symptomatic or H/H drops below 7/21.  - Patient has not received any PRBC transfusions to date  - Patient's anemia is currently stable  -  VTE Risk Mitigation (From admission, onward)           Ordered     enoxaparin injection 40 mg  Daily         05/01/25 0827     IP VTE HIGH RISK PATIENT  Once         05/01/25 0827     Place sequential compression device  Until discontinued         05/01/25 0827                    Discharge Planning   ERNIE: 5/20/2025     Code Status: Full Code   Medical Readiness for Discharge Date:   Discharge Plan A: Rehab   Discharge Delays: None known at this time          Saadia Quiñones MD  Department of Hospital Medicine   [facility] - Acute Medical Stepdown

## 2025-05-17 NOTE — ASSESSMENT & PLAN NOTE
79 year old male with history of Crohn's disease, SBO, hypothyroidism, and CAD  presenting from [facility] with encephalopathy. Unclear cause of encephalopathy at this time, though acute presentation with concurrent parkinsonism suggests autoimmune etiology. Motion artifact on MRI brain. On initial exam, was disoriented to place, time, and situation, minimally responsive and will answer with one word, asterixis in his upper extremities, axial rigidity/extremity/rigidity, masked facies, and intention tremor most notably in the left hand. No hyperreflexia. Ptosis of left eye though no extraocular movement abnormalities/pupillary discrepancy.     DDX: autoimmune Parkinsonism; PRES or PML (both possible with Stelara); SREAT +/-  superimposed catatonia or complex bereavement. 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 There have been reported cases of SREAT with normal fT4, so normal level does not exclude this diagnosis.     LABS  Serum  Pending: Ma2 ab, [facility] movement disorders panel, Vit E, NFL, heavy metals   On Movement Disorders panel, not on autoimmune encephalopathy panel = CCPQ (formerly known as P/Q type VGCC,) GRAF1, Septin5, ITPR1, AP3B2, KLHL11  Dopamine-2 and Belden-3 not commercially available per lab  NFL, heavy metals  WNL: strongyloides IgG, Treponema, negative celiac dx serology, zinc, folate, B12, copper, B1, autoimmune encephalopathy panel, HIV, Vit D  Abnormal: elevated anti-thyroglobulin & anti-TPO    CSF  Pending: None  WNL: AFB, meningitis-encephalitis panel, VDRL, culture, glucose, LDH, cytology, autoimmune encephalopathy panel  Abnormal: protein (60), pleocytosis (after correction for elevated RBC)    Recommendations:  - MRI brain today unrevealing  -> with this result, would not  pursue repeat LP to re-evaluate for infection  -> has been evaluated by ID previously, OK for steroids from their perspective  - S/p 5-day course of IVIG (EOT 5/12/2025)  - Will start empiric pulse-dose steroids for SREAT vs autoimmune encephalitis (movement panel pending)  - Continue B12 repletion - 1000 mcg po daily  - Treat empirically for B1 deficiency: 500mg IV TID for 2 days, 250mg daily for three days, 100mg daily thereafter  - F/u pending labs as above  - Continue Sinemet 25 -100 mg 2 tablets TID  - Appreciate Psychiatry assistance and endocrinology opinion  - Delirium and fall precautions   - Concern for inadequate nutritional intake: would consider alternative routes of nutrition (PEG, etc.)    We will continue to follow. Please reach out with any questions.

## 2025-05-17 NOTE — ANESTHESIA POSTPROCEDURE EVALUATION
Anesthesia Post Evaluation    Patient: Vince Huffman    Procedure(s) Performed: MRI (Magnetic Resonance Imagine) (N/A)    Final Anesthesia Type: general      Patient location during evaluation: PACU  Patient participation: Yes- Able to Participate  Level of consciousness: awake  Post-procedure vital signs: reviewed and stable  Pain management: adequate  Airway patency: patent    PONV status at discharge: No PONV  Anesthetic complications: no      Cardiovascular status: hemodynamically stable  Respiratory status: unassisted and spontaneous ventilation  Hydration status: euvolemic  Follow-up not needed.          Vitals Value Taken Time   /75 05/17/25 10:40   Temp 36.7 °C (98.1 °F) 05/17/25 10:30   Pulse 85 05/17/25 11:20   Resp 22 05/17/25 10:47   SpO2 100 % 05/17/25 11:20     Event Time   Out of Recovery 05/17/2025 11:11:00     Pain/Debbie Score: Debbie Score: 9 (5/17/2025 10:15 AM)

## 2025-05-17 NOTE — PLAN OF CARE
Patient alert but disoriented to place, time and situation. Patient shows no signs of pain. Patient MRI with anaesthesia done. Patient schedule to do LP tomorrow. Patient due meds administered and patient tolerating well. Plan of care reviewed with patient. Patient safety measures ongoing, call light within reach, fall precaution in place. No fall or in jury observed during the shift. Patient incontinence pad changed when needed. Patient made comfortable in bed. No other concerns at this time.       Problem: Adult Inpatient Plan of Care  Goal: Plan of Care Review  Outcome: Progressing  Goal: Patient-Specific Goal (Individualized)  Outcome: Progressing  Goal: Absence of Hospital-Acquired Illness or Injury  Outcome: Progressing  Goal: Optimal Comfort and Wellbeing  Outcome: Progressing  Goal: Readiness for Transition of Care  Outcome: Progressing     Problem: Skin Injury Risk Increased  Goal: Skin Health and Integrity  Outcome: Progressing     Problem: Infection  Goal: Absence of Infection Signs and Symptoms  Outcome: Progressing     Problem: Delirium  Goal: Optimal Coping  Outcome: Progressing  Goal: Improved Behavioral Control  Outcome: Progressing  Goal: Improved Attention and Thought Clarity  Outcome: Progressing  Goal: Improved Sleep  Outcome: Progressing     Problem: Fall Injury Risk  Goal: Absence of Fall and Fall-Related Injury  Outcome: Progressing

## 2025-05-17 NOTE — TRANSFER OF CARE
"Anesthesia Transfer of Care Note    Patient: Vince Huffman    Procedure(s) Performed: Procedure(s) (LRB):  MRI (Magnetic Resonance Imagine) (N/A)    Patient location: PACU    Anesthesia Type: general    Transport from OR: Transported from OR on 6-10 L/min O2 by face mask with adequate spontaneous ventilation    Post pain: adequate analgesia    Post assessment: no apparent anesthetic complications and tolerated procedure well    Post vital signs: unstable    Level of consciousness: sedated    Nausea/Vomiting: no nausea/vomiting    Complications: none    Transfer of care protocol was followedComments: Systolic blood pressure 89. Phenylephrine 150mcg iv given.       Last vitals: Visit Vitals  /69 (BP Location: Right arm, Patient Position: Lying)   Pulse 85   Temp 36.1 °C (97 °F) (Oral)   Resp 18   Ht 5' 11" (1.803 m)   Wt 64.1 kg (141 lb 5 oz)   SpO2 97%   BMI 19.71 kg/m²     "

## 2025-05-17 NOTE — ASSESSMENT & PLAN NOTE
Progressive acutely worsening confusion/tremors since 4/20. Previously AAOx4, now AAOx1  Neurology consulted: MRI brain ordered (no acute findings, motion artifact), extended EEG, ammonia levels.   Worsening mental status on 5/2, medicine team consulted for LP which was unsuccessful.   Neurology recommending carbidopa-levodopa trial, EEG, and attempting another LP.    Symptoms improved with increasing doses of Carbidopa/Levodopa.   LP concerning for meningitis so he was started on Empiric treatment on 5/6/25, viral panel negative  Discussion held with both Neurology and Infectious Disease, Infectious Disease does not believe patient has any infectious etiology and as such recommended discontinuing antimicrobials.    Neurology started IVIG for presumed autoimmune process.  Completed 5 day course of IVIG 5/14. Minimal improvement in cognition.   Neuro recommended repeat MRI with sedation as previous had artifact. Anesthesia notified to arrange with sedation which was done on 5/17.    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 Endocrine consulted. Neurology planning 5 day course of IV Solumedrol starting 5/17.     Continue Vitamin B12 and Thiamine replacement.

## 2025-05-17 NOTE — PROGRESS NOTES
"[facility]  Neurology  Progress Note    Patient Name: Vince Huffman  MRN: [id number]  Admission Date: 4/30/2025  Hospital Length of Stay: 16 days  Code Status: Full Code   Attending Provider: Saadia Quiñones MD  Primary Care Physician: Leland Porras MD   Principal Problem:Acute encephalopathy    HPI:   Vince Huffman is a 79 year old male with history of chron's disease, SBO, hypothyroidism, and CAD  presenting from [facility] with encephalopathy. He initially presented to [facility] for impaired mobility and difficulty with ADL's 2/2 generalized weakness. He was reportedly found confused today A&O x 1 (said the year is "1895") but is normally A&O x4. At this time he also expressed generalized abdominal pain. Recent ultrasounds of the lower extremity did not reveal DVT. MRI from last week was non diagnostic. UA unremarkable and CXR normal. CT abdomen and pelvis reveals wall thickening of the distal ileum consistent with possible infectious ileitis. Neurology consulted for further evaluation of encephalopathy.     Overview/Hospital Course:  05/12/2025 - IVIG Day 5/5. Exam worsened compared to yesterday (no longer speaking.)   05/13/2025 - Initial exam early this morning consistent with exam yesterday. However, when assessed by team closed to lunch time was talkative and saying multiple-word sentences (albeit still confused and perseverative.)   05/15/2025 - Patient continues having waxing-and-waning symptoms. Autoimmune encephalopathy serum and CSF panels unrevealing. Plan for repeat MRI with sedation as prior MRIs were motion degraded.   05/16/2025 - Sedated MRI delayed until tomorrow.   5/17/25: MRI done this morning, and is unrevealing though with presence of significant artifact; starting pulse-dose steroids        Subjective:     Interval History: See hospital course    Current Neurological Medications: See below    Current Medications[1]    Review of Systems   Unable to " perform ROS: Mental status change     Objective:     Vital Signs (Most Recent):  Temp: 99.6 °F (37.6 °C) (05/17/25 1620)  Pulse: 105 (05/17/25 1620)  Resp: 20 (05/17/25 1620)  BP: 138/68 (05/17/25 1620)  SpO2: 96 % (05/17/25 1620) Vital Signs (24h Range):  Temp:  [97 °F (36.1 °C)-99.6 °F (37.6 °C)] 99.6 °F (37.6 °C)  Pulse:  [] 105  Resp:  [13-20] 20  SpO2:  [92 %-100 %] 96 %  BP: ()/(58-76) 138/68     Weight: 64.1 kg (141 lb 5 oz)  Body mass index is 19.71 kg/m².     Physical Exam  Vitals and nursing note reviewed.   Constitutional:       General: He is not in acute distress.     Comments:  Thin   HENT:      Head: Normocephalic and atraumatic.   Eyes:      Extraocular Movements: EOM normal.      Conjunctiva/sclera: Conjunctivae normal.   Pulmonary:      Effort: Pulmonary effort is normal. No respiratory distress.   Musculoskeletal:      Right lower leg: No edema.      Left lower leg: No edema.   Skin:     General: Skin is warm and dry.   Neurological:      Mental Status: He is alert.          NEUROLOGICAL EXAMINATION:     MENTAL STATUS   Oriented to person.   Oriented to place.   Disoriented to time.   Level of consciousness: alert       Perseverating     CRANIAL NERVES     CN III, IV, VI   Extraocular motions are normal.     CN VII   Facial expression full, symmetric.     CN XI   CN XI normal.        - L ptosis  - PERRLA     MOTOR EXAM   Muscle bulk: increased  Overall muscle tone: increased    GAIT AND COORDINATION        Bradykinetic bilaterally    Low amplitude, moderate frequency tremor in both hands        Significant Labs: All pertinent lab results from the past 24 hours have been reviewed.    Significant Imaging: I have reviewed and interpreted all pertinent imaging results/findings within the past 24 hours.    Assessment and Plan:     * Acute encephalopathy  79 year old male with history of Crohn's disease, SBO, hypothyroidism, and CAD  presenting from [facility] with encephalopathy. Unclear  cause of encephalopathy at this time, though acute presentation with concurrent parkinsonism suggests autoimmune etiology. Motion artifact on MRI brain. On initial exam, was disoriented to place, time, and situation, minimally responsive and will answer with one word, asterixis in his upper extremities, axial rigidity/extremity/rigidity, masked facies, and intention tremor most notably in the left hand. No hyperreflexia. Ptosis of left eye though no extraocular movement abnormalities/pupillary discrepancy.     DDX: autoimmune Parkinsonism; PRES or PML (both possible with Stelara); SREAT +/-  superimposed catatonia or complex bereavement. 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 There have been reported cases of SREAT with normal fT4, so normal level does not exclude this diagnosis.     LABS  Serum  Pending: Ma2 ab, [facility] movement disorders panel, Vit E, NFL, heavy metals   On Movement Disorders panel, not on autoimmune encephalopathy panel = CCPQ (formerly known as P/Q type VGCC,) GRAF1, Septin5, ITPR1, AP3B2, KLHL11  Dopamine-2 and [place]-3 not commercially available per lab  NFL, heavy metals  WNL: strongyloides IgG, Treponema, negative celiac dx serology, zinc, folate, B12, copper, B1, autoimmune encephalopathy panel, HIV, Vit D  Abnormal: elevated anti-thyroglobulin & anti-TPO    CSF  Pending: None  WNL: AFB, meningitis-encephalitis panel, VDRL, culture, glucose, LDH, cytology, autoimmune encephalopathy panel  Abnormal: protein (60), pleocytosis (after correction for elevated RBC)    Recommendations:  - MRI brain today unrevealing  -> with this result, would not pursue repeat LP to re-evaluate for infection  -> has been evaluated by ID previously, OK for steroids from their perspective  - S/p 5-day  course of IVIG (EOT 5/12/2025)  - Will start empiric pulse-dose steroids for SREAT vs autoimmune encephalitis (movement panel pending)  - Continue B12 repletion - 1000 mcg po daily  - Treat empirically for B1 deficiency: 500mg IV TID for 2 days, 250mg daily for three days, 100mg daily thereafter  - F/u pending labs as above  - Continue Sinemet 25 -100 mg 2 tablets TID  - Appreciate Psychiatry assistance and endocrinology opinion  - Delirium and fall precautions   - Concern for inadequate nutritional intake: would consider alternative routes of nutrition (PEG, etc.)    We will continue to follow. Please reach out with any questions.         VTE Risk Mitigation (From admission, onward)           Ordered     enoxaparin injection 40 mg  Daily         05/01/25 0827     IP VTE HIGH RISK PATIENT  Once         05/01/25 0827     Place sequential compression device  Until discontinued         05/01/25 0827                    Jaden Carvajal DO  Neurology  [facility]       [1]   Current Facility-Administered Medications   Medication Dose Route Frequency Provider Last Rate Last Admin    acetaminophen tablet 1,000 mg  1,000 mg Oral Q8H PRN Zuly Wheat PA-C   1,000 mg at 05/12/25 1554    acetaminophen tablet 650 mg  650 mg Oral Q4H PRN Zuly Wheat PA-C   650 mg at 05/04/25 1453    ALPRAZolam tablet 0.5 mg  0.5 mg Oral BID PRN Zuly Wheat PA-C   0.5 mg at 05/14/25 1626    amLODIPine tablet 10 mg  10 mg Oral Daily Juan F Ramírez MD   10 mg at 05/17/25 0816    carbidopa-levodopa  mg per tablet 2 tablet  2 tablet Oral TID Elian López MD   2 tablet at 05/17/25 1546    cyanocobalamin tablet 1,000 mcg  1,000 mcg Oral Daily Zina Tarango MD   1,000 mcg at 05/17/25 0816    dextrose 50% injection 12.5 g  12.5 g Intravenous PRN Zuly Wheat PA-C        dextrose 50% injection 25 g  25 g Intravenous PRN Zuly Wheat PA-C        enoxaparin injection 40 mg  40 mg Subcutaneous Daily  Zuly Wheat PA-C   40 mg at 05/17/25 1623    glucagon (human recombinant) injection 1 mg  1 mg Intramuscular PRN Zuly Wheat PA-C        glucose chewable tablet 16 g  16 g Oral PRN Zuly Wheat PA-C        glucose chewable tablet 24 g  24 g Oral PRN Zuly Wheat PA-C        insulin aspart U-100 pen 0-5 Units  0-5 Units Subcutaneous QID (AC + HS) PRN Saadia Quiñones MD        LORazepam injection 2 mg  2 mg Intravenous On Call Procedure Jaden Carvajal DO        melatonin tablet 6 mg  6 mg Oral Nightly PRN Zuly Wheat PA-C        methylPREDNISolone sodium succinate (SOLU-MEDROL) 1,000 mg in 0.9% NaCl 100 mL IVPB  1,000 mg Intravenous Daily Jaden Carvajal DO   Stopped at 05/17/25 1616    mirtazapine tablet 7.5 mg  7.5 mg Oral QHS Elian Morejon MD   7.5 mg at 05/16/25 2047    naloxone 0.4 mg/mL injection 0.02 mg  0.02 mg Intravenous PRN Zuly Wheat PA-C        ondansetron disintegrating tablet 8 mg  8 mg Oral Q8H PRN Zuly Wheat PA-C        [START ON 5/18/2025] pantoprazole EC tablet 40 mg  40 mg Oral Daily Saadia Quiñones MD        polyethylene glycol packet 17 g  17 g Oral BID Zuly Wheat PA-C   17 g at 05/17/25 0816    prochlorperazine injection Soln 5 mg  5 mg Intravenous Q6H PRN Zuly Wheat PA-C        sodium chloride 0.9% flush 10 mL  10 mL Intravenous Q12H PRN Zuly Wheat PA-C        sodium chloride 0.9% flush 10 mL  10 mL Intravenous PRN Pedro Ash MD        thiamine injection 400 mg  400 mg Intravenous TID Saadia Quiñones MD   400 mg at 05/17/25 1617    Followed by    [START ON 5/20/2025] thiamine tablet 100 mg  100 mg Oral Daily Saadia Quiñones MD

## 2025-05-17 NOTE — NURSING TRANSFER
Nursing Transfer Note      5/17/2025   10:45 AM    Nurse giving handoff: GRUPO Wynn  Nurse receiving handoff:GRUPO Jimenez      Transfer To: [id number]    Transfer via     Transported by PCT and RN    Order for Tele Monitor? No    Additional Lines: Edmondson Catheter    Patient belongings transferred with patient: No    Chart send with patient: Yes    Patient reassessed at: 1030

## 2025-05-17 NOTE — ASSESSMENT & PLAN NOTE
Acute encephalopathy, etiology is unknown but is continuing to be worked up by the Neurology team.  Thyroid labs were obtained and were found to be within normal limits however TPO antibodies were elevated at 64.8 and antithyroglobulin was elevated at 23.9, there were some concerns that this may be Hashimoto encephalopathy.    -In order to diagnosis SREAT since this is a diagnosis of exclusion and requires a complete and full work up prior to diagnosis. At this time Neurology is assessing for etiology and planning on lumbar puncture in the near future.  -Studies have shown that an elevated TPO level and/or TgAb is essential for diagnosis however median anti-TPOAb level at diagnosis was 900 IU/mL. Patient's level is 64.8  -At this time would advise for extensive work up prior to diagnosis including consideration of LP, EEG, MRI with gadolinium, and laboratory testing for usual cause of delirium, will refer to Neurology  -Treatment for HE is with immunosuppressive therapy including steroids, however infection must be ruled out prior. Endocrinology does not manage SREAT, will refer decision of steroid use to Neurology. Reports indicate treatment is with prednisone however there is no guidance on treatment dose or duration however reports indicate spontaneous recovery is possible otherwise it may improve or resolve for months.  From Endocrinology standpoint would refer to Neurology for continued work up and management at this time.

## 2025-05-17 NOTE — PLAN OF CARE
Pt oriented to self. Unable to make needs known. Sating at 100% on RA with no distress noted. Became NPO at MN r/t to scheduled MRI of the brain with sedation. No s/s of any pain or discomfort noted. Turned Q2H and as need for comfort and pressure relief. VSS per pt baseline. Bed lowered and locked with bed alarm active. Call light within reach. No known needs at this time.    Problem: Adult Inpatient Plan of Care  Goal: Plan of Care Review  Outcome: Progressing  Goal: Patient-Specific Goal (Individualized)  Outcome: Progressing  Goal: Absence of Hospital-Acquired Illness or Injury  Outcome: Progressing  Goal: Optimal Comfort and Wellbeing  Outcome: Progressing  Goal: Readiness for Transition of Care  Outcome: Progressing     Problem: Skin Injury Risk Increased  Goal: Skin Health and Integrity  Outcome: Progressing     Problem: Infection  Goal: Absence of Infection Signs and Symptoms  Outcome: Progressing     Problem: Delirium  Goal: Optimal Coping  Outcome: Progressing  Goal: Improved Behavioral Control  Outcome: Progressing  Goal: Improved Attention and Thought Clarity  Outcome: Progressing  Goal: Improved Sleep  Outcome: Progressing     Problem: Fall Injury Risk  Goal: Absence of Fall and Fall-Related Injury  Outcome: Progressing

## 2025-05-17 NOTE — SUBJECTIVE & OBJECTIVE
Subjective:     Interval History: See hospital course    Current Neurological Medications: See below    Current Medications[1]    Review of Systems   Unable to perform ROS: Mental status change     Objective:     Vital Signs (Most Recent):  Temp: 99.6 °F (37.6 °C) (05/17/25 1620)  Pulse: 105 (05/17/25 1620)  Resp: 20 (05/17/25 1620)  BP: 138/68 (05/17/25 1620)  SpO2: 96 % (05/17/25 1620) Vital Signs (24h Range):  Temp:  [97 °F (36.1 °C)-99.6 °F (37.6 °C)] 99.6 °F (37.6 °C)  Pulse:  [] 105  Resp:  [13-20] 20  SpO2:  [92 %-100 %] 96 %  BP: ()/(58-76) 138/68     Weight: 64.1 kg (141 lb 5 oz)  Body mass index is 19.71 kg/m².     Physical Exam  Vitals and nursing note reviewed.   Constitutional:       General: He is not in acute distress.     Comments:  Thin   HENT:      Head: Normocephalic and atraumatic.   Eyes:      Extraocular Movements: EOM normal.      Conjunctiva/sclera: Conjunctivae normal.   Pulmonary:      Effort: Pulmonary effort is normal. No respiratory distress.   Musculoskeletal:      Right lower leg: No edema.      Left lower leg: No edema.   Skin:     General: Skin is warm and dry.   Neurological:      Mental Status: He is alert.          NEUROLOGICAL EXAMINATION:     MENTAL STATUS   Oriented to person.   Oriented to place.   Disoriented to time.   Level of consciousness: alert       Perseverating     CRANIAL NERVES     CN III, IV, VI   Extraocular motions are normal.     CN VII   Facial expression full, symmetric.     CN XI   CN XI normal.        - L ptosis  - PERRLA     MOTOR EXAM   Muscle bulk: increased  Overall muscle tone: increased    GAIT AND COORDINATION        Bradykinetic bilaterally    Low amplitude, moderate frequency tremor in both hands        Significant Labs: All pertinent lab results from the past 24 hours have been reviewed.    Significant Imaging: I have reviewed and interpreted all pertinent imaging results/findings within the past 24 hours.       [1]   Current  Facility-Administered Medications   Medication Dose Route Frequency Provider Last Rate Last Admin    acetaminophen tablet 1,000 mg  1,000 mg Oral Q8H PRN Zuly Wheat PA-C   1,000 mg at 05/12/25 1554    acetaminophen tablet 650 mg  650 mg Oral Q4H PRN Zuly Wheat PA-C   650 mg at 05/04/25 1453    ALPRAZolam tablet 0.5 mg  0.5 mg Oral BID PRN Zuly Wheat PA-C   0.5 mg at 05/14/25 1626    amLODIPine tablet 10 mg  10 mg Oral Daily Juan F Ramírez MD   10 mg at 05/17/25 0816    carbidopa-levodopa  mg per tablet 2 tablet  2 tablet Oral TID Elian López MD   2 tablet at 05/17/25 1546    cyanocobalamin tablet 1,000 mcg  1,000 mcg Oral Daily Zina Tarango MD   1,000 mcg at 05/17/25 0816    dextrose 50% injection 12.5 g  12.5 g Intravenous PRN Zuly Wheat PA-C        dextrose 50% injection 25 g  25 g Intravenous PRN Zuly Wheat PA-C        enoxaparin injection 40 mg  40 mg Subcutaneous Daily Zuly Wheat PA-C   40 mg at 05/17/25 1623    glucagon (human recombinant) injection 1 mg  1 mg Intramuscular PRN Zuly Wheat PA-C        glucose chewable tablet 16 g  16 g Oral PRN Zuly Wheat PA-C        glucose chewable tablet 24 g  24 g Oral PRN Zuly Wheat PA-C        insulin aspart U-100 pen 0-5 Units  0-5 Units Subcutaneous QID (AC + HS) PRN Saadia Quiñones MD        LORazepam injection 2 mg  2 mg Intravenous On Call Procedure Jaden Carvajal DO        melatonin tablet 6 mg  6 mg Oral Nightly PRN Zuly Wheat PA-C        methylPREDNISolone sodium succinate (SOLU-MEDROL) 1,000 mg in 0.9% NaCl 100 mL IVPB  1,000 mg Intravenous Daily Jaden Carvajal DO   Stopped at 05/17/25 1616    mirtazapine tablet 7.5 mg  7.5 mg Oral QHS Elian Morejon MD   7.5 mg at 05/16/25 2047    naloxone 0.4 mg/mL injection 0.02 mg  0.02 mg Intravenous PRN Zuly Wheat PA-C        ondansetron disintegrating tablet 8 mg  8 mg Oral Q8H PRN Zuly Wheat PA-C        [START ON 5/18/2025] pantoprazole EC  tablet 40 mg  40 mg Oral Daily Saadia Quiñones MD        polyethylene glycol packet 17 g  17 g Oral BID Zuly Wheat PA-C   17 g at 05/17/25 0816    prochlorperazine injection Soln 5 mg  5 mg Intravenous Q6H PRN Zuly Wheat PA-C        sodium chloride 0.9% flush 10 mL  10 mL Intravenous Q12H PRN Zuly Wheat PA-C        sodium chloride 0.9% flush 10 mL  10 mL Intravenous PRN Pedro Ash MD        thiamine injection 400 mg  400 mg Intravenous TID Saadia Quiñones MD   400 mg at 05/17/25 1617    Followed by    [START ON 5/20/2025] thiamine tablet 100 mg  100 mg Oral Daily Saadia Quiñones MD

## 2025-05-17 NOTE — CONSULTS
[facility] - [facility]  Endocrinology  Consult Note    Consult Requested by: Saadia Quiñones MD   Reason for admit: Acute encephalopathy    HISTORY OF PRESENT ILLNESS:  Mr. Huffman is a 79 year old gentleman who [place] [facility] [place] status from [facility].   Patient has a known medical history of HTN, crohn's disease, CAD, hx of SBO.  Patient does have a remote history of Hashimoto and was briefly taking levothyroxine 25 mcg at 1 point however there does not appear to be any workup prior to this and family at bedside is unsure of when the patient has stopped taking his levothyroxine.  Currently neurology is evaluating patient for altered mental status, thyroid labs are in range, a TPO level and antithyroglobulin were obtained on this admission and were 64.8 and 23.9 respectively.  There were some concerns for possible Hashimoto encephalopathy and endocrinology was consulted for recommendations.  Patient himself is AAO x1 only oriented to person when examined this morning, all history was obtained by family member at bedside.  Patient's family member states that prior to this visit he was doing well. On admission it was noted that patient did complain of abdominal pain and was not oriented to time.  Neurology started patient on IVIG for suspected autoimmune encephalitis and patient did not progress.  Currently working up with a lumbar puncture to rule out other causes.  Other than remote history of Hashimoto in the past there are no other endocrinopathies.    Medications and/or Treatments Impacting Glycemic Control:  Immunotherapy:    Immunosuppressants       None          Steroids:   Hormones (From admission, onward)      Start     Stop Route Frequency Ordered    05/17/25 1500  methylPREDNISolone sodium succinate (SOLU-MEDROL) 1,000 mg in 0.9% NaCl 100 mL IVPB         05/22/25 0859 IV Daily 05/17/25 1450    05/01/25 0926  melatonin tablet 6 mg         -- Oral Nightly PRN 05/01/25  "0827          Pressors:    Autonomic Drugs (From admission, onward)      None            Prescriptions Prior to Admission[1]    Current Medications[2]    PMH, PSH, FH, SH updated and reviewed     ROS:      Review of Systems   Constitutional:  Positive for activity change.   Gastrointestinal:  Positive for abdominal pain.   Psychiatric/Behavioral:  Positive for confusion.        Labs Reviewed and Include:  BASELINE Creatinine:   [unfilled]  [unfilled]  [unfilled]  Recent Labs   Lab 05/17/25  0414   GLU 87   CALCIUM 9.6   ALBUMIN 2.6*   PROT 8.1   *   K 4.2   CO2 26   CL 98   BUN 18   CREATININE 1.0   ALKPHOS 76   ALT 10   AST 44   BILITOT 0.4     No results found for: "HGBA1C"    Nutritional status:   Body mass index is 19.71 kg/m².  Lab Results   Component Value Date    ALBUMIN 2.6 (L) 05/17/2025    ALBUMIN 2.7 (L) 05/16/2025    ALBUMIN 2.6 (L) 05/15/2025     Lab Results   Component Value Date    PREALBUMIN 17 (L) 04/24/2025       Estimated Creatinine Clearance: 55.2 mL/min (based on SCr of 1 mg/dL).    POCT Glucose results:    Current Insulin Regimen:       PHYSICAL EXAMINATION:  Vitals:    05/17/25 1222   BP: 135/66   Pulse: (!) 46   Resp: 18   Temp: 98 °F (36.7 °C)     Body mass index is 19.71 kg/m².     Physical Exam  Constitutional:       General: He is not in acute distress.     Comments: AAO x1 only oriented to person   HENT:      Head: Normocephalic and atraumatic.   Eyes:      Conjunctiva/sclera: Conjunctivae normal.   Pulmonary:      Effort: Pulmonary effort is normal.   Musculoskeletal:      Cervical back: Neck supple.   Skin:     General: Skin is warm and dry.   Psychiatric:         Mood and Affect: Mood normal.        .     ASSESSMENT and PLAN:    Neuro  * Acute encephalopathy  Acute encephalopathy, etiology is unknown but is continuing to be worked up by the Neurology team.  Thyroid labs were obtained and were found to be within normal limits however TPO antibodies were elevated at 64.8 and " antithyroglobulin was elevated at 23.9, there were some concerns that this may be Hashimoto encephalopathy.    -In order to diagnosis SREAT since this is a diagnosis of exclusion and requires a complete and full work up prior to diagnosis. At this time Neurology is assessing for etiology and planning on lumbar puncture in the near future.  -Studies have shown that an elevated TPO level and/or TgAb is essential for diagnosis however median anti-TPOAb level at diagnosis was 900 IU/mL. Patient's level is 64.8  -At this time would advise for extensive work up prior to diagnosis including consideration of LP, EEG, MRI with gadolinium, and laboratory testing for usual cause of delirium, will refer to Neurology  -Treatment for HE is with immunosuppressive therapy including steroids, however infection must be ruled out prior. Endocrinology does not manage SREAT, will refer decision of steroid use to Neurology. Reports indicate treatment is with prednisone however there is no guidance on treatment dose or duration however reports indicate spontaneous recovery is possible otherwise it may improve or resolve for months.  From Endocrinology standpoint would refer to Neurology for continued work up and management at this time.        Endocrine  Hypothyroid  Diagnosed with outside provider with hypothyroid, unsure of work up or etiology.  Was managed with levothyroxine 25 mcg since 2018, patient no longer takes this medication and family member unable to report timing of medication or when it was stopped.  Labs on this admission are within range          DISCHARGE NEEDS: will assess daily    Toño Edgar MD  Endocrinology  [facility]        [1]   Medications Prior to Admission   Medication Sig Dispense Refill Last Dose/Taking    acetaminophen (TYLENOL) 500 MG tablet Take 500 mg by mouth every 6 (six) hours as needed.       ALPRAZolam (XANAX XR) 0.5 MG Tb24 Take 0.5 mg by mouth once daily.       amLODIPine  (NORVASC) 10 MG tablet Take 1 tablet (10 mg total) by mouth once daily.       ustekinumab (STELARA) 90 mg/mL Syrg syringe Inject 1 mL (90 mg total) into the skin every 8 weeks. 2 mL 2    [2]   Current Facility-Administered Medications   Medication Dose Route Frequency Provider Last Rate Last Admin    acetaminophen tablet 1,000 mg  1,000 mg Oral Q8H PRN Zuly Wheat PA-C   1,000 mg at 05/12/25 1554    acetaminophen tablet 650 mg  650 mg Oral Q4H PRN Zuly Wheat PA-C   650 mg at 05/04/25 1453    ALPRAZolam tablet 0.5 mg  0.5 mg Oral BID PRN Zuly Wheat PA-C   0.5 mg at 05/14/25 1626    amLODIPine tablet 10 mg  10 mg Oral Daily Juan F Ramírez MD   10 mg at 05/17/25 0816    carbidopa-levodopa  mg per tablet 2 tablet  2 tablet Oral TID Elian López MD   2 tablet at 05/17/25 0816    cyanocobalamin tablet 1,000 mcg  1,000 mcg Oral Daily Zina Tarango MD   1,000 mcg at 05/17/25 0816    dextrose 50% injection 12.5 g  12.5 g Intravenous PRN Zuly Wheat PA-C        dextrose 50% injection 25 g  25 g Intravenous PRN Zuly Wheat PA-C        enoxaparin injection 40 mg  40 mg Subcutaneous Daily JarretZuly chamorro PA-C   40 mg at 05/16/25 1711    glucagon (human recombinant) injection 1 mg  1 mg Intramuscular PRN Zuly Wheat PA-C        glucose chewable tablet 16 g  16 g Oral PRN Zuly Wheat PA-C        glucose chewable tablet 24 g  24 g Oral PRN Zuly Wheat PA-C        LORazepam injection 2 mg  2 mg Intravenous On Call Procedure Jaden Carvajal DO        melatonin tablet 6 mg  6 mg Oral Nightly PRN JarretZuly chamorro PA-C        methylPREDNISolone sodium succinate (SOLU-MEDROL) 1,000 mg in 0.9% NaCl 100 mL IVPB  1,000 mg Intravenous Daily Jaden Carvajal DO        mirtazapine tablet 7.5 mg  7.5 mg Oral QHS Elian Morejon MD   7.5 mg at 05/16/25 2047    naloxone 0.4 mg/mL injection 0.02 mg  0.02 mg Intravenous PRN Zuly Wheat PA-C        ondansetron disintegrating tablet 8 mg  8 mg Oral  Q8H PRN Zuly Wheat PA-C        [START ON 5/18/2025] pantoprazole EC tablet 40 mg  40 mg Oral Daily Saadia Quiñones MD        polyethylene glycol packet 17 g  17 g Oral BID Zuly Wheat PA-C   17 g at 05/17/25 0816    prochlorperazine injection Soln 5 mg  5 mg Intravenous Q6H PRN Zuly Wheat PA-C        sodium chloride 0.9% flush 10 mL  10 mL Intravenous Q12H PRN Zuly Wheat PA-C        sodium chloride 0.9% flush 10 mL  10 mL Intravenous PRN Pedro Ash MD        thiamine injection 400 mg  400 mg Intravenous TID Jaden Carvajal DO

## 2025-05-17 NOTE — SUBJECTIVE & OBJECTIVE
"PMH, PSH, FH, SH updated and reviewed     ROS:      Review of Systems   Constitutional:  Positive for activity change.   Gastrointestinal:  Positive for abdominal pain.   Psychiatric/Behavioral:  Positive for confusion.        Labs Reviewed and Include:  BASELINE Creatinine:   [unfilled]  [unfilled]  [unfilled]  Recent Labs   Lab 05/17/25  0414   GLU 87   CALCIUM 9.6   ALBUMIN 2.6*   PROT 8.1   *   K 4.2   CO2 26   CL 98   BUN 18   CREATININE 1.0   ALKPHOS 76   ALT 10   AST 44   BILITOT 0.4     No results found for: "HGBA1C"    Nutritional status:   Body mass index is 19.71 kg/m².  Lab Results   Component Value Date    ALBUMIN 2.6 (L) 05/17/2025    ALBUMIN 2.7 (L) 05/16/2025    ALBUMIN 2.6 (L) 05/15/2025     Lab Results   Component Value Date    PREALBUMIN 17 (L) 04/24/2025       Estimated Creatinine Clearance: 55.2 mL/min (based on SCr of 1 mg/dL).    POCT Glucose results:    Current Insulin Regimen:       PHYSICAL EXAMINATION:  Vitals:    05/17/25 1222   BP: 135/66   Pulse: (!) 46   Resp: 18   Temp: 98 °F (36.7 °C)     Body mass index is 19.71 kg/m².     Physical Exam  Constitutional:       General: He is not in acute distress.     Comments: AAO x1 only oriented to person   HENT:      Head: Normocephalic and atraumatic.   Eyes:      Conjunctiva/sclera: Conjunctivae normal.   Pulmonary:      Effort: Pulmonary effort is normal.   Musculoskeletal:      Cervical back: Neck supple.   Skin:     General: Skin is warm and dry.   Psychiatric:         Mood and Affect: Mood normal.          "

## 2025-05-17 NOTE — SUBJECTIVE & OBJECTIVE
Interval History:      Worked with OT today. Follows command intermittently.     Review of Systems   Unable to perform ROS: Other     Objective:     Vital Signs (Most Recent):  Temp: 98.5 °F (36.9 °C) (05/16/25 1630)  Pulse: 86 (05/16/25 1630)  Resp: 18 (05/16/25 1630)  BP: (!) 143/74 (05/16/25 1630)  SpO2: 98 % (05/16/25 1630) Vital Signs (24h Range):  Temp:  [98 °F (36.7 °C)-98.5 °F (36.9 °C)] 98.5 °F (36.9 °C)  Pulse:  [] 86  Resp:  [18-21] 18  SpO2:  [96 %-100 %] 98 %  BP: (143-169)/(62-85) 143/74     Weight: 64.1 kg (141 lb 5 oz)  Body mass index is 19.71 kg/m².    Intake/Output Summary (Last 24 hours) at 5/16/2025 1937  Last data filed at 5/16/2025 1722  Gross per 24 hour   Intake 240 ml   Output 375 ml   Net -135 ml        Physical Exam  Constitutional:       General: He is not in acute distress.     Appearance: He is ill-appearing.   HENT:      Head: Normocephalic.      Right Ear: External ear normal.      Left Ear: External ear normal.      Nose: Nose normal.   Cardiovascular:      Rate and Rhythm: Normal rate.      Heart sounds: Normal heart sounds.   Pulmonary:      Breath sounds: Normal breath sounds.   Abdominal:      Palpations: Abdomen is soft.      Tenderness: There is no abdominal tenderness.   Musculoskeletal:      Right lower leg: No edema.      Left lower leg: No edema.      Comments: SCD   Skin:     General: Skin is warm.   Neurological:      General: No focal deficit present.      Mental Status: He is alert however intermittently follows command.     Significant Labs: All pertinent labs within the past 24 hours have been reviewed.      Significant Imaging: I have reviewed all pertinent imaging results/findings within the past 24 hours        Physical Exam

## 2025-05-17 NOTE — ASSESSMENT & PLAN NOTE
Anemia is likely due to acute on chronic illness. Most recent hemoglobin and hematocrit are listed below.  Recent Labs     05/15/25  0455 05/16/25  0544 05/17/25  0414   HGB 10.5* 11.9* 11.8*   HCT 30.0* 34.3* 34.4*     Plan  - Monitor serial CBC: Daily  - Transfuse PRBC if patient becomes hemodynamically unstable, symptomatic or H/H drops below 7/21.  - Patient has not received any PRBC transfusions to date  - Patient's anemia is currently stable  -

## 2025-05-17 NOTE — HPI
Mr. Huffman is a 79 year old gentleman who presented to [facility] [place] due to altered mental status from [facility].   Patient has a known medical history of HTN, crohn's disease, CAD, hx of SBO.  Patient does have a remote history of Hashimoto and was briefly taking levothyroxine 25 mcg at 1 point however there does not appear to be any workup prior to this and family at bedside is unsure of when the patient has stopped taking his levothyroxine.  Currently neurology is evaluating patient for altered mental status, thyroid labs are in range, a TPO level and antithyroglobulin were obtained on this admission and were 64.8 and 23.9 respectively.  There were some concerns for possible Hashimoto encephalopathy and endocrinology was consulted for recommendations.  Patient himself is AAO x1 only oriented to person when examined this morning, all history was obtained by family member at bedside.  Patient's family member states that prior to this visit he was doing well. On admission it was noted that patient did complain of abdominal pain and was not oriented to time.  Neurology started patient on IVIG for suspected autoimmune encephalitis and patient did not progress.  Currently working up with a lumbar puncture to rule out other causes.  Other than remote history of Hashimoto in the past there are no other endocrinopathies.

## 2025-05-17 NOTE — PROGRESS NOTES
Patient escorted off unit via bed for MRI. Patient sent with bed with maximum assistance. No acute distress noted. Patient denies pain. patient peripheral IV intact. No redness or swelling noted. Awaiting patient's return.          Patient returned to the unit at 1118.

## 2025-05-17 NOTE — ASSESSMENT & PLAN NOTE
Anemia is likely due to acute on chronic illness. Most recent hemoglobin and hematocrit are listed below.  Recent Labs     05/14/25  0817 05/14/25  1637 05/15/25  0455 05/16/25  0544   HGB 11.1*  --  10.5* 11.9*   HCT 32.2* 34* 30.0* 34.3*     Plan  - Monitor serial CBC: Daily  - Transfuse PRBC if patient becomes hemodynamically unstable, symptomatic or H/H drops below 7/21.  - Patient has not received any PRBC transfusions to date  - Patient's anemia is currently stable  -

## 2025-05-17 NOTE — ASSESSMENT & PLAN NOTE
79 y.o.M with progressive acutely worsening confusion/tremors since 4/20. Previously AAOx4, now AAOx1  - neurology consulted, appreciate recommendations:  - MRI brain from last week was essentially non diagnostic  - Repeat MRI again without obvious new pathology   -working diagnosis is Parkinson's disease, as improving with Sinemet: iContinue two tabs TID  -LP concerning for possible meningitis. Continue Empiric treatment per Neurology recs   -EEG pending   - Delirium and fall precautions along with neuro checks  - PT/OT consult placed; return to [facility] upon discharge     5/8- Viral meningitis panel unremarkable.  Cultures no growth.  CSF with elevated proteins and white cell count.  Started on empiric antibiotics, vancomycin ceftriaxone and ampicillin, infectious disease consulted.  Follow up with Neurology recommendations.  Started on carbidopa levodopa.  Dispo plan acute rehab.  Mentation remains the same during this hospitalization A&O x1    5/9 -Started on IVIG per Neurology.  EEG(f/u)    5/10-Continue with IVIG, follow up with Neurology.  EEG report noted    5/14 - Completed IVIG course, follow up with Neurology. ? D/c back to acute rehab, if mentation improves    5/15- mentation remains unchanged, appreciate neuro and psychiatry recommendations. Hyponatremia improving. repeat MRI with sedation 5/16 as previous had artifact. Anesthesia notified to arrange with sedation.    5/16- MRI remains pending.  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 Endocrine consulted. Neurology planning 5 day course of IVMP starting 5/17. B12 1000 mcg po started.

## 2025-05-17 NOTE — ASSESSMENT & PLAN NOTE
Hyponatremia is likely due to Dehydration/hypovolemia. The patient's most recent sodium results are listed below.  Recent Labs     05/15/25  0455 05/16/25  0544 05/17/25  0414   * 129* 131*     Maxime  - Monitor sodium Daily.   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

## 2025-05-17 NOTE — PROGRESS NOTES
"CONSULTATION LIAISON PSYCHIATRY PROGRESS NOTE    Patient Name: Vince Huffman  MRN: [id number]  Patient Class: IP- Inpatient  Admission Date: 4/30/2025  Attending Physician: Saadia Quiñones MD      SUBJECTIVE:   Vince Huffman is a 79 y.o. male with no past psychiatric history & past pertinent medical history of Chrohn's disease, SBO, Hypothyroidism, and CAD presents to the ED/admitted to the hospital for generalized weakness and altered mental status.     Psychiatry consulted for concern for complex bereavement vs. catatonia     Interval History:   NAEON. No PRNs required for agitation or anxiety. Underwent repeat MRI today which did not reveal any significant abnormalities.    Upon psychiatric interview today, pt lying in bed. He wakes quickly to verbal stimuli. He states that he is doing "well" and states that he slept "well". He does not respond to questions assessing his orientation. He is able to follow simple commands on exam.     Collateral:  His daughter at bedside states that her father's condition does have a waxing and waning component. For example, she states that he was more able to talk to a family member on the phone    OBJECTIVE    Vital Signs:  Temp:  [97 °F (36.1 °C)-99.6 °F (37.6 °C)]   Pulse:  []   Resp:  [13-20]   BP: ()/(58-76)   SpO2:  [92 %-100 %]     Mental Status Exam:  General Appearance: appears stated age, dressed in hospital garb, lying in bed, in no acute distress  Behavior: reluctant to participate, minimal verbal responses, appropriate eye contact  Involuntary Movements and Motor Activity: concern for psychomotor retardation  Gait and Station: unable to assess - patient lying down or seated  Speech and Language: minimal but clear responses  Mood: "well"  Affect: constricted  Thought Process and Associations: unable to assess  Perceptual Disturbances: no objective RIS  Thought Content and Perceptions:: no reported suicidal ideation, no homicidal ideation, no " hallucinations per collateral  Sensorium and Orientation: unable to assess  Recent and Remote Memory: Unable to  Formally Assess, Unwilling to Participate in Formal Testing  Attention and Concentration: unable to assess  Fund of Knowledge: Unable to Formally Assess, Unwilling to Participate in Formal Testing  Insight: limited/partial awareness of illness  Judgment: compliant with health provider's recommendations and instructions    CAM ICU positive? unable to assess      ASSESSMENT & RECOMMENDATIONS   Vince Huffman is a 79 year old male with no past psychiatric history & past pertinent medical history of Chrohn's disease, SBO, Hypothyroidism, and CAD who presented to the ED/admitted to the hospital on 4/30/25 for generalized weakness and altered mental status. After gathering collateral, it appears that pt has had a gradual decline in his mental status over the past several months, becoming more withdrawal and anhedonic over time. There is low concern for catatonia at this time. The immobility and mutism seem to have a volitional component to them, and appear to be more in line with psychomotor retardation, potentially from depression given the history that his wife passed away in February 2024 and preceded his decline. Recommend trial of antidepressant, specifically Remeron, to address his poor intake and substantial weight loss. Would defer benzodiazepine trial at this time for fear that it may make his mental status worse.       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 Consider adding stool softener to bowel regimen and obtaining KUB to rule out constipation contributing to altered mental status  Recommend EEG to rule out delirium        Delirium Behavior Management  PLEASE utilize PRN meds first for agitation. Minimize use of PHYSICAL restraints OR have periods of being out of physical restraints if possible.  Keep window shades open and room lit during day and room dim at night in order to promote normal sleep-wake cycles  Encourage family at bedside. [place] patient often to situation, location, date.  Continue to Limit or Discontinue use of Narcotics, Benzos and Anti-cholinergic medications as they may worsen delirium.  Continue medical workup for causative etiology of Delirium.      Risk Assessment / Legal Status  Patient does not meet criteria for PEC or involuntary inpatient psychiatric admission at this time. Recommend to rescind PEC if one was placed. Patient is not currently an imminent danger to self or others and is not gravely disabled due to a psychiatric illness.     Follow-up:  Will follow-up while in house.     Disposition:   Defer to primary team.    Please contact ON CALL psychiatry service (24/7) for any acute issues that may arise.    Jovanni Wolf MD, PhD  [facility], PGY-2   [facility]  5/17/2025 1:41 PM        --------------------------------------------------------------------------------------------------------------------------------------------------------------------------------------------------------------------------------------    CONTINUED OBJECTIVE clinical data & findings reviewed and noted for above decision making    Current Medications:   Scheduled Meds:    amLODIPine  10 mg Oral Daily    carbidopa-levodopa  mg  2 tablet Oral TID    cyanocobalamin  1,000 mcg Oral Daily    enoxparin  40 mg Subcutaneous Daily    methylPREDNISolone injection (PEDS and ADULTS)  1,000 mg Intravenous  Daily    mirtazapine  7.5 mg Oral QHS    [START ON 5/18/2025] pantoprazole  40 mg Oral Daily    polyethylene glycol  17 g Oral BID    thiamine (B-1) injection  400 mg Intravenous TID    Followed by    [START ON 5/20/2025] thiamine  100 mg Oral Daily     PRN Meds:   Current Facility-Administered Medications:     acetaminophen, 1,000 mg, Oral, Q8H PRN    acetaminophen, 650 mg, Oral, Q4H PRN    ALPRAZolam, 0.5 mg, Oral, BID PRN    dextrose 50%, 12.5 g, Intravenous, PRN    dextrose 50%, 25 g, Intravenous, PRN    glucagon (human recombinant), 1 mg, Intramuscular, PRN    glucose, 16 g, Oral, PRN    glucose, 24 g, Oral, PRN    insulin aspart U-100, 0-5 Units, Subcutaneous, QID (AC + HS) PRN    lorazepam, 2 mg, Intravenous, On Call Procedure    melatonin, 6 mg, Oral, Nightly PRN    naloxone, 0.02 mg, Intravenous, PRN    ondansetron, 8 mg, Oral, Q8H PRN    prochlorperazine, 5 mg, Intravenous, Q6H PRN    sodium chloride 0.9%, 10 mL, Intravenous, Q12H PRN    sodium chloride 0.9%, 10 mL, Intravenous, PRN    Allergies:   Review of patient's allergies indicates:   Allergen Reactions    Gluten Diarrhea    Lactose        Vitals  Vitals:    05/17/25 1620   BP: 138/68   Pulse: 105   Resp: 20   Temp: 99.6 °F (37.6 °C)       Labs/Imaging/Studies:  Recent Results (from the past 24 hours)   Magnesium    Collection Time: 05/17/25  4:14 AM   Result Value Ref Range    Magnesium  1.8 1.6 - 2.6 mg/dL   Phosphorus    Collection Time: 05/17/25  4:14 AM   Result Value Ref Range    Phosphorus Level 3.1 2.7 - 4.5 mg/dL   Comprehensive Metabolic Panel    Collection Time: 05/17/25  4:14 AM   Result Value Ref Range    Sodium 131 (L) 136 - 145 mmol/L    Potassium 4.2 3.5 - 5.1 mmol/L    Chloride 98 95 - 110 mmol/L    CO2 26 23 - 29 mmol/L    Glucose 87 70 - 110 mg/dL    BUN 18 8 - 23 mg/dL    Creatinine 1.0 0.5 - 1.4 mg/dL    Calcium 9.6 8.7 - 10.5 mg/dL    Protein Total 8.1 6.0 - 8.4 gm/dL    Albumin 2.6 (L) 3.5 - 5.2 g/dL    Bilirubin Total 0.4 0.1  - 1.0 mg/dL    ALP 76 40 - 150 unit/L    AST 44 11 - 45 unit/L    ALT 10 10 - 44 unit/L    Anion Gap 7 (L) 8 - 16 mmol/L    eGFR >60 >60 mL/min/1.73/m2   Protime-INR    Collection Time: 05/17/25  4:14 AM   Result Value Ref Range    PT 11.1 9.0 - 12.5 seconds    INR 1.0 0.8 - 1.2   CBC with Differential    Collection Time: 05/17/25  4:14 AM   Result Value Ref Range    WBC 5.46 3.90 - 12.70 K/uL    RBC 3.69 (L) 4.60 - 6.20 M/uL    HGB 11.8 (L) 14.0 - 18.0 gm/dL    HCT 34.4 (L) 40.0 - 54.0 %    MCV 93 82 - 98 fL    MCH 32.0 (H) 27.0 - 31.0 pg    MCHC 34.3 32.0 - 36.0 g/dL    RDW 11.7 11.5 - 14.5 %    Platelet Count 226 150 - 450 K/uL    MPV 10.5 9.2 - 12.9 fL    Nucleated RBC 0 <=0 /100 WBC    Neut % 66.5 38 - 73 %    Lymph % 15.2 (L) 18 - 48 %    Mono % 17.0 (H) 4 - 15 %    Eos % 0.7 <=8 %    Basophil % 0.4 <=1.9 %    Imm Grans % 0.2 0.0 - 0.5 %    Neut # 3.63 1.8 - 7.7 K/uL    Lymph # 0.83 (L) 1 - 4.8 K/uL    Mono # 0.93 0.3 - 1 K/uL    Eos # 0.04 <=0.5 K/uL    Baso # 0.02 <=0.2 K/uL    Imm Grans # 0.01 0.00 - 0.04 K/uL   POCT glucose    Collection Time: 05/17/25  4:21 PM   Result Value Ref Range    POCT Glucose 197 (H) 70 - 110 mg/dL     Imaging Results               CT Abdomen Pelvis With IV Contrast NO Oral Contrast (Final result)  Result time 04/30/25 15:46:15      Final result by Russell Ba MD (04/30/25 15:46:15)                   Impression:      1. Wall thickening of the distal ileum to the surgical anastomosis with the large bowel.  Inflammatory infectious ileitis are considerations.  Recommend clinical correlation and follow-up.  2. Constipation.  There is fecal distention of the rectum.  No fecal impaction is not excluded.  3. Bilateral renal cysts and hepatic cysts.  4. Nonobstructing nephrolithiasis of the right kidney.  5. Nondistention of the urinary bladder.  Mild wall thickening is not excluded.  6. Diverticulosis of the sigmoid colon.  No evidence of acute diverticulitis.  7. This report was  flagged in Epic as abnormal.      Electronically signed by: Russell Humphries  Date:    04/30/2025  Time:    15:46               Narrative:    EXAMINATION:  CT ABDOMEN PELVIS WITH IV CONTRAST    CLINICAL HISTORY:  LLQ abdominal pain;RLQ abdominal pain (Age >= 14y);    TECHNIQUE:  Low dose axial images, sagittal and coronal reformations were obtained from the lung bases to the pubic symphysis following the IV administration of 75 mL of Omnipaque 350 .  Oral contrast was not administered.    COMPARISON:  11/05/2018    FINDINGS:  Abdomen:    - Lower thorax:    - Lung bases: No infiltrates and no nodules.    - Liver: Multiple small hepatic cysts.    - Gallbladder: No calcified gallstones.    - Bile Ducts: No evidence of intra or extra hepatic biliary ductal dilation.    - Spleen: Negative.    - Kidneys: Multiple bilateral simple renal cysts.  Single nonobstructing stone in the right kidney.  No hydronephrosis or hydroureter bilaterally.    - Adrenals: Unremarkable.    - Pancreas: No mass or peripancreatic fat stranding.    - Retroperitoneum:  No significant adenopathy.    - Vascular: Mild ectasia of the distal aorta with no evidence of aneurysm.    - Abdominal wall:  Unremarkable.    Pelvis:    Urinary bladder is incompletely distended with possible mild wall thickening.    Postop changes at the ileocecal junction.  There is wall thickening noted to the distal ileum to the surgical anastomosis.  Inflammatory or infectious ileitis are considerations.  Follow-up recommended.    Bowel/Mesentery:    No evidence of bowel obstruction.  Moderate retained feces in the colon.    Fecal distention of the rectum.  Impaction is not excluded.    Mild diverticulosis of the sigmoid colon.  No evidence of acute diverticulitis.    Bones:  No acute osseous abnormality and no suspicious lytic or blastic lesion.                                       CT Head Without Contrast (Final result)  Result time 04/30/25 14:49:11      Final result by  Ish Carrillo MD (04/30/25 14:49:11)                   Impression:      No evidence for acute intracranial pathology.      Electronically signed by: Ish Carrillo  Date:    04/30/2025  Time:    14:49               Narrative:    EXAMINATION:  CT HEAD WITHOUT CONTRAST    CLINICAL HISTORY:  Mental status change, unknown cause;    TECHNIQUE:  Low dose axial images were obtained through the head.  Coronal and sagittal reformations were also performed. Contrast was not administered.    COMPARISON:  MRI brain without contrast 04/24/2025, CT head without contrast 04/24/2025.    FINDINGS:  Ventricles are stable in size without evidence for new or worsening hydrocephalus.  No new or enlarging extra-axial blood or fluid collections.    Brain parenchyma appears unchanged.  Scattered supratentorial hypoattenuation, overall nonspecific, but can be seen in setting of microvascular ischemic change.  No parenchymal mass, edema, hemorrhage, or acute major vascular territory infarct.    No calvarial or skull base fracture or osseous destructive lesion.  Paranasal sinuses and mastoid air cells are essentially clear.                                       X-Ray Chest AP Portable (Final result)  Result time 04/30/25 12:49:39      Final result by Matti Cote MD (04/30/25 12:49:39)                   Impression:      See above      Electronically signed by: Matti Cote MD  Date:    04/30/2025  Time:    12:49               Narrative:    EXAMINATION:  XR CHEST AP PORTABLE    CLINICAL HISTORY:  Altered mental status, unspecified    TECHNIQUE:  Single frontal view of the chest was performed.    COMPARISON:  No 04/23/2025 ne    FINDINGS:  Heart size normal.  The tracheal slightly deviates to the right at the thoracic inlet probably related to enlarged thyroid gland.  Minimal subsegmental atelectatic changes noted at the right lung base.  Lungs are otherwise clear.  No pleural effusion.

## 2025-05-18 LAB
ABSOLUTE EOSINOPHIL (OHS): 0 K/UL
ABSOLUTE MONOCYTE (OHS): 0.05 K/UL (ref 0.3–1)
ABSOLUTE NEUTROPHIL COUNT (OHS): 3.87 K/UL (ref 1.8–7.7)
ALBUMIN SERPL BCP-MCNC: 2.7 G/DL (ref 3.5–5.2)
ALP SERPL-CCNC: 81 UNIT/L (ref 40–150)
ALT SERPL W/O P-5'-P-CCNC: 34 UNIT/L (ref 10–44)
ANION GAP (OHS): 7 MMOL/L (ref 8–16)
AST SERPL-CCNC: 54 UNIT/L (ref 11–45)
BACTERIA CSF CULT: NO GROWTH
BASOPHILS # BLD AUTO: 0 K/UL
BASOPHILS NFR BLD AUTO: 0 %
BILIRUB SERPL-MCNC: 0.4 MG/DL (ref 0.1–1)
BUN SERPL-MCNC: 27 MG/DL (ref 8–23)
CALCIUM SERPL-MCNC: 10 MG/DL (ref 8.7–10.5)
CHLORIDE SERPL-SCNC: 97 MMOL/L (ref 95–110)
CO2 SERPL-SCNC: 26 MMOL/L (ref 23–29)
CREAT SERPL-MCNC: 1.2 MG/DL (ref 0.5–1.4)
ERYTHROCYTE [DISTWIDTH] IN BLOOD BY AUTOMATED COUNT: 11.4 % (ref 11.5–14.5)
GFR SERPLBLD CREATININE-BSD FMLA CKD-EPI: >60 ML/MIN/1.73/M2
GLUCOSE SERPL-MCNC: 123 MG/DL (ref 70–110)
GRAM STN SPEC: NORMAL
HCT VFR BLD AUTO: 33.6 % (ref 40–54)
HGB BLD-MCNC: 11.7 GM/DL (ref 14–18)
IMM GRANULOCYTES # BLD AUTO: 0.02 K/UL (ref 0–0.04)
IMM GRANULOCYTES NFR BLD AUTO: 0.5 % (ref 0–0.5)
INR PPP: 1 (ref 0.8–1.2)
LYMPHOCYTES # BLD AUTO: 0.46 K/UL (ref 1–4.8)
MAGNESIUM SERPL-MCNC: 1.9 MG/DL (ref 1.6–2.6)
MCH RBC QN AUTO: 31.9 PG (ref 27–31)
MCHC RBC AUTO-ENTMCNC: 34.8 G/DL (ref 32–36)
MCV RBC AUTO: 92 FL (ref 82–98)
NUCLEATED RBC (/100WBC) (OHS): 0 /100 WBC
PHOSPHATE SERPL-MCNC: 3.2 MG/DL (ref 2.7–4.5)
PLATELET # BLD AUTO: 216 K/UL (ref 150–450)
PMV BLD AUTO: 11 FL (ref 9.2–12.9)
POCT GLUCOSE: 106 MG/DL (ref 70–110)
POCT GLUCOSE: 132 MG/DL (ref 70–110)
POCT GLUCOSE: 180 MG/DL (ref 70–110)
POCT GLUCOSE: 226 MG/DL (ref 70–110)
POTASSIUM SERPL-SCNC: 4.6 MMOL/L (ref 3.5–5.1)
PROT SERPL-MCNC: 8.4 GM/DL (ref 6–8.4)
PROTHROMBIN TIME: 11.2 SECONDS (ref 9–12.5)
RBC # BLD AUTO: 3.67 M/UL (ref 4.6–6.2)
RELATIVE EOSINOPHIL (OHS): 0 %
RELATIVE LYMPHOCYTE (OHS): 10.5 % (ref 18–48)
RELATIVE MONOCYTE (OHS): 1.1 % (ref 4–15)
RELATIVE NEUTROPHIL (OHS): 87.9 % (ref 38–73)
SODIUM SERPL-SCNC: 130 MMOL/L (ref 136–145)
WBC # BLD AUTO: 4.4 K/UL (ref 3.9–12.7)

## 2025-05-18 PROCEDURE — 25000003 PHARM REV CODE 250

## 2025-05-18 PROCEDURE — 85025 COMPLETE CBC W/AUTO DIFF WBC: CPT | Performed by: STUDENT IN AN ORGANIZED HEALTH CARE EDUCATION/TRAINING PROGRAM

## 2025-05-18 PROCEDURE — 84100 ASSAY OF PHOSPHORUS: CPT | Performed by: STUDENT IN AN ORGANIZED HEALTH CARE EDUCATION/TRAINING PROGRAM

## 2025-05-18 PROCEDURE — 20600001 HC STEP DOWN PRIVATE ROOM

## 2025-05-18 PROCEDURE — 36415 COLL VENOUS BLD VENIPUNCTURE: CPT | Performed by: STUDENT IN AN ORGANIZED HEALTH CARE EDUCATION/TRAINING PROGRAM

## 2025-05-18 PROCEDURE — 83735 ASSAY OF MAGNESIUM: CPT | Performed by: STUDENT IN AN ORGANIZED HEALTH CARE EDUCATION/TRAINING PROGRAM

## 2025-05-18 PROCEDURE — 25000003 PHARM REV CODE 250: Performed by: INTERNAL MEDICINE

## 2025-05-18 PROCEDURE — 85610 PROTHROMBIN TIME: CPT | Performed by: STUDENT IN AN ORGANIZED HEALTH CARE EDUCATION/TRAINING PROGRAM

## 2025-05-18 PROCEDURE — 63600175 PHARM REV CODE 636 W HCPCS

## 2025-05-18 PROCEDURE — 25000003 PHARM REV CODE 250: Performed by: HOSPITALIST

## 2025-05-18 PROCEDURE — 99232 SBSQ HOSP IP/OBS MODERATE 35: CPT | Mod: GC,,, | Performed by: STUDENT IN AN ORGANIZED HEALTH CARE EDUCATION/TRAINING PROGRAM

## 2025-05-18 PROCEDURE — 80053 COMPREHEN METABOLIC PANEL: CPT | Performed by: STUDENT IN AN ORGANIZED HEALTH CARE EDUCATION/TRAINING PROGRAM

## 2025-05-18 PROCEDURE — 99233 SBSQ HOSP IP/OBS HIGH 50: CPT | Mod: GC,,, | Performed by: STUDENT IN AN ORGANIZED HEALTH CARE EDUCATION/TRAINING PROGRAM

## 2025-05-18 PROCEDURE — 25000003 PHARM REV CODE 250: Performed by: STUDENT IN AN ORGANIZED HEALTH CARE EDUCATION/TRAINING PROGRAM

## 2025-05-18 PROCEDURE — 63600175 PHARM REV CODE 636 W HCPCS: Performed by: HOSPITALIST

## 2025-05-18 RX ADMIN — THIAMINE HYDROCHLORIDE 400 MG: 100 INJECTION, SOLUTION INTRAMUSCULAR; INTRAVENOUS at 09:05

## 2025-05-18 RX ADMIN — MIRTAZAPINE 7.5 MG: 7.5 TABLET, FILM COATED ORAL at 09:05

## 2025-05-18 RX ADMIN — POLYETHYLENE GLYCOL 3350 17 G: 17 POWDER, FOR SOLUTION ORAL at 09:05

## 2025-05-18 RX ADMIN — CYANOCOBALAMIN TAB 1000 MCG 1000 MCG: 1000 TAB at 08:05

## 2025-05-18 RX ADMIN — CARBIDOPA AND LEVODOPA 2 TABLET: 25; 100 TABLET ORAL at 09:05

## 2025-05-18 RX ADMIN — POLYETHYLENE GLYCOL 3350 17 G: 17 POWDER, FOR SOLUTION ORAL at 08:05

## 2025-05-18 RX ADMIN — CARBIDOPA AND LEVODOPA 2 TABLET: 25; 100 TABLET ORAL at 04:05

## 2025-05-18 RX ADMIN — PANTOPRAZOLE SODIUM 40 MG: 40 TABLET, DELAYED RELEASE ORAL at 08:05

## 2025-05-18 RX ADMIN — THIAMINE HYDROCHLORIDE 400 MG: 100 INJECTION, SOLUTION INTRAMUSCULAR; INTRAVENOUS at 04:05

## 2025-05-18 RX ADMIN — CARBIDOPA AND LEVODOPA 2 TABLET: 25; 100 TABLET ORAL at 08:05

## 2025-05-18 RX ADMIN — ENOXAPARIN SODIUM 40 MG: 40 INJECTION SUBCUTANEOUS at 04:05

## 2025-05-18 RX ADMIN — THIAMINE HYDROCHLORIDE 400 MG: 100 INJECTION, SOLUTION INTRAMUSCULAR; INTRAVENOUS at 08:05

## 2025-05-18 RX ADMIN — AMLODIPINE BESYLATE 10 MG: 10 TABLET ORAL at 08:05

## 2025-05-18 RX ADMIN — METHYLPREDNISOLONE SODIUM SUCCINATE 1000 MG: 1 INJECTION INTRAMUSCULAR; INTRAVENOUS at 08:05

## 2025-05-18 NOTE — ASSESSMENT & PLAN NOTE
Progressive acutely worsening confusion/tremors since 4/20. Previously AAOx4, now AAOx1  Neurology consulted: MRI brain ordered (no acute findings, motion artifact), extended EEG, ammonia levels.   Worsening mental status on 5/2, medicine team consulted for LP which was unsuccessful.   Neurology recommending carbidopa-levodopa trial, EEG, and attempting another LP.    Symptoms improved with increasing doses of Carbidopa/Levodopa.   LP concerning for meningitis so he was started on Empiric treatment on 5/6/25, viral panel negative  Discussion held with both Neurology and Infectious Disease, Infectious Disease does not believe patient has any infectious etiology and as such recommended discontinuing antimicrobials.    Neurology started IVIG for presumed autoimmune process.  Completed 5 day course of IVIG 5/14. Minimal improvement in cognition.   Neuro recommended repeat MRI with sedation as previous had artifact. Anesthesia notified to arrange with sedation which was done on 5/17.    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 Endocrine consulted.   Neurology planning 5 day course of IV Solumedrol starting 5/17 - Day 2  Continue Vitamin B12 and Thiamine replacement.

## 2025-05-18 NOTE — ASSESSMENT & PLAN NOTE
Sent from SNF  Progressive worsening weakness per son   PT/OT ordered. Will need placement at discharge

## 2025-05-18 NOTE — ASSESSMENT & PLAN NOTE
79 year old male with history of Crohn's disease, SBO, hypothyroidism, and CAD  presenting from [facility] with encephalopathy. Unclear cause of encephalopathy at this time, though acute presentation with concurrent parkinsonism suggests autoimmune etiology. Motion artifact on MRI brain. On initial exam, was disoriented to place, time, and situation, minimally responsive and will answer with one word, asterixis in his upper extremities, axial rigidity/extremity/rigidity, masked facies, and intention tremor most notably in the left hand. No hyperreflexia. Ptosis of left eye though no extraocular movement abnormalities/pupillary discrepancy.     DDX: autoimmune Parkinsonism; PRES or PML (both possible with Stelara); SREAT +/-  superimposed catatonia or complex bereavement. 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 There have been reported cases of SREAT with normal fT4, so normal level does not exclude this diagnosis.     LABS  Serum  Pending: Ma2 ab, [facility] movement disorders panel, Vit E, NFL, heavy metals   On Movement Disorders panel, not on autoimmune encephalopathy panel = CCPQ (formerly known as P/Q type VGCC,) GRAF1, Septin5, ITPR1, AP3B2, KLHL11  Dopamine-2 and [employer]-3 not commercially available per lab  NFL, heavy metals  WNL: strongyloides IgG, Treponema, negative celiac dx serology, zinc, folate, B12, copper, B1, autoimmune encephalopathy panel, HIV, Vit D  Abnormal: elevated anti-thyroglobulin & anti-TPO    CSF  Pending: None  WNL: AFB, meningitis-encephalitis panel, VDRL, culture, glucose, LDH, cytology, autoimmune encephalopathy panel  Abnormal: protein (60), pleocytosis (after correction for elevated RBC)    Recommendations:  - MRI brain unrevealing  - S/p 5-day course of IVIG (EOT  5/12/2025)  - Empiric pulse-dose steroids for SREAT vs autoimmune encephalitis (movement panel pending)  -> Day 2/5  - Continue B12 repletion - 1000 mcg po daily  - Continue to empirically treat B1 deficiency  - F/u pending labs as above  - Continue Sinemet 25 -100 mg 2 tablets TID  - Appreciate Psychiatry assistance and endocrinology opinion  - Delirium and fall precautions   - Concern for inadequate nutritional intake: would consider alternative routes of nutrition (PEG, etc.)--discussed w/primary this morning    We will continue to follow. Please reach out with any questions.

## 2025-05-18 NOTE — PLAN OF CARE
Patient alert but disoriented to place, time and situation. Patient shows no signs of pain. Patient due meds administered and patient tolerating well. Plan of care reviewed with patient and daughter and they verbalized understanding. Patient safety measures ongoing, call light within reach, fall precaution in place. No fall or in jury observed during the shift. Patient incontinence pad changed when needed. Patient made comfortable in bed. No other concerns at this time.               Problem: Adult Inpatient Plan of Care  Goal: Plan of Care Review  Outcome: Progressing  Goal: Patient-Specific Goal (Individualized)  Outcome: Progressing  Goal: Absence of Hospital-Acquired Illness or Injury  Outcome: Progressing  Goal: Optimal Comfort and Wellbeing  Outcome: Progressing  Goal: Readiness for Transition of Care  Outcome: Progressing     Problem: Skin Injury Risk Increased  Goal: Skin Health and Integrity  Outcome: Progressing     Problem: Infection  Goal: Absence of Infection Signs and Symptoms  Outcome: Progressing     Problem: Delirium  Goal: Optimal Coping  Outcome: Progressing  Goal: Improved Behavioral Control  Outcome: Progressing  Goal: Improved Attention and Thought Clarity  Outcome: Progressing  Goal: Improved Sleep  Outcome: Progressing     Problem: Fall Injury Risk  Goal: Absence of Fall and Fall-Related Injury  Outcome: Progressing

## 2025-05-18 NOTE — SUBJECTIVE & OBJECTIVE
Interval History: No acute events overnight.  Seen at bedside this morning and he was talking!  Oriented to person but holding a convo.  Daughter came to bedside later and we discussed that he has had waxing and waning mentation.  IV steroids will increase his appetite, so  hopefully he will eat more.  Diet changed to gluten and lactose free.  Boost Plus changed to Boost Breeze.  Will hold off on peg for now and see how he does after steroids.  D2 Solumedrol today.    Review of Systems   Constitutional:  Negative for chills, fatigue and fever.   Respiratory:  Positive for cough. Negative for shortness of breath.    Cardiovascular:  Negative for chest pain, palpitations and leg swelling.   Gastrointestinal:  Negative for abdominal pain, diarrhea, nausea and vomiting.   Genitourinary:  Negative for dysuria and urgency.   Neurological:  Negative for dizziness and headaches.   All other systems reviewed and are negative.    Objective:     Vital Signs (Most Recent):  Temp: 96.6 °F (35.9 °C) (05/18/25 1038)  Pulse: 99 (05/18/25 1038)  Resp: 19 (05/18/25 1038)  BP: 134/67 (05/18/25 1038)  SpO2: 100 % (05/18/25 1038) Vital Signs (24h Range):  Temp:  [96.6 °F (35.9 °C)-99.6 °F (37.6 °C)] 96.6 °F (35.9 °C)  Pulse:  [] 99  Resp:  [18-20] 19  SpO2:  [92 %-100 %] 100 %  BP: (125-144)/(62-98) 134/67     Weight: 64.1 kg (141 lb 5 oz)  Body mass index is 19.71 kg/m².    Intake/Output Summary (Last 24 hours) at 5/18/2025 1159  Last data filed at 5/18/2025 1042  Gross per 24 hour   Intake 480 ml   Output 850 ml   Net -370 ml      Physical Exam  Constitutional:       Appearance: He is ill-appearing.   HENT:      Head: Normocephalic and atraumatic.   Cardiovascular:      Rate and Rhythm: Normal rate and regular rhythm.      Heart sounds: No murmur heard.  Pulmonary:      Effort: Pulmonary effort is normal. No respiratory distress.      Breath sounds: Normal breath sounds. No wheezing or rales.   Abdominal:      General: There is  no distension.      Palpations: Abdomen is soft.      Tenderness: There is no abdominal tenderness.   Musculoskeletal:         General: No deformity.   Skin:     General: Skin is warm and dry.      Coloration: Skin is pale.   Neurological:      General: No focal deficit present.      Comments: Able to have a conversation, oriented to person         MELD 3.0: 15 at 5/18/2025  4:33 AM  MELD-Na: 8 at 5/18/2025  4:33 AM  Calculated from:  Serum Creatinine: 1.2 mg/dL at 5/18/2025  4:33 AM  Serum Sodium: 130 mmol/L at 5/18/2025  4:33 AM  Total Bilirubin: 0.4 mg/dL (Using min of 1 mg/dL) at 5/18/2025  4:33 AM  Serum Albumin: 2.7 g/dL at 5/18/2025  4:33 AM  INR(ratio): 1 at 5/18/2025  4:33 AM  Age at listing (hypothetical): 79 years  Sex: Male at 5/18/2025  4:33 AM      Significant Labs:  CBC:  Recent Labs   Lab 05/17/25  0414 05/18/25 0433   WBC 5.46 4.40   HGB 11.8* 11.7*   HCT 34.4* 33.6*    216     CMP:  Recent Labs   Lab 05/17/25 0414 05/18/25 0433   * 130*   K 4.2 4.6   CL 98 97   CO2 26 26   GLU 87 123*   BUN 18 27*   CREATININE 1.0 1.2   CALCIUM 9.6 10.0   PROT 8.1 8.4   ALBUMIN 2.6* 2.7*   BILITOT 0.4 0.4   ALKPHOS 76 81   AST 44 54*   ALT 10 34   ANIONGAP 7* 7*     PTINR:  Recent Labs   Lab 05/17/25  0414 05/18/25  0433   INR 1.0 1.0

## 2025-05-18 NOTE — ASSESSMENT & PLAN NOTE
Hyponatremia is likely due to Dehydration/hypovolemia. The patient's most recent sodium results are listed below.  Recent Labs     05/16/25  0544 05/17/25  0414 05/18/25  0433   * 131* 130*     Maxime  - Monitor sodium Daily.   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

## 2025-05-18 NOTE — PROGRESS NOTES
Adam Amezquita - [facility] Medicine  Progress Note    Patient Name: Vince Huffman  MRN: [id number]  Patient Class: IP- Inpatient   Admission Date: 4/30/2025  Length of Stay: 17 days  Attending Physician: Saadia Quiñones MD  Primary Care Provider: Leland Porras MD        Subjective     Principal Problem:Acute encephalopathy        HPI:  By Dr. Juan F Ramírez MD    80 yo M w/HTN, hypothyroid, crohn's disease, CAD, hx of SBO, presenting with AMS from [facility]. Patient was sent in from [facility] for progressively worsening cognitive function. Patient is A&O x1 at his baseline. Patient and his daughter endorse mild confusion. Which has been progressive.    Patient has not had any recent falls. Endorsed mild suprapubic abdominal pain. No UTI. Mild inflammation on CT, not unexpected in the setting of Crohn's.     Overview/Hospital Course:  Mr. Huffman was admitted to  for further evaluation of worsening AMS per rehab facility. AAOx1 on admission, normally AAOx4 prior to 4/20 per son. UA negative. CXR clear. CT abd/pelvis with wall thickening of distal ileum, inflammatory infectious ileitis are considerations, fecal distention of the rectum, impaction not excluded. Discussed with GI, anticipated these are chronic findings. CRP negative. Will give enema and monitor for improvement. Patient with successful BM. Neurology consulted: MRI brain ordered (no acute findings, motion artifact), extended EEG, ammonia levels. Worsening mental status on 5/2, medicine team consulted for LP which was unsuccessful. More alert on 5/3 and answering yes/no questions when redirected. Neurology recommending carbidopa-levodopa trial, EEG, and attempting another LP.  Symptoms improved with increasing doses of Carbidopa/Levodopa. LP concerning for meningitis so he was started on Empiric treatment on 5/6/25. Discussion held with both Neurology and Infectious Disease, Infectious Disease does not believe patient  has any infectious etiology and as such recommended discontinuing antimicrobials.  Neurology started IVIG for presumed autoimmune process.  Completed 5 day course of IVIG 5/14. Minimal improvement in cognition. Had bouts of poor oral intake, only drank boost.  Had intermittent hyponatremia responding to IV fluids.  On 05/14 am Sodium 124.  Nephrology was consulted, investigations ordered and sodium corrected with close electrolyte monitoring.  Psych was consulted by the request of the neurologist. Hyponatremia improved after stopping fluids. NG tube attempted thrice at bedside however not able to be placed. Able to tolerate oral intake however very limited intake. Neuro recommended repeat MRI with sedation as previous had artifact. Anesthesia notified to arrange with sedation which was done on 5/17.  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 Endocrine consulted. Neurology planning 5 day course of IV Solumedrol starting 5/17.   He was started on Vitamin B12 and Thiamine replacement.    Interval History: No acute events overnight.  Seen at bedside this morning and he was talking!  Oriented to person but holding a convo.  Daughter came to bedside later and we discussed that he has had waxing and waning mentation.  IV steroids will increase his appetite, so  hopefully he will eat more.  Diet changed to gluten and lactose free.  Boost Plus changed to Boost Breeze.  Will hold off on peg for now and see how he does after steroids.  D2 Solumedrol today.    Review of Systems   Constitutional:  Negative for chills, fatigue and fever.   Respiratory:  Positive for cough. Negative for shortness of breath.    Cardiovascular:  Negative for chest pain, palpitations and leg swelling.   Gastrointestinal:  Negative for abdominal pain, diarrhea, nausea and vomiting.   Genitourinary:  Negative for dysuria and urgency.   Neurological:  Negative for dizziness and headaches.    All other systems reviewed and are negative.    Objective:     Vital Signs (Most Recent):  Temp: 96.6 °F (35.9 °C) (05/18/25 1038)  Pulse: 99 (05/18/25 1038)  Resp: 19 (05/18/25 1038)  BP: 134/67 (05/18/25 1038)  SpO2: 100 % (05/18/25 1038) Vital Signs (24h Range):  Temp:  [96.6 °F (35.9 °C)-99.6 °F (37.6 °C)] 96.6 °F (35.9 °C)  Pulse:  [] 99  Resp:  [18-20] 19  SpO2:  [92 %-100 %] 100 %  BP: (125-144)/(62-98) 134/67     Weight: 64.1 kg (141 lb 5 oz)  Body mass index is 19.71 kg/m².    Intake/Output Summary (Last 24 hours) at 5/18/2025 1159  Last data filed at 5/18/2025 1042  Gross per 24 hour   Intake 480 ml   Output 850 ml   Net -370 ml      Physical Exam  Constitutional:       Appearance: He is ill-appearing.   HENT:      Head: Normocephalic and atraumatic.   Cardiovascular:      Rate and Rhythm: Normal rate and regular rhythm.      Heart sounds: No murmur heard.  Pulmonary:      Effort: Pulmonary effort is normal. No respiratory distress.      Breath sounds: Normal breath sounds. No wheezing or rales.   Abdominal:      General: There is no distension.      Palpations: Abdomen is soft.      Tenderness: There is no abdominal tenderness.   Musculoskeletal:         General: No deformity.   Skin:     General: Skin is warm and dry.      Coloration: Skin is pale.   Neurological:      General: No focal deficit present.      Comments: Able to have a conversation, oriented to person         MELD 3.0: 15 at 5/18/2025  4:33 AM  MELD-Na: 8 at 5/18/2025  4:33 AM  Calculated from:  Serum Creatinine: 1.2 mg/dL at 5/18/2025  4:33 AM  Serum Sodium: 130 mmol/L at 5/18/2025  4:33 AM  Total Bilirubin: 0.4 mg/dL (Using min of 1 mg/dL) at 5/18/2025  4:33 AM  Serum Albumin: 2.7 g/dL at 5/18/2025  4:33 AM  INR(ratio): 1 at 5/18/2025  4:33 AM  Age at listing (hypothetical): 79 years  Sex: Male at 5/18/2025  4:33 AM      Significant Labs:  CBC:  Recent Labs   Lab 05/17/25  0414 05/18/25  0433   WBC 5.46 4.40   HGB 11.8* 11.7*  "  HCT 34.4* 33.6*    216     CMP:  Recent Labs   Lab 05/17/25  0414 05/18/25  0433   * 130*   K 4.2 4.6   CL 98 97   CO2 26 26   GLU 87 123*   BUN 18 27*   CREATININE 1.0 1.2   CALCIUM 9.6 10.0   PROT 8.1 8.4   ALBUMIN 2.6* 2.7*   BILITOT 0.4 0.4   ALKPHOS 76 81   AST 44 54*   ALT 10 34   ANIONGAP 7* 7*     PTINR:  Recent Labs   Lab 05/17/25  0414 05/18/25  0433   INR 1.0 1.0         Assessment & Plan  Acute encephalopathy  AMS (altered mental status)  Progressive acutely worsening confusion/tremors since 4/20. Previously AAOx4, now AAOx1  Neurology consulted: MRI brain ordered (no acute findings, motion artifact), extended EEG, ammonia levels.   Worsening mental status on 5/2, medicine team consulted for LP which was unsuccessful.   Neurology recommending carbidopa-levodopa trial, EEG, and attempting another LP.    Symptoms improved with increasing doses of Carbidopa/Levodopa.   LP concerning for meningitis so he was started on Empiric treatment on 5/6/25, viral panel negative  Discussion held with both Neurology and Infectious Disease, Infectious Disease does not believe patient has any infectious etiology and as such recommended discontinuing antimicrobials.    Neurology started IVIG for presumed autoimmune process.  Completed 5 day course of IVIG 5/14. Minimal improvement in cognition.   Neuro recommended repeat MRI with sedation as previous had artifact. Anesthesia notified to arrange with sedation which was done on 5/17.    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 Endocrine consulted.   Neurology planning 5 day course of IV Solumedrol starting 5/17 - Day 2  Continue Vitamin B12 and Thiamine replacement.  Crohn's disease  Per CT scan "thickening of the distal ileum to the surgical anastomosis with the large bowel. Inflammatory infectious ileitis are considerations. Recommend clinical correlation and follow-up. "  CRP negative. No concern " for flare.   GI without concerns of confusion being caused by sterlara, confirmed with pharmacy   Generalized weakness  Sent from SNF  Progressive worsening weakness per son   PT/OT ordered. Will need placement at discharge  Unintended weight loss  Reported per son  Recently started gluten free diet per recommendations from GI  Temporal wasting noted  Body mass index is 19.71 kg/m².  Nutrition following  Boost changed to Boost Breeze given lactose intolerance  Hyponatremia  Hyponatremia is likely due to Dehydration/hypovolemia. The patient's most recent sodium results are listed below.  Recent Labs     05/16/25 0544 05/17/25 0414 05/18/25  0433   * 131* 130*     Maxime  - Monitor sodium Daily.   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   Continue Sinemet TID  CAD (coronary artery disease)  History noted.   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 Will hold anti-depressant medications. We will consult psychiatry at this time. Patient does not display psychosis at this time. Continue to monitor closely and adjust plan of care as needed.  Anemia  Anemia is likely due to acute on chronic illness. Most recent hemoglobin and hematocrit are listed below.  Recent Labs     05/16/25 0544 05/17/25 0414 05/18/25 0433   HGB 11.9* 11.8* 11.7*   HCT 34.3* 34.4* 33.6*     Plan  - Monitor serial CBC: Daily  - Transfuse PRBC if patient becomes hemodynamically unstable, symptomatic or H/H  drops below 7/21.  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

## 2025-05-18 NOTE — ASSESSMENT & PLAN NOTE
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

## 2025-05-18 NOTE — ASSESSMENT & PLAN NOTE
Reported per son  Recently started gluten free diet per recommendations from GI  Temporal wasting noted  Body mass index is 19.71 kg/m².  Nutrition following  Boost changed to Boost Breeze given lactose intolerance

## 2025-05-18 NOTE — PROGRESS NOTES
"CONSULTATION LIAISON PSYCHIATRY PROGRESS NOTE    Patient Name: Vince Huffman  MRN: [id number]  Patient Class: IP- Inpatient  Admission Date: 4/30/2025  Attending Physician: Saadia Quiñones MD      SUBJECTIVE:   Vince Huffman is a 79 y.o. male with no past psychiatric history & past pertinent medical history of Chrohn's disease, SBO, Hypothyroidism, and CAD presents to the ED/admitted to the hospital for generalized weakness and altered mental status.     Psychiatry consulted for concern for complex bereavement vs. catatonia     Interval History:   NAEON. No PRNs required for agitation or anxiety.     On interview, pt reports he feels "good" and daughter notes that he seems to be improving. He still is only oriented to person at this time and reports that he is in [place]. Pt reports appetite is improving. Daughter would like to increase remeron. This MD agrees.       OBJECTIVE    Vital Signs:  Temp:  [96.6 °F (35.9 °C)-99.6 °F (37.6 °C)]   Pulse:  []   Resp:  [18-20]   BP: (125-144)/(62-98)   SpO2:  [96 %-100 %]     Mental Status Exam:  General Appearance: appears stated age, dressed in hospital garb, lying in bed, in no acute distress  Behavior: cooperated   Involuntary Movements and Motor Activity: grossly normal   Gait and Station: unable to assess - patient lying down or seated  Speech and Language: minimal but clear responses  Mood: "good"  Affect: congruent  Thought Process and Associations: disorganized   Perceptual Disturbances: no objective RIS  Thought Content and Perceptions:: no reported suicidal ideation, no homicidal ideation, no hallucinations per collateral  Sensorium and Orientation: person only   Recent and Remote Memory: Unable to  Formally Assess, Unwilling to Participate in Formal Testing  Attention and Concentration: unable to assess  Fund of Knowledge: Unable to Formally Assess, Unwilling to Participate in Formal Testing  Insight: limited/partial awareness of illness  Judgment: " compliant with health provider's recommendations and instructions    CAM ICU positive? unable to assess      ASSESSMENT & RECOMMENDATIONS   Vince Huffman is a 79 year old male with no past psychiatric history & past pertinent medical history of Chrohn's disease, SBO, Hypothyroidism, and CAD who presented to the ED/admitted to the hospital on 4/30/25 for generalized weakness and altered mental status. After gathering collateral, it appears that pt has had a gradual decline in his mental status over the past several months, becoming more withdrawal and anhedonic over time. There is low concern for catatonia at this time. The immobility and mutism seem to have a volitional component to them, and appear to be more in line with psychomotor retardation, potentially from depression given the history that his wife passed away in February 2024 and preceded his decline. Recommend trial of antidepressant, specifically Remeron, to address his poor intake and substantial weight loss. Would defer benzodiazepine trial at this time for fear that it may make his mental status worse.       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 Minimize use of PHYSICAL restraints OR have periods of being out of physical restraints if possible.  Keep window shades open and room lit during day and room dim at night in order  to promote normal sleep-wake cycles  Encourage family at bedside. [place] patient often to situation, location, date.  Continue to Limit or Discontinue use of Narcotics, Benzos and Anti-cholinergic medications as they may worsen delirium.  Continue medical workup for causative etiology of Delirium.      Risk Assessment / Legal Status  Patient does not meet criteria for PEC or involuntary inpatient psychiatric admission at this time. Recommend to rescind PEC if one was placed. Patient is not currently an imminent danger to self or others and is not gravely disabled due to a psychiatric illness.     Follow-up:  Will follow-up while in house.     Disposition:   Defer to primary team.    Please contact ON CALL psychiatry service (24/7) for any acute issues that may arise.    William Sistrunk, MD  [facility], PGY-2   [facility]  5/18/2025 1:41 PM        --------------------------------------------------------------------------------------------------------------------------------------------------------------------------------------------------------------------------------------    CONTINUED OBJECTIVE clinical data & findings reviewed and noted for above decision making    Current Medications:   Scheduled Meds:    amLODIPine  10 mg Oral Daily    carbidopa-levodopa  mg  2 tablet Oral TID    cyanocobalamin  1,000 mcg Oral Daily    enoxparin  40 mg Subcutaneous Daily    methylPREDNISolone injection (PEDS and ADULTS)  1,000 mg Intravenous Daily    mirtazapine  7.5 mg Oral QHS    pantoprazole  40 mg Oral Daily    polyethylene glycol  17 g Oral BID    thiamine (B-1) injection  400 mg Intravenous TID    Followed by    [START ON 5/20/2025] thiamine  100 mg Oral Daily     PRN Meds:   Current Facility-Administered Medications:     acetaminophen, 1,000 mg, Oral, Q8H PRN    acetaminophen, 650 mg, Oral, Q4H PRN    ALPRAZolam, 0.5 mg, Oral, BID PRN    dextrose 50%, 12.5 g, Intravenous, PRN     dextrose 50%, 25 g, Intravenous, PRN    glucagon (human recombinant), 1 mg, Intramuscular, PRN    glucose, 16 g, Oral, PRN    glucose, 24 g, Oral, PRN    insulin aspart U-100, 0-5 Units, Subcutaneous, QID (AC + HS) PRN    lorazepam, 2 mg, Intravenous, On Call Procedure    melatonin, 6 mg, Oral, Nightly PRN    naloxone, 0.02 mg, Intravenous, PRN    ondansetron, 8 mg, Oral, Q8H PRN    prochlorperazine, 5 mg, Intravenous, Q6H PRN    sodium chloride 0.9%, 10 mL, Intravenous, Q12H PRN    sodium chloride 0.9%, 10 mL, Intravenous, PRN    Allergies:   Review of patient's allergies indicates:   Allergen Reactions    Gluten Diarrhea    Lactose        Vitals  Vitals:    05/18/25 1038   BP: 134/67   Pulse: 99   Resp: 19   Temp: 96.6 °F (35.9 °C)       Labs/Imaging/Studies:  Recent Results (from the past 24 hours)   POCT glucose    Collection Time: 05/17/25  4:21 PM   Result Value Ref Range    POCT Glucose 197 (H) 70 - 110 mg/dL   POCT glucose    Collection Time: 05/17/25  7:14 PM   Result Value Ref Range    POCT Glucose 185 (H) 70 - 110 mg/dL   Magnesium    Collection Time: 05/18/25  4:33 AM   Result Value Ref Range    Magnesium  1.9 1.6 - 2.6 mg/dL   Phosphorus    Collection Time: 05/18/25  4:33 AM   Result Value Ref Range    Phosphorus Level 3.2 2.7 - 4.5 mg/dL   Comprehensive Metabolic Panel    Collection Time: 05/18/25  4:33 AM   Result Value Ref Range    Sodium 130 (L) 136 - 145 mmol/L    Potassium 4.6 3.5 - 5.1 mmol/L    Chloride 97 95 - 110 mmol/L    CO2 26 23 - 29 mmol/L    Glucose 123 (H) 70 - 110 mg/dL    BUN 27 (H) 8 - 23 mg/dL    Creatinine 1.2 0.5 - 1.4 mg/dL    Calcium 10.0 8.7 - 10.5 mg/dL    Protein Total 8.4 6.0 - 8.4 gm/dL    Albumin 2.7 (L) 3.5 - 5.2 g/dL    Bilirubin Total 0.4 0.1 - 1.0 mg/dL    ALP 81 40 - 150 unit/L    AST 54 (H) 11 - 45 unit/L    ALT 34 10 - 44 unit/L    Anion Gap 7 (L) 8 - 16 mmol/L    eGFR >60 >60 mL/min/1.73/m2   Protime-INR    Collection Time: 05/18/25  4:33 AM   Result Value Ref  Range    PT 11.2 9.0 - 12.5 seconds    INR 1.0 0.8 - 1.2   CBC with Differential    Collection Time: 05/18/25  4:33 AM   Result Value Ref Range    WBC 4.40 3.90 - 12.70 K/uL    RBC 3.67 (L) 4.60 - 6.20 M/uL    HGB 11.7 (L) 14.0 - 18.0 gm/dL    HCT 33.6 (L) 40.0 - 54.0 %    MCV 92 82 - 98 fL    MCH 31.9 (H) 27.0 - 31.0 pg    MCHC 34.8 32.0 - 36.0 g/dL    RDW 11.4 (L) 11.5 - 14.5 %    Platelet Count 216 150 - 450 K/uL    MPV 11.0 9.2 - 12.9 fL    Nucleated RBC 0 <=0 /100 WBC    Neut % 87.9 (H) 38 - 73 %    Lymph % 10.5 (L) 18 - 48 %    Mono % 1.1 (L) 4 - 15 %    Eos % 0.0 <=8 %    Basophil % 0.0 <=1.9 %    Imm Grans % 0.5 0.0 - 0.5 %    Neut # 3.87 1.8 - 7.7 K/uL    Lymph # 0.46 (L) 1 - 4.8 K/uL    Mono # 0.05 (L) 0.3 - 1 K/uL    Eos # 0.00 <=0.5 K/uL    Baso # 0.00 <=0.2 K/uL    Imm Grans # 0.02 0.00 - 0.04 K/uL   POCT glucose    Collection Time: 05/18/25  7:40 AM   Result Value Ref Range    POCT Glucose 132 (H) 70 - 110 mg/dL   POCT glucose    Collection Time: 05/18/25 10:40 AM   Result Value Ref Range    POCT Glucose 226 (H) 70 - 110 mg/dL     Imaging Results               CT Abdomen Pelvis With IV Contrast NO Oral Contrast (Final result)  Result time 04/30/25 15:46:15      Final result by Russell Ba MD (04/30/25 15:46:15)                   Impression:      1. Wall thickening of the distal ileum to the surgical anastomosis with the large bowel.  Inflammatory infectious ileitis are considerations.  Recommend clinical correlation and follow-up.  2. Constipation.  There is fecal distention of the rectum.  No fecal impaction is not excluded.  3. Bilateral renal cysts and hepatic cysts.  4. Nonobstructing nephrolithiasis of the right kidney.  5. Nondistention of the urinary bladder.  Mild wall thickening is not excluded.  6. Diverticulosis of the sigmoid colon.  No evidence of acute diverticulitis.  7. This report was flagged in Epic as abnormal.      Electronically signed by: Russell  Yandel  Date:    04/30/2025  Time:    15:46               Narrative:    EXAMINATION:  CT ABDOMEN PELVIS WITH IV CONTRAST    CLINICAL HISTORY:  LLQ abdominal pain;RLQ abdominal pain (Age >= 14y);    TECHNIQUE:  Low dose axial images, sagittal and coronal reformations were obtained from the lung bases to the pubic symphysis following the IV administration of 75 mL of Omnipaque 350 .  Oral contrast was not administered.    COMPARISON:  11/05/2018    FINDINGS:  Abdomen:    - Lower thorax:    - Lung bases: No infiltrates and no nodules.    - Liver: Multiple small hepatic cysts.    - Gallbladder: No calcified gallstones.    - Bile Ducts: No evidence of intra or extra hepatic biliary ductal dilation.    - Spleen: Negative.    - Kidneys: Multiple bilateral simple renal cysts.  Single nonobstructing stone in the right kidney.  No hydronephrosis or hydroureter bilaterally.    - Adrenals: Unremarkable.    - Pancreas: No mass or peripancreatic fat stranding.    - Retroperitoneum:  No significant adenopathy.    - Vascular: Mild ectasia of the distal aorta with no evidence of aneurysm.    - Abdominal wall:  Unremarkable.    Pelvis:    Urinary bladder is incompletely distended with possible mild wall thickening.    Postop changes at the ileocecal junction.  There is wall thickening noted to the distal ileum to the surgical anastomosis.  Inflammatory or infectious ileitis are considerations.  Follow-up recommended.    Bowel/Mesentery:    No evidence of bowel obstruction.  Moderate retained feces in the colon.    Fecal distention of the rectum.  Impaction is not excluded.    Mild diverticulosis of the sigmoid colon.  No evidence of acute diverticulitis.    Bones:  No acute osseous abnormality and no suspicious lytic or blastic lesion.                                       CT Head Without Contrast (Final result)  Result time 04/30/25 14:49:11      Final result by Ish Carrillo MD (04/30/25 14:49:11)                   Impression:       No evidence for acute intracranial pathology.      Electronically signed by: Ish Carrillo  Date:    04/30/2025  Time:    14:49               Narrative:    EXAMINATION:  CT HEAD WITHOUT CONTRAST    CLINICAL HISTORY:  Mental status change, unknown cause;    TECHNIQUE:  Low dose axial images were obtained through the head.  Coronal and sagittal reformations were also performed. Contrast was not administered.    COMPARISON:  MRI brain without contrast 04/24/2025, CT head without contrast 04/24/2025.    FINDINGS:  Ventricles are stable in size without evidence for new or worsening hydrocephalus.  No new or enlarging extra-axial blood or fluid collections.    Brain parenchyma appears unchanged.  Scattered supratentorial hypoattenuation, overall nonspecific, but can be seen in setting of microvascular ischemic change.  No parenchymal mass, edema, hemorrhage, or acute major vascular territory infarct.    No calvarial or skull base fracture or osseous destructive lesion.  Paranasal sinuses and mastoid air cells are essentially clear.                                       X-Ray Chest AP Portable (Final result)  Result time 04/30/25 12:49:39      Final result by Matti Cote MD (04/30/25 12:49:39)                   Impression:      See above      Electronically signed by: Matti Cote MD  Date:    04/30/2025  Time:    12:49               Narrative:    EXAMINATION:  XR CHEST AP PORTABLE    CLINICAL HISTORY:  Altered mental status, unspecified    TECHNIQUE:  Single frontal view of the chest was performed.    COMPARISON:  No 04/23/2025 ne    FINDINGS:  Heart size normal.  The tracheal slightly deviates to the right at the thoracic inlet probably related to enlarged thyroid gland.  Minimal subsegmental atelectatic changes noted at the right lung base.  Lungs are otherwise clear.  No pleural effusion.

## 2025-05-18 NOTE — PROGRESS NOTES
"[facility]  Neurology  Progress Note    Patient Name: Vince Huffman  MRN: [id number]  Admission Date: 4/30/2025  Hospital Length of Stay: 17 days  Code Status: Full Code   Attending Provider: Saadia Quiñones MD  Primary Care Physician: Leland Porras MD   Principal Problem:Acute encephalopathy    HPI:   Vince Huffman is a 79 year old male with history of chron's disease, SBO, hypothyroidism, and CAD  presenting from [facility] with encephalopathy. He initially presented to [facility] for impaired mobility and difficulty with ADL's 2/2 generalized weakness. He was reportedly found confused today A&O x 1 (said the year is "1895") but is normally A&O x4. At this time he also expressed generalized abdominal pain. Recent ultrasounds of the lower extremity did not reveal DVT. MRI from last week was non diagnostic. UA unremarkable and CXR normal. CT abdomen and pelvis reveals wall thickening of the distal ileum consistent with possible infectious ileitis. Neurology consulted for further evaluation of encephalopathy.     Overview/Hospital Course:  05/12/2025 - IVIG Day 5/5. Exam worsened compared to yesterday (no longer speaking.)   05/13/2025 - Initial exam early this morning consistent with exam yesterday. However, when assessed by team closed to lunch time was talkative and saying multiple-word sentences (albeit still confused and perseverative.)   05/15/2025 - Patient continues having waxing-and-waning symptoms. Autoimmune encephalopathy serum and CSF panels unrevealing. Plan for repeat MRI with sedation as prior MRIs were motion degraded.   05/16/2025 - Sedated MRI delayed until tomorrow.   5/17/25: MRI done this morning, and is unrevealing though with presence of significant artifact; starting pulse-dose steroids  5/18/25: day 2 IVSM, exam stable, more interactive this morning, but within limits of prior fluctuations        Subjective:     Interval History: See hospital " course    Current Neurological Medications: See below    Current Medications[1]    Review of Systems   Unable to perform ROS: Mental status change     Objective:     Vital Signs (Most Recent):  Temp: 96.6 °F (35.9 °C) (05/18/25 1038)  Pulse: 99 (05/18/25 1038)  Resp: 19 (05/18/25 1038)  BP: 134/67 (05/18/25 1038)  SpO2: 100 % (05/18/25 1038) Vital Signs (24h Range):  Temp:  [96.6 °F (35.9 °C)-99.6 °F (37.6 °C)] 96.6 °F (35.9 °C)  Pulse:  [] 99  Resp:  [18-20] 19  SpO2:  [96 %-100 %] 100 %  BP: (125-144)/(62-98) 134/67     Weight: 64.1 kg (141 lb 5 oz)  Body mass index is 19.71 kg/m².     Physical Exam  Vitals and nursing note reviewed.   Constitutional:       General: He is not in acute distress.     Comments:  Thin   HENT:      Head: Normocephalic and atraumatic.   Eyes:      Extraocular Movements: EOM normal.      Conjunctiva/sclera: Conjunctivae normal.   Pulmonary:      Effort: Pulmonary effort is normal. No respiratory distress.   Musculoskeletal:      Right lower leg: No edema.      Left lower leg: No edema.   Skin:     General: Skin is warm and dry.   Neurological:      Mental Status: He is alert.          NEUROLOGICAL EXAMINATION:     MENTAL STATUS   Oriented to person.   Disoriented to place.   Disoriented to time.   Level of consciousness: alert       Perseverating  At times, states that he is not at home, but fluctuates     CRANIAL NERVES     CN III, IV, VI   Extraocular motions are normal.     CN VII   Facial expression full, symmetric.     CN XI   CN XI normal.        - L ptosis  - PERRLA     MOTOR EXAM   Muscle bulk: increased  Overall muscle tone: increased    GAIT AND COORDINATION        Bradykinetic bilaterally    Low amplitude, moderate frequency tremor in both hands        Significant Labs: All pertinent lab results from the past 24 hours have been reviewed.    Significant Imaging: I have reviewed and interpreted all pertinent imaging results/findings within the past 24 hours.    Assessment  and Plan:     * Acute encephalopathy  79 year old male with history of Crohn's disease, SBO, hypothyroidism, and CAD  presenting from [facility] with encephalopathy. Unclear cause of encephalopathy at this time, though acute presentation with concurrent parkinsonism suggests autoimmune etiology. Motion artifact on MRI brain. On initial exam, was disoriented to place, time, and situation, minimally responsive and will answer with one word, asterixis in his upper extremities, axial rigidity/extremity/rigidity, masked facies, and intention tremor most notably in the left hand. No hyperreflexia. Ptosis of left eye though no extraocular movement abnormalities/pupillary discrepancy.     DDX: autoimmune Parkinsonism; PRES or PML (both possible with Stelara); SREAT +/-  superimposed catatonia or complex bereavement. 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 There have been reported cases of SREAT with normal fT4, so normal level does not exclude this diagnosis.     LABS  Serum  Pending: Ma2 ab, [facility] movement disorders panel, Vit E, NFL, heavy metals   On Movement Disorders panel, not on autoimmune encephalopathy panel = CCPQ (formerly known as P/Q type VGCC,) GRAF1, Septin5, ITPR1, AP3B2, KLHL11  Dopamine-2 and [place]-3 not commercially available per lab  NFL, heavy metals  WNL: strongyloides IgG, Treponema, negative celiac dx serology, zinc, folate, B12, copper, B1, autoimmune encephalopathy panel, HIV, Vit D  Abnormal: elevated anti-thyroglobulin & anti-TPO    CSF  Pending: None  WNL: AFB, meningitis-encephalitis panel, VDRL, culture, glucose, LDH, cytology, autoimmune encephalopathy panel  Abnormal: protein (60), pleocytosis (after correction for elevated RBC)    Recommendations:  - MRI brain unrevealing  -  S/p 5-day course of IVIG (EOT 5/12/2025)  - Empiric pulse-dose steroids for SREAT vs autoimmune encephalitis (movement panel pending)  -> Day 2/5  - Continue B12 repletion - 1000 mcg po daily  - Continue to empirically treat B1 deficiency  - F/u pending labs as above  - Continue Sinemet 25 -100 mg 2 tablets TID  - Appreciate Psychiatry assistance and endocrinology opinion  - Delirium and fall precautions   - Concern for inadequate nutritional intake: would consider alternative routes of nutrition (PEG, etc.)--discussed w/primary this morning    We will continue to follow. Please reach out with any questions.         VTE Risk Mitigation (From admission, onward)           Ordered     enoxaparin injection 40 mg  Daily         05/01/25 0827     IP VTE HIGH RISK PATIENT  Once         05/01/25 0827     Place sequential compression device  Until discontinued         05/01/25 0827                    Jaden Carvajal DO  Neurology  [facility]       [1]   Current Facility-Administered Medications   Medication Dose Route Frequency Provider Last Rate Last Admin    acetaminophen tablet 1,000 mg  1,000 mg Oral Q8H PRN Zuly Wheat PA-C   1,000 mg at 05/12/25 1554    acetaminophen tablet 650 mg  650 mg Oral Q4H PRN Zuly Wheat PA-C   650 mg at 05/04/25 1453    ALPRAZolam tablet 0.5 mg  0.5 mg Oral BID PRN Zuly Wheat PA-C   0.5 mg at 05/14/25 1626    amLODIPine tablet 10 mg  10 mg Oral Daily Juan F Ramírez MD   10 mg at 05/18/25 0829    carbidopa-levodopa  mg per tablet 2 tablet  2 tablet Oral TID Elian López MD   2 tablet at 05/18/25 0829    cyanocobalamin tablet 1,000 mcg  1,000 mcg Oral Daily Zina Tarango MD   1,000 mcg at 05/18/25 0830    dextrose 50% injection 12.5 g  12.5 g Intravenous PRN Zuly Wheat PA-C        dextrose 50% injection 25 g  25 g Intravenous PRN Zuly Wheat PA-C        enoxaparin injection 40 mg  40 mg Subcutaneous Daily Zuly Wheat PA-C   40 mg at  05/17/25 1623    glucagon (human recombinant) injection 1 mg  1 mg Intramuscular PRN Zuly Wheat PA-C        glucose chewable tablet 16 g  16 g Oral PRN Zuly Wheat PA-C        glucose chewable tablet 24 g  24 g Oral PRN Zuly Wheat PA-C        insulin aspart U-100 pen 0-5 Units  0-5 Units Subcutaneous QID (AC + HS) PRN Saadia Quiñones MD        LORazepam injection 2 mg  2 mg Intravenous On Call Procedure Jaden Carvajal DO        melatonin tablet 6 mg  6 mg Oral Nightly PRN Zuly Wheat PA-C        methylPREDNISolone sodium succinate (SOLU-MEDROL) 1,000 mg in 0.9% NaCl 100 mL IVPB  1,000 mg Intravenous Daily Saadia Quiñones MD   Stopped at 05/18/25 0921    mirtazapine tablet 7.5 mg  7.5 mg Oral QHS Elian Morejon MD   7.5 mg at 05/17/25 2000    naloxone 0.4 mg/mL injection 0.02 mg  0.02 mg Intravenous PRN Zuly Wheat PA-C        ondansetron disintegrating tablet 8 mg  8 mg Oral Q8H PRN Zuly Wheat PA-C        pantoprazole EC tablet 40 mg  40 mg Oral Daily Saadia Quiñones MD   40 mg at 05/18/25 0830    polyethylene glycol packet 17 g  17 g Oral BID Zuly Wheat PA-C   17 g at 05/18/25 0830    prochlorperazine injection Soln 5 mg  5 mg Intravenous Q6H PRN Zuly Wheat PA-C        sodium chloride 0.9% flush 10 mL  10 mL Intravenous Q12H PRN Zuly Wheat PA-C        sodium chloride 0.9% flush 10 mL  10 mL Intravenous PRN Pedro Ash MD        thiamine injection 400 mg  400 mg Intravenous TID Saadia Quiñones MD   400 mg at 05/18/25 0830    Followed by    [START ON 5/20/2025] thiamine tablet 100 mg  100 mg Oral Daily Saadia Quiñones MD

## 2025-05-18 NOTE — ASSESSMENT & PLAN NOTE
Patient's blood pressure range in the last 24 hours was: BP  Min: 125/62  Max: 144/98.The patient's inpatient anti-hypertensive regimen is listed below:  Current Antihypertensives  amLODIPine tablet 10 mg, Daily, Oral    Plan  - BP is controlled, no changes needed to their regimen

## 2025-05-18 NOTE — ASSESSMENT & PLAN NOTE
Anemia is likely due to acute on chronic illness. Most recent hemoglobin and hematocrit are listed below.  Recent Labs     05/16/25  0544 05/17/25  0414 05/18/25  0433   HGB 11.9* 11.8* 11.7*   HCT 34.3* 34.4* 33.6*     Plan  - Monitor serial CBC: Daily  - Transfuse PRBC if patient becomes hemodynamically unstable, symptomatic or H/H drops below 7/21.  - Patient has not received any PRBC transfusions to date  - Patient's anemia is currently stable  -

## 2025-05-18 NOTE — SUBJECTIVE & OBJECTIVE
Subjective:     Interval History: See hospital course    Current Neurological Medications: See below    Current Medications[1]    Review of Systems   Unable to perform ROS: Mental status change     Objective:     Vital Signs (Most Recent):  Temp: 96.6 °F (35.9 °C) (05/18/25 1038)  Pulse: 99 (05/18/25 1038)  Resp: 19 (05/18/25 1038)  BP: 134/67 (05/18/25 1038)  SpO2: 100 % (05/18/25 1038) Vital Signs (24h Range):  Temp:  [96.6 °F (35.9 °C)-99.6 °F (37.6 °C)] 96.6 °F (35.9 °C)  Pulse:  [] 99  Resp:  [18-20] 19  SpO2:  [96 %-100 %] 100 %  BP: (125-144)/(62-98) 134/67     Weight: 64.1 kg (141 lb 5 oz)  Body mass index is 19.71 kg/m².     Physical Exam  Vitals and nursing note reviewed.   Constitutional:       General: He is not in acute distress.     Comments:  Thin   HENT:      Head: Normocephalic and atraumatic.   Eyes:      Extraocular Movements: EOM normal.      Conjunctiva/sclera: Conjunctivae normal.   Pulmonary:      Effort: Pulmonary effort is normal. No respiratory distress.   Musculoskeletal:      Right lower leg: No edema.      Left lower leg: No edema.   Skin:     General: Skin is warm and dry.   Neurological:      Mental Status: He is alert.          NEUROLOGICAL EXAMINATION:     MENTAL STATUS   Oriented to person.   Disoriented to place.   Disoriented to time.   Level of consciousness: alert       Perseverating  At times, states that he is not at home, but fluctuates     CRANIAL NERVES     CN III, IV, VI   Extraocular motions are normal.     CN VII   Facial expression full, symmetric.     CN XI   CN XI normal.        - L ptosis  - PERRLA     MOTOR EXAM   Muscle bulk: increased  Overall muscle tone: increased    GAIT AND COORDINATION        Bradykinetic bilaterally    Low amplitude, moderate frequency tremor in both hands        Significant Labs: All pertinent lab results from the past 24 hours have been reviewed.    Significant Imaging: I have reviewed and interpreted all pertinent imaging  results/findings within the past 24 hours.       [1]   Current Facility-Administered Medications   Medication Dose Route Frequency Provider Last Rate Last Admin    acetaminophen tablet 1,000 mg  1,000 mg Oral Q8H PRN Zuly Wheat PA-C   1,000 mg at 05/12/25 1554    acetaminophen tablet 650 mg  650 mg Oral Q4H PRN Zuly Wheat PA-C   650 mg at 05/04/25 1453    ALPRAZolam tablet 0.5 mg  0.5 mg Oral BID PRN Zuly Wheat PA-C   0.5 mg at 05/14/25 1626    amLODIPine tablet 10 mg  10 mg Oral Daily Juan F Ramírez MD   10 mg at 05/18/25 0829    carbidopa-levodopa  mg per tablet 2 tablet  2 tablet Oral TID Elian López MD   2 tablet at 05/18/25 0829    cyanocobalamin tablet 1,000 mcg  1,000 mcg Oral Daily Zina Tarango MD   1,000 mcg at 05/18/25 0830    dextrose 50% injection 12.5 g  12.5 g Intravenous PRN Zuly Wheat PA-C        dextrose 50% injection 25 g  25 g Intravenous PRN Zuly Wheat PA-C        enoxaparin injection 40 mg  40 mg Subcutaneous Daily Zuly Wheat PA-C   40 mg at 05/17/25 1623    glucagon (human recombinant) injection 1 mg  1 mg Intramuscular PRN Zuly Wheat PA-C        glucose chewable tablet 16 g  16 g Oral PRN Zuly Wheat PA-C        glucose chewable tablet 24 g  24 g Oral PRN Zuly Wheat PA-C        insulin aspart U-100 pen 0-5 Units  0-5 Units Subcutaneous QID (AC + HS) PRN Saadia Quiñones MD        LORazepam injection 2 mg  2 mg Intravenous On Call Procedure Jaden Carvajal DO        melatonin tablet 6 mg  6 mg Oral Nightly PRN Zuly Wheat PA-C        methylPREDNISolone sodium succinate (SOLU-MEDROL) 1,000 mg in 0.9% NaCl 100 mL IVPB  1,000 mg Intravenous Daily Saadia Quiñones MD   Stopped at 05/18/25 0921    mirtazapine tablet 7.5 mg  7.5 mg Oral QHS Morejon, Elian, MD   7.5 mg at 05/17/25 2000    naloxone 0.4 mg/mL injection 0.02 mg  0.02 mg Intravenous PRN Zuly Wheat PA-C        ondansetron disintegrating tablet 8 mg  8 mg Oral Q8H PRN  Zuly Wheat PA-C        pantoprazole EC tablet 40 mg  40 mg Oral Daily Saadia Quiñones MD   40 mg at 05/18/25 0830    polyethylene glycol packet 17 g  17 g Oral BID Zuly Wheat PA-C   17 g at 05/18/25 0830    prochlorperazine injection Soln 5 mg  5 mg Intravenous Q6H PRN Zuly Wheat PA-C        sodium chloride 0.9% flush 10 mL  10 mL Intravenous Q12H PRN Zuly Wheat PA-C        sodium chloride 0.9% flush 10 mL  10 mL Intravenous PRN Pedro Ash MD        thiamine injection 400 mg  400 mg Intravenous TID Saadia Quiñones MD   400 mg at 05/18/25 0830    Followed by    [START ON 5/20/2025] thiamine tablet 100 mg  100 mg Oral Daily Saadia Quiñones MD

## 2025-05-18 NOTE — ASSESSMENT & PLAN NOTE
"Per CT scan "thickening of the distal ileum to the surgical anastomosis with the large bowel. Inflammatory infectious ileitis are considerations. Recommend clinical correlation and follow-up. "  CRP negative. No concern for flare.   GI without concerns of confusion being caused by sterlara, confirmed with pharmacy   "

## 2025-05-18 NOTE — PLAN OF CARE
Pt alert and oriented to self without any awareness of date/time, place, and situation noted. Sating at 100% on RA with no distress noted. No s/s of any pain or discomfort noted. CBG monitoring in place. Daughter was able to encourage pt to eat more on tonight. VSS per pt baseline. Bed lowered and locked with bed alarm in place. Call light within reach. No known needs at this time.         Problem: Adult Inpatient Plan of Care  Goal: Plan of Care Review  Outcome: Progressing  Goal: Patient-Specific Goal (Individualized)  Outcome: Progressing  Goal: Absence of Hospital-Acquired Illness or Injury  Outcome: Progressing  Goal: Optimal Comfort and Wellbeing  Outcome: Progressing  Goal: Readiness for Transition of Care  Outcome: Progressing     Problem: Skin Injury Risk Increased  Goal: Skin Health and Integrity  Outcome: Progressing     Problem: Infection  Goal: Absence of Infection Signs and Symptoms  Outcome: Progressing     Problem: Delirium  Goal: Optimal Coping  Outcome: Progressing  Goal: Improved Behavioral Control  Outcome: Progressing  Goal: Improved Attention and Thought Clarity  Outcome: Progressing  Goal: Improved Sleep  Outcome: Progressing     Problem: Fall Injury Risk  Goal: Absence of Fall and Fall-Related Injury  Outcome: Progressing

## 2025-05-19 ENCOUNTER — TELEPHONE (OUTPATIENT)
Dept: GASTROENTEROLOGY | Facility: CLINIC | Age: 80
End: 2025-05-19
Payer: MEDICARE

## 2025-05-19 PROBLEM — K92.2 GIB (GASTROINTESTINAL BLEEDING): Status: ACTIVE | Noted: 2025-05-19

## 2025-05-19 LAB
ABSOLUTE EOSINOPHIL (OHS): 0 K/UL
ABSOLUTE MONOCYTE (OHS): 0.91 K/UL (ref 0.3–1)
ABSOLUTE NEUTROPHIL COUNT (OHS): 11.38 K/UL (ref 1.8–7.7)
ADDRESS: NORMAL
ALBUMIN SERPL BCP-MCNC: 2.8 G/DL (ref 3.5–5.2)
ALP SERPL-CCNC: 76 UNIT/L (ref 40–150)
ALT SERPL W/O P-5'-P-CCNC: 23 UNIT/L (ref 10–44)
ANION GAP (OHS): 11 MMOL/L (ref 8–16)
ARSENIC BLD-MCNC: <1 NG/ML
AST SERPL-CCNC: 42 UNIT/L (ref 11–45)
ATTENDING PHYSICIAN NAME: NORMAL
BASOPHILS # BLD AUTO: 0.01 K/UL
BASOPHILS NFR BLD AUTO: 0.1 %
BILIRUB SERPL-MCNC: 0.3 MG/DL (ref 0.1–1)
BUN SERPL-MCNC: 36 MG/DL (ref 8–23)
CADMIUM BLD-MCNC: 0.3 NG/ML
CALCIUM SERPL-MCNC: 10.3 MG/DL (ref 8.7–10.5)
CHLORIDE SERPL-SCNC: 101 MMOL/L (ref 95–110)
CO2 SERPL-SCNC: 25 MMOL/L (ref 23–29)
COUNTY OF RESIDENCE: NORMAL
CREAT SERPL-MCNC: 1.2 MG/DL (ref 0.5–1.4)
EMPLOYER NAME: NORMAL
ERYTHROCYTE [DISTWIDTH] IN BLOOD BY AUTOMATED COUNT: 11.6 % (ref 11.5–14.5)
FACILITY PHONE #: NORMAL
GFR SERPLBLD CREATININE-BSD FMLA CKD-EPI: >60 ML/MIN/1.73/M2
GLUCOSE SERPL-MCNC: 114 MG/DL (ref 70–110)
HCT VFR BLD AUTO: 36.6 % (ref 40–54)
HGB BLD-MCNC: 12.5 GM/DL (ref 14–18)
HX OF OCCUPATION: NORMAL
IMM GRANULOCYTES # BLD AUTO: 0.05 K/UL (ref 0–0.04)
IMM GRANULOCYTES NFR BLD AUTO: 0.4 % (ref 0–0.5)
INR PPP: 1 (ref 0.8–1.2)
LEAD BLDV-MCNC: 1.3 MCG/DL
LYMPHOCYTES # BLD AUTO: 0.68 K/UL (ref 1–4.8)
M HEALTH CARE PROVIDER PHONE: NORMAL
M PATIENT CITY: NORMAL
MAGNESIUM SERPL-MCNC: 1.9 MG/DL (ref 1.6–2.6)
MAYO GENERIC ORDERABLE RESULT: NORMAL
MCH RBC QN AUTO: 31.5 PG (ref 27–31)
MCHC RBC AUTO-ENTMCNC: 34.2 G/DL (ref 32–36)
MCV RBC AUTO: 92 FL (ref 82–98)
MERCURY BLD-MCNC: <1 NG/ML
NEUROFILAMENT LIGHT CHAIN, PLASMA: 392 PG/ML
NUCLEATED RBC (/100WBC) (OHS): 0 /100 WBC
PHONE #: NORMAL
PHOSPHATE SERPL-MCNC: 3.9 MG/DL (ref 2.7–4.5)
PLATELET # BLD AUTO: 285 K/UL (ref 150–450)
PMV BLD AUTO: 10.4 FL (ref 9.2–12.9)
POCT GLUCOSE: 127 MG/DL (ref 70–110)
POCT GLUCOSE: 127 MG/DL (ref 70–110)
POCT GLUCOSE: 131 MG/DL (ref 70–110)
POCT GLUCOSE: 69 MG/DL (ref 70–110)
POTASSIUM SERPL-SCNC: 4.3 MMOL/L (ref 3.5–5.1)
PROT SERPL-MCNC: 8.4 GM/DL (ref 6–8.4)
PROTHROMBIN TIME: 10.9 SECONDS (ref 9–12.5)
PROVIDER CITY: NORMAL
PROVIDER POSTAL CODE: NORMAL
PROVIDER STATE: NORMAL
RBC # BLD AUTO: 3.97 M/UL (ref 4.6–6.2)
REFER PHYSICIAN ADDR: NORMAL
RELATIVE EOSINOPHIL (OHS): 0 %
RELATIVE LYMPHOCYTE (OHS): 5.2 % (ref 18–48)
RELATIVE MONOCYTE (OHS): 7 % (ref 4–15)
RELATIVE NEUTROPHIL (OHS): 87.3 % (ref 38–73)
SODIUM SERPL-SCNC: 137 MMOL/L (ref 136–145)
SPECIMEN SOURCE: NORMAL
WBC # BLD AUTO: 13.03 K/UL (ref 3.9–12.7)

## 2025-05-19 PROCEDURE — 63600175 PHARM REV CODE 636 W HCPCS: Mod: JZ,TB | Performed by: HOSPITALIST

## 2025-05-19 PROCEDURE — 84100 ASSAY OF PHOSPHORUS: CPT | Performed by: STUDENT IN AN ORGANIZED HEALTH CARE EDUCATION/TRAINING PROGRAM

## 2025-05-19 PROCEDURE — 97110 THERAPEUTIC EXERCISES: CPT | Mod: CQ

## 2025-05-19 PROCEDURE — 36415 COLL VENOUS BLD VENIPUNCTURE: CPT | Performed by: HOSPITALIST

## 2025-05-19 PROCEDURE — 85025 COMPLETE CBC W/AUTO DIFF WBC: CPT | Performed by: STUDENT IN AN ORGANIZED HEALTH CARE EDUCATION/TRAINING PROGRAM

## 2025-05-19 PROCEDURE — 97112 NEUROMUSCULAR REEDUCATION: CPT

## 2025-05-19 PROCEDURE — 25000003 PHARM REV CODE 250: Performed by: HOSPITALIST

## 2025-05-19 PROCEDURE — 20600001 HC STEP DOWN PRIVATE ROOM

## 2025-05-19 PROCEDURE — 85610 PROTHROMBIN TIME: CPT | Performed by: STUDENT IN AN ORGANIZED HEALTH CARE EDUCATION/TRAINING PROGRAM

## 2025-05-19 PROCEDURE — 25000003 PHARM REV CODE 250

## 2025-05-19 PROCEDURE — 85018 HEMOGLOBIN: CPT | Performed by: HOSPITALIST

## 2025-05-19 PROCEDURE — 99231 SBSQ HOSP IP/OBS SF/LOW 25: CPT | Mod: GC,,, | Performed by: PSYCHIATRY & NEUROLOGY

## 2025-05-19 PROCEDURE — 86901 BLOOD TYPING SEROLOGIC RH(D): CPT | Performed by: HOSPITALIST

## 2025-05-19 PROCEDURE — 25000003 PHARM REV CODE 250: Performed by: STUDENT IN AN ORGANIZED HEALTH CARE EDUCATION/TRAINING PROGRAM

## 2025-05-19 PROCEDURE — 99233 SBSQ HOSP IP/OBS HIGH 50: CPT | Mod: GC,,, | Performed by: STUDENT IN AN ORGANIZED HEALTH CARE EDUCATION/TRAINING PROGRAM

## 2025-05-19 PROCEDURE — 83735 ASSAY OF MAGNESIUM: CPT | Performed by: STUDENT IN AN ORGANIZED HEALTH CARE EDUCATION/TRAINING PROGRAM

## 2025-05-19 PROCEDURE — 85014 HEMATOCRIT: CPT | Performed by: HOSPITALIST

## 2025-05-19 PROCEDURE — 36415 COLL VENOUS BLD VENIPUNCTURE: CPT | Performed by: STUDENT IN AN ORGANIZED HEALTH CARE EDUCATION/TRAINING PROGRAM

## 2025-05-19 PROCEDURE — 80053 COMPREHEN METABOLIC PANEL: CPT | Performed by: STUDENT IN AN ORGANIZED HEALTH CARE EDUCATION/TRAINING PROGRAM

## 2025-05-19 PROCEDURE — 97530 THERAPEUTIC ACTIVITIES: CPT | Mod: CQ

## 2025-05-19 RX ORDER — NIFEDIPINE 30 MG/1
60 TABLET, EXTENDED RELEASE ORAL DAILY
Status: DISCONTINUED | OUTPATIENT
Start: 2025-05-19 | End: 2025-06-02 | Stop reason: HOSPADM

## 2025-05-19 RX ORDER — PANTOPRAZOLE SODIUM 40 MG/10ML
80 INJECTION, POWDER, LYOPHILIZED, FOR SOLUTION INTRAVENOUS ONCE
Status: COMPLETED | OUTPATIENT
Start: 2025-05-19 | End: 2025-05-19

## 2025-05-19 RX ORDER — MIRTAZAPINE 15 MG/1
15 TABLET, FILM COATED ORAL NIGHTLY
Status: DISCONTINUED | OUTPATIENT
Start: 2025-05-19 | End: 2025-06-02 | Stop reason: HOSPADM

## 2025-05-19 RX ORDER — PANTOPRAZOLE SODIUM 40 MG/10ML
40 INJECTION, POWDER, LYOPHILIZED, FOR SOLUTION INTRAVENOUS 2 TIMES DAILY
Status: DISCONTINUED | OUTPATIENT
Start: 2025-05-19 | End: 2025-05-29

## 2025-05-19 RX ADMIN — MIRTAZAPINE 15 MG: 15 TABLET, FILM COATED ORAL at 09:05

## 2025-05-19 RX ADMIN — METHYLPREDNISOLONE SODIUM SUCCINATE 1000 MG: 1 INJECTION INTRAMUSCULAR; INTRAVENOUS at 08:05

## 2025-05-19 RX ADMIN — CYANOCOBALAMIN TAB 1000 MCG 1000 MCG: 1000 TAB at 08:05

## 2025-05-19 RX ADMIN — CARBIDOPA AND LEVODOPA 2 TABLET: 25; 100 TABLET ORAL at 03:05

## 2025-05-19 RX ADMIN — NIFEDIPINE 60 MG: 30 TABLET, FILM COATED, EXTENDED RELEASE ORAL at 08:05

## 2025-05-19 RX ADMIN — PANTOPRAZOLE SODIUM 40 MG: 40 TABLET, DELAYED RELEASE ORAL at 08:05

## 2025-05-19 RX ADMIN — CARBIDOPA AND LEVODOPA 2 TABLET: 25; 100 TABLET ORAL at 08:05

## 2025-05-19 RX ADMIN — PANTOPRAZOLE SODIUM 80 MG: 40 INJECTION, POWDER, FOR SOLUTION INTRAVENOUS at 12:05

## 2025-05-19 RX ADMIN — POLYETHYLENE GLYCOL 3350 17 G: 17 POWDER, FOR SOLUTION ORAL at 08:05

## 2025-05-19 RX ADMIN — PANTOPRAZOLE SODIUM 40 MG: 40 INJECTION, POWDER, FOR SOLUTION INTRAVENOUS at 09:05

## 2025-05-19 RX ADMIN — CARBIDOPA AND LEVODOPA 2 TABLET: 25; 100 TABLET ORAL at 09:05

## 2025-05-19 RX ADMIN — THIAMINE HYDROCHLORIDE 400 MG: 100 INJECTION, SOLUTION INTRAMUSCULAR; INTRAVENOUS at 08:05

## 2025-05-19 NOTE — TELEPHONE ENCOUNTER
----- Message from Bibi sent at 5/16/2025  4:24 PM CDT -----  Contact: elpidio  Type:  call backWho Called:Ifeanyies the patient know what this is regarding?:wellness check, [facility] or a response via [facility]? [facility] Call Back Number:[phone] Information: n/a

## 2025-05-19 NOTE — ASSESSMENT & PLAN NOTE
Anemia is likely due to acute on chronic illness. Most recent hemoglobin and hematocrit are listed below.  Recent Labs     05/17/25  0414 05/18/25  0433 05/19/25  0906   HGB 11.8* 11.7* 12.5*   HCT 34.4* 33.6* 36.6*     Plan  - Monitor serial CBC: Daily  - Transfuse PRBC if patient becomes hemodynamically unstable, symptomatic or H/H drops below 7/21.  - Patient has not received any PRBC transfusions to date  - Patient's anemia is currently stable  -

## 2025-05-19 NOTE — SUBJECTIVE & OBJECTIVE
"  Subjective:     Interval History: See hospital course    Current Neurological Medications: See below    Current Medications[1]    Review of Systems   Unable to perform ROS: Mental status change     Objective:     Vital Signs (Most Recent):  Temp: 96.3 °F (35.7 °C) (05/19/25 1228)  Pulse: 84 (05/19/25 1228)  Resp: 14 (05/19/25 1228)  BP: (!) 131/99 (05/19/25 1228)  SpO2: 100 % (05/19/25 1228) Vital Signs (24h Range):  Temp:  [96.1 °F (35.6 °C)-97.5 °F (36.4 °C)] 96.3 °F (35.7 °C)  Pulse:  [] 84  Resp:  [12-20] 14  SpO2:  [97 %-100 %] 100 %  BP: (131-180)/(67-99) 131/99     Weight: 64.1 kg (141 lb 5 oz)  Body mass index is 19.71 kg/m².     Physical Exam  Vitals and nursing note reviewed.   Constitutional:       General: He is not in acute distress.     Comments:  Thin   HENT:      Head: Normocephalic and atraumatic.   Eyes:      Extraocular Movements: EOM normal.      Conjunctiva/sclera: Conjunctivae normal.   Pulmonary:      Effort: Pulmonary effort is normal. No respiratory distress.   Musculoskeletal:      Right lower leg: No edema.      Left lower leg: No edema.   Skin:     General: Skin is warm and dry.   Neurological:      Mental Status: He is alert.          NEUROLOGICAL EXAMINATION:     MENTAL STATUS   Oriented to person.   Disoriented to place. ("At [employer]")  Disoriented to time.   Level of consciousness: alert       Perseverating     CRANIAL NERVES     CN III, IV, VI   Extraocular motions are normal.     CN VII   Facial expression full, symmetric.     CN XI   CN XI normal.        - L ptosis  - PERRLA     MOTOR EXAM   Muscle bulk: increased  Overall muscle tone: increased    GAIT AND COORDINATION        Bradykinetic bilaterally    Low amplitude, moderate frequency tremor in both hands        Significant Labs: All pertinent lab results from the past 24 hours have been reviewed.    Significant Imaging: I have reviewed and interpreted all pertinent imaging results/findings within the past 24 hours.   "     [1]   Current Facility-Administered Medications   Medication Dose Route Frequency Provider Last Rate Last Admin    acetaminophen tablet 1,000 mg  1,000 mg Oral Q8H PRN Zuly Wheat PA-C   1,000 mg at 05/12/25 1554    acetaminophen tablet 650 mg  650 mg Oral Q4H PRN Zuly Wheat PA-C   650 mg at 05/04/25 1453    ALPRAZolam tablet 0.5 mg  0.5 mg Oral BID PRN Zuly Wheat PA-C   0.5 mg at 05/14/25 1626    carbidopa-levodopa  mg per tablet 2 tablet  2 tablet Oral TID Elian López MD   2 tablet at 05/19/25 1527    cyanocobalamin tablet 1,000 mcg  1,000 mcg Oral Daily Zina Tarango MD   1,000 mcg at 05/19/25 0846    dextrose 50% injection 12.5 g  12.5 g Intravenous PRN Zuly Wheat PA-C        dextrose 50% injection 25 g  25 g Intravenous PRN Zuly Wheat PA-C        glucagon (human recombinant) injection 1 mg  1 mg Intramuscular PRN Zuly Wheat PA-C        glucose chewable tablet 16 g  16 g Oral PRN Zuly Wheat PA-C        glucose chewable tablet 24 g  24 g Oral PRN Zuly Wheat PA-C        insulin aspart U-100 pen 0-5 Units  0-5 Units Subcutaneous QID (AC + HS) PRN Saadia Quiñones MD        LORazepam injection 2 mg  2 mg Intravenous On Call Procedure Jaden Carvajal DO        melatonin tablet 6 mg  6 mg Oral Nightly PRN Zuly Wheat PA-C        methylPREDNISolone sodium succinate (SOLU-MEDROL) 1,000 mg in 0.9% NaCl 100 mL IVPB  1,000 mg Intravenous Daily Saadia Quiñones MD   Stopped at 05/19/25 0915    mirtazapine tablet 15 mg  15 mg Oral QHS Saadia Quiñones MD        naloxone 0.4 mg/mL injection 0.02 mg  0.02 mg Intravenous PRN Zuly Wheat PA-C        NIFEdipine 24 hr tablet 60 mg  60 mg Oral Daily Saadia Quiñones MD   60 mg at 05/19/25 0846    ondansetron disintegrating tablet 8 mg  8 mg Oral Q8H PRN Zuly Wheat PA-C        pantoprazole injection 40 mg  40 mg Intravenous BID Saadia Quiñones MD        polyethylene glycol packet 17 g  17 g Oral BID  Zuly Wheat PA-C   17 g at 05/19/25 0846    prochlorperazine injection Soln 5 mg  5 mg Intravenous Q6H PRN Zuly Wheat PA-C        sodium chloride 0.9% flush 10 mL  10 mL Intravenous Q12H PRN Zuly Wheat PA-C        sodium chloride 0.9% flush 10 mL  10 mL Intravenous PRN Pedro Ash MD        [START ON 5/20/2025] thiamine tablet 100 mg  100 mg Oral Daily Saadia Quiñones MD

## 2025-05-19 NOTE — ASSESSMENT & PLAN NOTE
Hyponatremia is likely due to Dehydration/hypovolemia. The patient's most recent sodium results are listed below.  Recent Labs     05/17/25  0414 05/18/25  0433 05/19/25  0906   * 130* 137     Maxime  - Monitor sodium Daily.   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 Most recent hemoglobin and hematocrit are listed below.  Recent Labs     05/17/25  0414 05/18/25  0433 05/19/25  0906   HGB 11.8* 11.7* 12.5*   HCT 34.4* 33.6* 36.6*     Plan  - Monitor serial CBC: Daily  - Transfuse PRBC if patient becomes hemodynamically unstable, symptomatic or H/H drops below 7/21.  - Patient has not received any PRBC transfusions to date  - Patient's anemia is currently stable  -

## 2025-05-19 NOTE — ASSESSMENT & PLAN NOTE
"Per CT scan "thickening of the distal ileum to the surgical anastomosis with the large bowel. Inflammatory infectious ileitis are considerations. Recommend clinical correlation and follow-up. "  CRP negative. No concern for flare.   GI without concerns of confusion being caused by sterlara, confirmed with pharmacy   On 5/19 developed new onset dark stools  Has been on Lovenox, IV Solumedrol and PO PPI  Pantoprazole 80mg IV x 1 then PPI 40mg IV BID  GI consulted  Repeat H/H at 2pm  Will transfuse if Hgb is <7g/dl (<8g/dl in cases of active ACS) or if patient has rapid bleeding leading to hemodynamic instability    Recent Labs     05/17/25  0414 05/18/25  0433 05/19/25  0906   HGB 11.8* 11.7* 12.5*   HCT 34.4* 33.6* 36.6*    216 285   INR 1.0 1.0 1.0     "

## 2025-05-19 NOTE — PLAN OF CARE
Problem: Adult Inpatient Plan of Care  Goal: Plan of Care Review  Outcome: Progressing  Goal: Patient-Specific Goal (Individualized)  Outcome: Progressing  Goal: Absence of Hospital-Acquired Illness or Injury  Outcome: Progressing  Goal: Optimal Comfort and Wellbeing  Outcome: Progressing  Goal: Readiness for Transition of Care  Outcome: Progressing     Problem: Skin Injury Risk Increased  Goal: Skin Health and Integrity  Outcome: Progressing     Problem: Infection  Goal: Absence of Infection Signs and Symptoms  Outcome: Progressing     Problem: Delirium  Goal: Optimal Coping  Outcome: Progressing  Goal: Improved Behavioral Control  Outcome: Progressing  Goal: Improved Attention and Thought Clarity  Outcome: Progressing  Goal: Improved Sleep  Outcome: Progressing     Problem: Fall Injury Risk  Goal: Absence of Fall and Fall-Related Injury  Outcome: Progressing   Pt oriented to person. No PRNs given. Call light within reach, safety measures in place, rounded per facility policy.

## 2025-05-19 NOTE — SUBJECTIVE & OBJECTIVE
Interval History: No acute events overnight.  Seen at bedside this morning , oriented to person only.  Thinks he's in [place].  On day 3 of IV Solumedrol.  Later this morning, nursing reports dark bloody stools which is new.  He has Crohns.  He has been on IV Solumedrol and PO PPI, as well as Lovenox for DVT px.  Will start IV PPI and consult GI.  Repeat labs at 2pm.    Review of Systems   Constitutional:  Negative for chills, fatigue and fever.   Respiratory:  Positive for cough. Negative for shortness of breath.    Cardiovascular:  Negative for chest pain, palpitations and leg swelling.   Gastrointestinal:  Positive for blood in stool. Negative for abdominal pain, diarrhea, nausea and vomiting.   Genitourinary:  Negative for dysuria and urgency.   Neurological:  Negative for dizziness and headaches.   All other systems reviewed and are negative.    Objective:     Vital Signs (Most Recent):  Temp: 96.8 °F (36 °C) (05/19/25 0845)  Pulse: 95 (05/19/25 0845)  Resp: 12 (05/19/25 0845)  BP: (!) 140/82 (05/19/25 0845)  SpO2: 100 % (05/19/25 0845) Vital Signs (24h Range):  Temp:  [96.1 °F (35.6 °C)-97.5 °F (36.4 °C)] 96.8 °F (36 °C)  Pulse:  [] 95  Resp:  [12-20] 12  SpO2:  [97 %-100 %] 100 %  BP: (137-180)/(67-94) 140/82     Weight: 64.1 kg (141 lb 5 oz)  Body mass index is 19.71 kg/m².    Intake/Output Summary (Last 24 hours) at 5/19/2025 1039  Last data filed at 5/19/2025 0427  Gross per 24 hour   Intake 320 ml   Output 900 ml   Net -580 ml      Physical Exam  Constitutional:       Appearance: He is ill-appearing.   HENT:      Head: Normocephalic and atraumatic.   Cardiovascular:      Rate and Rhythm: Normal rate and regular rhythm.      Heart sounds: No murmur heard.  Pulmonary:      Effort: Pulmonary effort is normal. No respiratory distress.      Breath sounds: Normal breath sounds. No wheezing or rales.   Abdominal:      General: There is no distension.      Palpations: Abdomen is soft.      Tenderness: There  is no abdominal tenderness.   Musculoskeletal:         General: No deformity.   Skin:     General: Skin is warm and dry.      Coloration: Skin is pale.   Neurological:      General: No focal deficit present.      Comments: Able to have a conversation, oriented to person         MELD 3.0: 9 at 5/19/2025  9:06 AM  MELD-Na: 8 at 5/19/2025  9:06 AM  Calculated from:  Serum Creatinine: 1.2 mg/dL at 5/19/2025  9:06 AM  Serum Sodium: 137 mmol/L at 5/19/2025  9:06 AM  Total Bilirubin: 0.3 mg/dL (Using min of 1 mg/dL) at 5/19/2025  9:06 AM  Serum Albumin: 2.8 g/dL at 5/19/2025  9:06 AM  INR(ratio): 1 at 5/19/2025  9:06 AM  Age at listing (hypothetical): 79 years  Sex: Male at 5/19/2025  9:06 AM      Significant Labs:  CBC:  Recent Labs   Lab 05/18/25 0433 05/19/25  0906   WBC 4.40 13.03*   HGB 11.7* 12.5*   HCT 33.6* 36.6*    285     CMP:  Recent Labs   Lab 05/18/25  0433 05/19/25  0906   * 137   K 4.6 4.3   CL 97 101   CO2 26 25   * 114*   BUN 27* 36*   CREATININE 1.2 1.2   CALCIUM 10.0 10.3   PROT 8.4 8.4   ALBUMIN 2.7* 2.8*   BILITOT 0.4 0.3   ALKPHOS 81 76   AST 54* 42   ALT 34 23   ANIONGAP 7* 11     PTINR:  Recent Labs   Lab 05/18/25  0433 05/19/25  0906   INR 1.0 1.0

## 2025-05-19 NOTE — PLAN OF CARE
Pt not very talkative today and sleeping most of the time.  Pt's appetite has not improved with steriods.  Pt's blood sugar dropped to 69mg/dl prior to pm meal.  Pt is eating some and juice offered.  POC cont'd.  Problem: Adult Inpatient Plan of Care  Goal: Plan of Care Review  Outcome: Progressing  Goal: Patient-Specific Goal (Individualized)  Outcome: Progressing  Goal: Absence of Hospital-Acquired Illness or Injury  Outcome: Progressing  Goal: Optimal Comfort and Wellbeing  Outcome: Progressing  Goal: Readiness for Transition of Care  Outcome: Progressing     Problem: Skin Injury Risk Increased  Goal: Skin Health and Integrity  Outcome: Progressing     Problem: Infection  Goal: Absence of Infection Signs and Symptoms  Outcome: Progressing     Problem: Delirium  Goal: Optimal Coping  Outcome: Progressing  Goal: Improved Behavioral Control  Outcome: Progressing  Goal: Improved Attention and Thought Clarity  Outcome: Progressing  Goal: Improved Sleep  Outcome: Progressing     Problem: Fall Injury Risk  Goal: Absence of Fall and Fall-Related Injury  Outcome: Progressing

## 2025-05-19 NOTE — PT/OT/SLP PROGRESS
Physical Therapy Treatment    Patient Name:  Vince Huffman   MRN:  [id number]    Recommendations:     Discharge Recommendations: High Intensity Therapy  Discharge Equipment Recommendations: wheelchair, walker, rolling  Barriers to discharge: Decreased caregiver support    Assessment:     Vince Huffman is a 79 y.o. male admitted with a medical diagnosis of Acute encephalopathy.  He presents with the following impairments/functional limitations: weakness, impaired endurance, impaired self care skills, impaired functional mobility, gait instability, impaired balance, decreased coordination, decreased lower extremity function, decreased safety awareness, decreased upper extremity function, decreased ROM, impaired fine motor, impaired cardiopulmonary response to activity, impaired joint extensibility. Continued emphasis on bed mobility ,bed transfers and LE strengthening. Patient required max assist with transfers along with heavy verbal cueing to complete tasks.     Rehab Prognosis: Poor; patient would benefit from acute skilled PT services to address these deficits and reach maximum level of function.    Recent Surgery: Procedure(s) (LRB):  MRI (Magnetic Resonance Imagine) (N/A) 2 Days Post-Op    Plan:     During this hospitalization, patient to be seen 4 x/week to address the identified rehab impairments via gait training, therapeutic activities, therapeutic exercises, neuromuscular re-education and progress toward the following goals:    Plan of Care Expires:  06/02/25    Subjective     Chief Complaint: Patient reports no pain symptoms before, during, or after treatment session.   Patient/Family Comments/goals: N/A  Pain/Comfort:  Pain Rating 1: 0/10      Objective:     Communicated with patient's daughter prior to session.  Patient found HOB elevated with pulse ox (continuous), telemetry, SCD upon PT entry to room.     General Precautions: Standard, fall  Orthopedic Precautions: N/A  Braces: N/A  Respiratory Status:  Room air     Functional Mobility:  Bed Mobility:     Rolling Right: maximal assistance  Scooting: maximal assistance to EOB, scooting to HOB max assist x 2   Supine to Sit: maximal assistance  Sit to Supine: maximal assistance x 2   Transfers:     Sit to Stand:  maximal assistance x 2 trials with no AD      AM-PAC 6 CLICK MOBILITY  Turning over in bed (including adjusting bedclothes, sheets and blankets)?: 2  Sitting down on and standing up from a chair with arms (e.g., wheelchair, bedside commode, etc.): 2  Moving from lying on back to sitting on the side of the bed?: 2  Moving to and from a bed to a chair (including a wheelchair)?: 1  Need to walk in hospital room?: 1  Climbing 3-5 steps with a railing?: 1  Basic Mobility Total Score: 9       Treatment & Education:  Static sitting mod to min assist, max assist with dynamic sitting balance, Pt with R lateral, posterior lean  Forward leaning/Side bending mod assist   LAQ x10 each mod assist with verbal cueing         Patient left HOB elevated with all lines intact, call button in reach, and bed alarm on..    GOALS:   Multidisciplinary Problems       Physical Therapy Goals          Problem: Physical Therapy    Goal Priority Disciplines Outcome Interventions   Physical Therapy Goal     PT, PT/OT Progressing    Description: Goals to be met by: 2025     Patient will increase functional independence with mobility by performin. Supine to sit with MInimal Assistance  2. Sit to supine with MInimal Assistance  3. Sit to stand transfer with Minimal Assistance  4. Bed to chair transfer with Minimal Assistance using LRAD  5. Gait  x 25 feet with Minimal Assistance using LRAD.   6. Lower extremity exercise program x15 reps per handout, with assistance as needed                         DME Justifications:  Sr. Vince Huffman has a mobility limitation that significantly impairs his ability to participate in one or more mobility related activities of daily living  (MRADLs) such as toileting, feeding, dressing, grooming, and bathing in customary locations in the home.  The mobility limitation cannot be sufficiently resolved by the use of a cane or walker.   Patients upper body strength is not sufficient to propel a standard WC. The use of a lightweight wheelchair will significantly improve the patients ability to participate in MRADLS and the patient will use it on regular basis in the home.   He has a caregiver who is available, willing, and able to provide assistance with the wheelchair when needed.    Vince's mobility limitation cannot be sufficiently resolved by the use of a cane. His functional mobility deficit can be sufficiently resolved with the use of a Rolling Walker. Patient's mobility limitation significantly impairs their ability to participate in one of more activities of daily living.  The use of a RW will significantly improve the patient's ability to participate in MRADLS and the patient will use it on regular basis in the home.    Time Tracking:     PT Received On: 05/19/25  PT Start Time: 1020     PT Stop Time: 1050  PT Total Time (min): 30 min     Billable Minutes: Therapeutic Activity 20 and Therapeutic Exercise 10    Treatment Type: Treatment  PT/PTA: PTA     Number of PTA visits since last PT visit: 3     05/19/2025

## 2025-05-19 NOTE — ASSESSMENT & PLAN NOTE
79 year old male with history of Crohn's disease, SBO, hypothyroidism, and CAD  presenting from [facility] with encephalopathy. Unclear cause of encephalopathy at this time, though acute presentation with concurrent parkinsonism suggests autoimmune etiology. Motion artifact on MRI brain. On initial exam, was disoriented to place, time, and situation, minimally responsive and will answer with one word, asterixis in his upper extremities, axial rigidity/extremity/rigidity, masked facies, and intention tremor most notably in the left hand. No hyperreflexia. Ptosis of left eye though no extraocular movement abnormalities/pupillary discrepancy.     DDX: autoimmune Parkinsonism; PRES or PML (both possible with Stelara); SREAT +/-  superimposed catatonia or complex bereavement. 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 There have been reported cases of SREAT with normal fT4, so normal level does not exclude this diagnosis.     Repeat MRI brain unrevealing (noting T2 FLAIR changes are artifact given same changes on T1.)    LABS  Serum  Pending: [facility] movement disorders panel, Vit E, NFL  On Movement Disorders panel, not on autoimmune encephalopathy panel = CCPQ (formerly known as P/Q type VGCC,) GRAF1, Septin5, ITPR1, AP3B2, KLHL11  Dopamine-2 and [place]-3 not commercially available per lab  WNL: strongyloides IgG, Treponema, negative celiac dx serology, zinc, folate, B12, copper, B1, autoimmune encephalopathy panel, HIV, Vit D, Ma2 ab, heavy metals  Abnormal: elevated anti-thyroglobulin & anti-TPO, elevated NFL (392)    CSF  Pending: None  WNL: AFB, meningitis-encephalitis panel, VDRL, culture, glucose, LDH, cytology, autoimmune encephalopathy panel  Abnormal: protein (60), pleocytosis (after correction for  elevated RBC)    Recommendations:  - S/p 5-day course of IVIG (EOT 5/12/2025)  - Holding IVMP 1000 mg daily x 5 days due to concern for GIB (has received 3 doses thus far)  - Continue B12 repletion - 1000 mcg po daily  - Continue to empirically treat B1 deficiency  - Continue Sinemet 25 -100 mg 2 tablets TID  - F/u pending labs as above  - Appreciate Psychiatry assistance  - Delirium and fall precautions   - Concern for inadequate nutritional intake: would consider alternative routes of nutrition (PEG, etc.)    We will continue to follow. Please reach out with any questions.

## 2025-05-19 NOTE — PROGRESS NOTES
"[facility] - [facility]  Neurology  Progress Note    Patient Name: Vince Huffman  MRN: [id number]  Admission Date: 4/30/2025  Hospital Length of Stay: 18 days  Code Status: Full Code   Attending Provider: Saadia Quiñones MD  Primary Care Physician: Leland Porras MD   Principal Problem:Acute encephalopathy    HPI:   Vince Huffman is a 79 year old male with history of chron's disease, SBO, hypothyroidism, and CAD  presenting from [facility] with encephalopathy. He initially presented to [facility] for impaired mobility and difficulty with ADL's 2/2 generalized weakness. He was reportedly found confused today A&O x 1 (said the year is "1895") but is normally A&O x4. At this time he also expressed generalized abdominal pain. Recent ultrasounds of the lower extremity did not reveal DVT. MRI from last week was non diagnostic. UA unremarkable and CXR normal. CT abdomen and pelvis reveals wall thickening of the distal ileum consistent with possible infectious ileitis. Neurology consulted for further evaluation of encephalopathy.     Overview/Hospital Course:  05/12/2025 - IVIG Day 5/5. Exam worsened compared to yesterday (no longer speaking.)   05/13/2025 - Initial exam early this morning consistent with exam yesterday. However, when assessed by team closed to lunch time was talkative and saying multiple-word sentences (albeit still confused and perseverative.)   05/15/2025 - Patient continues having waxing-and-waning symptoms. Autoimmune encephalopathy serum and CSF panels unrevealing. Plan for repeat MRI with sedation as prior MRIs were motion degraded.   05/16/2025 - Sedated MRI delayed until tomorrow.   5/17/25: MRI done this morning, and is unrevealing though with presence of significant artifact; starting pulse-dose steroids  5/18/25: day 2 IVSM, exam stable, more interactive this morning, but within limits of prior fluctuations  05/19/2025 - Had bloody BM today, steroids on hold while GI " "weighs in.         Subjective:     Interval History: See hospital course    Current Neurological Medications: See below    Current Medications[1]    Review of Systems   Unable to perform ROS: Mental status change     Objective:     Vital Signs (Most Recent):  Temp: 96.3 °F (35.7 °C) (05/19/25 1228)  Pulse: 84 (05/19/25 1228)  Resp: 14 (05/19/25 1228)  BP: (!) 131/99 (05/19/25 1228)  SpO2: 100 % (05/19/25 1228) Vital Signs (24h Range):  Temp:  [96.1 °F (35.6 °C)-97.5 °F (36.4 °C)] 96.3 °F (35.7 °C)  Pulse:  [] 84  Resp:  [12-20] 14  SpO2:  [97 %-100 %] 100 %  BP: (131-180)/(67-99) 131/99     Weight: 64.1 kg (141 lb 5 oz)  Body mass index is 19.71 kg/m².     Physical Exam  Vitals and nursing note reviewed.   Constitutional:       General: He is not in acute distress.     Comments:  Thin   HENT:      Head: Normocephalic and atraumatic.   Eyes:      Extraocular Movements: EOM normal.      Conjunctiva/sclera: Conjunctivae normal.   Pulmonary:      Effort: Pulmonary effort is normal. No respiratory distress.   Musculoskeletal:      Right lower leg: No edema.      Left lower leg: No edema.   Skin:     General: Skin is warm and dry.   Neurological:      Mental Status: He is alert.          NEUROLOGICAL EXAMINATION:     MENTAL STATUS   Oriented to person.   Disoriented to place. ("At [employer]")  Disoriented to time.   Level of consciousness: alert       Perseverating     CRANIAL NERVES     CN III, IV, VI   Extraocular motions are normal.     CN VII   Facial expression full, symmetric.     CN XI   CN XI normal.        - L ptosis  - PERRLA     MOTOR EXAM   Muscle bulk: increased  Overall muscle tone: increased    GAIT AND COORDINATION        Bradykinetic bilaterally    Low amplitude, moderate frequency tremor in both hands        Significant Labs: All pertinent lab results from the past 24 hours have been reviewed.    Significant Imaging: I have reviewed and interpreted all pertinent imaging results/findings within the " past 24 hours.    Assessment and Plan:     * Acute encephalopathy  79 year old male with history of Crohn's disease, SBO, hypothyroidism, and CAD  presenting from [facility] with encephalopathy. Unclear cause of encephalopathy at this time, though acute presentation with concurrent parkinsonism suggests autoimmune etiology. Motion artifact on MRI brain. On initial exam, was disoriented to place, time, and situation, minimally responsive and will answer with one word, asterixis in his upper extremities, axial rigidity/extremity/rigidity, masked facies, and intention tremor most notably in the left hand. No hyperreflexia. Ptosis of left eye though no extraocular movement abnormalities/pupillary discrepancy.     DDX: autoimmune Parkinsonism; PRES or PML (both possible with Stelara); SREAT +/-  superimposed catatonia or complex bereavement. 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 There have been reported cases of SREAT with normal fT4, so normal level does not exclude this diagnosis.     Repeat MRI brain unrevealing (noting T2 FLAIR changes are artifact given same changes on T1.)    LABS  Serum  Pending: [facility] movement disorders panel, Vit E, [id number]  On Movement Disorders panel, not on autoimmune encephalopathy panel = CCPQ (formerly known as P/Q type VGCC,) GRAF1, Septin5, ITPR1, AP3B2, KLHL11  Dopamine-2 and [place]-3 not commercially available per lab  WNL: strongyloides IgG, Treponema, negative celiac dx serology, zinc, folate, B12, copper, B1, autoimmune encephalopathy panel, HIV, Vit D, Ma2 ab, heavy metals  Abnormal: elevated anti-thyroglobulin & anti-TPO, elevated NFL (392)    CSF  Pending: None  WNL: AFB, meningitis-encephalitis panel, VDRL, culture, glucose, LDH, cytology, autoimmune  encephalopathy panel  Abnormal: protein (60), pleocytosis (after correction for elevated RBC)    Recommendations:  - S/p 5-day course of IVIG (EOT 5/12/2025)  - Holding IVMP 1000 mg daily x 5 days due to concern for GIB (has received 3 doses thus far)  - Continue B12 repletion - 1000 mcg po daily  - Continue to empirically treat B1 deficiency  - Continue Sinemet 25 -100 mg 2 tablets TID  - F/u pending labs as above  - Appreciate Psychiatry assistance  - Delirium and fall precautions   - Concern for inadequate nutritional intake: would consider alternative routes of nutrition (PEG, etc.)    We will continue to follow. Please reach out with any questions.         VTE Risk Mitigation (From admission, onward)           Ordered     IP VTE HIGH RISK PATIENT  Once         05/01/25 0827     Place sequential compression device  Until discontinued         05/01/25 0827                    Abdirashid Collier MD  Neurology  [facility] - Acute Medical Stepdown         [1]   Current Facility-Administered Medications   Medication Dose Route Frequency Provider Last Rate Last Admin    acetaminophen tablet 1,000 mg  1,000 mg Oral Q8H PRN Zuly Wheat PA-C   1,000 mg at 05/12/25 1554    acetaminophen tablet 650 mg  650 mg Oral Q4H PRN Zuly Wheat PA-C   650 mg at 05/04/25 1453    ALPRAZolam tablet 0.5 mg  0.5 mg Oral BID PRN Zuly Wheat PA-C   0.5 mg at 05/14/25 1626    carbidopa-levodopa  mg per tablet 2 tablet  2 tablet Oral TID Elian López MD   2 tablet at 05/19/25 1527    cyanocobalamin tablet 1,000 mcg  1,000 mcg Oral Daily Zina Tarango MD   1,000 mcg at 05/19/25 0846    dextrose 50% injection 12.5 g  12.5 g Intravenous PRN Zuly Wheat PA-C        dextrose 50% injection 25 g  25 g Intravenous PRN Zuly Wheat PA-C        glucagon (human recombinant) injection 1 mg  1 mg Intramuscular PRN Zuly Wheat PA-C        glucose chewable tablet 16 g  16 g Oral PRN Zuly Wheat PA-C        glucose chewable  tablet 24 g  24 g Oral PRN Zuly Wheat PA-C        insulin aspart U-100 pen 0-5 Units  0-5 Units Subcutaneous QID (AC + HS) PRN Saadia Quiñones MD        LORazepam injection 2 mg  2 mg Intravenous On Call Procedure Jaden Carvajal DO        melatonin tablet 6 mg  6 mg Oral Nightly PRN Zuly Wheat PA-C        methylPREDNISolone sodium succinate (SOLU-MEDROL) 1,000 mg in 0.9% NaCl 100 mL IVPB  1,000 mg Intravenous Daily Saadia Quiñones MD   Stopped at 05/19/25 0915    mirtazapine tablet 15 mg  15 mg Oral QHS Saadia Quiñones MD        naloxone 0.4 mg/mL injection 0.02 mg  0.02 mg Intravenous PRN Zuly Wheat PA-C        NIFEdipine 24 hr tablet 60 mg  60 mg Oral Daily Saadia Quiñones MD   60 mg at 05/19/25 0846    ondansetron disintegrating tablet 8 mg  8 mg Oral Q8H PRN Zuly Wheat PA-C        pantoprazole injection 40 mg  40 mg Intravenous BID Saadia Quiñones MD        polyethylene glycol packet 17 g  17 g Oral BID Zuly Wheat PA-C   17 g at 05/19/25 0846    prochlorperazine injection Soln 5 mg  5 mg Intravenous Q6H PRN Zuly Wheat PA-C        sodium chloride 0.9% flush 10 mL  10 mL Intravenous Q12H PRN Zuly Wheat PA-C        sodium chloride 0.9% flush 10 mL  10 mL Intravenous PRN Pedro Ash MD        [START ON 5/20/2025] thiamine tablet 100 mg  100 mg Oral Daily Saadia Quiñones MD

## 2025-05-19 NOTE — PROGRESS NOTES
[facility]  Progress Note    Patient Name: Vince Huffman  MRN: [id number]  Patient Class: IP- Inpatient   Admission Date: 4/30/2025  Length of Stay: 18 days  Attending Physician: Saadia Quiñones MD  Primary Care Provider: Leland Porras MD        Subjective     Principal Problem:Acute encephalopathy        HPI:  By Dr. Juan F Ramírez MD    80 yo M w/HTN, hypothyroid, crohn's disease, CAD, hx of SBO, presenting with AMS from [facility]. Patient was sent in from [facility] for progressively worsening cognitive function. Patient is A&O x1 at his baseline. Patient and his daughter endorse mild confusion. Which has been progressive.    Patient has not had any recent falls. Endorsed mild suprapubic abdominal pain. No UTI. Mild inflammation on CT, not unexpected in the setting of Crohn's.     Overview/Hospital Course:  Mr. Huffman was admitted to  for further evaluation of worsening AMS per rehab facility. AAOx1 on admission, normally AAOx4 prior to 4/20 per son. UA negative. CXR clear. CT abd/pelvis with wall thickening of distal ileum, inflammatory infectious ileitis are considerations, fecal distention of the rectum, impaction not excluded. Discussed with GI, anticipated these are chronic findings. CRP negative. Will give enema and monitor for improvement. Patient with successful BM. Neurology consulted: MRI brain ordered (no acute findings, motion artifact), extended EEG, ammonia levels. Worsening mental status on 5/2, medicine team consulted for LP which was unsuccessful. More alert on 5/3 and answering yes/no questions when redirected. Neurology recommending carbidopa-levodopa trial, EEG, and attempting another LP.  Symptoms improved with increasing doses of Carbidopa/Levodopa. LP concerning for meningitis so he was started on Empiric treatment on 5/6/25. Discussion held with both Neurology and Infectious Disease, Infectious Disease does not believe patient  has any infectious etiology and as such recommended discontinuing antimicrobials.  Neurology started IVIG for presumed autoimmune process.  Completed 5 day course of IVIG 5/14. Minimal improvement in cognition. Had bouts of poor oral intake, only drank boost.  Had intermittent hyponatremia responding to IV fluids.  On 05/14 am Sodium 124.  Nephrology was consulted, investigations ordered and sodium corrected with close electrolyte monitoring.  Psych was consulted by the request of the neurologist. Hyponatremia improved after stopping fluids. NG tube attempted thrice at bedside however not able to be placed. Able to tolerate oral intake however very limited intake. Neuro recommended repeat MRI with sedation as previous had artifact. Anesthesia notified to arrange with sedation which was done on 5/17.  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 Endocrine consulted. Neurology planning 5 day course of IV Solumedrol starting 5/17.   He was started on Vitamin B12 and Thiamine replacement.    Interval History: No acute events overnight.  Seen at bedside this morning , oriented to person only.  Thinks he's in [place].  On day 3 of IV Solumedrol.  Later this morning, nursing reports dark bloody stools which is new.  He has Crohns.  He has been on IV Solumedrol and PO PPI, as well as Lovenox for DVT px.  Will start IV PPI and consult GI.  Repeat labs at 2pm.    Review of Systems   Constitutional:  Negative for chills, fatigue and fever.   Respiratory:  Positive for cough. Negative for shortness of breath.    Cardiovascular:  Negative for chest pain, palpitations and leg swelling.   Gastrointestinal:  Positive for blood in stool. Negative for abdominal pain, diarrhea, nausea and vomiting.   Genitourinary:  Negative for dysuria and urgency.   Neurological:  Negative for dizziness and headaches.   All other systems reviewed and are negative.    Objective:     Vital  Signs (Most Recent):  Temp: 96.8 °F (36 °C) (05/19/25 0845)  Pulse: 95 (05/19/25 0845)  Resp: 12 (05/19/25 0845)  BP: (!) 140/82 (05/19/25 0845)  SpO2: 100 % (05/19/25 0845) Vital Signs (24h Range):  Temp:  [96.1 °F (35.6 °C)-97.5 °F (36.4 °C)] 96.8 °F (36 °C)  Pulse:  [] 95  Resp:  [12-20] 12  SpO2:  [97 %-100 %] 100 %  BP: (137-180)/(67-94) 140/82     Weight: 64.1 kg (141 lb 5 oz)  Body mass index is 19.71 kg/m².    Intake/Output Summary (Last 24 hours) at 5/19/2025 1039  Last data filed at 5/19/2025 0427  Gross per 24 hour   Intake 320 ml   Output 900 ml   Net -580 ml      Physical Exam  Constitutional:       Appearance: He is ill-appearing.   HENT:      Head: Normocephalic and atraumatic.   Cardiovascular:      Rate and Rhythm: Normal rate and regular rhythm.      Heart sounds: No murmur heard.  Pulmonary:      Effort: Pulmonary effort is normal. No respiratory distress.      Breath sounds: Normal breath sounds. No wheezing or rales.   Abdominal:      General: There is no distension.      Palpations: Abdomen is soft.      Tenderness: There is no abdominal tenderness.   Musculoskeletal:         General: No deformity.   Skin:     General: Skin is warm and dry.      Coloration: Skin is pale.   Neurological:      General: No focal deficit present.      Comments: Able to have a conversation, oriented to person         MELD 3.0: 9 at 5/19/2025  9:06 AM  MELD-Na: 8 at 5/19/2025  9:06 AM  Calculated from:  Serum Creatinine: 1.2 mg/dL at 5/19/2025  9:06 AM  Serum Sodium: 137 mmol/L at 5/19/2025  9:06 AM  Total Bilirubin: 0.3 mg/dL (Using min of 1 mg/dL) at 5/19/2025  9:06 AM  Serum Albumin: 2.8 g/dL at 5/19/2025  9:06 AM  INR(ratio): 1 at 5/19/2025  9:06 AM  Age at listing (hypothetical): 79 years  Sex: Male at 5/19/2025  9:06 AM      Significant Labs:  CBC:  Recent Labs   Lab 05/18/25 0433 05/19/25  0906   WBC 4.40 13.03*   HGB 11.7* 12.5*   HCT 33.6* 36.6*    285     CMP:  Recent Labs   Lab 05/18/25 0433  "05/19/25  0906   * 137   K 4.6 4.3   CL 97 101   CO2 26 25   * 114*   BUN 27* 36*   CREATININE 1.2 1.2   CALCIUM 10.0 10.3   PROT 8.4 8.4   ALBUMIN 2.7* 2.8*   BILITOT 0.4 0.3   ALKPHOS 81 76   AST 54* 42   ALT 34 23   ANIONGAP 7* 11     PTINR:  Recent Labs   Lab 05/18/25  0433 05/19/25  0906   INR 1.0 1.0         Assessment & Plan  Acute encephalopathy  AMS (altered mental status)  Progressive acutely worsening confusion/tremors since 4/20. Previously AAOx4, now AAOx1  Neurology consulted: MRI brain ordered (no acute findings, motion artifact), extended EEG, ammonia levels.   Worsening mental status on 5/2, medicine team consulted for LP which was unsuccessful.   Neurology recommending carbidopa-levodopa trial, EEG, and attempting another LP.    Symptoms improved with increasing doses of Carbidopa/Levodopa.   LP concerning for meningitis so he was started on Empiric treatment on 5/6/25, viral panel negative  Discussion held with both Neurology and Infectious Disease, Infectious Disease does not believe patient has any infectious etiology and as such recommended discontinuing antimicrobials.    Neurology started IVIG for presumed autoimmune process.  Completed 5 day course of IVIG 5/14. Minimal improvement in cognition.   Neuro recommended repeat MRI with sedation as previous had artifact. Anesthesia notified to arrange with sedation which was done on 5/17.    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 Endocrine consulted.   Neurology planning 5 day course of IV Solumedrol starting 5/17 - Day 2  Continue Vitamin B12 and Thiamine replacement.  Crohn's disease  GIB (gastrointestinal bleeding)  Per CT scan "thickening of the distal ileum to the surgical anastomosis with the large bowel. Inflammatory infectious ileitis are considerations. Recommend clinical correlation and follow-up. "  CRP negative. No concern for flare.   GI without concerns of " confusion being caused by malathilara, confirmed with pharmacy   On 5/19 developed new onset dark stools  Has been on Lovenox, IV Solumedrol and PO PPI  Pantoprazole 80mg IV x 1 then PPI 40mg IV BID  GI consulted  Repeat H/H at 2pm  Will transfuse if Hgb is <7g/dl (<8g/dl in cases of active ACS) or if patient has rapid bleeding leading to hemodynamic instability    Recent Labs     05/17/25 0414 05/18/25 0433 05/19/25  0906   HGB 11.8* 11.7* 12.5*   HCT 34.4* 33.6* 36.6*    216 285   INR 1.0 1.0 1.0     Anemia  Anemia is likely due to acute on chronic illness. Most recent hemoglobin and hematocrit are listed below.  Recent Labs     05/17/25 0414 05/18/25 0433 05/19/25  0906   HGB 11.8* 11.7* 12.5*   HCT 34.4* 33.6* 36.6*     Plan  - Monitor serial CBC: Daily  - Transfuse PRBC if patient becomes hemodynamically unstable, symptomatic or H/H drops below 7/21.  - Patient has not received any PRBC transfusions to date  - Patient's anemia is currently stable  -  Generalized weakness  Sent from SNF  Progressive worsening weakness per son   PT/OT ordered. Will need placement at discharge  Unintended weight loss  Reported per son  Recently started gluten free diet per recommendations from GI  Temporal wasting noted  Body mass index is 19.71 kg/m².  Nutrition following  Boost changed to Boost Breeze given lactose intolerance  Hyponatremia  Hyponatremia is likely due to Dehydration/hypovolemia. The patient's most recent sodium results are listed below.  Recent Labs     05/17/25 0414 05/18/25 0433 05/19/25  0906   * 130* 137     Maxime  - Monitor sodium Daily.   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 Most recent hemoglobin and hematocrit are listed below.  Recent Labs     05/17/25 0414 05/18/25 0433 05/19/25  0906   HGB 11.8* 11.7* 12.5*   HCT 34.4* 33.6* 36.6*     Plan  - Monitor serial CBC: Daily  - Transfuse PRBC if  patient becomes hemodynamically unstable, symptomatic or H/H drops below 7/21.  - Patient has not received any PRBC transfusions to date  - Patient's anemia is currently stable  -  Essential hypertension  Patient's blood pressure range in the last 24 hours was: BP  Min: 137/67  Max: 180/90.The patient's inpatient anti-hypertensive regimen is listed below:  Current Antihypertensives  NIFEdipine 24 hr tablet 60 mg, Daily, Oral    Plan  - BP is controlled, no changes needed to their regimen  Changed from Norvasc to Procardia given Psych recs    Parkinsonism  Suspect symptoms are at least partially due to Parkinson's disease.   Continue Sinemet TID  CAD (coronary artery disease)  History noted.   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 Will hold anti-depressant medications. We will consult psychiatry at this time. Patient does not display psychosis at this time. Continue to monitor closely and adjust plan of care as needed.  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

## 2025-05-19 NOTE — CONSULTS
[facility]  Gastroenterology Inflammatory Bowel Disease Inpatient Service   Consult Note    Patient Name: Vince Huffman  MRN: [id number]  Admission Date: 4/30/2025  Hospital Length of Stay: 18 days  Code Status: Full Code   Attending Provider: Saadia Quiñones MD   Consulting Provider: Ines Bella MD  Primary Care Physician: Leland Porras MD  Principal Problem:Acute encephalopathy  Inpatient consult to Gastroenterology  Consult performed by: Ines Bella MD  Consult ordered by: Saadia Quiñones MD        Subjective:     HPI: Vince Huffman is a 79 y.o. male with PMH Crohn's disease involving ileum and colon, prior SBO s/p SB resection 2007, ileocolonic resection 2013, HTN, ankylosing spondylitis, psoriatic arthritis, who presented to [facility] for worsening weakness, memory deficit, unsteady gait, slow speech, decreased appetite, weight loss, for several weeks. He has had an extensive neurologic workup and is currently being treated for encephalopathy of unclear etiology s/p 5 day course IVIG and currently on day 3 of IV solumedrol. GI consulted on hospital day 19 for new concern for GI bleed with reported dark stool by nursing staff this AM.     His Crohn's disease is being treated by GI in [place] with Stelara q8 weeks which was started in December for continued active inflammation while previously on Humira. First dose stelara was 12/19/24, most recent dose was April 10, 2025. He would be due for next dose of Stelara June 5th. Repeat egd/colonoscopy Feb 2025 showed anastomotic stricture with inflammation and inflammatory polyp. He had normal stool calprotectin in April (38), as well as negative C diff toxin at that time. Per his primary GI notes, he was able to wean off Imodium and no longer needed antibiotics. He had no reported bleeding at that visit either. He is obtunded and unable to respond to any question I ask him but does open his eyes and look at me when I  speak to him. He is not expressing any pain whatsoever on abdominal exam either.         IBD History:   Diagnosed in 2007   Has since had small bowel resection (30 cm resected) in 2009  Has had ileocolonic resection in 2013  Previously tried pentasa, then humira q2 weeks which was transitioned to stelara q8 weeks in 12/2024 due to active disease  Has been treated with prednisone and flagyl in the past as well   Has been symptomatically improved on stelara but then reported developing a gluten intolerance (diarrhea after eating gluten) in march of 2025 and has been gluten free since  Weight loss in 2025, starting around feb-march     Prior pertinent Surgeries:   SB resection 2007 30 cm removed  Ileocolonic resection 2013, 8 cm SB removed, right hemicolectomy    Prior pertinent Endoscopy:  EGD/Colonoscopy 2/11/2025: Small HH, DBx non-sp ditis, GBx wnl, anas Bx (stenosis/ulcer, prox ?polyps) acute non-sp colitis w/crypt abscess, CBx nl. Ileocolonic stricture/ulcer. Right colon ileocolonic anastomosis w/associated inflammatory appearing polyps proximal to the anastomosis, unable to traverse anastomosis with pediatric colonoscope. Sigmoid diverticulosis. On Stelara since 12/19/2024.      Colonoscopy 5/5/2021: Ileocolonic anastomosis. TI ulceration. TIBx chronic active colitis w/ulcer and associated granulation tissue/fibrinopurulent exudate. HSV-1/HSV-2/CMV neg. CBx benign w/intramucosal agg. 1 hyp. Repeat short Flagyl course. Repeat colon in 2 yr.   Colonoscopy  5/15/2018: Ileocolonic anastomosis Bx chronic active colitis w/ulceration. ACBx chr inactive colitis, TCBx focal active colitis. LCBx focal active colitis, Rectum Bx nl.  EGD 7/28/2016: Small sliding HH. DBx focal peptic-type duodenopathy in otherwise unremarkable mucosa. Small foci of gastric foveolar metaplasia. No evidence of celiac sprue.     Prior IBD Therapies:  Prednisone  Flagyl  Pentasa   Humira q 2 wk     Past Medical History[1]  Past Surgical  "History[2]  family history is not on file.  Social History[3]  Medications Ordered Prior to Encounter[4]  Scheduled Meds:   carbidopa-levodopa  mg  2 tablet Oral TID    cyanocobalamin  1,000 mcg Oral Daily    mirtazapine  15 mg Oral QHS    NIFEdipine  60 mg Oral Daily    pantoprazole  40 mg Intravenous BID    polyethylene glycol  17 g Oral BID    [START ON 5/20/2025] thiamine  100 mg Oral Daily     Continuous Infusions:  PRN Meds:.  Current Facility-Administered Medications:     acetaminophen, 1,000 mg, Oral, Q8H PRN    acetaminophen, 650 mg, Oral, Q4H PRN    ALPRAZolam, 0.5 mg, Oral, BID PRN    dextrose 50%, 12.5 g, Intravenous, PRN    dextrose 50%, 25 g, Intravenous, PRN    glucagon (human recombinant), 1 mg, Intramuscular, PRN    glucose, 16 g, Oral, PRN    glucose, 24 g, Oral, PRN    insulin aspart U-100, 0-5 Units, Subcutaneous, QID (AC + HS) PRN    lorazepam, 2 mg, Intravenous, On Call Procedure    melatonin, 6 mg, Oral, Nightly PRN    naloxone, 0.02 mg, Intravenous, PRN    ondansetron, 8 mg, Oral, Q8H PRN    prochlorperazine, 5 mg, Intravenous, Q6H PRN    sodium chloride 0.9%, 10 mL, Intravenous, Q12H PRN    sodium chloride 0.9%, 10 mL, Intravenous, PRN  Allergies reviewed    Review of Systems: see pertinent review of systems above    Objective:   Physical Examination  /77 (BP Location: Right arm, Patient Position: Lying)   Pulse 92   Temp 96.6 °F (35.9 °C) (Axillary)   Resp 16   Ht 5' 11" (1.803 m)   Wt 64.1 kg (141 lb 5 oz)   SpO2 100%   BMI 19.71 kg/m²    Constitutional: ill appearing but non toxic ; no verbal attempt to respond to me   Eye: clear, no scleral icterus    CV: RRR  Respiratory: normal effort, no distress   Gastrointestinal: soft, nondistended, difficult to assess tenderness    Labs:  Lab Results   Component Value Date    CRP 5.4 04/30/2025     Lab Results   Component Value Date    HEPBSAG NONREACTIVE 12/04/2024   , No results found for: "TBGOLDPLUS"  Lab Results   Component " Value Date    AUGCILAM22OE 31 05/16/2025    ETQCAMTE77 456 05/03/2025     Lab Results   Component Value Date    WBC 13.03 (H) 05/19/2025    HGB 12.5 (L) 05/19/2025    HCT 36.6 (L) 05/19/2025    MCV 92 05/19/2025     05/19/2025   ,   Lab Results   Component Value Date    CREATININE 1.2 05/19/2025    ALBUMIN 2.8 (L) 05/19/2025    BILITOT 0.3 05/19/2025    ALKPHOS 76 05/19/2025    AST 42 05/19/2025    ALT 23 05/19/2025     Current Imaging:  CT A/P with IV contrast  4/30   Impression:  1. Wall thickening of the distal ileum to the surgical anastomosis with the large bowel.  Inflammatory infectious ileitis are considerations.  Recommend clinical correlation and follow-up.  2. Constipation.  There is fecal distention of the rectum.  No fecal impaction is not excluded.  3. Bilateral renal cysts and hepatic cysts.  4. Nonobstructing nephrolithiasis of the right kidney.  5. Nondistention of the urinary bladder.  Mild wall thickening is not excluded.  6. Diverticulosis of the sigmoid colon.      Assessment/Plan:   Vince Huffman is a 79 y.o. male with     Impression:      Problem List:  Crohn's disease, ileocolonic, stricturing   History of ileocolectomy in 2013  History of small bowel resection in 2009  History of colon polyps   Ankylosing spondylitis  Psoriatic arthritis  Gluten sensitivity (?)  Long term immunosuppression    Recommendations:  - start dvt prophylaxis  - once patient improves and is able to swallow, obtain CT enterography or MRI enterography to further evaluate his crohn's disease   - patient will need restaging of his crohn's disease given that he's been on stelara for approximately 20 weeks to assess its effect on his disease  - daily cbc and continue to monitor for signs of bleeding, transfuse to maintain hb >7      - Avoid NSAIDs given risk of IBD exacerbation  - DVT prophylaxis- IBD pts at 3-4 fold higher likelihood of DVT, DVT prophylaxis- none  - Narcotics increase risk of infection along with  morbidity/mortality in IBD patients, tylenol preferred and if pain not controlled with tylenol then short-acting morphine preferred    Thank you for involving us in the care of Vince Huffman. Please call with any additional questions, concerns or changes in the patient's clinical status.     Ines Pitts  Gastroenterology Fellow PGY   [facility]         [1]   Past Medical History:  Diagnosis Date    Ankylosing spondylitis of unspecified sites in spine     Anxiety disorder, unspecified     BMI 21.0-21.9, adult 04/14/2025    Crohn's disease     Gout, unspecified     Heart disease     History of skin cancer 2001    squamous cell carcinoma left cheek    Hypertension     Hypothyroidism, unspecified     Psoriatic arthritis    [2]   Past Surgical History:  Procedure Laterality Date    ANGIOGRAM, CORONARY, WITH LEFT HEART CATHETERIZATION      APPENDECTOMY      APPENDECTOMY  2007    COLONOSCOPY N/A     ENDOSCOPY N/A     MAGNETIC RESONANCE IMAGING N/A 5/17/2025    Procedure: MRI (Magnetic Resonance Imagine);  Surgeon: Mervat Christianson;  Location: [facility];  Service: Anesthesiology;  Laterality: N/A;    RIGHT HEMICOLECTOMY  2013    SMALL INTESTINE SURGERY  2007    30.5 cm of small bowel removed   [3]   Social History  Tobacco Use    Smoking status: Never    Smokeless tobacco: Never   Substance Use Topics    Alcohol use: No    Drug use: No   [4]   No current facility-administered medications on file prior to encounter.     Current Outpatient Medications on File Prior to Encounter   Medication Sig Dispense Refill    acetaminophen (TYLENOL) 500 MG tablet Take 500 mg by mouth every 6 (six) hours as needed.      ALPRAZolam (XANAX XR) 0.5 MG Tb24 Take 0.5 mg by mouth once daily.      amLODIPine (NORVASC) 10 MG tablet Take 1 tablet (10 mg total) by mouth once daily.      ustekinumab (STELARA) 90 mg/mL Syrg syringe Inject 1 mL (90 mg total) into the skin every 8 weeks. 2 mL 2

## 2025-05-19 NOTE — PLAN OF CARE
"[facility] - Acute Medical Stepdown  Discharge Reassessment    Primary Care Provider: Leland Porras MD    Expected Discharge Date: 5/23/2025    Reassessment (most recent)       Discharge Reassessment - 05/19/25 1130          Discharge Reassessment    Assessment Type Discharge Planning Reassessment (P)      Did the patient's condition or plan change since previous assessment? No (P)      Discharge Plan discussed with: Adult children (P)      Name(s) and Number(s) Radha Lanier [phone] (P)      Communicated ERNIE with patient/caregiver Date not available/Unable to determine (P)      Discharge Plan A Rehab (P)      Discharge Plan B Skilled Nursing Facility (P)      DME Needed Upon Discharge  other (see comments) (P)    TBD.    Transition of Care Barriers None (P)      Why the patient remains in the hospital Requires continued medical care (P)         Post-Acute Status    Post-Acute Authorization Placement (P)      Post-Acute Placement Status Set-up Complete/Auth obtained (P)    [facility]    Coverage Medicare (P)                    MD speaking with daughterRadha at bedside. Other family visiting at bedside as well. Discharge plan is for [facility]. Per nurse, pt is "too fidgety." Neuro & Nephrology consulted. Blood in stool due to Crohn's. Day 3 of 5 for steroids. Barrier to [facility] is IV Solymexrol per Dr. Quiñones. Radha leaving later this afternoon to travel out of town.     Discharge Plan A and Plan B have been determined by review of patient's clinical status, future medical and therapeutic needs, and coverage/benefits for post-acute care in coordination with multidisciplinary team members.     Marissa Castillo LMSW     "

## 2025-05-19 NOTE — PROGRESS NOTES
CONSULTATION LIAISON PSYCHIATRY PROGRESS NOTE    Patient Name: Vince Huffman  MRN: [id number]  Patient Class: IP- Inpatient  Admission Date: 4/30/2025  Attending Physician: Saadia Quiñones MD      SUBJECTIVE:   Vince Huffman is a 79 y.o. male with no past psychiatric history & past pertinent medical history of Chrohn's disease, SBO, Hypothyroidism, and CAD presents to the ED/admitted to the hospital for generalized weakness and altered mental status.     Psychiatry consulted for concern for complex bereavement vs. catatonia     Interval History:   NAEON. No PRNs required for agitation or anxiety.   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 Appearing generally inattentive during conversation. States that he had eggs for breakfast (incorrect per daughter at bedside).     Patient later seen on attending rounds. Noted to be more talkative. Only oriented to self. Mild cogwheel rigidity noted in the wrist.     Collateral  Spoke with daughter and son at bedside  - they note that he was previously more talkative prior to his PT session  - still concerned that patient has not been eating and discussing with primary team about possible PEG tube.   - notes that there may be concern for patient volitionally pursing his lips.   - note agitation, restlessness over the weekend.          OBJECTIVE    Vital Signs:  Temp:  [96.1 °F (35.6 °C)-97.5 °F (36.4 °C)]   Pulse:  []   Resp:  [12-20]   BP: (137-180)/(67-94)   SpO2:  [97 %-100 %]     Mental Status Exam:  General Appearance: appears stated age, dressed in hospital garb, lying in bed, in no acute distress  Behavior: cooperated   Involuntary Movements and Motor Activity: grossly normal, no abnormal movements noted  Gait and Station: unable to assess - patient lying down or seated  Speech and  "Language: minimal but clear responses  Mood: "fine"  Affect: congruent, constricted, subdued  Thought Process and Associations: loose scattering, poverty of thought process demonstrated   Perceptual Disturbances: no objective RIS  Thought Content and Perceptions:: no reported suicidal ideation, no homicidal ideation, no hallucinations per collateral  Sensorium and Orientation: person only   Recent and Remote Memory: impaired recent - incorrectly identified breakfast  Attention and Concentration: impaired - unable to attend to conversation  Fund of Knowledge: unwilling/unable to participate meaningfully to assess  Insight: limited/partial awareness of illness  Judgment: compliant with health provider's recommendations and instructions    CAM ICU positive? unable to assess      ASSESSMENT & RECOMMENDATIONS   Vince Huffman is a 79 year old male with no past psychiatric history & past pertinent medical history of Chrohn's disease, SBO, Hypothyroidism, and CAD who presented to the ED/admitted to the hospital on 4/30/25 for generalized weakness and altered mental status. After gathering collateral, it appears that pt has had a gradual decline in his mental status over the past several months, becoming more withdrawal and anhedonic over time. There is low concern for catatonia at this time. The immobility and mutism seem to have a volitional component to them, and appear to be more in line with psychomotor retardation, potentially from depression given the history that his wife passed away in February 2024 and preceded his decline. Recommend trial of antidepressant, specifically Remeron, to address his poor intake and substantial weight loss. Would defer benzodiazepine trial at this time for fear that it may make his mental status worse.       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):  None         Delirium Behavior Management  PLEASE utilize PRN meds first for agitation. Minimize use of PHYSICAL restraints OR have periods of being out of physical restraints if possible.  Keep window shades open and room lit during day and room dim at night in order to promote normal sleep-wake cycles  Encourage family at bedside. Dawson patient often to situation, location, date.  Continue to Limit or Discontinue use of Narcotics, Benzos and Anti-cholinergic medications as they may worsen delirium.  Continue medical workup for causative etiology of Delirium.      Risk Assessment / Legal Status  Patient does not meet criteria for PEC or involuntary inpatient psychiatric admission at this time. Recommend to rescind PEC if one was placed. Patient is not currently an imminent danger to self or others and is not gravely disabled due to a psychiatric illness.     Follow-up:  Will follow-up while in house.  Will begin to chart check periodically     Disposition:   Defer to primary team.    Please contact ON CALL psychiatry service (24/7) for any acute issues that may arise.    Ruddy Alejandra MD  [facility] PGY-2          --------------------------------------------------------------------------------------------------------------------------------------------------------------------------------------------------------------------------------------    CONTINUED OBJECTIVE clinical data &  findings reviewed and noted for above decision making    Current Medications:   Scheduled Meds:    carbidopa-levodopa  mg  2 tablet Oral TID    cyanocobalamin  1,000 mcg Oral Daily    methylPREDNISolone injection (PEDS and ADULTS)  1,000 mg Intravenous Daily    mirtazapine  7.5 mg Oral QHS    NIFEdipine  60 mg Oral Daily    pantoprazole  40 mg Intravenous BID    pantoprazole  80 mg Intravenous Once    polyethylene glycol  17 g Oral BID    [START ON 5/20/2025] thiamine  100 mg Oral Daily     PRN Meds:   Current Facility-Administered Medications:     acetaminophen, 1,000 mg, Oral, Q8H PRN    acetaminophen, 650 mg, Oral, Q4H PRN    ALPRAZolam, 0.5 mg, Oral, BID PRN    dextrose 50%, 12.5 g, Intravenous, PRN    dextrose 50%, 25 g, Intravenous, PRN    glucagon (human recombinant), 1 mg, Intramuscular, PRN    glucose, 16 g, Oral, PRN    glucose, 24 g, Oral, PRN    insulin aspart U-100, 0-5 Units, Subcutaneous, QID (AC + HS) PRN    lorazepam, 2 mg, Intravenous, On Call Procedure    melatonin, 6 mg, Oral, Nightly PRN    naloxone, 0.02 mg, Intravenous, PRN    ondansetron, 8 mg, Oral, Q8H PRN    prochlorperazine, 5 mg, Intravenous, Q6H PRN    sodium chloride 0.9%, 10 mL, Intravenous, Q12H PRN    sodium chloride 0.9%, 10 mL, Intravenous, PRN    Allergies:   Review of patient's allergies indicates:   Allergen Reactions    Gluten Diarrhea    Lactose        Vitals  Vitals:    05/19/25 0845   BP: (!) 140/82   Pulse: 95   Resp: 12   Temp: 96.8 °F (36 °C)       Labs/Imaging/Studies:  Recent Results (from the past 24 hours)   POCT glucose    Collection Time: 05/18/25  3:50 PM   Result Value Ref Range    POCT Glucose 180 (H) 70 - 110 mg/dL   POCT glucose    Collection Time: 05/18/25 10:28 PM   Result Value Ref Range    POCT Glucose 106 70 - 110 mg/dL   POCT glucose    Collection Time: 05/19/25  8:46 AM   Result Value Ref Range    POCT Glucose 127 (H) 70 - 110 mg/dL   Magnesium    Collection Time: 05/19/25  9:06 AM   Result Value  Ref Range    Magnesium  1.9 1.6 - 2.6 mg/dL   Phosphorus    Collection Time: 05/19/25  9:06 AM   Result Value Ref Range    Phosphorus Level 3.9 2.7 - 4.5 mg/dL   Comprehensive Metabolic Panel    Collection Time: 05/19/25  9:06 AM   Result Value Ref Range    Sodium 137 136 - 145 mmol/L    Potassium 4.3 3.5 - 5.1 mmol/L    Chloride 101 95 - 110 mmol/L    CO2 25 23 - 29 mmol/L    Glucose 114 (H) 70 - 110 mg/dL    BUN 36 (H) 8 - 23 mg/dL    Creatinine 1.2 0.5 - 1.4 mg/dL    Calcium 10.3 8.7 - 10.5 mg/dL    Protein Total 8.4 6.0 - 8.4 gm/dL    Albumin 2.8 (L) 3.5 - 5.2 g/dL    Bilirubin Total 0.3 0.1 - 1.0 mg/dL    ALP 76 40 - 150 unit/L    AST 42 11 - 45 unit/L    ALT 23 10 - 44 unit/L    Anion Gap 11 8 - 16 mmol/L    eGFR >60 >60 mL/min/1.73/m2   Protime-INR    Collection Time: 05/19/25  9:06 AM   Result Value Ref Range    PT 10.9 9.0 - 12.5 seconds    INR 1.0 0.8 - 1.2   CBC with Differential    Collection Time: 05/19/25  9:06 AM   Result Value Ref Range    WBC 13.03 (H) 3.90 - 12.70 K/uL    RBC 3.97 (L) 4.60 - 6.20 M/uL    HGB 12.5 (L) 14.0 - 18.0 gm/dL    HCT 36.6 (L) 40.0 - 54.0 %    MCV 92 82 - 98 fL    MCH 31.5 (H) 27.0 - 31.0 pg    MCHC 34.2 32.0 - 36.0 g/dL    RDW 11.6 11.5 - 14.5 %    Platelet Count 285 150 - 450 K/uL    MPV 10.4 9.2 - 12.9 fL    Nucleated RBC 0 <=0 /100 WBC    Neut % 87.3 (H) 38 - 73 %    Lymph % 5.2 (L) 18 - 48 %    Mono % 7.0 4 - 15 %    Eos % 0.0 <=8 %    Basophil % 0.1 <=1.9 %    Imm Grans % 0.4 0.0 - 0.5 %    Neut # 11.38 (H) 1.8 - 7.7 K/uL    Lymph # 0.68 (L) 1 - 4.8 K/uL    Mono # 0.91 0.3 - 1 K/uL    Eos # 0.00 <=0.5 K/uL    Baso # 0.01 <=0.2 K/uL    Imm Grans # 0.05 (H) 0.00 - 0.04 K/uL     Imaging Results               CT Abdomen Pelvis With IV Contrast NO Oral Contrast (Final result)  Result time 04/30/25 15:46:15      Final result by Russell Ba MD (04/30/25 15:46:15)                   Impression:      1. Wall thickening of the distal ileum to the surgical anastomosis with  the large bowel.  Inflammatory infectious ileitis are considerations.  Recommend clinical correlation and follow-up.  2. Constipation.  There is fecal distention of the rectum.  No fecal impaction is not excluded.  3. Bilateral renal cysts and hepatic cysts.  4. Nonobstructing nephrolithiasis of the right kidney.  5. Nondistention of the urinary bladder.  Mild wall thickening is not excluded.  6. Diverticulosis of the sigmoid colon.  No evidence of acute diverticulitis.  7. This report was flagged in Epic as abnormal.      Electronically signed by: Russell Humphries  Date:    04/30/2025  Time:    15:46               Narrative:    EXAMINATION:  CT ABDOMEN PELVIS WITH IV CONTRAST    CLINICAL HISTORY:  LLQ abdominal pain;RLQ abdominal pain (Age >= 14y);    TECHNIQUE:  Low dose axial images, sagittal and coronal reformations were obtained from the lung bases to the pubic symphysis following the IV administration of 75 mL of Omnipaque 350 .  Oral contrast was not administered.    COMPARISON:  11/05/2018    FINDINGS:  Abdomen:    - Lower thorax:    - Lung bases: No infiltrates and no nodules.    - Liver: Multiple small hepatic cysts.    - Gallbladder: No calcified gallstones.    - Bile Ducts: No evidence of intra or extra hepatic biliary ductal dilation.    - Spleen: Negative.    - Kidneys: Multiple bilateral simple renal cysts.  Single nonobstructing stone in the right kidney.  No hydronephrosis or hydroureter bilaterally.    - Adrenals: Unremarkable.    - Pancreas: No mass or peripancreatic fat stranding.    - Retroperitoneum:  No significant adenopathy.    - Vascular: Mild ectasia of the distal aorta with no evidence of aneurysm.    - Abdominal wall:  Unremarkable.    Pelvis:    Urinary bladder is incompletely distended with possible mild wall thickening.    Postop changes at the ileocecal junction.  There is wall thickening noted to the distal ileum to the surgical anastomosis.  Inflammatory or infectious ileitis are  considerations.  Follow-up recommended.    Bowel/Mesentery:    No evidence of bowel obstruction.  Moderate retained feces in the colon.    Fecal distention of the rectum.  Impaction is not excluded.    Mild diverticulosis of the sigmoid colon.  No evidence of acute diverticulitis.    Bones:  No acute osseous abnormality and no suspicious lytic or blastic lesion.                                       CT Head Without Contrast (Final result)  Result time 04/30/25 14:49:11      Final result by Ish Carrillo MD (04/30/25 14:49:11)                   Impression:      No evidence for acute intracranial pathology.      Electronically signed by: Ish Carrillo  Date:    04/30/2025  Time:    14:49               Narrative:    EXAMINATION:  CT HEAD WITHOUT CONTRAST    CLINICAL HISTORY:  Mental status change, unknown cause;    TECHNIQUE:  Low dose axial images were obtained through the head.  Coronal and sagittal reformations were also performed. Contrast was not administered.    COMPARISON:  MRI brain without contrast 04/24/2025, CT head without contrast 04/24/2025.    FINDINGS:  Ventricles are stable in size without evidence for new or worsening hydrocephalus.  No new or enlarging extra-axial blood or fluid collections.    Brain parenchyma appears unchanged.  Scattered supratentorial hypoattenuation, overall nonspecific, but can be seen in setting of microvascular ischemic change.  No parenchymal mass, edema, hemorrhage, or acute major vascular territory infarct.    No calvarial or skull base fracture or osseous destructive lesion.  Paranasal sinuses and mastoid air cells are essentially clear.                                       X-Ray Chest AP Portable (Final result)  Result time 04/30/25 12:49:39      Final result by Matti Cote MD (04/30/25 12:49:39)                   Impression:      See above      Electronically signed by: Matti Cote MD  Date:    04/30/2025  Time:    12:49               Narrative:    EXAMINATION:  XR  CHEST AP PORTABLE    CLINICAL HISTORY:  Altered mental status, unspecified    TECHNIQUE:  Single frontal view of the chest was performed.    COMPARISON:  No 04/23/2025 ne    FINDINGS:  Heart size normal.  The tracheal slightly deviates to the right at the thoracic inlet probably related to enlarged thyroid gland.  Minimal subsegmental atelectatic changes noted at the right lung base.  Lungs are otherwise clear.  No pleural effusion.

## 2025-05-19 NOTE — ASSESSMENT & PLAN NOTE
Patient's blood pressure range in the last 24 hours was: BP  Min: 137/67  Max: 180/90.The patient's inpatient anti-hypertensive regimen is listed below:  Current Antihypertensives  NIFEdipine 24 hr tablet 60 mg, Daily, Oral    Plan  - BP is controlled, no changes needed to their regimen  Changed from Norvasc to Procardia given Psych recs

## 2025-05-19 NOTE — ASSESSMENT & PLAN NOTE
79 year old male with history of Crohn's disease, SBO, hypothyroidism, and CAD  presenting from [facility] with encephalopathy. Unclear cause of encephalopathy at this time, though acute presentation with concurrent parkinsonism suggests autoimmune etiology. Motion artifact on MRI brain. On initial exam, was disoriented to place, time, and situation, minimally responsive and will answer with one word, asterixis in his upper extremities, axial rigidity/extremity/rigidity, masked facies, and intention tremor most notably in the left hand. No hyperreflexia. Ptosis of left eye though no extraocular movement abnormalities/pupillary discrepancy.     DDX: autoimmune Parkinsonism; PRES or PML (both possible with Stelara); SREAT +/-  superimposed catatonia or complex bereavement. 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 There have been reported cases of SREAT with normal fT4, so normal level does not exclude this diagnosis.    Repeat MRI brain unrevealing (noting T2 FLAIR changes are artifact given same changes on T1.)    With highly elevated NFL, we are concerned for a rapidly progressive underlying neurodegenerative condition, although our testing has not revealed an actual diagnosis. We could consider a repeat LP but don't think that it would have diagnostic yield nor . Recommending Palliative Care consult to thoroughly assess GOC (ie more invasive testing like repeat LP). We do not think it is unreasonable to place a PEG tube to improve nutrition status and see if mentation improves.    LABS  Serum  Pending: Vit E  WNL: strongyloides IgG, Treponema, negative celiac dx serology, zinc, folate, B12, copper, B1, autoimmune encephalopathy panel, HIV, Vit D, Ma2 ab, heavy metals  Serum MDS2 panel was  mildly positive for GAD65 - level not high enough to be clinically relevant   On Movement Disorders panel, not on autoimmune encephalopathy panel = CCPQ (formerly known as P/Q type VGCC,) GRAF1, Septin5, ITPR1, AP3B2, KLHL11  Dopamine-2 and [place]-3 not commercially available per lab  Abnormal: elevated anti-thyroglobulin & anti-TPO, elevated NFL (392), intermediate pTau-217 (0.203)    CSF  Pending: None  WNL: AFB, meningitis-encephalitis panel, VDRL, culture, glucose, LDH, cytology, autoimmune encephalopathy panel  Abnormal: protein (60), pleocytosis (after correction for elevated RBC)    Recommendations:  - S/p 5-day course of IVIG (EOT 5/12/2025)  - S/p IVMP 1000 mg (EOT 5/21/2025)  - Recommend Palliative Care consult to clarify GOC  - Continue B12 repletion - 1000 mcg po daily  - Continue to empirically treat B1 deficiency  - Continue Sinemet 25 -100 mg 2 tablets TID  - F/u pending labs as above  - Appreciate Psychiatry assistance  - Delirium and fall precautions   - Concern for inadequate nutritional intake: would consider alternative routes of nutrition (PEG, etc.)    We will sign off. Please reach out with any questions or if further evaluation aligns with GOC after Palliative Care discussion.

## 2025-05-19 NOTE — TELEPHONE ENCOUNTER
Patient daughter says patient is admitted to [facility] since 4/23/25 for encephalopathy, possibly due to Stelara. She'd like for Dr. Blanton to know, says someone from our office called 5/11/25 or about, I don't have notes. Radha (daughter) can be reached at [phone]. Curious on next steps for his Crohn's treatment.

## 2025-05-19 NOTE — PT/OT/SLP PROGRESS
Occupational Therapy   Treatment    Name: Vince Huffman  MRN: [id number]  Admitting Diagnosis:  Acute encephalopathy  2 Days Post-Op    Recommendations:     Discharge Recommendations: Moderate Intensity Therapy  Discharge Equipment Recommendations:  wheelchair, walker, rolling  Barriers to discharge:  None    Assessment:     Vince Huffman is a 79 y.o. male with a medical diagnosis of Acute encephalopathy.  He presents with continued AMS only oriented to his name. Pt following minimal commands but is awake and alert. Performance deficits affecting function are weakness, impaired endurance, impaired cognition, decreased ROM, decreased coordination, impaired self care skills, impaired functional mobility, gait instability, impaired balance, decreased safety awareness, decreased lower extremity function, decreased upper extremity function.     Rehab Prognosis:  Good; patient would benefit from acute skilled OT services to address these deficits and reach maximum level of function.       Plan:     Patient to be seen 4 x/week to address the above listed problems via self-care/home management, therapeutic activities, therapeutic exercises, neuromuscular re-education  Plan of Care Expires: 05/24/25  Plan of Care Reviewed with: patient, daughter    Subjective     Pain/Comfort:  Pain Rating 1: 0/10    Objective:     Communicated with: rn prior to session.  Patient found supine with telemetry, pulse ox (continuous) upon OT entry to room.    General Precautions: Standard, fall    Orthopedic Precautions:N/A  Braces: N/A  Respiratory Status: Room air     Occupational Performance:     Bed Mobility:    Patient completed Rolling/Turning to Left with  total assistance  Patient completed Scooting/Bridging with total assistance  Patient completed Supine to Sit with maximal assistance  Patient completed Sit to Supine with maximal assistance and 2 persons     Functional Mobility/Transfers:  Patient completed Sit <> Stand Transfer with  maximal assistance  with  hand-held assist   Functional Mobility: unable at this time.    Activities of Daily Living:  Poor engagement due to AMS.    [facility] 6 Click ADL: 8    Treatment & Education:  EOB balance = Min - Mod with right/posterior lean.  Worked on facilitated anterior leaning EOB due to trunk weakness and hip tightness.  Discussed OT POC with daughter.    Patient left supine with all lines intact, call button in reach, and bed alarm on    GOALS:   Multidisciplinary Problems       Occupational Therapy Goals          Problem: Occupational Therapy    Goal Priority Disciplines Outcome Interventions   Occupational Therapy Goal     OT, PT/OT Progressing    Description: Goals to be met by: 5/24/25     Patient will increase functional independence with ADLs by performing:    UE Dressing with Contact Guard Assistance.  LE Dressing with Minimal Assistance.  Grooming while bedside chair with Minimal Assistance.  Toileting from bedside commode with Minimal Assistance for hygiene and clothing management.   Sitting at edge of bed x5 minutes with Contact Guard Assistance for upright balance.  Rolling to Bilateral with Minimal Assistance.   Supine to sit with Minimal Assistance.  Step transfer with Moderate Assistance  Toilet transfer to bedside commode with Moderate Assistance.                         DME Justifications:  No DME recommended requiring DME justifications    Time Tracking:     OT Date of Treatment: 05/19/25  OT Start Time: 1018  OT Stop Time: 1051  OT Total Time (min): 33 min    Billable Minutes:Neuromuscular Re-education 33    OT/PAUL: OT          5/19/2025

## 2025-05-20 PROBLEM — E43 SEVERE PROTEIN-CALORIE MALNUTRITION: Status: ACTIVE | Noted: 2025-05-20

## 2025-05-20 LAB
ABORH RETYPE: NORMAL
ABSOLUTE EOSINOPHIL (OHS): 0 K/UL
ABSOLUTE MONOCYTE (OHS): 0.62 K/UL (ref 0.3–1)
ABSOLUTE NEUTROPHIL COUNT (OHS): 9.97 K/UL (ref 1.8–7.7)
ALBUMIN SERPL BCP-MCNC: 2.9 G/DL (ref 3.5–5.2)
ALP SERPL-CCNC: 74 UNIT/L (ref 40–150)
ALT SERPL W/O P-5'-P-CCNC: 22 UNIT/L (ref 10–44)
ANION GAP (OHS): 8 MMOL/L (ref 8–16)
AST SERPL-CCNC: 31 UNIT/L (ref 11–45)
BASOPHILS # BLD AUTO: 0 K/UL
BASOPHILS NFR BLD AUTO: 0 %
BILIRUB SERPL-MCNC: 0.4 MG/DL (ref 0.1–1)
BUN SERPL-MCNC: 34 MG/DL (ref 8–23)
CALCIUM SERPL-MCNC: 10.7 MG/DL (ref 8.7–10.5)
CHLORIDE SERPL-SCNC: 100 MMOL/L (ref 95–110)
CO2 SERPL-SCNC: 25 MMOL/L (ref 23–29)
CREAT SERPL-MCNC: 1 MG/DL (ref 0.5–1.4)
CRP SERPL-MCNC: 8.8 MG/L
ERYTHROCYTE [DISTWIDTH] IN BLOOD BY AUTOMATED COUNT: 11.7 % (ref 11.5–14.5)
GFR SERPLBLD CREATININE-BSD FMLA CKD-EPI: >60 ML/MIN/1.73/M2
GLUCOSE SERPL-MCNC: 97 MG/DL (ref 70–110)
HCT VFR BLD AUTO: 37 % (ref 40–54)
HCT VFR BLD AUTO: 37.3 % (ref 40–54)
HGB BLD-MCNC: 12.3 GM/DL (ref 14–18)
HGB BLD-MCNC: 12.6 GM/DL (ref 14–18)
IMM GRANULOCYTES # BLD AUTO: 0.04 K/UL (ref 0–0.04)
IMM GRANULOCYTES NFR BLD AUTO: 0.4 % (ref 0–0.5)
INDIRECT COOMBS: NORMAL
INR PPP: 1 (ref 0.8–1.2)
IRON SATN MFR SERPL: 32 % (ref 20–50)
IRON SERPL-MCNC: 85 UG/DL (ref 45–160)
LYMPHOCYTES # BLD AUTO: 0.79 K/UL (ref 1–4.8)
MAGNESIUM SERPL-MCNC: 2.1 MG/DL (ref 1.6–2.6)
MCH RBC QN AUTO: 31.7 PG (ref 27–31)
MCHC RBC AUTO-ENTMCNC: 33.8 G/DL (ref 32–36)
MCV RBC AUTO: 94 FL (ref 82–98)
MUMPS IGG (OHS): >300 AU/ML
MUMPS IGG INTERPRETATION (OHS): POSITIVE
NUCLEATED RBC (/100WBC) (OHS): 0 /100 WBC
PHOSPHATE SERPL-MCNC: 2.9 MG/DL (ref 2.7–4.5)
PLATELET # BLD AUTO: 290 K/UL (ref 150–450)
PMV BLD AUTO: 10.7 FL (ref 9.2–12.9)
POCT GLUCOSE: 105 MG/DL (ref 70–110)
POCT GLUCOSE: 122 MG/DL (ref 70–110)
POCT GLUCOSE: 139 MG/DL (ref 70–110)
POCT GLUCOSE: 99 MG/DL (ref 70–110)
POTASSIUM SERPL-SCNC: 4.5 MMOL/L (ref 3.5–5.1)
PROT SERPL-MCNC: 8.4 GM/DL (ref 6–8.4)
PROTHROMBIN TIME: 10.9 SECONDS (ref 9–12.5)
RBC # BLD AUTO: 3.98 M/UL (ref 4.6–6.2)
RELATIVE EOSINOPHIL (OHS): 0 %
RELATIVE LYMPHOCYTE (OHS): 6.9 % (ref 18–48)
RELATIVE MONOCYTE (OHS): 5.4 % (ref 4–15)
RELATIVE NEUTROPHIL (OHS): 87.3 % (ref 38–73)
RH BLD: NORMAL
RUBEOLA (MEASLES) IGG (OHS): >300 AU/ML
RUBEOLA IGG INTERP. (OHS): POSITIVE
SODIUM SERPL-SCNC: 133 MMOL/L (ref 136–145)
SPECIMEN OUTDATE: NORMAL
TIBC SERPL-MCNC: 263 UG/DL (ref 250–450)
TRANSFERRIN SERPL-MCNC: 178 MG/DL (ref 200–375)
VIT B12 SERPL-MCNC: 1804 PG/ML (ref 210–950)
WBC # BLD AUTO: 11.42 K/UL (ref 3.9–12.7)

## 2025-05-20 PROCEDURE — 82607 VITAMIN B-12: CPT

## 2025-05-20 PROCEDURE — 99223 1ST HOSP IP/OBS HIGH 75: CPT | Mod: GC,,, | Performed by: INTERNAL MEDICINE

## 2025-05-20 PROCEDURE — 86762 RUBELLA ANTIBODY: CPT

## 2025-05-20 PROCEDURE — 85025 COMPLETE CBC W/AUTO DIFF WBC: CPT | Performed by: STUDENT IN AN ORGANIZED HEALTH CARE EDUCATION/TRAINING PROGRAM

## 2025-05-20 PROCEDURE — 86480 TB TEST CELL IMMUN MEASURE: CPT

## 2025-05-20 PROCEDURE — 25000003 PHARM REV CODE 250: Performed by: STUDENT IN AN ORGANIZED HEALTH CARE EDUCATION/TRAINING PROGRAM

## 2025-05-20 PROCEDURE — 20600001 HC STEP DOWN PRIVATE ROOM

## 2025-05-20 PROCEDURE — 63600175 PHARM REV CODE 636 W HCPCS: Mod: JZ,TB

## 2025-05-20 PROCEDURE — 25000003 PHARM REV CODE 250: Performed by: HOSPITALIST

## 2025-05-20 PROCEDURE — 97530 THERAPEUTIC ACTIVITIES: CPT | Mod: CO

## 2025-05-20 PROCEDURE — 86140 C-REACTIVE PROTEIN: CPT

## 2025-05-20 PROCEDURE — 25000003 PHARM REV CODE 250

## 2025-05-20 PROCEDURE — 84100 ASSAY OF PHOSPHORUS: CPT | Performed by: STUDENT IN AN ORGANIZED HEALTH CARE EDUCATION/TRAINING PROGRAM

## 2025-05-20 PROCEDURE — 80053 COMPREHEN METABOLIC PANEL: CPT | Performed by: STUDENT IN AN ORGANIZED HEALTH CARE EDUCATION/TRAINING PROGRAM

## 2025-05-20 PROCEDURE — 86787 VARICELLA-ZOSTER ANTIBODY: CPT

## 2025-05-20 PROCEDURE — 63600175 PHARM REV CODE 636 W HCPCS: Performed by: HOSPITALIST

## 2025-05-20 PROCEDURE — 86765 RUBEOLA ANTIBODY: CPT

## 2025-05-20 PROCEDURE — 86735 MUMPS ANTIBODY: CPT

## 2025-05-20 PROCEDURE — 36415 COLL VENOUS BLD VENIPUNCTURE: CPT | Performed by: STUDENT IN AN ORGANIZED HEALTH CARE EDUCATION/TRAINING PROGRAM

## 2025-05-20 PROCEDURE — 97535 SELF CARE MNGMENT TRAINING: CPT | Mod: CO

## 2025-05-20 PROCEDURE — 84393 TAU PHOSPHORYLATED EA: CPT

## 2025-05-20 PROCEDURE — 85610 PROTHROMBIN TIME: CPT | Performed by: STUDENT IN AN ORGANIZED HEALTH CARE EDUCATION/TRAINING PROGRAM

## 2025-05-20 PROCEDURE — 63600175 PHARM REV CODE 636 W HCPCS: Performed by: STUDENT IN AN ORGANIZED HEALTH CARE EDUCATION/TRAINING PROGRAM

## 2025-05-20 PROCEDURE — 97116 GAIT TRAINING THERAPY: CPT

## 2025-05-20 PROCEDURE — 84466 ASSAY OF TRANSFERRIN: CPT

## 2025-05-20 PROCEDURE — 36415 COLL VENOUS BLD VENIPUNCTURE: CPT

## 2025-05-20 PROCEDURE — 83735 ASSAY OF MAGNESIUM: CPT | Performed by: STUDENT IN AN ORGANIZED HEALTH CARE EDUCATION/TRAINING PROGRAM

## 2025-05-20 RX ORDER — FENTANYL CITRATE 50 UG/ML
25 INJECTION, SOLUTION INTRAMUSCULAR; INTRAVENOUS
Refills: 0 | Status: CANCELLED | OUTPATIENT
Start: 2025-05-20

## 2025-05-20 RX ORDER — ENOXAPARIN SODIUM 100 MG/ML
40 INJECTION SUBCUTANEOUS EVERY 24 HOURS
Status: DISCONTINUED | OUTPATIENT
Start: 2025-05-20 | End: 2025-05-26

## 2025-05-20 RX ORDER — HYDROMORPHONE HYDROCHLORIDE 2 MG/ML
0.5 INJECTION INTRAMUSCULAR; INTRAVENOUS; SUBCUTANEOUS
Refills: 0 | Status: CANCELLED | OUTPATIENT
Start: 2025-05-20

## 2025-05-20 RX ORDER — KETOROLAC TROMETHAMINE 30 MG/ML
INJECTION, SOLUTION INTRAMUSCULAR; INTRAVENOUS
Status: CANCELLED | OUTPATIENT
Start: 2025-05-20

## 2025-05-20 RX ORDER — MORPHINE SULFATE 10 MG/ML
INJECTION, SOLUTION INTRAMUSCULAR; INTRAVENOUS
Refills: 0 | Status: CANCELLED | OUTPATIENT
Start: 2025-05-20

## 2025-05-20 RX ORDER — INDOMETHACIN 1 MG/ML
0.2 INJECTION, POWDER, LYOPHILIZED, FOR SOLUTION INTRAVENOUS ONCE
Status: CANCELLED | OUTPATIENT
Start: 2025-05-20 | End: 2025-05-20

## 2025-05-20 RX ADMIN — CYANOCOBALAMIN TAB 1000 MCG 1000 MCG: 1000 TAB at 09:05

## 2025-05-20 RX ADMIN — CARBIDOPA AND LEVODOPA 2 TABLET: 25; 100 TABLET ORAL at 03:05

## 2025-05-20 RX ADMIN — PANTOPRAZOLE SODIUM 40 MG: 40 INJECTION, POWDER, FOR SOLUTION INTRAVENOUS at 09:05

## 2025-05-20 RX ADMIN — MIRTAZAPINE 15 MG: 15 TABLET, FILM COATED ORAL at 09:05

## 2025-05-20 RX ADMIN — CARBIDOPA AND LEVODOPA 2 TABLET: 25; 100 TABLET ORAL at 09:05

## 2025-05-20 RX ADMIN — Medication 100 MG: at 09:05

## 2025-05-20 RX ADMIN — METHYLPREDNISOLONE SODIUM SUCCINATE 1000 MG: 1 INJECTION INTRAMUSCULAR; INTRAVENOUS at 11:05

## 2025-05-20 RX ADMIN — ENOXAPARIN SODIUM 40 MG: 40 INJECTION SUBCUTANEOUS at 05:05

## 2025-05-20 RX ADMIN — NIFEDIPINE 60 MG: 30 TABLET, FILM COATED, EXTENDED RELEASE ORAL at 09:05

## 2025-05-20 NOTE — ASSESSMENT & PLAN NOTE
Nutrition consulted. Most recent weight and BMI monitored-     Measurements:  Wt Readings from Last 1 Encounters:   05/06/25 64.1 kg (141 lb 5 oz)   Body mass index is 19.71 kg/m².    Patient has been screened and assessed by RD.    Malnutrition Type:  Context:    Level:      Malnutrition Characteristic Summary:       Interventions/Recommendations (treatment strategy):  1. Continuous tube feeds of Jevity 1.5 with a goal rate of 45 ml/hr x 24 hours to meet > 75% of kcal/protein needs - provides Total volume: 1080 -Kcal: 1620 - Protein 73gm Free water: 840 mL. Additional free fluid flushes per MD. Patient currently has 1200cc fluid restriction. 2. Monitor for s/s TF intolerance.    - Will discuss role of G tube placement with family; attempted to call family today without answer. Will call back and coordinate with GI

## 2025-05-20 NOTE — PLAN OF CARE
LACEY assigned to pt today.  Pt is not medically ready.  CM dept will continue following along with pt treatment team for [facility] placement.      Discharge Plan A and Plan B have been determined by review of patient's clinical status, future medical and therapeutic needs, and coverage/benefits for post-acute care in coordination with multidisciplinary team members.           Concepcion Wood, CARLEE  - [facility]

## 2025-05-20 NOTE — PROGRESS NOTES
[facility] - [facility] Medicine  Progress Note    Patient Name: Vince uHffman  MRN: [id number]  Patient Class: IP- Inpatient   Admission Date: 4/30/2025  Length of Stay: 19 days  Attending Physician: Roseline Brizuela DO  Primary Care Provider: Leland Porras MD    Subjective     Principal Problem:Acute encephalopathy    HPI:  By Dr. Juan F Ramírez MD    78 yo M w/HTN, hypothyroid, crohn's disease, CAD, hx of SBO, presenting with AMS from [facility]. Patient was sent in from [facility] for progressively worsening cognitive function. Patient is A&O x1 at his baseline. Patient and his daughter endorse mild confusion. Which has been progressive.    Patient has not had any recent falls. Endorsed mild suprapubic abdominal pain. No UTI. Mild inflammation on CT, not unexpected in the setting of Crohn's.     Overview/Hospital Course:  Mr. Huffman was admitted to  for further evaluation of worsening AMS per rehab facility. AAOx1 on admission, normally AAOx4 prior to 4/20 per son. UA negative. CXR clear. CT abd/pelvis with wall thickening of distal ileum, inflammatory infectious ileitis are considerations, fecal distention of the rectum, impaction not excluded. Discussed with GI, anticipated these are chronic findings. CRP negative. Will give enema and monitor for improvement. Patient with successful BM. Neurology consulted: MRI brain ordered (no acute findings, motion artifact), extended EEG, ammonia levels. Worsening mental status on 5/2, medicine team consulted for LP which was unsuccessful. More alert on 5/3 and answering yes/no questions when redirected. Neurology recommending carbidopa-levodopa trial, EEG, and attempting another LP.  Symptoms improved with increasing doses of Carbidopa/Levodopa. LP concerning for meningitis so he was started on Empiric treatment on 5/6/25. Discussion held with both Neurology and Infectious Disease, Infectious Disease does not believe patient has any  infectious etiology and as such recommended discontinuing antimicrobials.  Neurology started IVIG for presumed autoimmune process.  Completed 5 day course of IVIG 5/14. Minimal improvement in cognition. Had bouts of poor oral intake, only drank boost.  Had intermittent hyponatremia responding to IV fluids.  On 05/14 am Sodium 124.  Nephrology was consulted, investigations ordered and sodium corrected with close electrolyte monitoring.  Psych was consulted by the request of the neurologist. Hyponatremia improved after stopping fluids. NG tube attempted thrice at bedside however not able to be placed. Able to tolerate oral intake however very limited intake. Neuro recommended repeat MRI with sedation as previous had artifact. Anesthesia notified to arrange with sedation which was done on 5/17.  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 Endocrine consulted. Neurology planning 5 day course of IV Solumedrol starting 5/17.   He was started on Vitamin B12 and Thiamine replacement.    Steroids paused on 5/19 given concern for upper GI bleeding. PPI started and GI consulted. GI team does not share same concern for bleeding and hemoglobin notably stable despite elevated BUN, which could be coincidence. Will continue PPI for now. Steroids resumed with PPI ongoing on 5/20 (day 4/5 of steroids). Will discuss rold of G-tube with family and GI.     Interval History:     No acute events overnight.  Seen at bedside this morning, oriented to self only. Answers to questions are inappropriate- ie. What city are we in is given the response of 1995. Denies any discomfort. No complaints at this time. Attempted to call daughter to discuss role of PEG as this was mentioned in neurology's note from yesterday. I share this concern given it is day 20 of admission and patient has not had much clinical improvement. Will try again and reach out to GI for possible PEG tube placement.  Hgb stable. PPI continued.     Review of Systems   Constitutional:  Negative for chills, fatigue and fever.   Respiratory:  Positive for cough. Negative for shortness of breath.    Cardiovascular:  Negative for chest pain, palpitations and leg swelling.   Gastrointestinal:  Positive for blood in stool. Negative for abdominal pain, diarrhea, nausea and vomiting.   Genitourinary:  Negative for dysuria and urgency.   Neurological:  Negative for dizziness and headaches.   All other systems reviewed and are negative.    Objective:     Vital Signs (Most Recent):  Temp: 96.2 °F (35.7 °C) (05/20/25 1242)  Pulse: 80 (05/20/25 1242)  Resp: 14 (05/20/25 1242)  BP: (!) 148/81 (05/20/25 1242)  SpO2: 100 % (05/20/25 1242) Vital Signs (24h Range):  Temp:  [96.2 °F (35.7 °C)-97.6 °F (36.4 °C)] 96.2 °F (35.7 °C)  Pulse:  [70-93] 80  Resp:  [14-18] 14  SpO2:  [100 %] 100 %  BP: (129-155)/(68-83) 148/81     Weight: 64.1 kg (141 lb 5 oz)  Body mass index is 19.71 kg/m².    Intake/Output Summary (Last 24 hours) at 5/20/2025 1415  Last data filed at 5/20/2025 0930  Gross per 24 hour   Intake 218 ml   Output 1450 ml   Net -1232 ml      Physical Exam  Constitutional:       Appearance: He is ill-appearing.   HENT:      Head: Normocephalic and atraumatic.   Cardiovascular:      Rate and Rhythm: Normal rate and regular rhythm.      Heart sounds: No murmur heard.  Pulmonary:      Effort: Pulmonary effort is normal. No respiratory distress.      Breath sounds: Normal breath sounds. No wheezing or rales.   Abdominal:      General: There is no distension.      Palpations: Abdomen is soft.      Tenderness: There is no abdominal tenderness.   Musculoskeletal:         General: No deformity.   Skin:     General: Skin is warm and dry.      Coloration: Skin is pale.   Neurological:      General: No focal deficit present.      Comments: Able to have a conversation, oriented to person         MELD 3.0: 10 at 5/20/2025  6:18 AM  MELD-Na: 6 at 5/20/2025   6:18 AM  Calculated from:  Serum Creatinine: 1 mg/dL at 5/20/2025  6:18 AM  Serum Sodium: 133 mmol/L at 5/20/2025  6:18 AM  Total Bilirubin: 0.4 mg/dL (Using min of 1 mg/dL) at 5/20/2025  6:18 AM  Serum Albumin: 2.9 g/dL at 5/20/2025  6:18 AM  INR(ratio): 1 at 5/20/2025  6:18 AM  Age at listing (hypothetical): 79 years  Sex: Male at 5/20/2025  6:18 AM      Significant Labs:  CBC:  Recent Labs   Lab 05/19/25  0906 05/19/25 2327 05/20/25 0618   WBC 13.03*  --  11.42   HGB 12.5* 12.3* 12.6*   HCT 36.6* 37.0* 37.3*     --  290     CMP:  Recent Labs   Lab 05/19/25  0906 05/20/25 0618    133*   K 4.3 4.5    100   CO2 25 25   * 97   BUN 36* 34*   CREATININE 1.2 1.0   CALCIUM 10.3 10.7*   PROT 8.4 8.4   ALBUMIN 2.8* 2.9*   BILITOT 0.3 0.4   ALKPHOS 76 74   AST 42 31   ALT 23 22   ANIONGAP 11 8     PTINR:  Recent Labs   Lab 05/19/25  0906 05/20/25 0618   INR 1.0 1.0         Assessment & Plan  Acute encephalopathy  AMS (altered mental status)  Progressive acutely worsening confusion/tremors since 4/20. Previously AAOx4, now AAOx1  Neurology consulted: MRI brain ordered (no acute findings, motion artifact), extended EEG, ammonia levels.   Worsening mental status on 5/2, medicine team consulted for LP which was unsuccessful.   Neurology recommending carbidopa-levodopa trial, EEG, and attempting another LP.    Symptoms improved with increasing doses of Carbidopa/Levodopa.   LP concerning for meningitis so he was started on Empiric treatment on 5/6/25, viral panel negative  Discussion held with both Neurology and Infectious Disease, Infectious Disease does not believe patient has any infectious etiology and as such recommended discontinuing antimicrobials.    Neurology started IVIG for presumed autoimmune process.  Completed 5 day course of IVIG 5/14. Minimal improvement in cognition.   Neuro recommended repeat MRI with sedation as previous had artifact. Anesthesia notified to arrange with sedation  "which was done on 5/17.    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 Endocrine consulted.   Neurology planning 5 day course of IV Solumedrol starting 5/17 - paused on 5/19, resumed 5/20 (Today is Day 4/5)   Continue Vitamin B12 and Thiamine replacement.  Progressive acutely worsening confusion/tremors since 4/20. Previously AAOx4, now AAOx1  Neurology consulted: MRI brain ordered (no acute findings, motion artifact), extended EEG, ammonia levels.   Worsening mental status on 5/2, medicine team consulted for LP which was unsuccessful.   Neurology recommending carbidopa-levodopa trial, EEG, and attempting another LP.    Symptoms improved with increasing doses of Carbidopa/Levodopa.   LP concerning for meningitis so he was started on Empiric treatment on 5/6/25, viral panel negative  Discussion held with both Neurology and Infectious Disease, Infectious Disease does not believe patient has any infectious etiology and as such recommended discontinuing antimicrobials.    Neurology started IVIG for presumed autoimmune process.  Completed 5 day course of IVIG 5/14. Minimal improvement in cognition.   Neuro recommended repeat MRI with sedation as previous had artifact. Anesthesia notified to arrange with sedation which was done on 5/17.    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 Endocrine consulted.   Neurology planning 5 day course of IV Solumedrol starting 5/17 - paused on 5/19, resumed 5/20 (Today is Day 4/5)   Continue Vitamin B12 and Thiamine replacement.  Crohn's disease  GIB (gastrointestinal bleeding)  Per CT scan "thickening of the distal ileum to the surgical anastomosis with the large bowel. Inflammatory infectious ileitis are considerations. Recommend clinical correlation and follow-up. "  CRP negative. No concern for flare.   GI without concerns of confusion being caused by sterlara, confirmed " "with pharmacy   On 5/19 developed new onset dark stools  Has been on Lovenox, IV Solumedrol and PO PPI  Pantoprazole 80mg IV x 1 then PPI 40mg IV BID  GI consulted  Repeat H/H at 2pm  Will transfuse if Hgb is <7g/dl (<8g/dl in cases of active ACS) or if patient has rapid bleeding leading to hemodynamic instability  IBD team recommending CTE vs MRE when patient able to take po and improving  Will need re-staging colonoscopy, likely outpatient     Recent Labs     05/18/25 0433 05/19/25 0906 05/19/25 2327 05/20/25 0618   HGB 11.7* 12.5* 12.3* 12.6*   HCT 33.6* 36.6* 37.0* 37.3*    285  --  290   INR 1.0 1.0  --  1.0   Per CT scan "thickening of the distal ileum to the surgical anastomosis with the large bowel. Inflammatory infectious ileitis are considerations. Recommend clinical correlation and follow-up. "  CRP negative. No concern for flare.   GI without concerns of confusion being caused by sterlara, confirmed with pharmacy   On 5/19 developed new onset dark stools  Has been on Lovenox, IV Solumedrol and PO PPI  Pantoprazole 80mg IV x 1 then PPI 40mg IV BID  GI consulted  Repeat H/H at 2pm  Will transfuse if Hgb is <7g/dl (<8g/dl in cases of active ACS) or if patient has rapid bleeding leading to hemodynamic instability    Recent Labs     Recent Labs     05/18/25 0433 05/19/25 0906 05/19/25 2327 05/20/25 0618   HGB 11.7* 12.5* 12.3* 12.6*   HCT 33.6* 36.6* 37.0* 37.3*    285  --  290   INR 1.0 1.0  --  1.0   Anemia is likely due to acute on chronic illness. Most recent hemoglobin and hematocrit are listed below.  Recent Labs     05/19/25 0906 05/19/25 2327 05/20/25 0618   HGB 12.5* 12.3* 12.6*   HCT 36.6* 37.0* 37.3*     Plan  - Monitor serial CBC: Daily  - Transfuse PRBC if patient becomes hemodynamically unstable, symptomatic or H/H drops below 7/21.  - Patient has not received any PRBC transfusions to date  - Patient's anemia is currently stable  - Continue PPI with high dose steroids " per neurology   Generalized weakness  Sent from SNF  Progressive worsening weakness per son   PT/OT ordered. Will need placement at discharge  Unintended weight loss  Reported per son  Recently started gluten free diet per recommendations from GI  Temporal wasting noted  Body mass index is 19.71 kg/m².  Nutrition following  Boost changed to Boost Breeze given lactose intolerance  Hyponatremia  Hyponatremia is likely due to Dehydration/hypovolemia. The patient's most recent sodium results are listed below.  Recent Labs     05/18/25  0433 05/19/25  0906 05/20/25  0618   * 137 133*     Maxime  - Monitor sodium Daily.   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 Most recent hemoglobin and hematocrit are listed below.  Recent Labs     05/19/25  0906 05/19/25  2327 05/20/25  0618   HGB 12.5* 12.3* 12.6*   HCT 36.6* 37.0* 37.3*     Plan  - Monitor serial CBC: Daily  - Transfuse PRBC if patient becomes hemodynamically unstable, symptomatic or H/H drops below 7/21.  - Patient has not received any PRBC transfusions to date  - Patient's anemia is currently stable  -  Essential hypertension  Patient's blood pressure range in the last 24 hours was: BP  Min: 129/68  Max: 155/83.The patient's inpatient anti-hypertensive regimen is listed below:  Current Antihypertensives  NIFEdipine 24 hr tablet 60 mg, Daily, Oral    Plan  - BP is controlled, no changes needed to their regimen  Changed from Norvasc to Procardia given Psych recs    Parkinsonism  Suspect symptoms are at least partially due to Parkinson's disease.   Continue Sinemet TID  CAD (coronary artery disease)  History noted.   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 Will hold anti-depressant medications. We will consult psychiatry at this time. Patient does not display  psychosis at this time. Continue to monitor closely and adjust plan of care as needed.  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 Most recent weight and BMI monitored-     Measurements:  Wt Readings from Last 1 Encounters:   05/06/25 64.1 kg (141 lb 5 oz)   Body mass index is 19.71 kg/m².    Patient has been screened and assessed by RD.    Malnutrition Type:  Context:    Level:      Malnutrition Characteristic Summary:       Interventions/Recommendations (treatment strategy):  1. Continuous tube feeds of Jevity 1.5 with a goal rate of 45 ml/hr x 24 hours to meet > 75% of kcal/protein needs - provides Total volume: 1080 -Kcal: 1620 - Protein 73gm Free water: 840 mL. Additional free fluid flushes per MD. Patient currently has 1200cc fluid restriction. 2. Monitor for s/s TF intolerance.    - Will discuss role of G tube placement with family; attempted to call family today without answer. Will call back and coordinate with GI     VTE Risk Mitigation (From admission, onward)           Ordered     enoxaparin injection 40 mg  Every 24 hours         05/20/25 0906     IP VTE HIGH RISK PATIENT  Once         05/01/25 0827     Place sequential compression device  Until discontinued         05/01/25 0827                    Discharge Planning   ERNIE: 5/26/2025     Code Status: Full Code   Medical Readiness for Discharge Date:   Discharge Plan A: Rehab   Discharge Delays: None known at this time      Please place Justification for DME    Roseline Brizuela DO  Department of Hospital Medicine   [facility] - Acute Medical Stepdown

## 2025-05-20 NOTE — ASSESSMENT & PLAN NOTE
"Per CT scan "thickening of the distal ileum to the surgical anastomosis with the large bowel. Inflammatory infectious ileitis are considerations. Recommend clinical correlation and follow-up. "  CRP negative. No concern for flare.   GI without concerns of confusion being caused by sterlara, confirmed with pharmacy   On 5/19 developed new onset dark stools  Has been on Lovenox, IV Solumedrol and PO PPI  Pantoprazole 80mg IV x 1 then PPI 40mg IV BID  GI consulted  Repeat H/H at 2pm  Will transfuse if Hgb is <7g/dl (<8g/dl in cases of active ACS) or if patient has rapid bleeding leading to hemodynamic instability  IBD team recommending CTE vs MRE when patient able to take po and improving  Will need re-staging colonoscopy, likely outpatient     Recent Labs     05/18/25  0433 05/19/25  0906 05/19/25  2327 05/20/25  0618   HGB 11.7* 12.5* 12.3* 12.6*   HCT 33.6* 36.6* 37.0* 37.3*    285  --  290   INR 1.0 1.0  --  1.0   Per CT scan "thickening of the distal ileum to the surgical anastomosis with the large bowel. Inflammatory infectious ileitis are considerations. Recommend clinical correlation and follow-up. "  CRP negative. No concern for flare.   GI without concerns of confusion being caused by sterlara, confirmed with pharmacy   On 5/19 developed new onset dark stools  Has been on Lovenox, IV Solumedrol and PO PPI  Pantoprazole 80mg IV x 1 then PPI 40mg IV BID  GI consulted  Repeat H/H at 2pm  Will transfuse if Hgb is <7g/dl (<8g/dl in cases of active ACS) or if patient has rapid bleeding leading to hemodynamic instability    Recent Labs     "

## 2025-05-20 NOTE — ASSESSMENT & PLAN NOTE
Hyponatremia is likely due to Dehydration/hypovolemia. The patient's most recent sodium results are listed below.  Recent Labs     05/18/25  0433 05/19/25  0906 05/20/25  0618   * 137 133*     Maxime  - Monitor sodium Daily.   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 Most recent hemoglobin and hematocrit are listed below.  Recent Labs     05/19/25  0906 05/19/25  2327 05/20/25  0618   HGB 12.5* 12.3* 12.6*   HCT 36.6* 37.0* 37.3*     Plan  - Monitor serial CBC: Daily  - Transfuse PRBC if patient becomes hemodynamically unstable, symptomatic or H/H drops below 7/21.  - Patient has not received any PRBC transfusions to date  - Patient's anemia is currently stable  -

## 2025-05-20 NOTE — PLAN OF CARE
General Neurology Service - Plan of Care    79 year old male with history of Crohn's disease, SBO, hypothyroidism, and CAD  presenting from [facility] with encephalopathy. Unclear cause of encephalopathy at this time, though acute presentation with concurrent parkinsonism suggests autoimmune etiology. Motion artifact on MRI brain. On initial exam, was disoriented to place, time, and situation, minimally responsive and will answer with one word, asterixis in his upper extremities, axial rigidity/extremity/rigidity, masked facies, and intention tremor most notably in the left hand. No hyperreflexia. Ptosis of left eye though no extraocular movement abnormalities/pupillary discrepancy.      DDX: autoimmune Parkinsonism; PRES or PML (both possible with Stelara); SREAT +/-  superimposed catatonia or complex bereavement. 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 There have been reported cases of SREAT with normal fT4, so normal level does not exclude this diagnosis. However, now with highly elevated NFL, concern for rapidly progressive underlying neurodegenerative condition.      Repeat MRI brain unrevealing (noting T2 FLAIR changes are artifact given same changes on T1.)     LABS  Serum  Pending: [facility] movement disorders panel, Vit E, [id number]  On Movement Disorders panel, not on autoimmune encephalopathy panel = CCPQ (formerly known as P/Q type VGCC,) GRAF1, Septin5, ITPR1, AP3B2, [id number]  Dopamine-2 and Amity-3 not commercially available per lab  WNL: strongyloides IgG, Treponema, negative celiac dx serology, zinc, folate, B12, copper, B1, autoimmune encephalopathy panel, HIV, Vit D, Ma2 ab, heavy metals  Abnormal: elevated anti-thyroglobulin & anti-TPO, elevated NFL (392)     CSF  Pending: None  WNL: AFB,  meningitis-encephalitis panel, VDRL, culture, glucose, LDH, cytology, autoimmune encephalopathy panel  Abnormal: protein (60), pleocytosis (after correction for elevated RBC)     Recommendations:  - S/p 5-day course of IVIG (EOT 5/12/2025)  - Resumed IVMP 1000 mg (Day 4/5) with PPI, discussed with Primary  - Recommend Palliative Care consult to clarify GOC  - Continue B12 repletion - 1000 mcg po daily  - Continue to empirically treat B1 deficiency  - Continue Sinemet 25 -100 mg 2 tablets TID  - F/u pending labs as above  - Appreciate Psychiatry assistance  - Delirium and fall precautions   - Concern for inadequate nutritional intake: would consider alternative routes of nutrition (PEG, etc.)     We will continue to follow. Please reach out with any questions.    Abdirashid Collier MD  Neurology PGY-2  [facility]

## 2025-05-20 NOTE — ASSESSMENT & PLAN NOTE
Patient's blood pressure range in the last 24 hours was: BP  Min: 129/68  Max: 155/83.The patient's inpatient anti-hypertensive regimen is listed below:  Current Antihypertensives  NIFEdipine 24 hr tablet 60 mg, Daily, Oral    Plan  - BP is controlled, no changes needed to their regimen  Changed from Norvasc to Procardia given Psych recs

## 2025-05-20 NOTE — PT/OT/SLP PROGRESS
"Occupational Therapy   Co-Treatment    Co-treatment performed due to patient's multiple deficits requiring two skilled therapists to appropriately and safely assess patient's strength, endurance, functional mobility, and ADL performance while facilitating functional tasks in addition to accommodating for patient's activity tolerance and medical acuity.      Name: Vince Huffman  MRN: [id number]  Admitting Diagnosis:  Acute encephalopathy  3 Days Post-Op    Recommendations:     Discharge Recommendations: Moderate Intensity Therapy  Discharge Equipment Recommendations:  wheelchair, walker, rolling  Barriers to discharge:       Assessment:     Vince Huffman is a 79 y.o. male with a medical diagnosis of Acute encephalopathy. In today's session, the patient completed functional mobility in the hallways with maximum assistance and maximum verbal/tactile cues for command following, attention to task, motivation, and upright posture. Performance deficits affecting function are weakness, impaired endurance, impaired cognition, decreased ROM, decreased coordination, impaired self care skills, impaired functional mobility, gait instability, impaired balance, decreased safety awareness, decreased lower extremity function, decreased upper extremity function.     Rehab Prognosis:  Good; patient would benefit from acute skilled OT services to address these deficits and reach maximum level of function.       Plan:     Patient to be seen 4 x/week to address the above listed problems via self-care/home management, therapeutic activities, therapeutic exercises, neuromuscular re-education  Plan of Care Expires: 05/24/25  Plan of Care Reviewed with: patient    Subjective     Chief Complaint: N/A  Patient/Family Comments/goals: "Patient reports it was nice to walk."  Pain/Comfort:  Pain Rating 1: 0/10    Objective:     Communicated with: Nurse prior to session.  Patient found HOB elevated with bed alarm, SCDs, pulse ox (continuous), santana " catheter upon OT entry to room.    A client care conference was completed by the OTR and the LEWIS prior to treatment by the LEWIS to discuss the patient's POC and current status.    General Precautions: Standard, fall    Orthopedic Precautions:N/A  Braces: N/A  Respiratory Status: Room air     Occupational Performance:     Bed Mobility:    Patient completed Supine to Sit with maximal assistance  Patient completed Sit to Supine with moderate assistance and 2 persons     Functional Mobility/Transfers:  Patient completed x1 Sit <> Stand Transfer from the EOB with maximal assistance with hand-held assist   Patient completed x1 Sit <> Stand Transfer from the bedside chair with maximal assistance with hand-held assist  Patient completed x1 Squat Pivot Transfer from the bedside chair with maximal assistance with hand-held assist  Functional Mobility: Patient engaged in functional mobility training to simulate a household distance. From EOB > the hallways with seated rest break > continue down the hallways with seated rest break and was brought back to room with maximum (A) with hand-held assistance to maximize functional endurance and standing balance required for home/community mobility and occupational engagement.     Activities of Daily Living:  Grooming: maximal assistance to attempt to complete hair care seated in the bedside chair. Patient required assistance to elevate RUE to his head and was unable to appropriately utilize the comb secondary to the patient's rigid tone.  Upper Body Dressing: moderate assistance to funmilayo gown on posterior side seated at EOB.  Lower Body Dressing: total assistance to doff/onn SCDs at bed level.    [facility] 6 Click ADL: 8    Treatment & Education:  Patient educated on OOB mobility to improve overall activity tolerance and promote recovery.  Patient sat at EOB to promote core engagement, static/dynamic sitting balance, tolerance, and skin integrity for carry over with all seated ADLs.    Patient completed standing task to promote static/dynamic standing balance and endurance for carry over with all standing ADLs.  Patient educated on role of occupational therapy, POC, and safety during ADLs and functional mobility. Patient and OT discussed importance of safe, continued mobility to optimize daily living skills. Patient verbalized understanding. Patient given instruction to call for medical staff/nurse for assistance.     Patient left HOB elevated with all lines intact, call button in reach, and nurse notified    GOALS:   Multidisciplinary Problems       Occupational Therapy Goals          Problem: Occupational Therapy    Goal Priority Disciplines Outcome Interventions   Occupational Therapy Goal     OT, PT/OT Progressing    Description: Goals to be met by: 5/24/25     Patient will increase functional independence with ADLs by performing:    UE Dressing with Contact Guard Assistance.  LE Dressing with Minimal Assistance.  Grooming while bedside chair with Minimal Assistance.  Toileting from bedside commode with Minimal Assistance for hygiene and clothing management.   Sitting at edge of bed x5 minutes with Contact Guard Assistance for upright balance.  Rolling to Bilateral with Minimal Assistance.   Supine to sit with Minimal Assistance.  Step transfer with Moderate Assistance  Toilet transfer to bedside commode with Moderate Assistance.                       Time Tracking:     OT Date of Treatment: 05/20/25  OT Start Time: 0943  OT Stop Time: 1017  OT Total Time (min): 34 min    Billable Minutes:Self Care/Home Management 15  Therapeutic Activity 19    OT/PAUL: PAUL     Number of PAUL visits since last OT visit: 1 5/20/2025

## 2025-05-20 NOTE — ASSESSMENT & PLAN NOTE
Progressive acutely worsening confusion/tremors since 4/20. Previously AAOx4, now AAOx1  Neurology consulted: MRI brain ordered (no acute findings, motion artifact), extended EEG, ammonia levels.   Worsening mental status on 5/2, medicine team consulted for LP which was unsuccessful.   Neurology recommending carbidopa-levodopa trial, EEG, and attempting another LP.    Symptoms improved with increasing doses of Carbidopa/Levodopa.   LP concerning for meningitis so he was started on Empiric treatment on 5/6/25, viral panel negative  Discussion held with both Neurology and Infectious Disease, Infectious Disease does not believe patient has any infectious etiology and as such recommended discontinuing antimicrobials.    Neurology started IVIG for presumed autoimmune process.  Completed 5 day course of IVIG 5/14. Minimal improvement in cognition.   Neuro recommended repeat MRI with sedation as previous had artifact. Anesthesia notified to arrange with sedation which was done on 5/17.    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 Endocrine consulted.   Neurology planning 5 day course of IV Solumedrol starting 5/17 - paused on 5/19, resumed 5/20 (Today is Day 4/5)   Continue Vitamin B12 and Thiamine replacement.  Progressive acutely worsening confusion/tremors since 4/20. Previously AAOx4, now AAOx1  Neurology consulted: MRI brain ordered (no acute findings, motion artifact), extended EEG, ammonia levels.   Worsening mental status on 5/2, medicine team consulted for LP which was unsuccessful.   Neurology recommending carbidopa-levodopa trial, EEG, and attempting another LP.    Symptoms improved with increasing doses of Carbidopa/Levodopa.   LP concerning for meningitis so he was started on Empiric treatment on 5/6/25, viral panel negative  Discussion held with both Neurology and Infectious Disease, Infectious Disease does not believe patient has any infectious  etiology and as such recommended discontinuing antimicrobials.    Neurology started IVIG for presumed autoimmune process.  Completed 5 day course of IVIG 5/14. Minimal improvement in cognition.   Neuro recommended repeat MRI with sedation as previous had artifact. Anesthesia notified to arrange with sedation which was done on 5/17.    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 Endocrine consulted.   Neurology planning 5 day course of IV Solumedrol starting 5/17 - paused on 5/19, resumed 5/20 (Today is Day 4/5)   Continue Vitamin B12 and Thiamine replacement.

## 2025-05-20 NOTE — SUBJECTIVE & OBJECTIVE
Interval History:     No acute events overnight.  Seen at bedside this morning, oriented to self only. Answers to questions are inappropriate- ie. What city are we in is given the response of 1995. Denies any discomfort. No complaints at this time. Attempted to call daughter to discuss role of PEG as this was mentioned in neurology's note from yesterday. I share this concern given it is day 20 of admission and patient has not had much clinical improvement. Will try again and reach out to GI for possible PEG tube placement. Hgb stable. PPI continued.     Review of Systems   Constitutional:  Negative for chills, fatigue and fever.   Respiratory:  Positive for cough. Negative for shortness of breath.    Cardiovascular:  Negative for chest pain, palpitations and leg swelling.   Gastrointestinal:  Positive for blood in stool. Negative for abdominal pain, diarrhea, nausea and vomiting.   Genitourinary:  Negative for dysuria and urgency.   Neurological:  Negative for dizziness and headaches.   All other systems reviewed and are negative.    Objective:     Vital Signs (Most Recent):  Temp: 96.2 °F (35.7 °C) (05/20/25 1242)  Pulse: 80 (05/20/25 1242)  Resp: 14 (05/20/25 1242)  BP: (!) 148/81 (05/20/25 1242)  SpO2: 100 % (05/20/25 1242) Vital Signs (24h Range):  Temp:  [96.2 °F (35.7 °C)-97.6 °F (36.4 °C)] 96.2 °F (35.7 °C)  Pulse:  [70-93] 80  Resp:  [14-18] 14  SpO2:  [100 %] 100 %  BP: (129-155)/(68-83) 148/81     Weight: 64.1 kg (141 lb 5 oz)  Body mass index is 19.71 kg/m².    Intake/Output Summary (Last 24 hours) at 5/20/2025 1415  Last data filed at 5/20/2025 0930  Gross per 24 hour   Intake 218 ml   Output 1450 ml   Net -1232 ml      Physical Exam  Constitutional:       Appearance: He is ill-appearing.   HENT:      Head: Normocephalic and atraumatic.   Cardiovascular:      Rate and Rhythm: Normal rate and regular rhythm.      Heart sounds: No murmur heard.  Pulmonary:      Effort: Pulmonary effort is normal. No  respiratory distress.      Breath sounds: Normal breath sounds. No wheezing or rales.   Abdominal:      General: There is no distension.      Palpations: Abdomen is soft.      Tenderness: There is no abdominal tenderness.   Musculoskeletal:         General: No deformity.   Skin:     General: Skin is warm and dry.      Coloration: Skin is pale.   Neurological:      General: No focal deficit present.      Comments: Able to have a conversation, oriented to person         MELD 3.0: 10 at 5/20/2025  6:18 AM  MELD-Na: 6 at 5/20/2025  6:18 AM  Calculated from:  Serum Creatinine: 1 mg/dL at 5/20/2025  6:18 AM  Serum Sodium: 133 mmol/L at 5/20/2025  6:18 AM  Total Bilirubin: 0.4 mg/dL (Using min of 1 mg/dL) at 5/20/2025  6:18 AM  Serum Albumin: 2.9 g/dL at 5/20/2025  6:18 AM  INR(ratio): 1 at 5/20/2025  6:18 AM  Age at listing (hypothetical): 79 years  Sex: Male at 5/20/2025  6:18 AM      Significant Labs:  CBC:  Recent Labs   Lab 05/19/25  0906 05/19/25 2327 05/20/25 0618   WBC 13.03*  --  11.42   HGB 12.5* 12.3* 12.6*   HCT 36.6* 37.0* 37.3*     --  290     CMP:  Recent Labs   Lab 05/19/25  0906 05/20/25 0618    133*   K 4.3 4.5    100   CO2 25 25   * 97   BUN 36* 34*   CREATININE 1.2 1.0   CALCIUM 10.3 10.7*   PROT 8.4 8.4   ALBUMIN 2.8* 2.9*   BILITOT 0.3 0.4   ALKPHOS 76 74   AST 42 31   ALT 23 22   ANIONGAP 11 8     PTINR:  Recent Labs   Lab 05/19/25  0906 05/20/25 0618   INR 1.0 1.0

## 2025-05-20 NOTE — PLAN OF CARE
Problem: Adult Inpatient Plan of Care  Goal: Plan of Care Review  Outcome: Progressing  Goal: Patient-Specific Goal (Individualized)  Outcome: Progressing  Goal: Absence of Hospital-Acquired Illness or Injury  Outcome: Progressing  Goal: Optimal Comfort and Wellbeing  Outcome: Progressing  Goal: Readiness for Transition of Care  Outcome: Progressing     Problem: Skin Injury Risk Increased  Goal: Skin Health and Integrity  Outcome: Progressing     Problem: Infection  Goal: Absence of Infection Signs and Symptoms  Outcome: Progressing     Problem: Delirium  Goal: Optimal Coping  Outcome: Progressing  Goal: Improved Behavioral Control  Outcome: Progressing  Goal: Improved Attention and Thought Clarity  Outcome: Progressing  Goal: Improved Sleep  Outcome: Progressing     Problem: Fall Injury Risk  Goal: Absence of Fall and Fall-Related Injury  Outcome: Progressing   Pt Aox1, more alert and engaged this shift. No PRNs given. Call light within reach, safety measures in place, rounded per facility policy.

## 2025-05-20 NOTE — PT/OT/SLP PROGRESS
Physical Therapy Co-Treatment    Patient Name:  Vince Huffman   MRN:  [id number]    Co-evaluation and co-treatment performed for this visit due to suspected patient need for two skilled therapists to ensure patient and staff safety and to accommodate for patient activity tolerance/pain management     Recommendations:     Discharge Recommendations: High Intensity Therapy  Discharge Equipment Recommendations: to be determined by next level of care  Barriers to discharge: None    Assessment:     Vince Huffman is a 79 y.o. male admitted with a medical diagnosis of Acute encephalopathy.  He presents with the following impairments/functional limitations: weakness, impaired endurance, impaired self care skills, impaired functional mobility, gait instability, impaired balance, impaired cognition, decreased upper extremity function, decreased lower extremity function, decreased safety awareness, abnormal tone, impaired coordination, impaired fine motor Pt. cooperative and tolerated treatment well. Pt. progressing with mobility with Max A and increased gait distance.    Rehab Prognosis: Good; patient would benefit from acute skilled PT services to address these deficits and reach maximum level of function.    Recent Surgery: Procedure(s) (LRB):  MRI (Magnetic Resonance Imagine) (N/A) 3 Days Post-Op    Plan:     During this hospitalization, patient to be seen 4 x/week to address the identified rehab impairments via gait training, therapeutic activities, therapeutic exercises, neuromuscular re-education and progress toward the following goals:    Plan of Care Expires:  06/02/25    Subjective     Chief Complaint: no specific c/o  Patient/Family Comments/goals: pt. Agreeable to PT  Pain/Comfort:  Pain Rating 1: 0/10  Pain Rating Post-Intervention 1: 0/10      Objective:     Communicated with nursing prior to session.  Patient found supine with santana catheter, pulse ox (continuous), blood pressure cuff, bed alarm upon PT entry to  room.     General Precautions: Standard, fall  Orthopedic Precautions: N/A  Braces: N/A  Respiratory Status: Room air     Functional Mobility:  Bed Mobility:     Rolling Left:  maximal assistance  Scooting: maximal assistance  Supine to Sit: maximal assistance  Sit to Supine: moderate assistance and of 2 persons  Transfers:     Sit to Stand:  maximal assistance with hand-held assist  Gait: 35' and 50' with HHA-Max A for balance/weight shifting/support/ followed with rolling bedside chair. Pt. had seated rest break between gait trials. Pt. amb. with narrow JOHN, increased (B) knee flexion, forward trunk flexion with head down.  Balance: poor standing      AM-PAC 6 CLICK MOBILITY  Turning over in bed (including adjusting bedclothes, sheets and blankets)?: 2  Sitting down on and standing up from a chair with arms (e.g., wheelchair, bedside commode, etc.): 2  Moving from lying on back to sitting on the side of the bed?: 2  Moving to and from a bed to a chair (including a wheelchair)?: 2  Need to walk in hospital room?: 2  Climbing 3-5 steps with a railing?: 1  Basic Mobility Total Score: 11       Treatment & Education:  Discussed pt.'s progress, goals, and POC. Pt. performed ADLs with OT.    Patient left supine with all lines intact and call button in reach..    GOALS:   Multidisciplinary Problems       Physical Therapy Goals          Problem: Physical Therapy    Goal Priority Disciplines Outcome Interventions   Physical Therapy Goal     PT, PT/OT Progressing    Description: Goals to be met by: 2025     Patient will increase functional independence with mobility by performin. Supine to sit with MInimal Assistance  2. Sit to supine with MInimal Assistance  3. Sit to stand transfer with Minimal Assistance  4. Bed to chair transfer with Minimal Assistance using LRAD  5. Gait  x 25 feet with Minimal Assistance using LRAD.   6. Lower extremity exercise program x15 reps per handout, with assistance as needed                          DME Justifications:  No DME recommended requiring DME justifications    Time Tracking:     PT Received On: 05/20/25  PT Start Time: 0943     PT Stop Time: 1017  PT Total Time (min): 34 min     Billable Minutes: Gait Training 34    Treatment Type: Treatment  PT/PTA: PT     Number of PTA visits since last PT visit: 0     05/20/2025

## 2025-05-20 NOTE — PLAN OF CARE
Pt calm and cooperative today.  VSS. Still eating less than 25% of meal.  New IV started today. POC cont'd  Problem: Adult Inpatient Plan of Care  Goal: Plan of Care Review  Outcome: Progressing  Goal: Patient-Specific Goal (Individualized)  Outcome: Progressing  Goal: Absence of Hospital-Acquired Illness or Injury  Outcome: Progressing  Goal: Optimal Comfort and Wellbeing  Outcome: Progressing  Goal: Readiness for Transition of Care  Outcome: Progressing     Problem: Skin Injury Risk Increased  Goal: Skin Health and Integrity  Outcome: Progressing     Problem: Infection  Goal: Absence of Infection Signs and Symptoms  Outcome: Progressing     Problem: Delirium  Goal: Optimal Coping  Outcome: Progressing  Goal: Improved Behavioral Control  Outcome: Progressing  Goal: Improved Attention and Thought Clarity  Outcome: Progressing  Goal: Improved Sleep  Outcome: Progressing     Problem: Fall Injury Risk  Goal: Absence of Fall and Fall-Related Injury  Outcome: Progressing

## 2025-05-20 NOTE — ASSESSMENT & PLAN NOTE
Anemia is likely due to acute on chronic illness. Most recent hemoglobin and hematocrit are listed below.  Recent Labs     05/19/25  2327 05/20/25  0618 05/21/25  0413   HGB 12.3* 12.6* 13.7*   HCT 37.0* 37.3* 41.4     Plan  - Monitor serial CBC: Daily  - Transfuse PRBC if patient becomes hemodynamically unstable, symptomatic or H/H drops below 7/21.  - Patient has not received any PRBC transfusions to date  - Patient's anemia is currently stable  - Continue PPI with high dose steroids per neurology

## 2025-05-21 ENCOUNTER — ANESTHESIA EVENT (OUTPATIENT)
Dept: ENDOSCOPY | Facility: HOSPITAL | Age: 80
End: 2025-05-21
Payer: MEDICARE

## 2025-05-21 ENCOUNTER — TELEPHONE (OUTPATIENT)
Dept: GASTROENTEROLOGY | Facility: CLINIC | Age: 80
End: 2025-05-21

## 2025-05-21 LAB
ABSOLUTE EOSINOPHIL (OHS): 0 K/UL
ABSOLUTE MONOCYTE (OHS): 0.53 K/UL (ref 0.3–1)
ABSOLUTE NEUTROPHIL COUNT (OHS): 6.98 K/UL (ref 1.8–7.7)
ALBUMIN SERPL BCP-MCNC: 3.1 G/DL (ref 3.5–5.2)
ALP SERPL-CCNC: 78 UNIT/L (ref 40–150)
ALT SERPL W/O P-5'-P-CCNC: 11 UNIT/L (ref 10–44)
AMPAR2 IGG SERPL QL CBA IFA: NEGATIVE
AMPHIPHYSIN IGG SER QL IA: NEGATIVE
ANION GAP (OHS): 9 MMOL/L (ref 8–16)
ANNOTATION COMMENT IMP: ABNORMAL
AP3B2 IFA: NEGATIVE
AST SERPL-CCNC: 27 UNIT/L (ref 11–45)
BASOPHILS # BLD AUTO: 0.01 K/UL
BASOPHILS NFR BLD AUTO: 0.1 %
BILIRUB SERPL-MCNC: 0.4 MG/DL (ref 0.1–1)
BUN SERPL-MCNC: 31 MG/DL (ref 8–23)
CALCIUM SERPL-MCNC: 10.4 MG/DL (ref 8.7–10.5)
CASPR2 IGG SER QL CBA IFA: NEGATIVE
CHLORIDE SERPL-SCNC: 101 MMOL/L (ref 95–110)
CO2 SERPL-SCNC: 25 MMOL/L (ref 23–29)
CREAT SERPL-MCNC: 1 MG/DL (ref 0.5–1.4)
CV2 IGG SER QL IB: NEGATIVE
ERYTHROCYTE [DISTWIDTH] IN BLOOD BY AUTOMATED COUNT: 11.7 % (ref 11.5–14.5)
GABABR IGG SERPL QL CBA IFA: NEGATIVE
GAD65 AB SER-SCNC: 0.1 NMOL/L
GFAP ALPHA IGG SER QL IF: NEGATIVE
GFR SERPLBLD CREATININE-BSD FMLA CKD-EPI: >60 ML/MIN/1.73/M2
GLIAL NUC TYPE 1 AB SER QL IF: NEGATIVE
GLUCOSE SERPL-MCNC: 92 MG/DL (ref 70–110)
GRAF IGG SER QL IF: NEGATIVE
HAV AB SER QL IA: REACTIVE
HBV SURFACE AG SERPL QL IA: NORMAL
HCT VFR BLD AUTO: 41.4 % (ref 40–54)
HGB BLD-MCNC: 13.7 GM/DL (ref 14–18)
HU1 AB SER QL: NEGATIVE
HU2 AB SER QL IF: NEGATIVE
HU3 AB SER QL: NEGATIVE
IGLON5 IGG SER QL CBA IFA: NEGATIVE
IMM GRANULOCYTES # BLD AUTO: 0.03 K/UL (ref 0–0.04)
IMM GRANULOCYTES NFR BLD AUTO: 0.4 % (ref 0–0.5)
IMMUNOLOGIST REVIEW: ABNORMAL
IMMUNOLOGIST REVIEW: ABNORMAL
INR PPP: 1 (ref 0.8–1.2)
IP3R 1 IGG SER QL IF: NEGATIVE
LGI1 IGG SER QL CBA IFA: NEGATIVE
LYMPHOCYTES # BLD AUTO: 0.59 K/UL (ref 1–4.8)
M DPPX AB CBA, S: NEGATIVE
M KLH11 AB CBA, S: NEGATIVE
M MGLUR1 AB IFA, S: NEGATIVE
M PHOSPHO-TAU 217: 0.2 PG/ML
M SEPTIN-5 IFA, S: NEGATIVE
M SEPTIN-7 IFA, S: NEGATIVE
MAGNESIUM SERPL-MCNC: 2 MG/DL (ref 1.6–2.6)
MCH RBC QN AUTO: 31.1 PG (ref 27–31)
MCHC RBC AUTO-ENTMCNC: 33.1 G/DL (ref 32–36)
MCV RBC AUTO: 94 FL (ref 82–98)
MITOGEN MINUS NIL (OHS): 3.95
NEUROCHONDRIN, IFA, S: NEGATIVE
NIF IGG SER QL IF: NEGATIVE
NIL TB SYNCED (OHS): 0.01
NMDAR1 IGG SER QL CBA IFA: NEGATIVE
NUCLEATED RBC (/100WBC) (OHS): 0 /100 WBC
PCA-1 AB SER QL IF: NEGATIVE
PCA-2 AB SER QL IF: NEGATIVE
PCA-TR AB SER QL IF: NEGATIVE
PHOSPHATE SERPL-MCNC: 2.7 MG/DL (ref 2.7–4.5)
PHOSPHODIESTERASE 10A (PDE10A) IGG, SERUM: NEGATIVE
PLATELET # BLD AUTO: 290 K/UL (ref 150–450)
PMV BLD AUTO: 10.6 FL (ref 9.2–12.9)
POCT GLUCOSE: 101 MG/DL (ref 70–110)
POCT GLUCOSE: 111 MG/DL (ref 70–110)
POCT GLUCOSE: 117 MG/DL (ref 70–110)
POCT GLUCOSE: 120 MG/DL (ref 70–110)
POTASSIUM SERPL-SCNC: 4.2 MMOL/L (ref 3.5–5.1)
PROT SERPL-MCNC: 8.6 GM/DL (ref 6–8.4)
PROTHROMBIN TIME: 11.1 SECONDS (ref 9–12.5)
QUANTIFERON GOLD INTERP (OHS): NEGATIVE
RBC # BLD AUTO: 4.41 M/UL (ref 4.6–6.2)
RELATIVE EOSINOPHIL (OHS): 0 %
RELATIVE LYMPHOCYTE (OHS): 7.2 % (ref 18–48)
RELATIVE MONOCYTE (OHS): 6.5 % (ref 4–15)
RELATIVE NEUTROPHIL (OHS): 85.8 % (ref 38–73)
RUBV IGG SER-ACNC: >400 IU/ML
RUBV IGG SER-IMP: REACTIVE
SODIUM SERPL-SCNC: 135 MMOL/L (ref 136–145)
TB1 AG MINUS NIL (OHS): <0
TB2 AG MINUS NIL (OHS): <0
TRIM46 AB IFA, SERUM: NEGATIVE
V.ZOSTER IGG INTERP (OHS): POSITIVE
VARICELLA ZOSTER IGG (OHS): 33.9 S/CO
VGCC-P/Q BIND AB SER-SCNC: 0 NMOL/L
WBC # BLD AUTO: 8.14 K/UL (ref 3.9–12.7)

## 2025-05-21 PROCEDURE — 25000003 PHARM REV CODE 250: Performed by: STUDENT IN AN ORGANIZED HEALTH CARE EDUCATION/TRAINING PROGRAM

## 2025-05-21 PROCEDURE — 99499 UNLISTED E&M SERVICE: CPT | Mod: GC,,, | Performed by: STUDENT IN AN ORGANIZED HEALTH CARE EDUCATION/TRAINING PROGRAM

## 2025-05-21 PROCEDURE — 85610 PROTHROMBIN TIME: CPT | Performed by: STUDENT IN AN ORGANIZED HEALTH CARE EDUCATION/TRAINING PROGRAM

## 2025-05-21 PROCEDURE — 85025 COMPLETE CBC W/AUTO DIFF WBC: CPT | Performed by: STUDENT IN AN ORGANIZED HEALTH CARE EDUCATION/TRAINING PROGRAM

## 2025-05-21 PROCEDURE — 86790 VIRUS ANTIBODY NOS: CPT

## 2025-05-21 PROCEDURE — 83735 ASSAY OF MAGNESIUM: CPT | Performed by: STUDENT IN AN ORGANIZED HEALTH CARE EDUCATION/TRAINING PROGRAM

## 2025-05-21 PROCEDURE — 87340 HEPATITIS B SURFACE AG IA: CPT

## 2025-05-21 PROCEDURE — 20600001 HC STEP DOWN PRIVATE ROOM

## 2025-05-21 PROCEDURE — 25000003 PHARM REV CODE 250

## 2025-05-21 PROCEDURE — 84100 ASSAY OF PHOSPHORUS: CPT | Performed by: STUDENT IN AN ORGANIZED HEALTH CARE EDUCATION/TRAINING PROGRAM

## 2025-05-21 PROCEDURE — 80053 COMPREHEN METABOLIC PANEL: CPT | Performed by: STUDENT IN AN ORGANIZED HEALTH CARE EDUCATION/TRAINING PROGRAM

## 2025-05-21 PROCEDURE — 99233 SBSQ HOSP IP/OBS HIGH 50: CPT | Mod: GC,,, | Performed by: STUDENT IN AN ORGANIZED HEALTH CARE EDUCATION/TRAINING PROGRAM

## 2025-05-21 PROCEDURE — 63600175 PHARM REV CODE 636 W HCPCS: Performed by: STUDENT IN AN ORGANIZED HEALTH CARE EDUCATION/TRAINING PROGRAM

## 2025-05-21 PROCEDURE — 63600175 PHARM REV CODE 636 W HCPCS: Mod: JZ,TB

## 2025-05-21 PROCEDURE — 36415 COLL VENOUS BLD VENIPUNCTURE: CPT | Performed by: STUDENT IN AN ORGANIZED HEALTH CARE EDUCATION/TRAINING PROGRAM

## 2025-05-21 PROCEDURE — 36415 COLL VENOUS BLD VENIPUNCTURE: CPT

## 2025-05-21 PROCEDURE — 63600175 PHARM REV CODE 636 W HCPCS: Performed by: HOSPITALIST

## 2025-05-21 PROCEDURE — 25000003 PHARM REV CODE 250: Performed by: HOSPITALIST

## 2025-05-21 RX ADMIN — CYANOCOBALAMIN TAB 1000 MCG 1000 MCG: 1000 TAB at 09:05

## 2025-05-21 RX ADMIN — POLYETHYLENE GLYCOL 3350 17 G: 17 POWDER, FOR SOLUTION ORAL at 09:05

## 2025-05-21 RX ADMIN — CARBIDOPA AND LEVODOPA 2 TABLET: 25; 100 TABLET ORAL at 09:05

## 2025-05-21 RX ADMIN — PANTOPRAZOLE SODIUM 40 MG: 40 INJECTION, POWDER, FOR SOLUTION INTRAVENOUS at 08:05

## 2025-05-21 RX ADMIN — ENOXAPARIN SODIUM 40 MG: 40 INJECTION SUBCUTANEOUS at 05:05

## 2025-05-21 RX ADMIN — CARBIDOPA AND LEVODOPA 2 TABLET: 25; 100 TABLET ORAL at 08:05

## 2025-05-21 RX ADMIN — POLYETHYLENE GLYCOL 3350 17 G: 17 POWDER, FOR SOLUTION ORAL at 08:05

## 2025-05-21 RX ADMIN — CARBIDOPA AND LEVODOPA 2 TABLET: 25; 100 TABLET ORAL at 02:05

## 2025-05-21 RX ADMIN — MIRTAZAPINE 15 MG: 15 TABLET, FILM COATED ORAL at 08:05

## 2025-05-21 RX ADMIN — NIFEDIPINE 60 MG: 30 TABLET, FILM COATED, EXTENDED RELEASE ORAL at 09:05

## 2025-05-21 RX ADMIN — Medication 100 MG: at 09:05

## 2025-05-21 RX ADMIN — PANTOPRAZOLE SODIUM 40 MG: 40 INJECTION, POWDER, FOR SOLUTION INTRAVENOUS at 09:05

## 2025-05-21 RX ADMIN — METHYLPREDNISOLONE SODIUM SUCCINATE 1000 MG: 1 INJECTION INTRAMUSCULAR; INTRAVENOUS at 09:05

## 2025-05-21 NOTE — ASSESSMENT & PLAN NOTE
Nutrition consulted. Most recent weight and BMI monitored-     Measurements:  Wt Readings from Last 1 Encounters:   05/06/25 64.1 kg (141 lb 5 oz)   Body mass index is 19.71 kg/m².    Patient has been screened and assessed by RD.    Malnutrition Type:  Context:    Level:      Malnutrition Characteristic Summary:       Interventions/Recommendations (treatment strategy):  1. Continuous tube feeds of Jevity 1.5 with a goal rate of 45 ml/hr x 24 hours to meet > 75% of kcal/protein needs - provides Total volume: 1080 -Kcal: 1620 - Protein 73gm Free water: 840 mL. Additional free fluid flushes per MD. Patient currently has 1200cc fluid restriction. 2. Monitor for s/s TF intolerance.    - Discussed G tube placement with daughter/MPOA and GI, planned for placement on 5/22

## 2025-05-21 NOTE — SUBJECTIVE & OBJECTIVE
"  Subjective:     Interval History: See hospital course    Current Neurological Medications: See below    Current Medications[1]    Review of Systems   Unable to perform ROS: Mental status change     Objective:     Vital Signs (Most Recent):  Temp: 97.9 °F (36.6 °C) (05/21/25 0501)  Pulse: 105 (05/21/25 0501)  Resp: 20 (05/21/25 0501)  BP: (!) 178/89 (05/21/25 0501)  SpO2: 98 % (05/21/25 0501) Vital Signs (24h Range):  Temp:  [96.2 °F (35.7 °C)-97.9 °F (36.6 °C)] 97.9 °F (36.6 °C)  Pulse:  [] 105  Resp:  [14-20] 20  SpO2:  [97 %-100 %] 98 %  BP: (136-178)/(73-93) 178/89     Weight: 64.1 kg (141 lb 5 oz)  Body mass index is 19.71 kg/m².     Physical Exam  Vitals and nursing note reviewed.   Constitutional:       General: He is not in acute distress.     Comments:  Thin   HENT:      Head: Normocephalic and atraumatic.   Eyes:      Conjunctiva/sclera: Conjunctivae normal.   Pulmonary:      Effort: Pulmonary effort is normal. No respiratory distress.   Musculoskeletal:      Right lower leg: No edema.      Left lower leg: No edema.   Skin:     General: Skin is warm and dry.   Neurological:      Mental Status: He is alert.          NEUROLOGICAL EXAMINATION:     MENTAL STATUS   Oriented to person.   Disoriented to place.   Disoriented to time.   Level of consciousness: alert       Perseverating.     More confused today (for example not answering a place when asked where we are & says "[place]" when asked in he is in pain)         CRANIAL NERVES        - L ptosis       Significant Labs: All pertinent lab results from the past 24 hours have been reviewed.    Significant Imaging: I have reviewed and interpreted all pertinent imaging results/findings within the past 24 hours.       [1]   Current Facility-Administered Medications   Medication Dose Route Frequency Provider Last Rate Last Admin    acetaminophen tablet 1,000 mg  1,000 mg Oral Q8H PRN Zuly Wheat PA-C   1,000 mg at 05/12/25 1554    acetaminophen tablet " 650 mg  650 mg Oral Q4H PRN Zuly Wheat PA-C   650 mg at 05/04/25 1453    ALPRAZolam tablet 0.5 mg  0.5 mg Oral BID PRN Zuly Wheat PA-C   0.5 mg at 05/14/25 1626    carbidopa-levodopa  mg per tablet 2 tablet  2 tablet Oral TID Elian López MD   2 tablet at 05/21/25 0921    cyanocobalamin tablet 1,000 mcg  1,000 mcg Oral Daily Zina Tarango MD   1,000 mcg at 05/21/25 0921    dextrose 50% injection 12.5 g  12.5 g Intravenous PRN Zuly Wheat PA-C        dextrose 50% injection 25 g  25 g Intravenous PRN Zuly Wheat PA-C        enoxaparin injection 40 mg  40 mg Subcutaneous Q24H (prophylaxis, 1700) Roseline Brizuela, DO   40 mg at 05/20/25 1732    glucagon (human recombinant) injection 1 mg  1 mg Intramuscular PRN Zuly Wheat PA-C        glucose chewable tablet 16 g  16 g Oral PRN Zuly Wheat PA-C        glucose chewable tablet 24 g  24 g Oral PRN Zuly Wheat PA-C        insulin aspart U-100 pen 0-5 Units  0-5 Units Subcutaneous QID (AC + HS) PRN Saadia Quiñones MD        LORazepam injection 2 mg  2 mg Intravenous On Call Procedure Jaden Carvajal DO        melatonin tablet 6 mg  6 mg Oral Nightly PRN Zuly Wheat PA-C        methylPREDNISolone sodium succinate (SOLU-MEDROL) 1,000 mg in 0.9% NaCl 100 mL IVPB  1,000 mg Intravenous Daily Jaden Carvajal  mL/hr at 05/21/25 0909 1,000 mg at 05/21/25 0909    mirtazapine tablet 15 mg  15 mg Oral QHS Saadia Quiñones MD   15 mg at 05/20/25 2117    naloxone 0.4 mg/mL injection 0.02 mg  0.02 mg Intravenous PRN Zuly Wheat PA-C        NIFEdipine 24 hr tablet 60 mg  60 mg Oral Daily Saadia Quiñones MD   60 mg at 05/21/25 0921    ondansetron disintegrating tablet 8 mg  8 mg Oral Q8H PRN Zuly Wheat PA-C        pantoprazole injection 40 mg  40 mg Intravenous BID Saadia Quiñones MD   40 mg at 05/21/25 0904    polyethylene glycol packet 17 g  17 g Oral BID Zuly Wheat PA-C   17 g at 05/21/25 0921    prochlorperazine  injection Soln 5 mg  5 mg Intravenous Q6H PRN Zuly Wheat PA-C        sodium chloride 0.9% flush 10 mL  10 mL Intravenous Q12H PRN Zuly Wheat PA-C        sodium chloride 0.9% flush 10 mL  10 mL Intravenous PRN Pedro Ash MD        thiamine tablet 100 mg  100 mg Oral Daily Saadia Quiñones MD   100 mg at 05/21/25 0972

## 2025-05-21 NOTE — PLAN OF CARE
Problem: Adult Inpatient Plan of Care  Goal: Plan of Care Review  Outcome: Progressing  Goal: Patient-Specific Goal (Individualized)  Outcome: Progressing  Goal: Absence of Hospital-Acquired Illness or Injury  Outcome: Progressing  Goal: Optimal Comfort and Wellbeing  Outcome: Progressing  Goal: Readiness for Transition of Care  Outcome: Progressing     Problem: Skin Injury Risk Increased  Goal: Skin Health and Integrity  Outcome: Progressing     Problem: Infection  Goal: Absence of Infection Signs and Symptoms  Outcome: Progressing     Problem: Fall Injury Risk  Goal: Absence of Fall and Fall-Related Injury  Outcome: Progressing     Problem: Restraint, Nonviolent  Goal: Absence of Harm or Injury  Outcome: Progressing     Problem: Coping Ineffective  Goal: Effective Coping  Outcome: Progressing

## 2025-05-21 NOTE — ASSESSMENT & PLAN NOTE
Hyponatremia is likely due to Dehydration/hypovolemia. The patient's most recent sodium results are listed below.  Recent Labs     05/19/25  0906 05/20/25  0618 05/21/25  0413    133* 135*     Maxime  - Monitor sodium Daily.   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 Most recent hemoglobin and hematocrit are listed below.  Recent Labs     05/19/25  2327 05/20/25  0618 05/21/25  0413   HGB 12.3* 12.6* 13.7*   HCT 37.0* 37.3* 41.4     Plan  - Monitor serial CBC: Daily  - Transfuse PRBC if patient becomes hemodynamically unstable, symptomatic or H/H drops below 7/21.  - Patient has not received any PRBC transfusions to date  - Patient's anemia is currently stable  -

## 2025-05-21 NOTE — CARE UPDATE
Hospital Medicine Care Update Note    Received message from neuro team regarding testing results, specifically elevated NFL (392), which indicates that the patient has a rapidly progressive neurodegenerative process- though our testing has not revealed a specific diagnosis as of yet. This was discussed and shared with the patient's daughter/MPOA. At this juncture, a palliative care consult was recommended and discussed to determine next steps.     Palliative care consulted.     Roseline Brizuela DO  Hospitalist

## 2025-05-21 NOTE — PLAN OF CARE
Problem: Adult Inpatient Plan of Care  Goal: Plan of Care Review  Outcome: Progressing  Goal: Patient-Specific Goal (Individualized)  Outcome: Progressing  Goal: Absence of Hospital-Acquired Illness or Injury  Outcome: Progressing     Problem: Skin Injury Risk Increased  Goal: Skin Health and Integrity  Outcome: Progressing     Problem: Infection  Goal: Absence of Infection Signs and Symptoms  Outcome: Progressing     Problem: Delirium  Goal: Optimal Coping  Outcome: Progressing  Goal: Improved Behavioral Control  Outcome: Progressing  Goal: Improved Attention and Thought Clarity  Outcome: Progressing  Goal: Improved Sleep  Outcome: Progressing     Problem: Fall Injury Risk  Goal: Absence of Fall and Fall-Related Injury  Outcome: Progressing   Pt Aox1, RA. No PRNs given. Call light within reach, safety measures in place, rounded per facility policy.

## 2025-05-21 NOTE — TREATMENT PLAN
IBD Treatment Plan      Spoke with Radha Huffman, patients daughter. I have updated her on our recommendations including peg tube placement by general GI service 5/22 (tentatively), as well as recommendation to continue Stelara on next dose June 5th which she will have family obtain and bring to him (likely will be at rehab by then). She understood that we dont feel stelara caused  his change in mentation and that PRES or PML are the only neurologic side effects of Stelara but neither have been established. She is in agreement with the plan to restage, most likely as outpatient with his GI docs in [place] and that they may have further recs pending that colonoscopy result.      Ines Bella MD  Gastroenterology and Hepatology Fellow, PGY-4

## 2025-05-21 NOTE — ASSESSMENT & PLAN NOTE
"Per CT scan "thickening of the distal ileum to the surgical anastomosis with the large bowel. Inflammatory infectious ileitis are considerations. Recommend clinical correlation and follow-up. "  CRP negative. No concern for flare.   GI without concerns of confusion being caused by sterlara, confirmed with pharmacy   On 5/19 developed new onset dark stools  Has been on Lovenox, IV Solumedrol and PO PPI  Pantoprazole 80mg IV x 1 then PPI 40mg IV BID  GI consulted  Repeat H/H at 2pm  Will transfuse if Hgb is <7g/dl (<8g/dl in cases of active ACS) or if patient has rapid bleeding leading to hemodynamic instability  IBD team recommending CTE vs MRE when patient able to take po and improving  Will need re-staging colonoscopy, likely outpatient     Recent Labs     05/19/25 0906 05/19/25 2327 05/20/25  0618 05/21/25  0413   HGB 12.5* 12.3* 12.6* 13.7*   HCT 36.6* 37.0* 37.3* 41.4     --  290 290   INR 1.0  --  1.0 1.0   CRP negative. No concern for flare.   GI without concerns of confusion being caused by sterlara, confirmed with pharmacy   On 5/19 developed new onset dark stools  Has been on Lovenox, IV Solumedrol and PO PPI  Pantoprazole 80mg IV x 1 then PPI 40mg IV BID  GI consulted  Repeat H/H at 2pm  Will transfuse if Hgb is <7g/dl (<8g/dl in cases of active ACS) or if patient has rapid bleeding leading to hemodynamic instability    Recent Labs     Recent Labs     05/19/25  0906 05/19/25 2327 05/20/25  0618 05/21/25  0413   HGB 12.5* 12.3* 12.6* 13.7*   HCT 36.6* 37.0* 37.3* 41.4     --  290 290   INR 1.0  --  1.0 1.0   CRP negative. No concern for flare.   GI without concerns of confusion being caused by sterlara, confirmed with pharmacy   On 5/19 developed new onset dark stools  Has been on Lovenox, IV Solumedrol and PO PPI  Pantoprazole 80mg IV x 1 then PPI 40mg IV BID  GI consulted  Repeat H/H at 2pm  Will transfuse if Hgb is <7g/dl (<8g/dl in cases of active ACS) or if patient has rapid bleeding " leading to hemodynamic instability  IBD team recommending CTE vs MRE when patient able to take po and improving  Will need re-staging colonoscopy, likely outpatient     Recent Labs     Recent Labs     05/19/25  0906 05/19/25  2327 05/20/25  0618 05/21/25  0413   HGB 12.5* 12.3* 12.6* 13.7*   HCT 36.6* 37.0* 37.3* 41.4     --  290 290   INR 1.0  --  1.0 1.0   On 5/19 developed new onset dark stools  Has been on Lovenox, IV Solumedrol and PO PPI  Pantoprazole 80mg IV x 1 then PPI 40mg IV BID  GI consulted  Repeat H/H at 2pm  Will transfuse if Hgb is <7g/dl (<8g/dl in cases of active ACS) or if patient has rapid bleeding leading to hemodynamic instability    Recent Labs

## 2025-05-21 NOTE — TELEPHONE ENCOUNTER
See NBP's note from 5/21/2025 telephone encounter.   I have been following his progress since he was admitted.  MLC

## 2025-05-21 NOTE — ASSESSMENT & PLAN NOTE
Patient's blood pressure range in the last 24 hours was: BP  Min: 143/70  Max: 179/81.The patient's inpatient anti-hypertensive regimen is listed below:  Current Antihypertensives  NIFEdipine 24 hr tablet 60 mg, Daily, Oral    Plan  - BP is controlled, no changes needed to their regimen  Changed from Norvasc to Procardia given Psych recs

## 2025-05-21 NOTE — ASSESSMENT & PLAN NOTE
Progressive acutely worsening confusion/tremors since 4/20. Previously AAOx4, now AAOx1  Neurology consulted: MRI brain ordered (no acute findings, motion artifact), extended EEG, ammonia levels.   Worsening mental status on 5/2, medicine team consulted for LP which was unsuccessful.   Neurology recommending carbidopa-levodopa trial, EEG, and attempting another LP.    Symptoms improved with increasing doses of Carbidopa/Levodopa.   LP concerning for meningitis so he was started on Empiric treatment on 5/6/25, viral panel negative  Discussion held with both Neurology and Infectious Disease, Infectious Disease does not believe patient has any infectious etiology and as such recommended discontinuing antimicrobials.    Neurology started IVIG for presumed autoimmune process.  Completed 5 day course of IVIG 5/14. Minimal improvement in cognition.   Neuro recommended repeat MRI with sedation as previous had artifact. Anesthesia notified to arrange with sedation which was done on 5/17.    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 Endocrine consulted.   Neurology planning 5 day course of IV Solumedrol starting 5/17 - paused on 5/19, resumed 5/20 (Today is Day 4/5)   Continue Vitamin B12 and Thiamine replacement.  Progressive acutely worsening confusion/tremors since 4/20. Previously AAOx4, now AAOx1  Neurology consulted: MRI brain ordered (no acute findings, motion artifact), extended EEG, ammonia levels.   Worsening mental status on 5/2, medicine team consulted for LP which was unsuccessful.   Neurology recommending carbidopa-levodopa trial, EEG, and attempting another LP.    Symptoms improved with increasing doses of Carbidopa/Levodopa.   LP concerning for meningitis so he was started on Empiric treatment on 5/6/25, viral panel negative  Discussion held with both Neurology and Infectious Disease, Infectious Disease does not believe patient has any infectious  etiology and as such recommended discontinuing antimicrobials.    Neurology started IVIG for presumed autoimmune process.  Completed 5 day course of IVIG 5/14. Minimal improvement in cognition.   Neuro recommended repeat MRI with sedation as previous had artifact. Anesthesia notified to arrange with sedation which was done on 5/17.    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 Endocrine consulted.   Neurology planning 5 day course of IV Solumedrol starting 5/17 - paused on 5/19, resumed 5/20 (Today is Day 5/5)   Continue Vitamin B12 and Thiamine replacement.  Progressive acutely worsening confusion/tremors since 4/20. Previously AAOx4, now AAOx1  Neurology consulted: MRI brain ordered (no acute findings, motion artifact), extended EEG, ammonia levels.   Worsening mental status on 5/2, medicine team consulted for LP which was unsuccessful.   Neurology recommending carbidopa-levodopa trial, EEG, and attempting another LP.    Symptoms improved with increasing doses of Carbidopa/Levodopa.   LP concerning for meningitis so he was started on Empiric treatment on 5/6/25, viral panel negative  Discussion held with both Neurology and Infectious Disease, Infectious Disease does not believe patient has any infectious etiology and as such recommended discontinuing antimicrobials.    Neurology started IVIG for presumed autoimmune process.  Completed 5 day course of IVIG 5/14. Minimal improvement in cognition.   Neuro recommended repeat MRI with sedation as previous had artifact. Anesthesia notified to arrange with sedation which was done on 5/17.    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 Endocrine consulted.   Neurology planning 5 day course of IV Solumedrol starting 5/17 - paused on 5/19, resumed 5/20 (Today is Day 4/5)   Continue Vitamin B12 and Thiamine replacement.  Progressive acutely worsening  confusion/tremors since 4/20. Previously AAOx4, now AAOx1  Neurology consulted: MRI brain ordered (no acute findings, motion artifact), extended EEG, ammonia levels.   Worsening mental status on 5/2, medicine team consulted for LP which was unsuccessful.   Neurology recommending carbidopa-levodopa trial, EEG, and attempting another LP.    Symptoms improved with increasing doses of Carbidopa/Levodopa.   LP concerning for meningitis so he was started on Empiric treatment on 5/6/25, viral panel negative  Discussion held with both Neurology and Infectious Disease, Infectious Disease does not believe patient has any infectious etiology and as such recommended discontinuing antimicrobials.    Neurology started IVIG for presumed autoimmune process.  Completed 5 day course of IVIG 5/14. Minimal improvement in cognition.   Neuro recommended repeat MRI with sedation as previous had artifact. Anesthesia notified to arrange with sedation which was done on 5/17.    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 Endocrine consulted.   Neurology planning 5 day course of IV Solumedrol starting 5/17 - paused on 5/19, resumed 5/20 (Today is Day 5/5)   Continue Vitamin B12 and Thiamine replacement.

## 2025-05-21 NOTE — TELEPHONE ENCOUNTER
See notes in 5/19/2025 telephone encounter.  Notify daughter that I reviewed his records. Dr. Walker in the [facility] has evaluated the patient and recommends continuing his Stelara as is since it does not seem to be the source of his altered mental status.  His GI conditions will be managed by those local GI providers at that facility until he can be released.  I appreciate the update and will keep updated on him.  JARRETT

## 2025-05-21 NOTE — PROGRESS NOTES
"[facility] - Acute [facility]  Neurology  Progress Note    Patient Name: Vince Huffman  MRN: [id number]  Admission Date: 4/30/2025  Hospital Length of Stay: 20 days  Code Status: Full Code   Attending Provider: Roseline Brizuela DO  Primary Care Physician: Leland Porras MD   Principal Problem:Acute encephalopathy    HPI:   Vince Huffman is a 79 year old male with history of chron's disease, SBO, hypothyroidism, and CAD  presenting from [facility] with encephalopathy. He initially presented to [facility] for impaired mobility and difficulty with ADL's 2/2 generalized weakness. He was reportedly found confused today A&O x 1 (said the year is "1895") but is normally A&O x4. At this time he also expressed generalized abdominal pain. Recent ultrasounds of the lower extremity did not reveal DVT. MRI from last week was non diagnostic. UA unremarkable and CXR normal. CT abdomen and pelvis reveals wall thickening of the distal ileum consistent with possible infectious ileitis. Neurology consulted for further evaluation of encephalopathy.     Overview/Hospital Course:  05/12/2025 - IVIG Day 5/5. Exam worsened compared to yesterday (no longer speaking.)   05/13/2025 - Initial exam early this morning consistent with exam yesterday. However, when assessed by team closed to lunch time was talkative and saying multiple-word sentences (albeit still confused and perseverative.)   05/15/2025 - Patient continues having waxing-and-waning symptoms. Autoimmune encephalopathy serum and CSF panels unrevealing. Plan for repeat MRI with sedation as prior MRIs were motion degraded.   05/16/2025 - Sedated MRI delayed until tomorrow.   5/17/25: MRI done this morning, and is unrevealing though with presence of significant artifact; starting pulse-dose steroids  5/18/25: day 2 IVSM, exam stable, more interactive this morning, but within limits of prior fluctuations  05/19/2025 - Had bloody BM today, steroids on hold while GI " "weighs in.   05/21/2025 - NAEON. Going for EGD this AM, IVMP day 5/5 today without improvement in clinical picture.         Subjective:     Interval History: See hospital course    Current Neurological Medications: See below    Current Medications[1]    Review of Systems   Unable to perform ROS: Mental status change     Objective:     Vital Signs (Most Recent):  Temp: 97.9 °F (36.6 °C) (05/21/25 0501)  Pulse: 105 (05/21/25 0501)  Resp: 20 (05/21/25 0501)  BP: (!) 178/89 (05/21/25 0501)  SpO2: 98 % (05/21/25 0501) Vital Signs (24h Range):  Temp:  [96.2 °F (35.7 °C)-97.9 °F (36.6 °C)] 97.9 °F (36.6 °C)  Pulse:  [] 105  Resp:  [14-20] 20  SpO2:  [97 %-100 %] 98 %  BP: (136-178)/(73-93) 178/89     Weight: 64.1 kg (141 lb 5 oz)  Body mass index is 19.71 kg/m².     Physical Exam  Vitals and nursing note reviewed.   Constitutional:       General: He is not in acute distress.     Comments:  Thin   HENT:      Head: Normocephalic and atraumatic.   Eyes:      Conjunctiva/sclera: Conjunctivae normal.   Pulmonary:      Effort: Pulmonary effort is normal. No respiratory distress.   Musculoskeletal:      Right lower leg: No edema.      Left lower leg: No edema.   Skin:     General: Skin is warm and dry.   Neurological:      Mental Status: He is alert.          NEUROLOGICAL EXAMINATION:     MENTAL STATUS   Oriented to person.   Disoriented to place.   Disoriented to time.   Level of consciousness: alert       Perseverating.     More confused today (for example not answering a place when asked where we are & says "seesaw" when asked in he is in pain)         CRANIAL NERVES        - L ptosis       Significant Labs: All pertinent lab results from the past 24 hours have been reviewed.    Significant Imaging: I have reviewed and interpreted all pertinent imaging results/findings within the past 24 hours.    Assessment and Plan:     * Acute encephalopathy  79 year old male with history of Crohn's disease, SBO, hypothyroidism, and " CAD  presenting from [facility] with encephalopathy. Unclear cause of encephalopathy at this time, though acute presentation with concurrent parkinsonism suggests autoimmune etiology. Motion artifact on MRI brain. On initial exam, was disoriented to place, time, and situation, minimally responsive and will answer with one word, asterixis in his upper extremities, axial rigidity/extremity/rigidity, masked facies, and intention tremor most notably in the left hand. No hyperreflexia. Ptosis of left eye though no extraocular movement abnormalities/pupillary discrepancy.     DDX: autoimmune Parkinsonism; PRES or PML (both possible with Stelara); SREAT +/-  superimposed catatonia or complex bereavement. 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 There have been reported cases of SREAT with normal fT4, so normal level does not exclude this diagnosis.    Repeat MRI brain unrevealing (noting T2 FLAIR changes are artifact given same changes on T1.)    With highly elevated NFL, we are concerned for a rapidly progressive underlying neurodegenerative condition, although our testing has not revealed an actual diagnosis. We could consider a repeat LP but don't think that it would have diagnostic yield nor . Recommending Palliative Care consult to thoroughly assess GOC (ie more invasive testing like repeat LP). We do not think it is unreasonable to place a PEG tube to improve nutrition status and see if mentation improves.    LABS  Serum  Pending: Vit E  WNL: strongyloides IgG, Treponema, negative celiac dx serology, zinc, folate, B12, copper, B1, autoimmune encephalopathy panel, HIV, Vit D, Ma2 ab, heavy metals  Serum MDS2 panel was mildly positive for GAD65 - level not high enough to be clinically relevant    On Movement Disorders panel, not on autoimmune encephalopathy panel = CCPQ (formerly known as P/Q type VGCC,) GRAF1, Septin5, ITPR1, AP3B2, KLHL11  Dopamine-2 and [place]-3 not commercially available per lab  Abnormal: elevated anti-thyroglobulin & anti-TPO, elevated NFL (392), intermediate pTau-217 (0.203)    CSF  Pending: None  WNL: AFB, meningitis-encephalitis panel, VDRL, culture, glucose, LDH, cytology, autoimmune encephalopathy panel  Abnormal: protein (60), pleocytosis (after correction for elevated RBC)    Recommendations:  - S/p 5-day course of IVIG (EOT 5/12/2025)  - S/p IVMP 1000 mg (EOT 5/21/2025)  - Recommend Palliative Care consult to clarify GOC  - Continue B12 repletion - 1000 mcg po daily  - Continue to empirically treat B1 deficiency  - Continue Sinemet 25 -100 mg 2 tablets TID  - F/u pending labs as above  - Appreciate Psychiatry assistance  - Delirium and fall precautions   - Concern for inadequate nutritional intake: would consider alternative routes of nutrition (PEG, etc.)    We will sign off. Please reach out with any questions or if further evaluation aligns with GOC after Palliative Care discussion.         VTE Risk Mitigation (From admission, onward)           Ordered     enoxaparin injection 40 mg  Every 24 hours         05/20/25 0906     IP VTE HIGH RISK PATIENT  Once         05/01/25 0827     Place sequential compression device  Until discontinued         05/01/25 0827                    Abdirashid Collier MD  Neurology  [facility] - Acute Medical Stepdown       [1]   Current Facility-Administered Medications   Medication Dose Route Frequency Provider Last Rate Last Admin    acetaminophen tablet 1,000 mg  1,000 mg Oral Q8H PRN Zuly Wheat PA-C   1,000 mg at 05/12/25 1554    acetaminophen tablet 650 mg  650 mg Oral Q4H PRN Zuly Wheat PA-C   650 mg at 05/04/25 1453    ALPRAZolam tablet 0.5 mg  0.5 mg Oral BID PRN Zuly Wheat PA-C   0.5 mg at 05/14/25 1626    carbidopa-levodopa   mg per tablet 2 tablet  2 tablet Oral TID Elian López MD   2 tablet at 05/21/25 0921    cyanocobalamin tablet 1,000 mcg  1,000 mcg Oral Daily Zina Tarango MD   1,000 mcg at 05/21/25 0921    dextrose 50% injection 12.5 g  12.5 g Intravenous PRN Zuly Wheat PA-C        dextrose 50% injection 25 g  25 g Intravenous PRN Zuly Wheat PA-C        enoxaparin injection 40 mg  40 mg Subcutaneous Q24H (prophylaxis, 1700) Roseline Brizuela DO   40 mg at 05/20/25 1732    glucagon (human recombinant) injection 1 mg  1 mg Intramuscular PRN Zuly Wheat PA-C        glucose chewable tablet 16 g  16 g Oral PRN Zuly Wheat PA-C        glucose chewable tablet 24 g  24 g Oral PRN Zuly Wheat PA-C        insulin aspart U-100 pen 0-5 Units  0-5 Units Subcutaneous QID (AC + HS) PRN Saadia Quiñones MD        LORazepam injection 2 mg  2 mg Intravenous On Call Procedure Jaden Carvajal DO        melatonin tablet 6 mg  6 mg Oral Nightly PRN Zuly Wheat PA-C        methylPREDNISolone sodium succinate (SOLU-MEDROL) 1,000 mg in 0.9% NaCl 100 mL IVPB  1,000 mg Intravenous Daily Jaden Carvajal  mL/hr at 05/21/25 0909 1,000 mg at 05/21/25 0909    mirtazapine tablet 15 mg  15 mg Oral QHS Saadia Quiñones MD   15 mg at 05/20/25 2117    naloxone 0.4 mg/mL injection 0.02 mg  0.02 mg Intravenous PRN Zuly Wheat PA-C        NIFEdipine 24 hr tablet 60 mg  60 mg Oral Daily Saadia Quiñones MD   60 mg at 05/21/25 0921    ondansetron disintegrating tablet 8 mg  8 mg Oral Q8H PRN Zuly Wheat PA-C        pantoprazole injection 40 mg  40 mg Intravenous BID Saadia Quiñones MD   40 mg at 05/21/25 0904    polyethylene glycol packet 17 g  17 g Oral BID Zuly Wheat PA-C   17 g at 05/21/25 0921    prochlorperazine injection Soln 5 mg  5 mg Intravenous Q6H PRN Zuly Wheat PA-C        sodium chloride 0.9% flush 10 mL  10 mL Intravenous Q12H PRN Zuly Wheat PA-C        sodium chloride 0.9% flush 10  mL  10 mL Intravenous PRN Pedro Ash MD        thiamine tablet 100 mg  100 mg Oral Daily Saadia Quiñones MD   100 mg at 05/21/25 5985

## 2025-05-21 NOTE — ANESTHESIA PREPROCEDURE EVALUATION
[facility]  Anesthesia Pre-Operative Evaluation         Patient Name: Vince Huffman  YOB: 1945  MRN: [id number]    SUBJECTIVE:     Pre-operative evaluation for Procedure(s) (LRB):  EGD (ESOPHAGOGASTRODUODENOSCOPY) (N/A)     05/21/2025    Vince Huffman is a 79 y.o. male w/ a significant PMHx of Crohn's, CAD, hypothyroidism, HTN, MDD, Anemia, who was admitted with encephalopathy, now receiving IVIG.     Patient now presents for the above procedure(s).    Echo Summary  No results found for this or any previous visit.       Prev airway: None documented.    LDA:        Peripheral IV - Single Lumen 05/20/25 1130 22 G 1 in No Right Antecubital (Active)   Site Assessment Clean;Dry;Intact 05/21/25 0400   Line Securement Device Secured with sutureless device 05/20/25 1130   Extremity Assessment Distal to IV No abnormal discoloration 05/20/25 1130   Line Status Saline locked 05/21/25 0400   Dressing Status Clean;Dry;Intact 05/21/25 0400   Dressing Intervention Integrity maintained 05/21/25 0400   Dressing Change Due 05/24/25 05/20/25 1130   Site Change Due 05/24/25 05/20/25 1130   Reason Not Rotated Not due 05/20/25 1130   Number of days: 0       Male External Urinary Catheter 04/30/25 1856 (Active)   Collection Container Urimeter 05/20/25 2000   Securement Method secured to top of thigh w/ adhesive device 05/20/25 2000   Skin no redness;no breakdown 05/20/25 2000   Tolerance no signs/symptoms of discomfort 05/20/25 2000   Output (mL) 650 mL 05/19/25 1600   Catheter Change Date 05/16/25 05/16/25 1000   Catheter Change Time 0945 05/16/25 1000   Number of days: 20       Drips: None documented.      Problem List[1]    Review of patient's allergies indicates:   Allergen Reactions    Gluten Diarrhea    Lactose        Current Inpatient Medications:   carbidopa-levodopa  mg  2 tablet Oral TID    cyanocobalamin  1,000 mcg Oral Daily    enoxparin  40 mg Subcutaneous Q24H (prophylaxis, 1700)     methylPREDNISolone injection (PEDS and ADULTS)  1,000 mg Intravenous Daily    mirtazapine  15 mg Oral QHS    NIFEdipine  60 mg Oral Daily    pantoprazole  40 mg Intravenous BID    polyethylene glycol  17 g Oral BID    thiamine  100 mg Oral Daily       Medications Ordered Prior to Encounter[2]    Past Surgical History:   Procedure Laterality Date    ANGIOGRAM, CORONARY, WITH LEFT HEART CATHETERIZATION      APPENDECTOMY      APPENDECTOMY  2007    COLONOSCOPY N/A     ENDOSCOPY N/A     MAGNETIC RESONANCE IMAGING N/A 5/17/2025    Procedure: MRI (Magnetic Resonance Imagine);  Surgeon: SurgeonMervat;  [facility];  Service: Anesthesiology;  Laterality: N/A;    RIGHT HEMICOLECTOMY  2013    SMALL INTESTINE SURGERY  2007    30.5 cm of small bowel removed       Social History:  Tobacco Use: Low Risk  (4/30/2025)    Patient History     Smoking Tobacco Use: Never     Smokeless Tobacco Use: Never     Passive Exposure: Not on file   Recent Concern: Tobacco Use - Medium Risk (4/25/2025)    Received from [facility]    Patient History     Smoking Tobacco Use: Former     Smokeless Tobacco Use: Never     Passive Exposure: Not on file      Alcohol Use: Not At Risk (5/2/2025)    AUDIT-C     Frequency of Alcohol Consumption: Never     Average Number of Drinks: Patient does not drink     Frequency of Binge Drinking: Never        OBJECTIVE:     Vital Signs Range (Last 24H):  Temp:  [35.7 °C (96.2 °F)-36.6 °C (97.9 °F)]   Pulse:  []   Resp:  [14-20]   BP: (136-178)/(73-93)   SpO2:  [97 %-100 %]       Significant Labs:  Lab Results   Component Value Date    WBC 8.14 05/21/2025    HGB 13.7 (L) 05/21/2025    HCT 41.4 05/21/2025     05/21/2025    ALT 11 05/21/2025    AST 27 05/21/2025     (L) 05/21/2025    K 4.2 05/21/2025     05/21/2025    CREATININE 1.0 05/21/2025    BUN 31 (H) 05/21/2025    CO2 25 05/21/2025    TSH 0.710 05/15/2025    INR 1.0 05/21/2025       Diagnostic Studies: No relevant  studies.    EKG:   Results for orders placed or performed during the hospital encounter of 04/30/25   EKG 12-lead    Collection Time: 05/01/25  3:25 PM   Result Value Ref Range    QRS Duration 78 ms    [facility] QTC Calculation 461 ms    Narrative    Test Reason : R00.0,    Vent. Rate : 128 BPM     Atrial Rate : 128 BPM     P-R Int : 144 ms          QRS Dur :  78 ms      QT Int : 316 ms       P-R-T Axes :  50 -57  63 degrees    QTcB Int : 461 ms    Sinus tachycardia  Left anterior fascicular block  Minimal voltage criteria for LVH, may be normal variant ( Adriano product )  Abnormal ECG  When compared with ECG of 30-Apr-2025 12:09,  No significant change was found  Confirmed by Jamir Joe ([id number]) on 5/1/2025 4:14:39 PM    Referred By: AAAREFERRAL SELF           Confirmed By: Jamir Joe       2D ECHO:  TTE:  No results found for this or any previous visit.    MARIANA:  No results found for this or any previous visit.    ASSESSMENT/PLAN:         Pre-op Assessment    I have reviewed the Patient Summary Reports.     I have reviewed the Nursing Notes. I have reviewed the NPO Status.   I have reviewed the Medications.   Prednisone    Review of Systems  Anesthesia Hx:  No problems with previous Anesthesia   History of prior surgery of interest to airway management or planning:          Denies Family Hx of Anesthesia complications.    Denies Personal Hx of Anesthesia complications.                    Social:  Non-Smoker, No Alcohol Use       Hematology/Oncology:       -- Anemia:                                  Cardiovascular:  Exercise tolerance: good   Hypertension   CAD    Denies CABG/stent.     Denies CHF.    no hyperlipidemia   ECG has been reviewed.                            Pulmonary:    Denies COPD.  Denies Asthma.     Denies Sleep Apnea.                Renal/:   Denies Chronic Renal Disease.                Hepatic/GI:   PUD,   Denies GERD.   Crohns             Neurological:    Denies CVA.    Denies Headaches.  Denies Seizures.    Encephalopathy                            Endocrine:  Denies Diabetes. Hypothyroidism        Denies Obesity / BMI > 30  Psych:  Denies Psychiatric History.  depression                Physical Exam  General: Cachexia, Lethargic, Confusion and Somnolent    Airway:  Mallampati: unable to assess   Mouth Opening: Normal  TM Distance: Normal  Tongue: Normal  Neck ROM: Normal ROM    Dental:  Intact        Anesthesia Plan  Type of Anesthesia, risks & benefits discussed:    Anesthesia Type: Gen ETT, MAC, Gen Natural Airway  Intra-op Monitoring Plan: Standard ASA Monitors  Post Op Pain Control Plan: multimodal analgesia and IV/PO Opioids PRN  Induction:  IV  Airway Plan: Direct and Video, Post-Induction  Informed Consent: Informed consent signed with the Patient representative and all parties understand the risks and agree with anesthesia plan.  All questions answered.   ASA Score: 3  Day of Surgery Review of History & Physical: H&P Update referred to the surgeon/provider.    Ready For Surgery From Anesthesia Perspective.     .           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     Current Outpatient Medications on File Prior to Encounter   Medication Sig Dispense Refill    acetaminophen (TYLENOL) 500 MG tablet Take 500 mg by mouth every 6 (six) hours as needed.      ALPRAZolam (XANAX XR) 0.5 MG Tb24 Take 0.5 mg by mouth once daily.      amLODIPine (NORVASC) 10 MG tablet  Take 1 tablet (10 mg total) by mouth once daily.      ustekinumab (STELARA) 90 mg/mL Syrg syringe Inject 1 mL (90 mg total) into the skin every 8 weeks. 2 mL 2

## 2025-05-21 NOTE — SUBJECTIVE & OBJECTIVE
Interval History:     No acute events overnight.  Seen at bedside this morning, oriented to self only. Denies any discomfort. No complaints at this time. Discussed PEG tube placement with daughter and GI; all agreeable to placement. Initially planned for 5/21, but delayed to 5/22.      Review of Systems   Constitutional:  Negative for chills, fatigue and fever.   Respiratory:  Positive for cough. Negative for shortness of breath.    Cardiovascular:  Negative for chest pain, palpitations and leg swelling.   Gastrointestinal:  Negative for abdominal pain, blood in stool, diarrhea, nausea and vomiting.   Genitourinary:  Negative for dysuria and urgency.   Neurological:  Negative for dizziness and headaches.   All other systems reviewed and are negative.    Objective:     Vital Signs (Most Recent):  Temp: 97 °F (36.1 °C) (05/21/25 1126)  Pulse: 79 (05/21/25 1126)  Resp: 18 (05/21/25 1126)  BP: (!) 143/70 (05/21/25 1126)  SpO2: 97 % (05/21/25 1126) Vital Signs (24h Range):  Temp:  [96.2 °F (35.7 °C)-97.9 °F (36.6 °C)] 97 °F (36.1 °C)  Pulse:  [] 79  Resp:  [14-20] 18  SpO2:  [97 %-100 %] 97 %  BP: (136-178)/(70-93) 143/70     Weight: 64.1 kg (141 lb 5 oz)  Body mass index is 19.71 kg/m².  No intake or output data in the 24 hours ending 05/21/25 1151     Physical Exam  Constitutional:       Appearance: He is ill-appearing.   HENT:      Head: Normocephalic and atraumatic.   Cardiovascular:      Rate and Rhythm: Normal rate and regular rhythm.      Heart sounds: No murmur heard.  Pulmonary:      Effort: Pulmonary effort is normal. No respiratory distress.      Breath sounds: Normal breath sounds. No wheezing or rales.   Abdominal:      General: There is no distension.      Palpations: Abdomen is soft.      Tenderness: There is no abdominal tenderness.   Musculoskeletal:         General: No deformity.   Skin:     General: Skin is warm and dry.      Coloration: Skin is pale.   Neurological:      General: No focal  deficit present.      Comments: Able to have a conversation, oriented to person         MELD 3.0: 8 at 5/21/2025  4:13 AM  MELD-Na: 6 at 5/21/2025  4:13 AM  Calculated from:  Serum Creatinine: 1 mg/dL at 5/21/2025  4:13 AM  Serum Sodium: 135 mmol/L at 5/21/2025  4:13 AM  Total Bilirubin: 0.4 mg/dL (Using min of 1 mg/dL) at 5/21/2025  4:13 AM  Serum Albumin: 3.1 g/dL at 5/21/2025  4:13 AM  INR(ratio): 1 at 5/21/2025  4:13 AM  Age at listing (hypothetical): 79 years  Sex: Male at 5/21/2025  4:13 AM      Significant Labs:  CBC:  Recent Labs   Lab 05/19/25 2327 05/20/25 0618 05/21/25 0413   WBC  --  11.42 8.14   HGB 12.3* 12.6* 13.7*   HCT 37.0* 37.3* 41.4   PLT  --  290 290     CMP:  Recent Labs   Lab 05/20/25 0618 05/21/25 0413   * 135*   K 4.5 4.2    101   CO2 25 25   GLU 97 92   BUN 34* 31*   CREATININE 1.0 1.0   CALCIUM 10.7* 10.4   PROT 8.4 8.6*   ALBUMIN 2.9* 3.1*   BILITOT 0.4 0.4   ALKPHOS 74 78   AST 31 27   ALT 22 11   ANIONGAP 8 9     PTINR:  Recent Labs   Lab 05/20/25 0618 05/21/25  0413   INR 1.0 1.0

## 2025-05-21 NOTE — TELEPHONE ENCOUNTER
Planning for G tube placement 5/22/2025.   No concerns of confusion being caused by Stelara per GI.  MLC

## 2025-05-21 NOTE — PROGRESS NOTES
[facility] - [facility] Medicine  Progress Note    Patient Name: Vince Huffman  MRN: [id number]  Patient Class: IP- Inpatient   Admission Date: 4/30/2025  Length of Stay: 20 days  Attending Physician: Roseline Brizuela DO  Primary Care Provider: Leland Porras MD    Subjective     Principal Problem:Acute encephalopathy    HPI:  By Dr. Juna F Ramírez MD    80 yo M w/HTN, hypothyroid, crohn's disease, CAD, hx of SBO, presenting with AMS from [facility]. Patient was sent in from [facility] for progressively worsening cognitive function. Patient is A&O x1 at his baseline. Patient and his daughter endorse mild confusion. Which has been progressive.    Patient has not had any recent falls. Endorsed mild suprapubic abdominal pain. No UTI. Mild inflammation on CT, not unexpected in the setting of Crohn's.     Overview/Hospital Course:  Mr. Huffman was admitted to  for further evaluation of worsening AMS per rehab facility. AAOx1 on admission, normally AAOx4 prior to 4/20 per son. UA negative. CXR clear. CT abd/pelvis with wall thickening of distal ileum, inflammatory infectious ileitis are considerations, fecal distention of the rectum, impaction not excluded. Discussed with GI, anticipated these are chronic findings. CRP negative. Will give enema and monitor for improvement. Patient with successful BM. Neurology consulted: MRI brain ordered (no acute findings, motion artifact), extended EEG, ammonia levels. Worsening mental status on 5/2, medicine team consulted for LP which was unsuccessful. More alert on 5/3 and answering yes/no questions when redirected. Neurology recommending carbidopa-levodopa trial, EEG, and attempting another LP.  Symptoms improved with increasing doses of Carbidopa/Levodopa. LP concerning for meningitis so he was started on Empiric treatment on 5/6/25. Discussion held with both Neurology and Infectious Disease, Infectious Disease does not believe patient has any  infectious etiology and as such recommended discontinuing antimicrobials.  Neurology started IVIG for presumed autoimmune process.  Completed 5 day course of IVIG 5/14. Minimal improvement in cognition. Had bouts of poor oral intake, only drank boost.  Had intermittent hyponatremia responding to IV fluids.  On 05/14 am Sodium 124.  Nephrology was consulted, investigations ordered and sodium corrected with close electrolyte monitoring.  Psych was consulted by the request of the neurologist. Hyponatremia improved after stopping fluids. NG tube attempted thrice at bedside however not able to be placed. Able to tolerate oral intake however very limited intake. Neuro recommended repeat MRI with sedation as previous had artifact. Anesthesia notified to arrange with sedation which was done on 5/17.  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 Endocrine consulted. Neurology planning 5 day course of IV Solumedrol starting 5/17.   He was started on Vitamin B12 and Thiamine replacement.    Steroids paused on 5/19 given concern for upper GI bleeding. PPI started and GI consulted. GI team does not share same concern for bleeding and hemoglobin notably stable despite elevated BUN, which could be coincidence. Will continue PPI for now. Steroids resumed with PPI ongoing on 5/20 (day 4/5 of steroids). Discussed role of G-tube with family and GI and everyone agreeable to placement. Plan for G tube placement on 5/22.     Interval History:     No acute events overnight.  Seen at bedside this morning, oriented to self only. Denies any discomfort. No complaints at this time. Discussed PEG tube placement with daughter and GI; all agreeable to placement. Initially planned for 5/21, but delayed to 5/22.      Review of Systems   Constitutional:  Negative for chills, fatigue and fever.   Respiratory:  Positive for cough. Negative for shortness of breath.    Cardiovascular:  Negative  for chest pain, palpitations and leg swelling.   Gastrointestinal:  Negative for abdominal pain, blood in stool, diarrhea, nausea and vomiting.   Genitourinary:  Negative for dysuria and urgency.   Neurological:  Negative for dizziness and headaches.   All other systems reviewed and are negative.    Objective:     Vital Signs (Most Recent):  Temp: 97 °F (36.1 °C) (05/21/25 1126)  Pulse: 79 (05/21/25 1126)  Resp: 18 (05/21/25 1126)  BP: (!) 143/70 (05/21/25 1126)  SpO2: 97 % (05/21/25 1126) Vital Signs (24h Range):  Temp:  [96.2 °F (35.7 °C)-97.9 °F (36.6 °C)] 97 °F (36.1 °C)  Pulse:  [] 79  Resp:  [14-20] 18  SpO2:  [97 %-100 %] 97 %  BP: (136-178)/(70-93) 143/70     Weight: 64.1 kg (141 lb 5 oz)  Body mass index is 19.71 kg/m².  No intake or output data in the 24 hours ending 05/21/25 1151     Physical Exam  Constitutional:       Appearance: He is ill-appearing.   HENT:      Head: Normocephalic and atraumatic.   Cardiovascular:      Rate and Rhythm: Normal rate and regular rhythm.      Heart sounds: No murmur heard.  Pulmonary:      Effort: Pulmonary effort is normal. No respiratory distress.      Breath sounds: Normal breath sounds. No wheezing or rales.   Abdominal:      General: There is no distension.      Palpations: Abdomen is soft.      Tenderness: There is no abdominal tenderness.   Musculoskeletal:         General: No deformity.   Skin:     General: Skin is warm and dry.      Coloration: Skin is pale.   Neurological:      General: No focal deficit present.      Comments: Able to have a conversation, oriented to person         MELD 3.0: 8 at 5/21/2025  4:13 AM  MELD-Na: 6 at 5/21/2025  4:13 AM  Calculated from:  Serum Creatinine: 1 mg/dL at 5/21/2025  4:13 AM  Serum Sodium: 135 mmol/L at 5/21/2025  4:13 AM  Total Bilirubin: 0.4 mg/dL (Using min of 1 mg/dL) at 5/21/2025  4:13 AM  Serum Albumin: 3.1 g/dL at 5/21/2025  4:13 AM  INR(ratio): 1 at 5/21/2025  4:13 AM  Age at listing (hypothetical): 79  years  Sex: Male at 5/21/2025  4:13 AM      Significant Labs:  CBC:  Recent Labs   Lab 05/19/25  2327 05/20/25  0618 05/21/25  0413   WBC  --  11.42 8.14   HGB 12.3* 12.6* 13.7*   HCT 37.0* 37.3* 41.4   PLT  --  290 290     CMP:  Recent Labs   Lab 05/20/25 0618 05/21/25  0413   * 135*   K 4.5 4.2    101   CO2 25 25   GLU 97 92   BUN 34* 31*   CREATININE 1.0 1.0   CALCIUM 10.7* 10.4   PROT 8.4 8.6*   ALBUMIN 2.9* 3.1*   BILITOT 0.4 0.4   ALKPHOS 74 78   AST 31 27   ALT 22 11   ANIONGAP 8 9     PTINR:  Recent Labs   Lab 05/20/25 0618 05/21/25 0413   INR 1.0 1.0         Assessment & Plan  Acute encephalopathy  AMS (altered mental status)  Progressive acutely worsening confusion/tremors since 4/20. Previously AAOx4, now AAOx1  Neurology consulted: MRI brain ordered (no acute findings, motion artifact), extended EEG, ammonia levels.   Worsening mental status on 5/2, medicine team consulted for LP which was unsuccessful.   Neurology recommending carbidopa-levodopa trial, EEG, and attempting another LP.    Symptoms improved with increasing doses of Carbidopa/Levodopa.   LP concerning for meningitis so he was started on Empiric treatment on 5/6/25, viral panel negative  Discussion held with both Neurology and Infectious Disease, Infectious Disease does not believe patient has any infectious etiology and as such recommended discontinuing antimicrobials.    Neurology started IVIG for presumed autoimmune process.  Completed 5 day course of IVIG 5/14. Minimal improvement in cognition.   Neuro recommended repeat MRI with sedation as previous had artifact. Anesthesia notified to arrange with sedation which was done on 5/17.    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 Endocrine consulted.   Neurology planning 5 day course of IV Solumedrol starting 5/17 - paused on 5/19, resumed 5/20 (Today is Day 4/5)   Continue Vitamin B12 and Thiamine  replacement.  Progressive acutely worsening confusion/tremors since 4/20. Previously AAOx4, now AAOx1  Neurology consulted: MRI brain ordered (no acute findings, motion artifact), extended EEG, ammonia levels.   Worsening mental status on 5/2, medicine team consulted for LP which was unsuccessful.   Neurology recommending carbidopa-levodopa trial, EEG, and attempting another LP.    Symptoms improved with increasing doses of Carbidopa/Levodopa.   LP concerning for meningitis so he was started on Empiric treatment on 5/6/25, viral panel negative  Discussion held with both Neurology and Infectious Disease, Infectious Disease does not believe patient has any infectious etiology and as such recommended discontinuing antimicrobials.    Neurology started IVIG for presumed autoimmune process.  Completed 5 day course of IVIG 5/14. Minimal improvement in cognition.   Neuro recommended repeat MRI with sedation as previous had artifact. Anesthesia notified to arrange with sedation which was done on 5/17.    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 Endocrine consulted.   Neurology planning 5 day course of IV Solumedrol starting 5/17 - paused on 5/19, resumed 5/20 (Today is Day 5/5)   Continue Vitamin B12 and Thiamine replacement.  Progressive acutely worsening confusion/tremors since 4/20. Previously AAOx4, now AAOx1  Neurology consulted: MRI brain ordered (no acute findings, motion artifact), extended EEG, ammonia levels.   Worsening mental status on 5/2, medicine team consulted for LP which was unsuccessful.   Neurology recommending carbidopa-levodopa trial, EEG, and attempting another LP.    Symptoms improved with increasing doses of Carbidopa/Levodopa.   LP concerning for meningitis so he was started on Empiric treatment on 5/6/25, viral panel negative  Discussion held with both Neurology and Infectious Disease, Infectious Disease does not believe patient has any  infectious etiology and as such recommended discontinuing antimicrobials.    Neurology started IVIG for presumed autoimmune process.  Completed 5 day course of IVIG 5/14. Minimal improvement in cognition.   Neuro recommended repeat MRI with sedation as previous had artifact. Anesthesia notified to arrange with sedation which was done on 5/17.    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 Endocrine consulted.   Neurology planning 5 day course of IV Solumedrol starting 5/17 - paused on 5/19, resumed 5/20 (Today is Day 4/5)   Continue Vitamin B12 and Thiamine replacement.  Progressive acutely worsening confusion/tremors since 4/20. Previously AAOx4, now AAOx1  Neurology consulted: MRI brain ordered (no acute findings, motion artifact), extended EEG, ammonia levels.   Worsening mental status on 5/2, medicine team consulted for LP which was unsuccessful.   Neurology recommending carbidopa-levodopa trial, EEG, and attempting another LP.    Symptoms improved with increasing doses of Carbidopa/Levodopa.   LP concerning for meningitis so he was started on Empiric treatment on 5/6/25, viral panel negative  Discussion held with both Neurology and Infectious Disease, Infectious Disease does not believe patient has any infectious etiology and as such recommended discontinuing antimicrobials.    Neurology started IVIG for presumed autoimmune process.  Completed 5 day course of IVIG 5/14. Minimal improvement in cognition.   Neuro recommended repeat MRI with sedation as previous had artifact. Anesthesia notified to arrange with sedation which was done on 5/17.    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 Endocrine consulted.   Neurology planning 5 day course of IV Solumedrol starting 5/17 - paused on 5/19, resumed 5/20 (Today is Day 5/5)   Continue Vitamin B12 and Thiamine replacement.  Crohn's disease  GIB  "(gastrointestinal bleeding)  Per CT scan "thickening of the distal ileum to the surgical anastomosis with the large bowel. Inflammatory infectious ileitis are considerations. Recommend clinical correlation and follow-up. "  CRP negative. No concern for flare.   GI without concerns of confusion being caused by sterlara, confirmed with pharmacy   On 5/19 developed new onset dark stools  Has been on Lovenox, IV Solumedrol and PO PPI  Pantoprazole 80mg IV x 1 then PPI 40mg IV BID  GI consulted  Repeat H/H at 2pm  Will transfuse if Hgb is <7g/dl (<8g/dl in cases of active ACS) or if patient has rapid bleeding leading to hemodynamic instability  IBD team recommending CTE vs MRE when patient able to take po and improving  Will need re-staging colonoscopy, likely outpatient     Recent Labs     05/19/25  0906 05/19/25 2327 05/20/25  0618 05/21/25  0413   HGB 12.5* 12.3* 12.6* 13.7*   HCT 36.6* 37.0* 37.3* 41.4     --  290 290   INR 1.0  --  1.0 1.0   CRP negative. No concern for flare.   GI without concerns of confusion being caused by sterlara, confirmed with pharmacy   On 5/19 developed new onset dark stools  Has been on Lovenox, IV Solumedrol and PO PPI  Pantoprazole 80mg IV x 1 then PPI 40mg IV BID  GI consulted  Repeat H/H at 2pm  Will transfuse if Hgb is <7g/dl (<8g/dl in cases of active ACS) or if patient has rapid bleeding leading to hemodynamic instability    Recent Labs     Recent Labs     05/19/25  0906 05/19/25 2327 05/20/25  0618 05/21/25  0413   HGB 12.5* 12.3* 12.6* 13.7*   HCT 36.6* 37.0* 37.3* 41.4     --  290 290   INR 1.0  --  1.0 1.0   CRP negative. No concern for flare.   GI without concerns of confusion being caused by sterlara, confirmed with pharmacy   On 5/19 developed new onset dark stools  Has been on Lovenox, IV Solumedrol and PO PPI  Pantoprazole 80mg IV x 1 then PPI 40mg IV BID  GI consulted  Repeat H/H at 2pm  Will transfuse if Hgb is <7g/dl (<8g/dl in cases of active ACS) or if " patient has rapid bleeding leading to hemodynamic instability  IBD team recommending CTE vs MRE when patient able to take po and improving  Will need re-staging colonoscopy, likely outpatient     Recent Labs     Recent Labs     05/19/25 0906 05/19/25 2327 05/20/25 0618 05/21/25  0413   HGB 12.5* 12.3* 12.6* 13.7*   HCT 36.6* 37.0* 37.3* 41.4     --  290 290   INR 1.0  --  1.0 1.0   On 5/19 developed new onset dark stools  Has been on Lovenox, IV Solumedrol and PO PPI  Pantoprazole 80mg IV x 1 then PPI 40mg IV BID  GI consulted  Repeat H/H at 2pm  Will transfuse if Hgb is <7g/dl (<8g/dl in cases of active ACS) or if patient has rapid bleeding leading to hemodynamic instability    Recent Labs     Recent Labs     05/19/25 0906 05/19/25 2327 05/20/25 0618 05/21/25  0413   HGB 12.5* 12.3* 12.6* 13.7*   HCT 36.6* 37.0* 37.3* 41.4     --  290 290   INR 1.0  --  1.0 1.0   NIFEdipine 24 hr tablet 60 mg, Daily, Oral    Plan  - BP is controlled, no changes needed to their regimen  Changed from Norvasc to Procardia given Psych recs    Generalized weakness  Sent from SNF  Progressive worsening weakness per son   PT/OT ordered. Will need placement at discharge  Unintended weight loss  Reported per son  Recently started gluten free diet per recommendations from GI  Temporal wasting noted  Body mass index is 19.71 kg/m².  Nutrition following  Boost changed to Boost Breeze given lactose intolerance  Hyponatremia  Hyponatremia is likely due to Dehydration/hypovolemia. The patient's most recent sodium results are listed below.  Recent Labs     05/19/25 0906 05/20/25 0618 05/21/25 0413    133* 135*     Maxime  - Monitor sodium Daily.   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 Most recent hemoglobin and hematocrit are listed below.  Recent Labs     05/19/25 2327 05/20/25 0618 05/21/25  0413   HGB 12.3* 12.6* 13.7*   HCT  37.0* 37.3* 41.4     Plan  - Monitor serial CBC: Daily  - Transfuse PRBC if patient becomes hemodynamically unstable, symptomatic or H/H drops below 7/21.  - Patient has not received any PRBC transfusions to date  - Patient's anemia is currently stable  -  Parkinsonism  Suspect symptoms are at least partially due to Parkinson's disease.   Continue Sinemet TID  CAD (coronary artery disease)  History noted.   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 Will hold anti-depressant medications. We will consult psychiatry at this time. Patient does not display psychosis at this time. Continue to monitor closely and adjust plan of care as needed.  Anemia  Anemia is likely due to acute on chronic illness. Most recent hemoglobin and hematocrit are listed below.  Recent Labs     05/19/25  2327 05/20/25  0618 05/21/25  0413   HGB 12.3* 12.6* 13.7*   HCT 37.0* 37.3* 41.4     Plan  - Monitor serial CBC: Daily  - Transfuse PRBC if patient becomes hemodynamically unstable, symptomatic or H/H drops below 7/21.  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 Most recent weight and BMI monitored-     Measurements:  Wt Readings from Last 1 Encounters:   05/06/25 64.1 kg (141 lb 5 oz)   Body mass index is 19.71 kg/m².    Patient has been screened and assessed by RD.    Malnutrition Type:  Context:    Level:      Malnutrition Characteristic Summary:       Interventions/Recommendations (treatment strategy):  1. Continuous tube feeds of Jevity 1.5 with a goal rate of 45 ml/hr x 24 hours to meet > 75% of  kcal/protein needs - provides Total volume: 1080 -Kcal: 1620 - Protein 73gm Free water: 840 mL. Additional free fluid flushes per MD. Patient currently has 1200cc fluid restriction. 2. Monitor for s/s TF intolerance.    - Discussed G tube placement with daughter/MPOA and GI, planned for placement on 5/22    VTE Risk Mitigation (From admission, onward)           Ordered     enoxaparin injection 40 mg  Every 24 hours         05/20/25 0906     IP VTE HIGH RISK PATIENT  Once         05/01/25 0827     Place sequential compression device  Until discontinued         05/01/25 0827                    Discharge Planning   ERNIE: 5/26/2025     Code Status: Full Code   Medical Readiness for Discharge Date:   Discharge Plan A: Rehab   Discharge Delays: None known at this time    Please place Justification for ASHVIN Brizuela DO  Department of Hospital Medicine   [facility] - Acute Medical Stepdown

## 2025-05-22 ENCOUNTER — ANESTHESIA (OUTPATIENT)
Dept: ENDOSCOPY | Facility: HOSPITAL | Age: 80
End: 2025-05-22
Payer: MEDICARE

## 2025-05-22 PROBLEM — Z51.5 PALLIATIVE CARE ENCOUNTER: Status: ACTIVE | Noted: 2025-05-22

## 2025-05-22 LAB
ABSOLUTE EOSINOPHIL (OHS): 0 K/UL
ABSOLUTE MONOCYTE (OHS): 1.02 K/UL (ref 0.3–1)
ABSOLUTE NEUTROPHIL COUNT (OHS): 5.89 K/UL (ref 1.8–7.7)
ANION GAP (OHS): 10 MMOL/L (ref 8–16)
BASOPHILS # BLD AUTO: 0.01 K/UL
BASOPHILS NFR BLD AUTO: 0.1 %
BUN SERPL-MCNC: 27 MG/DL (ref 8–23)
CALCIUM SERPL-MCNC: 10.3 MG/DL (ref 8.7–10.5)
CHLORIDE SERPL-SCNC: 100 MMOL/L (ref 95–110)
CO2 SERPL-SCNC: 24 MMOL/L (ref 23–29)
CREAT SERPL-MCNC: 1 MG/DL (ref 0.5–1.4)
ERYTHROCYTE [DISTWIDTH] IN BLOOD BY AUTOMATED COUNT: 11.7 % (ref 11.5–14.5)
GFR SERPLBLD CREATININE-BSD FMLA CKD-EPI: >60 ML/MIN/1.73/M2
GLUCOSE SERPL-MCNC: 81 MG/DL (ref 70–110)
HCT VFR BLD AUTO: 37.5 % (ref 40–54)
HGB BLD-MCNC: 12.6 GM/DL (ref 14–18)
IMM GRANULOCYTES # BLD AUTO: 0.04 K/UL (ref 0–0.04)
IMM GRANULOCYTES NFR BLD AUTO: 0.5 % (ref 0–0.5)
INR PPP: 1 (ref 0.8–1.2)
LYMPHOCYTES # BLD AUTO: 0.83 K/UL (ref 1–4.8)
MAGNESIUM SERPL-MCNC: 1.9 MG/DL (ref 1.6–2.6)
MCH RBC QN AUTO: 31.3 PG (ref 27–31)
MCHC RBC AUTO-ENTMCNC: 33.6 G/DL (ref 32–36)
MCV RBC AUTO: 93 FL (ref 82–98)
NUCLEATED RBC (/100WBC) (OHS): 0 /100 WBC
PLATELET # BLD AUTO: 287 K/UL (ref 150–450)
PMV BLD AUTO: 10.6 FL (ref 9.2–12.9)
POCT GLUCOSE: 102 MG/DL (ref 70–110)
POCT GLUCOSE: 114 MG/DL (ref 70–110)
POCT GLUCOSE: 84 MG/DL (ref 70–110)
POCT GLUCOSE: 88 MG/DL (ref 70–110)
POTASSIUM SERPL-SCNC: 4.3 MMOL/L (ref 3.5–5.1)
PROTHROMBIN TIME: 11.4 SECONDS (ref 9–12.5)
RBC # BLD AUTO: 4.03 M/UL (ref 4.6–6.2)
RELATIVE EOSINOPHIL (OHS): 0 %
RELATIVE LYMPHOCYTE (OHS): 10.7 % (ref 18–48)
RELATIVE MONOCYTE (OHS): 13.1 % (ref 4–15)
RELATIVE NEUTROPHIL (OHS): 75.6 % (ref 38–73)
SODIUM SERPL-SCNC: 134 MMOL/L (ref 136–145)
WBC # BLD AUTO: 7.79 K/UL (ref 3.9–12.7)

## 2025-05-22 PROCEDURE — 85610 PROTHROMBIN TIME: CPT | Performed by: STUDENT IN AN ORGANIZED HEALTH CARE EDUCATION/TRAINING PROGRAM

## 2025-05-22 PROCEDURE — 25000003 PHARM REV CODE 250: Performed by: HOSPITALIST

## 2025-05-22 PROCEDURE — 20600001 HC STEP DOWN PRIVATE ROOM

## 2025-05-22 PROCEDURE — 25000003 PHARM REV CODE 250: Performed by: NURSE PRACTITIONER

## 2025-05-22 PROCEDURE — 97116 GAIT TRAINING THERAPY: CPT

## 2025-05-22 PROCEDURE — 36415 COLL VENOUS BLD VENIPUNCTURE: CPT | Performed by: STUDENT IN AN ORGANIZED HEALTH CARE EDUCATION/TRAINING PROGRAM

## 2025-05-22 PROCEDURE — 25000003 PHARM REV CODE 250

## 2025-05-22 PROCEDURE — 83735 ASSAY OF MAGNESIUM: CPT | Performed by: STUDENT IN AN ORGANIZED HEALTH CARE EDUCATION/TRAINING PROGRAM

## 2025-05-22 PROCEDURE — 94761 N-INVAS EAR/PLS OXIMETRY MLT: CPT

## 2025-05-22 PROCEDURE — 99498 ADVNCD CARE PLAN ADDL 30 MIN: CPT | Mod: ,,,

## 2025-05-22 PROCEDURE — 99497 ADVNCD CARE PLAN 30 MIN: CPT | Mod: 25,,,

## 2025-05-22 PROCEDURE — 25000003 PHARM REV CODE 250: Performed by: STUDENT IN AN ORGANIZED HEALTH CARE EDUCATION/TRAINING PROGRAM

## 2025-05-22 PROCEDURE — 80048 BASIC METABOLIC PNL TOTAL CA: CPT | Performed by: STUDENT IN AN ORGANIZED HEALTH CARE EDUCATION/TRAINING PROGRAM

## 2025-05-22 PROCEDURE — 63600175 PHARM REV CODE 636 W HCPCS: Performed by: HOSPITALIST

## 2025-05-22 PROCEDURE — 25000003 PHARM REV CODE 250: Performed by: INTERNAL MEDICINE

## 2025-05-22 PROCEDURE — 85025 COMPLETE CBC W/AUTO DIFF WBC: CPT | Performed by: STUDENT IN AN ORGANIZED HEALTH CARE EDUCATION/TRAINING PROGRAM

## 2025-05-22 PROCEDURE — 99223 1ST HOSP IP/OBS HIGH 75: CPT | Mod: 25,,,

## 2025-05-22 PROCEDURE — 86901 BLOOD TYPING SEROLOGIC RH(D): CPT | Performed by: STUDENT IN AN ORGANIZED HEALTH CARE EDUCATION/TRAINING PROGRAM

## 2025-05-22 PROCEDURE — 63600175 PHARM REV CODE 636 W HCPCS: Performed by: STUDENT IN AN ORGANIZED HEALTH CARE EDUCATION/TRAINING PROGRAM

## 2025-05-22 PROCEDURE — 97112 NEUROMUSCULAR REEDUCATION: CPT | Mod: CO

## 2025-05-22 PROCEDURE — 99232 SBSQ HOSP IP/OBS MODERATE 35: CPT | Mod: ,,, | Performed by: PSYCHIATRY & NEUROLOGY

## 2025-05-22 PROCEDURE — 97530 THERAPEUTIC ACTIVITIES: CPT | Mod: CO

## 2025-05-22 RX ORDER — METHYLPHENIDATE HYDROCHLORIDE 5 MG/1
5 TABLET ORAL DAILY
Refills: 0 | Status: DISCONTINUED | OUTPATIENT
Start: 2025-05-22 | End: 2025-05-23

## 2025-05-22 RX ORDER — PANTOPRAZOLE SODIUM 40 MG/1
40 TABLET, DELAYED RELEASE ORAL
Status: CANCELLED | OUTPATIENT
Start: 2025-05-22

## 2025-05-22 RX ADMIN — NIFEDIPINE 60 MG: 30 TABLET, FILM COATED, EXTENDED RELEASE ORAL at 08:05

## 2025-05-22 RX ADMIN — MIRTAZAPINE 15 MG: 15 TABLET, FILM COATED ORAL at 08:05

## 2025-05-22 RX ADMIN — SODIUM CHLORIDE 250 MG: 9 INJECTION, SOLUTION INTRAVENOUS at 09:05

## 2025-05-22 RX ADMIN — ENOXAPARIN SODIUM 40 MG: 40 INJECTION SUBCUTANEOUS at 05:05

## 2025-05-22 RX ADMIN — CARBIDOPA AND LEVODOPA 2 TABLET: 25; 100 TABLET ORAL at 03:05

## 2025-05-22 RX ADMIN — Medication 100 MG: at 08:05

## 2025-05-22 RX ADMIN — PANTOPRAZOLE SODIUM 40 MG: 40 INJECTION, POWDER, FOR SOLUTION INTRAVENOUS at 08:05

## 2025-05-22 RX ADMIN — CYANOCOBALAMIN TAB 1000 MCG 1000 MCG: 1000 TAB at 08:05

## 2025-05-22 RX ADMIN — CARBIDOPA AND LEVODOPA 2 TABLET: 25; 100 TABLET ORAL at 08:05

## 2025-05-22 RX ADMIN — METHYLPHENIDATE HYDROCHLORIDE 5 MG: 5 TABLET ORAL at 12:05

## 2025-05-22 RX ADMIN — POLYETHYLENE GLYCOL 3350 17 G: 17 POWDER, FOR SOLUTION ORAL at 08:05

## 2025-05-22 RX ADMIN — Medication 6 MG: at 10:05

## 2025-05-22 RX ADMIN — ALPRAZOLAM 0.5 MG: 0.5 TABLET ORAL at 08:05

## 2025-05-22 NOTE — TELEPHONE ENCOUNTER
Spoke with patient's daughter, Radha. She was notified of NBP's remarks and voiced understanding. She said she just met with the palliative care team and they are talking palliative care hospice for patient. Radha wants to know how does Richard play in this? DMP

## 2025-05-22 NOTE — ASSESSMENT & PLAN NOTE
Hyponatremia is likely due to Dehydration/hypovolemia. The patient's most recent sodium results are listed below.  Recent Labs     05/20/25  0618 05/21/25  0413 05/22/25  0546   * 135* 134*     Maxime  - Monitor sodium Daily.   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 Most recent hemoglobin and hematocrit are listed below.  Recent Labs     05/20/25  0618 05/21/25  0413 05/22/25  0546   HGB 12.6* 13.7* 12.6*   HCT 37.3* 41.4 37.5*     Plan  - Monitor serial CBC: Daily  - Transfuse PRBC if patient becomes hemodynamically unstable, symptomatic or H/H drops below 7/21.  - Patient has not received any PRBC transfusions to date  - Patient's anemia is currently stable  -

## 2025-05-22 NOTE — ASSESSMENT & PLAN NOTE
"Per CT scan "thickening of the distal ileum to the surgical anastomosis with the large bowel. Inflammatory infectious ileitis are considerations. Recommend clinical correlation and follow-up. "  CRP negative. No concern for flare.   GI without concerns of confusion being caused by sterlara, confirmed with pharmacy   On 5/19 developed new onset dark stools  Has been on Lovenox, IV Solumedrol and PO PPI  Pantoprazole 80mg IV x 1 then PPI 40mg IV BID  GI consulted  Repeat H/H at 2pm  Will transfuse if Hgb is <7g/dl (<8g/dl in cases of active ACS) or if patient has rapid bleeding leading to hemodynamic instability  IBD team recommending CTE vs MRE when patient able to take po and improving  Will need re-staging colonoscopy, likely outpatient     Recent Labs     05/20/25  0618 05/21/25 0413 05/22/25  0546   HGB 12.6* 13.7* 12.6*   HCT 37.3* 41.4 37.5*    290 287   INR 1.0 1.0 1.0   CRP negative. No concern for flare.   GI without concerns of confusion being caused by sterlara, confirmed with pharmacy   On 5/19 developed new onset dark stools  Has been on Lovenox, IV Solumedrol and PO PPI  Pantoprazole 80mg IV x 1 then PPI 40mg IV BID  GI consulted  Repeat H/H at 2pm  Will transfuse if Hgb is <7g/dl (<8g/dl in cases of active ACS) or if patient has rapid bleeding leading to hemodynamic instability    Recent Labs     05/20/25  0618 05/21/25 0413 05/22/25  0546   HGB 12.6* 13.7* 12.6*   HCT 37.3* 41.4 37.5*    290 287   INR 1.0 1.0 1.0     Recent Labs     05/20/25  0618 05/21/25  0413 05/22/25  0546   HGB 12.6* 13.7* 12.6*   HCT 37.3* 41.4 37.5*    290 287   INR 1.0 1.0 1.0   CRP negative. No concern for flare.   GI without concerns of confusion being caused by sterlara, confirmed with pharmacy   On 5/19 developed new onset dark stools  Has been on Lovenox, IV Solumedrol and PO PPI  Pantoprazole 80mg IV x 1 then PPI 40mg IV BID  GI consulted  Repeat H/H at 2pm  Will transfuse if Hgb is <7g/dl (<8g/dl " in cases of active ACS) or if patient has rapid bleeding leading to hemodynamic instability  IBD team recommending CTE vs MRE when patient able to take po and improving  Will need re-staging colonoscopy, likely outpatient     Recent Labs     05/20/25 0618 05/21/25 0413 05/22/25  0546   HGB 12.6* 13.7* 12.6*   HCT 37.3* 41.4 37.5*    290 287   INR 1.0 1.0 1.0     Recent Labs     05/20/25 0618 05/21/25 0413 05/22/25  0546   HGB 12.6* 13.7* 12.6*   HCT 37.3* 41.4 37.5*    290 287   INR 1.0 1.0 1.0   On 5/19 developed new onset dark stools  Has been on Lovenox, IV Solumedrol and PO PPI  Pantoprazole 80mg IV x 1 then PPI 40mg IV BID  GI consulted  Repeat H/H at 2pm  Will transfuse if Hgb is <7g/dl (<8g/dl in cases of active ACS) or if patient has rapid bleeding leading to hemodynamic instability    Recent Labs     05/20/25 0618 05/21/25 0413 05/22/25  0546   HGB 12.6* 13.7* 12.6*   HCT 37.3* 41.4 37.5*    290 287   INR 1.0 1.0 1.0

## 2025-05-22 NOTE — PLAN OF CARE
PT is still in impulsive trying to get out of bed and pulling on things. Restraints orders active still 5/22 @ 1015. Pt is oriented to self.Pt is NPO @MN for PEG tube placement on 5/22.  VSS. No changes noted.      Problem: Adult Inpatient Plan of Care  Goal: Plan of Care Review  Outcome: Progressing  Goal: Patient-Specific Goal (Individualized)  Outcome: Progressing  Goal: Absence of Hospital-Acquired Illness or Injury  Outcome: Progressing  Goal: Optimal Comfort and Wellbeing  Outcome: Progressing  Goal: Readiness for Transition of Care  Outcome: Progressing     Problem: Skin Injury Risk Increased  Goal: Skin Health and Integrity  Outcome: Progressing     Problem: Infection  Goal: Absence of Infection Signs and Symptoms  Outcome: Progressing     Problem: Delirium  Goal: Improved Behavioral Control  Outcome: Progressing  Goal: Improved Attention and Thought Clarity  Outcome: Progressing  Goal: Improved Sleep  Outcome: Progressing

## 2025-05-22 NOTE — CONSULTS
[facility] - Acute Medical Stepdown  Palliative Medicine  Consult Note    Patient Name: Vince Huffman  MRN: [id number]  Admission Date: 4/30/2025  Hospital Length of Stay: 21 days  Code Status: Full Code   Attending Provider: Juan F Ramírez MD  Consulting Provider: Nan Mondragon NP  Primary Care Physician: Leland Porras MD  Principal Problem:Acute encephalopathy    Patient information was obtained from patient, relative(s), past medical records, and primary team.      Inpatient consult to Palliative Care  Consult performed by: Nan Mondragon NP  Consult ordered by: Roseline Brizuela DO  Reason for consult: Goals of care and advanced care planning discussions        Assessment/Plan:     Palliative Care  Palliative care encounter  Impression:  Vince Huffman is a 79 y.o. male with PMHx of PMH of Crohn's disease, prior SBO, HTN, who [place] [facility] [place] due to gradually worsening generalized weakness. He has not been getting out of bed as much, or walking as much as usual. He also notes decreased appetite/PO intake, slowed speech, unsteady gait, and hand tremors over the past several weeks. Since being hospitalized, patient has been seen by neurology for evaluation of encephalopathy, infectious disease for meningitis workup as potential cause for acute encephalopathy, psychiatry for concerns of complex bereavement given the recent loss of his wife 18 months ago vs catatonia, and endocrine for concerns of Hashimoto encephalopathy. Patient was initiated on IVIG and steroids, but has not had any clear improvement in mentation. 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 Neurology initiated goals of care discussion with patient's daughter, Radha. Palliative Medicine was consulted by primary, Dr. Ramírez, for continued goals of care and advanced care planning discussions.      Patient appears to be resting comfortably in bed.  Awake. Oriented to self. Pleasant. Patient was just returned to bed by PT/OT as he just finished walking in the hallway with therapy. Patient is in bilateral wrist restraints. No acute distress noted.     - Prior experience with serious illness: yes, wife passed away 18 months ago. It has been reported that wife spent last 8 months of life in hospital setting. Patient stayed by patient's bedside throughout this time.  -The patient has not previously engaged in advance care planning or [facility] discussions  - Insight/Understanding of illness: patient with confusion. Patient's family with good understanding of illness. Would benefit from more discussions with neurology team.    Advance Care Planning    Date: 05/22/2025  - Patient oriented to self. Unable to participate in discussions. Family is amenable to Palliative Medicine.   - Phone conference call with patient's daughter (Radha), sister (Luiz), brothers (Mitchel and Patrick), and sister-in-Law (Mei).  - Discussed patient prognosis which appears to be poor given patient's progressive debility, frailty, and malnutrition.  - Family shares that the news of patient having a rapidly progressive neurodegenerative condition was shocking as patient was ambulatory and independent a month ago. Luiz adds that she assumed patient was grieving the loss of his wife, and was not expecting the news from the neurology team.  - Family hopes that patient will be able to leave the hospital, but most importantly that patient not suffer.  - Discussed disease trajectory of a progressive neurodegenerative condition. Family inquired about next steps once patient is medically ready to leave hospital. We discussed treatment focused care and that as patient's disease process continues to progress, I expressed worry that his independence will worsen along with his mentation, debility, and nutrition leading to risks of skin breakdown from being bed bound, and risk for aspiration/infection.  Luiz expressed that she would not want to see her brother suffer from any additional skin breakdown. She states his skin is fragile already and has had skin tears from his hospital identification band on his arm, she would not want to see any more skin breakdown from decreased mobility and malnutrition. Radha acknowledges that patient would not want to depend on others for basic care needs.   - Nutritional status discussed. I shared patient's breakfast tray was untouched this morning, and patient stated he was not hungry when I offered to feed him. Family shares that patient takes excessive amounts of time to chew and swallow his food and that he requires assistance with feeding.  - Philosophies of hospice and comfort care introduced.   - Goals of care discussed. We talked more about quality of life and prolonging life for more time. Code status also discussed. As mentioned earlier in our discussion family's hope of limiting suffering, I recommended that if patient's heart or breathing stops naturally, we allow patient to rest peacefully and pass away comfortably. Luiz reiterates she does not believe patient would find his current life to be of quality and would like to shift to comfort focused care and not resuscitate patient if he passes away. Radha agreed. Luiz acknowledges that Radha is one of the primary decision makers along with Vince Edwards (Patient's son). Of note, patient's son was not on call because family requests to loop him in on patient's most recent health update once he is physically here at hospital.   - Radha shares similar sentiments as Luiz, but shares she would like additional time for continued discussions among patient's decision-making support system. Family asks to speak with neurology regarding prognosis with lack of diagnosis and if no changes on MRI impacts prognosis. Dr. Collier, Neurology updated.             Life Limiting Diagnosis  Acute encephalopathy  Elevated  "NFL            Symptom Management  - Pain: no  - acetaminophen 650mg q4h PRN    - Dyspnea: no  - 24h SpO2: %  - maintained on RA    - Constipation: no  - LBM:  - polyethylene glycol BID    - Nausea: no  - ondansetron 8mg q8h PRN    - Debility: yes  - Functional status: fair.  Pt was not able to manage ADLs  - Fall precautions  - PT/OT recommends Moderate Intense Therapy    - Dysphagia: no  - Aspiration precautions  - Nursing communication placed to assist patient with feedings  - SLP eval and treat        Recommendations  - Please see above  - Continue management per primary team  - Patient and family would benefit from continued education and information regarding plan of care, prognosis, and what to expect in the future  - Most important goals at this time: continued goals of care discussions   - Code status: Full. Discussion initiated. Will follow up  - Disposition: TBD        The above recommendations communicated directly to primary team on 5/22/25  Thank you for consulting Palliative Medicine.                Thank you for your consult. I will follow-up with patient. Please contact us if you have any additional questions.    Subjective:     HPI:   As per H&P, "Vince Huffman is a 79 y.o. male with a PMHx of Crohn's disease, prior SBO, HTN, who presented to [facility] ED on 4/24/25 due to gradually worsening generalized weakness. He has not been getting out of bed as much, or walking as much as usual. He also notes decreased appetite/PO intake, slowed speech, unsteady gait, and hand tremors over the past several weeks. He reports a 30 lb weights loss. Since he lost his wife he has been staying with his sister, who has felt his gait is unsteady enough that she encouraged him to start using a cane. He denies changes/losses of strength of sensation, fever, dyspnea, back pain, leg weakness, gait freezing, or symptoms of festination. He states that moving his knees when he begins walking is difficult. He was " "evaluated in the ED with labs showing no leukocytosis or left shift. H&H were 10.9/31.8. A metabolic panel was overall normal. UA was without infection. A CXR showed no acute findings. A CT Head showed no acute process. An MRI of the brain was "significantly limited," showing motion artifact causing the exam to be incomplete. He was placed on observation."    Palliative Medicine was consulted per primary, Dr. Ramírez, for goals of care and advanced care planning discussions.    Hospital Course:  No notes on file    Interval History: Palliative Medicine introduced to patient and family. Goals of care discussions continued.    Past Medical History:   Diagnosis Date    Ankylosing spondylitis of unspecified sites in spine     Anxiety disorder, unspecified     BMI 21.0-21.9, adult 04/14/2025    Crohn's disease     Gout, unspecified     Heart disease     History of skin cancer 2001    squamous cell carcinoma left cheek    Hypertension     Hypothyroidism, unspecified     Psoriatic arthritis        Past Surgical History:   Procedure Laterality Date    ANGIOGRAM, CORONARY, WITH LEFT HEART CATHETERIZATION      APPENDECTOMY      APPENDECTOMY  2007    COLONOSCOPY N/A     ENDOSCOPY N/A     MAGNETIC RESONANCE IMAGING N/A 5/17/2025    Procedure: MRI (Magnetic Resonance Imagine);  Surgeon: Mervat Christianson;  Location: [facility];  Service: Anesthesiology;  Laterality: N/A;    RIGHT HEMICOLECTOMY  2013    SMALL INTESTINE SURGERY  2007    30.5 cm of small bowel removed       Review of patient's allergies indicates:   Allergen Reactions    Gluten Diarrhea    Lactose        Medications:  Continuous Infusions:  Scheduled Meds:   carbidopa-levodopa  mg  2 tablet Oral TID    cyanocobalamin  1,000 mcg Oral Daily    enoxparin  40 mg Subcutaneous Q24H (prophylaxis, 1700)    mirtazapine  15 mg Oral QHS    NIFEdipine  60 mg Oral Daily    pantoprazole  40 mg Intravenous BID    polyethylene glycol  17 g Oral BID    thiamine  100 mg Oral Daily "     PRN Meds:  Current Facility-Administered Medications:     acetaminophen, 1,000 mg, Oral, Q8H PRN    acetaminophen, 650 mg, Oral, Q4H PRN    ALPRAZolam, 0.5 mg, Oral, BID PRN    dextrose 50%, 12.5 g, Intravenous, PRN    dextrose 50%, 25 g, Intravenous, PRN    glucagon (human recombinant), 1 mg, Intramuscular, PRN    glucose, 16 g, Oral, PRN    glucose, 24 g, Oral, PRN    insulin aspart U-100, 0-5 Units, Subcutaneous, QID (AC + HS) PRN    lorazepam, 2 mg, Intravenous, On Call Procedure    melatonin, 6 mg, Oral, Nightly PRN    naloxone, 0.02 mg, Intravenous, PRN    ondansetron, 8 mg, Oral, Q8H PRN    prochlorperazine, 5 mg, Intravenous, Q6H PRN    sodium chloride 0.9%, 10 mL, Intravenous, Q12H PRN    sodium chloride 0.9%, 10 mL, Intravenous, PRN    Family History    None       Tobacco Use    Smoking status: Never    Smokeless tobacco: Never   Substance and Sexual Activity    Alcohol use: No    Drug use: No    Sexual activity: Never     Partners: Female       Review of Systems   Unable to perform ROS: Acuity of condition     Objective:     Vital Signs (Most Recent):  Temp: 97.5 °F (36.4 °C) (05/22/25 0719)  Pulse: 110 (05/22/25 0719)  Resp: 18 (05/22/25 0719)  BP: (!) 179/81 (05/22/25 0719)  SpO2: (!) 94 % (05/22/25 0719) Vital Signs (24h Range):  Temp:  [97 °F (36.1 °C)-98.3 °F (36.8 °C)] 97.5 °F (36.4 °C)  Pulse:  [] 110  Resp:  [18] 18  SpO2:  [94 %-100 %] 94 %  BP: (143-179)/(69-86) 179/81     Weight: 64.1 kg (141 lb 5 oz)  Body mass index is 19.71 kg/m².       Physical Exam  Vitals and nursing note reviewed.   Constitutional:       Appearance: He is ill-appearing.   HENT:      Head: Normocephalic and atraumatic.      Nose: Nose normal.      Mouth/Throat:      Mouth: Mucous membranes are dry.   Eyes:      Extraocular Movements: Extraocular movements intact.   Cardiovascular:      Rate and Rhythm: Normal rate.   Pulmonary:      Effort: Pulmonary effort is normal.   Skin:     General: Skin is warm and dry.    Neurological:      Mental Status: He is alert. He is disoriented.      Motor: Weakness present.            Review of Symptoms      Symptom Assessment (ESAS 0-10 Scale)  Unable to complete assessment due to Acuity of condition     CAM / Delirium:  Negative  Constipation:  Negative  Diarrhea:  Negative      Bowel Management Plan (BMP):  Yes      Pain Assessment in Advanced Demential Scale (PAINAD)   Breathing - Independent of vocalization:  0  Negative vocalization:  0  Facial expression:  0  Body language:  0  Consolability:  0  Total:  0    Modified Sreekanth Scale:  0    Performance Status:  60    Psychosocial/Cultural:   See Palliative Psychosocial Note: Yes  - previously  to Stephanie Huffman, but now . Wife passed away about 18 months ago. Patient's sister (Luiz) informs me that patient's late wife spent last 8 months of her life in and out of the hospital with surgical complications, and patient visited her everyday until she passed away.   - patient has 2 adult children, Kun (Kadi Huffman  - worked for many years in the [employer]  - described as being very smart, kind, and having a great sense of humor  **Primary  to Follow**  Palliative Care  Consult: Yes    Spiritual:  F - Corry and Belief:  Latter-day  I - Importance:  Yes  A - Address in Care:  Emelia support offered. Father Surendra contacted.        Advance Care Planning  Advance Directives:   Living Will: No    LaPOST: No    Do Not Resuscitate Status: No    Medical Power of : No      Decision Making:  Family answered questions and Patient unable to communicate due to disease severity/cognitive impairment  Goals of Care: The family endorses that what is most important right now is to focus on symptom/pain control and quality of life, even if it means sacrificing a little time    Accordingly, we have decided that the best plan to meet the patient's goals includes continuing with treatment and  allowing additional time for goals of care discussions         Significant Labs: All pertinent labs within the past 24 hours have been reviewed.  CBC:   Recent Labs   Lab 05/22/25  0546   WBC 7.79   HGB 12.6*   HCT 37.5*   MCV 93        BMP:  Recent Labs   Lab 05/22/25  0546   GLU 81   *   K 4.3      CO2 24   BUN 27*   CREATININE 1.0   CALCIUM 10.3   MG 1.9     LFT:  Lab Results   Component Value Date    AST 27 05/21/2025    ALKPHOS 78 05/21/2025    BILITOT 0.4 05/21/2025     Albumin:   Albumin   Date Value Ref Range Status   05/21/2025 3.1 (L) 3.5 - 5.2 g/dL Final   04/14/2025 3.7 3.5 - 5.2 g/dL Final     Protein:   Protein Total   Date Value Ref Range Status   05/21/2025 8.6 (H) 6.0 - 8.4 gm/dL Final     Total Protein   Date Value Ref Range Status   11/06/2018 6.9 6.0 - 8.4 g/dL Final     Lactic acid:   Lab Results   Component Value Date    LACTATE 0.8 05/01/2025       Significant Imaging: I have reviewed all pertinent imaging results/findings within the past 24 hours.          I spent a total of 75 minutes on the day of the visit. This includes face to face time in discussion of goals of care, symptom assessment, coordination of care and emotional support.  This also includes non-face to face time preparing to see the patient (eg, review of tests/imaging), obtaining and/or reviewing separately obtained history, documenting clinical information in the electronic or other health record, independently interpreting results and communicating results to the patient/family/caregiver, or care coordinator.      Additional 50min time spent on a voluntary advance care planning and /or goals of care discussion, providing emotional support, formulating, and communicating prognosis and exploring burden/benefit of various approaches of treatment.     Nan Mondragon, YAYA  Palliative Medicine  [facility]

## 2025-05-22 NOTE — SUBJECTIVE & OBJECTIVE
Interval History: Palliative Medicine introduced to patient and family. Goals of care discussions continued.    Past Medical History:   Diagnosis Date    Ankylosing spondylitis of unspecified sites in spine     Anxiety disorder, unspecified     BMI 21.0-21.9, adult 04/14/2025    Crohn's disease     Gout, unspecified     Heart disease     History of skin cancer 2001    squamous cell carcinoma left cheek    Hypertension     Hypothyroidism, unspecified     Psoriatic arthritis        Past Surgical History:   Procedure Laterality Date    ANGIOGRAM, CORONARY, WITH LEFT HEART CATHETERIZATION      APPENDECTOMY      APPENDECTOMY  2007    COLONOSCOPY N/A     ENDOSCOPY N/A     MAGNETIC RESONANCE IMAGING N/A 5/17/2025    Procedure: MRI (Magnetic Resonance Imagine);  Surgeon: Mervat Christianson;  Location: [facility];  Service: Anesthesiology;  Laterality: N/A;    RIGHT HEMICOLECTOMY  2013    SMALL INTESTINE SURGERY  2007    30.5 cm of small bowel removed       Review of patient's allergies indicates:   Allergen Reactions    Gluten Diarrhea    Lactose        Medications:  Continuous Infusions:  Scheduled Meds:   carbidopa-levodopa  mg  2 tablet Oral TID    cyanocobalamin  1,000 mcg Oral Daily    enoxparin  40 mg Subcutaneous Q24H (prophylaxis, 1700)    mirtazapine  15 mg Oral QHS    NIFEdipine  60 mg Oral Daily    pantoprazole  40 mg Intravenous BID    polyethylene glycol  17 g Oral BID    thiamine  100 mg Oral Daily     PRN Meds:  Current Facility-Administered Medications:     acetaminophen, 1,000 mg, Oral, Q8H PRN    acetaminophen, 650 mg, Oral, Q4H PRN    ALPRAZolam, 0.5 mg, Oral, BID PRN    dextrose 50%, 12.5 g, Intravenous, PRN    dextrose 50%, 25 g, Intravenous, PRN    glucagon (human recombinant), 1 mg, Intramuscular, PRN    glucose, 16 g, Oral, PRN    glucose, 24 g, Oral, PRN    insulin aspart U-100, 0-5 Units, Subcutaneous, QID (AC + HS) PRN    lorazepam, 2 mg, Intravenous, On Call Procedure    melatonin, 6 mg, Oral,  Nightly PRN    naloxone, 0.02 mg, Intravenous, PRN    ondansetron, 8 mg, Oral, Q8H PRN    prochlorperazine, 5 mg, Intravenous, Q6H PRN    sodium chloride 0.9%, 10 mL, Intravenous, Q12H PRN    sodium chloride 0.9%, 10 mL, Intravenous, PRN    Family History    None       Tobacco Use    Smoking status: Never    Smokeless tobacco: Never   Substance and Sexual Activity    Alcohol use: No    Drug use: No    Sexual activity: Never     Partners: Female       Review of Systems   Unable to perform ROS: Acuity of condition     Objective:     Vital Signs (Most Recent):  Temp: 97.5 °F (36.4 °C) (05/22/25 0719)  Pulse: 110 (05/22/25 0719)  Resp: 18 (05/22/25 0719)  BP: (!) 179/81 (05/22/25 0719)  SpO2: (!) 94 % (05/22/25 0719) Vital Signs (24h Range):  Temp:  [97 °F (36.1 °C)-98.3 °F (36.8 °C)] 97.5 °F (36.4 °C)  Pulse:  [] 110  Resp:  [18] 18  SpO2:  [94 %-100 %] 94 %  BP: (143-179)/(69-86) 179/81     Weight: 64.1 kg (141 lb 5 oz)  Body mass index is 19.71 kg/m².       Physical Exam  Vitals and nursing note reviewed.   Constitutional:       Appearance: He is ill-appearing.   HENT:      Head: Normocephalic and atraumatic.      Nose: Nose normal.      Mouth/Throat:      Mouth: Mucous membranes are dry.   Eyes:      Extraocular Movements: Extraocular movements intact.   Cardiovascular:      Rate and Rhythm: Normal rate.   Pulmonary:      Effort: Pulmonary effort is normal.   Skin:     General: Skin is warm and dry.   Neurological:      Mental Status: He is alert. He is disoriented.      Motor: Weakness present.            Review of Symptoms      Symptom Assessment (ESAS 0-10 Scale)  Unable to complete assessment due to Acuity of condition     CAM / Delirium:  Negative  Constipation:  Negative  Diarrhea:  Negative      Bowel Management Plan (BMP):  Yes      Pain Assessment in Advanced Demential Scale (PAINAD)   Breathing - Independent of vocalization:  0  Negative vocalization:  0  Facial expression:  0  Body language:   0  Consolability:  0  Total:  0    Modified Sreekanth Scale:  0    Performance Status:  60    Psychosocial/Cultural:   See Palliative Psychosocial Note: Yes  - previously  to Stephanie Huffman, but now . Wife passed away about 18 months ago. Patient's sister (Luiz) informs me that patient's late wife spent last 8 months of her life in and out of the hospital with surgical complications, and patient visited her everyday until she passed away.   - patient has 2 adult children, Radha and Vince (Jr.) Huffman  - worked for many years in the [employer]  - described as being very smart, kind, and having a great sense of humor  **Primary  to Follow**  Palliative Care  Consult: Yes    Spiritual:  F - Corry and Belief:  Hindu  I - Importance:  Yes  A - Address in Care:  Emelia support offered. Father Surendra contacted.        Advance Care Planning   Advance Directives:   Living Will: No    LaPOST: No    Do Not Resuscitate Status: No    Medical Power of : No      Decision Making:  Family answered questions and Patient unable to communicate due to disease severity/cognitive impairment  Goals of Care: The family endorses that what is most important right now is to focus on symptom/pain control and quality of life, even if it means sacrificing a little time    Accordingly, we have decided that the best plan to meet the patient's goals includes continuing with treatment and allowing additional time for goals of care discussions         Significant Labs: All pertinent labs within the past 24 hours have been reviewed.  CBC:   Recent Labs   Lab 05/22/25  0546   WBC 7.79   HGB 12.6*   HCT 37.5*   MCV 93        BMP:  Recent Labs   Lab 05/22/25 0546   GLU 81   *   K 4.3      CO2 24   BUN 27*   CREATININE 1.0   CALCIUM 10.3   MG 1.9     LFT:  Lab Results   Component Value Date    AST 27 05/21/2025    ALKPHOS 78 05/21/2025    BILITOT 0.4 05/21/2025     Albumin:   Albumin    Date Value Ref Range Status   05/21/2025 3.1 (L) 3.5 - 5.2 g/dL Final   04/14/2025 3.7 3.5 - 5.2 g/dL Final     Protein:   Protein Total   Date Value Ref Range Status   05/21/2025 8.6 (H) 6.0 - 8.4 gm/dL Final     Total Protein   Date Value Ref Range Status   11/06/2018 6.9 6.0 - 8.4 g/dL Final     Lactic acid:   Lab Results   Component Value Date    LACTATE 0.8 05/01/2025       Significant Imaging: I have reviewed all pertinent imaging results/findings within the past 24 hours.

## 2025-05-22 NOTE — PROGRESS NOTES
"CONSULTATION LIAISON PSYCHIATRY PROGRESS NOTE    Patient Name: Vince Huffman  MRN: [id number]  Patient Class: IP- Inpatient  Admission Date: 4/30/2025  Attending Physician: Juan F Ramírez MD      SUBJECTIVE:   Vince Huffman is a 79 y.o. male with no past psychiatric history. Past pertinent medical history of Chrohn's disease, SBO, Hypothyroidism, and CAD presents to the ED/admitted to the hospital for generalized weakness and AMS/ worsening cognitive function.     Psychiatry consulted for complex bereavement vs catatonia     Interval History: NAEON. No PRNs required for agitation or anxiety.  Completed IVMP day 5/5 yesterday wo improvement in clinical picture.   Plans to get EGD tomorrow.    Upon psychiatric interview today, pt is in NAD states that he is feeling better than yesterday. He is lying in bed with 2 point restraints on his wrist. He is oriented to self and place but is unable to correctly answer questions regarding situation, time, president. When asked the month and year he responded "December" both times. He is not sure why he is in the hospital and states that he was brought in by his daughter because they "went for a ride". Pt reports no appetite, full breakfast remains at bedside.     On resident interview, patient continues to be only partially oriented, appearing to have inattentive on examination and irrelevant answers. Had not ate breakfast on my examination.       OBJECTIVE    Vital Signs:  Temp:  [97 °F (36.1 °C)-98.3 °F (36.8 °C)]   Pulse:  []   Resp:  [18]   BP: (142-179)/(67-86)   SpO2:  [94 %-100 %]     Mental Status Exam:  General Appearance: appears stated age,dressed in hospital garb, thin, no acute distress  Behavior: cooperative, pleasant, polite  Involuntary Movements and Motor Activity: no abnormal involuntary movements noted; no tics, no tremors, no akathisia, no dystonia, no evidence of tardive dyskinesia; no psychomotor agitation or retardation, He is currently in 2 " "point restraints at his wrist.   Gait and Station: ambulates slowly, unable to assess - patient lying down or seated, Prior to interviewing pt I observed him briefly walking with PT in the hallway, when I returned he was in the bed lying down.  Speech and Language: soft, low, minimal response.  Mood: "good"  Affect: mood-congruent  Thought Process and Associations: scattered, irrelavant  Perceptual Disturbances: denies hallucinations  Thought Content and Perceptions:: no suicidal or homicidal ideation, no auditory or visual hallucinations, no paranoid ideation, no ideas of reference, no evidence of delusions or psychosis  Sensorium and Orientation: impaired, oriented to person and place  Recent and Remote Memory: impaired, unable to recall why he is in the hospital approximately 2 min after I told him. Explained why he was unable to not eat last night, pt unable to reiterate back to me.   Attention and Concentration: able to spell WORLD forwards, unable to spell WORLD backwards despite being asked to start with the letter D.  Fund of Knowledge: unable to identify the   Insight: impaired  Judgment: compliant with health provider's recommendations and instructions    CAM ICU positive? 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)  R/O Delirium   R/O Unspecified neurocognitive disorder (R41.9)              R/O Dementia syndrome of depression      Scheduled Medication(s):  Continue remeron 15mg nightly due to concerns for depression with poor PO intake and weight loss  START ritalin 5 mg qd before breakfast to augment for depression / poor appetite    PRN Medication(s):  Although patient has not demonstrated agitation behavior, would consider depakote  mg PRN for acute agitation       Other Recommendations (labs, imaging, further consults, etc.):  N/A      Delirium Behavior " Management  PLEASE utilize PRN meds first for agitation. Minimize use of PHYSICAL restraints OR have periods of being out of physical restraints if possible.  Keep window shades open and room lit during day and room dim at night in order to promote normal sleep-wake cycles  Encourage family at bedside. [place] patient often to situation, location, date.  Continue to Limit or Discontinue use of Narcotics, Benzos and Anti-cholinergic medications as they may worsen delirium.  Continue medical workup for causative etiology of Delirium.     Risk Assessment / Legal Status  No indications for PEC at this time due to the fact that he is not a danger to himself or others and isn't disabled due to psychiatric illness.     Follow-up:  Will follow-up while in house.    Disposition:   Defer to primary team.    Please contact ON CALL psychiatry service (24/7) for any acute issues that may arise.    Tootie CARRIZALES   Psychiatry  [facility]  5/22/2025 8:14 AM    I attest that I saw this patient in conjunction with the medical student. The medical student wrote the initial draft of this note, which was then reviewed and edited by myself.    Ruddy Alejandra MD  [employer] PGY-2          --------------------------------------------------------------------------------------------------------------------------------------------------------------------------------------------------------------------------------------    CONTINUED OBJECTIVE clinical data & findings reviewed and noted for above decision making    Current Medications:   Scheduled Meds:    carbidopa-levodopa  mg  2 tablet Oral TID    cyanocobalamin  1,000 mcg Oral Daily    enoxparin  40 mg Subcutaneous Q24H (prophylaxis, 1700)    mirtazapine  15 mg Oral QHS    NIFEdipine  60 mg Oral Daily    pantoprazole  40 mg Intravenous BID    polyethylene glycol  17 g Oral BID    thiamine  100 mg Oral Daily     PRN Meds:   Current  Facility-Administered Medications:     acetaminophen, 1,000 mg, Oral, Q8H PRN    acetaminophen, 650 mg, Oral, Q4H PRN    ALPRAZolam, 0.5 mg, Oral, BID PRN    dextrose 50%, 12.5 g, Intravenous, PRN    dextrose 50%, 25 g, Intravenous, PRN    glucagon (human recombinant), 1 mg, Intramuscular, PRN    glucose, 16 g, Oral, PRN    glucose, 24 g, Oral, PRN    insulin aspart U-100, 0-5 Units, Subcutaneous, QID (AC + HS) PRN    lorazepam, 2 mg, Intravenous, On Call Procedure    melatonin, 6 mg, Oral, Nightly PRN    naloxone, 0.02 mg, Intravenous, PRN    ondansetron, 8 mg, Oral, Q8H PRN    prochlorperazine, 5 mg, Intravenous, Q6H PRN    sodium chloride 0.9%, 10 mL, Intravenous, Q12H PRN    sodium chloride 0.9%, 10 mL, Intravenous, PRN    Allergies:   Review of patient's allergies indicates:   Allergen Reactions    Gluten Diarrhea    Lactose        Vitals  Vitals:    05/22/25 1119   BP: (!) 142/67   Pulse: 94   Resp: 18   Temp: 98 °F (36.7 °C)       Labs/Imaging/Studies:  Recent Results (from the past 24 hours)   POCT glucose    Collection Time: 05/21/25 11:25 AM   Result Value Ref Range    POCT Glucose 101 70 - 110 mg/dL   POCT glucose    Collection Time: 05/21/25  3:38 PM   Result Value Ref Range    POCT Glucose 111 (H) 70 - 110 mg/dL   POCT glucose    Collection Time: 05/21/25  8:59 PM   Result Value Ref Range    POCT Glucose 117 (H) 70 - 110 mg/dL   Magnesium    Collection Time: 05/22/25  5:46 AM   Result Value Ref Range    Magnesium  1.9 1.6 - 2.6 mg/dL   Protime-INR    Collection Time: 05/22/25  5:46 AM   Result Value Ref Range    PT 11.4 9.0 - 12.5 seconds    INR 1.0 0.8 - 1.2   Basic metabolic panel    Collection Time: 05/22/25  5:46 AM   Result Value Ref Range    Sodium 134 (L) 136 - 145 mmol/L    Potassium 4.3 3.5 - 5.1 mmol/L    Chloride 100 95 - 110 mmol/L    CO2 24 23 - 29 mmol/L    Glucose 81 70 - 110 mg/dL    BUN 27 (H) 8 - 23 mg/dL    Creatinine 1.0 0.5 - 1.4 mg/dL    Calcium 10.3 8.7 - 10.5 mg/dL    Anion Gap  10 8 - 16 mmol/L    eGFR >60 >60 mL/min/1.73/m2   CBC with Differential    Collection Time: 05/22/25  5:46 AM   Result Value Ref Range    WBC 7.79 3.90 - 12.70 K/uL    RBC 4.03 (L) 4.60 - 6.20 M/uL    HGB 12.6 (L) 14.0 - 18.0 gm/dL    HCT 37.5 (L) 40.0 - 54.0 %    MCV 93 82 - 98 fL    MCH 31.3 (H) 27.0 - 31.0 pg    MCHC 33.6 32.0 - 36.0 g/dL    RDW 11.7 11.5 - 14.5 %    Platelet Count 287 150 - 450 K/uL    MPV 10.6 9.2 - 12.9 fL    Nucleated RBC 0 <=0 /100 WBC    Neut % 75.6 (H) 38 - 73 %    Lymph % 10.7 (L) 18 - 48 %    Mono % 13.1 4 - 15 %    Eos % 0.0 <=8 %    Basophil % 0.1 <=1.9 %    Imm Grans % 0.5 0.0 - 0.5 %    Neut # 5.89 1.8 - 7.7 K/uL    Lymph # 0.83 (L) 1 - 4.8 K/uL    Mono # 1.02 (H) 0.3 - 1 K/uL    Eos # 0.00 <=0.5 K/uL    Baso # 0.01 <=0.2 K/uL    Imm Grans # 0.04 0.00 - 0.04 K/uL   POCT glucose    Collection Time: 05/22/25  7:19 AM   Result Value Ref Range    POCT Glucose 88 70 - 110 mg/dL     Imaging Results               CT Abdomen Pelvis With IV Contrast NO Oral Contrast (Final result)  Result time 04/30/25 15:46:15      Final result by Russell Ba MD (04/30/25 15:46:15)                   Impression:      1. Wall thickening of the distal ileum to the surgical anastomosis with the large bowel.  Inflammatory infectious ileitis are considerations.  Recommend clinical correlation and follow-up.  2. Constipation.  There is fecal distention of the rectum.  No fecal impaction is not excluded.  3. Bilateral renal cysts and hepatic cysts.  4. Nonobstructing nephrolithiasis of the right kidney.  5. Nondistention of the urinary bladder.  Mild wall thickening is not excluded.  6. Diverticulosis of the sigmoid colon.  No evidence of acute diverticulitis.  7. This report was flagged in Epic as abnormal.      Electronically signed by: Russell Ba  Date:    04/30/2025  Time:    15:46               Narrative:    EXAMINATION:  CT ABDOMEN PELVIS WITH IV CONTRAST    CLINICAL HISTORY:  LLQ abdominal pain;RLQ  abdominal pain (Age >= 14y);    TECHNIQUE:  Low dose axial images, sagittal and coronal reformations were obtained from the lung bases to the pubic symphysis following the IV administration of 75 mL of Omnipaque 350 .  Oral contrast was not administered.    COMPARISON:  11/05/2018    FINDINGS:  Abdomen:    - Lower thorax:    - Lung bases: No infiltrates and no nodules.    - Liver: Multiple small hepatic cysts.    - Gallbladder: No calcified gallstones.    - Bile Ducts: No evidence of intra or extra hepatic biliary ductal dilation.    - Spleen: Negative.    - Kidneys: Multiple bilateral simple renal cysts.  Single nonobstructing stone in the right kidney.  No hydronephrosis or hydroureter bilaterally.    - Adrenals: Unremarkable.    - Pancreas: No mass or peripancreatic fat stranding.    - Retroperitoneum:  No significant adenopathy.    - Vascular: Mild ectasia of the distal aorta with no evidence of aneurysm.    - Abdominal wall:  Unremarkable.    Pelvis:    Urinary bladder is incompletely distended with possible mild wall thickening.    Postop changes at the ileocecal junction.  There is wall thickening noted to the distal ileum to the surgical anastomosis.  Inflammatory or infectious ileitis are considerations.  Follow-up recommended.    Bowel/Mesentery:    No evidence of bowel obstruction.  Moderate retained feces in the colon.    Fecal distention of the rectum.  Impaction is not excluded.    Mild diverticulosis of the sigmoid colon.  No evidence of acute diverticulitis.    Bones:  No acute osseous abnormality and no suspicious lytic or blastic lesion.                                       CT Head Without Contrast (Final result)  Result time 04/30/25 14:49:11      Final result by Ish Carrillo MD (04/30/25 14:49:11)                   Impression:      No evidence for acute intracranial pathology.      Electronically signed by: Ish Carrillo  Date:    04/30/2025  Time:    14:49               Narrative:     EXAMINATION:  CT HEAD WITHOUT CONTRAST    CLINICAL HISTORY:  Mental status change, unknown cause;    TECHNIQUE:  Low dose axial images were obtained through the head.  Coronal and sagittal reformations were also performed. Contrast was not administered.    COMPARISON:  MRI brain without contrast 04/24/2025, CT head without contrast 04/24/2025.    FINDINGS:  Ventricles are stable in size without evidence for new or worsening hydrocephalus.  No new or enlarging extra-axial blood or fluid collections.    Brain parenchyma appears unchanged.  Scattered supratentorial hypoattenuation, overall nonspecific, but can be seen in setting of microvascular ischemic change.  No parenchymal mass, edema, hemorrhage, or acute major vascular territory infarct.    No calvarial or skull base fracture or osseous destructive lesion.  Paranasal sinuses and mastoid air cells are essentially clear.                                       X-Ray Chest AP Portable (Final result)  Result time 04/30/25 12:49:39      Final result by Matti Cote MD (04/30/25 12:49:39)                   Impression:      See above      Electronically signed by: Matti Cote MD  Date:    04/30/2025  Time:    12:49               Narrative:    EXAMINATION:  XR CHEST AP PORTABLE    CLINICAL HISTORY:  Altered mental status, unspecified    TECHNIQUE:  Single frontal view of the chest was performed.    COMPARISON:  No 04/23/2025 ne    FINDINGS:  Heart size normal.  The tracheal slightly deviates to the right at the thoracic inlet probably related to enlarged thyroid gland.  Minimal subsegmental atelectatic changes noted at the right lung base.  Lungs are otherwise clear.  No pleural effusion.

## 2025-05-22 NOTE — PT/OT/SLP PROGRESS
"Occupational Therapy   Co-Treatment    Co-treatment performed due to patient's multiple deficits requiring two skilled therapists to appropriately and safely assess patient's strength, endurance, functional mobility, and ADL performance while facilitating functional tasks in addition to accommodating for patient's activity tolerance and medical acuity.      Name: Vince Huffman  MRN: [id number]  Admitting Diagnosis:  Acute encephalopathy  5 Days Post-Op    Recommendations:     Discharge Recommendations: Moderate Intensity Therapy  Discharge Equipment Recommendations:  wheelchair, walker, rolling  Barriers to discharge:       Assessment:     Vince Huffman is a 79 y.o. male with a medical diagnosis of Acute encephalopathy.  In today's session, the patient required less assistance for functional mobility and also utilized the RW. Patient still requires maximum verbal/tactile cues for command following and upright posture. Patient still noted with high tone. Performance deficits affecting function are weakness, impaired endurance, impaired cognition, decreased ROM, decreased coordination, impaired self care skills, impaired functional mobility, gait instability, impaired balance, decreased safety awareness, decreased lower extremity function, decreased upper extremity function.     Rehab Prognosis:  Good; patient would benefit from acute skilled OT services to address these deficits and reach maximum level of function.       Plan:     Patient to be seen 4 x/week to address the above listed problems via self-care/home management, therapeutic activities, therapeutic exercises, neuromuscular re-education  Plan of Care Expires: 05/24/25  Plan of Care Reviewed with: patient    Subjective     Chief Complaint: N/A  Patient/Family Comments/goals: "Yeah." When asked to complete a task.  Pain/Comfort:       Objective:     Communicated with: Nurse prior to session.  Patient found HOB elevated with bed alarm, santana catheter, " restraints upon OT entry to room.    General Precautions: Standard, fall    Orthopedic Precautions:N/A  Braces: N/A  Respiratory Status: Room air     Occupational Performance:     Bed Mobility:    Patient completed Supine to Sit with maximal assistance  Patient completed Sit to Supine with maximal assistance     Functional Mobility/Transfers:  Patient completed x1 Sit <> Stand Transfer from EOB with moderate assistance with hand-held assist   Patient completed x1 Sit <> Stand Transfer from the bedside chair with moderate assistance with hand-held assist and RW  Functional Mobility: Patient engaged in functional mobility training to simulate a household distance. From EOB > the hallways > EOB initially with maximum > moderate (A) with hand-held assistance > minimum/moderate (A) with RW to maximize functional endurance and standing balance required for home/community mobility and occupational engagement.   Patient required RW management and verbal/tactile cues to facilitate upright posture.    Activities of Daily Living:  Upper Body Dressing: moderate assistance to funmilayo/doff gown seated at EOB. Total assistance to doff/funmilayo restraints at bed level.  Lower Body Dressing: total assistance to funmilayo socks seated at EOB.    [facility] 6 Click ADL: 9    Treatment & Education:  Patient educated on OOB mobility to improve overall activity tolerance and promote recovery.  Patient sat at EOB to promote core engagement, static/dynamic sitting balance, tolerance, and skin integrity for carry over with all seated ADLs.   Patient educated on rolling walker management for functional mobility.   Patient completed standing self-care task to promote static/dynamic standing balance and endurance for carry over with all standing ADLs.  Patient educated on role of occupational therapy, POC, and safety during ADLs and functional mobility. Patient and OT discussed importance of safe, continued mobility to optimize daily living skills. Patient  verbalized understanding. Patient given instruction to call for medical staff/nurse for assistance.     Patient left with bed in chair position with all lines intact, call button in reach, and nurse notified    GOALS:   Multidisciplinary Problems       Occupational Therapy Goals          Problem: Occupational Therapy    Goal Priority Disciplines Outcome Interventions   Occupational Therapy Goal     OT, PT/OT Progressing    Description: Goals to be met by: 5/24/25     Patient will increase functional independence with ADLs by performing:    UE Dressing with Contact Guard Assistance.  LE Dressing with Minimal Assistance.  Grooming while bedside chair with Minimal Assistance.  Toileting from bedside commode with Minimal Assistance for hygiene and clothing management.   Sitting at edge of bed x5 minutes with Contact Guard Assistance for upright balance.  Rolling to Bilateral with Minimal Assistance.   Supine to sit with Minimal Assistance.  Step transfer with Moderate Assistance  Toilet transfer to bedside commode with Moderate Assistance.                       Time Tracking:     OT Date of Treatment: 05/22/25  OT Start Time: 0833  OT Stop Time: 0857  OT Total Time (min): 24 min    Billable Minutes:Therapeutic Activity 14  Neuromuscular Re-education 10    OT/PAUL: PAUL     Number of PAUL visits since last OT visit: 2    5/22/2025

## 2025-05-22 NOTE — MEDICAL/APP STUDENT
"CONSULTATION LIAISON PSYCHIATRY PROGRESS NOTE    Patient Name: Vince Huffman  MRN: [id number]  Patient Class: IP- Inpatient  Admission Date: 4/30/2025  Attending Physician: Juan F Ramírez MD      SUBJECTIVE:   Vince Huffman is a 79 y.o. male with no past psychiatric history. Past pertinent medical history of Chrohn's disease, SBO, Hypothyroidism, and CAD presents to the ED/admitted to the hospital for generalized weakness and AMS/ worsening cognitive function.     Psychiatry consulted for complex bereavement vs catatonia     Interval History: NAEON. No PRNs required for agitation or anxiety.  Completed IVMP day 5/5 yesterday wo improvement in clinical picture.   Plans to get EGD tomorrow.    Upon psychiatric interview today, pt is in NAD states that he is feeling better than yesterday. He is lying in bed with 2 point restraints on his wrist. He is oriented to self and place but is unable to correctly answer questions regarding situation, time, president. When asked the month and year he responded "December" both times. He is not sure why he is in the hospital and states that he was brought in by his daughter because they "went for a ride". Pt reports no appetite, full breakfast remains at bedside. Continued AMS.      OBJECTIVE    Vital Signs:  Temp:  [97 °F (36.1 °C)-98.3 °F (36.8 °C)]   Pulse:  []   Resp:  [18]   BP: (143-179)/(69-86)   SpO2:  [94 %-100 %]     Mental Status Exam:  General Appearance: appears stated age,dressed in hospital garb, thin, no acute distress  Behavior: cooperative, pleasant, polite  Involuntary Movements and Motor Activity: no abnormal involuntary movements noted; no tics, no tremors, no akathisia, no dystonia, no evidence of tardive dyskinesia; no psychomotor agitation or retardation, He is currently in 2 point restraints at his wrist.   Gait and Station: ambulates slowly, unable to assess - patient lying down or seated, Prior to interviewing pt I observed him briefly walking " "with PT in the hallway, when I returned he was in the bed lying down.  Speech and Language: soft, low, minimal response.  Mood: "good"  Affect: mood-congruent  Thought Process and Associations: +loosening of associations  Perceptual Disturbances: denies hallucinations  Thought Content and Perceptions:: no suicidal or homicidal ideation, no auditory or visual hallucinations, no paranoid ideation, no ideas of reference, no evidence of delusions or psychosis  Sensorium and Orientation: impaired, oriented to person and place  Recent and Remote Memory: impaired, unable to recall why he is in the hospital approximately 2 min after I told him. Explained why he was unable to not eat last night, pt unable to reiterate back to me.   Attention and Concentration: able to spell WORLD forwards, unable to spell WORLD backwards despite being asked to start with the letter D.  Fund of Knowledge: unable to identify the   Insight: impaired  Judgment: compliant with health provider's recommendations and instructions    CAM ICU positive? 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)  R/O Delirium   R/O Unspecified neurocognitive disorder (R41.9)              R/O Dementia syndrome of depression      Scheduled Medication(s):  Increase Remeron 15mg nightly due to concerns for depression with poor PO intake and weight loss  -Can consider decreasing amlodipine to 5mg daily              -Has been shown to be a cause of delirium or contributor              -Blood pressure has been in normal limits       PRN Medication(s):  Although patient has not demonstrated agitation behavior, would consider depakote  mg PRN for acute agitation       Other Recommendations (labs, imaging, further consults, etc.):  N/A      Delirium Behavior Management  PLEASE utilize PRN meds first for agitation. Minimize use of PHYSICAL " restraints OR have periods of being out of physical restraints if possible.  Keep window shades open and room lit during day and room dim at night in order to promote normal sleep-wake cycles  Encourage family at bedside. [place] patient often to situation, location, date.  Continue to Limit or Discontinue use of Narcotics, Benzos and Anti-cholinergic medications as they may worsen delirium.  Continue medical workup for causative etiology of Delirium.     Risk Assessment / Legal Status  No indications for PEC at this time due to the fact that he is not a danger to himself or others and isn't disabled due to psychiatric illness.     Follow-up:  Will follow-up while in house.  Will begin to chart check periodically    Disposition:   Defer to primary team.    Please contact ON CALL psychiatry service (24/7) for any acute issues that may arise.    Tootie CARRIZALES   Psychiatry  [facility]  5/22/2025 8:14 AM        --------------------------------------------------------------------------------------------------------------------------------------------------------------------------------------------------------------------------------------    CONTINUED OBJECTIVE clinical data & findings reviewed and noted for above decision making    Current Medications:   Scheduled Meds:    carbidopa-levodopa  mg  2 tablet Oral TID    cyanocobalamin  1,000 mcg Oral Daily    enoxparin  40 mg Subcutaneous Q24H (prophylaxis, 1700)    mirtazapine  15 mg Oral QHS    NIFEdipine  60 mg Oral Daily    pantoprazole  40 mg Intravenous BID    polyethylene glycol  17 g Oral BID    thiamine  100 mg Oral Daily     PRN Meds:   Current Facility-Administered Medications:     acetaminophen, 1,000 mg, Oral, Q8H PRN    acetaminophen, 650 mg, Oral, Q4H PRN    ALPRAZolam, 0.5 mg, Oral, BID PRN    dextrose 50%, 12.5 g, Intravenous, PRN    dextrose 50%, 25 g, Intravenous, PRN    glucagon (human recombinant), 1 mg, Intramuscular,  PRN    glucose, 16 g, Oral, PRN    glucose, 24 g, Oral, PRN    insulin aspart U-100, 0-5 Units, Subcutaneous, QID (AC + HS) PRN    lorazepam, 2 mg, Intravenous, On Call Procedure    melatonin, 6 mg, Oral, Nightly PRN    naloxone, 0.02 mg, Intravenous, PRN    ondansetron, 8 mg, Oral, Q8H PRN    prochlorperazine, 5 mg, Intravenous, Q6H PRN    sodium chloride 0.9%, 10 mL, Intravenous, Q12H PRN    sodium chloride 0.9%, 10 mL, Intravenous, PRN    Allergies:   Review of patient's allergies indicates:   Allergen Reactions    Gluten Diarrhea    Lactose        Vitals  Vitals:    05/22/25 0719   BP: (!) 179/81   Pulse: 110   Resp: 18   Temp: 97.5 °F (36.4 °C)       Labs/Imaging/Studies:  Recent Results (from the past 24 hours)   POCT glucose    Collection Time: 05/21/25 11:25 AM   Result Value Ref Range    POCT Glucose 101 70 - 110 mg/dL   POCT glucose    Collection Time: 05/21/25  3:38 PM   Result Value Ref Range    POCT Glucose 111 (H) 70 - 110 mg/dL   POCT glucose    Collection Time: 05/21/25  8:59 PM   Result Value Ref Range    POCT Glucose 117 (H) 70 - 110 mg/dL   Magnesium    Collection Time: 05/22/25  5:46 AM   Result Value Ref Range    Magnesium  1.9 1.6 - 2.6 mg/dL   Protime-INR    Collection Time: 05/22/25  5:46 AM   Result Value Ref Range    PT 11.4 9.0 - 12.5 seconds    INR 1.0 0.8 - 1.2   Basic metabolic panel    Collection Time: 05/22/25  5:46 AM   Result Value Ref Range    Sodium 134 (L) 136 - 145 mmol/L    Potassium 4.3 3.5 - 5.1 mmol/L    Chloride 100 95 - 110 mmol/L    CO2 24 23 - 29 mmol/L    Glucose 81 70 - 110 mg/dL    BUN 27 (H) 8 - 23 mg/dL    Creatinine 1.0 0.5 - 1.4 mg/dL    Calcium 10.3 8.7 - 10.5 mg/dL    Anion Gap 10 8 - 16 mmol/L    eGFR >60 >60 mL/min/1.73/m2   CBC with Differential    Collection Time: 05/22/25  5:46 AM   Result Value Ref Range    WBC 7.79 3.90 - 12.70 K/uL    RBC 4.03 (L) 4.60 - 6.20 M/uL    HGB 12.6 (L) 14.0 - 18.0 gm/dL    HCT 37.5 (L) 40.0 - 54.0 %    MCV 93 82 - 98 fL    MCH  31.3 (H) 27.0 - 31.0 pg    MCHC 33.6 32.0 - 36.0 g/dL    RDW 11.7 11.5 - 14.5 %    Platelet Count 287 150 - 450 K/uL    MPV 10.6 9.2 - 12.9 fL    Nucleated RBC 0 <=0 /100 WBC    Neut % 75.6 (H) 38 - 73 %    Lymph % 10.7 (L) 18 - 48 %    Mono % 13.1 4 - 15 %    Eos % 0.0 <=8 %    Basophil % 0.1 <=1.9 %    Imm Grans % 0.5 0.0 - 0.5 %    Neut # 5.89 1.8 - 7.7 K/uL    Lymph # 0.83 (L) 1 - 4.8 K/uL    Mono # 1.02 (H) 0.3 - 1 K/uL    Eos # 0.00 <=0.5 K/uL    Baso # 0.01 <=0.2 K/uL    Imm Grans # 0.04 0.00 - 0.04 K/uL     Imaging Results               CT Abdomen Pelvis With IV Contrast NO Oral Contrast (Final result)  Result time 04/30/25 15:46:15      Final result by Russell Ba MD (04/30/25 15:46:15)                   Impression:      1. Wall thickening of the distal ileum to the surgical anastomosis with the large bowel.  Inflammatory infectious ileitis are considerations.  Recommend clinical correlation and follow-up.  2. Constipation.  There is fecal distention of the rectum.  No fecal impaction is not excluded.  3. Bilateral renal cysts and hepatic cysts.  4. Nonobstructing nephrolithiasis of the right kidney.  5. Nondistention of the urinary bladder.  Mild wall thickening is not excluded.  6. Diverticulosis of the sigmoid colon.  No evidence of acute diverticulitis.  7. This report was flagged in Epic as abnormal.      Electronically signed by: Russell Ba  Date:    04/30/2025  Time:    15:46               Narrative:    EXAMINATION:  CT ABDOMEN PELVIS WITH IV CONTRAST    CLINICAL HISTORY:  LLQ abdominal pain;RLQ abdominal pain (Age >= 14y);    TECHNIQUE:  Low dose axial images, sagittal and coronal reformations were obtained from the lung bases to the pubic symphysis following the IV administration of 75 mL of Omnipaque 350 .  Oral contrast was not administered.    COMPARISON:  11/05/2018    FINDINGS:  Abdomen:    - Lower thorax:    - Lung bases: No infiltrates and no nodules.    - Liver: Multiple small  hepatic cysts.    - Gallbladder: No calcified gallstones.    - Bile Ducts: No evidence of intra or extra hepatic biliary ductal dilation.    - Spleen: Negative.    - Kidneys: Multiple bilateral simple renal cysts.  Single nonobstructing stone in the right kidney.  No hydronephrosis or hydroureter bilaterally.    - Adrenals: Unremarkable.    - Pancreas: No mass or peripancreatic fat stranding.    - Retroperitoneum:  No significant adenopathy.    - Vascular: Mild ectasia of the distal aorta with no evidence of aneurysm.    - Abdominal wall:  Unremarkable.    Pelvis:    Urinary bladder is incompletely distended with possible mild wall thickening.    Postop changes at the ileocecal junction.  There is wall thickening noted to the distal ileum to the surgical anastomosis.  Inflammatory or infectious ileitis are considerations.  Follow-up recommended.    Bowel/Mesentery:    No evidence of bowel obstruction.  Moderate retained feces in the colon.    Fecal distention of the rectum.  Impaction is not excluded.    Mild diverticulosis of the sigmoid colon.  No evidence of acute diverticulitis.    Bones:  No acute osseous abnormality and no suspicious lytic or blastic lesion.                                       CT Head Without Contrast (Final result)  Result time 04/30/25 14:49:11      Final result by Ish Carrillo MD (04/30/25 14:49:11)                   Impression:      No evidence for acute intracranial pathology.      Electronically signed by: Ish Carrillo  Date:    04/30/2025  Time:    14:49               Narrative:    EXAMINATION:  CT HEAD WITHOUT CONTRAST    CLINICAL HISTORY:  Mental status change, unknown cause;    TECHNIQUE:  Low dose axial images were obtained through the head.  Coronal and sagittal reformations were also performed. Contrast was not administered.    COMPARISON:  MRI brain without contrast 04/24/2025, CT head without contrast 04/24/2025.    FINDINGS:  Ventricles are stable in size without evidence for  new or worsening hydrocephalus.  No new or enlarging extra-axial blood or fluid collections.    Brain parenchyma appears unchanged.  Scattered supratentorial hypoattenuation, overall nonspecific, but can be seen in setting of microvascular ischemic change.  No parenchymal mass, edema, hemorrhage, or acute major vascular territory infarct.    No calvarial or skull base fracture or osseous destructive lesion.  Paranasal sinuses and mastoid air cells are essentially clear.                                       X-Ray Chest AP Portable (Final result)  Result time 04/30/25 12:49:39      Final result by Matti Cote MD (04/30/25 12:49:39)                   Impression:      See above      Electronically signed by: Matti Cote MD  Date:    04/30/2025  Time:    12:49               Narrative:    EXAMINATION:  XR CHEST AP PORTABLE    CLINICAL HISTORY:  Altered mental status, unspecified    TECHNIQUE:  Single frontal view of the chest was performed.    COMPARISON:  No 04/23/2025 ne    FINDINGS:  Heart size normal.  The tracheal slightly deviates to the right at the thoracic inlet probably related to enlarged thyroid gland.  Minimal subsegmental atelectatic changes noted at the right lung base.  Lungs are otherwise clear.  No pleural effusion.

## 2025-05-22 NOTE — SUBJECTIVE & OBJECTIVE
Interval History: will not place PEG will pursue palliative care, remains confused this am.    Review of Systems  Objective:     Vital Signs (Most Recent):  Temp: 97.5 °F (36.4 °C) (05/22/25 0719)  Pulse: 110 (05/22/25 0719)  Resp: 18 (05/22/25 0719)  BP: (!) 179/81 (05/22/25 0719)  SpO2: (!) 94 % (05/22/25 0719) Vital Signs (24h Range):  Temp:  [97 °F (36.1 °C)-98.3 °F (36.8 °C)] 97.5 °F (36.4 °C)  Pulse:  [] 110  Resp:  [18] 18  SpO2:  [94 %-100 %] 94 %  BP: (143-179)/(69-86) 179/81     Weight: 64.1 kg (141 lb 5 oz)  Body mass index is 19.71 kg/m².    Intake/Output Summary (Last 24 hours) at 5/22/2025 0818  Last data filed at 5/22/2025 0342  Gross per 24 hour   Intake 1090 ml   Output 550 ml   Net 540 ml      Physical Exam  Constitutional:       Appearance: He is ill-appearing.   HENT:      Head: Normocephalic and atraumatic.   Cardiovascular:      Rate and Rhythm: Normal rate and regular rhythm.      Heart sounds: No murmur heard.  Pulmonary:      Effort: Pulmonary effort is normal. No respiratory distress.      Breath sounds: Normal breath sounds. No wheezing or rales.   Abdominal:      General: There is no distension.      Palpations: Abdomen is soft.      Tenderness: There is no abdominal tenderness.   Musculoskeletal:         General: No deformity.   Skin:     General: Skin is warm and dry.      Coloration: Skin is pale.   Neurological:      General: No focal deficit present.      Comments: Able to have a conversation, oriented to person         MELD 3.0: 9 at 5/22/2025  5:46 AM  MELD-Na: 6 at 5/22/2025  5:46 AM  Calculated from:  Serum Creatinine: 1 mg/dL at 5/22/2025  5:46 AM  Serum Sodium: 134 mmol/L at 5/22/2025  5:46 AM  Total Bilirubin: 0.4 mg/dL (Using min of 1 mg/dL) at 5/21/2025  4:13 AM  Serum Albumin: 3.1 g/dL at 5/21/2025  4:13 AM  INR(ratio): 1 at 5/22/2025  5:46 AM  Age at listing (hypothetical): 79 years  Sex: Male at 5/22/2025  5:46 AM      Significant Labs:  CBC:  Recent Labs   Lab  05/21/25  0413 05/22/25  0546   WBC 8.14 7.79   HGB 13.7* 12.6*   HCT 41.4 37.5*    287     CMP:  Recent Labs   Lab 05/21/25 0413 05/22/25  0546   * 134*   K 4.2 4.3    100   CO2 25 24   GLU 92 81   BUN 31* 27*   CREATININE 1.0 1.0   CALCIUM 10.4 10.3   PROT 8.6*  --    ALBUMIN 3.1*  --    BILITOT 0.4  --    ALKPHOS 78  --    AST 27  --    ALT 11  --    ANIONGAP 9 10     PTINR:  Recent Labs   Lab 05/21/25 0413 05/22/25  0546   INR 1.0 1.0       Significant Procedures:   Dobutamine Stress Test with Color Flow: No results found for this or any previous visit.    None

## 2025-05-22 NOTE — CHAPLAIN
Patient: Vince Huffman  MRN: [id number]  : 1945  Age: 79 y.o.  Legal sex: male   Hospital Length of Stay: 21 days  Code Status: Full Code   Attending Provider: Juan F Ramírez MD  Principal Problem: Acute encephalopathy  Patient's Mu-ism: Anglican  Length of my visit: 5 min  Purpose of visit:   [place]      attempted to visit - patient with nurses.         Rev. Cyrus Judge, [id number]  board certified , desire (Thai+)     support is available and on-site . Please call the on-call  for any emergent spiritual care needs, [phone].

## 2025-05-22 NOTE — PROGRESS NOTES
Adam Amezquita - [facility] Medicine  Progress Note    Patient Name: Vince Huffman  MRN: [id number]  Patient Class: IP- Inpatient   Admission Date: 4/30/2025  Length of Stay: 21 days  Attending Physician: Juan F Ramírez MD  Primary Care Provider: Leland Porras MD        Subjective     Principal Problem:Acute encephalopathy        HPI:  By Dr. Juan F Ramírez MD    78 yo M w/HTN, hypothyroid, crohn's disease, CAD, hx of SBO, presenting with AMS from [facility]. Patient was sent in from [facility] for progressively worsening cognitive function. Patient is A&O x1 at his baseline. Patient and his daughter endorse mild confusion. Which has been progressive.    Patient has not had any recent falls. Endorsed mild suprapubic abdominal pain. No UTI. Mild inflammation on CT, not unexpected in the setting of Crohn's.     Overview/Hospital Course:  Mr. Huffman was admitted to  for further evaluation of worsening AMS per rehab facility. AAOx1 on admission, normally AAOx4 prior to 4/20 per son. UA negative. CXR clear. CT abd/pelvis with wall thickening of distal ileum, inflammatory infectious ileitis are considerations, fecal distention of the rectum, impaction not excluded. Discussed with GI, anticipated these are chronic findings. CRP negative. Will give enema and monitor for improvement. Patient with successful BM. Neurology consulted: MRI brain ordered (no acute findings, motion artifact), extended EEG, ammonia levels. Worsening mental status on 5/2, medicine team consulted for LP which was unsuccessful. More alert on 5/3 and answering yes/no questions when redirected. Neurology recommending carbidopa-levodopa trial, EEG, and attempting another LP.  Symptoms improved with increasing doses of Carbidopa/Levodopa. LP concerning for meningitis so he was started on Empiric treatment on 5/6/25. Discussion held with both Neurology and Infectious Disease, Infectious Disease does not believe patient  has any infectious etiology and as such recommended discontinuing antimicrobials.  Neurology started IVIG for presumed autoimmune process.  Completed 5 day course of IVIG 5/14. Minimal improvement in cognition. Had bouts of poor oral intake, only drank boost.  Had intermittent hyponatremia responding to IV fluids.  On 05/14 am Sodium 124.  Nephrology was consulted, investigations ordered and sodium corrected with close electrolyte monitoring.  Psych was consulted by the request of the neurologist. Hyponatremia improved after stopping fluids. NG tube attempted thrice at bedside however not able to be placed. Able to tolerate oral intake however very limited intake. Neuro recommended repeat MRI with sedation as previous had artifact. Anesthesia notified to arrange with sedation which was done on 5/17.  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 Endocrine consulted. Neurology planning 5 day course of IV Solumedrol starting 5/17.   He was started on Vitamin B12 and Thiamine replacement.    Steroids paused on 5/19 given concern for upper GI bleeding. PPI started and GI consulted. GI team does not share same concern for bleeding and hemoglobin notably stable despite elevated BUN, which could be coincidence. Will continue PPI for now. Steroids resumed with PPI ongoing on 5/20 (day 4/5 of steroids). Discussed role of G-tube with family and GI and everyone agreeable to placement. Plan for G tube placement on 5/22.     Interval History: will not place PEG will pursue palliative care, remains confused this am. To meet with palliative care this am, to discuss dc options    Review of Systems  Objective:     Vital Signs (Most Recent):  Temp: 97.5 °F (36.4 °C) (05/22/25 0719)  Pulse: 110 (05/22/25 0719)  Resp: 18 (05/22/25 0719)  BP: (!) 179/81 (05/22/25 0719)  SpO2: (!) 94 % (05/22/25 0719) Vital Signs (24h Range):  Temp:  [97 °F (36.1 °C)-98.3 °F (36.8 °C)] 97.5 °F  (36.4 °C)  Pulse:  [] 110  Resp:  [18] 18  SpO2:  [94 %-100 %] 94 %  BP: (143-179)/(69-86) 179/81     Weight: 64.1 kg (141 lb 5 oz)  Body mass index is 19.71 kg/m².    Intake/Output Summary (Last 24 hours) at 5/22/2025 0818  Last data filed at 5/22/2025 0342  Gross per 24 hour   Intake 1090 ml   Output 550 ml   Net 540 ml      Physical Exam  Constitutional:       Appearance: He is ill-appearing.   HENT:      Head: Normocephalic and atraumatic.   Cardiovascular:      Rate and Rhythm: Normal rate and regular rhythm.      Heart sounds: No murmur heard.  Pulmonary:      Effort: Pulmonary effort is normal. No respiratory distress.      Breath sounds: Normal breath sounds. No wheezing or rales.   Abdominal:      General: There is no distension.      Palpations: Abdomen is soft.      Tenderness: There is no abdominal tenderness.   Musculoskeletal:         General: No deformity.   Skin:     General: Skin is warm and dry.      Coloration: Skin is pale.   Neurological:      General: No focal deficit present.      Comments: Able to have a conversation, oriented to person         MELD 3.0: 9 at 5/22/2025  5:46 AM  MELD-Na: 6 at 5/22/2025  5:46 AM  Calculated from:  Serum Creatinine: 1 mg/dL at 5/22/2025  5:46 AM  Serum Sodium: 134 mmol/L at 5/22/2025  5:46 AM  Total Bilirubin: 0.4 mg/dL (Using min of 1 mg/dL) at 5/21/2025  4:13 AM  Serum Albumin: 3.1 g/dL at 5/21/2025  4:13 AM  INR(ratio): 1 at 5/22/2025  5:46 AM  Age at listing (hypothetical): 79 years  Sex: Male at 5/22/2025  5:46 AM      Significant Labs:  CBC:  Recent Labs   Lab 05/21/25 0413 05/22/25  0546   WBC 8.14 7.79   HGB 13.7* 12.6*   HCT 41.4 37.5*    287     CMP:  Recent Labs   Lab 05/21/25 0413 05/22/25  0546   * 134*   K 4.2 4.3    100   CO2 25 24   GLU 92 81   BUN 31* 27*   CREATININE 1.0 1.0   CALCIUM 10.4 10.3   PROT 8.6*  --    ALBUMIN 3.1*  --    BILITOT 0.4  --    ALKPHOS 78  --    AST 27  --    ALT 11  --    ANIONGAP 9 10      PTINR:  Recent Labs   Lab 05/21/25  0413 05/22/25  0546   INR 1.0 1.0       Significant Procedures:   Dobutamine Stress Test with Color Flow: No results found for this or any previous visit.    None      Assessment & Plan  Acute encephalopathy  AMS (altered mental status)  Progressive acutely worsening confusion/tremors since 4/20. Previously AAOx4, now AAOx1  Neurology consulted: MRI brain ordered (no acute findings, motion artifact), extended EEG, ammonia levels.   Worsening mental status on 5/2, medicine team consulted for LP which was unsuccessful.   Neurology recommending carbidopa-levodopa trial, EEG, and attempting another LP.    Symptoms improved with increasing doses of Carbidopa/Levodopa.   LP concerning for meningitis so he was started on Empiric treatment on 5/6/25, viral panel negative  Discussion held with both Neurology and Infectious Disease, Infectious Disease does not believe patient has any infectious etiology and as such recommended discontinuing antimicrobials.    Neurology started IVIG for presumed autoimmune process.  Completed 5 day course of IVIG 5/14. Minimal improvement in cognition.   Neuro recommended repeat MRI with sedation as previous had artifact. Anesthesia notified to arrange with sedation which was done on 5/17.    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 Endocrine consulted.   Neurology planning 5 day course of IV Solumedrol starting 5/17 - paused on 5/19, resumed 5/20 (Today is Day 4/5)   Continue Vitamin B12 and Thiamine replacement.  Progressive acutely worsening confusion/tremors since 4/20. Previously AAOx4, now AAOx1  Neurology consulted: MRI brain ordered (no acute findings, motion artifact), extended EEG, ammonia levels.   Worsening mental status on 5/2, medicine team consulted for LP which was unsuccessful.   Neurology recommending carbidopa-levodopa trial, EEG, and attempting another LP.    Symptoms improved  with increasing doses of Carbidopa/Levodopa.   LP concerning for meningitis so he was started on Empiric treatment on 5/6/25, viral panel negative  Discussion held with both Neurology and Infectious Disease, Infectious Disease does not believe patient has any infectious etiology and as such recommended discontinuing antimicrobials.    Neurology started IVIG for presumed autoimmune process.  Completed 5 day course of IVIG 5/14. Minimal improvement in cognition.   Neuro recommended repeat MRI with sedation as previous had artifact. Anesthesia notified to arrange with sedation which was done on 5/17.    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 Endocrine consulted.   Neurology planning 5 day course of IV Solumedrol starting 5/17 - paused on 5/19, resumed 5/20 (Today is Day 5/5)   Continue Vitamin B12 and Thiamine replacement.  Progressive acutely worsening confusion/tremors since 4/20. Previously AAOx4, now AAOx1  Neurology consulted: MRI brain ordered (no acute findings, motion artifact), extended EEG, ammonia levels.   Worsening mental status on 5/2, medicine team consulted for LP which was unsuccessful.   Neurology recommending carbidopa-levodopa trial, EEG, and attempting another LP.    Symptoms improved with increasing doses of Carbidopa/Levodopa.   LP concerning for meningitis so he was started on Empiric treatment on 5/6/25, viral panel negative  Discussion held with both Neurology and Infectious Disease, Infectious Disease does not believe patient has any infectious etiology and as such recommended discontinuing antimicrobials.    Neurology started IVIG for presumed autoimmune process.  Completed 5 day course of IVIG 5/14. Minimal improvement in cognition.   Neuro recommended repeat MRI with sedation as previous had artifact. Anesthesia notified to arrange with sedation which was done on 5/17.    Anti TPO positive SREAT (Steroid Responsive Encephalopathy  "Associated with Autoimmune Thyroiditis). Endocrine consulted.   Neurology planning 5 day course of IV Solumedrol starting 5/17 - paused on 5/19, resumed 5/20 (Today is Day 4/5)   Continue Vitamin B12 and Thiamine replacement.  Progressive acutely worsening confusion/tremors since 4/20. Previously AAOx4, now AAOx1  Neurology consulted: MRI brain ordered (no acute findings, motion artifact), extended EEG, ammonia levels.   Worsening mental status on 5/2, medicine team consulted for LP which was unsuccessful.   Neurology recommending carbidopa-levodopa trial, EEG, and attempting another LP.    Symptoms improved with increasing doses of Carbidopa/Levodopa.   LP concerning for meningitis so he was started on Empiric treatment on 5/6/25, viral panel negative  Discussion held with both Neurology and Infectious Disease, Infectious Disease does not believe patient has any infectious etiology and as such recommended discontinuing antimicrobials.    Neurology started IVIG for presumed autoimmune process.  Completed 5 day course of IVIG 5/14. Minimal improvement in cognition.   Neuro recommended repeat MRI with sedation as previous had artifact. Anesthesia notified to arrange with sedation which was done on 5/17.    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 Endocrine consulted.   Neurology planning 5 day course of IV Solumedrol starting 5/17 - paused on 5/19, resumed 5/20 (Today is Day 5/5)   Continue Vitamin B12 and Thiamine replacement.  Crohn's disease  GIB (gastrointestinal bleeding)  Per CT scan "thickening of the distal ileum to the surgical anastomosis with the large bowel. Inflammatory infectious ileitis are considerations. Recommend clinical correlation and follow-up. "  CRP negative. No concern for flare.   GI without concerns of confusion being caused by sterlara, confirmed with pharmacy   On 5/19 developed new onset dark stools  Has been on Lovenox, IV " Solumedrol and PO PPI  Pantoprazole 80mg IV x 1 then PPI 40mg IV BID  GI consulted  Repeat H/H at 2pm  Will transfuse if Hgb is <7g/dl (<8g/dl in cases of active ACS) or if patient has rapid bleeding leading to hemodynamic instability  IBD team recommending CTE vs MRE when patient able to take po and improving  Will need re-staging colonoscopy, likely outpatient     Recent Labs     05/20/25  0618 05/21/25  0413 05/22/25  0546   HGB 12.6* 13.7* 12.6*   HCT 37.3* 41.4 37.5*    290 287   INR 1.0 1.0 1.0   CRP negative. No concern for flare.   GI without concerns of confusion being caused by sterlara, confirmed with pharmacy   On 5/19 developed new onset dark stools  Has been on Lovenox, IV Solumedrol and PO PPI  Pantoprazole 80mg IV x 1 then PPI 40mg IV BID  GI consulted  Repeat H/H at 2pm  Will transfuse if Hgb is <7g/dl (<8g/dl in cases of active ACS) or if patient has rapid bleeding leading to hemodynamic instability    Recent Labs     05/20/25  0618 05/21/25  0413 05/22/25  0546   HGB 12.6* 13.7* 12.6*   HCT 37.3* 41.4 37.5*    290 287   INR 1.0 1.0 1.0     Recent Labs     05/20/25  0618 05/21/25 0413 05/22/25  0546   HGB 12.6* 13.7* 12.6*   HCT 37.3* 41.4 37.5*    290 287   INR 1.0 1.0 1.0   CRP negative. No concern for flare.   GI without concerns of confusion being caused by sterlara, confirmed with pharmacy   On 5/19 developed new onset dark stools  Has been on Lovenox, IV Solumedrol and PO PPI  Pantoprazole 80mg IV x 1 then PPI 40mg IV BID  GI consulted  Repeat H/H at 2pm  Will transfuse if Hgb is <7g/dl (<8g/dl in cases of active ACS) or if patient has rapid bleeding leading to hemodynamic instability  IBD team recommending CTE vs MRE when patient able to take po and improving  Will need re-staging colonoscopy, likely outpatient     Recent Labs     05/20/25  0618 05/21/25  0413 05/22/25  0546   HGB 12.6* 13.7* 12.6*   HCT 37.3* 41.4 37.5*    290 287   INR 1.0 1.0 1.0     Recent  Labs     05/20/25  0618 05/21/25  0413 05/22/25  0546   HGB 12.6* 13.7* 12.6*   HCT 37.3* 41.4 37.5*    290 287   INR 1.0 1.0 1.0   On 5/19 developed new onset dark stools  Has been on Lovenox, IV Solumedrol and PO PPI  Pantoprazole 80mg IV x 1 then PPI 40mg IV BID  GI consulted  Repeat H/H at 2pm  Will transfuse if Hgb is <7g/dl (<8g/dl in cases of active ACS) or if patient has rapid bleeding leading to hemodynamic instability    Recent Labs     05/20/25  0618 05/21/25  0413 05/22/25  0546   HGB 12.6* 13.7* 12.6*   HCT 37.3* 41.4 37.5*    290 287   INR 1.0 1.0 1.0     Essential hypertension  Patient's blood pressure range in the last 24 hours was: BP  Min: 143/70  Max: 179/81.The patient's inpatient anti-hypertensive regimen is listed below:  Current Antihypertensives  NIFEdipine 24 hr tablet 60 mg, Daily, Oral    Plan  - BP is controlled, no changes needed to their regimen  Changed from Norvasc to Procardia given Psych recs    Generalized weakness  Sent from SNF  Progressive worsening weakness per son   PT/OT ordered. Will need placement at discharge  Unintended weight loss  Reported per son  Recently started gluten free diet per recommendations from GI  Temporal wasting noted  Body mass index is 19.71 kg/m².  Nutrition following  Boost changed to Boost Breeze given lactose intolerance  Hyponatremia  Hyponatremia is likely due to Dehydration/hypovolemia. The patient's most recent sodium results are listed below.  Recent Labs     05/20/25  0618 05/21/25  0413 05/22/25  0546   * 135* 134*     Maxime  - Monitor sodium Daily.   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 Most recent hemoglobin and hematocrit are listed below.  Recent Labs     05/20/25  0618 05/21/25  0413 05/22/25  0546   HGB 12.6* 13.7* 12.6*   HCT 37.3* 41.4 37.5*     Plan  - Monitor serial CBC: Daily  - Transfuse PRBC if patient becomes  hemodynamically unstable, symptomatic or H/H drops below 7/21.  - Patient has not received any PRBC transfusions to date  - Patient's anemia is currently stable  -  Parkinsonism  Suspect symptoms are at least partially due to Parkinson's disease.   Continue Sinemet TID  CAD (coronary artery disease)  History noted.   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 Will hold anti-depressant medications. We will consult psychiatry at this time. Patient does not display psychosis at this time. Continue to monitor closely and adjust plan of care as needed.  Anemia  Anemia is likely due to acute on chronic illness. Most recent hemoglobin and hematocrit are listed below.  Recent Labs     05/20/25  0618 05/21/25  0413 05/22/25  0546   HGB 12.6* 13.7* 12.6*   HCT 37.3* 41.4 37.5*     Plan  - Monitor serial CBC: Daily  - Transfuse PRBC if patient becomes hemodynamically unstable, symptomatic or H/H drops below 7/21.  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 Most recent weight and BMI monitored-     Measurements:  Wt Readings from Last 1 Encounters:   05/06/25 64.1 kg (141 lb 5 oz)   Body mass index is 19.71 kg/m².    Patient has been screened and assessed by RD.    Malnutrition Type:  Context:    Level:      Malnutrition Characteristic Summary:       Interventions/Recommendations (treatment strategy):  1. Continuous tube feeds of Jevity 1.5 with a goal rate of 45 ml/hr x 24 hours to meet > 75% of kcal/protein needs - provides Total volume: 1080 -Kcal: 1620 - Protein 73gm Free water: 840 mL.  Additional free fluid flushes per MD. Patient currently has 1200cc fluid restriction. 2. Monitor for s/s TF intolerance.    - Discussed G tube placement with daughter/MPOA and GI, planned for placement on 5/22    VTE Risk Mitigation (From admission, onward)           Ordered     enoxaparin injection 40 mg  Every 24 hours         05/20/25 0906     IP VTE HIGH RISK PATIENT  Once         05/01/25 0827     Place sequential compression device  Until discontinued         05/01/25 0827                    Discharge Planning   ERNIE: 5/26/2025     Code Status: Full Code   Medical Readiness for Discharge Date:   Discharge Plan A: Rehab   Discharge Delays: None known at this time            Please place Justification for DME        Juan F Ramírez MD  Department of Hospital Medicine   [facility] - Acute Medical Stepdown

## 2025-05-22 NOTE — HPI
"As per H&P, "Vince Huffman is a 79 y.o. male with a PMHx of Crohn's disease, prior SBO, HTN, who presented to [facility] ED on 4/24/25 due to gradually worsening generalized weakness. He has not been getting out of bed as much, or walking as much as usual. He also notes decreased appetite/PO intake, slowed speech, unsteady gait, and hand tremors over the past several weeks. He reports a 30 lb weights loss. Since he lost his wife he has been staying with his sister, who has felt his gait is unsteady enough that she encouraged him to start using a cane. He denies changes/losses of strength of sensation, fever, dyspnea, back pain, leg weakness, gait freezing, or symptoms of festination. He states that moving his knees when he begins walking is difficult. He was evaluated in the ED with labs showing no leukocytosis or left shift. H&H were 10.9/31.8. A metabolic panel was overall normal. UA was without infection. A CXR showed no acute findings. A CT Head showed no acute process. An MRI of the brain was "significantly limited," showing motion artifact causing the exam to be incomplete. He was placed on observation."    Palliative Medicine was consulted per primary, Dr. Ramírez, for goals of care and advanced care planning discussions.  "

## 2025-05-22 NOTE — ASSESSMENT & PLAN NOTE
Anemia is likely due to acute on chronic illness. Most recent hemoglobin and hematocrit are listed below.  Recent Labs     05/20/25  0618 05/21/25  0413 05/22/25  0546   HGB 12.6* 13.7* 12.6*   HCT 37.3* 41.4 37.5*     Plan  - Monitor serial CBC: Daily  - Transfuse PRBC if patient becomes hemodynamically unstable, symptomatic or H/H drops below 7/21.  - Patient has not received any PRBC transfusions to date  - Patient's anemia is currently stable  - Continue PPI with high dose steroids per neurology

## 2025-05-22 NOTE — PLAN OF CARE
Problem: Adult Inpatient Plan of Care  Goal: Plan of Care Review  Outcome: Progressing  Goal: Patient-Specific Goal (Individualized)  Outcome: Progressing  Goal: Absence of Hospital-Acquired Illness or Injury  Outcome: Progressing  Goal: Optimal Comfort and Wellbeing  Outcome: Progressing  Goal: Readiness for Transition of Care  Outcome: Progressing     Problem: Skin Injury Risk Increased  Goal: Skin Health and Integrity  Outcome: Progressing     Problem: Infection  Goal: Absence of Infection Signs and Symptoms  Outcome: Progressing     Problem: Fall Injury Risk  Goal: Absence of Fall and Fall-Related Injury  Outcome: Progressing

## 2025-05-22 NOTE — PT/OT/SLP PROGRESS
Physical Therapy Treatment    Patient Name:  Vince Huffman   MRN:  [id number]    Recommendations:     Discharge Recommendations: High Intensity Therapy  Discharge Equipment Recommendations: to be determined by next level of care  Barriers to discharge: None    Assessment:     Vince Huffman is a 79 y.o. male admitted with a medical diagnosis of Acute encephalopathy.  He presents with the following impairments/functional limitations: weakness, impaired endurance, impaired self care skills, impaired functional mobility, gait instability, impaired balance, decreased coordination, impaired cognition, decreased safety awareness, abnormal tone, impaired fine motor Pt. cooperative and tolerated treatment well. Pt. progressing with mobility with hand-held assistance and rolling walker with improved upright posture.    Rehab Prognosis: Good; patient would benefit from acute skilled PT services to address these deficits and reach maximum level of function.    Recent Surgery: Procedure(s) (LRB):  MRI (Magnetic Resonance Imagine) (N/A) 5 Days Post-Op    Plan:     During this hospitalization, patient to be seen 4 x/week to address the identified rehab impairments via gait training, therapeutic activities, therapeutic exercises, neuromuscular re-education and progress toward the following goals:    Plan of Care Expires:  06/02/25    Subjective     Chief Complaint: NAD  Patient/Family Comments/goals: pt. Agreeable to PT  Pain/Comfort:  Pain Rating 1: 0/10  Pain Rating Post-Intervention 1: 0/10      Objective:     Communicated with nursing prior to session.  Patient found supine with bed alarm, Condom Catheter, restraints upon PT entry to room.     General Precautions: Standard, fall  Orthopedic Precautions: N/A  Braces: N/A  Respiratory Status: Room air     Functional Mobility:  Bed Mobility:     Rolling Left:  moderate assistance  Scooting: maximal assistance  Supine to Sit: maximal assistance  Sit to Supine: maximal  assistance  Transfers:     Sit to Stand:  moderate assistance with hand-held assist and rolling walker  Gait: 100' with HHA-Mod/Min A for balance/guidance/weight shifting/safety and 100' with RW and Mod-Min A for RW management/guidance/safety with rolling bedside chair follow. Pt. with seated rest between gait trials. Pt. with posterior lean in standing and amb. with decreased step length and increased flexion (B) knees.  Balance: fair sitting and poor standing      AM-PAC 6 CLICK MOBILITY  Turning over in bed (including adjusting bedclothes, sheets and blankets)?: 2  Sitting down on and standing up from a chair with arms (e.g., wheelchair, bedside commode, etc.): 2  Moving from lying on back to sitting on the side of the bed?: 2  Moving to and from a bed to a chair (including a wheelchair)?: 2  Need to walk in hospital room?: 2  Climbing 3-5 steps with a railing?: 1  Basic Mobility Total Score: 11       Treatment & Education:  Discussed pt.'s progress, goals, and POC.     Patient left supine with all lines intact, call button in reach, and restraints reapplied at end of session..    GOALS:   Multidisciplinary Problems       Physical Therapy Goals          Problem: Physical Therapy    Goal Priority Disciplines Outcome Interventions   Physical Therapy Goal     PT, PT/OT Progressing    Description: Goals to be met by: 2025     Patient will increase functional independence with mobility by performin. Supine to sit with MInimal Assistance  2. Sit to supine with MInimal Assistance  3. Sit to stand transfer with Minimal Assistance  4. Bed to chair transfer with Minimal Assistance using LRAD  5. Gait  x 25 feet with Minimal Assistance using LRAD.   6. Lower extremity exercise program x15 reps per handout, with assistance as needed                         DME Justifications:  No DME recommended requiring DME justifications    Time Tracking:     PT Received On: 25  PT Start Time: 833     PT Stop Time:  0857  PT Total Time (min): 24 min     Billable Minutes: Gait Training 24    Treatment Type: Treatment  PT/PTA: PT     Number of PTA visits since last PT visit: 0     05/22/2025

## 2025-05-22 NOTE — ASSESSMENT & PLAN NOTE
Impression:  Vince Huffman is a 79 y.o. male with PMHx of PMH of Crohn's disease, prior SBO, HTN, who [place] [facility] due to gradually worsening generalized weakness. He has not been getting out of bed as much, or walking as much as usual. He also notes decreased appetite/PO intake, slowed speech, unsteady gait, and hand tremors over the past several weeks. Since being hospitalized, patient has been seen by neurology for evaluation of encephalopathy, infectious disease for meningitis workup as potential cause for acute encephalopathy, psychiatry for concerns of complex bereavement given the recent loss of his wife 18 months ago vs catatonia, and endocrine for concerns of Hashimoto encephalopathy. Patient was initiated on IVIG and steroids, but has not had any clear improvement in mentation. 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 Neurology initiated goals of care discussion with patient's daughter, Radha. Palliative Medicine was consulted by primary, Dr. Ramírez, for continued goals of care and advanced care planning discussions.      Patient appears to be resting comfortably in bed. Awake. Oriented to self. Pleasant. Patient was just returned to bed by PT/OT as he just finished walking in the hallway with therapy. Patient is in bilateral wrist restraints. No acute distress noted.     - Prior experience with serious illness: yes, wife passed away 18 months ago. It has been reported that wife spent last 8 months of life in hospital setting. Patient stayed by patient's bedside throughout this time.  -The patient has not previously engaged in advance care planning or GOC discussions  - Insight/Understanding of illness: patient with confusion. Patient's family with good understanding of illness. Would benefit from more discussions with neurology team.    Advance Care Planning     Date: 05/22/2025  - Patient oriented to  self. Unable to participate in discussions. Family is amenable to Palliative Medicine.   - Phone conference call with patient's daughter (Radha), sister (Luiz), brothers (Mitchel and Patrick), and sister-in-Law (Mei).  - Discussed patient prognosis which appears to be poor given patient's progressive debility, frailty, and malnutrition.  - Family shares that the news of patient having a rapidly progressive neurodegenerative condition was shocking as patient was ambulatory and independent a month ago. Luiz adds that she assumed patient was grieving the loss of his wife, and was not expecting the news from the neurology team.  - Family hopes that patient will be able to leave the hospital, but most importantly that patient not suffer.  - Discussed disease trajectory of a progressive neurodegenerative condition. Family inquired about next steps once patient is medically ready to leave hospital. We discussed treatment focused care and that as patient's disease process continues to progress, I expressed worry that his independence will worsen along with his mentation, debility, and nutrition leading to risks of skin breakdown from being bed bound, and risk for aspiration/infection. Luiz expressed that she would not want to see her brother suffer from any additional skin breakdown. She states his skin is fragile already and has had skin tears from his hospital identification band on his arm, she would not want to see any more skin breakdown from decreased mobility and malnutrition. Radha acknowledges that patient would not want to depend on others for basic care needs.   - Nutritional status discussed. I shared patient's breakfast tray was untouched this morning, and patient stated he was not hungry when I offered to feed him. Family shares that patient takes excessive amounts of time to chew and swallow his food and that he requires assistance with feeding.  - Philosophies of hospice and comfort care introduced.    - Goals of care discussed. We talked more about quality of life and prolonging life for more time. Code status also discussed. As mentioned earlier in our discussion family's hope of limiting suffering, I recommended that if patient's heart or breathing stops naturally, we allow patient to rest peacefully and pass away comfortably. Luiz reiterates she does not believe patient would find his current life to be of quality and would like to shift to comfort focused care and not resuscitate patient if he passes away. Radha agreed. Luiz acknowledges that Radha is one of the primary decision makers along with Vince MurphyRip (Patient's son). Of note, patient's son was not on call because family requests to loop him in on patient's most recent health update once he is physically here at hospital.   - Radha shares similar sentiments as Luiz, but shares she would like additional time for continued discussions among patient's decision-making support system. Family asks to speak with neurology regarding prognosis with lack of diagnosis and if no changes on MRI impacts prognosis. Dr. Collier, Neurology updated.             Life Limiting Diagnosis  Acute encephalopathy  Elevated NFL            Symptom Management  - Pain: no  - acetaminophen 650mg q4h PRN    - Dyspnea: no  - 24h SpO2: %  - maintained on RA    - Constipation: no  - LBM:  - polyethylene glycol BID    - Nausea: no  - ondansetron 8mg q8h PRN    - Debility: yes  - Functional status: fair.  Pt was not able to manage ADLs  - Fall precautions  - PT/OT recommends Moderate Intense Therapy    - Dysphagia: no  - Aspiration precautions  - Nursing communication placed to assist patient with feedings  - SLP eval and treat        Recommendations  - Please see above  - Continue management per primary team  - Patient and family would benefit from continued education and information regarding plan of care, prognosis, and what to expect in the future  - Most important goals at  this time: continued goals of care discussions   - Code status: Full. Discussion initiated. Will follow up  - Disposition: TBD        The above recommendations communicated directly to primary team on 5/22/25  Thank you for consulting Palliative Medicine.

## 2025-05-22 NOTE — PLAN OF CARE
Please note that decision to not pursue further diagnostic work-up (i.e. repeat LP) was arrived at following discussion with Mr. Huffman's family over the phone (daughter: Radha). For remainder of details, please see Dr. Collier's progress note from today.

## 2025-05-23 LAB
ABSOLUTE EOSINOPHIL (OHS): 0 K/UL
ABSOLUTE MONOCYTE (OHS): 0.69 K/UL (ref 0.3–1)
ABSOLUTE NEUTROPHIL COUNT (OHS): 8.6 K/UL (ref 1.8–7.7)
ANION GAP (OHS): 9 MMOL/L (ref 8–16)
BASOPHILS # BLD AUTO: 0 K/UL
BASOPHILS NFR BLD AUTO: 0 %
BUN SERPL-MCNC: 28 MG/DL (ref 8–23)
CALCIUM SERPL-MCNC: 9.6 MG/DL (ref 8.7–10.5)
CHLORIDE SERPL-SCNC: 99 MMOL/L (ref 95–110)
CO2 SERPL-SCNC: 27 MMOL/L (ref 23–29)
CREAT SERPL-MCNC: 1.2 MG/DL (ref 0.5–1.4)
ERYTHROCYTE [DISTWIDTH] IN BLOOD BY AUTOMATED COUNT: 11.9 % (ref 11.5–14.5)
GFR SERPLBLD CREATININE-BSD FMLA CKD-EPI: >60 ML/MIN/1.73/M2
GLUCOSE SERPL-MCNC: 97 MG/DL (ref 70–110)
HCT VFR BLD AUTO: 35.2 % (ref 40–54)
HGB BLD-MCNC: 11.9 GM/DL (ref 14–18)
IMM GRANULOCYTES # BLD AUTO: 0.03 K/UL (ref 0–0.04)
IMM GRANULOCYTES NFR BLD AUTO: 0.3 % (ref 0–0.5)
INDIRECT COOMBS: NORMAL
INR PPP: 1.1 (ref 0.8–1.2)
LYMPHOCYTES # BLD AUTO: 0.33 K/UL (ref 1–4.8)
MAGNESIUM SERPL-MCNC: 1.8 MG/DL (ref 1.6–2.6)
MCH RBC QN AUTO: 31.8 PG (ref 27–31)
MCHC RBC AUTO-ENTMCNC: 33.8 G/DL (ref 32–36)
MCV RBC AUTO: 94 FL (ref 82–98)
NUCLEATED RBC (/100WBC) (OHS): 0 /100 WBC
PLATELET # BLD AUTO: 211 K/UL (ref 150–450)
PMV BLD AUTO: 10.4 FL (ref 9.2–12.9)
POCT GLUCOSE: 104 MG/DL (ref 70–110)
POCT GLUCOSE: 112 MG/DL (ref 70–110)
POCT GLUCOSE: 114 MG/DL (ref 70–110)
POCT GLUCOSE: 91 MG/DL (ref 70–110)
POTASSIUM SERPL-SCNC: 3.9 MMOL/L (ref 3.5–5.1)
PROTHROMBIN TIME: 11.9 SECONDS (ref 9–12.5)
RBC # BLD AUTO: 3.74 M/UL (ref 4.6–6.2)
RELATIVE EOSINOPHIL (OHS): 0 %
RELATIVE LYMPHOCYTE (OHS): 3.4 % (ref 18–48)
RELATIVE MONOCYTE (OHS): 7.2 % (ref 4–15)
RELATIVE NEUTROPHIL (OHS): 89.1 % (ref 38–73)
RH BLD: NORMAL
SODIUM SERPL-SCNC: 135 MMOL/L (ref 136–145)
SPECIMEN OUTDATE: NORMAL
WBC # BLD AUTO: 9.65 K/UL (ref 3.9–12.7)

## 2025-05-23 PROCEDURE — 99233 SBSQ HOSP IP/OBS HIGH 50: CPT | Mod: GC,,, | Performed by: PSYCHIATRY & NEUROLOGY

## 2025-05-23 PROCEDURE — 80048 BASIC METABOLIC PNL TOTAL CA: CPT | Performed by: STUDENT IN AN ORGANIZED HEALTH CARE EDUCATION/TRAINING PROGRAM

## 2025-05-23 PROCEDURE — 20600001 HC STEP DOWN PRIVATE ROOM

## 2025-05-23 PROCEDURE — 25000003 PHARM REV CODE 250

## 2025-05-23 PROCEDURE — 36415 COLL VENOUS BLD VENIPUNCTURE: CPT | Performed by: STUDENT IN AN ORGANIZED HEALTH CARE EDUCATION/TRAINING PROGRAM

## 2025-05-23 PROCEDURE — 97112 NEUROMUSCULAR REEDUCATION: CPT | Mod: CO

## 2025-05-23 PROCEDURE — 85025 COMPLETE CBC W/AUTO DIFF WBC: CPT | Performed by: STUDENT IN AN ORGANIZED HEALTH CARE EDUCATION/TRAINING PROGRAM

## 2025-05-23 PROCEDURE — 63600175 PHARM REV CODE 636 W HCPCS: Performed by: STUDENT IN AN ORGANIZED HEALTH CARE EDUCATION/TRAINING PROGRAM

## 2025-05-23 PROCEDURE — 85610 PROTHROMBIN TIME: CPT | Performed by: STUDENT IN AN ORGANIZED HEALTH CARE EDUCATION/TRAINING PROGRAM

## 2025-05-23 PROCEDURE — 63600175 PHARM REV CODE 636 W HCPCS: Performed by: PSYCHIATRY & NEUROLOGY

## 2025-05-23 PROCEDURE — 25000003 PHARM REV CODE 250: Performed by: INTERNAL MEDICINE

## 2025-05-23 PROCEDURE — 99497 ADVNCD CARE PLAN 30 MIN: CPT | Mod: 25,,,

## 2025-05-23 PROCEDURE — 97530 THERAPEUTIC ACTIVITIES: CPT | Mod: CO

## 2025-05-23 PROCEDURE — 99233 SBSQ HOSP IP/OBS HIGH 50: CPT | Mod: 25,,,

## 2025-05-23 PROCEDURE — 25000003 PHARM REV CODE 250: Performed by: STUDENT IN AN ORGANIZED HEALTH CARE EDUCATION/TRAINING PROGRAM

## 2025-05-23 PROCEDURE — 97110 THERAPEUTIC EXERCISES: CPT | Mod: CO

## 2025-05-23 PROCEDURE — 83735 ASSAY OF MAGNESIUM: CPT | Performed by: STUDENT IN AN ORGANIZED HEALTH CARE EDUCATION/TRAINING PROGRAM

## 2025-05-23 PROCEDURE — 25000003 PHARM REV CODE 250: Performed by: HOSPITALIST

## 2025-05-23 PROCEDURE — 63600175 PHARM REV CODE 636 W HCPCS: Performed by: HOSPITALIST

## 2025-05-23 PROCEDURE — 87040 BLOOD CULTURE FOR BACTERIA: CPT | Performed by: STUDENT IN AN ORGANIZED HEALTH CARE EDUCATION/TRAINING PROGRAM

## 2025-05-23 RX ORDER — LORAZEPAM 2 MG/ML
1 INJECTION INTRAMUSCULAR ONCE
Status: COMPLETED | OUTPATIENT
Start: 2025-05-23 | End: 2025-05-23

## 2025-05-23 RX ORDER — ACETAMINOPHEN 325 MG/1
650 TABLET ORAL ONCE
Status: COMPLETED | OUTPATIENT
Start: 2025-05-23 | End: 2025-05-23

## 2025-05-23 RX ADMIN — ENOXAPARIN SODIUM 40 MG: 40 INJECTION SUBCUTANEOUS at 05:05

## 2025-05-23 RX ADMIN — LORAZEPAM 1 MG: 2 INJECTION INTRAMUSCULAR; INTRAVENOUS at 02:05

## 2025-05-23 RX ADMIN — CYANOCOBALAMIN TAB 1000 MCG 1000 MCG: 1000 TAB at 08:05

## 2025-05-23 RX ADMIN — PANTOPRAZOLE SODIUM 40 MG: 40 INJECTION, POWDER, FOR SOLUTION INTRAVENOUS at 08:05

## 2025-05-23 RX ADMIN — CARBIDOPA AND LEVODOPA 2 TABLET: 25; 100 TABLET ORAL at 03:05

## 2025-05-23 RX ADMIN — METHYLPHENIDATE HYDROCHLORIDE 5 MG: 5 TABLET ORAL at 08:05

## 2025-05-23 RX ADMIN — ACETAMINOPHEN 650 MG: 325 TABLET ORAL at 03:05

## 2025-05-23 RX ADMIN — CARBIDOPA AND LEVODOPA 2 TABLET: 25; 100 TABLET ORAL at 08:05

## 2025-05-23 RX ADMIN — Medication 100 MG: at 08:05

## 2025-05-23 RX ADMIN — POLYETHYLENE GLYCOL 3350 17 G: 17 POWDER, FOR SOLUTION ORAL at 08:05

## 2025-05-23 RX ADMIN — NIFEDIPINE 60 MG: 30 TABLET, FILM COATED, EXTENDED RELEASE ORAL at 08:05

## 2025-05-23 NOTE — PT/OT/SLP PROGRESS
Occupational Therapy   Treatment    Name: Vince Huffman  MRN: [id number]  Admitting Diagnosis:  Acute encephalopathy  6 Days Post-Op    Recommendations:     Discharge Recommendations: Moderate Intensity Therapy  Discharge Equipment Recommendations:  wheelchair, walker, rolling  Barriers to discharge:       Assessment:     Vince Huffman is a 79 y.o. male with a medical diagnosis of Acute encephalopathy.  In today's session, the patient sat at EOB and completed AAROM/PROM BUE and BLE exercises and stretches. Patient's hypertonia increased with fatigue. L sided weakness noted. Nurse notified. Performance deficits affecting function are weakness, impaired endurance, impaired cognition, decreased ROM, decreased coordination, impaired self care skills, impaired functional mobility, gait instability, impaired balance, decreased safety awareness, decreased lower extremity function, decreased upper extremity function.     Rehab Prognosis:  Good; patient would benefit from acute skilled OT services to address these deficits and reach maximum level of function.       Plan:     Patient to be seen 4 x/week to address the above listed problems via self-care/home management, therapeutic activities, therapeutic exercises, neuromuscular re-education  Plan of Care Expires: 05/24/25  Plan of Care Reviewed with: patient (sister-in-law)    Subjective     Chief Complaint: N/A  Patient/Family Comments/goals: Patient's sister-in-law reports she's noticed that his left side is weaker.   Pain/Comfort:  Pain Rating 1: 0/10    Objective:     Communicated with: Nurse prior to session.  Patient found HOB elevated with bed alarm, Condom Catheter upon OT entry to room.    General Precautions: Standard, fall    Orthopedic Precautions:N/A  Braces: N/A  Respiratory Status: Room air     Occupational Performance:     Bed Mobility:    Patient completed Rolling/Turning to Left with  total assistance  Patient completed Rolling/Turning to Right with total  assistance  Patient completed Scooting towards the EOB with total assistance  Patient completed Scooting towards the HOB with total assistance of 2 persons  Patient completed Supine to Sit with maximal assistance  Patient completed Sit to Supine with total assistance     Functional Mobility/Transfers:  Patient completed x1 Sit <> Stand Transfer from the EOB with total assistance with no assistive device   Functional Mobility: Patient was unable to facilitate upright posture and was unable to take side steps towards the HOB.    Activities of Daily Living:  Grooming: contact guard assistance for facial hygiene seated at EOB. Patient required minimum assistance to initiate task.  Upper Body Dressing: moderate assistance to funmilayo gown on posterior side seated at EOB.  Lower Body Dressing: total assistance to funmilayo socks seated at EOB.    [facility] 6 Click ADL: 9    Treatment & Education:  Patient sat at EOB to promote core engagement, static/dynamic sitting balance, tolerance, and skin integrity for carry over with all seated ADLs.   Patient educated on OOB mobility to improve overall activity tolerance and promote recovery.  Patient completed the following seated BUE exercises with AAROM/PROM to promote endurance, strength,  prevent contractures, and ROM for carry over with ADLs. (Shoulder flexion 10x and horizontal abduction 5x) Patient's hypertonia increased with fatigue.  Patient completed the following seated BLE exercises with AAROM/PROM to promote strength, prevent contractures, and ROM for functional mobility. (LAQs 10x)  Patient completed functional reaching tasks 2x with the RUE but was unable to complete task with LUE.  Patient educated on role of occupational therapy, POC, and safety during ADLs and functional mobility. Patient and OT discussed importance of safe, continued mobility to optimize daily living skills. Patient verbalized understanding. Patient given instruction to call for medical staff/nurse for  assistance.     Patient left HOB elevated with all lines intact, call button in reach, nurse notified, and sister-in-law present    GOALS:   Multidisciplinary Problems       Occupational Therapy Goals          Problem: Occupational Therapy    Goal Priority Disciplines Outcome Interventions   Occupational Therapy Goal     OT, PT/OT Progressing    Description: Goals to be met by: 5/24/25     Patient will increase functional independence with ADLs by performing:    UE Dressing with Contact Guard Assistance.  LE Dressing with Minimal Assistance.  Grooming while bedside chair with Minimal Assistance.  Toileting from bedside commode with Minimal Assistance for hygiene and clothing management.   Sitting at edge of bed x5 minutes with Contact Guard Assistance for upright balance.  Rolling to Bilateral with Minimal Assistance.   Supine to sit with Minimal Assistance.  Step transfer with Moderate Assistance  Toilet transfer to bedside commode with Moderate Assistance.                       Time Tracking:     OT Date of Treatment: 05/23/25  OT Start Time: 1046  OT Stop Time: 1124  OT Total Time (min): 38 min    Billable Minutes:Therapeutic Activity 15  Therapeutic Exercise 15  Neuromuscular Re-education 8    OT/PAUL: PAUL     Number of PAUL visits since last OT visit: 3    5/23/2025

## 2025-05-23 NOTE — PROGRESS NOTES
Adam Amezquita - [facility] Medicine  Progress Note    Patient Name: Vince Huffman  MRN: [id number]  Patient Class: IP- Inpatient   Admission Date: 4/30/2025  Length of Stay: 22 days  Attending Physician: Juan F Ramírez MD  Primary Care Provider: Leland Porras MD        Subjective     Principal Problem:Acute encephalopathy        HPI:  By Dr. Juan F Ramírez MD    80 yo M w/HTN, hypothyroid, crohn's disease, CAD, hx of SBO, presenting with AMS from [facility]. Patient was sent in from [facility] for progressively worsening cognitive function. Patient is A&O x1 at his baseline. Patient and his daughter endorse mild confusion. Which has been progressive.    Patient has not had any recent falls. Endorsed mild suprapubic abdominal pain. No UTI. Mild inflammation on CT, not unexpected in the setting of Crohn's.     Overview/Hospital Course:  Mr. Huffman was admitted to  for further evaluation of worsening AMS per rehab facility. AAOx1 on admission, normally AAOx4 prior to 4/20 per son. UA negative. CXR clear. CT abd/pelvis with wall thickening of distal ileum, inflammatory infectious ileitis are considerations, fecal distention of the rectum, impaction not excluded. Discussed with GI, anticipated these are chronic findings. CRP negative. Will give enema and monitor for improvement. Patient with successful BM. Neurology consulted: MRI brain ordered (no acute findings, motion artifact), extended EEG, ammonia levels. Worsening mental status on 5/2, medicine team consulted for LP which was unsuccessful. More alert on 5/3 and answering yes/no questions when redirected. Neurology recommending carbidopa-levodopa trial, EEG, and attempting another LP.  Symptoms improved with increasing doses of Carbidopa/Levodopa. LP concerning for meningitis so he was started on Empiric treatment on 5/6/25. Discussion held with both Neurology and Infectious Disease, Infectious Disease does not believe patient  has any infectious etiology and as such recommended discontinuing antimicrobials.  Neurology started IVIG for presumed autoimmune process.  Completed 5 day course of IVIG 5/14. Minimal improvement in cognition. Had bouts of poor oral intake, only drank boost.  Had intermittent hyponatremia responding to IV fluids.  On 05/14 am Sodium 124.  Nephrology was consulted, investigations ordered and sodium corrected with close electrolyte monitoring.  Psych was consulted by the request of the neurologist. Hyponatremia improved after stopping fluids. NG tube attempted thrice at bedside however not able to be placed. Able to tolerate oral intake however very limited intake. Neuro recommended repeat MRI with sedation as previous had artifact. Anesthesia notified to arrange with sedation which was done on 5/17.  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 Endocrine consulted. Neurology planning 5 day course of IV Solumedrol starting 5/17.   He was started on Vitamin B12 and Thiamine replacement.    Steroids paused on 5/19 given concern for upper GI bleeding. PPI started and GI consulted. GI team does not share same concern for bleeding and hemoglobin notably stable despite elevated BUN, which could be coincidence. Will continue PPI for now. Steroids resumed with PPI ongoing on 5/20 (day 4/5 of steroids). Discussed role of G-tube with family and GI and everyone agreeable to placement. Plan for G tube placement on 5/22.     Interval History: I believe he is rapidly declining and hospice would be appropriate, spoke with family , they would like NH with hospice in the [place] area    Review of Systems  Objective:     Vital Signs (Most Recent):  Temp: 97.5 °F (36.4 °C) (05/23/25 0731)  Pulse: 85 (05/23/25 0731)  Resp: 16 (05/23/25 0731)  BP: 138/79 (05/23/25 0731)  SpO2: 99 % (05/23/25 0731) Vital Signs (24h Range):  Temp:  [97.5 °F (36.4 °C)-103.1 °F (39.5 °C)] 97.5 °F  (36.4 °C)  Pulse:  [] 85  Resp:  [16-18] 16  SpO2:  [94 %-99 %] 99 %  BP: (132-179)/(67-89) 138/79     Weight: 64.1 kg (141 lb 5 oz)  Body mass index is 19.71 kg/m².    Intake/Output Summary (Last 24 hours) at 5/23/2025 0742  Last data filed at 5/23/2025 0426  Gross per 24 hour   Intake 293.93 ml   Output 625 ml   Net -331.07 ml      Physical Exam  Constitutional:       Appearance: He is ill-appearing.   HENT:      Head: Normocephalic and atraumatic.   Cardiovascular:      Rate and Rhythm: Normal rate and regular rhythm.      Heart sounds: No murmur heard.  Pulmonary:      Effort: Pulmonary effort is normal. No respiratory distress.      Breath sounds: Normal breath sounds. No wheezing or rales.   Abdominal:      General: There is no distension.      Palpations: Abdomen is soft.      Tenderness: There is no abdominal tenderness.   Musculoskeletal:         General: No deformity.   Skin:     General: Skin is warm and dry.      Coloration: Skin is pale.   Neurological:      General: No focal deficit present.      Comments: Able to have a conversation, oriented to person         MELD 3.0: 11 at 5/23/2025  6:10 AM  MELD-Na: 9 at 5/23/2025  6:10 AM  Calculated from:  Serum Creatinine: 1.2 mg/dL at 5/23/2025  6:10 AM  Serum Sodium: 135 mmol/L at 5/23/2025  6:10 AM  Total Bilirubin: 0.4 mg/dL (Using min of 1 mg/dL) at 5/21/2025  4:13 AM  Serum Albumin: 3.1 g/dL at 5/21/2025  4:13 AM  INR(ratio): 1.1 at 5/23/2025  6:10 AM  Age at listing (hypothetical): 79 years  Sex: Male at 5/23/2025  6:10 AM      Significant Labs:  CBC:  Recent Labs   Lab 05/22/25  0546 05/23/25  0610   WBC 7.79 9.65   HGB 12.6* 11.9*   HCT 37.5* 35.2*    211     CMP:  Recent Labs   Lab 05/22/25  0546 05/23/25  0610   * 135*   K 4.3 3.9    99   CO2 24 27   GLU 81 97   BUN 27* 28*   CREATININE 1.0 1.2   CALCIUM 10.3 9.6   ANIONGAP 10 9     PTINR:  Recent Labs   Lab 05/22/25  0546 05/23/25  0610   INR 1.0 1.1       Significant  Procedures:   Dobutamine Stress Test with Color Flow: No results found for this or any previous visit.    None      Assessment & Plan  Acute encephalopathy  AMS (altered mental status)  Progressive acutely worsening confusion/tremors since 4/20. Previously AAOx4, now AAOx1  Neurology consulted: MRI brain ordered (no acute findings, motion artifact), extended EEG, ammonia levels.   Worsening mental status on 5/2, medicine team consulted for LP which was unsuccessful.   Neurology recommending carbidopa-levodopa trial, EEG, and attempting another LP.    Symptoms improved with increasing doses of Carbidopa/Levodopa.   LP concerning for meningitis so he was started on Empiric treatment on 5/6/25, viral panel negative  Discussion held with both Neurology and Infectious Disease, Infectious Disease does not believe patient has any infectious etiology and as such recommended discontinuing antimicrobials.    Neurology started IVIG for presumed autoimmune process.  Completed 5 day course of IVIG 5/14. Minimal improvement in cognition.   Neuro recommended repeat MRI with sedation as previous had artifact. Anesthesia notified to arrange with sedation which was done on 5/17.    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 Endocrine consulted.   Neurology planning 5 day course of IV Solumedrol starting 5/17 - paused on 5/19, resumed 5/20 (Today is Day 4/5)   Continue Vitamin B12 and Thiamine replacement.  Progressive acutely worsening confusion/tremors since 4/20. Previously AAOx4, now AAOx1  Neurology consulted: MRI brain ordered (no acute findings, motion artifact), extended EEG, ammonia levels.   Worsening mental status on 5/2, medicine team consulted for LP which was unsuccessful.   Neurology recommending carbidopa-levodopa trial, EEG, and attempting another LP.    Symptoms improved with increasing doses of Carbidopa/Levodopa.   LP concerning for meningitis so he was  started on Empiric treatment on 5/6/25, viral panel negative  Discussion held with both Neurology and Infectious Disease, Infectious Disease does not believe patient has any infectious etiology and as such recommended discontinuing antimicrobials.    Neurology started IVIG for presumed autoimmune process.  Completed 5 day course of IVIG 5/14. Minimal improvement in cognition.   Neuro recommended repeat MRI with sedation as previous had artifact. Anesthesia notified to arrange with sedation which was done on 5/17.    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 Endocrine consulted.   Neurology planning 5 day course of IV Solumedrol starting 5/17 - paused on 5/19, resumed 5/20 (Today is Day 5/5)   Continue Vitamin B12 and Thiamine replacement.  Progressive acutely worsening confusion/tremors since 4/20. Previously AAOx4, now AAOx1  Neurology consulted: MRI brain ordered (no acute findings, motion artifact), extended EEG, ammonia levels.   Worsening mental status on 5/2, medicine team consulted for LP which was unsuccessful.   Neurology recommending carbidopa-levodopa trial, EEG, and attempting another LP.    Symptoms improved with increasing doses of Carbidopa/Levodopa.   LP concerning for meningitis so he was started on Empiric treatment on 5/6/25, viral panel negative  Discussion held with both Neurology and Infectious Disease, Infectious Disease does not believe patient has any infectious etiology and as such recommended discontinuing antimicrobials.    Neurology started IVIG for presumed autoimmune process.  Completed 5 day course of IVIG 5/14. Minimal improvement in cognition.   Neuro recommended repeat MRI with sedation as previous had artifact. Anesthesia notified to arrange with sedation which was done on 5/17.    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 Endocrine consulted.   Neurology planning 5  "day course of IV Solumedrol starting 5/17 - paused on 5/19, resumed 5/20 (Today is Day 4/5)   Continue Vitamin B12 and Thiamine replacement.  Progressive acutely worsening confusion/tremors since 4/20. Previously AAOx4, now AAOx1  Neurology consulted: MRI brain ordered (no acute findings, motion artifact), extended EEG, ammonia levels.   Worsening mental status on 5/2, medicine team consulted for LP which was unsuccessful.   Neurology recommending carbidopa-levodopa trial, EEG, and attempting another LP.    Symptoms improved with increasing doses of Carbidopa/Levodopa.   LP concerning for meningitis so he was started on Empiric treatment on 5/6/25, viral panel negative  Discussion held with both Neurology and Infectious Disease, Infectious Disease does not believe patient has any infectious etiology and as such recommended discontinuing antimicrobials.    Neurology started IVIG for presumed autoimmune process.  Completed 5 day course of IVIG 5/14. Minimal improvement in cognition.   Neuro recommended repeat MRI with sedation as previous had artifact. Anesthesia notified to arrange with sedation which was done on 5/17.    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 Endocrine consulted.   Neurology planning 5 day course of IV Solumedrol starting 5/17 - paused on 5/19, resumed 5/20 (Today is Day 5/5)   Continue Vitamin B12 and Thiamine replacement.  Crohn's disease  GIB (gastrointestinal bleeding)  Per CT scan "thickening of the distal ileum to the surgical anastomosis with the large bowel. Inflammatory infectious ileitis are considerations. Recommend clinical correlation and follow-up. "  CRP negative. No concern for flare.   GI without concerns of confusion being caused by sterlara, confirmed with pharmacy   On 5/19 developed new onset dark stools  Has been on Lovenox, IV Solumedrol and PO PPI  Pantoprazole 80mg IV x 1 then PPI 40mg IV BID  GI consulted  Repeat " H/H at 2pm  Will transfuse if Hgb is <7g/dl (<8g/dl in cases of active ACS) or if patient has rapid bleeding leading to hemodynamic instability  IBD team recommending CTE vs MRE when patient able to take po and improving  Will need re-staging colonoscopy, likely outpatient     Recent Labs     05/21/25 0413 05/22/25  0546 05/23/25  0610   HGB 13.7* 12.6* 11.9*   HCT 41.4 37.5* 35.2*    287 211   INR 1.0 1.0 1.1   CRP negative. No concern for flare.   GI without concerns of confusion being caused by sterlara, confirmed with pharmacy   On 5/19 developed new onset dark stools  Has been on Lovenox, IV Solumedrol and PO PPI  Pantoprazole 80mg IV x 1 then PPI 40mg IV BID  GI consulted  Repeat H/H at 2pm  Will transfuse if Hgb is <7g/dl (<8g/dl in cases of active ACS) or if patient has rapid bleeding leading to hemodynamic instability    Recent Labs     05/21/25 0413 05/22/25  0546 05/23/25  0610   HGB 13.7* 12.6* 11.9*   HCT 41.4 37.5* 35.2*    287 211   INR 1.0 1.0 1.1     Recent Labs     05/21/25 0413 05/22/25  0546 05/23/25  0610   HGB 13.7* 12.6* 11.9*   HCT 41.4 37.5* 35.2*    287 211   INR 1.0 1.0 1.1   CRP negative. No concern for flare.   GI without concerns of confusion being caused by sterlara, confirmed with pharmacy   On 5/19 developed new onset dark stools  Has been on Lovenox, IV Solumedrol and PO PPI  Pantoprazole 80mg IV x 1 then PPI 40mg IV BID  GI consulted  Repeat H/H at 2pm  Will transfuse if Hgb is <7g/dl (<8g/dl in cases of active ACS) or if patient has rapid bleeding leading to hemodynamic instability  IBD team recommending CTE vs MRE when patient able to take po and improving  Will need re-staging colonoscopy, likely outpatient     Recent Labs     05/21/25 0413 05/22/25  0546 05/23/25  0610   HGB 13.7* 12.6* 11.9*   HCT 41.4 37.5* 35.2*    287 211   INR 1.0 1.0 1.1     Recent Labs     05/21/25  0413 05/22/25  0546 05/23/25  0610   HGB 13.7* 12.6* 11.9*   HCT 41.4  37.5* 35.2*    287 211   INR 1.0 1.0 1.1   On 5/19 developed new onset dark stools  Has been on Lovenox, IV Solumedrol and PO PPI  Pantoprazole 80mg IV x 1 then PPI 40mg IV BID  GI consulted  Repeat H/H at 2pm  Will transfuse if Hgb is <7g/dl (<8g/dl in cases of active ACS) or if patient has rapid bleeding leading to hemodynamic instability    Recent Labs     05/21/25  0413 05/22/25  0546 05/23/25  0610   HGB 13.7* 12.6* 11.9*   HCT 41.4 37.5* 35.2*    287 211   INR 1.0 1.0 1.1     Essential hypertension  Patient's blood pressure range in the last 24 hours was: BP  Min: 132/84  Max: 179/89.The patient's inpatient anti-hypertensive regimen is listed below:  Current Antihypertensives  NIFEdipine 24 hr tablet 60 mg, Daily, Oral    Plan  - BP is controlled, no changes needed to their regimen  Changed from Norvasc to Procardia given Psych recs    Generalized weakness  Sent from SNF  Progressive worsening weakness per son   PT/OT ordered. Will need placement at discharge  Unintended weight loss  Reported per son  Recently started gluten free diet per recommendations from GI  Temporal wasting noted  Body mass index is 19.71 kg/m².  Nutrition following  Boost changed to Boost Breeze given lactose intolerance  Hyponatremia  Hyponatremia is likely due to Dehydration/hypovolemia. The patient's most recent sodium results are listed below.  Recent Labs     05/21/25  0413 05/22/25  0546 05/23/25  0610   * 134* 135*     Maxime  - Monitor sodium Daily.   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 Most recent hemoglobin and hematocrit are listed below.  Recent Labs     05/21/25  0413 05/22/25  0546 05/23/25  0610   HGB 13.7* 12.6* 11.9*   HCT 41.4 37.5* 35.2*     Plan  - Monitor serial CBC: Daily  - Transfuse PRBC if patient becomes hemodynamically unstable, symptomatic or H/H drops below 7/21.  - Patient has not received any  PRBC transfusions to date  - Patient's anemia is currently stable  -  Parkinsonism  Suspect symptoms are at least partially due to Parkinson's disease.   Continue Sinemet TID  CAD (coronary artery disease)  History noted.   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 Will hold anti-depressant medications. We will consult psychiatry at this time. Patient does not display psychosis at this time. Continue to monitor closely and adjust plan of care as needed.  Anemia  Anemia is likely due to acute on chronic illness. Most recent hemoglobin and hematocrit are listed below.  Recent Labs     05/21/25  0413 05/22/25  0546 05/23/25  0610   HGB 13.7* 12.6* 11.9*   HCT 41.4 37.5* 35.2*     Plan  - Monitor serial CBC: Daily  - Transfuse PRBC if patient becomes hemodynamically unstable, symptomatic or H/H drops below 7/21.  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 Most recent weight and BMI monitored-     Measurements:  Wt Readings from Last 1 Encounters:   05/06/25 64.1 kg (141 lb 5 oz)   Body mass index is 19.71 kg/m².    Patient has been screened and assessed by RD.    Malnutrition Type:  Context:    Level:      Malnutrition Characteristic Summary:       Interventions/Recommendations (treatment strategy):  1. Continuous tube feeds of Jevity 1.5 with a goal rate of 45 ml/hr x 24 hours to meet > 75% of kcal/protein needs - provides Total volume: 1080 -Kcal: 1620 - Protein 73gm Free water: 840 mL. Additional free fluid flushes per MD. Patient currently has 1200cc fluid restriction. 2. Monitor  for s/s TF intolerance.    - Discussed G tube placement with daughter/MPOA and GI, planned for placement on 5/22    Palliative care encounter      VTE Risk Mitigation (From admission, onward)           Ordered     enoxaparin injection 40 mg  Every 24 hours         05/20/25 0906     IP VTE HIGH RISK PATIENT  Once         05/01/25 0827     Place sequential compression device  Until discontinued         05/01/25 0827                    Discharge Planning   ERNIE: 5/26/2025     Code Status: Full Code   Medical Readiness for Discharge Date:   Discharge Plan A: Rehab   Discharge Delays: None known at this time            Please place Justification for DME        Juan F Ramírez MD  Department of Hospital Medicine   [facility] - Acute Medical Stepdown

## 2025-05-23 NOTE — ASSESSMENT & PLAN NOTE
Anemia is likely due to acute on chronic illness. Most recent hemoglobin and hematocrit are listed below.  Recent Labs     05/21/25  0413 05/22/25  0546 05/23/25  0610   HGB 13.7* 12.6* 11.9*   HCT 41.4 37.5* 35.2*     Plan  - Monitor serial CBC: Daily  - Transfuse PRBC if patient becomes hemodynamically unstable, symptomatic or H/H drops below 7/21.  - Patient has not received any PRBC transfusions to date  - Patient's anemia is currently stable  - Continue PPI with high dose steroids per neurology

## 2025-05-23 NOTE — SUBJECTIVE & OBJECTIVE
Interval History: I believe he is rapidly declining and hospice would be appropriate, will discuss with family.    Review of Systems  Objective:     Vital Signs (Most Recent):  Temp: 97.5 °F (36.4 °C) (05/23/25 0731)  Pulse: 85 (05/23/25 0731)  Resp: 16 (05/23/25 0731)  BP: 138/79 (05/23/25 0731)  SpO2: 99 % (05/23/25 0731) Vital Signs (24h Range):  Temp:  [97.5 °F (36.4 °C)-103.1 °F (39.5 °C)] 97.5 °F (36.4 °C)  Pulse:  [] 85  Resp:  [16-18] 16  SpO2:  [94 %-99 %] 99 %  BP: (132-179)/(67-89) 138/79     Weight: 64.1 kg (141 lb 5 oz)  Body mass index is 19.71 kg/m².    Intake/Output Summary (Last 24 hours) at 5/23/2025 0742  Last data filed at 5/23/2025 0426  Gross per 24 hour   Intake 293.93 ml   Output 625 ml   Net -331.07 ml      Physical Exam  Constitutional:       Appearance: He is ill-appearing.   HENT:      Head: Normocephalic and atraumatic.   Cardiovascular:      Rate and Rhythm: Normal rate and regular rhythm.      Heart sounds: No murmur heard.  Pulmonary:      Effort: Pulmonary effort is normal. No respiratory distress.      Breath sounds: Normal breath sounds. No wheezing or rales.   Abdominal:      General: There is no distension.      Palpations: Abdomen is soft.      Tenderness: There is no abdominal tenderness.   Musculoskeletal:         General: No deformity.   Skin:     General: Skin is warm and dry.      Coloration: Skin is pale.   Neurological:      General: No focal deficit present.      Comments: Able to have a conversation, oriented to person         MELD 3.0: 11 at 5/23/2025  6:10 AM  MELD-Na: 9 at 5/23/2025  6:10 AM  Calculated from:  Serum Creatinine: 1.2 mg/dL at 5/23/2025  6:10 AM  Serum Sodium: 135 mmol/L at 5/23/2025  6:10 AM  Total Bilirubin: 0.4 mg/dL (Using min of 1 mg/dL) at 5/21/2025  4:13 AM  Serum Albumin: 3.1 g/dL at 5/21/2025  4:13 AM  INR(ratio): 1.1 at 5/23/2025  6:10 AM  Age at listing (hypothetical): 79 years  Sex: Male at 5/23/2025  6:10 AM      Significant  Labs:  CBC:  Recent Labs   Lab 05/22/25  0546 05/23/25  0610   WBC 7.79 9.65   HGB 12.6* 11.9*   HCT 37.5* 35.2*    211     CMP:  Recent Labs   Lab 05/22/25  0546 05/23/25  0610   * 135*   K 4.3 3.9    99   CO2 24 27   GLU 81 97   BUN 27* 28*   CREATININE 1.0 1.2   CALCIUM 10.3 9.6   ANIONGAP 10 9     PTINR:  Recent Labs   Lab 05/22/25  0546 05/23/25  0610   INR 1.0 1.1       Significant Procedures:   Dobutamine Stress Test with Color Flow: No results found for this or any previous visit.    None

## 2025-05-23 NOTE — PROGRESS NOTES
[facility] - Acute Medical [facility]down  Palliative Medicine  Progress Note    Patient Name: Vince Huffman  MRN: [id number]  Admission Date: 4/30/2025  Hospital Length of Stay: 22 days  Code Status: DNR   Attending Provider: Juan F Ramírez MD  Consulting Provider: Nan Mondragon NP  Primary Care Physician: Leland Porras MD  Principal Problem:Acute encephalopathy    Patient information was obtained from relative(s), past medical records, and primary team.      Assessment/Plan:     Palliative Care  Palliative care encounter  Impression:  Vince Huffman is a 79 y.o. male with PMHx of PMH of Crohn's disease, prior SBO, HTN, who presented to [facility] ED on 4/24/25 due to gradually worsening generalized weakness. He has not been getting out of bed as much, or walking as much as usual. He also notes decreased appetite/PO intake, slowed speech, unsteady gait, and hand tremors over the past several weeks. Since being hospitalized, patient has been seen by neurology for evaluation of encephalopathy, infectious disease for meningitis workup as potential cause for acute encephalopathy, psychiatry for concerns of complex bereavement given the recent loss of his wife 18 months ago vs catatonia, and endocrine for concerns of Hashimoto encephalopathy. Patient was initiated on IVIG and steroids, but has not had any clear improvement in mentation. 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 Neurology initiated goals of care discussion with patient's daughter, Radha. Palliative Medicine was consulted by primary, Dr. Ramírez, for continued goals of care and advanced care planning discussions.      Patient appears to be resting comfortably in bed. Awake. Oriented to self. Pleasant. Patient was just returned to bed by PT/OT as he just finished walking in the hallway with therapy. Patient is in bilateral wrist restraints. No acute distress noted.     - Prior  experience with serious illness: yes, wife passed away 18 months ago. It has been reported that wife spent last 8 months of life in hospital setting. Patient stayed by patient's bedside throughout this time.  -The patient has not previously engaged in advance care planning or [facility] discussions  - Insight/Understanding of illness: patient with confusion. Patient's family with good understanding of illness. Would benefit from more discussions with neurology team.    Advance Care Planning    Date: 05/23/2025  - Patient resting in bed, watching television. VSS, maintained on room air, no acute distress noted. Sister-in-law, Carol, is at bedside. Patient appears to be in good spirits and denies pain.  - I followed up with Radha (patient's daughter) via telephone. Radha shares that after further deliberation with family, they understand that there are no additional options to try to slow the progressions of patient's neurodegenerative disease and they would like to pursue hospice care. With comfort in mind, family would also like change patient's code status to DNR. Radha states she spoke with Dr. Ramírez this morning and was grateful for his call. Case management updated.    Date: 05/22/2025  - Patient oriented to self. Unable to participate in discussions. Family is amenable to Palliative Medicine.   - Phone conference call with patient's daughter (Radha), sister (Luiz), brothers (Mitchel and Patrick), and sister-in-Law (Mei).  - Discussed patient prognosis which appears to be poor given patient's progressive debility, frailty, and malnutrition.  - Family shares that the news of patient having a rapidly progressive neurodegenerative condition was shocking as patient was ambulatory and independent a month ago. Luiz adds that she assumed patient was grieving the loss of his wife, and was not expecting the news from the neurology team.  - Family hopes that patient will be able to leave the hospital, but most  importantly that patient not suffer.  - Discussed disease trajectory of a progressive neurodegenerative condition. Family inquired about next steps once patient is medically ready to leave hospital. We discussed treatment focused care and that as patient's disease process continues to progress, I expressed worry that his independence will worsen along with his mentation, debility, and nutrition leading to risks of skin breakdown from being bed bound, and risk for aspiration/infection. Luiz expressed that she would not want to see her brother suffer from any additional skin breakdown. She states his skin is fragile already and has had skin tears from his hospital identification band on his arm, she would not want to see any more skin breakdown from decreased mobility and malnutrition. Radha acknowledges that patient would not want to depend on others for basic care needs.   - Nutritional status discussed. I shared patient's breakfast tray was untouched this morning, and patient stated he was not hungry when I offered to feed him. Family shares that patient takes excessive amounts of time to chew and swallow his food and that he requires assistance with feeding.  - Philosophies of hospice and comfort care introduced.   - Goals of care discussed. We talked more about quality of life and prolonging life for more time. Code status also discussed. As mentioned earlier in our discussion family's hope of limiting suffering, I recommended that if patient's heart or breathing stops naturally, we allow patient to rest peacefully and pass away comfortably. Luiz reiterates she does not believe patient would find his current life to be of quality and would like to shift to comfort focused care and not resuscitate patient if he passes away. Radha agreed. Luiz acknowledges that Radha is one of the primary decision makers along with Vince Edwards (Patient's son). Of note, patient's son was not on call because family requests to  "loop him in on patient's most recent health update once he is physically here at hospital.   - Radha shares similar sentiments as Luiz, but shares she would like additional time for continued discussions among patient's decision-making support system. Family asks to speak with neurology regarding prognosis with lack of diagnosis and if no changes on MRI impacts prognosis. Dr. Collier, Neurology updated.             Life Limiting Diagnosis  Acute encephalopathy  Elevated NFL            Symptom Management  - Pain: no  - acetaminophen 650mg q4h PRN    - Dyspnea: no  - 24h SpO2: %  - maintained on RA    - Constipation: no  - LBM: 5/22/25  - polyethylene glycol BID    - Nausea: no  - ondansetron 8mg q8h PRN    - Debility: yes  - Functional status: fair.  Pt was not able to manage ADLs  - Fall precautions      - Dysphagia: no  - Aspiration precautions  - Nursing communication placed to assist patient with feedings  - SLP eval and treat        Recommendations  - Please see above  - Continue management per primary team  - Patient and family would benefit from continued education and information regarding plan of care, prognosis, and what to expect in the future  - Most important goals at this time: continued goals of care discussions   - Code status: Full. Discussion initiated. Will follow up  - Disposition: TBD        The above recommendations communicated directly to primary team on 5/22/25  Thank you for consulting Palliative Medicine.                I will sign off. Please contact us if you have any additional questions.    Subjective:     Chief Complaint:   Chief Complaint   Patient presents with    Altered Mental Status     X2 days, from [facility] rehab       HPI:   As per H&P, "Vince Huffman is a 79 y.o. male with a PMHx of Crohn's disease, prior SBO, HTN, who presented to [facility] ED on 4/24/25 due to gradually worsening generalized weakness. He has not been getting out of bed as much, or walking as much as usual. He " "also notes decreased appetite/PO intake, slowed speech, unsteady gait, and hand tremors over the past several weeks. He reports a 30 lb weights loss. Since he lost his wife he has been staying with his sister, who has felt his gait is unsteady enough that she encouraged him to start using a cane. He denies changes/losses of strength of sensation, fever, dyspnea, back pain, leg weakness, gait freezing, or symptoms of festination. He states that moving his knees when he begins walking is difficult. He was evaluated in the ED with labs showing no leukocytosis or left shift. H&H were 10.9/31.8. A metabolic panel was overall normal. UA was without infection. A CXR showed no acute findings. A CT Head showed no acute process. An MRI of the brain was "significantly limited," showing motion artifact causing the exam to be incomplete. He was placed on observation."    Palliative Medicine was consulted per primary, Dr. Ramírez, for goals of care and advanced care planning discussions.    Hospital Course:  No notes on file    Interval History: No acute events overnight. Goals of care discussion continued.    Past Medical History:   Diagnosis Date    Ankylosing spondylitis of unspecified sites in spine     Anxiety disorder, unspecified     BMI 21.0-21.9, adult 04/14/2025    Crohn's disease     Gout, unspecified     Heart disease     History of skin cancer 2001    squamous cell carcinoma left cheek    Hypertension     Hypothyroidism, unspecified     Psoriatic arthritis        Past Surgical History:   Procedure Laterality Date    ANGIOGRAM, CORONARY, WITH LEFT HEART CATHETERIZATION      APPENDECTOMY      APPENDECTOMY  2007    COLONOSCOPY N/A     ENDOSCOPY N/A     MAGNETIC RESONANCE IMAGING N/A 5/17/2025    Procedure: MRI (Magnetic Resonance Imagine);  Surgeon: Mervat Christianson;  Location: [facility];  Service: Anesthesiology;  Laterality: N/A;    RIGHT HEMICOLECTOMY  2013    SMALL INTESTINE SURGERY  2007    30.5 cm of small bowel " removed       Review of patient's allergies indicates:   Allergen Reactions    Gluten Diarrhea    Lactose        Medications:  Continuous Infusions:  Scheduled Meds:   carbidopa-levodopa  mg  2 tablet Oral TID    cyanocobalamin  1,000 mcg Oral Daily    enoxparin  40 mg Subcutaneous Q24H (prophylaxis, 1700)    methylphenidate HCl  5 mg Oral Daily    mirtazapine  15 mg Oral QHS    NIFEdipine  60 mg Oral Daily    pantoprazole  40 mg Intravenous BID    polyethylene glycol  17 g Oral BID    thiamine  100 mg Oral Daily     PRN Meds:  Current Facility-Administered Medications:     acetaminophen, 1,000 mg, Oral, Q8H PRN    acetaminophen, 650 mg, Oral, Q4H PRN    ALPRAZolam, 0.5 mg, Oral, BID PRN    dextrose 50%, 12.5 g, Intravenous, PRN    dextrose 50%, 25 g, Intravenous, PRN    glucagon (human recombinant), 1 mg, Intramuscular, PRN    glucose, 16 g, Oral, PRN    glucose, 24 g, Oral, PRN    insulin aspart U-100, 0-5 Units, Subcutaneous, QID (AC + HS) PRN    lorazepam, 2 mg, Intravenous, On Call Procedure    melatonin, 6 mg, Oral, Nightly PRN    naloxone, 0.02 mg, Intravenous, PRN    ondansetron, 8 mg, Oral, Q8H PRN    prochlorperazine, 5 mg, Intravenous, Q6H PRN    sodium chloride 0.9%, 10 mL, Intravenous, Q12H PRN    sodium chloride 0.9%, 10 mL, Intravenous, PRN    Family History    None       Tobacco Use    Smoking status: Never    Smokeless tobacco: Never   Substance and Sexual Activity    Alcohol use: No    Drug use: No    Sexual activity: Never     Partners: Female       Review of Systems   Unable to perform ROS: Acuity of condition     Objective:     Vital Signs (Most Recent):  Temp: 97.5 °F (36.4 °C) (05/23/25 0731)  Pulse: 85 (05/23/25 0731)  Resp: 16 (05/23/25 0731)  BP: 138/79 (05/23/25 0731)  SpO2: 99 % (05/23/25 0842) Vital Signs (24h Range):  Temp:  [97.5 °F (36.4 °C)-103.1 °F (39.5 °C)] 97.5 °F (36.4 °C)  Pulse:  [] 85  Resp:  [16-18] 16  SpO2:  [94 %-99 %] 99 %  BP: (132-179)/(72-89) 138/79      Weight: 64.1 kg (141 lb 5 oz)  Body mass index is 19.71 kg/m².       Physical Exam  Vitals and nursing note reviewed.   Constitutional:       Appearance: He is ill-appearing.   HENT:      Head: Normocephalic and atraumatic.      Nose: Nose normal.      Mouth/Throat:      Mouth: Mucous membranes are dry.   Eyes:      Extraocular Movements: Extraocular movements intact.   Cardiovascular:      Rate and Rhythm: Normal rate.   Pulmonary:      Effort: Pulmonary effort is normal.   Skin:     General: Skin is warm and dry.   Neurological:      Mental Status: He is alert. He is disoriented.      Motor: Weakness present.            Review of Symptoms      Symptom Assessment (ESAS 0-10 Scale)  Unable to complete assessment due to Acuity of condition     CAM / Delirium:  Negative  Constipation:  Negative  Diarrhea:  Negative      Bowel Management Plan (BMP):  Yes      Pain Assessment in Advanced Demential Scale (PAINAD)   Breathing - Independent of vocalization:  0  Negative vocalization:  0  Facial expression:  0  Body language:  0  Consolability:  0  Total:  0    Modified Sreekanth Scale:  0    Performance Status:  60    Psychosocial/Cultural:   See Palliative Psychosocial Note: Yes  - previously  to Stephanie Huffman, but now . Wife passed away about 18 months ago. Patient's sister (Luiz) informs me that patient's late wife spent last 8 months of her life in and out of the hospital with surgical complications, and patient visited her everyday until she passed away.   - patient has 2 adult children, Radha and Vince (Jr.) Huffman  - worked for many years in the [employer]  - described as being very smart, kind, and having a great sense of humor  **Primary  to Follow**  Palliative Care  Consult: Yes    Spiritual:  F - [place] and Belief:  Adventism  I - Importance:  Yes  A - Address in Care:  [place] support offered. Father Surendra contacted.        Advance Care Planning  Advance Directives:    Living Will: No    LaPOST: No    Do Not Resuscitate Status: Yes    Medical Power of : No      Decision Making:  Family answered questions and Patient unable to communicate due to disease severity/cognitive impairment  Goals of Care: The family endorses that what is most important right now is to focus on symptom/pain control and quality of life, even if it means sacrificing a little time    Accordingly, we have decided that the best plan to meet the patient's goals includes continuing with treatment and allowing additional time for goals of care discussions         Significant Labs: All pertinent labs within the past 24 hours have been reviewed.  CBC:   Recent Labs   Lab 05/23/25  0610   WBC 9.65   HGB 11.9*   HCT 35.2*   MCV 94        BMP:  Recent Labs   Lab 05/23/25  0610   GLU 97   *   K 3.9   CL 99   CO2 27   BUN 28*   CREATININE 1.2   CALCIUM 9.6   MG 1.8     LFT:  Lab Results   Component Value Date    AST 27 05/21/2025    ALKPHOS 78 05/21/2025    BILITOT 0.4 05/21/2025     Albumin:   Albumin   Date Value Ref Range Status   05/21/2025 3.1 (L) 3.5 - 5.2 g/dL Final   04/14/2025 3.7 3.5 - 5.2 g/dL Final     Protein:   Protein Total   Date Value Ref Range Status   05/21/2025 8.6 (H) 6.0 - 8.4 gm/dL Final     Total Protein   Date Value Ref Range Status   11/06/2018 6.9 6.0 - 8.4 g/dL Final     Lactic acid:   Lab Results   Component Value Date    LACTATE 0.8 05/01/2025       Significant Imaging: I have reviewed all pertinent imaging results/findings within the past 24 hours.        I spent a total of 50 minutes on the day of the visit. This includes face to face time in discussion of goals of care, symptom assessment, coordination of care and emotional support. This also includes non-face to face time preparing to see the patient (eg, review of tests/imaging), obtaining and/or reviewing separately obtained history, documenting clinical information in the electronic or other health record,  independently interpreting results and communicating results to the patient/family/caregiver, or care coordinator.       Additional 16min time spent on a voluntary advance care planning and /or goals of care discussion, providing emotional support, formulating, and communicating prognosis and exploring burden/benefit of various approaches of treatment.     Nan Mondragon, YAYA  Palliative Medicine  [facility] Medical Stepdown

## 2025-05-23 NOTE — PROGRESS NOTES
"CONSULTATION LIAISON PSYCHIATRY PROGRESS NOTE    Patient Name: Vince Huffman  MRN: [id number]  Patient Class: IP- Inpatient  Admission Date: 4/30/2025  Attending Physician: Juan F Ramírez MD      SUBJECTIVE:   Vince Huffman is a 79 y.o. male with no past psychiatric history. Past pertinent medical history of Chrohn's disease, SBO, Hypothyroidism, and CAD presents to the ED/admitted to the hospital for generalized weakness and AMS/ worsening cognitive function.     Psychiatry consulted for complex bereavement vs catatonia     Interval History:   - received xanax 0.5 mg PRN at 2000  - received depakote 125 mg IV at 2100  - per nursing, patient appeared agitated, trying to get out of bed.     Assessed patient at approximately 1000. Food had been mostly untouched at bedside. Patient oriented to person. Not appearing to understand my questions - responding "I don't know" to orientation to questions or situational awareness of who was in the room. States he has no appetite. Sleep he reports is fine. Does not recall last night.     When reassessed at approximately 1330, patient was noticed to be grimacing, having odd posturing of his arms. Tremors were noted in all extremities, worse in upper extremities. Rigidity noted in upper extremities, unclear for volitional component.     Per nursing, patient was seen visiting with his sister in law until 1300. Noted to be mildly conversive, but oriented to self.     Concern for emergent catatonia. Gave one time dose of ativan at 1430.     Patient reassessed at 1500. Rigidity less apparent, but still with noticeable tremor and grimacing. Upon chart review, it is noted that patient takes carvidopa-levadopa for parkinson's disease.     At this time, unclear whether to explain current symptoms as catatonia-like vs breakthrough parkinsonian symptoms.       OBJECTIVE    Vital Signs:  Temp:  [97.5 °F (36.4 °C)-103.1 °F (39.5 °C)]   Pulse:  []   Resp:  [16-18]   BP: " "(132-179)/(72-89)   SpO2:  [94 %-99 %]     Mental Status Exam:  General Appearance: appears stated age,dressed in hospital garb, thin, no acute distress  Behavior: cooperative, pleasant, polite, later noticed to be grimacing, odd posturing  Involuntary Movements and Motor Activity: no abnormal involuntary movements noted at 1000, later noted to have bilateral tremors in upper extremities, lead rigidity in both upper extremities  Gait and Station: unable to assess - patient lying down or seated  Speech and Language: soft, low, minimal response.  Mood: "fine" at 1000, later noted to be mute  Affect: low-key  Thought Process and Associations: scattered, irrelavant  Perceptual Disturbances: denies hallucinations  Thought Content and Perceptions:: no suicidal or homicidal ideation, no auditory or visual hallucinations, no paranoid ideation, no ideas of reference, no evidence of delusions or psychosis  Sensorium and Orientation: impaired, oriented to person only  Recent and Remote Memory: impaired  Attention and Concentration: impaired  Fund of Knowledge: unable to identify the   Insight: impaired  Judgment: intact, behavior is adequate/appropriate to the circumstances, compliant with health provider's recommendations and instructions    CAM ICU positive? 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 Minimize use of PHYSICAL restraints OR have periods of being out of physical restraints if possible.  Keep window shades open and room lit during day and room dim at night in order to promote normal sleep-wake cycles  Encourage family at bedside. [place] patient often to situation, location, date.  Continue to Limit or Discontinue use of Narcotics, Benzos and Anti-cholinergic medications as they may worsen delirium.  Continue medical workup for causative etiology of Delirium.     Risk Assessment / Legal Status  No indications for PEC at this time due to the fact that he is not a danger to himself or others and isn't disabled due to psychiatric illness.     Follow-up:  Will follow-up while in house.    Disposition:   Defer to primary team.    Please contact ON CALL psychiatry service (24/7) for any acute issues that may arise.        Ruddy Alejandra MD  [facility] PGY-2          --------------------------------------------------------------------------------------------------------------------------------------------------------------------------------------------------------------------------------------    CONTINUED OBJECTIVE clinical data & findings reviewed and noted for above decision making    Current Medications:   Scheduled Meds:    carbidopa-levodopa  mg  2 tablet Oral TID    cyanocobalamin  1,000 mcg Oral Daily    enoxparin  40 mg Subcutaneous Q24H (prophylaxis, 1700)    methylphenidate HCl  5 mg Oral Daily    mirtazapine  15 mg Oral QHS    NIFEdipine  60 mg Oral Daily    pantoprazole  40 mg Intravenous BID    polyethylene glycol  17 g Oral BID    thiamine  100 mg Oral Daily     PRN Meds:   Current Facility-Administered Medications:     acetaminophen, 1,000 mg, Oral, Q8H PRN    acetaminophen, 650 mg, Oral, Q4H PRN    ALPRAZolam, 0.5 mg, Oral, BID PRN    dextrose 50%, 12.5 g, Intravenous, PRN    dextrose  50%, 25 g, Intravenous, PRN    glucagon (human recombinant), 1 mg, Intramuscular, PRN    glucose, 16 g, Oral, PRN    glucose, 24 g, Oral, PRN    insulin aspart U-100, 0-5 Units, Subcutaneous, QID (AC + HS) PRN    lorazepam, 2 mg, Intravenous, On Call Procedure    melatonin, 6 mg, Oral, Nightly PRN    naloxone, 0.02 mg, Intravenous, PRN    ondansetron, 8 mg, Oral, Q8H PRN    prochlorperazine, 5 mg, Intravenous, Q6H PRN    sodium chloride 0.9%, 10 mL, Intravenous, Q12H PRN    sodium chloride 0.9%, 10 mL, Intravenous, PRN    Allergies:   Review of patient's allergies indicates:   Allergen Reactions    Gluten Diarrhea    Lactose        Vitals  Vitals:    05/23/25 0731   BP: 138/79   Pulse: 85   Resp: 16   Temp: 97.5 °F (36.4 °C)       Labs/Imaging/Studies:  Recent Results (from the past 24 hours)   POCT glucose    Collection Time: 05/22/25  3:40 PM   Result Value Ref Range    POCT Glucose 114 (H) 70 - 110 mg/dL   POCT glucose    Collection Time: 05/22/25  8:05 PM   Result Value Ref Range    POCT Glucose 102 70 - 110 mg/dL   Type & Screen    Collection Time: 05/22/25 10:19 PM   Result Value Ref Range    Specimen Outdate 05/25/2025 23:59     Group & Rh O POS     Indirect Richard NEG    Magnesium    Collection Time: 05/23/25  6:10 AM   Result Value Ref Range    Magnesium  1.8 1.6 - 2.6 mg/dL   Protime-INR    Collection Time: 05/23/25  6:10 AM   Result Value Ref Range    PT 11.9 9.0 - 12.5 seconds    INR 1.1 0.8 - 1.2   Basic metabolic panel    Collection Time: 05/23/25  6:10 AM   Result Value Ref Range    Sodium 135 (L) 136 - 145 mmol/L    Potassium 3.9 3.5 - 5.1 mmol/L    Chloride 99 95 - 110 mmol/L    CO2 27 23 - 29 mmol/L    Glucose 97 70 - 110 mg/dL    BUN 28 (H) 8 - 23 mg/dL    Creatinine 1.2 0.5 - 1.4 mg/dL    Calcium 9.6 8.7 - 10.5 mg/dL    Anion Gap 9 8 - 16 mmol/L    eGFR >60 >60 mL/min/1.73/m2   CBC with Differential    Collection Time: 05/23/25  6:10 AM   Result Value Ref Range    WBC 9.65 3.90 - 12.70 K/uL     RBC 3.74 (L) 4.60 - 6.20 M/uL    HGB 11.9 (L) 14.0 - 18.0 gm/dL    HCT 35.2 (L) 40.0 - 54.0 %    MCV 94 82 - 98 fL    MCH 31.8 (H) 27.0 - 31.0 pg    MCHC 33.8 32.0 - 36.0 g/dL    RDW 11.9 11.5 - 14.5 %    Platelet Count 211 150 - 450 K/uL    MPV 10.4 9.2 - 12.9 fL    Nucleated RBC 0 <=0 /100 WBC    Neut % 89.1 (H) 38 - 73 %    Lymph % 3.4 (L) 18 - 48 %    Mono % 7.2 4 - 15 %    Eos % 0.0 <=8 %    Basophil % 0.0 <=1.9 %    Imm Grans % 0.3 0.0 - 0.5 %    Neut # 8.60 (H) 1.8 - 7.7 K/uL    Lymph # 0.33 (L) 1 - 4.8 K/uL    Mono # 0.69 0.3 - 1 K/uL    Eos # 0.00 <=0.5 K/uL    Baso # 0.00 <=0.2 K/uL    Imm Grans # 0.03 0.00 - 0.04 K/uL   POCT glucose    Collection Time: 05/23/25  7:29 AM   Result Value Ref Range    POCT Glucose 114 (H) 70 - 110 mg/dL     Imaging Results               CT Abdomen Pelvis With IV Contrast NO Oral Contrast (Final result)  Result time 04/30/25 15:46:15      Final result by Russell Ba MD (04/30/25 15:46:15)                   Impression:      1. Wall thickening of the distal ileum to the surgical anastomosis with the large bowel.  Inflammatory infectious ileitis are considerations.  Recommend clinical correlation and follow-up.  2. Constipation.  There is fecal distention of the rectum.  No fecal impaction is not excluded.  3. Bilateral renal cysts and hepatic cysts.  4. Nonobstructing nephrolithiasis of the right kidney.  5. Nondistention of the urinary bladder.  Mild wall thickening is not excluded.  6. Diverticulosis of the sigmoid colon.  No evidence of acute diverticulitis.  7. This report was flagged in Epic as abnormal.      Electronically signed by: Russell Ba  Date:    04/30/2025  Time:    15:46               Narrative:    EXAMINATION:  CT ABDOMEN PELVIS WITH IV CONTRAST    CLINICAL HISTORY:  LLQ abdominal pain;RLQ abdominal pain (Age >= 14y);    TECHNIQUE:  Low dose axial images, sagittal and coronal reformations were obtained from the lung bases to the pubic symphysis  following the IV administration of 75 mL of Omnipaque 350 .  Oral contrast was not administered.    COMPARISON:  11/05/2018    FINDINGS:  Abdomen:    - Lower thorax:    - Lung bases: No infiltrates and no nodules.    - Liver: Multiple small hepatic cysts.    - Gallbladder: No calcified gallstones.    - Bile Ducts: No evidence of intra or extra hepatic biliary ductal dilation.    - Spleen: Negative.    - Kidneys: Multiple bilateral simple renal cysts.  Single nonobstructing stone in the right kidney.  No hydronephrosis or hydroureter bilaterally.    - Adrenals: Unremarkable.    - Pancreas: No mass or peripancreatic fat stranding.    - Retroperitoneum:  No significant adenopathy.    - Vascular: Mild ectasia of the distal aorta with no evidence of aneurysm.    - Abdominal wall:  Unremarkable.    Pelvis:    Urinary bladder is incompletely distended with possible mild wall thickening.    Postop changes at the ileocecal junction.  There is wall thickening noted to the distal ileum to the surgical anastomosis.  Inflammatory or infectious ileitis are considerations.  Follow-up recommended.    Bowel/Mesentery:    No evidence of bowel obstruction.  Moderate retained feces in the colon.    Fecal distention of the rectum.  Impaction is not excluded.    Mild diverticulosis of the sigmoid colon.  No evidence of acute diverticulitis.    Bones:  No acute osseous abnormality and no suspicious lytic or blastic lesion.                                       CT Head Without Contrast (Final result)  Result time 04/30/25 14:49:11      Final result by Ish Carrillo MD (04/30/25 14:49:11)                   Impression:      No evidence for acute intracranial pathology.      Electronically signed by: Ish Carrillo  Date:    04/30/2025  Time:    14:49               Narrative:    EXAMINATION:  CT HEAD WITHOUT CONTRAST    CLINICAL HISTORY:  Mental status change, unknown cause;    TECHNIQUE:  Low dose axial images were obtained through the head.   Coronal and sagittal reformations were also performed. Contrast was not administered.    COMPARISON:  MRI brain without contrast 04/24/2025, CT head without contrast 04/24/2025.    FINDINGS:  Ventricles are stable in size without evidence for new or worsening hydrocephalus.  No new or enlarging extra-axial blood or fluid collections.    Brain parenchyma appears unchanged.  Scattered supratentorial hypoattenuation, overall nonspecific, but can be seen in setting of microvascular ischemic change.  No parenchymal mass, edema, hemorrhage, or acute major vascular territory infarct.    No calvarial or skull base fracture or osseous destructive lesion.  Paranasal sinuses and mastoid air cells are essentially clear.                                       X-Ray Chest AP Portable (Final result)  Result time 04/30/25 12:49:39      Final result by Matti Cote MD (04/30/25 12:49:39)                   Impression:      See above      Electronically signed by: Matti Cote MD  Date:    04/30/2025  Time:    12:49               Narrative:    EXAMINATION:  XR CHEST AP PORTABLE    CLINICAL HISTORY:  Altered mental status, unspecified    TECHNIQUE:  Single frontal view of the chest was performed.    COMPARISON:  No 04/23/2025 ne    FINDINGS:  Heart size normal.  The tracheal slightly deviates to the right at the thoracic inlet probably related to enlarged thyroid gland.  Minimal subsegmental atelectatic changes noted at the right lung base.  Lungs are otherwise clear.  No pleural effusion.

## 2025-05-23 NOTE — ASSESSMENT & PLAN NOTE
Patient's blood pressure range in the last 24 hours was: BP  Min: 132/84  Max: 179/89.The patient's inpatient anti-hypertensive regimen is listed below:  Current Antihypertensives  NIFEdipine 24 hr tablet 60 mg, Daily, Oral    Plan  - BP is controlled, no changes needed to their regimen  Changed from Norvasc to Procardia given Psych recs

## 2025-05-23 NOTE — ASSESSMENT & PLAN NOTE
Hyponatremia is likely due to Dehydration/hypovolemia. The patient's most recent sodium results are listed below.  Recent Labs     05/21/25  0413 05/22/25  0546 05/23/25  0610   * 134* 135*     Maxime  - Monitor sodium Daily.   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 Most recent hemoglobin and hematocrit are listed below.  Recent Labs     05/21/25  0413 05/22/25  0546 05/23/25  0610   HGB 13.7* 12.6* 11.9*   HCT 41.4 37.5* 35.2*     Plan  - Monitor serial CBC: Daily  - Transfuse PRBC if patient becomes hemodynamically unstable, symptomatic or H/H drops below 7/21.  - Patient has not received any PRBC transfusions to date  - Patient's anemia is currently stable  -

## 2025-05-23 NOTE — SUBJECTIVE & OBJECTIVE
Interval History: No acute events overnight. Goals of care discussion continued.    Past Medical History:   Diagnosis Date    Ankylosing spondylitis of unspecified sites in spine     Anxiety disorder, unspecified     BMI 21.0-21.9, adult 04/14/2025    Crohn's disease     Gout, unspecified     Heart disease     History of skin cancer 2001    squamous cell carcinoma left cheek    Hypertension     Hypothyroidism, unspecified     Psoriatic arthritis        Past Surgical History:   Procedure Laterality Date    ANGIOGRAM, CORONARY, WITH LEFT HEART CATHETERIZATION      APPENDECTOMY      APPENDECTOMY  2007    COLONOSCOPY N/A     ENDOSCOPY N/A     MAGNETIC RESONANCE IMAGING N/A 5/17/2025    Procedure: MRI (Magnetic Resonance Imagine);  Surgeon: Mervat Christianson;  Location: [facility];  Service: Anesthesiology;  Laterality: N/A;    RIGHT HEMICOLECTOMY  2013    SMALL INTESTINE SURGERY  2007    30.5 cm of small bowel removed       Review of patient's allergies indicates:   Allergen Reactions    Gluten Diarrhea    Lactose        Medications:  Continuous Infusions:  Scheduled Meds:   carbidopa-levodopa  mg  2 tablet Oral TID    cyanocobalamin  1,000 mcg Oral Daily    enoxparin  40 mg Subcutaneous Q24H (prophylaxis, 1700)    methylphenidate HCl  5 mg Oral Daily    mirtazapine  15 mg Oral QHS    NIFEdipine  60 mg Oral Daily    pantoprazole  40 mg Intravenous BID    polyethylene glycol  17 g Oral BID    thiamine  100 mg Oral Daily     PRN Meds:  Current Facility-Administered Medications:     acetaminophen, 1,000 mg, Oral, Q8H PRN    acetaminophen, 650 mg, Oral, Q4H PRN    ALPRAZolam, 0.5 mg, Oral, BID PRN    dextrose 50%, 12.5 g, Intravenous, PRN    dextrose 50%, 25 g, Intravenous, PRN    glucagon (human recombinant), 1 mg, Intramuscular, PRN    glucose, 16 g, Oral, PRN    glucose, 24 g, Oral, PRN    insulin aspart U-100, 0-5 Units, Subcutaneous, QID (AC + HS) PRN    lorazepam, 2 mg, Intravenous, On Call Procedure    melatonin, 6  mg, Oral, Nightly PRN    naloxone, 0.02 mg, Intravenous, PRN    ondansetron, 8 mg, Oral, Q8H PRN    prochlorperazine, 5 mg, Intravenous, Q6H PRN    sodium chloride 0.9%, 10 mL, Intravenous, Q12H PRN    sodium chloride 0.9%, 10 mL, Intravenous, PRN    Family History    None       Tobacco Use    Smoking status: Never    Smokeless tobacco: Never   Substance and Sexual Activity    Alcohol use: No    Drug use: No    Sexual activity: Never     Partners: Female       Review of Systems   Unable to perform ROS: Acuity of condition     Objective:     Vital Signs (Most Recent):  Temp: 97.5 °F (36.4 °C) (05/23/25 0731)  Pulse: 85 (05/23/25 0731)  Resp: 16 (05/23/25 0731)  BP: 138/79 (05/23/25 0731)  SpO2: 99 % (05/23/25 0842) Vital Signs (24h Range):  Temp:  [97.5 °F (36.4 °C)-103.1 °F (39.5 °C)] 97.5 °F (36.4 °C)  Pulse:  [] 85  Resp:  [16-18] 16  SpO2:  [94 %-99 %] 99 %  BP: (132-179)/(72-89) 138/79     Weight: 64.1 kg (141 lb 5 oz)  Body mass index is 19.71 kg/m².       Physical Exam  Vitals and nursing note reviewed.   Constitutional:       Appearance: He is ill-appearing.   HENT:      Head: Normocephalic and atraumatic.      Nose: Nose normal.      Mouth/Throat:      Mouth: Mucous membranes are dry.   Eyes:      Extraocular Movements: Extraocular movements intact.   Cardiovascular:      Rate and Rhythm: Normal rate.   Pulmonary:      Effort: Pulmonary effort is normal.   Skin:     General: Skin is warm and dry.   Neurological:      Mental Status: He is alert. He is disoriented.      Motor: Weakness present.            Review of Symptoms      Symptom Assessment (ESAS 0-10 Scale)  Unable to complete assessment due to Acuity of condition     CAM / Delirium:  Negative  Constipation:  Negative  Diarrhea:  Negative      Bowel Management Plan (BMP):  Yes      Pain Assessment in Advanced Demential Scale (PAINAD)   Breathing - Independent of vocalization:  0  Negative vocalization:  0  Facial expression:  0  Body language:   0  Consolability:  0  Total:  0    Modified Sreekanth Scale:  0    Performance Status:  60    Psychosocial/Cultural:   See Palliative Psychosocial Note: Yes  - previously  to Stephanie Huffman, but now . Wife passed away about 18 months ago. Patient's sister (Luiz) informs me that patient's late wife spent last 8 months of her life in and out of the hospital with surgical complications, and patient visited her everyday until she passed away.   - patient has 2 adult children, Radha and Vince () Armida  - worked for many years in the [employer]  - described as being very smart, kind, and having a great sense of humor  **Primary  to Follow**  Palliative Care  Consult: Yes    Spiritual:  F - Corry and Belief:  Evangelical  I - Importance:  Yes  A - Address in Care:  Emelia support offered. Father Surendra contacted.        Advance Care Planning   Advance Directives:   Living Will: No    LaPOST: No    Do Not Resuscitate Status: Yes    Medical Power of : No      Decision Making:  Family answered questions and Patient unable to communicate due to disease severity/cognitive impairment  Goals of Care: The family endorses that what is most important right now is to focus on symptom/pain control and quality of life, even if it means sacrificing a little time    Accordingly, we have decided that the best plan to meet the patient's goals includes continuing with treatment and allowing additional time for goals of care discussions         Significant Labs: All pertinent labs within the past 24 hours have been reviewed.  CBC:   Recent Labs   Lab 05/23/25  0610   WBC 9.65   HGB 11.9*   HCT 35.2*   MCV 94        BMP:  Recent Labs   Lab 05/23/25  0610   GLU 97   *   K 3.9   CL 99   CO2 27   BUN 28*   CREATININE 1.2   CALCIUM 9.6   MG 1.8     LFT:  Lab Results   Component Value Date    AST 27 05/21/2025    ALKPHOS 78 05/21/2025    BILITOT 0.4 05/21/2025     Albumin:   Albumin    Date Value Ref Range Status   05/21/2025 3.1 (L) 3.5 - 5.2 g/dL Final   04/14/2025 3.7 3.5 - 5.2 g/dL Final     Protein:   Protein Total   Date Value Ref Range Status   05/21/2025 8.6 (H) 6.0 - 8.4 gm/dL Final     Total Protein   Date Value Ref Range Status   11/06/2018 6.9 6.0 - 8.4 g/dL Final     Lactic acid:   Lab Results   Component Value Date    LACTATE 0.8 05/01/2025       Significant Imaging: I have reviewed all pertinent imaging results/findings within the past 24 hours.

## 2025-05-23 NOTE — ASSESSMENT & PLAN NOTE
"Per CT scan "thickening of the distal ileum to the surgical anastomosis with the large bowel. Inflammatory infectious ileitis are considerations. Recommend clinical correlation and follow-up. "  CRP negative. No concern for flare.   GI without concerns of confusion being caused by sterlara, confirmed with pharmacy   On 5/19 developed new onset dark stools  Has been on Lovenox, IV Solumedrol and PO PPI  Pantoprazole 80mg IV x 1 then PPI 40mg IV BID  GI consulted  Repeat H/H at 2pm  Will transfuse if Hgb is <7g/dl (<8g/dl in cases of active ACS) or if patient has rapid bleeding leading to hemodynamic instability  IBD team recommending CTE vs MRE when patient able to take po and improving  Will need re-staging colonoscopy, likely outpatient     Recent Labs     05/21/25  0413 05/22/25  0546 05/23/25  0610   HGB 13.7* 12.6* 11.9*   HCT 41.4 37.5* 35.2*    287 211   INR 1.0 1.0 1.1   CRP negative. No concern for flare.   GI without concerns of confusion being caused by sterlara, confirmed with pharmacy   On 5/19 developed new onset dark stools  Has been on Lovenox, IV Solumedrol and PO PPI  Pantoprazole 80mg IV x 1 then PPI 40mg IV BID  GI consulted  Repeat H/H at 2pm  Will transfuse if Hgb is <7g/dl (<8g/dl in cases of active ACS) or if patient has rapid bleeding leading to hemodynamic instability    Recent Labs     05/21/25  0413 05/22/25  0546 05/23/25  0610   HGB 13.7* 12.6* 11.9*   HCT 41.4 37.5* 35.2*    287 211   INR 1.0 1.0 1.1     Recent Labs     05/21/25  0413 05/22/25  0546 05/23/25  0610   HGB 13.7* 12.6* 11.9*   HCT 41.4 37.5* 35.2*    287 211   INR 1.0 1.0 1.1   CRP negative. No concern for flare.   GI without concerns of confusion being caused by sterlara, confirmed with pharmacy   On 5/19 developed new onset dark stools  Has been on Lovenox, IV Solumedrol and PO PPI  Pantoprazole 80mg IV x 1 then PPI 40mg IV BID  GI consulted  Repeat H/H at 2pm  Will transfuse if Hgb is <7g/dl (<8g/dl " in cases of active ACS) or if patient has rapid bleeding leading to hemodynamic instability  IBD team recommending CTE vs MRE when patient able to take po and improving  Will need re-staging colonoscopy, likely outpatient     Recent Labs     05/21/25 0413 05/22/25  0546 05/23/25  0610   HGB 13.7* 12.6* 11.9*   HCT 41.4 37.5* 35.2*    287 211   INR 1.0 1.0 1.1     Recent Labs     05/21/25 0413 05/22/25  0546 05/23/25  0610   HGB 13.7* 12.6* 11.9*   HCT 41.4 37.5* 35.2*    287 211   INR 1.0 1.0 1.1   On 5/19 developed new onset dark stools  Has been on Lovenox, IV Solumedrol and PO PPI  Pantoprazole 80mg IV x 1 then PPI 40mg IV BID  GI consulted  Repeat H/H at 2pm  Will transfuse if Hgb is <7g/dl (<8g/dl in cases of active ACS) or if patient has rapid bleeding leading to hemodynamic instability    Recent Labs     05/21/25 0413 05/22/25  0546 05/23/25  0610   HGB 13.7* 12.6* 11.9*   HCT 41.4 37.5* 35.2*    287 211   INR 1.0 1.0 1.1

## 2025-05-23 NOTE — ASSESSMENT & PLAN NOTE
Impression:  Vince Huffman is a 79 y.o. male with PMHx of PMH of Crohn's disease, prior SBO, HTN, who [place] [facility] [place]24/25 due to gradually worsening generalized weakness. He has not been getting out of bed as much, or walking as much as usual. He also notes decreased appetite/PO intake, slowed speech, unsteady gait, and hand tremors over the past several weeks. Since being hospitalized, patient has been seen by neurology for evaluation of encephalopathy, infectious disease for meningitis workup as potential cause for acute encephalopathy, psychiatry for concerns of complex bereavement given the recent loss of his wife 18 months ago vs catatonia, and endocrine for concerns of Hashimoto encephalopathy. Patient was initiated on IVIG and steroids, but has not had any clear improvement in mentation. 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 Neurology initiated goals of care discussion with patient's daughter, Radha. Palliative Medicine was consulted by primary, Dr. Ramírez, for continued goals of care and advanced care planning discussions.      Patient appears to be resting comfortably in bed. Awake. Oriented to self. Pleasant. Patient was just returned to bed by PT/OT as he just finished walking in the hallway with therapy. Patient is in bilateral wrist restraints. No acute distress noted.     - Prior experience with serious illness: yes, wife passed away 18 months ago. It has been reported that wife spent last 8 months of life in hospital setting. Patient stayed by patient's bedside throughout this time.  -The patient has not previously engaged in advance care planning or GOC discussions  - Insight/Understanding of illness: patient with confusion. Patient's family with good understanding of illness. Would benefit from more discussions with neurology team.    Advance Care Planning     Date: 05/23/2025  - Patient resting in bed,  watching television. VSS, maintained on room air, no acute distress noted. Sister-in-law, Caorl, is at bedside. Patient appears to be in good spirits and denies pain.  - I followed up with Radha (patient's daughter) via telephone. Radha shares that after further deliberation with family, they understand that there are no additional options to try to slow the progressions of patient's neurodegenerative disease and they would like to pursue hospice care. With comfort in mind, family would also like change patient's code status to DNR. Radha states she spoke with Dr. Ramírez this morning and was grateful for his call. Case management updated.  - With clear goals, Palliative Medicine will sign off.    Date: 05/22/2025  - Patient oriented to self. Unable to participate in discussions. Family is amenable to Palliative Medicine.   - Phone conference call with patient's daughter (Radha), sister (Luiz), brothers (Mitchel and Patrick), and sister-in-Law (Mei).  - Discussed patient prognosis which appears to be poor given patient's progressive debility, frailty, and malnutrition.  - Family shares that the news of patient having a rapidly progressive neurodegenerative condition was shocking as patient was ambulatory and independent a month ago. Luiz adds that she assumed patient was grieving the loss of his wife, and was not expecting the news from the neurology team.  - Family hopes that patient will be able to leave the hospital, but most importantly that patient not suffer.  - Discussed disease trajectory of a progressive neurodegenerative condition. Family inquired about next steps once patient is medically ready to leave hospital. We discussed treatment focused care and that as patient's disease process continues to progress, I expressed worry that his independence will worsen along with his mentation, debility, and nutrition leading to risks of skin breakdown from being bed bound, and risk for aspiration/infection.  Luiz expressed that she would not want to see her brother suffer from any additional skin breakdown. She states his skin is fragile already and has had skin tears from his hospital identification band on his arm, she would not want to see any more skin breakdown from decreased mobility and malnutrition. Radha acknowledges that patient would not want to depend on others for basic care needs.   - Nutritional status discussed. I shared patient's breakfast tray was untouched this morning, and patient stated he was not hungry when I offered to feed him. Family shares that patient takes excessive amounts of time to chew and swallow his food and that he requires assistance with feeding.  - Philosophies of hospice and comfort care introduced.   - Goals of care discussed. We talked more about quality of life and prolonging life for more time. Code status also discussed. As mentioned earlier in our discussion family's hope of limiting suffering, I recommended that if patient's heart or breathing stops naturally, we allow patient to rest peacefully and pass away comfortably. Luiz reiterates she does not believe patient would find his current life to be of quality and would like to shift to comfort focused care and not resuscitate patient if he passes away. Radha agreed. Luiz acknowledges that Radha is one of the primary decision makers along with Vince Edwards (Patient's son). Of note, patient's son was not on call because family requests to loop him in on patient's most recent health update once he is physically here at hospital.   - Radha shares similar sentiments as Luiz, but shares she would like additional time for continued discussions among patient's decision-making support system. Family asks to speak with neurology regarding prognosis with lack of diagnosis and if no changes on MRI impacts prognosis. Dr. Collier, Neurology updated.             Life Limiting Diagnosis  Acute encephalopathy  Elevated  NFL            Symptom Management  - Pain: no  - acetaminophen 650mg q4h PRN    - Dyspnea: no  - 24h SpO2: %  - maintained on RA    - Constipation: no  - LBM: 5/22/25  - polyethylene glycol BID    - Nausea: no  - ondansetron 8mg q8h PRN    - Debility: yes  - Functional status: fair.  Pt was not able to manage ADLs  - Fall precautions      - Dysphagia: no  - Aspiration precautions  - Nursing communication placed to assist patient with feedings  - SLP eval and treat        Recommendations  - Please see above  - Continue management per primary team  - Patient and family would benefit from continued education and information regarding plan of care, prognosis, and what to expect in the future  - Most important goals at this time: continued goals of care discussions   - Code status: Full. Discussion initiated. Will follow up  - Disposition: TBD        The above recommendations communicated directly to primary team on 5/22/25  Thank you for consulting Palliative Medicine.

## 2025-05-23 NOTE — PLAN OF CARE
Pt is not on restraints anymore. Ativan and  IV valproate given due anxiety and agitation.  Family at bedside, safety measures are in place, bed alarm is on. Pt temp was different Oral was 98.0, axillary was 101.8 and rectally was 103.1. MD  was notified and tylenol was given.Temp now is 99.8 rectally.  Blood cultures and urine culture and urinalysis ordered and pending results        Problem: Adult Inpatient Plan of Care  Goal: Plan of Care Review  Outcome: Progressing  Goal: Patient-Specific Goal (Individualized)  Outcome: Progressing  Goal: Absence of Hospital-Acquired Illness or Injury  Outcome: Progressing  Goal: Optimal Comfort and Wellbeing  Outcome: Progressing  Goal: Readiness for Transition of Care  Outcome: Progressing     Problem: Skin Injury Risk Increased  Goal: Skin Health and Integrity  Outcome: Progressing     Problem: Infection  Goal: Absence of Infection Signs and Symptoms  Outcome: Progressing     Problem: Delirium  Goal: Optimal Coping  Outcome: Progressing  Goal: Improved Behavioral Control  Outcome: Progressing  Goal: Improved Attention and Thought Clarity  Outcome: Progressing  Goal: Improved Sleep  Outcome: Progressing     Problem: Fall Injury Risk  Goal: Absence of Fall and Fall-Related Injury  Outcome: Progressing     Problem: Restraint, Nonviolent  Goal: Absence of Harm or Injury  Outcome: Progressing     Problem: Coping Ineffective  Goal: Effective Coping  Outcome: Progressing

## 2025-05-23 NOTE — PLAN OF CARE
05/23/25 1627   Post-Acute Status   Post-Acute Authorization Placement   Post-Acute Placement Status Referrals Sent   Patient choice form signed by patient/caregiver List with quality metrics by geographic area provided;List from System Post-Acute Care   Discharge Plan   Discharge Plan A New Nursing Home placement - correction care facility   Discharge Plan B Comfort care/withdrawal     Nursing Home referrals sent to [facility] as chosen by pt's daughter, Radha after review Patient Choice Form. Emailed to: [email or web address]. Radha is leaving on a [employer] Cruise and will not return until next Saturday. Family member Luiz Delgado is caring for the patient in the interim. Radha will have access to communication on the cruise boat. Discharg plan is nursing home with hospice. LOCET/PASRR completed.     Discharge Plan A and Plan B have been determined by review of patient's clinical status, future medical and therapeutic needs, and coverage/benefits for post-acute care in coordination with multidisciplinary team members.     Marissa Castillo LMSW

## 2025-05-23 NOTE — TREATMENT PLAN
GI Treatment Plan      Patient's family has made the decision to go home with home hospice given his rapid neurological decline. GI and IBD services will sign off at this time.       Ines Bella MD  Gastroenterology and Hepatology Fellow, PGY-4

## 2025-05-23 NOTE — PLAN OF CARE
Met with daughterRadha to review discharge recommendation of nursing home with hospice and family is agreeable to plan.    Patient/family provided list of facilities in-network with patient's payor plan. Providers that are owned, operated, or affiliated with [facility] are included on the list.     Notified that referral sent to below listed facilities from in-network list based on proximity to home/family support:   1.[facility]  2.[facility]  3.  4.  5. (can send more than 5)    Patient/family instructed to identify preference.    Preferred Facility: (if more than 1, listed in order of descending preference)  1.[facility]    OR  Patient has declined to select a preferred provider and elects placement with the first accepting in network provider that is available to provide services as ordered by the physician       If an additional preferred facility not listed above is identified, additional referral to be sent. If above facilities unable to accept, will send additional referrals to in-network providers.       Marissa Castillo LMSW

## 2025-05-24 LAB
ABSOLUTE EOSINOPHIL (OHS): 0 K/UL
ABSOLUTE MONOCYTE (OHS): 0.39 K/UL (ref 0.3–1)
ABSOLUTE NEUTROPHIL COUNT (OHS): 6.99 K/UL (ref 1.8–7.7)
ANION GAP (OHS): 9 MMOL/L (ref 8–16)
BACTERIA #/AREA URNS AUTO: NORMAL /HPF
BASOPHILS # BLD AUTO: 0.01 K/UL
BASOPHILS NFR BLD AUTO: 0.1 %
BILIRUB UR QL STRIP.AUTO: NEGATIVE
BUN SERPL-MCNC: 27 MG/DL (ref 8–23)
CALCIUM SERPL-MCNC: 9.8 MG/DL (ref 8.7–10.5)
CHLORIDE SERPL-SCNC: 101 MMOL/L (ref 95–110)
CLARITY UR: CLEAR
CO2 SERPL-SCNC: 29 MMOL/L (ref 23–29)
COLOR UR AUTO: YELLOW
CREAT SERPL-MCNC: 1.2 MG/DL (ref 0.5–1.4)
ERYTHROCYTE [DISTWIDTH] IN BLOOD BY AUTOMATED COUNT: 11.9 % (ref 11.5–14.5)
GFR SERPLBLD CREATININE-BSD FMLA CKD-EPI: >60 ML/MIN/1.73/M2
GLUCOSE SERPL-MCNC: 97 MG/DL (ref 70–110)
GLUCOSE UR QL STRIP: NEGATIVE
HCT VFR BLD AUTO: 36.4 % (ref 40–54)
HGB BLD-MCNC: 12.1 GM/DL (ref 14–18)
HGB UR QL STRIP: ABNORMAL
HOLD SPECIMEN: NORMAL
HYALINE CASTS UR QL AUTO: 0 /LPF (ref 0–1)
IMM GRANULOCYTES # BLD AUTO: 0.08 K/UL (ref 0–0.04)
IMM GRANULOCYTES NFR BLD AUTO: 1 % (ref 0–0.5)
INR PPP: 1.1 (ref 0.8–1.2)
KETONES UR QL STRIP: ABNORMAL
LEUKOCYTE ESTERASE UR QL STRIP: NEGATIVE
LYMPHOCYTES # BLD AUTO: 0.31 K/UL (ref 1–4.8)
MAGNESIUM SERPL-MCNC: 1.8 MG/DL (ref 1.6–2.6)
MCH RBC QN AUTO: 31.3 PG (ref 27–31)
MCHC RBC AUTO-ENTMCNC: 33.2 G/DL (ref 32–36)
MCV RBC AUTO: 94 FL (ref 82–98)
MICROSCOPIC COMMENT: NORMAL
NITRITE UR QL STRIP: NEGATIVE
NUCLEATED RBC (/100WBC) (OHS): 0 /100 WBC
PH UR STRIP: 6 [PH]
PLATELET # BLD AUTO: 157 K/UL (ref 150–450)
PMV BLD AUTO: 11.6 FL (ref 9.2–12.9)
POCT GLUCOSE: 116 MG/DL (ref 70–110)
POCT GLUCOSE: 121 MG/DL (ref 70–110)
POCT GLUCOSE: 124 MG/DL (ref 70–110)
POCT GLUCOSE: 125 MG/DL (ref 70–110)
POTASSIUM SERPL-SCNC: 4 MMOL/L (ref 3.5–5.1)
PROT UR QL STRIP: ABNORMAL
PROTHROMBIN TIME: 11.6 SECONDS (ref 9–12.5)
RBC # BLD AUTO: 3.86 M/UL (ref 4.6–6.2)
RBC #/AREA URNS AUTO: 3 /HPF (ref 0–4)
RELATIVE EOSINOPHIL (OHS): 0 %
RELATIVE LYMPHOCYTE (OHS): 4 % (ref 18–48)
RELATIVE MONOCYTE (OHS): 5 % (ref 4–15)
RELATIVE NEUTROPHIL (OHS): 89.9 % (ref 38–73)
SODIUM SERPL-SCNC: 139 MMOL/L (ref 136–145)
SP GR UR STRIP: 1.02
UROBILINOGEN UR STRIP-ACNC: ABNORMAL EU/DL
WBC # BLD AUTO: 7.78 K/UL (ref 3.9–12.7)
WBC #/AREA URNS AUTO: 2 /HPF (ref 0–5)

## 2025-05-24 PROCEDURE — 63600175 PHARM REV CODE 636 W HCPCS: Performed by: STUDENT IN AN ORGANIZED HEALTH CARE EDUCATION/TRAINING PROGRAM

## 2025-05-24 PROCEDURE — 99232 SBSQ HOSP IP/OBS MODERATE 35: CPT | Mod: ,,, | Performed by: PSYCHIATRY & NEUROLOGY

## 2025-05-24 PROCEDURE — 36415 COLL VENOUS BLD VENIPUNCTURE: CPT | Performed by: STUDENT IN AN ORGANIZED HEALTH CARE EDUCATION/TRAINING PROGRAM

## 2025-05-24 PROCEDURE — 85025 COMPLETE CBC W/AUTO DIFF WBC: CPT | Performed by: STUDENT IN AN ORGANIZED HEALTH CARE EDUCATION/TRAINING PROGRAM

## 2025-05-24 PROCEDURE — 63600175 PHARM REV CODE 636 W HCPCS: Performed by: HOSPITALIST

## 2025-05-24 PROCEDURE — 25000003 PHARM REV CODE 250

## 2025-05-24 PROCEDURE — 80048 BASIC METABOLIC PNL TOTAL CA: CPT | Performed by: STUDENT IN AN ORGANIZED HEALTH CARE EDUCATION/TRAINING PROGRAM

## 2025-05-24 PROCEDURE — 81001 URINALYSIS AUTO W/SCOPE: CPT | Performed by: STUDENT IN AN ORGANIZED HEALTH CARE EDUCATION/TRAINING PROGRAM

## 2025-05-24 PROCEDURE — 83735 ASSAY OF MAGNESIUM: CPT | Performed by: STUDENT IN AN ORGANIZED HEALTH CARE EDUCATION/TRAINING PROGRAM

## 2025-05-24 PROCEDURE — 20600001 HC STEP DOWN PRIVATE ROOM

## 2025-05-24 PROCEDURE — 85610 PROTHROMBIN TIME: CPT | Performed by: STUDENT IN AN ORGANIZED HEALTH CARE EDUCATION/TRAINING PROGRAM

## 2025-05-24 RX ADMIN — ENOXAPARIN SODIUM 40 MG: 40 INJECTION SUBCUTANEOUS at 06:05

## 2025-05-24 RX ADMIN — ALPRAZOLAM 0.5 MG: 0.5 TABLET ORAL at 02:05

## 2025-05-24 RX ADMIN — PANTOPRAZOLE SODIUM 40 MG: 40 INJECTION, POWDER, FOR SOLUTION INTRAVENOUS at 09:05

## 2025-05-24 RX ADMIN — PANTOPRAZOLE SODIUM 40 MG: 40 INJECTION, POWDER, FOR SOLUTION INTRAVENOUS at 08:05

## 2025-05-24 NOTE — SUBJECTIVE & OBJECTIVE
Interval History: Plan is to NH home with hospice, is now DNR.    Review of Systems  Objective:     Vital Signs (Most Recent):  Temp: 97.6 °F (36.4 °C) (05/24/25 0813)  Pulse: (!) 120 (05/24/25 0813)  Resp: 20 (05/24/25 0813)  BP: (!) 163/93 (05/24/25 0813)  SpO2: (!) 93 % (05/24/25 0813) Vital Signs (24h Range):  Temp:  [97.6 °F (36.4 °C)-99.7 °F (37.6 °C)] 97.6 °F (36.4 °C)  Pulse:  [107-126] 120  Resp:  [16-21] 20  SpO2:  [93 %-99 %] 93 %  BP: (156-176)/(83-99) 163/93     Weight: 64.1 kg (141 lb 5 oz)  Body mass index is 19.71 kg/m².    Intake/Output Summary (Last 24 hours) at 5/24/2025 0855  Last data filed at 5/24/2025 0553  Gross per 24 hour   Intake --   Output 300 ml   Net -300 ml      Physical Exam  Constitutional:       Appearance: He is ill-appearing.   HENT:      Head: Normocephalic and atraumatic.   Cardiovascular:      Rate and Rhythm: Normal rate and regular rhythm.      Heart sounds: No murmur heard.  Pulmonary:      Effort: Pulmonary effort is normal. No respiratory distress.      Breath sounds: Normal breath sounds. No wheezing or rales.   Abdominal:      General: There is no distension.      Palpations: Abdomen is soft.      Tenderness: There is no abdominal tenderness.   Musculoskeletal:         General: No deformity.   Skin:     General: Skin is warm and dry.      Coloration: Skin is pale.   Neurological:      General: No focal deficit present.      Comments: Able to have a conversation, oriented to person         MELD 3.0: 10 at 5/23/2025  6:10 AM  MELD-Na: 8 at 5/23/2025  6:10 AM  Calculated from:  Serum Creatinine: 1.2 mg/dL at 5/23/2025  6:10 AM  Serum Sodium: 135 mmol/L at 5/23/2025  6:10 AM  Total Bilirubin: 0.4 mg/dL (Using min of 1 mg/dL) at 5/21/2025  4:13 AM  Serum Albumin: 3.1 g/dL at 5/21/2025  4:13 AM  INR(ratio): 1 at 5/21/2025  4:13 AM  Age at listing (hypothetical): 79 years  Sex: Male at 5/23/2025  6:10 AM      Significant Labs:  CBC:  Recent Labs   Lab 05/23/25  0610  05/24/25  0641   WBC 9.65 7.78   HGB 11.9* 12.1*   HCT 35.2* 36.4*    157     CMP:  Recent Labs   Lab 05/23/25  0610 05/24/25  0642   * 139   K 3.9 4.0   CL 99 101   CO2 27 29   GLU 97 97   BUN 28* 27*   CREATININE 1.2 1.2   CALCIUM 9.6 9.8   ANIONGAP 9 9     PTINR:  Recent Labs   Lab 05/23/25  0610 05/24/25  0642   INR 1.1 1.1       Significant Procedures:   Dobutamine Stress Test with Color Flow: No results found for this or any previous visit.    None

## 2025-05-24 NOTE — ASSESSMENT & PLAN NOTE
Patient's blood pressure range in the last 24 hours was: BP  Min: 156/95  Max: 176/99.The patient's inpatient anti-hypertensive regimen is listed below:  Current Antihypertensives  NIFEdipine 24 hr tablet 60 mg, Daily, Oral    Plan  - BP is controlled, no changes needed to their regimen  Changed from Norvasc to Procardia given Psych recs

## 2025-05-24 NOTE — ASSESSMENT & PLAN NOTE
Hyponatremia is likely due to Dehydration/hypovolemia. The patient's most recent sodium results are listed below.  Recent Labs     05/22/25  0546 05/23/25  0610 05/24/25  0642   * 135* 139     Maxime  - Monitor sodium Daily.   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 Most recent hemoglobin and hematocrit are listed below.  Recent Labs     05/22/25  0546 05/23/25  0610 05/24/25  0641   HGB 12.6* 11.9* 12.1*   HCT 37.5* 35.2* 36.4*     Plan  - Monitor serial CBC: Daily  - Transfuse PRBC if patient becomes hemodynamically unstable, symptomatic or H/H drops below 7/21.  - Patient has not received any PRBC transfusions to date  - Patient's anemia is currently stable  -

## 2025-05-24 NOTE — PROGRESS NOTES
Adam Amezquita - [facility] Medicine  Progress Note    Patient Name: Vince Huffman  MRN: [id number]  Patient Class: IP- Inpatient   Admission Date: 4/30/2025  Length of Stay: 23 days  Attending Physician: Juan F Ramírez MD  Primary Care Provider: Leland Porras MD        Subjective     Principal Problem:Acute encephalopathy        HPI:  By Dr. Juan F Ramírez MD    78 yo M w/HTN, hypothyroid, crohn's disease, CAD, hx of SBO, presenting with AMS from [facility]. Patient was sent in from [facility] for progressively worsening cognitive function. Patient is A&O x1 at his baseline. Patient and his daughter endorse mild confusion. Which has been progressive.    Patient has not had any recent falls. Endorsed mild suprapubic abdominal pain. No UTI. Mild inflammation on CT, not unexpected in the setting of Crohn's.     Overview/Hospital Course:  Mr. Huffman was admitted to  for further evaluation of worsening AMS per rehab facility. AAOx1 on admission, normally AAOx4 prior to 4/20 per son. UA negative. CXR clear. CT abd/pelvis with wall thickening of distal ileum, inflammatory infectious ileitis are considerations, fecal distention of the rectum, impaction not excluded. Discussed with GI, anticipated these are chronic findings. CRP negative. Will give enema and monitor for improvement. Patient with successful BM. Neurology consulted: MRI brain ordered (no acute findings, motion artifact), extended EEG, ammonia levels. Worsening mental status on 5/2, medicine team consulted for LP which was unsuccessful. More alert on 5/3 and answering yes/no questions when redirected. Neurology recommending carbidopa-levodopa trial, EEG, and attempting another LP.  Symptoms improved with increasing doses of Carbidopa/Levodopa. LP concerning for meningitis so he was started on Empiric treatment on 5/6/25. Discussion held with both Neurology and Infectious Disease, Infectious Disease does not believe patient  has any infectious etiology and as such recommended discontinuing antimicrobials.  Neurology started IVIG for presumed autoimmune process.  Completed 5 day course of IVIG 5/14. Minimal improvement in cognition. Had bouts of poor oral intake, only drank boost.  Had intermittent hyponatremia responding to IV fluids.  On 05/14 am Sodium 124.  Nephrology was consulted, investigations ordered and sodium corrected with close electrolyte monitoring.  Psych was consulted by the request of the neurologist. Hyponatremia improved after stopping fluids. NG tube attempted thrice at bedside however not able to be placed. Able to tolerate oral intake however very limited intake. Neuro recommended repeat MRI with sedation as previous had artifact. Anesthesia notified to arrange with sedation which was done on 5/17.  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 Endocrine consulted. Neurology planning 5 day course of IV Solumedrol starting 5/17.   He was started on Vitamin B12 and Thiamine replacement.    Steroids paused on 5/19 given concern for upper GI bleeding. PPI started and GI consulted. GI team does not share same concern for bleeding and hemoglobin notably stable despite elevated BUN, which could be coincidence. Will continue PPI for now. Steroids resumed with PPI ongoing on 5/20 (day 4/5 of steroids). Discussed role of G-tube with family and GI and everyone agreeable to placement. Plan for G tube placement on 5/22.     Interval History: Plan is to [facility] home with hospice, is now DNR.    Review of Systems  Objective:     Vital Signs (Most Recent):  Temp: 97.6 °F (36.4 °C) (05/24/25 0813)  Pulse: (!) 120 (05/24/25 0813)  Resp: 20 (05/24/25 0813)  BP: (!) 163/93 (05/24/25 0813)  SpO2: (!) 93 % (05/24/25 0813) Vital Signs (24h Range):  Temp:  [97.6 °F (36.4 °C)-99.7 °F (37.6 °C)] 97.6 °F (36.4 °C)  Pulse:  [107-126] 120  Resp:  [16-21] 20  SpO2:  [93 %-99 %] 93 %  BP:  (156-176)/(83-99) 163/93     Weight: 64.1 kg (141 lb 5 oz)  Body mass index is 19.71 kg/m².    Intake/Output Summary (Last 24 hours) at 5/24/2025 0855  Last data filed at 5/24/2025 0553  Gross per 24 hour   Intake --   Output 300 ml   Net -300 ml      Physical Exam  Constitutional:       Appearance: He is ill-appearing.   HENT:      Head: Normocephalic and atraumatic.   Cardiovascular:      Rate and Rhythm: Normal rate and regular rhythm.      Heart sounds: No murmur heard.  Pulmonary:      Effort: Pulmonary effort is normal. No respiratory distress.      Breath sounds: Normal breath sounds. No wheezing or rales.   Abdominal:      General: There is no distension.      Palpations: Abdomen is soft.      Tenderness: There is no abdominal tenderness.   Musculoskeletal:         General: No deformity.   Skin:     General: Skin is warm and dry.      Coloration: Skin is pale.   Neurological:      General: No focal deficit present.      Comments: Able to have a conversation, oriented to person         MELD 3.0: 10 at 5/23/2025  6:10 AM  MELD-Na: 8 at 5/23/2025  6:10 AM  Calculated from:  Serum Creatinine: 1.2 mg/dL at 5/23/2025  6:10 AM  Serum Sodium: 135 mmol/L at 5/23/2025  6:10 AM  Total Bilirubin: 0.4 mg/dL (Using min of 1 mg/dL) at 5/21/2025  4:13 AM  Serum Albumin: 3.1 g/dL at 5/21/2025  4:13 AM  INR(ratio): 1 at 5/21/2025  4:13 AM  Age at listing (hypothetical): 79 years  Sex: Male at 5/23/2025  6:10 AM      Significant Labs:  CBC:  Recent Labs   Lab 05/23/25  0610 05/24/25  0641   WBC 9.65 7.78   HGB 11.9* 12.1*   HCT 35.2* 36.4*    157     CMP:  Recent Labs   Lab 05/23/25  0610 05/24/25  0642   * 139   K 3.9 4.0   CL 99 101   CO2 27 29   GLU 97 97   BUN 28* 27*   CREATININE 1.2 1.2   CALCIUM 9.6 9.8   ANIONGAP 9 9     PTINR:  Recent Labs   Lab 05/23/25  0610 05/24/25  0642   INR 1.1 1.1       Significant Procedures:   Dobutamine Stress Test with Color Flow: No results found for this or any previous  visit.    None      Assessment & Plan  Acute encephalopathy  AMS (altered mental status)  Progressive acutely worsening confusion/tremors since 4/20. Previously AAOx4, now AAOx1  Neurology consulted: MRI brain ordered (no acute findings, motion artifact), extended EEG, ammonia levels.   Worsening mental status on 5/2, medicine team consulted for LP which was unsuccessful.   Neurology recommending carbidopa-levodopa trial, EEG, and attempting another LP.    Symptoms improved with increasing doses of Carbidopa/Levodopa.   LP concerning for meningitis so he was started on Empiric treatment on 5/6/25, viral panel negative  Discussion held with both Neurology and Infectious Disease, Infectious Disease does not believe patient has any infectious etiology and as such recommended discontinuing antimicrobials.    Neurology started IVIG for presumed autoimmune process.  Completed 5 day course of IVIG 5/14. Minimal improvement in cognition.   Neuro recommended repeat MRI with sedation as previous had artifact. Anesthesia notified to arrange with sedation which was done on 5/17.    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 Endocrine consulted.   Neurology planning 5 day course of IV Solumedrol starting 5/17 - paused on 5/19, resumed 5/20 (Today is Day 4/5)   Continue Vitamin B12 and Thiamine replacement.  Progressive acutely worsening confusion/tremors since 4/20. Previously AAOx4, now AAOx1  Neurology consulted: MRI brain ordered (no acute findings, motion artifact), extended EEG, ammonia levels.   Worsening mental status on 5/2, medicine team consulted for LP which was unsuccessful.   Neurology recommending carbidopa-levodopa trial, EEG, and attempting another LP.    Symptoms improved with increasing doses of Carbidopa/Levodopa.   LP concerning for meningitis so he was started on Empiric treatment on 5/6/25, viral panel negative  Discussion held with both Neurology and  Infectious Disease, Infectious Disease does not believe patient has any infectious etiology and as such recommended discontinuing antimicrobials.    Neurology started IVIG for presumed autoimmune process.  Completed 5 day course of IVIG 5/14. Minimal improvement in cognition.   Neuro recommended repeat MRI with sedation as previous had artifact. Anesthesia notified to arrange with sedation which was done on 5/17.    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 Endocrine consulted.   Neurology planning 5 day course of IV Solumedrol starting 5/17 - paused on 5/19, resumed 5/20 (Today is Day 5/5)   Continue Vitamin B12 and Thiamine replacement.  Progressive acutely worsening confusion/tremors since 4/20. Previously AAOx4, now AAOx1  Neurology consulted: MRI brain ordered (no acute findings, motion artifact), extended EEG, ammonia levels.   Worsening mental status on 5/2, medicine team consulted for LP which was unsuccessful.   Neurology recommending carbidopa-levodopa trial, EEG, and attempting another LP.    Symptoms improved with increasing doses of Carbidopa/Levodopa.   LP concerning for meningitis so he was started on Empiric treatment on 5/6/25, viral panel negative  Discussion held with both Neurology and Infectious Disease, Infectious Disease does not believe patient has any infectious etiology and as such recommended discontinuing antimicrobials.    Neurology started IVIG for presumed autoimmune process.  Completed 5 day course of IVIG 5/14. Minimal improvement in cognition.   Neuro recommended repeat MRI with sedation as previous had artifact. Anesthesia notified to arrange with sedation which was done on 5/17.    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 Endocrine consulted.   Neurology planning 5 day course of IV Solumedrol starting 5/17 - paused on 5/19, resumed 5/20 (Today is Day 4/5)   Continue  "Vitamin B12 and Thiamine replacement.  Progressive acutely worsening confusion/tremors since 4/20. Previously AAOx4, now AAOx1  Neurology consulted: MRI brain ordered (no acute findings, motion artifact), extended EEG, ammonia levels.   Worsening mental status on 5/2, medicine team consulted for LP which was unsuccessful.   Neurology recommending carbidopa-levodopa trial, EEG, and attempting another LP.    Symptoms improved with increasing doses of Carbidopa/Levodopa.   LP concerning for meningitis so he was started on Empiric treatment on 5/6/25, viral panel negative  Discussion held with both Neurology and Infectious Disease, Infectious Disease does not believe patient has any infectious etiology and as such recommended discontinuing antimicrobials.    Neurology started IVIG for presumed autoimmune process.  Completed 5 day course of IVIG 5/14. Minimal improvement in cognition.   Neuro recommended repeat MRI with sedation as previous had artifact. Anesthesia notified to arrange with sedation which was done on 5/17.    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 Endocrine consulted.   Neurology planning 5 day course of IV Solumedrol starting 5/17 - paused on 5/19, resumed 5/20 (Today is Day 5/5)   Continue Vitamin B12 and Thiamine replacement.  Crohn's disease  GIB (gastrointestinal bleeding)  Per CT scan "thickening of the distal ileum to the surgical anastomosis with the large bowel. Inflammatory infectious ileitis are considerations. Recommend clinical correlation and follow-up. "  CRP negative. No concern for flare.   GI without concerns of confusion being caused by sterlara, confirmed with pharmacy   On 5/19 developed new onset dark stools  Has been on Lovenox, IV Solumedrol and PO PPI  Pantoprazole 80mg IV x 1 then PPI 40mg IV BID  GI consulted  Repeat H/H at 2pm  Will transfuse if Hgb is <7g/dl (<8g/dl in cases of active ACS) or if patient has rapid " bleeding leading to hemodynamic instability  IBD team recommending CTE vs MRE when patient able to take po and improving  Will need re-staging colonoscopy, likely outpatient     Recent Labs     05/22/25  0546 05/23/25  0610 05/24/25  0641 05/24/25  0642   HGB 12.6* 11.9* 12.1*  --    HCT 37.5* 35.2* 36.4*  --     211 157  --    INR 1.0 1.1  --  1.1   CRP negative. No concern for flare.   GI without concerns of confusion being caused by sterlara, confirmed with pharmacy   On 5/19 developed new onset dark stools  Has been on Lovenox, IV Solumedrol and PO PPI  Pantoprazole 80mg IV x 1 then PPI 40mg IV BID  GI consulted  Repeat H/H at 2pm  Will transfuse if Hgb is <7g/dl (<8g/dl in cases of active ACS) or if patient has rapid bleeding leading to hemodynamic instability    Recent Labs     05/22/25  0546 05/23/25  0610 05/24/25  0641 05/24/25  0642   HGB 12.6* 11.9* 12.1*  --    HCT 37.5* 35.2* 36.4*  --     211 157  --    INR 1.0 1.1  --  1.1     Recent Labs     05/22/25  0546 05/23/25  0610 05/24/25  0641 05/24/25  0642   HGB 12.6* 11.9* 12.1*  --    HCT 37.5* 35.2* 36.4*  --     211 157  --    INR 1.0 1.1  --  1.1   CRP negative. No concern for flare.   GI without concerns of confusion being caused by sterlara, confirmed with pharmacy   On 5/19 developed new onset dark stools  Has been on Lovenox, IV Solumedrol and PO PPI  Pantoprazole 80mg IV x 1 then PPI 40mg IV BID  GI consulted  Repeat H/H at 2pm  Will transfuse if Hgb is <7g/dl (<8g/dl in cases of active ACS) or if patient has rapid bleeding leading to hemodynamic instability  IBD team recommending CTE vs MRE when patient able to take po and improving  Will need re-staging colonoscopy, likely outpatient     Recent Labs     05/22/25  0546 05/23/25  0610 05/24/25  0641 05/24/25  0642   HGB 12.6* 11.9* 12.1*  --    HCT 37.5* 35.2* 36.4*  --     211 157  --    INR 1.0 1.1  --  1.1     Recent Labs     05/22/25  0546 05/23/25  0610  05/24/25  0641 05/24/25  0642   HGB 12.6* 11.9* 12.1*  --    HCT 37.5* 35.2* 36.4*  --     211 157  --    INR 1.0 1.1  --  1.1   On 5/19 developed new onset dark stools  Has been on Lovenox, IV Solumedrol and PO PPI  Pantoprazole 80mg IV x 1 then PPI 40mg IV BID  GI consulted  Repeat H/H at 2pm  Will transfuse if Hgb is <7g/dl (<8g/dl in cases of active ACS) or if patient has rapid bleeding leading to hemodynamic instability    Recent Labs     05/22/25  0546 05/23/25  0610 05/24/25  0641 05/24/25  0642   HGB 12.6* 11.9* 12.1*  --    HCT 37.5* 35.2* 36.4*  --     211 157  --    INR 1.0 1.1  --  1.1     Essential hypertension  Patient's blood pressure range in the last 24 hours was: BP  Min: 156/95  Max: 176/99.The patient's inpatient anti-hypertensive regimen is listed below:  Current Antihypertensives  NIFEdipine 24 hr tablet 60 mg, Daily, Oral    Plan  - BP is controlled, no changes needed to their regimen  Changed from Norvasc to Procardia given Psych recs    Generalized weakness  Sent from SNF  Progressive worsening weakness per son   PT/OT ordered. Will need placement at discharge  Unintended weight loss  Reported per son  Recently started gluten free diet per recommendations from GI  Temporal wasting noted  Body mass index is 19.71 kg/m².  Nutrition following  Boost changed to Boost Breeze given lactose intolerance  Hyponatremia  Hyponatremia is likely due to Dehydration/hypovolemia. The patient's most recent sodium results are listed below.  Recent Labs     05/22/25  0546 05/23/25  0610 05/24/25  0642   * 135* 139     Maxime  - Monitor sodium Daily.   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 Most recent hemoglobin and hematocrit are listed below.  Recent Labs     05/22/25  0546 05/23/25  0610 05/24/25  0641   HGB 12.6* 11.9* 12.1*   HCT 37.5* 35.2* 36.4*     Plan  - Monitor serial CBC: Daily  - Transfuse  PRBC if patient becomes hemodynamically unstable, symptomatic or H/H drops below 7/21.  - Patient has not received any PRBC transfusions to date  - Patient's anemia is currently stable  -  Parkinsonism  Suspect symptoms are at least partially due to Parkinson's disease.   Continue Sinemet TID  CAD (coronary artery disease)  History noted.   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 Will hold anti-depressant medications. We will consult psychiatry at this time. Patient does not display psychosis at this time. Continue to monitor closely and adjust plan of care as needed.  Anemia  Anemia is likely due to acute on chronic illness. Most recent hemoglobin and hematocrit are listed below.  Recent Labs     05/22/25  0546 05/23/25  0610 05/24/25  0641   HGB 12.6* 11.9* 12.1*   HCT 37.5* 35.2* 36.4*     Plan  - Monitor serial CBC: Daily  - Transfuse PRBC if patient becomes hemodynamically unstable, symptomatic or H/H drops below 7/21.  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 Most recent weight and BMI monitored-     Measurements:  Wt Readings from Last 1 Encounters:   05/06/25 64.1 kg (141 lb 5 oz)   Body mass index is 19.71 kg/m².    Patient has been screened and assessed by RD.    Malnutrition Type:  Context:    Level:      Malnutrition Characteristic Summary:       Interventions/Recommendations (treatment strategy):  1. Continuous tube feeds of Jevity 1.5 with a goal rate of 45 ml/hr x 24 hours to meet > 75% of kcal/protein needs - provides Total volume: 1080 -Kcal: 1620 - Protein 73gm  Free water: 840 mL. Additional free fluid flushes per MD. Patient currently has 1200cc fluid restriction. 2. Monitor for s/s TF intolerance.    - Discussed G tube placement with daughter/MPOA and GI, planned for placement on 5/22    Palliative care encounter      VTE Risk Mitigation (From admission, onward)           Ordered     enoxaparin injection 40 mg  Every 24 hours         05/20/25 0906     IP VTE HIGH RISK PATIENT  Once         05/01/25 0827     Place sequential compression device  Until discontinued         05/01/25 0827                    Discharge Planning   ERNIE: 5/27/2025     Code Status: DNR   Medical Readiness for Discharge Date:   Discharge Plan A: [facility]   Discharge Delays: None known at this time            Please place Justification for DME        Juan F Ramírez MD  Department of Hospital Medicine   [facility] - Acute Medical Stepdown

## 2025-05-24 NOTE — ASSESSMENT & PLAN NOTE
"Per CT scan "thickening of the distal ileum to the surgical anastomosis with the large bowel. Inflammatory infectious ileitis are considerations. Recommend clinical correlation and follow-up. "  CRP negative. No concern for flare.   GI without concerns of confusion being caused by sterlara, confirmed with pharmacy   On 5/19 developed new onset dark stools  Has been on Lovenox, IV Solumedrol and PO PPI  Pantoprazole 80mg IV x 1 then PPI 40mg IV BID  GI consulted  Repeat H/H at 2pm  Will transfuse if Hgb is <7g/dl (<8g/dl in cases of active ACS) or if patient has rapid bleeding leading to hemodynamic instability  IBD team recommending CTE vs MRE when patient able to take po and improving  Will need re-staging colonoscopy, likely outpatient     Recent Labs     05/22/25  0546 05/23/25  0610 05/24/25  0641 05/24/25 0642   HGB 12.6* 11.9* 12.1*  --    HCT 37.5* 35.2* 36.4*  --     211 157  --    INR 1.0 1.1  --  1.1   CRP negative. No concern for flare.   GI without concerns of confusion being caused by sterlara, confirmed with pharmacy   On 5/19 developed new onset dark stools  Has been on Lovenox, IV Solumedrol and PO PPI  Pantoprazole 80mg IV x 1 then PPI 40mg IV BID  GI consulted  Repeat H/H at 2pm  Will transfuse if Hgb is <7g/dl (<8g/dl in cases of active ACS) or if patient has rapid bleeding leading to hemodynamic instability    Recent Labs     05/22/25  0546 05/23/25  0610 05/24/25  0641 05/24/25  0642   HGB 12.6* 11.9* 12.1*  --    HCT 37.5* 35.2* 36.4*  --     211 157  --    INR 1.0 1.1  --  1.1     Recent Labs     05/22/25  0546 05/23/25  0610 05/24/25  0641 05/24/25  0642   HGB 12.6* 11.9* 12.1*  --    HCT 37.5* 35.2* 36.4*  --     211 157  --    INR 1.0 1.1  --  1.1   CRP negative. No concern for flare.   GI without concerns of confusion being caused by sterlara, confirmed with pharmacy   On 5/19 developed new onset dark stools  Has been on Lovenox, IV Solumedrol and PO PPI  Pantoprazole " 80mg IV x 1 then PPI 40mg IV BID  GI consulted  Repeat H/H at 2pm  Will transfuse if Hgb is <7g/dl (<8g/dl in cases of active ACS) or if patient has rapid bleeding leading to hemodynamic instability  IBD team recommending CTE vs MRE when patient able to take po and improving  Will need re-staging colonoscopy, likely outpatient     Recent Labs     05/22/25  0546 05/23/25  0610 05/24/25  0641 05/24/25  0642   HGB 12.6* 11.9* 12.1*  --    HCT 37.5* 35.2* 36.4*  --     211 157  --    INR 1.0 1.1  --  1.1     Recent Labs     05/22/25  0546 05/23/25  0610 05/24/25  0641 05/24/25  0642   HGB 12.6* 11.9* 12.1*  --    HCT 37.5* 35.2* 36.4*  --     211 157  --    INR 1.0 1.1  --  1.1   On 5/19 developed new onset dark stools  Has been on Lovenox, IV Solumedrol and PO PPI  Pantoprazole 80mg IV x 1 then PPI 40mg IV BID  GI consulted  Repeat H/H at 2pm  Will transfuse if Hgb is <7g/dl (<8g/dl in cases of active ACS) or if patient has rapid bleeding leading to hemodynamic instability    Recent Labs     05/22/25  0546 05/23/25  0610 05/24/25  0641 05/24/25  0642   HGB 12.6* 11.9* 12.1*  --    HCT 37.5* 35.2* 36.4*  --     211 157  --    INR 1.0 1.1  --  1.1

## 2025-05-24 NOTE — PLAN OF CARE
Problem: Infection  Goal: Absence of Infection Signs and Symptoms  Outcome: Progressing     Problem: Adult Inpatient Plan of Care  Goal: Plan of Care Review  Outcome: Progressing  Goal: Patient-Specific Goal (Individualized)  Outcome: Progressing  Goal: Absence of Hospital-Acquired Illness or Injury  Outcome: Progressing  Goal: Optimal Comfort and Wellbeing  Outcome: Progressing  Goal: Readiness for Transition of Care  Outcome: Progressing     Patient alert and oriented to self. VSS. Room air. Q2 turns. Patient refused medications; held lips shut; eyes closed; very little interaction. Fluids and meals encouraged; feeding assist; low intake. Bed alarm set. Bed low and locked position.

## 2025-05-24 NOTE — ASSESSMENT & PLAN NOTE
Anemia is likely due to acute on chronic illness. Most recent hemoglobin and hematocrit are listed below.  Recent Labs     05/22/25  0546 05/23/25  0610 05/24/25  0641   HGB 12.6* 11.9* 12.1*   HCT 37.5* 35.2* 36.4*     Plan  - Monitor serial CBC: Daily  - Transfuse PRBC if patient becomes hemodynamically unstable, symptomatic or H/H drops below 7/21.  - Patient has not received any PRBC transfusions to date  - Patient's anemia is currently stable  - Continue PPI with high dose steroids per neurology

## 2025-05-24 NOTE — PLAN OF CARE
Problem: Adult Inpatient Plan of Care  Goal: Plan of Care Review  Outcome: Progressing  Goal: Patient-Specific Goal (Individualized)  Outcome: Progressing  Goal: Absence of Hospital-Acquired Illness or Injury  Outcome: Progressing  Goal: Optimal Comfort and Wellbeing  Outcome: Progressing  Goal: Readiness for Transition of Care  Outcome: Progressing     Problem: Skin Injury Risk Increased  Goal: Skin Health and Integrity  Outcome: Progressing     Problem: Infection  Goal: Absence of Infection Signs and Symptoms  Outcome: Progressing     Problem: Delirium  Goal: Optimal Coping  Outcome: Progressing  Goal: Improved Behavioral Control  Outcome: Progressing  Goal: Improved Attention and Thought Clarity  Outcome: Progressing  Goal: Improved Sleep  Outcome: Progressing     Problem: Fall Injury Risk  Goal: Absence of Fall and Fall-Related Injury  Outcome: Progressing     Problem: Restraint, Nonviolent  Goal: Absence of Harm or Injury  Outcome: Progressing     Problem: Coping Ineffective  Goal: Effective Coping  Outcome: Progressing

## 2025-05-25 LAB
ABSOLUTE EOSINOPHIL (OHS): 0 K/UL
ABSOLUTE MONOCYTE (OHS): 0.33 K/UL (ref 0.3–1)
ABSOLUTE NEUTROPHIL COUNT (OHS): 6.35 K/UL (ref 1.8–7.7)
ANION GAP (OHS): 9 MMOL/L (ref 8–16)
BASOPHILS # BLD AUTO: 0 K/UL
BASOPHILS NFR BLD AUTO: 0 %
BUN SERPL-MCNC: 37 MG/DL (ref 8–23)
CALCIUM SERPL-MCNC: 10 MG/DL (ref 8.7–10.5)
CHLORIDE SERPL-SCNC: 102 MMOL/L (ref 95–110)
CO2 SERPL-SCNC: 28 MMOL/L (ref 23–29)
CREAT SERPL-MCNC: 1.7 MG/DL (ref 0.5–1.4)
ERYTHROCYTE [DISTWIDTH] IN BLOOD BY AUTOMATED COUNT: 12 % (ref 11.5–14.5)
GFR SERPLBLD CREATININE-BSD FMLA CKD-EPI: 41 ML/MIN/1.73/M2
GLUCOSE SERPL-MCNC: 129 MG/DL (ref 70–110)
HCT VFR BLD AUTO: 36.9 % (ref 40–54)
HGB BLD-MCNC: 12.1 GM/DL (ref 14–18)
IMM GRANULOCYTES # BLD AUTO: 0.08 K/UL (ref 0–0.04)
IMM GRANULOCYTES NFR BLD AUTO: 1.1 % (ref 0–0.5)
INR PPP: 1 (ref 0.8–1.2)
LYMPHOCYTES # BLD AUTO: 0.53 K/UL (ref 1–4.8)
MAGNESIUM SERPL-MCNC: 2 MG/DL (ref 1.6–2.6)
MCH RBC QN AUTO: 30.9 PG (ref 27–31)
MCHC RBC AUTO-ENTMCNC: 32.8 G/DL (ref 32–36)
MCV RBC AUTO: 94 FL (ref 82–98)
NUCLEATED RBC (/100WBC) (OHS): 0 /100 WBC
PLATELET # BLD AUTO: 153 K/UL (ref 150–450)
PMV BLD AUTO: 11.1 FL (ref 9.2–12.9)
POCT GLUCOSE: 135 MG/DL (ref 70–110)
POCT GLUCOSE: 148 MG/DL (ref 70–110)
POCT GLUCOSE: 214 MG/DL (ref 70–110)
POCT GLUCOSE: 95 MG/DL (ref 70–110)
POTASSIUM SERPL-SCNC: 3.9 MMOL/L (ref 3.5–5.1)
PROTHROMBIN TIME: 11.2 SECONDS (ref 9–12.5)
RBC # BLD AUTO: 3.91 M/UL (ref 4.6–6.2)
RELATIVE EOSINOPHIL (OHS): 0 %
RELATIVE LYMPHOCYTE (OHS): 7.3 % (ref 18–48)
RELATIVE MONOCYTE (OHS): 4.5 % (ref 4–15)
RELATIVE NEUTROPHIL (OHS): 87.1 % (ref 38–73)
SODIUM SERPL-SCNC: 139 MMOL/L (ref 136–145)
WBC # BLD AUTO: 7.29 K/UL (ref 3.9–12.7)

## 2025-05-25 PROCEDURE — 25000003 PHARM REV CODE 250

## 2025-05-25 PROCEDURE — 63600175 PHARM REV CODE 636 W HCPCS: Performed by: STUDENT IN AN ORGANIZED HEALTH CARE EDUCATION/TRAINING PROGRAM

## 2025-05-25 PROCEDURE — 25000003 PHARM REV CODE 250: Performed by: HOSPITALIST

## 2025-05-25 PROCEDURE — 25000003 PHARM REV CODE 250: Performed by: STUDENT IN AN ORGANIZED HEALTH CARE EDUCATION/TRAINING PROGRAM

## 2025-05-25 PROCEDURE — 83735 ASSAY OF MAGNESIUM: CPT | Performed by: STUDENT IN AN ORGANIZED HEALTH CARE EDUCATION/TRAINING PROGRAM

## 2025-05-25 PROCEDURE — 63600175 PHARM REV CODE 636 W HCPCS: Performed by: HOSPITALIST

## 2025-05-25 PROCEDURE — 20600001 HC STEP DOWN PRIVATE ROOM

## 2025-05-25 PROCEDURE — 85025 COMPLETE CBC W/AUTO DIFF WBC: CPT | Performed by: STUDENT IN AN ORGANIZED HEALTH CARE EDUCATION/TRAINING PROGRAM

## 2025-05-25 PROCEDURE — 80048 BASIC METABOLIC PNL TOTAL CA: CPT | Performed by: STUDENT IN AN ORGANIZED HEALTH CARE EDUCATION/TRAINING PROGRAM

## 2025-05-25 PROCEDURE — 85610 PROTHROMBIN TIME: CPT | Performed by: STUDENT IN AN ORGANIZED HEALTH CARE EDUCATION/TRAINING PROGRAM

## 2025-05-25 PROCEDURE — 36415 COLL VENOUS BLD VENIPUNCTURE: CPT | Performed by: STUDENT IN AN ORGANIZED HEALTH CARE EDUCATION/TRAINING PROGRAM

## 2025-05-25 RX ADMIN — INSULIN ASPART 2 UNITS: 100 INJECTION, SOLUTION INTRAVENOUS; SUBCUTANEOUS at 01:05

## 2025-05-25 RX ADMIN — ACETAMINOPHEN 1000 MG: 500 TABLET ORAL at 05:05

## 2025-05-25 RX ADMIN — ENOXAPARIN SODIUM 40 MG: 40 INJECTION SUBCUTANEOUS at 05:05

## 2025-05-25 RX ADMIN — CARBIDOPA AND LEVODOPA 2 TABLET: 25; 100 TABLET ORAL at 09:05

## 2025-05-25 RX ADMIN — Medication 100 MG: at 09:05

## 2025-05-25 RX ADMIN — NIFEDIPINE 60 MG: 30 TABLET, FILM COATED, EXTENDED RELEASE ORAL at 09:05

## 2025-05-25 RX ADMIN — MIRTAZAPINE 15 MG: 15 TABLET, FILM COATED ORAL at 08:05

## 2025-05-25 RX ADMIN — POLYETHYLENE GLYCOL 3350 17 G: 17 POWDER, FOR SOLUTION ORAL at 08:05

## 2025-05-25 RX ADMIN — CARBIDOPA AND LEVODOPA 2 TABLET: 25; 100 TABLET ORAL at 08:05

## 2025-05-25 RX ADMIN — ALPRAZOLAM 0.5 MG: 0.5 TABLET ORAL at 08:05

## 2025-05-25 RX ADMIN — CYANOCOBALAMIN TAB 1000 MCG 1000 MCG: 1000 TAB at 09:05

## 2025-05-25 RX ADMIN — PANTOPRAZOLE SODIUM 40 MG: 40 INJECTION, POWDER, FOR SOLUTION INTRAVENOUS at 10:05

## 2025-05-25 RX ADMIN — PANTOPRAZOLE SODIUM 40 MG: 40 INJECTION, POWDER, FOR SOLUTION INTRAVENOUS at 08:05

## 2025-05-25 NOTE — PROGRESS NOTES
Adam Amezquita - [facility] Medicine  Progress Note    Patient Name: Vince Huffman  MRN: [id number]  Patient Class: IP- Inpatient   Admission Date: 4/30/2025  Length of Stay: 24 days  Attending Physician: Juan F Ramírez MD  Primary Care Provider: Leland Porras MD        Subjective     Principal Problem:Acute encephalopathy        HPI:  By Dr. Juan F Ramírez MD    80 yo M w/HTN, hypothyroid, crohn's disease, CAD, hx of SBO, presenting with AMS from [facility]. Patient was sent in from [facility] for progressively worsening cognitive function. Patient is A&O x1 at his baseline. Patient and his daughter endorse mild confusion. Which has been progressive.    Patient has not had any recent falls. Endorsed mild suprapubic abdominal pain. No UTI. Mild inflammation on CT, not unexpected in the setting of Crohn's.     Overview/Hospital Course:  Mr. Huffman was admitted to  for further evaluation of worsening AMS per rehab facility. AAOx1 on admission, normally AAOx4 prior to 4/20 per son. UA negative. CXR clear. CT abd/pelvis with wall thickening of distal ileum, inflammatory infectious ileitis are considerations, fecal distention of the rectum, impaction not excluded. Discussed with GI, anticipated these are chronic findings. CRP negative. Will give enema and monitor for improvement. Patient with successful BM. Neurology consulted: MRI brain ordered (no acute findings, motion artifact), extended EEG, ammonia levels. Worsening mental status on 5/2, medicine team consulted for LP which was unsuccessful. More alert on 5/3 and answering yes/no questions when redirected. Neurology recommending carbidopa-levodopa trial, EEG, and attempting another LP.  Symptoms improved with increasing doses of Carbidopa/Levodopa. LP concerning for meningitis so he was started on Empiric treatment on 5/6/25. Discussion held with both Neurology and Infectious Disease, Infectious Disease does not believe patient  has any infectious etiology and as such recommended discontinuing antimicrobials.  Neurology started IVIG for presumed autoimmune process.  Completed 5 day course of IVIG 5/14. Minimal improvement in cognition. Had bouts of poor oral intake, only drank boost.  Had intermittent hyponatremia responding to IV fluids.  On 05/14 am Sodium 124.  Nephrology was consulted, investigations ordered and sodium corrected with close electrolyte monitoring.  Psych was consulted by the request of the neurologist. Hyponatremia improved after stopping fluids. NG tube attempted thrice at bedside however not able to be placed. Able to tolerate oral intake however very limited intake. Neuro recommended repeat MRI with sedation as previous had artifact. Anesthesia notified to arrange with sedation which was done on 5/17.  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 Endocrine consulted. Neurology planning 5 day course of IV Solumedrol starting 5/17.   He was started on Vitamin B12 and Thiamine replacement.    Steroids paused on 5/19 given concern for upper GI bleeding. PPI started and GI consulted. GI team does not share same concern for bleeding and hemoglobin notably stable despite elevated BUN, which could be coincidence. Will continue PPI for now. Steroids resumed with PPI ongoing on 5/20 (day 4/5 of steroids). Discussed role of G-tube with family and GI and everyone agreeable to placement. Plan for G tube placement on 5/22.     Interval History: Plan is for dc to NH with hospice, is now DNR. Poor po intake,    Review of Systems  Objective:     Vital Signs (Most Recent):  Temp: 98.8 °F (37.1 °C) (05/25/25 0738)  Pulse: 93 (05/25/25 0738)  Resp: 16 (05/25/25 0738)  BP: (!) 148/78 (05/25/25 0738)  SpO2: 97 % (05/25/25 0830) Vital Signs (24h Range):  Temp:  [98.8 °F (37.1 °C)-100.8 °F (38.2 °C)] 98.8 °F (37.1 °C)  Pulse:  [] 93  Resp:  [16-18] 16  SpO2:  [92 %-98 %] 97  %  BP: (129-174)/(78-98) 148/78     Weight: 64.1 kg (141 lb 5 oz)  Body mass index is 19.71 kg/m².    Intake/Output Summary (Last 24 hours) at 5/25/2025 0924  Last data filed at 5/25/2025 0500  Gross per 24 hour   Intake --   Output 750 ml   Net -750 ml      Physical Exam  Constitutional:       Appearance: He is ill-appearing.   HENT:      Head: Normocephalic and atraumatic.   Cardiovascular:      Rate and Rhythm: Normal rate and regular rhythm.      Heart sounds: No murmur heard.  Pulmonary:      Effort: Pulmonary effort is normal. No respiratory distress.      Breath sounds: Normal breath sounds. No wheezing or rales.   Abdominal:      General: There is no distension.      Palpations: Abdomen is soft.      Tenderness: There is no abdominal tenderness.   Musculoskeletal:         General: No deformity.   Skin:     General: Skin is warm and dry.      Coloration: Skin is pale.   Neurological:      General: No focal deficit present.      Comments: Able to have a conversation, oriented to person         MELD 3.0: 10 at 5/23/2025  6:10 AM  MELD-Na: 8 at 5/23/2025  6:10 AM  Calculated from:  Serum Creatinine: 1.2 mg/dL at 5/23/2025  6:10 AM  Serum Sodium: 135 mmol/L at 5/23/2025  6:10 AM  Total Bilirubin: 0.4 mg/dL (Using min of 1 mg/dL) at 5/21/2025  4:13 AM  Serum Albumin: 3.1 g/dL at 5/21/2025  4:13 AM  INR(ratio): 1 at 5/22/2025  5:46 AM  Age at listing (hypothetical): 79 years  Sex: Male at 5/23/2025  6:10 AM      Significant Labs:  CBC:  Recent Labs   Lab 05/24/25  0641 05/25/25  0710   WBC 7.78 7.29   HGB 12.1* 12.1*   HCT 36.4* 36.9*    153     CMP:  Recent Labs   Lab 05/24/25  0642 05/25/25  0710    139   K 4.0 3.9    102   CO2 29 28   GLU 97 129*   BUN 27* 37*   CREATININE 1.2 1.7*   CALCIUM 9.8 10.0   ANIONGAP 9 9     PTINR:  Recent Labs   Lab 05/24/25  0642 05/25/25  0710   INR 1.1 1.0       Significant Procedures:   Dobutamine Stress Test with Color Flow: No results found for this or any  previous visit.    None      Assessment & Plan  Acute encephalopathy  AMS (altered mental status)  Progressive acutely worsening confusion/tremors since 4/20. Previously AAOx4, now AAOx1  Neurology consulted: MRI brain ordered (no acute findings, motion artifact), extended EEG, ammonia levels.   Worsening mental status on 5/2, medicine team consulted for LP which was unsuccessful.   Neurology recommending carbidopa-levodopa trial, EEG, and attempting another LP.    Symptoms improved with increasing doses of Carbidopa/Levodopa.   LP concerning for meningitis so he was started on Empiric treatment on 5/6/25, viral panel negative  Discussion held with both Neurology and Infectious Disease, Infectious Disease does not believe patient has any infectious etiology and as such recommended discontinuing antimicrobials.    Neurology started IVIG for presumed autoimmune process.  Completed 5 day course of IVIG 5/14. Minimal improvement in cognition.   Neuro recommended repeat MRI with sedation as previous had artifact. Anesthesia notified to arrange with sedation which was done on 5/17.    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 Endocrine consulted.   Neurology planning 5 day course of IV Solumedrol starting 5/17 - paused on 5/19, resumed 5/20 (Today is Day 4/5)   Continue Vitamin B12 and Thiamine replacement.  Progressive acutely worsening confusion/tremors since 4/20. Previously AAOx4, now AAOx1  Neurology consulted: MRI brain ordered (no acute findings, motion artifact), extended EEG, ammonia levels.   Worsening mental status on 5/2, medicine team consulted for LP which was unsuccessful.   Neurology recommending carbidopa-levodopa trial, EEG, and attempting another LP.    Symptoms improved with increasing doses of Carbidopa/Levodopa.   LP concerning for meningitis so he was started on Empiric treatment on 5/6/25, viral panel negative  Discussion held with both  Neurology and Infectious Disease, Infectious Disease does not believe patient has any infectious etiology and as such recommended discontinuing antimicrobials.    Neurology started IVIG for presumed autoimmune process.  Completed 5 day course of IVIG 5/14. Minimal improvement in cognition.   Neuro recommended repeat MRI with sedation as previous had artifact. Anesthesia notified to arrange with sedation which was done on 5/17.    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 Endocrine consulted.   Neurology planning 5 day course of IV Solumedrol starting 5/17 - paused on 5/19, resumed 5/20 (Today is Day 5/5)   Continue Vitamin B12 and Thiamine replacement.  Progressive acutely worsening confusion/tremors since 4/20. Previously AAOx4, now AAOx1  Neurology consulted: MRI brain ordered (no acute findings, motion artifact), extended EEG, ammonia levels.   Worsening mental status on 5/2, medicine team consulted for LP which was unsuccessful.   Neurology recommending carbidopa-levodopa trial, EEG, and attempting another LP.    Symptoms improved with increasing doses of Carbidopa/Levodopa.   LP concerning for meningitis so he was started on Empiric treatment on 5/6/25, viral panel negative  Discussion held with both Neurology and Infectious Disease, Infectious Disease does not believe patient has any infectious etiology and as such recommended discontinuing antimicrobials.    Neurology started IVIG for presumed autoimmune process.  Completed 5 day course of IVIG 5/14. Minimal improvement in cognition.   Neuro recommended repeat MRI with sedation as previous had artifact. Anesthesia notified to arrange with sedation which was done on 5/17.    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 Endocrine consulted.   Neurology planning 5 day course of IV Solumedrol starting 5/17 - paused on 5/19, resumed 5/20 (Today is Day  "4/5)   Continue Vitamin B12 and Thiamine replacement.  Progressive acutely worsening confusion/tremors since 4/20. Previously AAOx4, now AAOx1  Neurology consulted: MRI brain ordered (no acute findings, motion artifact), extended EEG, ammonia levels.   Worsening mental status on 5/2, medicine team consulted for LP which was unsuccessful.   Neurology recommending carbidopa-levodopa trial, EEG, and attempting another LP.    Symptoms improved with increasing doses of Carbidopa/Levodopa.   LP concerning for meningitis so he was started on Empiric treatment on 5/6/25, viral panel negative  Discussion held with both Neurology and Infectious Disease, Infectious Disease does not believe patient has any infectious etiology and as such recommended discontinuing antimicrobials.    Neurology started IVIG for presumed autoimmune process.  Completed 5 day course of IVIG 5/14. Minimal improvement in cognition.   Neuro recommended repeat MRI with sedation as previous had artifact. Anesthesia notified to arrange with sedation which was done on 5/17.    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 Endocrine consulted.   Neurology planning 5 day course of IV Solumedrol starting 5/17 - paused on 5/19, resumed 5/20 (Today is Day 5/5)   Continue Vitamin B12 and Thiamine replacement.  Crohn's disease  GIB (gastrointestinal bleeding)  Per CT scan "thickening of the distal ileum to the surgical anastomosis with the large bowel. Inflammatory infectious ileitis are considerations. Recommend clinical correlation and follow-up. "  CRP negative. No concern for flare.   GI without concerns of confusion being caused by sterlara, confirmed with pharmacy   On 5/19 developed new onset dark stools  Has been on Lovenox, IV Solumedrol and PO PPI  Pantoprazole 80mg IV x 1 then PPI 40mg IV BID  GI consulted  Repeat H/H at 2pm  Will transfuse if Hgb is <7g/dl (<8g/dl in cases of active ACS) or if " patient has rapid bleeding leading to hemodynamic instability  IBD team recommending CTE vs MRE when patient able to take po and improving  Will need re-staging colonoscopy, likely outpatient     Recent Labs     05/23/25  0610 05/24/25  0641 05/24/25  0642 05/25/25  0710   HGB 11.9* 12.1*  --  12.1*   HCT 35.2* 36.4*  --  36.9*    157  --  153   INR 1.1  --  1.1 1.0   CRP negative. No concern for flare.   GI without concerns of confusion being caused by sterlara, confirmed with pharmacy   On 5/19 developed new onset dark stools  Has been on Lovenox, IV Solumedrol and PO PPI  Pantoprazole 80mg IV x 1 then PPI 40mg IV BID  GI consulted  Repeat H/H at 2pm  Will transfuse if Hgb is <7g/dl (<8g/dl in cases of active ACS) or if patient has rapid bleeding leading to hemodynamic instability    Recent Labs     05/23/25  0610 05/24/25  0641 05/24/25  0642 05/25/25  0710   HGB 11.9* 12.1*  --  12.1*   HCT 35.2* 36.4*  --  36.9*    157  --  153   INR 1.1  --  1.1 1.0     Recent Labs     05/23/25  0610 05/24/25  0641 05/24/25  0642 05/25/25  0710   HGB 11.9* 12.1*  --  12.1*   HCT 35.2* 36.4*  --  36.9*    157  --  153   INR 1.1  --  1.1 1.0   CRP negative. No concern for flare.   GI without concerns of confusion being caused by sterlara, confirmed with pharmacy   On 5/19 developed new onset dark stools  Has been on Lovenox, IV Solumedrol and PO PPI  Pantoprazole 80mg IV x 1 then PPI 40mg IV BID  GI consulted  Repeat H/H at 2pm  Will transfuse if Hgb is <7g/dl (<8g/dl in cases of active ACS) or if patient has rapid bleeding leading to hemodynamic instability  IBD team recommending CTE vs MRE when patient able to take po and improving  Will need re-staging colonoscopy, likely outpatient     Recent Labs     05/23/25  0610 05/24/25  0641 05/24/25  0642 05/25/25  0710   HGB 11.9* 12.1*  --  12.1*   HCT 35.2* 36.4*  --  36.9*    157  --  153   INR 1.1  --  1.1 1.0     Recent Labs     05/23/25  0610  05/24/25  0641 05/24/25  0642 05/25/25  0710   HGB 11.9* 12.1*  --  12.1*   HCT 35.2* 36.4*  --  36.9*    157  --  153   INR 1.1  --  1.1 1.0   On 5/19 developed new onset dark stools  Has been on Lovenox, IV Solumedrol and PO PPI  Pantoprazole 80mg IV x 1 then PPI 40mg IV BID  GI consulted  Repeat H/H at 2pm  Will transfuse if Hgb is <7g/dl (<8g/dl in cases of active ACS) or if patient has rapid bleeding leading to hemodynamic instability    Recent Labs     05/23/25  0610 05/24/25  0641 05/24/25  0642 05/25/25  0710   HGB 11.9* 12.1*  --  12.1*   HCT 35.2* 36.4*  --  36.9*    157  --  153   INR 1.1  --  1.1 1.0     Essential hypertension  Patient's blood pressure range in the last 24 hours was: BP  Min: 129/90  Max: 174/98.The patient's inpatient anti-hypertensive regimen is listed below:  Current Antihypertensives  NIFEdipine 24 hr tablet 60 mg, Daily, Oral    Plan  - BP is controlled, no changes needed to their regimen  Changed from Norvasc to Procardia given Psych recs    Generalized weakness  Sent from SNF  Progressive worsening weakness per son   PT/OT ordered. Will need placement at discharge  Unintended weight loss  Reported per son  Recently started gluten free diet per recommendations from GI  Temporal wasting noted  Body mass index is 19.71 kg/m².  Nutrition following  Boost changed to Boost Breeze given lactose intolerance  Hyponatremia  Hyponatremia is likely due to Dehydration/hypovolemia. The patient's most recent sodium results are listed below.  Recent Labs     05/23/25  0610 05/24/25  0642 05/25/25  0710   * 139 139     Maxime  - Monitor sodium Daily.   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 Most recent hemoglobin and hematocrit are listed below.  Recent Labs     05/23/25  0610 05/24/25  0641 05/25/25  0710   HGB 11.9* 12.1* 12.1*   HCT 35.2* 36.4* 36.9*     Plan  - Monitor serial CBC:  Daily  - Transfuse PRBC if patient becomes hemodynamically unstable, symptomatic or H/H drops below 7/21.  - Patient has not received any PRBC transfusions to date  - Patient's anemia is currently stable  -  Parkinsonism  Suspect symptoms are at least partially due to Parkinson's disease.   Continue Sinemet TID  CAD (coronary artery disease)  History noted.   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 Will hold anti-depressant medications. We will consult psychiatry at this time. Patient does not display psychosis at this time. Continue to monitor closely and adjust plan of care as needed.  Anemia  Anemia is likely due to acute on chronic illness. Most recent hemoglobin and hematocrit are listed below.  Recent Labs     05/23/25  0610 05/24/25  0641 05/25/25  0710   HGB 11.9* 12.1* 12.1*   HCT 35.2* 36.4* 36.9*     Plan  - Monitor serial CBC: Daily  - Transfuse PRBC if patient becomes hemodynamically unstable, symptomatic or H/H drops below 7/21.  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 Most recent weight and BMI monitored-     Measurements:  Wt Readings from Last 1 Encounters:   05/06/25 64.1 kg (141 lb 5 oz)   Body mass index is 19.71 kg/m².    Patient has been screened and assessed by RD.    Malnutrition Type:  Context:    Level:      Malnutrition Characteristic Summary:       Interventions/Recommendations (treatment strategy):  1. Continuous tube feeds of Jevity 1.5 with a goal rate of 45 ml/hr x 24 hours to meet > 75% of kcal/protein needs - provides Total volume: 1080 -Kcal:  1620 - Protein 73gm Free water: 840 mL. Additional free fluid flushes per MD. Patient currently has 1200cc fluid restriction. 2. Monitor for s/s TF intolerance.    - Discussed G tube placement with daughter/MPOA and GI, planned for placement on 5/22    Palliative care encounter      VTE Risk Mitigation (From admission, onward)           Ordered     enoxaparin injection 40 mg  Every 24 hours         05/20/25 0906     IP VTE HIGH RISK PATIENT  Once         05/01/25 0827     Place sequential compression device  Until discontinued         05/01/25 0827                    Discharge Planning   ERNIE: 5/27/2025     Code Status: DNR   Medical Readiness for Discharge Date:   Discharge Plan A: New Nursing Home placement - retirement care facility   Discharge Delays: None known at this time            Please place Justification for DME        Juan F Ramírez MD  Department of Hospital Medicine   [facility] - Acute [facility] Stepdown

## 2025-05-25 NOTE — SUBJECTIVE & OBJECTIVE
Interval History: Plan is for dc to NH with hospice, is now DNR. Poor po intake,    Review of Systems  Objective:     Vital Signs (Most Recent):  Temp: 98.8 °F (37.1 °C) (05/25/25 0738)  Pulse: 93 (05/25/25 0738)  Resp: 16 (05/25/25 0738)  BP: (!) 148/78 (05/25/25 0738)  SpO2: 97 % (05/25/25 0830) Vital Signs (24h Range):  Temp:  [98.8 °F (37.1 °C)-100.8 °F (38.2 °C)] 98.8 °F (37.1 °C)  Pulse:  [] 93  Resp:  [16-18] 16  SpO2:  [92 %-98 %] 97 %  BP: (129-174)/(78-98) 148/78     Weight: 64.1 kg (141 lb 5 oz)  Body mass index is 19.71 kg/m².    Intake/Output Summary (Last 24 hours) at 5/25/2025 0924  Last data filed at 5/25/2025 0500  Gross per 24 hour   Intake --   Output 750 ml   Net -750 ml      Physical Exam  Constitutional:       Appearance: He is ill-appearing.   HENT:      Head: Normocephalic and atraumatic.   Cardiovascular:      Rate and Rhythm: Normal rate and regular rhythm.      Heart sounds: No murmur heard.  Pulmonary:      Effort: Pulmonary effort is normal. No respiratory distress.      Breath sounds: Normal breath sounds. No wheezing or rales.   Abdominal:      General: There is no distension.      Palpations: Abdomen is soft.      Tenderness: There is no abdominal tenderness.   Musculoskeletal:         General: No deformity.   Skin:     General: Skin is warm and dry.      Coloration: Skin is pale.   Neurological:      General: No focal deficit present.      Comments: Able to have a conversation, oriented to person         MELD 3.0: 10 at 5/23/2025  6:10 AM  MELD-Na: 8 at 5/23/2025  6:10 AM  Calculated from:  Serum Creatinine: 1.2 mg/dL at 5/23/2025  6:10 AM  Serum Sodium: 135 mmol/L at 5/23/2025  6:10 AM  Total Bilirubin: 0.4 mg/dL (Using min of 1 mg/dL) at 5/21/2025  4:13 AM  Serum Albumin: 3.1 g/dL at 5/21/2025  4:13 AM  INR(ratio): 1 at 5/22/2025  5:46 AM  Age at listing (hypothetical): 79 years  Sex: Male at 5/23/2025  6:10 AM      Significant Labs:  CBC:  Recent Labs   Lab 05/24/25  0641  05/25/25  0710   WBC 7.78 7.29   HGB 12.1* 12.1*   HCT 36.4* 36.9*    153     CMP:  Recent Labs   Lab 05/24/25  0642 05/25/25  0710    139   K 4.0 3.9    102   CO2 29 28   GLU 97 129*   BUN 27* 37*   CREATININE 1.2 1.7*   CALCIUM 9.8 10.0   ANIONGAP 9 9     PTINR:  Recent Labs   Lab 05/24/25  0642 05/25/25  0710   INR 1.1 1.0       Significant Procedures:   Dobutamine Stress Test with Color Flow: No results found for this or any previous visit.    None

## 2025-05-25 NOTE — ASSESSMENT & PLAN NOTE
"Per CT scan "thickening of the distal ileum to the surgical anastomosis with the large bowel. Inflammatory infectious ileitis are considerations. Recommend clinical correlation and follow-up. "  CRP negative. No concern for flare.   GI without concerns of confusion being caused by sterlara, confirmed with pharmacy   On 5/19 developed new onset dark stools  Has been on Lovenox, IV Solumedrol and PO PPI  Pantoprazole 80mg IV x 1 then PPI 40mg IV BID  GI consulted  Repeat H/H at 2pm  Will transfuse if Hgb is <7g/dl (<8g/dl in cases of active ACS) or if patient has rapid bleeding leading to hemodynamic instability  IBD team recommending CTE vs MRE when patient able to take po and improving  Will need re-staging colonoscopy, likely outpatient     Recent Labs     05/23/25  0610 05/24/25  0641 05/24/25  0642 05/25/25  0710   HGB 11.9* 12.1*  --  12.1*   HCT 35.2* 36.4*  --  36.9*    157  --  153   INR 1.1  --  1.1 1.0   CRP negative. No concern for flare.   GI without concerns of confusion being caused by sterlara, confirmed with pharmacy   On 5/19 developed new onset dark stools  Has been on Lovenox, IV Solumedrol and PO PPI  Pantoprazole 80mg IV x 1 then PPI 40mg IV BID  GI consulted  Repeat H/H at 2pm  Will transfuse if Hgb is <7g/dl (<8g/dl in cases of active ACS) or if patient has rapid bleeding leading to hemodynamic instability    Recent Labs     05/23/25  0610 05/24/25  0641 05/24/25  0642 05/25/25  0710   HGB 11.9* 12.1*  --  12.1*   HCT 35.2* 36.4*  --  36.9*    157  --  153   INR 1.1  --  1.1 1.0     Recent Labs     05/23/25  0610 05/24/25  0641 05/24/25  0642 05/25/25  0710   HGB 11.9* 12.1*  --  12.1*   HCT 35.2* 36.4*  --  36.9*    157  --  153   INR 1.1  --  1.1 1.0   CRP negative. No concern for flare.   GI without concerns of confusion being caused by sterlara, confirmed with pharmacy   On 5/19 developed new onset dark stools  Has been on Lovenox, IV Solumedrol and PO PPI  Pantoprazole " 80mg IV x 1 then PPI 40mg IV BID  GI consulted  Repeat H/H at 2pm  Will transfuse if Hgb is <7g/dl (<8g/dl in cases of active ACS) or if patient has rapid bleeding leading to hemodynamic instability  IBD team recommending CTE vs MRE when patient able to take po and improving  Will need re-staging colonoscopy, likely outpatient     Recent Labs     05/23/25  0610 05/24/25  0641 05/24/25  0642 05/25/25  0710   HGB 11.9* 12.1*  --  12.1*   HCT 35.2* 36.4*  --  36.9*    157  --  153   INR 1.1  --  1.1 1.0     Recent Labs     05/23/25  0610 05/24/25  0641 05/24/25  0642 05/25/25  0710   HGB 11.9* 12.1*  --  12.1*   HCT 35.2* 36.4*  --  36.9*    157  --  153   INR 1.1  --  1.1 1.0   On 5/19 developed new onset dark stools  Has been on Lovenox, IV Solumedrol and PO PPI  Pantoprazole 80mg IV x 1 then PPI 40mg IV BID  GI consulted  Repeat H/H at 2pm  Will transfuse if Hgb is <7g/dl (<8g/dl in cases of active ACS) or if patient has rapid bleeding leading to hemodynamic instability    Recent Labs     05/23/25  0610 05/24/25  0641 05/24/25  0642 05/25/25  0710   HGB 11.9* 12.1*  --  12.1*   HCT 35.2* 36.4*  --  36.9*    157  --  153   INR 1.1  --  1.1 1.0

## 2025-05-25 NOTE — PROGRESS NOTES
CONSULTATION LIAISON PSYCHIATRY PROGRESS NOTE    Patient Name: Vince Huffman  MRN: [id number]  Patient Class: IP- Inpatient  Admission Date: 4/30/2025  Attending Physician: Juan F Ramírez MD      SUBJECTIVE:   Vince Huffman is a 79 y.o. male with no past psychiatric history. Past pertinent medical history of Chrohn's disease, SBO, Hypothyroidism, and CAD presents to the ED/admitted to the hospital for generalized weakness and AMS/ worsening cognitive function.     Psychiatry consulted for complex bereavement vs catatonia     Interval History: Collaborative decision making with hospital medicine, palliative medicine, and patient's family to change patient's code status to DNR and transition to comfort care with plan for discharge to nursing home with hospice. Received PRN Alprazolam on 5/24 @ 0228 for anxiety.    On psychiatric exam this morning, patient was observed tucked in underneath the sheets sleeping and without any connection to monitors. Patient in NAD and observed to be alternating between shallow breaths and periods of apnea. Difficult to rouse despite verbal and tactile stimuli. Subsequently spoke with patient's bedside nurse who reports patient refused PM and AM medications today. He was just awake 5 minutes prior to my evaluation as nurse states she changed his sheets/helped him be more comfortable. Has not been eating today. Requested nursing staff to help rouse the patient. With further gentle tactile and verbal stimuli, patient briefly opened his eyes. Unable to follow commands to squeeze hands; both extremities significantly cold. Per nurse, tremors in upper extremities much improved this morning. Nurse denies any periods of aggression or confusion. No notable rigidity to his bilateral extremities on passive range of motion this morning.     OBJECTIVE    Vital Signs:  Temp:  [97.6 °F (36.4 °C)-100.1 °F (37.8 °C)]   Pulse:  [107-120]   Resp:  [16-21]   BP: (160-176)/(81-99)   SpO2:  [93 %-96 %]      Mental Status Exam:  General Appearance: appears stated age,dressed in hospital garb, thin, no acute distress, ill-appearing, pale  Behavior: somnolent with mouth open, alternating between shallow breaths and periods of apnea.  Involuntary Movements and Motor Activity: mild tremor to the bilateral upper extremities on passive range of movement   Gait and Station: unable to assess - patient lying down or seated  Speech and Language: MARIA DEL ROSARIO, somnolent   Mood: MARIA DEL ROSARIO, somnolent   Affect: MARIA DEL ROSARIO   Thought Process and Associations: MARIA DEL ROSARIO   Perceptual Disturbances: MARIA DEL ROSARIO  Thought Content and Perceptions:: MARIA DEL ROSARIO  Sensorium and Orientation: impaired, oriented to person only, briefly opens eyes to name   Recent and Remote Memory: impaired  Attention and Concentration: impaired  Fund of Knowledge: MARIA DEL ROSARIO  Insight: impaired  Judgment: intact, behavior is adequate/appropriate to the circumstances, compliant with health provider's recommendations and instructions    CAM ICU positive? 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   Ritalin 5 mg qd discontinued on 5/23 following 2 doses, per psychiatry     PRN Medication(s):   depakote  mg PRN for acute agitation     Other Recommendations (labs, imaging, further consults, etc.):  None       Delirium Behavior Management  PLEASE utilize PRN meds first for agitation. Minimize use of PHYSICAL restraints OR have periods of being out of physical restraints if possible.  Keep window shades  open and room lit during day and room dim at night in order to promote normal sleep-wake cycles  Encourage family at bedside. [place] patient often to situation, location, date.  Continue to Limit or Discontinue use of Narcotics, Benzos and Anti-cholinergic medications as they may worsen delirium.  Continue medical workup for causative etiology of Delirium.     Risk Assessment / Legal Status  No indications for PEC at this time due to the fact that he is not a danger to himself or others and isn't disabled due to psychiatric illness.     Follow-up:  Psychiatry to sign off.     Disposition:   Defer to primary team.    Please contact ON CALL psychiatry service (24/7) for any acute issues that may arise.    Idalmis Cruz MD   [facility], PGY-2       --------------------------------------------------------------------------------------------------------------------------------------------------------------------------------------------------------------------------------------    CONTINUED OBJECTIVE clinical data & findings reviewed and noted for above decision making    Current Medications:   Scheduled Meds:    carbidopa-levodopa  mg  2 tablet Oral TID    cyanocobalamin  1,000 mcg Oral Daily    enoxparin  40 mg Subcutaneous Q24H (prophylaxis, 1700)    mirtazapine  15 mg Oral QHS    NIFEdipine  60 mg Oral Daily    pantoprazole  40 mg Intravenous BID    polyethylene glycol  17 g Oral BID    thiamine  100 mg Oral Daily     PRN Meds:   Current Facility-Administered Medications:     acetaminophen, 1,000 mg, Oral, Q8H PRN    acetaminophen, 650 mg, Oral, Q4H PRN    ALPRAZolam, 0.5 mg, Oral, BID PRN    dextrose 50%, 12.5 g, Intravenous, PRN    dextrose 50%, 25 g, Intravenous, PRN    glucagon (human recombinant), 1 mg, Intramuscular, PRN    glucose, 16 g, Oral, PRN    glucose, 24 g, Oral, PRN    insulin aspart U-100, 0-5 Units, Subcutaneous, QID (AC + HS) PRN    lorazepam, 2 mg, Intravenous, On  Call Procedure    melatonin, 6 mg, Oral, Nightly PRN    naloxone, 0.02 mg, Intravenous, PRN    ondansetron, 8 mg, Oral, Q8H PRN    prochlorperazine, 5 mg, Intravenous, Q6H PRN    sodium chloride 0.9%, 10 mL, Intravenous, Q12H PRN    sodium chloride 0.9%, 10 mL, Intravenous, PRN    Allergies:   Review of patient's allergies indicates:   Allergen Reactions    Gluten Diarrhea    Lactose        Vitals  Vitals:    05/24/25 1916   BP: (!) 168/96   Pulse: (!) 117   Resp: 18   Temp: 100.1 °F (37.8 °C)       Labs/Imaging/Studies:  Recent Results (from the past 24 hours)   CBC with Differential    Collection Time: 05/24/25  6:41 AM   Result Value Ref Range    WBC 7.78 3.90 - 12.70 K/uL    RBC 3.86 (L) 4.60 - 6.20 M/uL    HGB 12.1 (L) 14.0 - 18.0 gm/dL    HCT 36.4 (L) 40.0 - 54.0 %    MCV 94 82 - 98 fL    MCH 31.3 (H) 27.0 - 31.0 pg    MCHC 33.2 32.0 - 36.0 g/dL    RDW 11.9 11.5 - 14.5 %    Platelet Count 157 150 - 450 K/uL    MPV 11.6 9.2 - 12.9 fL    Nucleated RBC 0 <=0 /100 WBC    Neut % 89.9 (H) 38 - 73 %    Lymph % 4.0 (L) 18 - 48 %    Mono % 5.0 4 - 15 %    Eos % 0.0 <=8 %    Basophil % 0.1 <=1.9 %    Imm Grans % 1.0 (H) 0.0 - 0.5 %    Neut # 6.99 1.8 - 7.7 K/uL    Lymph # 0.31 (L) 1 - 4.8 K/uL    Mono # 0.39 0.3 - 1 K/uL    Eos # 0.00 <=0.5 K/uL    Baso # 0.01 <=0.2 K/uL    Imm Grans # 0.08 (H) 0.00 - 0.04 K/uL   Magnesium    Collection Time: 05/24/25  6:42 AM   Result Value Ref Range    Magnesium  1.8 1.6 - 2.6 mg/dL   Protime-INR    Collection Time: 05/24/25  6:42 AM   Result Value Ref Range    PT 11.6 9.0 - 12.5 seconds    INR 1.1 0.8 - 1.2   Basic metabolic panel    Collection Time: 05/24/25  6:42 AM   Result Value Ref Range    Sodium 139 136 - 145 mmol/L    Potassium 4.0 3.5 - 5.1 mmol/L    Chloride 101 95 - 110 mmol/L    CO2 29 23 - 29 mmol/L    Glucose 97 70 - 110 mg/dL    BUN 27 (H) 8 - 23 mg/dL    Creatinine 1.2 0.5 - 1.4 mg/dL    Calcium 9.8 8.7 - 10.5 mg/dL    Anion Gap 9 8 - 16 mmol/L    eGFR >60 >60  mL/min/1.73/m2   POCT glucose    Collection Time: 05/24/25  8:16 AM   Result Value Ref Range    POCT Glucose 116 (H) 70 - 110 mg/dL   Urinalysis, Reflex to Urine Culture Urine, Clean Catch    Collection Time: 05/24/25  9:21 AM    Specimen: Urine   Result Value Ref Range    Color, UA Yellow Straw, Sandra, Yellow, Light-Orange    Appearance, UA Clear Clear    pH, UA 6.0 5.0 - 8.0    Spec Grav UA 1.025 1.005 - 1.030    Protein, UA 1+ (A) Negative    Glucose, UA Negative Negative    Ketones, UA 1+ (A) Negative    Bilirubin, UA Negative Negative    Blood, UA 1+ (A) Negative    Nitrites, UA Negative Negative    Urobilinogen, UA 2.0-3.0 (A) <2.0 EU/dL    Leukocyte Esterase, UA Negative Negative   GREY TOP URINE HOLD    Collection Time: 05/24/25  9:21 AM   Result Value Ref Range    Extra Tube Hold for add-ons.    Urinalysis Microscopic    Collection Time: 05/24/25  9:21 AM   Result Value Ref Range    RBC, UA 3 0 - 4 /HPF    WBC, UA 2 0 - 5 /HPF    Bacteria, UA Rare None, Rare, Occasional /HPF    Hyaline Casts, UA 0 0 - 1 /LPF    Microscopic Comment     POCT glucose    Collection Time: 05/24/25 12:35 PM   Result Value Ref Range    POCT Glucose 125 (H) 70 - 110 mg/dL   POCT glucose    Collection Time: 05/24/25  5:26 PM   Result Value Ref Range    POCT Glucose 124 (H) 70 - 110 mg/dL   POCT glucose    Collection Time: 05/24/25  8:00 PM   Result Value Ref Range    POCT Glucose 121 (H) 70 - 110 mg/dL     Imaging Results               CT Abdomen Pelvis With IV Contrast NO Oral Contrast (Final result)  Result time 04/30/25 15:46:15      Final result by Russell Ba MD (04/30/25 15:46:15)                   Impression:      1. Wall thickening of the distal ileum to the surgical anastomosis with the large bowel.  Inflammatory infectious ileitis are considerations.  Recommend clinical correlation and follow-up.  2. Constipation.  There is fecal distention of the rectum.  No fecal impaction is not excluded.  3. Bilateral renal cysts  and hepatic cysts.  4. Nonobstructing nephrolithiasis of the right kidney.  5. Nondistention of the urinary bladder.  Mild wall thickening is not excluded.  6. Diverticulosis of the sigmoid colon.  No evidence of acute diverticulitis.  7. This report was flagged in Epic as abnormal.      Electronically signed by: Russell Humphries  Date:    04/30/2025  Time:    15:46               Narrative:    EXAMINATION:  CT ABDOMEN PELVIS WITH IV CONTRAST    CLINICAL HISTORY:  LLQ abdominal pain;RLQ abdominal pain (Age >= 14y);    TECHNIQUE:  Low dose axial images, sagittal and coronal reformations were obtained from the lung bases to the pubic symphysis following the IV administration of 75 mL of Omnipaque 350 .  Oral contrast was not administered.    COMPARISON:  11/05/2018    FINDINGS:  Abdomen:    - Lower thorax:    - Lung bases: No infiltrates and no nodules.    - Liver: Multiple small hepatic cysts.    - Gallbladder: No calcified gallstones.    - Bile Ducts: No evidence of intra or extra hepatic biliary ductal dilation.    - Spleen: Negative.    - Kidneys: Multiple bilateral simple renal cysts.  Single nonobstructing stone in the right kidney.  No hydronephrosis or hydroureter bilaterally.    - Adrenals: Unremarkable.    - Pancreas: No mass or peripancreatic fat stranding.    - Retroperitoneum:  No significant adenopathy.    - Vascular: Mild ectasia of the distal aorta with no evidence of aneurysm.    - Abdominal wall:  Unremarkable.    Pelvis:    Urinary bladder is incompletely distended with possible mild wall thickening.    Postop changes at the ileocecal junction.  There is wall thickening noted to the distal ileum to the surgical anastomosis.  Inflammatory or infectious ileitis are considerations.  Follow-up recommended.    Bowel/Mesentery:    No evidence of bowel obstruction.  Moderate retained feces in the colon.    Fecal distention of the rectum.  Impaction is not excluded.    Mild diverticulosis of the sigmoid colon.   No evidence of acute diverticulitis.    Bones:  No acute osseous abnormality and no suspicious lytic or blastic lesion.                                       CT Head Without Contrast (Final result)  Result time 04/30/25 14:49:11      Final result by Ish Carrillo MD (04/30/25 14:49:11)                   Impression:      No evidence for acute intracranial pathology.      Electronically signed by: Ish Carrillo  Date:    04/30/2025  Time:    14:49               Narrative:    EXAMINATION:  CT HEAD WITHOUT CONTRAST    CLINICAL HISTORY:  Mental status change, unknown cause;    TECHNIQUE:  Low dose axial images were obtained through the head.  Coronal and sagittal reformations were also performed. Contrast was not administered.    COMPARISON:  MRI brain without contrast 04/24/2025, CT head without contrast 04/24/2025.    FINDINGS:  Ventricles are stable in size without evidence for new or worsening hydrocephalus.  No new or enlarging extra-axial blood or fluid collections.    Brain parenchyma appears unchanged.  Scattered supratentorial hypoattenuation, overall nonspecific, but can be seen in setting of microvascular ischemic change.  No parenchymal mass, edema, hemorrhage, or acute major vascular territory infarct.    No calvarial or skull base fracture or osseous destructive lesion.  Paranasal sinuses and mastoid air cells are essentially clear.                                       X-Ray Chest AP Portable (Final result)  Result time 04/30/25 12:49:39      Final result by Matti Cote MD (04/30/25 12:49:39)                   Impression:      See above      Electronically signed by: Matti Cote MD  Date:    04/30/2025  Time:    12:49               Narrative:    EXAMINATION:  XR CHEST AP PORTABLE    CLINICAL HISTORY:  Altered mental status, unspecified    TECHNIQUE:  Single frontal view of the chest was performed.    COMPARISON:  No 04/23/2025 ne    FINDINGS:  Heart size normal.  The tracheal slightly deviates to the  right at the thoracic inlet probably related to enlarged thyroid gland.  Minimal subsegmental atelectatic changes noted at the right lung base.  Lungs are otherwise clear.  No pleural effusion.

## 2025-05-25 NOTE — ASSESSMENT & PLAN NOTE
"Per CT scan "thickening of the distal ileum to the surgical anastomosis with the large bowel. Inflammatory infectious ileitis are considerations. Recommend clinical correlation and follow-up. "  CRP negative. No concern for flare.   GI without concerns of confusion being caused by sterlara, confirmed with pharmacy   On 5/19 developed new onset dark stools  Has been on Lovenox, IV Solumedrol and PO PPI  Pantoprazole 80mg IV x 1 then PPI 40mg IV BID  GI consulted  Repeat H/H at 2pm  Will transfuse if Hgb is <7g/dl (<8g/dl in cases of active ACS) or if patient has rapid bleeding leading to hemodynamic instability  IBD team recommending CTE vs MRE when patient able to take po and improving  Will need re-staging colonoscopy, likely outpatient     Recent Labs     05/23/25  0610 05/24/25  0641 05/24/25  0642 05/25/25  0710   HGB 11.9* 12.1*  --  12.1*   HCT 35.2* 36.4*  --  36.9*    157  --  153   INR 1.1  --  1.1 1.0   CRP negative. No concern for flare.   GI without concerns of confusion being caused by sterlara, confirmed with pharmacy   On 5/19 developed new onset dark stools  Has been on Lovenox, IV Solumedrol and PO PPI  Pantoprazole 80mg IV x 1 then PPI 40mg IV BID  GI consulted  Repeat H/H at 2pm  Will transfuse if Hgb is <7g/dl (<8g/dl in cases of active ACS) or if patient has rapid bleeding leading to hemodynamic instability    Recent Labs     05/23/25  0610 05/24/25  0641 05/24/25  0642 05/25/25  0710   HGB 11.9* 12.1*  --  12.1*   HCT 35.2* 36.4*  --  36.9*    157  --  153   INR 1.1  --  1.1 1.0     Recent Labs     05/23/25  0610 05/24/25  0641 05/24/25  0642 05/25/25  0710   HGB 11.9* 12.1*  --  12.1*   HCT 35.2* 36.4*  --  36.9*    157  --  153   INR 1.1  --  1.1 1.0   CRP negative. No concern for flare.   GI without concerns of confusion being caused by sterlara, confirmed with pharmacy   On 5/19 developed new onset dark stools  Has been on Lovenox, IV Solumedrol and PO PPI  Pantoprazole " 80mg IV x 1 then PPI 40mg IV BID  GI consulted  Repeat H/H at 2pm  Will transfuse if Hgb is <7g/dl (<8g/dl in cases of active ACS) or if patient has rapid bleeding leading to hemodynamic instability  IBD team recommending CTE vs MRE when patient able to take po and improving  Will need re-staging colonoscopy, likely outpatient     Recent Labs     05/23/25  0610 05/24/25  0641 05/24/25  0642 05/25/25  0710   HGB 11.9* 12.1*  --  12.1*   HCT 35.2* 36.4*  --  36.9*    157  --  153   INR 1.1  --  1.1 1.0     Recent Labs     05/23/25  0610 05/24/25  0641 05/24/25  0642 05/25/25  0710   HGB 11.9* 12.1*  --  12.1*   HCT 35.2* 36.4*  --  36.9*    157  --  153   INR 1.1  --  1.1 1.0   On 5/19 developed new onset dark stools  Has been on Lovenox, IV Solumedrol and PO PPI  Pantoprazole 80mg IV x 1 then PPI 40mg IV BID  GI consulted  Repeat H/H at 2pm  Will transfuse if Hgb is <7g/dl (<8g/dl in cases of active ACS) or if patient has rapid bleeding leading to hemodynamic instability    Recent Labs     05/23/25  0610 05/24/25  0641 05/24/25  0642 05/25/25  0710   HGB 11.9* 12.1*  --  12.1*   HCT 35.2* 36.4*  --  36.9*    157  --  153   INR 1.1  --  1.1 1.0      [Negative] : Genitourinary

## 2025-05-25 NOTE — ASSESSMENT & PLAN NOTE
Hyponatremia is likely due to Dehydration/hypovolemia. The patient's most recent sodium results are listed below.  Recent Labs     05/23/25  0610 05/24/25  0642 05/25/25  0710   * 139 139     Maxime  - Monitor sodium Daily.   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 Most recent hemoglobin and hematocrit are listed below.  Recent Labs     05/23/25  0610 05/24/25  0641 05/25/25  0710   HGB 11.9* 12.1* 12.1*   HCT 35.2* 36.4* 36.9*     Plan  - Monitor serial CBC: Daily  - Transfuse PRBC if patient becomes hemodynamically unstable, symptomatic or H/H drops below 7/21.  - Patient has not received any PRBC transfusions to date  - Patient's anemia is currently stable  -

## 2025-05-25 NOTE — ASSESSMENT & PLAN NOTE
Anemia is likely due to acute on chronic illness. Most recent hemoglobin and hematocrit are listed below.  Recent Labs     05/23/25  0610 05/24/25  0641 05/25/25  0710   HGB 11.9* 12.1* 12.1*   HCT 35.2* 36.4* 36.9*     Plan  - Monitor serial CBC: Daily  - Transfuse PRBC if patient becomes hemodynamically unstable, symptomatic or H/H drops below 7/21.  - Patient has not received any PRBC transfusions to date  - Patient's anemia is currently stable  - Continue PPI with high dose steroids per neurology

## 2025-05-25 NOTE — ASSESSMENT & PLAN NOTE
Patient's blood pressure range in the last 24 hours was: BP  Min: 129/90  Max: 174/98.The patient's inpatient anti-hypertensive regimen is listed below:  Current Antihypertensives  NIFEdipine 24 hr tablet 60 mg, Daily, Oral    Plan  - BP is controlled, no changes needed to their regimen  Changed from Norvasc to Procardia given Psych recs

## 2025-05-25 NOTE — PLAN OF CARE
Problem: Adult Inpatient Plan of Care  Goal: Plan of Care Review  Outcome: Progressing  Goal: Patient-Specific Goal (Individualized)  Outcome: Progressing  Goal: Absence of Hospital-Acquired Illness or Injury  Outcome: Progressing  Goal: Optimal Comfort and Wellbeing  Outcome: Progressing  Goal: Readiness for Transition of Care  Outcome: Progressing     Problem: Infection  Goal: Absence of Infection Signs and Symptoms  Outcome: Progressing    Patient alert and oriented to self. VSS. Room air. Q2 turns. Patient more awake and involved today. Fluids and meals encouraged; feeding assist; low intake. Complaint of headache; PRN given x 1. Bed alarm set. Bed low and locked position.

## 2025-05-26 LAB
ABSOLUTE EOSINOPHIL (OHS): 0.02 K/UL
ABSOLUTE MONOCYTE (OHS): 0.82 K/UL (ref 0.3–1)
ABSOLUTE NEUTROPHIL COUNT (OHS): 7.49 K/UL (ref 1.8–7.7)
ANION GAP (OHS): 14 MMOL/L (ref 8–16)
BASOPHILS # BLD AUTO: 0.02 K/UL
BASOPHILS NFR BLD AUTO: 0.2 %
BUN SERPL-MCNC: 46 MG/DL (ref 8–23)
CALCIUM SERPL-MCNC: 10.2 MG/DL (ref 8.7–10.5)
CHLORIDE SERPL-SCNC: 99 MMOL/L (ref 95–110)
CO2 SERPL-SCNC: 22 MMOL/L (ref 23–29)
CREAT SERPL-MCNC: 1.9 MG/DL (ref 0.5–1.4)
ERYTHROCYTE [DISTWIDTH] IN BLOOD BY AUTOMATED COUNT: 11.9 % (ref 11.5–14.5)
GFR SERPLBLD CREATININE-BSD FMLA CKD-EPI: 36 ML/MIN/1.73/M2
GLUCOSE SERPL-MCNC: 106 MG/DL (ref 70–110)
HCT VFR BLD AUTO: 43.2 % (ref 40–54)
HGB BLD-MCNC: 14.4 GM/DL (ref 14–18)
IMM GRANULOCYTES # BLD AUTO: 0.06 K/UL (ref 0–0.04)
IMM GRANULOCYTES NFR BLD AUTO: 0.6 % (ref 0–0.5)
INR PPP: 1 (ref 0.8–1.2)
LYMPHOCYTES # BLD AUTO: 0.87 K/UL (ref 1–4.8)
MAGNESIUM SERPL-MCNC: 2 MG/DL (ref 1.6–2.6)
MCH RBC QN AUTO: 31.4 PG (ref 27–31)
MCHC RBC AUTO-ENTMCNC: 33.3 G/DL (ref 32–36)
MCV RBC AUTO: 94 FL (ref 82–98)
NUCLEATED RBC (/100WBC) (OHS): 0 /100 WBC
PLATELET # BLD AUTO: 123 K/UL (ref 150–450)
PMV BLD AUTO: 12.2 FL (ref 9.2–12.9)
POCT GLUCOSE: 124 MG/DL (ref 70–110)
POCT GLUCOSE: 127 MG/DL (ref 70–110)
POCT GLUCOSE: 128 MG/DL (ref 70–110)
POCT GLUCOSE: 136 MG/DL (ref 70–110)
POTASSIUM SERPL-SCNC: 4.4 MMOL/L (ref 3.5–5.1)
PROTHROMBIN TIME: 10.6 SECONDS (ref 9–12.5)
RBC # BLD AUTO: 4.58 M/UL (ref 4.6–6.2)
RELATIVE EOSINOPHIL (OHS): 0.2 %
RELATIVE LYMPHOCYTE (OHS): 9.4 % (ref 18–48)
RELATIVE MONOCYTE (OHS): 8.8 % (ref 4–15)
RELATIVE NEUTROPHIL (OHS): 80.8 % (ref 38–73)
SODIUM SERPL-SCNC: 135 MMOL/L (ref 136–145)
WBC # BLD AUTO: 9.28 K/UL (ref 3.9–12.7)

## 2025-05-26 PROCEDURE — 97535 SELF CARE MNGMENT TRAINING: CPT | Mod: CO

## 2025-05-26 PROCEDURE — 25000003 PHARM REV CODE 250

## 2025-05-26 PROCEDURE — 97530 THERAPEUTIC ACTIVITIES: CPT | Mod: CO

## 2025-05-26 PROCEDURE — 82310 ASSAY OF CALCIUM: CPT | Performed by: STUDENT IN AN ORGANIZED HEALTH CARE EDUCATION/TRAINING PROGRAM

## 2025-05-26 PROCEDURE — 83735 ASSAY OF MAGNESIUM: CPT | Performed by: STUDENT IN AN ORGANIZED HEALTH CARE EDUCATION/TRAINING PROGRAM

## 2025-05-26 PROCEDURE — 25000003 PHARM REV CODE 250: Performed by: STUDENT IN AN ORGANIZED HEALTH CARE EDUCATION/TRAINING PROGRAM

## 2025-05-26 PROCEDURE — 25000003 PHARM REV CODE 250: Performed by: HOSPITALIST

## 2025-05-26 PROCEDURE — 97112 NEUROMUSCULAR REEDUCATION: CPT | Mod: CQ

## 2025-05-26 PROCEDURE — 63600175 PHARM REV CODE 636 W HCPCS: Performed by: INTERNAL MEDICINE

## 2025-05-26 PROCEDURE — 85025 COMPLETE CBC W/AUTO DIFF WBC: CPT | Performed by: STUDENT IN AN ORGANIZED HEALTH CARE EDUCATION/TRAINING PROGRAM

## 2025-05-26 PROCEDURE — 36415 COLL VENOUS BLD VENIPUNCTURE: CPT | Performed by: STUDENT IN AN ORGANIZED HEALTH CARE EDUCATION/TRAINING PROGRAM

## 2025-05-26 PROCEDURE — 85610 PROTHROMBIN TIME: CPT | Performed by: STUDENT IN AN ORGANIZED HEALTH CARE EDUCATION/TRAINING PROGRAM

## 2025-05-26 PROCEDURE — 99233 SBSQ HOSP IP/OBS HIGH 50: CPT | Mod: 25,,,

## 2025-05-26 PROCEDURE — 99497 ADVNCD CARE PLAN 30 MIN: CPT | Mod: 25,,,

## 2025-05-26 PROCEDURE — 20600001 HC STEP DOWN PRIVATE ROOM

## 2025-05-26 PROCEDURE — 63600175 PHARM REV CODE 636 W HCPCS: Performed by: HOSPITALIST

## 2025-05-26 RX ORDER — ENOXAPARIN SODIUM 100 MG/ML
30 INJECTION SUBCUTANEOUS EVERY 24 HOURS
Status: DISCONTINUED | OUTPATIENT
Start: 2025-05-26 | End: 2025-05-27

## 2025-05-26 RX ADMIN — ENOXAPARIN SODIUM 30 MG: 30 INJECTION SUBCUTANEOUS at 05:05

## 2025-05-26 RX ADMIN — CYANOCOBALAMIN TAB 1000 MCG 1000 MCG: 1000 TAB at 09:05

## 2025-05-26 RX ADMIN — CARBIDOPA AND LEVODOPA 2 TABLET: 25; 100 TABLET ORAL at 09:05

## 2025-05-26 RX ADMIN — Medication 100 MG: at 09:05

## 2025-05-26 RX ADMIN — PANTOPRAZOLE SODIUM 40 MG: 40 INJECTION, POWDER, FOR SOLUTION INTRAVENOUS at 09:05

## 2025-05-26 RX ADMIN — MIRTAZAPINE 15 MG: 15 TABLET, FILM COATED ORAL at 09:05

## 2025-05-26 RX ADMIN — POLYETHYLENE GLYCOL 3350 17 G: 17 POWDER, FOR SOLUTION ORAL at 09:05

## 2025-05-26 RX ADMIN — NIFEDIPINE 60 MG: 30 TABLET, FILM COATED, EXTENDED RELEASE ORAL at 09:05

## 2025-05-26 NOTE — PLAN OF CARE
Recommendations     1.) Recommend continuing with Soft and Bite-Sized diet as tolerated, gently encouraging PO intake.                  - RN staff: please continue to document PO intake in the flowsheets      2.) Continue with Boost Breeze TID to help meet needs.      3.) Please obtain new weight: last wt taken on 5/6.      4.) Consider an appetite stimulant.     5.) RD to monitor wt, PO intake, skin, labs.       Goals: Meet % of EEN/EPN by next RD follow-up  Nutrition Goal Status: progressing towards goal  Communication of RD Recs:  (POC)     Nutrition Discharge Planning     Nutrition Discharge Planning: General healthy diet, Oral supplement regimen (comments), Oral diet with texture modifications per SLP (comments)  Oral supplement regimen (comments): ONS of choice  Oral diet with texture modifications per SLP (comments): Soft and Bite-sized

## 2025-05-26 NOTE — TELEPHONE ENCOUNTER
Message sent to Dr. Blanton about Stelara. They should notify our office when he is getting discharged from the hospital.   MLC

## 2025-05-26 NOTE — PROGRESS NOTES
[facility] - Acute Medical Stepdown  Adult Nutrition  Progress Note    SUMMARY       Recommendations    1.) Recommend continuing with Soft and Bite-Sized diet as tolerated, gently encouraging PO intake.      - RN staff: please continue to document PO intake in the flowsheets     2.) Continue with Boost Breeze TID to help meet needs.     3.) Please obtain new weight: last wt taken on 5/6.     4.) Consider an appetite stimulant.    5.) RD to monitor wt, PO intake, skin, labs.      Goals: Meet % of EEN/EPN by next RD follow-up  Nutrition Goal Status: progressing towards goal  Communication of RD Recs:  (POC)    Nutrition Discharge Planning    Nutrition Discharge Planning: General healthy diet, Oral supplement regimen (comments), Oral diet with texture modifications per SLP (comments)  Oral supplement regimen (comments): ONS of choice  Oral diet with texture modifications per SLP (comments): Soft and Bite-sized    Assessment and Plan    Endocrine  Severe protein-calorie malnutrition  Malnutrition Type:  Context: chronic illness  Level: severe    Related to (etiology):   Inadequate protein-energy intake    Signs and Symptoms (as evidenced by):   Moderate to severe muscle/fat wasting and poor PO intake < 50% x 1 month     Malnutrition Characteristic Summary:  Energy Intake (Malnutrition): less than or equal to 50% for greater than or equal to 1 month  Subcutaneous Fat (Malnutrition):  (moderate to severe)  Muscle Mass (Malnutrition):  (moderate to severe)    Interventions/Recommendations (treatment strategy):  Collaboration of nutritional care with other providers.      Nutrition Diagnosis Status:   New       Malnutrition Assessment  Malnutrition Context: chronic illness  Malnutrition Level: severe  Skin (Micronutrient): none  Nails (Micronutrient): none  Hair/Scalp (Micronutrient): none  Eyes (Micronutrient): none  Extraoral (Micronutrient): none  Gums (Micronutrient): none  Lips/Mucous Membranes (Micronutrient):  none  Teeth (Micronutrient): none  Tongue (Micronutrient): none  Neck/Chest (Micronutrient): none  Musculoskeletal/Lower Extremities: none   Micronutrient Evaluation Summary: no deficiencies   Energy Intake (Malnutrition): less than or equal to 50% for greater than or equal to 1 month  Subcutaneous Fat (Malnutrition):  (moderate to severe)  Muscle Mass (Malnutrition):  (moderate to severe)   Orbital Region (Subcutaneous Fat Loss): severe depletion  Upper Arm Region (Subcutaneous Fat Loss): severe depletion  Thoracic and Lumbar Region: moderate depletion   [place] Region (Muscle Loss): severe depletion  Clavicle Bone Region (Muscle Loss): severe depletion  Clavicle and Acromion Bone Region (Muscle Loss): severe depletion  Scapular Bone Region (Muscle Loss): severe depletion  Dorsal Hand (Muscle Loss): moderate depletion  Patellar Region (Muscle Loss): severe depletion  Anterior Thigh Region (Muscle Loss): severe depletion  Posterior Calf Region (Muscle Loss):  (compressor present)     Reason for Assessment    Reason For Assessment: RD follow-up  Diagnosis: other (see comments) (Encephalopathy)    General Information Comments: RD f/u: Pt continues with poor PO intake < 25%. Pt endorses low appetite. No noted n/v/d/c. Significant wt loss noted at the 3 month marker: -14%. NFPE was performed: RD feels pt meets the criteria for severe malnutrition in the context of chronic illness. Please see PES and Malnutrition assessment for details.     Nutrition Related Social Determinants of Health: SDOH: Adequate food in home environment     Food Insecurity: No Food Insecurity (5/2/2025)    Hunger Vital Sign     Worried About Running Out of Food in the Last Year: Never true     Ran Out of Food in the Last Year: Never true      Nutrition/Diet History    Spiritual, Cultural Beliefs, Yarsani Practices, Values that Affect Care: no  Food Allergies: other (see comments) (gluten/lactose)  Factors Affecting Nutritional Intake: decreased  "appetite    Anthropometrics    Height: 5' 11" (180.3 cm)  Height (inches): 71 in  Height Method: Stated  Weight: 64.1 kg (141 lb 5 oz)  Weight (lb): 141.32 lb  Weight Method: Bed Scale  Ideal Body Weight (IBW), Male: 172 lb  % Ideal Body Weight, Male (lb): 80.75 %  BMI (Calculated): 19.7  BMI Grade: less than 23 (older than 65 years) - underweight    Lab/Procedures/Meds    Pertinent Labs Reviewed: reviewed  Pertinent Labs Comments: Na: 135, BUN: 46, Cr: 1.9, GFR: 36  Pertinent Medications Reviewed: reviewed  Pertinent Medications Comments: VitB12, enoxaparin, mirtazapine, pantoprazole, polyethylene glycol, thiamine    Estimated/Assessed Needs    Weight Used For Calorie Calculations: 64.1 kg (141 lb 5 oz)  Energy Calorie Requirements (kcal): 1602- 1923 kcal  Energy Need Method: Kcal/kg (25-30 kcal/kg)  Protein Requirements: 84g (1.3g/kg)  Weight Used For Protein Calculations: 64.1 kg (141 lb 5 oz)  Fluid Requirements (mL): 1 mL/kcal or per MD  Estimated Fluid Requirement Method: RDA Method  RDA Method (mL): 1602    Nutrition Prescription Ordered    Current Diet Order: Soft and Bite-Sized  Oral Nutrition Supplement: Boost Breeze    Evaluation of Received Nutrient/Fluid Intake    I/O: -2.6 L since 5/18  Energy Calories Required: not meeting needs  Protein Required: not meeting needs  Fluid Required:  (as per MD)  Comments: LBM 5/25  Tolerance: tolerating  % Intake of Estimated Energy Needs: 0 - 25 %  % Meal Intake: 0 - 25 %    PES Statement  Underweight related to Other (comment) (Pt report of recent 24-33 lb weight loss per MST) as evidenced by BMI  Status: Continues    Nutrition Risk    Level of Risk/Frequency of Follow-up: moderate     Monitor and Evaluation    Monitor and Evaluation: Food and beverage intake, Diet order, Weight, Electrolyte and renal panel, Gastrointestinal profile, Glucose/endocrine profile, Inflammatory profile, Lipid profile, Nutrition focused physical findings, Skin     Nutrition " Follow-Up    RD Follow-up?: Yes

## 2025-05-26 NOTE — ASSESSMENT & PLAN NOTE
Patient's blood pressure range in the last 24 hours was: BP  Min: 112/65  Max: 164/91.The patient's inpatient anti-hypertensive regimen is listed below:  Current Antihypertensives  NIFEdipine 24 hr tablet 60 mg, Daily, Oral    Plan  - BP is controlled, no changes needed to their regimen  Changed from Norvasc to Procardia given Psych recs

## 2025-05-26 NOTE — SUBJECTIVE & OBJECTIVE
Interval History: Plan is to dc to NH with hospice, daughter is out of town. Medically ready for dc.    Review of Systems  Objective:     Vital Signs (Most Recent):  Temp: 98.2 °F (36.8 °C) (05/26/25 0415)  Pulse: 105 (05/26/25 0415)  Resp: 18 (05/26/25 0415)  BP: (!) 164/91 (05/26/25 0415)  SpO2: 99 % (05/26/25 0415) Vital Signs (24h Range):  Temp:  [97.5 °F (36.4 °C)-99 °F (37.2 °C)] 98.2 °F (36.8 °C)  Pulse:  [] 105  Resp:  [16-18] 18  SpO2:  [96 %-100 %] 99 %  BP: (112-164)/(65-91) 164/91     Weight: 64.1 kg (141 lb 5 oz)  Body mass index is 19.71 kg/m².    Intake/Output Summary (Last 24 hours) at 5/26/2025 0834  Last data filed at 5/26/2025 0415  Gross per 24 hour   Intake 222 ml   Output 650 ml   Net -428 ml      Physical Exam  Constitutional:       Appearance: He is ill-appearing.   HENT:      Head: Normocephalic and atraumatic.   Cardiovascular:      Rate and Rhythm: Normal rate and regular rhythm.      Heart sounds: No murmur heard.  Pulmonary:      Effort: Pulmonary effort is normal. No respiratory distress.      Breath sounds: Normal breath sounds. No wheezing or rales.   Abdominal:      General: There is no distension.      Palpations: Abdomen is soft.      Tenderness: There is no abdominal tenderness.   Musculoskeletal:         General: No deformity.   Skin:     General: Skin is warm and dry.      Coloration: Skin is pale.   Neurological:      General: No focal deficit present.      Comments: Able to have a conversation, oriented to person         MELD 3.0: 11 at 5/23/2025  6:10 AM  MELD-Na: 9 at 5/23/2025  6:10 AM  Calculated from:  Serum Creatinine: 1.2 mg/dL at 5/23/2025  6:10 AM  Serum Sodium: 135 mmol/L at 5/23/2025  6:10 AM  Total Bilirubin: 0.4 mg/dL (Using min of 1 mg/dL) at 5/21/2025  4:13 AM  Serum Albumin: 3.1 g/dL at 5/21/2025  4:13 AM  INR(ratio): 1.1 at 5/23/2025  6:10 AM  Age at listing (hypothetical): 79 years  Sex: Male at 5/23/2025  6:10 AM      Significant Labs:  CBC:  Recent  Labs   Lab 05/25/25  0710 05/26/25  0513   WBC 7.29 9.28   HGB 12.1* 14.4   HCT 36.9* 43.2    123*     CMP:  Recent Labs   Lab 05/25/25  0710 05/26/25  0513    135*   K 3.9 4.4    99   CO2 28 22*   * 106   BUN 37* 46*   CREATININE 1.7* 1.9*   CALCIUM 10.0 10.2   ANIONGAP 9 14     PTINR:  Recent Labs   Lab 05/25/25  0710 05/26/25  0611   INR 1.0 1.0       Significant Procedures:   Dobutamine Stress Test with Color Flow: No results found for this or any previous visit.    None   0

## 2025-05-26 NOTE — PLAN OF CARE
[facility] - Acute Medical Stepdown  Discharge Reassessment    Primary Care Provider: Leland Porras MD    Expected Discharge Date: 5/28/2025    Reassessment (most recent)       Discharge Reassessment - 05/26/25 1129          Discharge Reassessment    Assessment Type Discharge Planning Reassessment (P)      Did the patient's condition or plan change since previous assessment? No (P)      Communicated ERNIE with patient/caregiver Date not available/Unable to determine (P)      Discharge Plan A Other (P)    Nursing Home w/ Hospice.    Discharge Plan B [facility] Nursing [facility] placement [facility] (P)      Transition of Care Barriers None (P)      Why the patient remains in the hospital Requires continued medical care (P)         Post-Acute Status    Post-Acute Authorization Placement (P)      Post-Acute Placement Status Referrals Sent (P)      Hospice Status Referrals Sent (P)                    [facility] declined. [facility] reviewing. Referral to [facility]. Daughter is on a cruise and another family member will assist with placement. MD monitoring for ELIZABETH.     Discharge Plan A and Plan B have been determined by review of patient's clinical status, future medical and therapeutic needs, and coverage/benefits for post-acute care in coordination with multidisciplinary team members.     Marissa Castillo LMSW

## 2025-05-26 NOTE — ASSESSMENT & PLAN NOTE
Malnutrition Type:  Context: chronic illness  Level: severe    Related to (etiology):   Inadequate protein-energy intake    Signs and Symptoms (as evidenced by):   Moderate to severe muscle/fat wasting and poor PO intake < 50% x 1 month     Malnutrition Characteristic Summary:  Energy Intake (Malnutrition): less than or equal to 50% for greater than or equal to 1 month  Subcutaneous Fat (Malnutrition):  (moderate to severe)  Muscle Mass (Malnutrition):  (moderate to severe)    Interventions/Recommendations (treatment strategy):  Collaboration of nutritional care with other providers.      Nutrition Diagnosis Status:   New

## 2025-05-26 NOTE — PROGRESS NOTES
Pharmacist Renal Dose Adjustment Note    Vince Huffman is a 79 y.o. male being treated with the medication Enoxaparin (Lovenox).    Patient Data:    Vital Signs (Most Recent):  Temp: 97.3 °F (36.3 °C) (05/26/25 0910)  Pulse: 93 (05/26/25 0910)  Resp: 18 (05/26/25 0910)  BP: (!) 173/91 (05/26/25 0910)  SpO2: 97 % (05/26/25 0910) Vital Signs (72h Range):  Temp:  [97.3 °F (36.3 °C)-100.8 °F (38.2 °C)]   Pulse:  []   Resp:  [16-21]   BP: (112-176)/(65-99)   SpO2:  [92 %-100 %]      Recent Labs   Lab 05/24/25  0642 05/25/25  0710 05/26/25  0513   CREATININE 1.2 1.7* 1.9*     Serum creatinine: 1.9 mg/dL (H) 05/26/25 0513  Estimated creatinine clearance: 29.1 mL/min (A)    Medication: Lovenox 40 mg subQ every 24 hours will be changed to Lovenox 30 mg subQ every 24 hours per pharmacy protocol.    Pharmacist's Name: Melissa Bowser, PharmD  Pharmacist's Extension: [phone]

## 2025-05-26 NOTE — PLAN OF CARE
Problem: Adult Inpatient Plan of Care  Goal: Plan of Care Review  Outcome: Progressing  Goal: Patient-Specific Goal (Individualized)  Outcome: Progressing  Goal: Absence of Hospital-Acquired Illness or Injury  Outcome: Progressing  Goal: Optimal Comfort and Wellbeing  Outcome: Progressing  Goal: Readiness for Transition of Care  Outcome: Progressing     Problem: Infection  Goal: Absence of Infection Signs and Symptoms  Outcome: Progressing     Problem: Coping Ineffective  Goal: Effective Coping  Outcome: Progressing     Patient alert and oriented to self. Room air. VSS. Iv abx. Low intake; meals and fluids encouraged; assisted feedings. Q2 turns. Blood sugars monitored.  Bed alarm set. Bed low and locked position. Call light within reach.

## 2025-05-26 NOTE — PT/OT/SLP PROGRESS
Physical Therapy Treatment/Co-Treatment with O.T.    Patient Name:  Vince Huffman   MRN:  [id number]    Recommendations:     Discharge Recommendations: High Intensity Therapy  Discharge Equipment Recommendations: other (see comments) (TBD at next level of care)  Barriers to discharge: Inaccessible home and Decreased caregiver support    Assessment:     Vince Huffman is a 79 y.o. male admitted with a medical diagnosis of Acute encephalopathy.  He presents with the following impairments/functional limitations: weakness, impaired endurance, impaired self care skills, impaired functional mobility, gait instability, impaired balance, decreased safety awareness, decreased ROM, abnormal tone, impaired fine motor, impaired cardiopulmonary response to activity, decreased upper extremity function, decreased lower extremity function . Patient was very lethargic and very slow to respond to basic motor command. Heart rate was also elevated to 136 bp and 150 bpm with min exertion.    Rehab Prognosis: Fair; patient would benefit from acute skilled PT services to address these deficits and reach maximum level of function.    Recent Surgery: Procedure(s) (LRB):  MRI (Magnetic Resonance Imagine) (N/A) 9 Days Post-Op    Plan:     During this hospitalization, patient to be seen 4 x/week to address the identified rehab impairments via gait training, therapeutic activities, therapeutic exercises, neuromuscular re-education and progress toward the following goals:    Plan of Care Expires:  06/02/25    Subjective     Chief Complaint: weakness  Patient/Family Comments/goals: none stated  Pain/Comfort:  Pain Rating 1: 0/10  Pain Rating Post-Intervention 1: 0/10      Objective:     Communicated with NSG prior to session.  Patient found HOB elevated with telemetry, santana catheter, pulse ox (continuous) upon PT entry to room.     General Precautions: Standard, fall  Orthopedic Precautions: N/A  Braces: N/A  Respiratory Status: Room air      Functional Mobility:  Bed Mobility:     Rolling Right: maximal assistance  Supine to Sit: moderate assistance, maximal assistance, and of 2 persons  Sit to Supine: maximal assistance and of 2 persons  Transfers:     Sit to Stand:  maximal assistance and of 2 persons with rolling walker      AM-PAC 6 CLICK MOBILITY  Turning over in bed (including adjusting bedclothes, sheets and blankets)?: 2  Sitting down on and standing up from a chair with arms (e.g., wheelchair, bedside commode, etc.): 2  Moving from lying on back to sitting on the side of the bed?: 2  Moving to and from a bed to a chair (including a wheelchair)?: 2  Need to walk in hospital room?: 2  Climbing 3-5 steps with a railing?: 1  Basic Mobility Total Score: 11       Treatment & Education:  Co-Treatment with O.T. Patient sat at EOB with assistance ranging from min to CGA x 18 minutes. Anterior lean stretch to improve posterior loss of balance. Static standing balance x 35 secs with RW for support with max of 2 and cues to extend B knees. Transfer back to supine as heart rate was elevated. Repositioned in bed for comfort with B heels elevated on a pillow to prevent Decubitus and to improve B knee extension.    Patient left HOB elevated with all lines intact, call button in reach, and NSG notified.    GOALS:   Multidisciplinary Problems       Physical Therapy Goals          Problem: Physical Therapy    Goal Priority Disciplines Outcome Interventions   Physical Therapy Goal     PT, PT/OT Progressing    Description: Goals to be met by: 2025     Patient will increase functional independence with mobility by performin. Supine to sit with MInimal Assistance  2. Sit to supine with MInimal Assistance  3. Sit to stand transfer with Minimal Assistance  4. Bed to chair transfer with Minimal Assistance using LRAD  5. Gait  x 25 feet with Minimal Assistance using LRAD.   6. Lower extremity exercise program x15 reps per handout, with assistance as  needed                         DME Justifications:  TBD at next level of care.    Time Tracking:     PT Received On: 05/26/25  PT Start Time: 1000     PT Stop Time: 1025  PT Total Time (min): 25 min     Billable Minutes: Neuromuscular Re-education 25    Treatment Type: Treatment  PT/PTA: PTA     Number of PTA visits since last PT visit: 1 05/26/2025

## 2025-05-26 NOTE — ASSESSMENT & PLAN NOTE
Impression:  Vince Huffman is a 79 y.o. male with PMHx of PMH of Crohn's disease, prior SBO, HTN, who presented to [facility] ED on 4/24/25 due to gradually worsening generalized weakness. He has not been getting out of bed as much, or walking as much as usual. He also notes decreased appetite/PO intake, slowed speech, unsteady gait, and hand tremors over the past several weeks. Since being hospitalized, patient has been seen by neurology for evaluation of encephalopathy, infectious disease for meningitis workup as potential cause for acute encephalopathy, psychiatry for concerns of complex bereavement given the recent loss of his wife 18 months ago vs catatonia, and endocrine for concerns of Hashimoto encephalopathy. Patient was initiated on IVIG and steroids, but has not had any clear improvement in mentation. 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 Neurology initiated goals of care discussion with patient's daughter, Radha. Palliative Medicine was consulted by primary, Dr. Ramírez, for continued goals of care and advanced care planning discussions.      Patient appears to be resting comfortably in bed. Awake. Oriented to self. Pleasant. Patient was just returned to bed by PT/OT as he just finished walking in the hallway with therapy. Patient is in bilateral wrist restraints. No acute distress noted.     - Prior experience with serious illness: yes, wife passed away 18 months ago. It has been reported that wife spent last 8 months of life in hospital setting. Patient stayed by patient's bedside throughout this time.  -The patient has not previously engaged in advance care planning or GOC discussions  - Insight/Understanding of illness: patient with confusion. Patient's family with good understanding of illness. Would benefit from more discussions with neurology team.    Advance Care Planning     Date: 05/26/2025  - Received phone call  from patient's son, Vince Huffman Jr., requesting a visit from Palliative Medicine  - Patient resting in bed, awake, disoriented, and pleasant. No acute distress noted. No family at bedside during my initial rounding. I called Vince Murphy. Who shared that he was outside in the waiting room and requested that we talk in the waiting room. I med Vince Murphy and patient's nephew, Mehran.  - Patient's current state of health, prognosis given debility malnutrition, and lack of improvement in mentation despite IVIG and steroid therapy.  - Family shares consistent hopes of patient discharging to facility where comfort can be prioritized.   - Philosophies of hospice discussed.   - During discussion, Luiz called. Discussions continued with Vince Murphy, Mehran, and Luiz. Goals remain consistent with pursing facility placement with hospice. With consistent goals, [facility] will sign off. Please do not hesitate to re-engage for any additional palliative needs.      Date: 05/23/2025  - Patient resting in bed, watching television. VSS, maintained on room air, no acute distress noted. Sister-in-law, Carol, is at bedside. Patient appears to be in good spirits and denies pain.  - I followed up with Radha (patient's daughter) via telephone. Radha shares that after further deliberation with family, they understand that there are no additional options to try to slow the progressions of patient's neurodegenerative disease and they would like to pursue hospice care. With comfort in mind, family would also like change patient's code status to DNR. Radha states she spoke with Dr. Ramírez this morning and was grateful for his call. Case management updated.  - With clear goals, Palliative Medicine will sign off.    Date: 05/22/2025  - Patient oriented to self. Unable to participate in discussions. Family is amenable to Palliative Medicine.   - Phone conference call with patient's daughter (Radha), sister (Luiz), brothers (Mitchel and Patrick), and  sister-in-Law (Mei).  - Discussed patient prognosis which appears to be poor given patient's progressive debility, frailty, and malnutrition.  - Family shares that the news of patient having a rapidly progressive neurodegenerative condition was shocking as patient was ambulatory and independent a month ago. Luiz adds that she assumed patient was grieving the loss of his wife, and was not expecting the news from the neurology team.  - Family hopes that patient will be able to leave the hospital, but most importantly that patient not suffer.  - Discussed disease trajectory of a progressive neurodegenerative condition. Family inquired about next steps once patient is medically ready to leave hospital. We discussed treatment focused care and that as patient's disease process continues to progress, I expressed worry that his independence will worsen along with his mentation, debility, and nutrition leading to risks of skin breakdown from being bed bound, and risk for aspiration/infection. Luiz expressed that she would not want to see her brother suffer from any additional skin breakdown. She states his skin is fragile already and has had skin tears from his hospital identification band on his arm, she would not want to see any more skin breakdown from decreased mobility and malnutrition. Radha acknowledges that patient would not want to depend on others for basic care needs.   - Nutritional status discussed. I shared patient's breakfast tray was untouched this morning, and patient stated he was not hungry when I offered to feed him. Family shares that patient takes excessive amounts of time to chew and swallow his food and that he requires assistance with feeding.  - Philosophies of hospice and comfort care introduced.   - Goals of care discussed. We talked more about quality of life and prolonging life for more time. Code status also discussed. As mentioned earlier in our discussion family's hope of limiting  suffering, I recommended that if patient's heart or breathing stops naturally, we allow patient to rest peacefully and pass away comfortably. Luiz reiterates she does not believe patient would find his current life to be of quality and would like to shift to comfort focused care and not resuscitate patient if he passes away. Radha agreed. Luiz acknowledges that Radha is one of the primary decision makers along with Vince Edwards (Patient's son). Of note, patient's son was not on call because family requests to loop him in on patient's most recent health update once he is physically here at hospital.   - Radha shares similar sentiments as Luiz, but shares she would like additional time for continued discussions among patient's decision-making support system. Family asks to speak with neurology regarding prognosis with lack of diagnosis and if no changes on MRI impacts prognosis. Dr. Collier, Neurology updated.             Life Limiting Diagnosis  Acute encephalopathy  Elevated NFL            Symptom Management  - Pain: no  - acetaminophen 650mg q4h PRN    - Dyspnea: no  - 24h SpO2: %  - maintained on RA    - Constipation: no  - LBM: 5/25/25  - polyethylene glycol BID    - Nausea: no  - ondansetron 8mg q8h PRN    - Debility: yes  - Functional status: fair.  Pt was not able to manage ADLs  - Fall precautions      - Dysphagia: no  - Aspiration precautions  - Nursing communication placed to assist patient with feedings  - SLP eval and treat        Recommendations  - Please see above  - Continue management per primary team  - Patient and family would benefit from continued education and information regarding plan of care, prognosis, and what to expect in the future  - Most important goals at this time: continued goals of care discussions   - Code status: DNR  - Disposition: facility with hospice        The above recommendations communicated directly to primary team on 5/26/25  Thank you for consulting Palliative  Medicine.

## 2025-05-26 NOTE — PROGRESS NOTES
[facility] - Acute Medical Stepdown  Palliative Medicine  Progress Note    Patient Name: Vince Huffman  MRN: [id number]  Admission Date: 4/30/2025  Hospital Length of Stay: 25 days  Code Status: DNR   Attending Provider: Juan F Ramírez MD  Consulting Provider: Nan Mondragon NP  Primary Care Physician: Leland Porras MD  Principal Problem:Acute encephalopathy    Patient information was obtained from relative(s), past medical records, and primary team.      Assessment/Plan:     Palliative Care  Palliative care encounter  Impression:  Vince Huffman is a 79 y.o. male with PMHx of PMH of Crohn's disease, prior SBO, HTN, who presented to [facility] [place]24/25 due to gradually worsening generalized weakness. He has not been getting out of bed as much, or walking as much as usual. He also notes decreased appetite/PO intake, slowed speech, unsteady gait, and hand tremors over the past several weeks. Since being hospitalized, patient has been seen by neurology for evaluation of encephalopathy, infectious disease for meningitis workup as potential cause for acute encephalopathy, psychiatry for concerns of complex bereavement given the recent loss of his wife 18 months ago vs catatonia, and endocrine for concerns of Hashimoto encephalopathy. Patient was initiated on IVIG and steroids, but has not had any clear improvement in mentation. 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 Neurology initiated goals of care discussion with patient's daughter, Radha. Palliative Medicine was consulted by primary, Dr. Ramírez, for continued goals of care and advanced care planning discussions.      Patient appears to be resting comfortably in bed. Awake. Oriented to self. Pleasant. Patient was just returned to bed by PT/OT as he just finished walking in the hallway with therapy. Patient is in bilateral wrist restraints. No acute distress noted.     - Prior  experience with serious illness: yes, wife passed away 18 months ago. It has been reported that wife spent last 8 months of life in hospital setting. Patient stayed by patient's bedside throughout this time.  -The patient has not previously engaged in advance care planning or [facility] discussions  - Insight/Understanding of illness: patient with confusion. Patient's family with good understanding of illness. Would benefit from more discussions with neurology team.    Advance Care Planning    Date: 05/26/2025  - Received phone call from patient's son, Vince Huffman Jr., requesting a visit from Palliative Medicine  - Patient resting in bed, awake, disoriented, and pleasant. No acute distress noted. No family at bedside during my initial rounding. I called Vince Murphy. Who shared that he was outside in the waiting room and requested that we talk in the waiting room. I med Vince Murphy and patient's nephew, Mehran.  - Patient's current state of health, prognosis given debility malnutrition, and lack of improvement in mentation despite IVIG and steroid therapy.  - Family shares consistent hopes of patient discharging to facility where comfort can be prioritized.   - Philosophies of hospice discussed.   - During discussion, Luiz called. Discussions continued with Mehran Clarke Jr, and Luiz. Goals remain consistent with pursing facility placement with hospice. With consistent goals, Pal Med will sign off. Please do not hesitate to re-engage for any additional palliative needs.      Date: 05/23/2025  - Patient resting in bed, watching television. VSS, maintained on room air, no acute distress noted. Sister-in-law, Carol, is at bedside. Patient appears to be in good spirits and denies pain.  - I followed up with Radha (patient's daughter) via telephone. Radha shares that after further deliberation with family, they understand that there are no additional options to try to slow the progressions of patient's neurodegenerative disease  and they would like to pursue hospice care. With comfort in mind, family would also like change patient's code status to DNR. Radha states she spoke with Dr. Ramírez this morning and was grateful for his call. Case management updated.  - With clear goals, Palliative Medicine will sign off.    Date: 05/22/2025  - Patient oriented to self. Unable to participate in discussions. Family is amenable to Palliative Medicine.   - Phone conference call with patient's daughter (Radha), sister (Luiz), brothers (Mitchel and Patrick), and sister-in-Law (Mei).  - Discussed patient prognosis which appears to be poor given patient's progressive debility, frailty, and malnutrition.  - Family shares that the news of patient having a rapidly progressive neurodegenerative condition was shocking as patient was ambulatory and independent a month ago. Luiz adds that she assumed patient was grieving the loss of his wife, and was not expecting the news from the neurology team.  - Family hopes that patient will be able to leave the hospital, but most importantly that patient not suffer.  - Discussed disease trajectory of a progressive neurodegenerative condition. Family inquired about next steps once patient is medically ready to leave hospital. We discussed treatment focused care and that as patient's disease process continues to progress, I expressed worry that his independence will worsen along with his mentation, debility, and nutrition leading to risks of skin breakdown from being bed bound, and risk for aspiration/infection. Luiz expressed that she would not want to see her brother suffer from any additional skin breakdown. She states his skin is fragile already and has had skin tears from his hospital identification band on his arm, she would not want to see any more skin breakdown from decreased mobility and malnutrition. Radha acknowledges that patient would not want to depend on others for basic care needs.   - Nutritional  status discussed. I shared patient's breakfast tray was untouched this morning, and patient stated he was not hungry when I offered to feed him. Family shares that patient takes excessive amounts of time to chew and swallow his food and that he requires assistance with feeding.  - Philosophies of hospice and comfort care introduced.   - Goals of care discussed. We talked more about quality of life and prolonging life for more time. Code status also discussed. As mentioned earlier in our discussion family's hope of limiting suffering, I recommended that if patient's heart or breathing stops naturally, we allow patient to rest peacefully and pass away comfortably. Luiz reiterates she does not believe patient would find his current life to be of quality and would like to shift to comfort focused care and not resuscitate patient if he passes away. Radha agreed. Luiz acknowledges that Radha is one of the primary decision makers along with Vince MurphyRip (Patient's son). Of note, patient's son was not on call because family requests to loop him in on patient's most recent health update once he is physically here at hospital.   - Radha shares similar sentiments as Luiz, but shares she would like additional time for continued discussions among patient's decision-making support system. Family asks to speak with neurology regarding prognosis with lack of diagnosis and if no changes on MRI impacts prognosis. Dr. Collier, Neurology updated.             Life Limiting Diagnosis  Acute encephalopathy  Elevated NFL            Symptom Management  - Pain: no  - acetaminophen 650mg q4h PRN    - Dyspnea: no  - 24h SpO2: %  - maintained on RA    - Constipation: no  - LBM: 5/25/25  - polyethylene glycol BID    - Nausea: no  - ondansetron 8mg q8h PRN    - Debility: yes  - Functional status: fair.  Pt was not able to manage ADLs  - Fall precautions      - Dysphagia: no  - Aspiration precautions  - Nursing communication placed to assist  "patient with feedings  - SLP eval and treat        Recommendations  - Please see above  - Continue management per primary team  - Patient and family would benefit from continued education and information regarding plan of care, prognosis, and what to expect in the future  - Most important goals at this time: continued goals of care discussions   - Code status: DNR  - Disposition: facility with hospice        The above recommendations communicated directly to primary team on 5/26/25  Thank you for consulting Palliative Medicine.                I will sign off. Please contact us if you have any additional questions.    Subjective:     Chief Complaint:   Chief Complaint   Patient presents with    Altered Mental Status     X2 days, from [facility] rehab       HPI:   As per H&P, "Vince Huffman is a 79 y.o. male with a PMHx of Crohn's disease, prior SBO, HTN, who presented to [facility] ED on 4/24/25 due to gradually worsening generalized weakness. He has not been getting out of bed as much, or walking as much as usual. He also notes decreased appetite/PO intake, slowed speech, unsteady gait, and hand tremors over the past several weeks. He reports a 30 lb weights loss. Since he lost his wife he has been staying with his sister, who has felt his gait is unsteady enough that she encouraged him to start using a cane. He denies changes/losses of strength of sensation, fever, dyspnea, back pain, leg weakness, gait freezing, or symptoms of festination. He states that moving his knees when he begins walking is difficult. He was evaluated in the ED with labs showing no leukocytosis or left shift. H&H were 10.9/31.8. A metabolic panel was overall normal. UA was without infection. A CXR showed no acute findings. A CT Head showed no acute process. An MRI of the brain was "significantly limited," showing motion artifact causing the exam to be incomplete. He was placed on observation."    Palliative Medicine was consulted per primary, . " Darrell for goals of care and advanced care planning discussions.    Hospital Course:  No notes on file    Interval History: No acute events overnight. Patient resting comfortably in bed. Patient's son, Vince Murphy, and nephew, Mehran, at bedside. Reviewed goals of care with family.    Past Medical History:   Diagnosis Date    Ankylosing spondylitis of unspecified sites in spine     Anxiety disorder, unspecified     BMI 21.0-21.9, adult 04/14/2025    Crohn's disease     Gout, unspecified     Heart disease     History of skin cancer 2001    squamous cell carcinoma left cheek    Hypertension     Hypothyroidism, unspecified     Psoriatic arthritis        Past Surgical History:   Procedure Laterality Date    ANGIOGRAM, CORONARY, WITH LEFT HEART CATHETERIZATION      APPENDECTOMY      APPENDECTOMY  2007    COLONOSCOPY N/A     ENDOSCOPY N/A     ESOPHAGOGASTRODUODENOSCOPY N/A 5/23/2025    Procedure: EGD (ESOPHAGOGASTRODUODENOSCOPY);  Surgeon: Viktor Barbosa MD;  Location: [address]);  Service: Endoscopy;  Laterality: N/A;    MAGNETIC RESONANCE IMAGING N/A 5/17/2025    Procedure: MRI (Magnetic Resonance Imagine);  Surgeon: Mervat Christianson;  Location: [facility];  Service: Anesthesiology;  Laterality: N/A;    RIGHT HEMICOLECTOMY  2013    SMALL INTESTINE SURGERY  2007    30.5 cm of small bowel removed       Review of patient's allergies indicates:   Allergen Reactions    Gluten Diarrhea    Lactose        Medications:  Continuous Infusions:  Scheduled Meds:   carbidopa-levodopa  mg  2 tablet Oral TID    cyanocobalamin  1,000 mcg Oral Daily    enoxparin  30 mg Subcutaneous Q24H (prophylaxis, 1700)    mirtazapine  15 mg Oral QHS    NIFEdipine  60 mg Oral Daily    pantoprazole  40 mg Intravenous BID    polyethylene glycol  17 g Oral BID    thiamine  100 mg Oral Daily     PRN Meds:  Current Facility-Administered Medications:     acetaminophen, 1,000 mg, Oral, Q8H PRN    acetaminophen, 650 mg, Oral, Q4H PRN    ALPRAZolam,  0.5 mg, Oral, BID PRN    dextrose 50%, 12.5 g, Intravenous, PRN    dextrose 50%, 25 g, Intravenous, PRN    glucagon (human recombinant), 1 mg, Intramuscular, PRN    glucose, 16 g, Oral, PRN    glucose, 24 g, Oral, PRN    insulin aspart U-100, 0-5 Units, Subcutaneous, QID (AC + HS) PRN    lorazepam, 2 mg, Intravenous, On Call Procedure    melatonin, 6 mg, Oral, Nightly PRN    naloxone, 0.02 mg, Intravenous, PRN    ondansetron, 8 mg, Oral, Q8H PRN    prochlorperazine, 5 mg, Intravenous, Q6H PRN    sodium chloride 0.9%, 10 mL, Intravenous, Q12H PRN    sodium chloride 0.9%, 10 mL, Intravenous, PRN    Family History    None       Tobacco Use    Smoking status: Never    Smokeless tobacco: Never   Substance and Sexual Activity    Alcohol use: No    Drug use: No    Sexual activity: Never     Partners: Female       Review of Systems   Unable to perform ROS: Acuity of condition     Objective:     Vital Signs (Most Recent):  Temp: 97.9 °F (36.6 °C) (05/26/25 1131)  Pulse: 88 (05/26/25 1131)  Resp: 18 (05/26/25 1131)  BP: (!) 170/91 (05/26/25 1131)  SpO2: 98 % (05/26/25 1251) Vital Signs (24h Range):  Temp:  [97.3 °F (36.3 °C)-99 °F (37.2 °C)] 97.9 °F (36.6 °C)  Pulse:  [] 88  Resp:  [16-18] 18  SpO2:  [96 %-100 %] 98 %  BP: (136-173)/(81-91) 170/91     Weight: 64.1 kg (141 lb 5 oz)  Body mass index is 19.71 kg/m².       Physical Exam  Vitals and nursing note reviewed.   Constitutional:       Appearance: He is ill-appearing.   HENT:      Head: Normocephalic and atraumatic.      Nose: Nose normal.      Mouth/Throat:      Mouth: Mucous membranes are dry.   Eyes:      Extraocular Movements: Extraocular movements intact.   Cardiovascular:      Rate and Rhythm: Normal rate.   Pulmonary:      Effort: Pulmonary effort is normal.   Skin:     General: Skin is warm and dry.   Neurological:      Mental Status: He is alert. He is disoriented.      Motor: Weakness present.            Review of Symptoms      Symptom Assessment (ESAS  0-10 Scale)  Pain:  0  Dyspnea:  0  Anxiety:  0  Nausea:  0  Depression:  0  Anorexia:  0  Fatigue:  0  Insomnia:  0  Restlessness:  0  Agitation:  0  Unable to complete assessment due to Acuity of condition     CAM / Delirium:  Negative  Constipation:  Negative  Diarrhea:  Negative      Bowel Management Plan (BMP):  Yes      Pain Assessment in Advanced Demential Scale (PAINAD)   Breathing - Independent of vocalization:  0  Negative vocalization:  0  Facial expression:  0  Body language:  0  Consolability:  0  Total:  0    Modified Sreekanth Scale:  0    Performance Status:  60    Psychosocial/Cultural:   See Palliative Psychosocial Note: Yes  - previously  to Stephanie Huffman for 55 years, but now . Wife passed away about 18 months ago. Patient's sister (Luiz) informs me that patient's late wife spent last 8 months of her life in and out of the hospital with surgical complications, and patient visited her everyday until she passed away.   - patient has 2 adult children, Kun Huffman (Jr.)  - worked for many years in the [employer]  - described as being very smart, kind, and having a great sense of humor  **Primary  to Follow**  Palliative Care  Consult: Yes    Spiritual:  F - Corry and Belief:  [employer]  I - Importance:  Yes  A - Address in Care:  Emelia support offered. Father Surendra contacted.        Advance Care Planning  Advance Directives:   Living Will: No    LaPOST: No    Do Not Resuscitate Status: Yes    Medical Power of : No      Decision Making:  Family answered questions and Patient unable to communicate due to disease severity/cognitive impairment  Goals of Care: The family endorses that what is most important right now is to focus on symptom/pain control and quality of life, even if it means sacrificing a little time    Accordingly, we have decided that the best plan to meet the patient's goals includes continuing with treatment and allowing  additional time for goals of care discussions         Significant Labs: All pertinent labs within the past 24 hours have been reviewed.  CBC:   Recent Labs   Lab 05/26/25  0513   WBC 9.28   HGB 14.4   HCT 43.2   MCV 94   *     BMP:  Recent Labs   Lab 05/26/25  0513      *   K 4.4   CL 99   CO2 22*   BUN 46*   CREATININE 1.9*   CALCIUM 10.2   MG 2.0     LFT:  Lab Results   Component Value Date    AST 27 05/21/2025    ALKPHOS 78 05/21/2025    BILITOT 0.4 05/21/2025     Albumin:   Albumin   Date Value Ref Range Status   05/21/2025 3.1 (L) 3.5 - 5.2 g/dL Final   04/14/2025 3.7 3.5 - 5.2 g/dL Final     Protein:   Protein Total   Date Value Ref Range Status   05/21/2025 8.6 (H) 6.0 - 8.4 gm/dL Final     Total Protein   Date Value Ref Range Status   11/06/2018 6.9 6.0 - 8.4 g/dL Final     Lactic acid:   Lab Results   Component Value Date    LACTATE 0.8 05/01/2025       Significant Imaging: I have reviewed all pertinent imaging results/findings within the past 24 hours.        I spent a total of 50 minutes on the day of the visit. This includes face to face time in discussion of goals of care, symptom assessment, coordination of care and emotional support. This also includes non-face to face time preparing to see the patient (eg, review of tests/imaging), obtaining and/or reviewing separately obtained history, documenting clinical information in the electronic or other health record, independently interpreting results and communicating results to the patient/family/caregiver, or care coordinator.         Additional 16min time spent on a voluntary advance care planning and /or goals of care discussion, providing emotional support, formulating, and communicating prognosis and exploring burden/benefit of various approaches of treatment.     Nan Mondragon, YAYA  Palliative Medicine  [facility]

## 2025-05-26 NOTE — PLAN OF CARE
05/26/25 1125   Post-Acute Status   Post-Acute Authorization Hospice   Post-Acute Placement Status Referrals Sent   Hospice Status Referrals Sent   Discharge Plan   Discharge Plan A Other  (Nursing Home with Hospice)   Discharge Plan B New Nursing Home placement - retirement care facility     Hospice referral sent to: [facility] who works with [facility] for nursing home with hospice. [facility] denied patient for NH placement.     Discharge Plan A and Plan B have been determined by review of patient's clinical status, future medical and therapeutic needs, and coverage/benefits for post-acute care in coordination with multidisciplinary team members.     Marissa Castillo LMSW

## 2025-05-26 NOTE — ASSESSMENT & PLAN NOTE
"Per CT scan "thickening of the distal ileum to the surgical anastomosis with the large bowel. Inflammatory infectious ileitis are considerations. Recommend clinical correlation and follow-up. "  CRP negative. No concern for flare.   GI without concerns of confusion being caused by sterlara, confirmed with pharmacy   On 5/19 developed new onset dark stools  Has been on Lovenox, IV Solumedrol and PO PPI  Pantoprazole 80mg IV x 1 then PPI 40mg IV BID  GI consulted  Repeat H/H at 2pm  Will transfuse if Hgb is <7g/dl (<8g/dl in cases of active ACS) or if patient has rapid bleeding leading to hemodynamic instability  IBD team recommending CTE vs MRE when patient able to take po and improving  Will need re-staging colonoscopy, likely outpatient     Recent Labs     05/24/25  0641 05/24/25  0642 05/25/25  0710 05/26/25  0513 05/26/25  0611   HGB 12.1*  --  12.1* 14.4  --    HCT 36.4*  --  36.9* 43.2  --      --  153 123*  --    INR  --  1.1 1.0  --  1.0   CRP negative. No concern for flare.   GI without concerns of confusion being caused by sterlara, confirmed with pharmacy   On 5/19 developed new onset dark stools  Has been on Lovenox, IV Solumedrol and PO PPI  Pantoprazole 80mg IV x 1 then PPI 40mg IV BID  GI consulted  Repeat H/H at 2pm  Will transfuse if Hgb is <7g/dl (<8g/dl in cases of active ACS) or if patient has rapid bleeding leading to hemodynamic instability    Recent Labs     05/24/25  0641 05/24/25  0642 05/25/25  0710 05/26/25  0513 05/26/25  0611   HGB 12.1*  --  12.1* 14.4  --    HCT 36.4*  --  36.9* 43.2  --      --  153 123*  --    INR  --  1.1 1.0  --  1.0     Recent Labs     05/24/25  0641 05/24/25  0642 05/25/25  0710 05/26/25  0513 05/26/25  0611   HGB 12.1*  --  12.1* 14.4  --    HCT 36.4*  --  36.9* 43.2  --      --  153 123*  --    INR  --  1.1 1.0  --  1.0   CRP negative. No concern for flare.   GI without concerns of confusion being caused by sterlara, confirmed with pharmacy "   On 5/19 developed new onset dark stools  Has been on Lovenox, IV Solumedrol and PO PPI  Pantoprazole 80mg IV x 1 then PPI 40mg IV BID  GI consulted  Repeat H/H at 2pm  Will transfuse if Hgb is <7g/dl (<8g/dl in cases of active ACS) or if patient has rapid bleeding leading to hemodynamic instability  IBD team recommending CTE vs MRE when patient able to take po and improving  Will need re-staging colonoscopy, likely outpatient     Recent Labs     05/24/25  0641 05/24/25  0642 05/25/25  0710 05/26/25  0513 05/26/25  0611   HGB 12.1*  --  12.1* 14.4  --    HCT 36.4*  --  36.9* 43.2  --      --  153 123*  --    INR  --  1.1 1.0  --  1.0     Recent Labs     05/24/25  0641 05/24/25  0642 05/25/25  0710 05/26/25  0513 05/26/25  0611   HGB 12.1*  --  12.1* 14.4  --    HCT 36.4*  --  36.9* 43.2  --      --  153 123*  --    INR  --  1.1 1.0  --  1.0   On 5/19 developed new onset dark stools  Has been on Lovenox, IV Solumedrol and PO PPI  Pantoprazole 80mg IV x 1 then PPI 40mg IV BID  GI consulted  Repeat H/H at 2pm  Will transfuse if Hgb is <7g/dl (<8g/dl in cases of active ACS) or if patient has rapid bleeding leading to hemodynamic instability    Recent Labs     05/24/25  0641 05/24/25  0642 05/25/25  0710 05/26/25  0513 05/26/25  0611   HGB 12.1*  --  12.1* 14.4  --    HCT 36.4*  --  36.9* 43.2  --      --  153 123*  --    INR  --  1.1 1.0  --  1.0

## 2025-05-26 NOTE — TELEPHONE ENCOUNTER
We may need to attempt to order his Stelara through his hospice plan. In my experience, the primary hospice provider takes over all of the orders including the medicines. However, we are willing and available to help however we can.  If the hospice company can help us navigate this for him, then that may be more direct. Does she have someone we can contact via his hospice company? If not finalized yet, then she can send us that information once finalized.  JARRETT

## 2025-05-26 NOTE — ASSESSMENT & PLAN NOTE
Anemia is likely due to acute on chronic illness. Most recent hemoglobin and hematocrit are listed below.  Recent Labs     05/24/25  0641 05/25/25  0710 05/26/25  0513   HGB 12.1* 12.1* 14.4   HCT 36.4* 36.9* 43.2     Plan  - Monitor serial CBC: Daily  - Transfuse PRBC if patient becomes hemodynamically unstable, symptomatic or H/H drops below 7/21.  - Patient has not received any PRBC transfusions to date  - Patient's anemia is currently stable  - Continue PPI with high dose steroids per neurology

## 2025-05-26 NOTE — PROGRESS NOTES
[facility]  Progress Note    Patient Name: Vince Huffman  MRN: [id number]  Patient Class: IP- Inpatient   Admission Date: 4/30/2025  Length of Stay: 25 days  Attending Physician: Juan F Ramírez MD  Primary Care Provider: Leland Porras MD        Subjective     Principal Problem:Acute encephalopathy        HPI:  By Dr. Juan F Ramírez MD    80 yo M w/HTN, hypothyroid, crohn's disease, CAD, hx of SBO, presenting with AMS from [facility]. Patient was sent in from [facility] for progressively worsening cognitive function. Patient is A&O x1 at his baseline. Patient and his daughter endorse mild confusion. Which has been progressive.    Patient has not had any recent falls. Endorsed mild suprapubic abdominal pain. No UTI. Mild inflammation on CT, not unexpected in the setting of Crohn's.     Overview/Hospital Course:  Mr. Huffman was admitted to  for further evaluation of worsening AMS per rehab facility. AAOx1 on admission, normally AAOx4 prior to 4/20 per son. UA negative. CXR clear. CT abd/pelvis with wall thickening of distal ileum, inflammatory infectious ileitis are considerations, fecal distention of the rectum, impaction not excluded. Discussed with GI, anticipated these are chronic findings. CRP negative. Will give enema and monitor for improvement. Patient with successful BM. Neurology consulted: MRI brain ordered (no acute findings, motion artifact), extended EEG, ammonia levels. Worsening mental status on 5/2, medicine team consulted for LP which was unsuccessful. More alert on 5/3 and answering yes/no questions when redirected. Neurology recommending carbidopa-levodopa trial, EEG, and attempting another LP.  Symptoms improved with increasing doses of Carbidopa/Levodopa. LP concerning for meningitis so he was started on Empiric treatment on 5/6/25. Discussion held with both Neurology and Infectious Disease, Infectious Disease does not believe patient  has any infectious etiology and as such recommended discontinuing antimicrobials.  Neurology started IVIG for presumed autoimmune process.  Completed 5 day course of IVIG 5/14. Minimal improvement in cognition. Had bouts of poor oral intake, only drank boost.  Had intermittent hyponatremia responding to IV fluids.  On 05/14 am Sodium 124.  Nephrology was consulted, investigations ordered and sodium corrected with close electrolyte monitoring.  Psych was consulted by the request of the neurologist. Hyponatremia improved after stopping fluids. NG tube attempted thrice at bedside however not able to be placed. Able to tolerate oral intake however very limited intake. Neuro recommended repeat MRI with sedation as previous had artifact. Anesthesia notified to arrange with sedation which was done on 5/17.  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 Endocrine consulted. Neurology planning 5 day course of IV Solumedrol starting 5/17.   He was started on Vitamin B12 and Thiamine replacement.    Steroids paused on 5/19 given concern for upper GI bleeding. PPI started and GI consulted. GI team does not share same concern for bleeding and hemoglobin notably stable despite elevated BUN, which could be coincidence. Will continue PPI for now. Steroids resumed with PPI ongoing on 5/20 (day 4/5 of steroids). Discussed role of G-tube with family and GI and everyone agreeable to placement. Plan for G tube placement on 5/22.     Interval History: Plan is to dc to NH with hospice, daughter is out of town. Medically ready for dc.    Review of Systems  Objective:     Vital Signs (Most Recent):  Temp: 98.2 °F (36.8 °C) (05/26/25 0415)  Pulse: 105 (05/26/25 0415)  Resp: 18 (05/26/25 0415)  BP: (!) 164/91 (05/26/25 0415)  SpO2: 99 % (05/26/25 0415) Vital Signs (24h Range):  Temp:  [97.5 °F (36.4 °C)-99 °F (37.2 °C)] 98.2 °F (36.8 °C)  Pulse:  [] 105  Resp:  [16-18] 18  SpO2:   [96 %-100 %] 99 %  BP: (112-164)/(65-91) 164/91     Weight: 64.1 kg (141 lb 5 oz)  Body mass index is 19.71 kg/m².    Intake/Output Summary (Last 24 hours) at 5/26/2025 0834  Last data filed at 5/26/2025 0415  Gross per 24 hour   Intake 222 ml   Output 650 ml   Net -428 ml      Physical Exam  Constitutional:       Appearance: He is ill-appearing.   HENT:      Head: Normocephalic and atraumatic.   Cardiovascular:      Rate and Rhythm: Normal rate and regular rhythm.      Heart sounds: No murmur heard.  Pulmonary:      Effort: Pulmonary effort is normal. No respiratory distress.      Breath sounds: Normal breath sounds. No wheezing or rales.   Abdominal:      General: There is no distension.      Palpations: Abdomen is soft.      Tenderness: There is no abdominal tenderness.   Musculoskeletal:         General: No deformity.   Skin:     General: Skin is warm and dry.      Coloration: Skin is pale.   Neurological:      General: No focal deficit present.      Comments: Able to have a conversation, oriented to person         MELD 3.0: 11 at 5/23/2025  6:10 AM  MELD-Na: 9 at 5/23/2025  6:10 AM  Calculated from:  Serum Creatinine: 1.2 mg/dL at 5/23/2025  6:10 AM  Serum Sodium: 135 mmol/L at 5/23/2025  6:10 AM  Total Bilirubin: 0.4 mg/dL (Using min of 1 mg/dL) at 5/21/2025  4:13 AM  Serum Albumin: 3.1 g/dL at 5/21/2025  4:13 AM  INR(ratio): 1.1 at 5/23/2025  6:10 AM  Age at listing (hypothetical): 79 years  Sex: Male at 5/23/2025  6:10 AM      Significant Labs:  CBC:  Recent Labs   Lab 05/25/25  0710 05/26/25  0513   WBC 7.29 9.28   HGB 12.1* 14.4   HCT 36.9* 43.2    123*     CMP:  Recent Labs   Lab 05/25/25  0710 05/26/25 0513    135*   K 3.9 4.4    99   CO2 28 22*   * 106   BUN 37* 46*   CREATININE 1.7* 1.9*   CALCIUM 10.0 10.2   ANIONGAP 9 14     PTINR:  Recent Labs   Lab 05/25/25  0710 05/26/25  0611   INR 1.0 1.0       Significant Procedures:   Dobutamine Stress Test with Color Flow: No  results found for this or any previous visit.    None      Assessment & Plan  Acute encephalopathy  AMS (altered mental status)  Progressive acutely worsening confusion/tremors since 4/20. Previously AAOx4, now AAOx1  Neurology consulted: MRI brain ordered (no acute findings, motion artifact), extended EEG, ammonia levels.   Worsening mental status on 5/2, medicine team consulted for LP which was unsuccessful.   Neurology recommending carbidopa-levodopa trial, EEG, and attempting another LP.    Symptoms improved with increasing doses of Carbidopa/Levodopa.   LP concerning for meningitis so he was started on Empiric treatment on 5/6/25, viral panel negative  Discussion held with both Neurology and Infectious Disease, Infectious Disease does not believe patient has any infectious etiology and as such recommended discontinuing antimicrobials.    Neurology started IVIG for presumed autoimmune process.  Completed 5 day course of IVIG 5/14. Minimal improvement in cognition.   Neuro recommended repeat MRI with sedation as previous had artifact. Anesthesia notified to arrange with sedation which was done on 5/17.    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 Endocrine consulted.   Neurology planning 5 day course of IV Solumedrol starting 5/17 - paused on 5/19, resumed 5/20 (Today is Day 4/5)   Continue Vitamin B12 and Thiamine replacement.  Progressive acutely worsening confusion/tremors since 4/20. Previously AAOx4, now AAOx1  Neurology consulted: MRI brain ordered (no acute findings, motion artifact), extended EEG, ammonia levels.   Worsening mental status on 5/2, medicine team consulted for LP which was unsuccessful.   Neurology recommending carbidopa-levodopa trial, EEG, and attempting another LP.    Symptoms improved with increasing doses of Carbidopa/Levodopa.   LP concerning for meningitis so he was started on Empiric treatment on 5/6/25, viral panel  negative  Discussion held with both Neurology and Infectious Disease, Infectious Disease does not believe patient has any infectious etiology and as such recommended discontinuing antimicrobials.    Neurology started IVIG for presumed autoimmune process.  Completed 5 day course of IVIG 5/14. Minimal improvement in cognition.   Neuro recommended repeat MRI with sedation as previous had artifact. Anesthesia notified to arrange with sedation which was done on 5/17.    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 Endocrine consulted.   Neurology planning 5 day course of IV Solumedrol starting 5/17 - paused on 5/19, resumed 5/20 (Today is Day 5/5)   Continue Vitamin B12 and Thiamine replacement.  Progressive acutely worsening confusion/tremors since 4/20. Previously AAOx4, now AAOx1  Neurology consulted: MRI brain ordered (no acute findings, motion artifact), extended EEG, ammonia levels.   Worsening mental status on 5/2, medicine team consulted for LP which was unsuccessful.   Neurology recommending carbidopa-levodopa trial, EEG, and attempting another LP.    Symptoms improved with increasing doses of Carbidopa/Levodopa.   LP concerning for meningitis so he was started on Empiric treatment on 5/6/25, viral panel negative  Discussion held with both Neurology and Infectious Disease, Infectious Disease does not believe patient has any infectious etiology and as such recommended discontinuing antimicrobials.    Neurology started IVIG for presumed autoimmune process.  Completed 5 day course of IVIG 5/14. Minimal improvement in cognition.   Neuro recommended repeat MRI with sedation as previous had artifact. Anesthesia notified to arrange with sedation which was done on 5/17.    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 Endocrine consulted.   Neurology planning 5 day course of IV Solumedrol starting 5/17 - paused  "on 5/19, resumed 5/20 (Today is Day 4/5)   Continue Vitamin B12 and Thiamine replacement.  Progressive acutely worsening confusion/tremors since 4/20. Previously AAOx4, now AAOx1  Neurology consulted: MRI brain ordered (no acute findings, motion artifact), extended EEG, ammonia levels.   Worsening mental status on 5/2, medicine team consulted for LP which was unsuccessful.   Neurology recommending carbidopa-levodopa trial, EEG, and attempting another LP.    Symptoms improved with increasing doses of Carbidopa/Levodopa.   LP concerning for meningitis so he was started on Empiric treatment on 5/6/25, viral panel negative  Discussion held with both Neurology and Infectious Disease, Infectious Disease does not believe patient has any infectious etiology and as such recommended discontinuing antimicrobials.    Neurology started IVIG for presumed autoimmune process.  Completed 5 day course of IVIG 5/14. Minimal improvement in cognition.   Neuro recommended repeat MRI with sedation as previous had artifact. Anesthesia notified to arrange with sedation which was done on 5/17.    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 Endocrine consulted.   Neurology planning 5 day course of IV Solumedrol starting 5/17 - paused on 5/19, resumed 5/20 (Today is Day 5/5)   Continue Vitamin B12 and Thiamine replacement.  Crohn's disease  GIB (gastrointestinal bleeding)  Per CT scan "thickening of the distal ileum to the surgical anastomosis with the large bowel. Inflammatory infectious ileitis are considerations. Recommend clinical correlation and follow-up. "  CRP negative. No concern for flare.   GI without concerns of confusion being caused by sterlara, confirmed with pharmacy   On 5/19 developed new onset dark stools  Has been on Lovenox, IV Solumedrol and PO PPI  Pantoprazole 80mg IV x 1 then PPI 40mg IV BID  GI consulted  Repeat H/H at 2pm  Will transfuse if Hgb is <7g/dl (<8g/dl " in cases of active ACS) or if patient has rapid bleeding leading to hemodynamic instability  IBD team recommending CTE vs MRE when patient able to take po and improving  Will need re-staging colonoscopy, likely outpatient     Recent Labs     05/24/25  0641 05/24/25  0642 05/25/25  0710 05/26/25  0513 05/26/25  0611   HGB 12.1*  --  12.1* 14.4  --    HCT 36.4*  --  36.9* 43.2  --      --  153 123*  --    INR  --  1.1 1.0  --  1.0   CRP negative. No concern for flare.   GI without concerns of confusion being caused by sterlara, confirmed with pharmacy   On 5/19 developed new onset dark stools  Has been on Lovenox, IV Solumedrol and PO PPI  Pantoprazole 80mg IV x 1 then PPI 40mg IV BID  GI consulted  Repeat H/H at 2pm  Will transfuse if Hgb is <7g/dl (<8g/dl in cases of active ACS) or if patient has rapid bleeding leading to hemodynamic instability    Recent Labs     05/24/25  0641 05/24/25  0642 05/25/25  0710 05/26/25  0513 05/26/25  0611   HGB 12.1*  --  12.1* 14.4  --    HCT 36.4*  --  36.9* 43.2  --      --  153 123*  --    INR  --  1.1 1.0  --  1.0     Recent Labs     05/24/25  0641 05/24/25  0642 05/25/25  0710 05/26/25  0513 05/26/25  0611   HGB 12.1*  --  12.1* 14.4  --    HCT 36.4*  --  36.9* 43.2  --      --  153 123*  --    INR  --  1.1 1.0  --  1.0   CRP negative. No concern for flare.   GI without concerns of confusion being caused by sterlara, confirmed with pharmacy   On 5/19 developed new onset dark stools  Has been on Lovenox, IV Solumedrol and PO PPI  Pantoprazole 80mg IV x 1 then PPI 40mg IV BID  GI consulted  Repeat H/H at 2pm  Will transfuse if Hgb is <7g/dl (<8g/dl in cases of active ACS) or if patient has rapid bleeding leading to hemodynamic instability  IBD team recommending CTE vs MRE when patient able to take po and improving  Will need re-staging colonoscopy, likely outpatient     Recent Labs     05/24/25  0641 05/24/25  0642 05/25/25  0710 05/26/25  0513 05/26/25  0611    HGB 12.1*  --  12.1* 14.4  --    HCT 36.4*  --  36.9* 43.2  --      --  153 123*  --    INR  --  1.1 1.0  --  1.0     Recent Labs     05/24/25  0641 05/24/25  0642 05/25/25  0710 05/26/25  0513 05/26/25  0611   HGB 12.1*  --  12.1* 14.4  --    HCT 36.4*  --  36.9* 43.2  --      --  153 123*  --    INR  --  1.1 1.0  --  1.0   On 5/19 developed new onset dark stools  Has been on Lovenox, IV Solumedrol and PO PPI  Pantoprazole 80mg IV x 1 then PPI 40mg IV BID  GI consulted  Repeat H/H at 2pm  Will transfuse if Hgb is <7g/dl (<8g/dl in cases of active ACS) or if patient has rapid bleeding leading to hemodynamic instability    Recent Labs     05/24/25  0641 05/24/25  0642 05/25/25  0710 05/26/25  0513 05/26/25  0611   HGB 12.1*  --  12.1* 14.4  --    HCT 36.4*  --  36.9* 43.2  --      --  153 123*  --    INR  --  1.1 1.0  --  1.0     Essential hypertension  Patient's blood pressure range in the last 24 hours was: BP  Min: 112/65  Max: 164/91.The patient's inpatient anti-hypertensive regimen is listed below:  Current Antihypertensives  NIFEdipine 24 hr tablet 60 mg, Daily, Oral    Plan  - BP is controlled, no changes needed to their regimen  Changed from Norvasc to Procardia given Psych recs    Generalized weakness  Sent from SNF  Progressive worsening weakness per son   PT/OT ordered. Will need placement at discharge  Unintended weight loss  Reported per son  Recently started gluten free diet per recommendations from GI  Temporal wasting noted  Body mass index is 19.71 kg/m².  Nutrition following  Boost changed to Boost Breeze given lactose intolerance  Hyponatremia  Hyponatremia is likely due to Dehydration/hypovolemia. The patient's most recent sodium results are listed below.  Recent Labs     05/24/25  0642 05/25/25  0710 05/26/25  0513    139 135*     Maxime  - Monitor sodium Daily.   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 Most recent hemoglobin and hematocrit are listed below.  Recent Labs     05/24/25  0641 05/25/25  0710 05/26/25  0513   HGB 12.1* 12.1* 14.4   HCT 36.4* 36.9* 43.2     Plan  - Monitor serial CBC: Daily  - Transfuse PRBC if patient becomes hemodynamically unstable, symptomatic or H/H drops below 7/21.  - Patient has not received any PRBC transfusions to date  - Patient's anemia is currently stable  -  Parkinsonism  Suspect symptoms are at least partially due to Parkinson's disease.   Continue Sinemet TID  CAD (coronary artery disease)  History noted.   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 Will hold anti-depressant medications. We will consult psychiatry at this time. Patient does not display psychosis at this time. Continue to monitor closely and adjust plan of care as needed.  Anemia  Anemia is likely due to acute on chronic illness. Most recent hemoglobin and hematocrit are listed below.  Recent Labs     05/24/25  0641 05/25/25  0710 05/26/25  0513   HGB 12.1* 12.1* 14.4   HCT 36.4* 36.9* 43.2     Plan  - Monitor serial CBC: Daily  - Transfuse PRBC if patient becomes hemodynamically unstable, symptomatic or H/H drops below 7/21.  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 Most recent weight and BMI monitored-     Measurements:  Wt Readings from Last 1 Encounters:   05/06/25 64.1 kg (141 lb 5 oz)   Body mass index is 19.71 kg/m².    Patient has been screened and assessed by RD.    Malnutrition Type:  Context:    Level:      Malnutrition Characteristic  Summary:       Interventions/Recommendations (treatment strategy):  1. Continuous tube feeds of Jevity 1.5 with a goal rate of 45 ml/hr x 24 hours to meet > 75% of kcal/protein needs - provides Total volume: 1080 -Kcal: 1620 - Protein 73gm Free water: 840 mL. Additional free fluid flushes per MD. Patient currently has 1200cc fluid restriction. 2. Monitor for s/s TF intolerance.    - Discussed G tube placement with daughter/MPOA and GI, planned for placement on 5/22    Palliative care encounter      VTE Risk Mitigation (From admission, onward)           Ordered     enoxaparin injection 40 mg  Every 24 hours         05/20/25 0906     IP VTE HIGH RISK PATIENT  Once         05/01/25 0827     Place sequential compression device  Until discontinued         05/01/25 0827                    Discharge Planning   ERNIE: 5/27/2025     Code Status: DNR   Medical Readiness for Discharge Date:   Discharge Plan A: New Nursing Home placement - longterm care facility   Discharge Delays: None known at this time            Please place Justification for DME        Juan F Ramírez MD  Department of Hospital Medicine   [facility] - Acute Medical Stepdown

## 2025-05-26 NOTE — PT/OT/SLP PROGRESS
"Occupational Therapy   Co-Treatment    Co-treatment performed due to patient's multiple deficits requiring two skilled therapists to appropriately and safely assess patient's strength, endurance, functional mobility, and ADL performance while facilitating functional tasks in addition to accommodating for patient's activity tolerance and medical acuity.        Name: Vince Huffman  MRN: [id number]  Admitting Diagnosis:  Acute encephalopathy  9 Days Post-Op    Recommendations:     Discharge Recommendations: Moderate Intensity Therapy  Discharge Equipment Recommendations:  wheelchair, walker, rolling  Barriers to discharge:       Assessment:     Vince Huffman is a 79 y.o. male with a medical diagnosis of Acute encephalopathy.  In today's session, the patient appeared more flat than previous sessions. Limited session secondary to patient with increased HR with mobility. After completing sit to stand the patient's HR elevated to 150s. Nurse was notified. Patient was asymptomatic. Performance deficits affecting function are weakness, impaired endurance, impaired cognition, decreased ROM, decreased coordination, impaired self care skills, impaired functional mobility, gait instability, impaired balance, decreased safety awareness, decreased lower extremity function, decreased upper extremity function.     Rehab Prognosis:  Fair; patient would benefit from acute skilled OT services to address these deficits and reach maximum level of function.       Plan:     Patient to be seen 4 x/week to address the above listed problems via self-care/home management, therapeutic activities, therapeutic exercises, neuromuscular re-education  Plan of Care Expires: 05/24/25  Plan of Care Reviewed with: patient    Subjective     Chief Complaint: N/A  Patient/Family Comments/goals: "Yeah." When asked if he feels okay.  Pain/Comfort:  Pain Rating 1: 0/10    Objective:     Communicated with: Nurse prior to session.  Patient found HOB elevated " with santana catheter, pulse ox (continuous), telemetry upon OT entry to room.    General Precautions: Standard, fall    Orthopedic Precautions:N/A  Braces: N/A  Respiratory Status: Room air     Occupational Performance:     Bed Mobility:    Patient completed Scooting towards the EOB with total assistance  Patient completed Supine to Sit with moderate assistance and 2 persons  Patient completed Sit to Supine with maximal assistance and 2 persons     Functional Mobility/Transfers:  Patient completed x1 Sit <> Stand Transfer from EOB with maximal assistance of 2 persons with rolling walker     Activities of Daily Living:  Grooming: maximal assistance to initiate hair care task. Patient was unable to complete task. Total assistance to complete facial hygiene and hair care.  Lower Body Dressing: total assistance to funmilayo socks and SCDs at bed level.    [facility] 6 Click ADL: 9    Treatment & Education:  Patient sat at EOB to promote core engagement, static/dynamic sitting balance, tolerance, and skin integrity for carry over with all seated ADLs.   Patient completed standing self-care task to promote static/dynamic standing balance and endurance for carry over with all standing ADLs.  Patient educated on role of occupational therapy, POC, and safety during ADLs and functional mobility. Patient and OT discussed importance of safe, continued mobility to optimize daily living skills. Patient verbalized understanding. Patient given instruction to call for medical staff/nurse for assistance.     Patient left HOB elevated with all lines intact, call button in reach, and nurse notified    GOALS:   Multidisciplinary Problems       Occupational Therapy Goals          Problem: Occupational Therapy    Goal Priority Disciplines Outcome Interventions   Occupational Therapy Goal     OT, PT/OT Progressing    Description: Goals to be met by: 5/24/25     Patient will increase functional independence with ADLs by performing:    UE Dressing with  Contact Guard Assistance.  LE Dressing with Minimal Assistance.  Grooming while bedside chair with Minimal Assistance.  Toileting from bedside commode with Minimal Assistance for hygiene and clothing management.   Sitting at edge of bed x5 minutes with Contact Guard Assistance for upright balance.  Rolling to Bilateral with Minimal Assistance.   Supine to sit with Minimal Assistance.  Step transfer with Moderate Assistance  Toilet transfer to bedside commode with Moderate Assistance.                       Time Tracking:     OT Date of Treatment: 05/26/25  OT Start Time: 1000  OT Stop Time: 1025  OT Total Time (min): 25 min    Billable Minutes:Self Care/Home Management 10  Therapeutic Activity 15    OT/PAUL: PAUL     Number of PAUL visits since last OT visit: 4    5/26/2025

## 2025-05-26 NOTE — ASSESSMENT & PLAN NOTE
Hyponatremia is likely due to Dehydration/hypovolemia. The patient's most recent sodium results are listed below.  Recent Labs     05/24/25  0642 05/25/25  0710 05/26/25  0513    139 135*     Maxime  - Monitor sodium Daily.   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 Most recent hemoglobin and hematocrit are listed below.  Recent Labs     05/24/25  0641 05/25/25  0710 05/26/25  0513   HGB 12.1* 12.1* 14.4   HCT 36.4* 36.9* 43.2     Plan  - Monitor serial CBC: Daily  - Transfuse PRBC if patient becomes hemodynamically unstable, symptomatic or H/H drops below 7/21.  - Patient has not received any PRBC transfusions to date  - Patient's anemia is currently stable  -

## 2025-05-26 NOTE — SUBJECTIVE & OBJECTIVE
Interval History: No acute events overnight. Patient resting comfortably in bed. Patient's son, Vince Murphy, and nephew, Mehran, at bedside. Reviewed goals of care with family.    Past Medical History:   Diagnosis Date    Ankylosing spondylitis of unspecified sites in spine     Anxiety disorder, unspecified     BMI 21.0-21.9, adult 04/14/2025    Crohn's disease     Gout, unspecified     Heart disease     History of skin cancer 2001    squamous cell carcinoma left cheek    Hypertension     Hypothyroidism, unspecified     Psoriatic arthritis        Past Surgical History:   Procedure Laterality Date    ANGIOGRAM, CORONARY, WITH LEFT HEART CATHETERIZATION      APPENDECTOMY      APPENDECTOMY  2007    COLONOSCOPY N/A     ENDOSCOPY N/A     ESOPHAGOGASTRODUODENOSCOPY N/A 5/23/2025    Procedure: EGD (ESOPHAGOGASTRODUODENOSCOPY);  Surgeon: Viktor Barbosa MD;  Location: [facility] ([address] [place] [address]);  Service: Endoscopy;  Laterality: N/A;    MAGNETIC RESONANCE IMAGING N/A 5/17/2025    Procedure: MRI (Magnetic Resonance Imagine);  Surgeon: Mervat Christianson;  Location: [facility];  Service: Anesthesiology;  Laterality: N/A;    RIGHT HEMICOLECTOMY  2013    SMALL INTESTINE SURGERY  2007    30.5 cm of small bowel removed       Review of patient's allergies indicates:   Allergen Reactions    Gluten Diarrhea    Lactose        Medications:  Continuous Infusions:  Scheduled Meds:   carbidopa-levodopa  mg  2 tablet Oral TID    cyanocobalamin  1,000 mcg Oral Daily    enoxparin  30 mg Subcutaneous Q24H (prophylaxis, 1700)    mirtazapine  15 mg Oral QHS    NIFEdipine  60 mg Oral Daily    pantoprazole  40 mg Intravenous BID    polyethylene glycol  17 g Oral BID    thiamine  100 mg Oral Daily     PRN Meds:  Current Facility-Administered Medications:     acetaminophen, 1,000 mg, Oral, Q8H PRN    acetaminophen, 650 mg, Oral, Q4H PRN    ALPRAZolam, 0.5 mg, Oral, BID PRN    dextrose 50%, 12.5 g, Intravenous, PRN    dextrose 50%, 25 g, Intravenous, PRN     glucagon (human recombinant), 1 mg, Intramuscular, PRN    glucose, 16 g, Oral, PRN    glucose, 24 g, Oral, PRN    insulin aspart U-100, 0-5 Units, Subcutaneous, QID (AC + HS) PRN    lorazepam, 2 mg, Intravenous, On Call Procedure    melatonin, 6 mg, Oral, Nightly PRN    naloxone, 0.02 mg, Intravenous, PRN    ondansetron, 8 mg, Oral, Q8H PRN    prochlorperazine, 5 mg, Intravenous, Q6H PRN    sodium chloride 0.9%, 10 mL, Intravenous, Q12H PRN    sodium chloride 0.9%, 10 mL, Intravenous, PRN    Family History    None       Tobacco Use    Smoking status: Never    Smokeless tobacco: Never   Substance and Sexual Activity    Alcohol use: No    Drug use: No    Sexual activity: Never     Partners: Female       Review of Systems   Unable to perform ROS: Acuity of condition     Objective:     Vital Signs (Most Recent):  Temp: 97.9 °F (36.6 °C) (05/26/25 1131)  Pulse: 88 (05/26/25 1131)  Resp: 18 (05/26/25 1131)  BP: (!) 170/91 (05/26/25 1131)  SpO2: 98 % (05/26/25 1251) Vital Signs (24h Range):  Temp:  [97.3 °F (36.3 °C)-99 °F (37.2 °C)] 97.9 °F (36.6 °C)  Pulse:  [] 88  Resp:  [16-18] 18  SpO2:  [96 %-100 %] 98 %  BP: (136-173)/(81-91) 170/91     Weight: 64.1 kg (141 lb 5 oz)  Body mass index is 19.71 kg/m².       Physical Exam  Vitals and nursing note reviewed.   Constitutional:       Appearance: He is ill-appearing.   HENT:      Head: Normocephalic and atraumatic.      Nose: Nose normal.      Mouth/Throat:      Mouth: Mucous membranes are dry.   Eyes:      Extraocular Movements: Extraocular movements intact.   Cardiovascular:      Rate and Rhythm: Normal rate.   Pulmonary:      Effort: Pulmonary effort is normal.   Skin:     General: Skin is warm and dry.   Neurological:      Mental Status: He is alert. He is disoriented.      Motor: Weakness present.            Review of Symptoms      Symptom Assessment (ESAS 0-10 Scale)  Pain:  0  Dyspnea:  0  Anxiety:  0  Nausea:  0  Depression:  0  Anorexia:  0  Fatigue:   0  Insomnia:  0  Restlessness:  0  Agitation:  0  Unable to complete assessment due to Acuity of condition     CAM / Delirium:  Negative  Constipation:  Negative  Diarrhea:  Negative      Bowel Management Plan (BMP):  Yes      Pain Assessment in Advanced Demential Scale (PAINAD)   Breathing - Independent of vocalization:  0  Negative vocalization:  0  Facial expression:  0  Body language:  0  Consolability:  0  Total:  0    Modified Sreekanth Scale:  0    Performance Status:  60    Psychosocial/Cultural:   See Palliative Psychosocial Note: Yes  - previously  to Stephanie Huffman for 55 years, but now . Wife passed away about 18 months ago. Patient's sister (Luiz) informs me that patient's late wife spent last 8 months of her life in and out of the hospital with surgical complications, and patient visited her everyday until she passed away.   - patient has 2 adult children, Kun (Rip) Armida  - worked for many years in the [employer]  - described as being very smart, kind, and having a great sense of humor  **Primary  to Follow**  Palliative Care  Consult: Yes    Spiritual:  F - Corry and Belief:  Cheondoism  I - Importance:  Yes  A - Address in Care:  Rescue support offered. Father Surendra contacted.        Advance Care Planning   Advance Directives:   Living Will: No    LaPOST: No    Do Not Resuscitate Status: Yes    Medical Power of : No      Decision Making:  Family answered questions and Patient unable to communicate due to disease severity/cognitive impairment  Goals of Care: The family endorses that what is most important right now is to focus on symptom/pain control and quality of life, even if it means sacrificing a little time    Accordingly, we have decided that the best plan to meet the patient's goals includes continuing with treatment and allowing additional time for goals of care discussions         Significant Labs: All pertinent labs within the  past 24 hours have been reviewed.  CBC:   Recent Labs   Lab 05/26/25  0513   WBC 9.28   HGB 14.4   HCT 43.2   MCV 94   *     BMP:  Recent Labs   Lab 05/26/25 0513      *   K 4.4   CL 99   CO2 22*   BUN 46*   CREATININE 1.9*   CALCIUM 10.2   MG 2.0     LFT:  Lab Results   Component Value Date    AST 27 05/21/2025    ALKPHOS 78 05/21/2025    BILITOT 0.4 05/21/2025     Albumin:   Albumin   Date Value Ref Range Status   05/21/2025 3.1 (L) 3.5 - 5.2 g/dL Final   04/14/2025 3.7 3.5 - 5.2 g/dL Final     Protein:   Protein Total   Date Value Ref Range Status   05/21/2025 8.6 (H) 6.0 - 8.4 gm/dL Final     Total Protein   Date Value Ref Range Status   11/06/2018 6.9 6.0 - 8.4 g/dL Final     Lactic acid:   Lab Results   Component Value Date    LACTATE 0.8 05/01/2025       Significant Imaging: I have reviewed all pertinent imaging results/findings within the past 24 hours.

## 2025-05-27 LAB
ABSOLUTE EOSINOPHIL (OHS): 0 K/UL
ABSOLUTE MONOCYTE (OHS): 1.15 K/UL (ref 0.3–1)
ABSOLUTE NEUTROPHIL COUNT (OHS): 7.38 K/UL (ref 1.8–7.7)
ANION GAP (OHS): 14 MMOL/L (ref 8–16)
BASOPHILS # BLD AUTO: 0.01 K/UL
BASOPHILS NFR BLD AUTO: 0.1 %
BUN SERPL-MCNC: 43 MG/DL (ref 8–23)
CALCIUM SERPL-MCNC: 10.3 MG/DL (ref 8.7–10.5)
CHLORIDE SERPL-SCNC: 103 MMOL/L (ref 95–110)
CO2 SERPL-SCNC: 21 MMOL/L (ref 23–29)
CREAT SERPL-MCNC: 1.6 MG/DL (ref 0.5–1.4)
ERYTHROCYTE [DISTWIDTH] IN BLOOD BY AUTOMATED COUNT: 12 % (ref 11.5–14.5)
GFR SERPLBLD CREATININE-BSD FMLA CKD-EPI: 44 ML/MIN/1.73/M2
GLUCOSE SERPL-MCNC: 107 MG/DL (ref 70–110)
HCT VFR BLD AUTO: 41 % (ref 40–54)
HGB BLD-MCNC: 13.8 GM/DL (ref 14–18)
IMM GRANULOCYTES # BLD AUTO: 0.03 K/UL (ref 0–0.04)
IMM GRANULOCYTES NFR BLD AUTO: 0.3 % (ref 0–0.5)
INR PPP: 1 (ref 0.8–1.2)
LYMPHOCYTES # BLD AUTO: 1 K/UL (ref 1–4.8)
MAGNESIUM SERPL-MCNC: 1.9 MG/DL (ref 1.6–2.6)
MCH RBC QN AUTO: 31.6 PG (ref 27–31)
MCHC RBC AUTO-ENTMCNC: 33.7 G/DL (ref 32–36)
MCV RBC AUTO: 94 FL (ref 82–98)
NUCLEATED RBC (/100WBC) (OHS): 0 /100 WBC
PLATELET # BLD AUTO: 170 K/UL (ref 150–450)
PLATELET BLD QL SMEAR: NORMAL
PMV BLD AUTO: 10.7 FL (ref 9.2–12.9)
POCT GLUCOSE: 114 MG/DL (ref 70–110)
POCT GLUCOSE: 115 MG/DL (ref 70–110)
POCT GLUCOSE: 216 MG/DL (ref 70–110)
POTASSIUM SERPL-SCNC: 4.2 MMOL/L (ref 3.5–5.1)
PROTHROMBIN TIME: 11 SECONDS (ref 9–12.5)
RBC # BLD AUTO: 4.37 M/UL (ref 4.6–6.2)
RELATIVE EOSINOPHIL (OHS): 0 %
RELATIVE LYMPHOCYTE (OHS): 10.4 % (ref 18–48)
RELATIVE MONOCYTE (OHS): 12 % (ref 4–15)
RELATIVE NEUTROPHIL (OHS): 77.2 % (ref 38–73)
SODIUM SERPL-SCNC: 138 MMOL/L (ref 136–145)
WBC # BLD AUTO: 9.57 K/UL (ref 3.9–12.7)

## 2025-05-27 PROCEDURE — 20600001 HC STEP DOWN PRIVATE ROOM

## 2025-05-27 PROCEDURE — 25000003 PHARM REV CODE 250: Performed by: STUDENT IN AN ORGANIZED HEALTH CARE EDUCATION/TRAINING PROGRAM

## 2025-05-27 PROCEDURE — 25000003 PHARM REV CODE 250: Performed by: HOSPITALIST

## 2025-05-27 PROCEDURE — 25000003 PHARM REV CODE 250

## 2025-05-27 PROCEDURE — 63600175 PHARM REV CODE 636 W HCPCS: Performed by: HOSPITALIST

## 2025-05-27 PROCEDURE — 85610 PROTHROMBIN TIME: CPT | Performed by: STUDENT IN AN ORGANIZED HEALTH CARE EDUCATION/TRAINING PROGRAM

## 2025-05-27 PROCEDURE — 80048 BASIC METABOLIC PNL TOTAL CA: CPT | Performed by: STUDENT IN AN ORGANIZED HEALTH CARE EDUCATION/TRAINING PROGRAM

## 2025-05-27 PROCEDURE — 36415 COLL VENOUS BLD VENIPUNCTURE: CPT | Performed by: STUDENT IN AN ORGANIZED HEALTH CARE EDUCATION/TRAINING PROGRAM

## 2025-05-27 PROCEDURE — 85025 COMPLETE CBC W/AUTO DIFF WBC: CPT | Performed by: STUDENT IN AN ORGANIZED HEALTH CARE EDUCATION/TRAINING PROGRAM

## 2025-05-27 PROCEDURE — 63600175 PHARM REV CODE 636 W HCPCS: Performed by: INTERNAL MEDICINE

## 2025-05-27 PROCEDURE — 83735 ASSAY OF MAGNESIUM: CPT | Performed by: STUDENT IN AN ORGANIZED HEALTH CARE EDUCATION/TRAINING PROGRAM

## 2025-05-27 RX ORDER — ENOXAPARIN SODIUM 100 MG/ML
40 INJECTION SUBCUTANEOUS EVERY 24 HOURS
Status: DISCONTINUED | OUTPATIENT
Start: 2025-05-27 | End: 2025-06-02 | Stop reason: HOSPADM

## 2025-05-27 RX ADMIN — CYANOCOBALAMIN TAB 1000 MCG 1000 MCG: 1000 TAB at 08:05

## 2025-05-27 RX ADMIN — INSULIN ASPART 2 UNITS: 100 INJECTION, SOLUTION INTRAVENOUS; SUBCUTANEOUS at 11:05

## 2025-05-27 RX ADMIN — POLYETHYLENE GLYCOL 3350 17 G: 17 POWDER, FOR SOLUTION ORAL at 08:05

## 2025-05-27 RX ADMIN — CARBIDOPA AND LEVODOPA 2 TABLET: 25; 100 TABLET ORAL at 09:05

## 2025-05-27 RX ADMIN — POLYETHYLENE GLYCOL 3350 17 G: 17 POWDER, FOR SOLUTION ORAL at 09:05

## 2025-05-27 RX ADMIN — CARBIDOPA AND LEVODOPA 2 TABLET: 25; 100 TABLET ORAL at 03:05

## 2025-05-27 RX ADMIN — PANTOPRAZOLE SODIUM 40 MG: 40 INJECTION, POWDER, FOR SOLUTION INTRAVENOUS at 08:05

## 2025-05-27 RX ADMIN — NIFEDIPINE 60 MG: 30 TABLET, FILM COATED, EXTENDED RELEASE ORAL at 08:05

## 2025-05-27 RX ADMIN — ENOXAPARIN SODIUM 40 MG: 40 INJECTION SUBCUTANEOUS at 04:05

## 2025-05-27 RX ADMIN — PANTOPRAZOLE SODIUM 40 MG: 40 INJECTION, POWDER, FOR SOLUTION INTRAVENOUS at 09:05

## 2025-05-27 RX ADMIN — MIRTAZAPINE 15 MG: 15 TABLET, FILM COATED ORAL at 09:05

## 2025-05-27 RX ADMIN — Medication 100 MG: at 08:05

## 2025-05-27 RX ADMIN — CARBIDOPA AND LEVODOPA 2 TABLET: 25; 100 TABLET ORAL at 08:05

## 2025-05-27 NOTE — MEDICARE RECERTIFICATION
MEDICARE INPATIENT  PHYSICIAN RECERTIFICATION  OF MEDICAL NECESSITY      1st Recertification (required no later than the 20th day of hospitalization)     I certify that this patient's continued medical needs require hospitalization because there is not currently a Nursing Home with Hospice bed available in participating facilities.  I estimate that the patient PLAN IS TO DC  NURSING HOME WITH HOSPICE TODAY.  Post-acute planning is in process, and currently the patient will likely be discharged to Nursing Home with Hospice.

## 2025-05-27 NOTE — ASSESSMENT & PLAN NOTE
Patient's blood pressure range in the last 24 hours was: BP  Min: 155/89  Max: 175/98.The patient's inpatient anti-hypertensive regimen is listed below:  Current Antihypertensives  NIFEdipine 24 hr tablet 60 mg, Daily, Oral    Plan  - BP is controlled, no changes needed to their regimen  Changed from Norvasc to Procardia given Psych recs

## 2025-05-27 NOTE — PLAN OF CARE
05/27/25 0838   Post-Acute Status   Post-Acute Authorization Placement;Hospice   Patient choice form signed by patient/caregiver List with quality metrics by geographic area provided   Discharge Delays None known at this time   Discharge Plan   Discharge Plan A Inpatient Hospice;New Nursing Home placement - snf care facility     Update on Placement:     [facility] - Pending  [facility]- Pending       LACEY will continue to follow.      8:47 AM   LACEY attempted contacting [facility] at [phone] and left VM concerning patient. SW will continue to follow.    12:19 PM   LACEY spoke with Madisyn [phone] with [facility] and informed her that patient will be at Critical access hospital in [place] and to contact patient's sister Luiz and provide her with the phone number the daughter is currently out of town. LACEY will continue to follow.    12:23 PM   LACEY attempted contacting [facility] at [phone] and left VM concerning patient. LACEY will continue to folow.          Concepcion Wood, CARLEE   - [facility]

## 2025-05-27 NOTE — ASSESSMENT & PLAN NOTE
"Per CT scan "thickening of the distal ileum to the surgical anastomosis with the large bowel. Inflammatory infectious ileitis are considerations. Recommend clinical correlation and follow-up. "  CRP negative. No concern for flare.   GI without concerns of confusion being caused by sterlara, confirmed with pharmacy   On 5/19 developed new onset dark stools  Has been on Lovenox, IV Solumedrol and PO PPI  Pantoprazole 80mg IV x 1 then PPI 40mg IV BID  GI consulted  Repeat H/H at 2pm  Will transfuse if Hgb is <7g/dl (<8g/dl in cases of active ACS) or if patient has rapid bleeding leading to hemodynamic instability  IBD team recommending CTE vs MRE when patient able to take po and improving  Will need re-staging colonoscopy, likely outpatient     Recent Labs     05/25/25  0710 05/26/25  0513 05/26/25  0611 05/27/25  0434   HGB 12.1* 14.4  --  13.8*   HCT 36.9* 43.2  --  41.0    123*  --  170   INR 1.0  --  1.0 1.0   CRP negative. No concern for flare.   GI without concerns of confusion being caused by sterlara, confirmed with pharmacy   On 5/19 developed new onset dark stools  Has been on Lovenox, IV Solumedrol and PO PPI  Pantoprazole 80mg IV x 1 then PPI 40mg IV BID  GI consulted  Repeat H/H at 2pm  Will transfuse if Hgb is <7g/dl (<8g/dl in cases of active ACS) or if patient has rapid bleeding leading to hemodynamic instability    Recent Labs     05/25/25  0710 05/26/25  0513 05/26/25  0611 05/27/25  0434   HGB 12.1* 14.4  --  13.8*   HCT 36.9* 43.2  --  41.0    123*  --  170   INR 1.0  --  1.0 1.0     Recent Labs     05/25/25  0710 05/26/25  0513 05/26/25  0611 05/27/25  0434   HGB 12.1* 14.4  --  13.8*   HCT 36.9* 43.2  --  41.0    123*  --  170   INR 1.0  --  1.0 1.0   CRP negative. No concern for flare.   GI without concerns of confusion being caused by sterlara, confirmed with pharmacy   On 5/19 developed new onset dark stools  Has been on Lovenox, IV Solumedrol and PO PPI  Pantoprazole 80mg " IV x 1 then PPI 40mg IV BID  GI consulted  Repeat H/H at 2pm  Will transfuse if Hgb is <7g/dl (<8g/dl in cases of active ACS) or if patient has rapid bleeding leading to hemodynamic instability  IBD team recommending CTE vs MRE when patient able to take po and improving  Will need re-staging colonoscopy, likely outpatient     Recent Labs     05/25/25  0710 05/26/25  0513 05/26/25  0611 05/27/25 0434   HGB 12.1* 14.4  --  13.8*   HCT 36.9* 43.2  --  41.0    123*  --  170   INR 1.0  --  1.0 1.0     Recent Labs     05/25/25  0710 05/26/25  0513 05/26/25 0611 05/27/25 0434   HGB 12.1* 14.4  --  13.8*   HCT 36.9* 43.2  --  41.0    123*  --  170   INR 1.0  --  1.0 1.0   On 5/19 developed new onset dark stools  Has been on Lovenox, IV Solumedrol and PO PPI  Pantoprazole 80mg IV x 1 then PPI 40mg IV BID  GI consulted  Repeat H/H at 2pm  Will transfuse if Hgb is <7g/dl (<8g/dl in cases of active ACS) or if patient has rapid bleeding leading to hemodynamic instability    Recent Labs     05/25/25  0710 05/26/25  0513 05/26/25  0611 05/27/25 0434   HGB 12.1* 14.4  --  13.8*   HCT 36.9* 43.2  --  41.0    123*  --  170   INR 1.0  --  1.0 1.0

## 2025-05-27 NOTE — ASSESSMENT & PLAN NOTE
Anemia is likely due to acute on chronic illness. Most recent hemoglobin and hematocrit are listed below.  Recent Labs     05/25/25  0710 05/26/25  0513 05/27/25  0434   HGB 12.1* 14.4 13.8*   HCT 36.9* 43.2 41.0     Plan  - Monitor serial CBC: Daily  - Transfuse PRBC if patient becomes hemodynamically unstable, symptomatic or H/H drops below 7/21.  - Patient has not received any PRBC transfusions to date  - Patient's anemia is currently stable  - Continue PPI with high dose steroids per neurology

## 2025-05-27 NOTE — PROGRESS NOTES
Pharmacist Renal Dose Adjustment Note    Vince Huffman is a 79 y.o. male being treated with the medication lovenox    Patient Data:    Vital Signs (Most Recent):  Temp: 97.3 °F (36.3 °C) (05/27/25 0722)  Pulse: 83 (05/27/25 0722)  Resp: 18 (05/27/25 0722)  BP: (!) 172/93 (05/27/25 0722)  SpO2: 99 % (05/27/25 0722) Vital Signs (72h Range):  Temp:  [97.3 °F (36.3 °C)-100.8 °F (38.2 °C)]   Pulse:  []   Resp:  [16-18]   BP: (112-175)/(65-98)   SpO2:  [92 %-100 %]      Recent Labs   Lab 05/25/25  0710 05/26/25  0513 05/27/25  0434   CREATININE 1.7* 1.9* 1.6*     Serum creatinine: 1.6 mg/dL (H) 05/27/25 0434  Estimated creatinine clearance: 34.5 mL/min (A)    Lovenox 30 mg SQ q24h will be changed to 40 mg SQ q24h    Pharmacist's Name: Eric Singh, PharmD, BCPS   Pharmacist's Extension: [phone]

## 2025-05-27 NOTE — SUBJECTIVE & OBJECTIVE
Interval History: PLAN IS TO DC  NURSING HOME WITH HOSPICE TODAY    Review of Systems  Objective:     Vital Signs (Most Recent):  Temp: 97.3 °F (36.3 °C) (05/27/25 0722)  Pulse: 83 (05/27/25 0722)  Resp: 18 (05/27/25 0722)  BP: (!) 172/93 (05/27/25 0722)  SpO2: 99 % (05/27/25 0722) Vital Signs (24h Range):  Temp:  [97.3 °F (36.3 °C)-98.5 °F (36.9 °C)] 97.3 °F (36.3 °C)  Pulse:  [] 83  Resp:  [17-18] 18  SpO2:  [96 %-99 %] 99 %  BP: (155-175)/(72-98) 172/93     Weight: 64.1 kg (141 lb 5 oz)  Body mass index is 19.71 kg/m².    Intake/Output Summary (Last 24 hours) at 5/27/2025 0917  Last data filed at 5/27/2025 0436  Gross per 24 hour   Intake 222 ml   Output 450 ml   Net -228 ml      Physical Exam  Constitutional:       Appearance: He is ill-appearing.   HENT:      Head: Normocephalic and atraumatic.   Cardiovascular:      Rate and Rhythm: Normal rate and regular rhythm.      Heart sounds: No murmur heard.  Pulmonary:      Effort: Pulmonary effort is normal. No respiratory distress.      Breath sounds: Normal breath sounds. No wheezing or rales.   Abdominal:      General: There is no distension.      Palpations: Abdomen is soft.      Tenderness: There is no abdominal tenderness.   Musculoskeletal:         General: No deformity.   Skin:     General: Skin is warm and dry.      Coloration: Skin is pale.   Neurological:      General: No focal deficit present.      Comments: Able to have a conversation, oriented to person         MELD 3.0: 10 at 5/23/2025  6:10 AM  MELD-Na: 8 at 5/23/2025  6:10 AM  Calculated from:  Serum Creatinine: 1.2 mg/dL at 5/23/2025  6:10 AM  Serum Sodium: 135 mmol/L at 5/23/2025  6:10 AM  Total Bilirubin: 0.4 mg/dL (Using min of 1 mg/dL) at 5/21/2025  4:13 AM  Serum Albumin: 3.1 g/dL at 5/21/2025  4:13 AM  INR(ratio): 1 at 5/21/2025  4:13 AM  Age at listing (hypothetical): 79 years  Sex: Male at 5/23/2025  6:10 AM      Significant Labs:  CBC:  Recent Labs   Lab 05/26/25  0513 05/27/25  0434    WBC 9.28 9.57   HGB 14.4 13.8*   HCT 43.2 41.0   * 170     CMP:  Recent Labs   Lab 05/26/25  0513 05/27/25  0434   * 138   K 4.4 4.2   CL 99 103   CO2 22* 21*    107   BUN 46* 43*   CREATININE 1.9* 1.6*   CALCIUM 10.2 10.3   ANIONGAP 14 14     PTINR:  Recent Labs   Lab 05/26/25  0611 05/27/25  0434   INR 1.0 1.0       Significant Procedures:   Dobutamine Stress Test with Color Flow: No results found for this or any previous visit.    None

## 2025-05-27 NOTE — ASSESSMENT & PLAN NOTE
Hyponatremia is likely due to Dehydration/hypovolemia. The patient's most recent sodium results are listed below.  Recent Labs     05/25/25  0710 05/26/25  0513 05/27/25  0434    135* 138     Maxime  - Monitor sodium Daily.   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 Most recent hemoglobin and hematocrit are listed below.  Recent Labs     05/25/25  0710 05/26/25  0513 05/27/25  0434   HGB 12.1* 14.4 13.8*   HCT 36.9* 43.2 41.0     Plan  - Monitor serial CBC: Daily  - Transfuse PRBC if patient becomes hemodynamically unstable, symptomatic or H/H drops below 7/21.  - Patient has not received any PRBC transfusions to date  - Patient's anemia is currently stable  -

## 2025-05-27 NOTE — PROGRESS NOTES
Adam Amezquita - [facility] Medicine  Progress Note    Patient Name: Vince Huffman  MRN: [id number]  Patient Class: IP- Inpatient   Admission Date: 4/30/2025  Length of Stay: 26 days  Attending Physician: Juan F Ramírez MD  Primary Care Provider: Leland Porras MD        Subjective     Principal Problem:Acute encephalopathy        HPI:  By Dr. Juan F Ramírez MD    78 yo M w/HTN, hypothyroid, crohn's disease, CAD, hx of SBO, presenting with AMS from [facility]. Patient was sent in from [facility] for progressively worsening cognitive function. Patient is A&O x1 at his baseline. Patient and his daughter endorse mild confusion. Which has been progressive.    Patient has not had any recent falls. Endorsed mild suprapubic abdominal pain. No UTI. Mild inflammation on CT, not unexpected in the setting of Crohn's.     Overview/Hospital Course:  Mr. Huffman was admitted to  for further evaluation of worsening AMS per rehab facility. AAOx1 on admission, normally AAOx4 prior to 4/20 per son. UA negative. CXR clear. CT abd/pelvis with wall thickening of distal ileum, inflammatory infectious ileitis are considerations, fecal distention of the rectum, impaction not excluded. Discussed with GI, anticipated these are chronic findings. CRP negative. Will give enema and monitor for improvement. Patient with successful BM. Neurology consulted: MRI brain ordered (no acute findings, motion artifact), extended EEG, ammonia levels. Worsening mental status on 5/2, medicine team consulted for LP which was unsuccessful. More alert on 5/3 and answering yes/no questions when redirected. Neurology recommending carbidopa-levodopa trial, EEG, and attempting another LP.  Symptoms improved with increasing doses of Carbidopa/Levodopa. LP concerning for meningitis so he was started on Empiric treatment on 5/6/25. Discussion held with both Neurology and Infectious Disease, Infectious Disease does not believe patient  has any infectious etiology and as such recommended discontinuing antimicrobials.  Neurology started IVIG for presumed autoimmune process.  Completed 5 day course of IVIG 5/14. Minimal improvement in cognition. Had bouts of poor oral intake, only drank boost.  Had intermittent hyponatremia responding to IV fluids.  On 05/14 am Sodium 124.  Nephrology was consulted, investigations ordered and sodium corrected with close electrolyte monitoring.  Psych was consulted by the request of the neurologist. Hyponatremia improved after stopping fluids. NG tube attempted thrice at bedside however not able to be placed. Able to tolerate oral intake however very limited intake. Neuro recommended repeat MRI with sedation as previous had artifact. Anesthesia notified to arrange with sedation which was done on 5/17.  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 Endocrine consulted. Neurology planning 5 day course of IV Solumedrol starting 5/17.   He was started on Vitamin B12 and Thiamine replacement.    Steroids paused on 5/19 given concern for upper GI bleeding. PPI started and GI consulted. GI team does not share same concern for bleeding and hemoglobin notably stable despite elevated BUN, which could be coincidence. Will continue PPI for now. Steroids resumed with PPI ongoing on 5/20 (day 4/5 of steroids). Discussed role of G-tube with family and GI and everyone agreeable to placement. Plan for G tube placement on 5/22.     Interval History: PLAN IS TO DC  NURSING HOME WITH HOSPICE TODAY    Review of Systems  Objective:     Vital Signs (Most Recent):  Temp: 97.3 °F (36.3 °C) (05/27/25 0722)  Pulse: 83 (05/27/25 0722)  Resp: 18 (05/27/25 0722)  BP: (!) 172/93 (05/27/25 0722)  SpO2: 99 % (05/27/25 0722) Vital Signs (24h Range):  Temp:  [97.3 °F (36.3 °C)-98.5 °F (36.9 °C)] 97.3 °F (36.3 °C)  Pulse:  [] 83  Resp:  [17-18] 18  SpO2:  [96 %-99 %] 99 %  BP:  (155-175)/(72-98) 172/93     Weight: 64.1 kg (141 lb 5 oz)  Body mass index is 19.71 kg/m².    Intake/Output Summary (Last 24 hours) at 5/27/2025 0917  Last data filed at 5/27/2025 0436  Gross per 24 hour   Intake 222 ml   Output 450 ml   Net -228 ml      Physical Exam  Constitutional:       Appearance: He is ill-appearing.   HENT:      Head: Normocephalic and atraumatic.   Cardiovascular:      Rate and Rhythm: Normal rate and regular rhythm.      Heart sounds: No murmur heard.  Pulmonary:      Effort: Pulmonary effort is normal. No respiratory distress.      Breath sounds: Normal breath sounds. No wheezing or rales.   Abdominal:      General: There is no distension.      Palpations: Abdomen is soft.      Tenderness: There is no abdominal tenderness.   Musculoskeletal:         General: No deformity.   Skin:     General: Skin is warm and dry.      Coloration: Skin is pale.   Neurological:      General: No focal deficit present.      Comments: Able to have a conversation, oriented to person         MELD 3.0: 10 at 5/23/2025  6:10 AM  MELD-Na: 8 at 5/23/2025  6:10 AM  Calculated from:  Serum Creatinine: 1.2 mg/dL at 5/23/2025  6:10 AM  Serum Sodium: 135 mmol/L at 5/23/2025  6:10 AM  Total Bilirubin: 0.4 mg/dL (Using min of 1 mg/dL) at 5/21/2025  4:13 AM  Serum Albumin: 3.1 g/dL at 5/21/2025  4:13 AM  INR(ratio): 1 at 5/21/2025  4:13 AM  Age at listing (hypothetical): 79 years  Sex: Male at 5/23/2025  6:10 AM      Significant Labs:  CBC:  Recent Labs   Lab 05/26/25  0513 05/27/25  0434   WBC 9.28 9.57   HGB 14.4 13.8*   HCT 43.2 41.0   * 170     CMP:  Recent Labs   Lab 05/26/25  0513 05/27/25  0434   * 138   K 4.4 4.2   CL 99 103   CO2 22* 21*    107   BUN 46* 43*   CREATININE 1.9* 1.6*   CALCIUM 10.2 10.3   ANIONGAP 14 14     PTINR:  Recent Labs   Lab 05/26/25  0611 05/27/25  0434   INR 1.0 1.0       Significant Procedures:   Dobutamine Stress Test with Color Flow: No results found for this or any  previous visit.    None      Assessment & Plan  Acute encephalopathy  AMS (altered mental status)  Progressive acutely worsening confusion/tremors since 4/20. Previously AAOx4, now AAOx1  Neurology consulted: MRI brain ordered (no acute findings, motion artifact), extended EEG, ammonia levels.   Worsening mental status on 5/2, medicine team consulted for LP which was unsuccessful.   Neurology recommending carbidopa-levodopa trial, EEG, and attempting another LP.    Symptoms improved with increasing doses of Carbidopa/Levodopa.   LP concerning for meningitis so he was started on Empiric treatment on 5/6/25, viral panel negative  Discussion held with both Neurology and Infectious Disease, Infectious Disease does not believe patient has any infectious etiology and as such recommended discontinuing antimicrobials.    Neurology started IVIG for presumed autoimmune process.  Completed 5 day course of IVIG 5/14. Minimal improvement in cognition.   Neuro recommended repeat MRI with sedation as previous had artifact. Anesthesia notified to arrange with sedation which was done on 5/17.    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 Endocrine consulted.   Neurology planning 5 day course of IV Solumedrol starting 5/17 - paused on 5/19, resumed 5/20 (Today is Day 4/5)   Continue Vitamin B12 and Thiamine replacement.  Progressive acutely worsening confusion/tremors since 4/20. Previously AAOx4, now AAOx1  Neurology consulted: MRI brain ordered (no acute findings, motion artifact), extended EEG, ammonia levels.   Worsening mental status on 5/2, medicine team consulted for LP which was unsuccessful.   Neurology recommending carbidopa-levodopa trial, EEG, and attempting another LP.    Symptoms improved with increasing doses of Carbidopa/Levodopa.   LP concerning for meningitis so he was started on Empiric treatment on 5/6/25, viral panel negative  Discussion held with both  Neurology and Infectious Disease, Infectious Disease does not believe patient has any infectious etiology and as such recommended discontinuing antimicrobials.    Neurology started IVIG for presumed autoimmune process.  Completed 5 day course of IVIG 5/14. Minimal improvement in cognition.   Neuro recommended repeat MRI with sedation as previous had artifact. Anesthesia notified to arrange with sedation which was done on 5/17.    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 Endocrine consulted.   Neurology planning 5 day course of IV Solumedrol starting 5/17 - paused on 5/19, resumed 5/20 (Today is Day 5/5)   Continue Vitamin B12 and Thiamine replacement.  Progressive acutely worsening confusion/tremors since 4/20. Previously AAOx4, now AAOx1  Neurology consulted: MRI brain ordered (no acute findings, motion artifact), extended EEG, ammonia levels.   Worsening mental status on 5/2, medicine team consulted for LP which was unsuccessful.   Neurology recommending carbidopa-levodopa trial, EEG, and attempting another LP.    Symptoms improved with increasing doses of Carbidopa/Levodopa.   LP concerning for meningitis so he was started on Empiric treatment on 5/6/25, viral panel negative  Discussion held with both Neurology and Infectious Disease, Infectious Disease does not believe patient has any infectious etiology and as such recommended discontinuing antimicrobials.    Neurology started IVIG for presumed autoimmune process.  Completed 5 day course of IVIG 5/14. Minimal improvement in cognition.   Neuro recommended repeat MRI with sedation as previous had artifact. Anesthesia notified to arrange with sedation which was done on 5/17.    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 Endocrine consulted.   Neurology planning 5 day course of IV Solumedrol starting 5/17 - paused on 5/19, resumed 5/20 (Today is Day  "4/5)   Continue Vitamin B12 and Thiamine replacement.  Progressive acutely worsening confusion/tremors since 4/20. Previously AAOx4, now AAOx1  Neurology consulted: MRI brain ordered (no acute findings, motion artifact), extended EEG, ammonia levels.   Worsening mental status on 5/2, medicine team consulted for LP which was unsuccessful.   Neurology recommending carbidopa-levodopa trial, EEG, and attempting another LP.    Symptoms improved with increasing doses of Carbidopa/Levodopa.   LP concerning for meningitis so he was started on Empiric treatment on 5/6/25, viral panel negative  Discussion held with both Neurology and Infectious Disease, Infectious Disease does not believe patient has any infectious etiology and as such recommended discontinuing antimicrobials.    Neurology started IVIG for presumed autoimmune process.  Completed 5 day course of IVIG 5/14. Minimal improvement in cognition.   Neuro recommended repeat MRI with sedation as previous had artifact. Anesthesia notified to arrange with sedation which was done on 5/17.    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 Endocrine consulted.   Neurology planning 5 day course of IV Solumedrol starting 5/17 - paused on 5/19, resumed 5/20 (Today is Day 5/5)   Continue Vitamin B12 and Thiamine replacement.  Crohn's disease  GIB (gastrointestinal bleeding)  Per CT scan "thickening of the distal ileum to the surgical anastomosis with the large bowel. Inflammatory infectious ileitis are considerations. Recommend clinical correlation and follow-up. "  CRP negative. No concern for flare.   GI without concerns of confusion being caused by sterlara, confirmed with pharmacy   On 5/19 developed new onset dark stools  Has been on Lovenox, IV Solumedrol and PO PPI  Pantoprazole 80mg IV x 1 then PPI 40mg IV BID  GI consulted  Repeat H/H at 2pm  Will transfuse if Hgb is <7g/dl (<8g/dl in cases of active ACS) or if " patient has rapid bleeding leading to hemodynamic instability  IBD team recommending CTE vs MRE when patient able to take po and improving  Will need re-staging colonoscopy, likely outpatient     Recent Labs     05/25/25  0710 05/26/25  0513 05/26/25  0611 05/27/25  0434   HGB 12.1* 14.4  --  13.8*   HCT 36.9* 43.2  --  41.0    123*  --  170   INR 1.0  --  1.0 1.0   CRP negative. No concern for flare.   GI without concerns of confusion being caused by sterlara, confirmed with pharmacy   On 5/19 developed new onset dark stools  Has been on Lovenox, IV Solumedrol and PO PPI  Pantoprazole 80mg IV x 1 then PPI 40mg IV BID  GI consulted  Repeat H/H at 2pm  Will transfuse if Hgb is <7g/dl (<8g/dl in cases of active ACS) or if patient has rapid bleeding leading to hemodynamic instability    Recent Labs     05/25/25  0710 05/26/25  0513 05/26/25  0611 05/27/25  0434   HGB 12.1* 14.4  --  13.8*   HCT 36.9* 43.2  --  41.0    123*  --  170   INR 1.0  --  1.0 1.0     Recent Labs     05/25/25  0710 05/26/25  0513 05/26/25  0611 05/27/25  0434   HGB 12.1* 14.4  --  13.8*   HCT 36.9* 43.2  --  41.0    123*  --  170   INR 1.0  --  1.0 1.0   CRP negative. No concern for flare.   GI without concerns of confusion being caused by sterlara, confirmed with pharmacy   On 5/19 developed new onset dark stools  Has been on Lovenox, IV Solumedrol and PO PPI  Pantoprazole 80mg IV x 1 then PPI 40mg IV BID  GI consulted  Repeat H/H at 2pm  Will transfuse if Hgb is <7g/dl (<8g/dl in cases of active ACS) or if patient has rapid bleeding leading to hemodynamic instability  IBD team recommending CTE vs MRE when patient able to take po and improving  Will need re-staging colonoscopy, likely outpatient     Recent Labs     05/25/25  0710 05/26/25  0513 05/26/25  0611 05/27/25  0434   HGB 12.1* 14.4  --  13.8*   HCT 36.9* 43.2  --  41.0    123*  --  170   INR 1.0  --  1.0 1.0     Recent Labs     05/25/25  0710 05/26/25  0513  05/26/25  0611 05/27/25  0434   HGB 12.1* 14.4  --  13.8*   HCT 36.9* 43.2  --  41.0    123*  --  170   INR 1.0  --  1.0 1.0   On 5/19 developed new onset dark stools  Has been on Lovenox, IV Solumedrol and PO PPI  Pantoprazole 80mg IV x 1 then PPI 40mg IV BID  GI consulted  Repeat H/H at 2pm  Will transfuse if Hgb is <7g/dl (<8g/dl in cases of active ACS) or if patient has rapid bleeding leading to hemodynamic instability    Recent Labs     05/25/25  0710 05/26/25  0513 05/26/25  0611 05/27/25  0434   HGB 12.1* 14.4  --  13.8*   HCT 36.9* 43.2  --  41.0    123*  --  170   INR 1.0  --  1.0 1.0     Essential hypertension  Patient's blood pressure range in the last 24 hours was: BP  Min: 155/89  Max: 175/98.The patient's inpatient anti-hypertensive regimen is listed below:  Current Antihypertensives  NIFEdipine 24 hr tablet 60 mg, Daily, Oral    Plan  - BP is controlled, no changes needed to their regimen  Changed from Norvasc to Procardia given Psych recs    Generalized weakness  Sent from SNF  Progressive worsening weakness per son   PT/OT ordered. Will need placement at discharge  Unintended weight loss  Reported per son  Recently started gluten free diet per recommendations from GI  Temporal wasting noted  Body mass index is 19.71 kg/m².  Nutrition following  Boost changed to Boost Breeze given lactose intolerance  Hyponatremia  Hyponatremia is likely due to Dehydration/hypovolemia. The patient's most recent sodium results are listed below.  Recent Labs     05/25/25  0710 05/26/25  0513 05/27/25  0434    135* 138     Maxime  - Monitor sodium Daily.   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 Most recent hemoglobin and hematocrit are listed below.  Recent Labs     05/25/25  0710 05/26/25  0513 05/27/25  0434   HGB 12.1* 14.4 13.8*   HCT 36.9* 43.2 41.0     Plan  - Monitor serial CBC: Daily  - Transfuse PRBC if  patient becomes hemodynamically unstable, symptomatic or H/H drops below 7/21.  - Patient has not received any PRBC transfusions to date  - Patient's anemia is currently stable  -  Parkinsonism  Suspect symptoms are at least partially due to Parkinson's disease.   Continue Sinemet TID  CAD (coronary artery disease)  History noted.   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 Will hold anti-depressant medications. We will consult psychiatry at this time. Patient does not display psychosis at this time. Continue to monitor closely and adjust plan of care as needed.  Anemia  Anemia is likely due to acute on chronic illness. Most recent hemoglobin and hematocrit are listed below.  Recent Labs     05/25/25  0710 05/26/25  0513 05/27/25  0434   HGB 12.1* 14.4 13.8*   HCT 36.9* 43.2 41.0     Plan  - Monitor serial CBC: Daily  - Transfuse PRBC if patient becomes hemodynamically unstable, symptomatic or H/H drops below 7/21.  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 Most recent weight and BMI monitored-     Measurements:  Wt Readings from Last 1 Encounters:   05/06/25 64.1 kg (141 lb 5 oz)   Body mass index is 19.71 kg/m².    Patient has been screened and assessed by RD.    Malnutrition Type:  Context: chronic illness  Level: severe    Malnutrition Characteristic Summary:  Energy Intake (Malnutrition): less than or equal to 50% for greater than or equal to 1 month  Subcutaneous Fat (Malnutrition):  (moderate to severe)  Muscle Mass (Malnutrition):  (moderate to  severe)    Interventions/Recommendations (treatment strategy):  1.)    - Discussed G tube placement with daughter/MPOA and GI, planned for placement on 5/22    Palliative care encounter      VTE Risk Mitigation (From admission, onward)           Ordered     enoxaparin injection 40 mg  Every 24 hours         05/27/25 0823     IP VTE HIGH RISK PATIENT  Once         05/01/25 0827     Place sequential compression device  Until discontinued         05/01/25 0827                    Discharge Planning   ERNIE: 5/30/2025     Code Status: DNR   Medical Readiness for Discharge Date:   Discharge Plan A: Inpatient Hospice, New Nursing Home placement - [facility]   Discharge Delays: None known at this time            Please place Justification for DME        Juan F Ramírez MD  Department of Hospital Medicine   [facility] - Acute Medical Stepdown

## 2025-05-27 NOTE — PLAN OF CARE
Pt oriented to self only; intermittently responds to questions. NAD. VSS on room air. Condom cath to gravity draining well. Pt frequently turned. No acute events. Safety measures in place. No concerns voiced at this time. Call light in reach.    Problem: Adult Inpatient Plan of Care  Goal: Plan of Care Review  Outcome: Progressing  Goal: Patient-Specific Goal (Individualized)  Outcome: Progressing  Goal: Absence of Hospital-Acquired Illness or Injury  Outcome: Progressing  Goal: Optimal Comfort and Wellbeing  Outcome: Progressing  Goal: Readiness for Transition of Care  Outcome: Progressing     Problem: Skin Injury Risk Increased  Goal: Skin Health and Integrity  Outcome: Progressing     Problem: Infection  Goal: Absence of Infection Signs and Symptoms  Outcome: Progressing     Problem: Delirium  Goal: Optimal Coping  Outcome: Progressing  Goal: Improved Behavioral Control  Outcome: Progressing  Goal: Improved Attention and Thought Clarity  Outcome: Progressing  Goal: Improved Sleep  Outcome: Progressing     Problem: Fall Injury Risk  Goal: Absence of Fall and Fall-Related Injury  Outcome: Progressing     Problem: Restraint, Nonviolent  Goal: Absence of Harm or Injury  Outcome: Progressing     Problem: Coping Ineffective  Goal: Effective Coping  Outcome: Progressing

## 2025-05-27 NOTE — ASSESSMENT & PLAN NOTE
Nutrition consulted. Most recent weight and BMI monitored-     Measurements:  Wt Readings from Last 1 Encounters:   05/06/25 64.1 kg (141 lb 5 oz)   Body mass index is 19.71 kg/m².    Patient has been screened and assessed by RD.    Malnutrition Type:  Context: chronic illness  Level: severe    Malnutrition Characteristic Summary:  Energy Intake (Malnutrition): less than or equal to 50% for greater than or equal to 1 month  Subcutaneous Fat (Malnutrition):  (moderate to severe)  Muscle Mass (Malnutrition):  (moderate to severe)    Interventions/Recommendations (treatment strategy):  1.)    - Discussed G tube placement with daughter/MPOA and GI, planned for placement on 5/22

## 2025-05-28 LAB
ABSOLUTE EOSINOPHIL (OHS): 0.11 K/UL
ABSOLUTE MONOCYTE (OHS): 1.02 K/UL (ref 0.3–1)
ABSOLUTE NEUTROPHIL COUNT (OHS): 8.37 K/UL (ref 1.8–7.7)
ANION GAP (OHS): 14 MMOL/L (ref 8–16)
BACTERIA BLD CULT: NORMAL
BACTERIA BLD CULT: NORMAL
BASOPHILS # BLD AUTO: 0.05 K/UL
BASOPHILS NFR BLD AUTO: 0.5 %
BUN SERPL-MCNC: 40 MG/DL (ref 8–23)
CALCIUM SERPL-MCNC: 10.2 MG/DL (ref 8.7–10.5)
CHLORIDE SERPL-SCNC: 104 MMOL/L (ref 95–110)
CO2 SERPL-SCNC: 19 MMOL/L (ref 23–29)
CREAT SERPL-MCNC: 1.4 MG/DL (ref 0.5–1.4)
ERYTHROCYTE [DISTWIDTH] IN BLOOD BY AUTOMATED COUNT: 12.1 % (ref 11.5–14.5)
GFR SERPLBLD CREATININE-BSD FMLA CKD-EPI: 51 ML/MIN/1.73/M2
GLUCOSE SERPL-MCNC: 96 MG/DL (ref 70–110)
HCT VFR BLD AUTO: 41.8 % (ref 40–54)
HGB BLD-MCNC: 13.7 GM/DL (ref 14–18)
IMM GRANULOCYTES # BLD AUTO: 0.19 K/UL (ref 0–0.04)
IMM GRANULOCYTES NFR BLD AUTO: 1.7 % (ref 0–0.5)
INR PPP: 1.1 (ref 0.8–1.2)
LYMPHOCYTES # BLD AUTO: 1.13 K/UL (ref 1–4.8)
MAGNESIUM SERPL-MCNC: 1.8 MG/DL (ref 1.6–2.6)
MCH RBC QN AUTO: 31.3 PG (ref 27–31)
MCHC RBC AUTO-ENTMCNC: 32.8 G/DL (ref 32–36)
MCV RBC AUTO: 95 FL (ref 82–98)
NUCLEATED RBC (/100WBC) (OHS): 0 /100 WBC
PLATELET # BLD AUTO: 167 K/UL (ref 150–450)
PLATELET BLD QL SMEAR: NORMAL
PMV BLD AUTO: 11.4 FL (ref 9.2–12.9)
POCT GLUCOSE: 121 MG/DL (ref 70–110)
POCT GLUCOSE: 130 MG/DL (ref 70–110)
POCT GLUCOSE: 133 MG/DL (ref 70–110)
POCT GLUCOSE: 199 MG/DL (ref 70–110)
POTASSIUM SERPL-SCNC: 4.6 MMOL/L (ref 3.5–5.1)
PROTHROMBIN TIME: 12 SECONDS (ref 9–12.5)
RBC # BLD AUTO: 4.38 M/UL (ref 4.6–6.2)
RELATIVE EOSINOPHIL (OHS): 1 %
RELATIVE LYMPHOCYTE (OHS): 10.4 % (ref 18–48)
RELATIVE MONOCYTE (OHS): 9.4 % (ref 4–15)
RELATIVE NEUTROPHIL (OHS): 77 % (ref 38–73)
SODIUM SERPL-SCNC: 137 MMOL/L (ref 136–145)
WBC # BLD AUTO: 10.87 K/UL (ref 3.9–12.7)

## 2025-05-28 PROCEDURE — 36415 COLL VENOUS BLD VENIPUNCTURE: CPT | Performed by: STUDENT IN AN ORGANIZED HEALTH CARE EDUCATION/TRAINING PROGRAM

## 2025-05-28 PROCEDURE — 25000003 PHARM REV CODE 250: Performed by: STUDENT IN AN ORGANIZED HEALTH CARE EDUCATION/TRAINING PROGRAM

## 2025-05-28 PROCEDURE — 25000003 PHARM REV CODE 250: Performed by: HOSPITALIST

## 2025-05-28 PROCEDURE — 63600175 PHARM REV CODE 636 W HCPCS: Performed by: INTERNAL MEDICINE

## 2025-05-28 PROCEDURE — 25000003 PHARM REV CODE 250

## 2025-05-28 PROCEDURE — 85610 PROTHROMBIN TIME: CPT | Performed by: STUDENT IN AN ORGANIZED HEALTH CARE EDUCATION/TRAINING PROGRAM

## 2025-05-28 PROCEDURE — 63600175 PHARM REV CODE 636 W HCPCS: Performed by: HOSPITALIST

## 2025-05-28 PROCEDURE — 20600001 HC STEP DOWN PRIVATE ROOM

## 2025-05-28 PROCEDURE — 83735 ASSAY OF MAGNESIUM: CPT | Performed by: STUDENT IN AN ORGANIZED HEALTH CARE EDUCATION/TRAINING PROGRAM

## 2025-05-28 PROCEDURE — 80048 BASIC METABOLIC PNL TOTAL CA: CPT | Performed by: STUDENT IN AN ORGANIZED HEALTH CARE EDUCATION/TRAINING PROGRAM

## 2025-05-28 PROCEDURE — 85025 COMPLETE CBC W/AUTO DIFF WBC: CPT | Performed by: STUDENT IN AN ORGANIZED HEALTH CARE EDUCATION/TRAINING PROGRAM

## 2025-05-28 RX ORDER — TALC
6 POWDER (GRAM) TOPICAL NIGHTLY PRN
Start: 2025-05-28

## 2025-05-28 RX ORDER — CARBIDOPA AND LEVODOPA 25; 100 MG/1; MG/1
2 TABLET ORAL 3 TIMES DAILY
Qty: 180 TABLET | Refills: 11 | Status: SHIPPED | OUTPATIENT
Start: 2025-05-28 | End: 2026-05-28

## 2025-05-28 RX ADMIN — CYANOCOBALAMIN TAB 1000 MCG 1000 MCG: 1000 TAB at 08:05

## 2025-05-28 RX ADMIN — PANTOPRAZOLE SODIUM 40 MG: 40 INJECTION, POWDER, FOR SOLUTION INTRAVENOUS at 08:05

## 2025-05-28 RX ADMIN — POLYETHYLENE GLYCOL 3350 17 G: 17 POWDER, FOR SOLUTION ORAL at 09:05

## 2025-05-28 RX ADMIN — POLYETHYLENE GLYCOL 3350 17 G: 17 POWDER, FOR SOLUTION ORAL at 08:05

## 2025-05-28 RX ADMIN — MIRTAZAPINE 15 MG: 15 TABLET, FILM COATED ORAL at 09:05

## 2025-05-28 RX ADMIN — CARBIDOPA AND LEVODOPA 2 TABLET: 25; 100 TABLET ORAL at 08:05

## 2025-05-28 RX ADMIN — CARBIDOPA AND LEVODOPA 2 TABLET: 25; 100 TABLET ORAL at 09:05

## 2025-05-28 RX ADMIN — CARBIDOPA AND LEVODOPA 2 TABLET: 25; 100 TABLET ORAL at 02:05

## 2025-05-28 RX ADMIN — NIFEDIPINE 60 MG: 30 TABLET, FILM COATED, EXTENDED RELEASE ORAL at 08:05

## 2025-05-28 RX ADMIN — Medication 100 MG: at 08:05

## 2025-05-28 RX ADMIN — ENOXAPARIN SODIUM 40 MG: 40 INJECTION SUBCUTANEOUS at 05:05

## 2025-05-28 RX ADMIN — PANTOPRAZOLE SODIUM 40 MG: 40 INJECTION, POWDER, FOR SOLUTION INTRAVENOUS at 09:05

## 2025-05-28 NOTE — PLAN OF CARE
Patient resting in bed at this time. Alert and oriented to self. Needs anticipated by staff. No known unmet needs at this time. Respirations even and unlabored. Lung sounds clear. Heart rate and rhythm regular. Abdomen soft, nontender, nondistended. Bowel sounds present. VSS. NAD.    Problem: Adult Inpatient Plan of Care  Goal: Plan of Care Review  Outcome: Ongoing  Goal: Patient-Specific Goal (Individualized)  Outcome: Ongoing  Goal: Absence of Hospital-Acquired Illness or Injury  Outcome: Ongoing  Goal: Optimal Comfort and Wellbeing  Outcome: Ongoing  Goal: Readiness for Transition of Care  Outcome: Ongoing     Problem: Skin Injury Risk Increased  Goal: Skin Health and Integrity  Outcome: Ongoing     Problem: Infection  Goal: Absence of Infection Signs and Symptoms  Outcome: Ongoing     Problem: Delirium  Goal: Optimal Coping  Outcome: Ongoing  Goal: Improved Behavioral Control  Outcome: Ongoing  Goal: Improved Attention and Thought Clarity  Outcome: Ongoing  Goal: Improved Sleep  Outcome: Ongoing     Problem: Fall Injury Risk  Goal: Absence of Fall and Fall-Related Injury  Outcome: Ongoing     Problem: Restraint, Nonviolent  Goal: Absence of Harm or Injury  Outcome: Ongoing     Problem: Coping Ineffective  Goal: Effective Coping  Outcome: Ongoing

## 2025-05-28 NOTE — PLAN OF CARE
[facility]    FACILITY TRANSFER ORDERS      Patient Name: Vince Huffman  YOB: 1945    PCP: Leland Porras MD   PCP Address: [address] [place] [address]  PCP Phone Number: [phone]  PCP Fax: [phone]    Encounter Date: 05/28/2025    Admit to: Nursing Home with Hospice    Vital Signs:  Routine    DNR    Diagnoses:   Active Hospital Problems    Diagnosis  POA    *Acute encephalopathy [G93.40]  Yes    Palliative care encounter [Z51.5]  Not Applicable    Severe protein-calorie malnutrition [E43]  Yes    GIB (gastrointestinal bleeding) [K92.2]  No    Hypothyroid [E03.9]  Yes    Anemia [D64.9]  Yes    Major depressive disorder, single episode, 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 [F32.2]  Yes    CAD (coronary artery disease) [I25.10]  Yes    AMS (altered mental status) [R41.82]  Yes    Parkinsonism [G20.C]  Yes    Hyponatremia [E87.1]  Yes    Generalized weakness [R53.1]  Yes     Chronic    Unintended weight loss [R63.4]  Yes     Chronic    Essential hypertension [I10]  Yes     Chronic    Crohn's disease [K50.90]  Yes      Resolved Hospital Problems    Diagnosis Date Resolved POA    Hypomagnesemia [E83.42] 05/15/2025 Yes    Tachycardia [R00.0] 05/06/2025 Yes    UTI (urinary tract infection) [N39.0] 05/01/2025 Unknown    Bereavement [Z63.4] 05/01/2025 Not Applicable       Allergies:  Review of patient's allergies indicates:   Allergen Reactions    Gluten Diarrhea    Lactose        Diet: regular diet    Activities: Activity as tolerated    Goals of Care Treatment Preferences:  Code Status: DNR          What is most important right now is to focus on symptom/pain control, quality of life, even if it means sacrificing a little time.            Medications: Review discharge medications with patient and family and provide education.         Medication List        START taking these medications      carbidopa-levodopa  mg  mg per tablet  Commonly known as: SINEMET  Take 2  tablets by mouth 3 (three) times daily.     melatonin 3 mg tablet  Commonly known as: MELATIN  Take 2 tablets (6 mg total) by mouth nightly as needed for Insomnia.            CONTINUE taking these medications      acetaminophen 500 MG tablet  Commonly known as: TYLENOL  Take 500 mg by mouth every 6 (six) hours as needed.     amLODIPine 10 MG tablet  Commonly known as: NORVASC  Take 1 tablet (10 mg total) by mouth once daily.            STOP taking these medications      ALPRAZolam 0.5 MG Tb24  Commonly known as: XANAX XR     STELARA 90 mg/mL Syrg syringe  Generic drug: ustekinumab                Immunizations Administered as of 5/28/2025       No immunizations on file.                Some patients may experience side effects after vaccination.  These may include fever, headache, muscle or joint aches.  Most symptoms resolve with 24-48 hours and do not require urgent medical evaluation unless they persist for more than 72 hours or symptoms are concerning for an unrelated medical condition.          _________________________________  Juan F Ramírez MD  05/28/2025

## 2025-05-28 NOTE — SUBJECTIVE & OBJECTIVE
Interval History: Medically ready for dc, plan is NH with hospice.    Review of Systems  Objective:     Vital Signs (Most Recent):  Temp: 98.9 °F (37.2 °C) (05/28/25 0840)  Pulse: 90 (05/28/25 0840)  Resp: 16 (05/28/25 0840)  BP: (!) 178/79 (05/28/25 0834)  SpO2: 98 % (05/28/25 0840) Vital Signs (24h Range):  Temp:  [97.4 °F (36.3 °C)-98.9 °F (37.2 °C)] 98.9 °F (37.2 °C)  Pulse:  [] 90  Resp:  [16-18] 16  SpO2:  [96 %-99 %] 98 %  BP: (123-178)/(73-88) 178/79     Weight: 64.1 kg (141 lb 5 oz)  Body mass index is 19.71 kg/m².    Intake/Output Summary (Last 24 hours) at 5/28/2025 0904  Last data filed at 5/27/2025 1849  Gross per 24 hour   Intake 111 ml   Output 550 ml   Net -439 ml      Physical Exam  Constitutional:       Appearance: He is ill-appearing.   HENT:      Head: Normocephalic and atraumatic.   Cardiovascular:      Rate and Rhythm: Normal rate and regular rhythm.      Heart sounds: No murmur heard.  Pulmonary:      Effort: Pulmonary effort is normal. No respiratory distress.      Breath sounds: Normal breath sounds. No wheezing or rales.   Abdominal:      General: There is no distension.      Palpations: Abdomen is soft.      Tenderness: There is no abdominal tenderness.   Musculoskeletal:         General: No deformity.   Skin:     General: Skin is warm and dry.      Coloration: Skin is pale.   Neurological:      General: No focal deficit present.      Comments: Able to have a conversation, oriented to person         MELD 3.0: 10 at 5/23/2025  6:10 AM  MELD-Na: 8 at 5/23/2025  6:10 AM  Calculated from:  Serum Creatinine: 1.2 mg/dL at 5/23/2025  6:10 AM  Serum Sodium: 135 mmol/L at 5/23/2025  6:10 AM  Total Bilirubin: 0.4 mg/dL (Using min of 1 mg/dL) at 5/21/2025  4:13 AM  Serum Albumin: 3.1 g/dL at 5/21/2025  4:13 AM  INR(ratio): 1 at 5/22/2025  5:46 AM  Age at listing (hypothetical): 79 years  Sex: Male at 5/23/2025  6:10 AM      Significant Labs:  CBC:  Recent Labs   Lab 05/27/25  0434  05/28/25  0308   WBC 9.57 10.87   HGB 13.8* 13.7*   HCT 41.0 41.8    167     CMP:  Recent Labs   Lab 05/27/25  0434 05/28/25  0308    137   K 4.2 4.6    104   CO2 21* 19*    96   BUN 43* 40*   CREATININE 1.6* 1.4   CALCIUM 10.3 10.2   ANIONGAP 14 14     PTINR:  Recent Labs   Lab 05/27/25 0434 05/28/25 0308   INR 1.0 1.1       Significant Procedures:   Dobutamine Stress Test with Color Flow: No results found for this or any previous visit.    None

## 2025-05-28 NOTE — ASSESSMENT & PLAN NOTE
Patient's blood pressure range in the last 24 hours was: BP  Min: 123/73  Max: 178/79.The patient's inpatient anti-hypertensive regimen is listed below:  Current Antihypertensives  NIFEdipine 24 hr tablet 60 mg, Daily, Oral    Plan  - BP is controlled, no changes needed to their regimen  Changed from Norvasc to Procardia given Psych recs

## 2025-05-28 NOTE — PLAN OF CARE
Pt alert; oriented to self only. NAD. VSS on room air. Condom cath to urimeter draining well. Pt frequently turned. No acute events. Safety measures in place. Family at bedside. No concerns voiced at this time. Call light in reach.    Problem: Adult Inpatient Plan of Care  Goal: Plan of Care Review  Outcome: Progressing  Goal: Patient-Specific Goal (Individualized)  Outcome: Progressing  Goal: Absence of Hospital-Acquired Illness or Injury  Outcome: Progressing  Goal: Optimal Comfort and Wellbeing  Outcome: Progressing  Goal: Readiness for Transition of Care  Outcome: Progressing     Problem: Skin Injury Risk Increased  Goal: Skin Health and Integrity  Outcome: Progressing     Problem: Infection  Goal: Absence of Infection Signs and Symptoms  Outcome: Progressing     Problem: Delirium  Goal: Optimal Coping  Outcome: Progressing  Goal: Improved Behavioral Control  Outcome: Progressing  Goal: Improved Attention and Thought Clarity  Outcome: Progressing  Goal: Improved Sleep  Outcome: Progressing     Problem: Fall Injury Risk  Goal: Absence of Fall and Fall-Related Injury  Outcome: Progressing     Problem: Coping Ineffective  Goal: Effective Coping  Outcome: Progressing

## 2025-05-28 NOTE — PLAN OF CARE
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 [F32.2]  Yes    CAD (coronary artery disease) [I25.10]  Yes    AMS (altered mental status) [R41.82]  Yes    Parkinsonism [G20.C]  Yes    Hyponatremia [E87.1]  Yes    Generalized weakness [R53.1]  Yes     Chronic    Unintended weight loss [R63.4]  Yes     Chronic    Essential hypertension [I10]  Yes     Chronic    Crohn's disease [K50.90]  Yes      Resolved Hospital Problems    Diagnosis Date Resolved POA    Hypomagnesemia [E83.42] 05/15/2025 Yes    Tachycardia [R00.0] 05/06/2025 Yes    UTI (urinary tract infection) [N39.0] 05/01/2025 Unknown    Bereavement [Z63.4] 05/01/2025 Not Applicable       Hospice Qualifying Diagnoses:        Patient has a life expectancy < 6 months due to:  Primary Hospice Diagnosis Dementia:    Vital Signs: Routine per Hospice Protocol.    Code Status: DNR    Allergies:   Review of patient's allergies indicates:   Allergen Reactions    Gluten Diarrhea    Lactose        Diet: Regular    Activities: As tolerated    Goals of Care Treatment Preferences:  Code Status: DNR          What is most important right now is to focus on symptom/pain control, quality of life, even if it means sacrificing a little time.        Nursing: Per Hospice Routine.              Medications:          Medication List        START taking these medications      carbidopa-levodopa  mg  mg per  tablet  Commonly known as: SINEMET  Take 2 tablets by mouth 3 (three) times daily.     melatonin 3 mg tablet  Commonly known as: MELATIN  Take 2 tablets (6 mg total) by mouth nightly as needed for Insomnia.            CONTINUE taking these medications      acetaminophen 500 MG tablet  Commonly known as: TYLENOL  Take 500 mg by mouth every 6 (six) hours as needed.     amLODIPine 10 MG tablet  Commonly known as: NORVASC  Take 1 tablet (10 mg total) by mouth once daily.            STOP taking these medications      ALPRAZolam 0.5 MG Tb24  Commonly known as: XANAX XR     STELARA 90 mg/mL Syrg syringe  Generic drug: ustekinumab                      Future Orders:  Hospice Medical Director may dictate new orders for comfortable care measures & sign death certificate.        _________________________________  Juan F Ramírez MD  05/28/2025

## 2025-05-28 NOTE — ASSESSMENT & PLAN NOTE
Anemia is likely due to acute on chronic illness. Most recent hemoglobin and hematocrit are listed below.  Recent Labs     05/26/25  0513 05/27/25  0434 05/28/25  0308   HGB 14.4 13.8* 13.7*   HCT 43.2 41.0 41.8     Plan  - Monitor serial CBC: Daily  - Transfuse PRBC if patient becomes hemodynamically unstable, symptomatic or H/H drops below 7/21.  - Patient has not received any PRBC transfusions to date  - Patient's anemia is currently stable  - Continue PPI with high dose steroids per neurology

## 2025-05-28 NOTE — PT/OT/SLP PROGRESS
Occupational Therapy      Patient Name:  Vince Huffman   MRN:  [id number]    Patient not seen today secondary to Pt pending d/c to nursing home with hospice. Will follow-up should Pt remain inpatient and discuss goals of care with therapy involvement.    5/28/2025

## 2025-05-28 NOTE — PLAN OF CARE
05/28/25 1445   Post-Acute Status   Post-Acute Authorization Hospice   Patient choice form signed by patient/caregiver List with quality metrics by geographic area provided   Discharge Delays None known at this time   Discharge Plan   Discharge Plan A Inpatient Hospice;New Nursing Home placement - intermediate care facility     LACEY faxed Hospice Orders to  HOSPICE [facility] via [facility] for review. SW will continue to follow patient.          Discharge Plan A and Plan B have been determined by review of patient's clinical status, future medical and therapeutic needs, and coverage/benefits for post-acute care in coordination with multidisciplinary team members.     6:10 PM   LACEY updated son and brother at bedside waiting for the aunt to bring finical and sign paperwork at he facility on  tomorrow            Concepcion Wood LMSW   - [facility]

## 2025-05-28 NOTE — ASSESSMENT & PLAN NOTE
Hyponatremia is likely due to Dehydration/hypovolemia. The patient's most recent sodium results are listed below.  Recent Labs     05/26/25  0513 05/27/25  0434 05/28/25  0308   * 138 137     Maxime  - Monitor sodium Daily.   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 Most recent hemoglobin and hematocrit are listed below.  Recent Labs     05/26/25  0513 05/27/25  0434 05/28/25  0308   HGB 14.4 13.8* 13.7*   HCT 43.2 41.0 41.8     Plan  - Monitor serial CBC: Daily  - Transfuse PRBC if patient becomes hemodynamically unstable, symptomatic or H/H drops below 7/21.  - Patient has not received any PRBC transfusions to date  - Patient's anemia is currently stable  -

## 2025-05-28 NOTE — PLAN OF CARE
05/28/25 1250   Post-Acute Status   Post-Acute Authorization Placement   Post-Acute Placement Status Set-up Complete/Auth obtained   Hospice Status Set-up Complete/Auth obtained   Hospital Resources/Appts/Education Provided Provided patient/caregiver with written discharge plan information   Discharge Delays None known at this time   Discharge Plan   Discharge Plan A New Nursing Home placement - nursing home care facility;Inpatient Hospice     [facility] has accepted the patient for placement with hospice.  SW informed Team SW will continue to follow.        Discharge Plan A and Plan B have been determined by review of patient's clinical status, future medical and therapeutic needs, and coverage/benefits for post-acute care in coordination with multidisciplinary team members.       Concepcion Wood, CARLEE  - [facility]

## 2025-05-28 NOTE — ASSESSMENT & PLAN NOTE
"Per CT scan "thickening of the distal ileum to the surgical anastomosis with the large bowel. Inflammatory infectious ileitis are considerations. Recommend clinical correlation and follow-up. "  CRP negative. No concern for flare.   GI without concerns of confusion being caused by sterlara, confirmed with pharmacy   On 5/19 developed new onset dark stools  Has been on Lovenox, IV Solumedrol and PO PPI  Pantoprazole 80mg IV x 1 then PPI 40mg IV BID  GI consulted  Repeat H/H at 2pm  Will transfuse if Hgb is <7g/dl (<8g/dl in cases of active ACS) or if patient has rapid bleeding leading to hemodynamic instability  IBD team recommending CTE vs MRE when patient able to take po and improving  Will need re-staging colonoscopy, likely outpatient     Recent Labs     05/26/25  0513 05/26/25  0611 05/27/25  0434 05/28/25  0308   HGB 14.4  --  13.8* 13.7*   HCT 43.2  --  41.0 41.8   *  --  170 167   INR  --  1.0 1.0 1.1   CRP negative. No concern for flare.   GI without concerns of confusion being caused by sterlara, confirmed with pharmacy   On 5/19 developed new onset dark stools  Has been on Lovenox, IV Solumedrol and PO PPI  Pantoprazole 80mg IV x 1 then PPI 40mg IV BID  GI consulted  Repeat H/H at 2pm  Will transfuse if Hgb is <7g/dl (<8g/dl in cases of active ACS) or if patient has rapid bleeding leading to hemodynamic instability    Recent Labs     05/26/25  0513 05/26/25  0611 05/27/25  0434 05/28/25  0308   HGB 14.4  --  13.8* 13.7*   HCT 43.2  --  41.0 41.8   *  --  170 167   INR  --  1.0 1.0 1.1     Recent Labs     05/26/25  0513 05/26/25  0611 05/27/25  0434 05/28/25  0308   HGB 14.4  --  13.8* 13.7*   HCT 43.2  --  41.0 41.8   *  --  170 167   INR  --  1.0 1.0 1.1   CRP negative. No concern for flare.   GI without concerns of confusion being caused by sterlara, confirmed with pharmacy   On 5/19 developed new onset dark stools  Has been on Lovenox, IV Solumedrol and PO PPI  Pantoprazole 80mg IV " x 1 then PPI 40mg IV BID  GI consulted  Repeat H/H at 2pm  Will transfuse if Hgb is <7g/dl (<8g/dl in cases of active ACS) or if patient has rapid bleeding leading to hemodynamic instability  IBD team recommending CTE vs MRE when patient able to take po and improving  Will need re-staging colonoscopy, likely outpatient     Recent Labs     05/26/25  0513 05/26/25  0611 05/27/25  0434 05/28/25  0308   HGB 14.4  --  13.8* 13.7*   HCT 43.2  --  41.0 41.8   *  --  170 167   INR  --  1.0 1.0 1.1     Recent Labs     05/26/25  0513 05/26/25  0611 05/27/25 0434 05/28/25  0308   HGB 14.4  --  13.8* 13.7*   HCT 43.2  --  41.0 41.8   *  --  170 167   INR  --  1.0 1.0 1.1   On 5/19 developed new onset dark stools  Has been on Lovenox, IV Solumedrol and PO PPI  Pantoprazole 80mg IV x 1 then PPI 40mg IV BID  GI consulted  Repeat H/H at 2pm  Will transfuse if Hgb is <7g/dl (<8g/dl in cases of active ACS) or if patient has rapid bleeding leading to hemodynamic instability    Recent Labs     05/26/25  0513 05/26/25  0611 05/27/25  0434 05/28/25  0308   HGB 14.4  --  13.8* 13.7*   HCT 43.2  --  41.0 41.8   *  --  170 167   INR  --  1.0 1.0 1.1

## 2025-05-28 NOTE — PROGRESS NOTES
Adam Amezquita - [facility] Medicine  Progress Note    Patient Name: Vince Huffman  MRN: [id number]  Patient Class: IP- Inpatient   Admission Date: 4/30/2025  Length of Stay: 27 days  Attending Physician: Juan F Ramírez MD  Primary Care Provider: Leland Porras MD        Subjective     Principal Problem:Acute encephalopathy        HPI:  By Dr. Juan F Ramírez MD    80 yo M w/HTN, hypothyroid, crohn's disease, CAD, hx of SBO, presenting with AMS from [facility]. Patient was sent in from [facility] for progressively worsening cognitive function. Patient is A&O x1 at his baseline. Patient and his daughter endorse mild confusion. Which has been progressive.    Patient has not had any recent falls. Endorsed mild suprapubic abdominal pain. No UTI. Mild inflammation on CT, not unexpected in the setting of Crohn's.     Overview/Hospital Course:  Mr. Huffman was admitted to  for further evaluation of worsening AMS per rehab facility. AAOx1 on admission, normally AAOx4 prior to 4/20 per son. UA negative. CXR clear. CT abd/pelvis with wall thickening of distal ileum, inflammatory infectious ileitis are considerations, fecal distention of the rectum, impaction not excluded. Discussed with GI, anticipated these are chronic findings. CRP negative. Will give enema and monitor for improvement. Patient with successful BM. Neurology consulted: MRI brain ordered (no acute findings, motion artifact), extended EEG, ammonia levels. Worsening mental status on 5/2, medicine team consulted for LP which was unsuccessful. More alert on 5/3 and answering yes/no questions when redirected. Neurology recommending carbidopa-levodopa trial, EEG, and attempting another LP.  Symptoms improved with increasing doses of Carbidopa/Levodopa. LP concerning for meningitis so he was started on Empiric treatment on 5/6/25. Discussion held with both Neurology and Infectious Disease, Infectious Disease does not believe patient  has any infectious etiology and as such recommended discontinuing antimicrobials.  Neurology started IVIG for presumed autoimmune process.  Completed 5 day course of IVIG 5/14. Minimal improvement in cognition. Had bouts of poor oral intake, only drank boost.  Had intermittent hyponatremia responding to IV fluids.  On 05/14 am Sodium 124.  Nephrology was consulted, investigations ordered and sodium corrected with close electrolyte monitoring.  Psych was consulted by the request of the neurologist. Hyponatremia improved after stopping fluids. NG tube attempted thrice at bedside however not able to be placed. Able to tolerate oral intake however very limited intake. Neuro recommended repeat MRI with sedation as previous had artifact. Anesthesia notified to arrange with sedation which was done on 5/17.  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 Endocrine consulted. Neurology planning 5 day course of IV Solumedrol starting 5/17.   He was started on Vitamin B12 and Thiamine replacement.    Steroids paused on 5/19 given concern for upper GI bleeding. PPI started and GI consulted. GI team does not share same concern for bleeding and hemoglobin notably stable despite elevated BUN, which could be coincidence. Will continue PPI for now. Steroids resumed with PPI ongoing on 5/20 (day 4/5 of steroids). Discussed role of G-tube with family and GI and everyone agreeable to placement. Plan for G tube placement on 5/22.     Interval History: Medically ready for dc, plan is NH with hospice.    Review of Systems  Objective:     Vital Signs (Most Recent):  Temp: 98.9 °F (37.2 °C) (05/28/25 0840)  Pulse: 90 (05/28/25 0840)  Resp: 16 (05/28/25 0840)  BP: (!) 178/79 (05/28/25 0834)  SpO2: 98 % (05/28/25 0840) Vital Signs (24h Range):  Temp:  [97.4 °F (36.3 °C)-98.9 °F (37.2 °C)] 98.9 °F (37.2 °C)  Pulse:  [] 90  Resp:  [16-18] 16  SpO2:  [96 %-99 %] 98 %  BP:  (123-178)/(73-88) 178/79     Weight: 64.1 kg (141 lb 5 oz)  Body mass index is 19.71 kg/m².    Intake/Output Summary (Last 24 hours) at 5/28/2025 0904  Last data filed at 5/27/2025 1849  Gross per 24 hour   Intake 111 ml   Output 550 ml   Net -439 ml      Physical Exam  Constitutional:       Appearance: He is ill-appearing.   HENT:      Head: Normocephalic and atraumatic.   Cardiovascular:      Rate and Rhythm: Normal rate and regular rhythm.      Heart sounds: No murmur heard.  Pulmonary:      Effort: Pulmonary effort is normal. No respiratory distress.      Breath sounds: Normal breath sounds. No wheezing or rales.   Abdominal:      General: There is no distension.      Palpations: Abdomen is soft.      Tenderness: There is no abdominal tenderness.   Musculoskeletal:         General: No deformity.   Skin:     General: Skin is warm and dry.      Coloration: Skin is pale.   Neurological:      General: No focal deficit present.      Comments: Able to have a conversation, oriented to person         MELD 3.0: 10 at 5/23/2025  6:10 AM  MELD-Na: 8 at 5/23/2025  6:10 AM  Calculated from:  Serum Creatinine: 1.2 mg/dL at 5/23/2025  6:10 AM  Serum Sodium: 135 mmol/L at 5/23/2025  6:10 AM  Total Bilirubin: 0.4 mg/dL (Using min of 1 mg/dL) at 5/21/2025  4:13 AM  Serum Albumin: 3.1 g/dL at 5/21/2025  4:13 AM  INR(ratio): 1 at 5/22/2025  5:46 AM  Age at listing (hypothetical): 79 years  Sex: Male at 5/23/2025  6:10 AM      Significant Labs:  CBC:  Recent Labs   Lab 05/27/25  0434 05/28/25  0308   WBC 9.57 10.87   HGB 13.8* 13.7*   HCT 41.0 41.8    167     CMP:  Recent Labs   Lab 05/27/25  0434 05/28/25  0308    137   K 4.2 4.6    104   CO2 21* 19*    96   BUN 43* 40*   CREATININE 1.6* 1.4   CALCIUM 10.3 10.2   ANIONGAP 14 14     PTINR:  Recent Labs   Lab 05/27/25  0434 05/28/25  0308   INR 1.0 1.1       Significant Procedures:   Dobutamine Stress Test with Color Flow: No results found for this or any  previous visit.    None      Assessment & Plan  Acute encephalopathy  AMS (altered mental status)  Progressive acutely worsening confusion/tremors since 4/20. Previously AAOx4, now AAOx1  Neurology consulted: MRI brain ordered (no acute findings, motion artifact), extended EEG, ammonia levels.   Worsening mental status on 5/2, medicine team consulted for LP which was unsuccessful.   Neurology recommending carbidopa-levodopa trial, EEG, and attempting another LP.    Symptoms improved with increasing doses of Carbidopa/Levodopa.   LP concerning for meningitis so he was started on Empiric treatment on 5/6/25, viral panel negative  Discussion held with both Neurology and Infectious Disease, Infectious Disease does not believe patient has any infectious etiology and as such recommended discontinuing antimicrobials.    Neurology started IVIG for presumed autoimmune process.  Completed 5 day course of IVIG 5/14. Minimal improvement in cognition.   Neuro recommended repeat MRI with sedation as previous had artifact. Anesthesia notified to arrange with sedation which was done on 5/17.    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 Endocrine consulted.   Neurology planning 5 day course of IV Solumedrol starting 5/17 - paused on 5/19, resumed 5/20 (Today is Day 4/5)   Continue Vitamin B12 and Thiamine replacement.  Progressive acutely worsening confusion/tremors since 4/20. Previously AAOx4, now AAOx1  Neurology consulted: MRI brain ordered (no acute findings, motion artifact), extended EEG, ammonia levels.   Worsening mental status on 5/2, medicine team consulted for LP which was unsuccessful.   Neurology recommending carbidopa-levodopa trial, EEG, and attempting another LP.    Symptoms improved with increasing doses of Carbidopa/Levodopa.   LP concerning for meningitis so he was started on Empiric treatment on 5/6/25, viral panel negative  Discussion held with both  Neurology and Infectious Disease, Infectious Disease does not believe patient has any infectious etiology and as such recommended discontinuing antimicrobials.    Neurology started IVIG for presumed autoimmune process.  Completed 5 day course of IVIG 5/14. Minimal improvement in cognition.   Neuro recommended repeat MRI with sedation as previous had artifact. Anesthesia notified to arrange with sedation which was done on 5/17.    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 Endocrine consulted.   Neurology planning 5 day course of IV Solumedrol starting 5/17 - paused on 5/19, resumed 5/20 (Today is Day 5/5)   Continue Vitamin B12 and Thiamine replacement.  Progressive acutely worsening confusion/tremors since 4/20. Previously AAOx4, now AAOx1  Neurology consulted: MRI brain ordered (no acute findings, motion artifact), extended EEG, ammonia levels.   Worsening mental status on 5/2, medicine team consulted for LP which was unsuccessful.   Neurology recommending carbidopa-levodopa trial, EEG, and attempting another LP.    Symptoms improved with increasing doses of Carbidopa/Levodopa.   LP concerning for meningitis so he was started on Empiric treatment on 5/6/25, viral panel negative  Discussion held with both Neurology and Infectious Disease, Infectious Disease does not believe patient has any infectious etiology and as such recommended discontinuing antimicrobials.    Neurology started IVIG for presumed autoimmune process.  Completed 5 day course of IVIG 5/14. Minimal improvement in cognition.   Neuro recommended repeat MRI with sedation as previous had artifact. Anesthesia notified to arrange with sedation which was done on 5/17.    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 Endocrine consulted.   Neurology planning 5 day course of IV Solumedrol starting 5/17 - paused on 5/19, resumed 5/20 (Today is Day  "4/5)   Continue Vitamin B12 and Thiamine replacement.  Progressive acutely worsening confusion/tremors since 4/20. Previously AAOx4, now AAOx1  Neurology consulted: MRI brain ordered (no acute findings, motion artifact), extended EEG, ammonia levels.   Worsening mental status on 5/2, medicine team consulted for LP which was unsuccessful.   Neurology recommending carbidopa-levodopa trial, EEG, and attempting another LP.    Symptoms improved with increasing doses of Carbidopa/Levodopa.   LP concerning for meningitis so he was started on Empiric treatment on 5/6/25, viral panel negative  Discussion held with both Neurology and Infectious Disease, Infectious Disease does not believe patient has any infectious etiology and as such recommended discontinuing antimicrobials.    Neurology started IVIG for presumed autoimmune process.  Completed 5 day course of IVIG 5/14. Minimal improvement in cognition.   Neuro recommended repeat MRI with sedation as previous had artifact. Anesthesia notified to arrange with sedation which was done on 5/17.    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 Endocrine consulted.   Neurology planning 5 day course of IV Solumedrol starting 5/17 - paused on 5/19, resumed 5/20 (Today is Day 5/5)   Continue Vitamin B12 and Thiamine replacement.  Crohn's disease  GIB (gastrointestinal bleeding)  Per CT scan "thickening of the distal ileum to the surgical anastomosis with the large bowel. Inflammatory infectious ileitis are considerations. Recommend clinical correlation and follow-up. "  CRP negative. No concern for flare.   GI without concerns of confusion being caused by sterlara, confirmed with pharmacy   On 5/19 developed new onset dark stools  Has been on Lovenox, IV Solumedrol and PO PPI  Pantoprazole 80mg IV x 1 then PPI 40mg IV BID  GI consulted  Repeat H/H at 2pm  Will transfuse if Hgb is <7g/dl (<8g/dl in cases of active ACS) or if " patient has rapid bleeding leading to hemodynamic instability  IBD team recommending CTE vs MRE when patient able to take po and improving  Will need re-staging colonoscopy, likely outpatient     Recent Labs     05/26/25  0513 05/26/25  0611 05/27/25  0434 05/28/25  0308   HGB 14.4  --  13.8* 13.7*   HCT 43.2  --  41.0 41.8   *  --  170 167   INR  --  1.0 1.0 1.1   CRP negative. No concern for flare.   GI without concerns of confusion being caused by sterlara, confirmed with pharmacy   On 5/19 developed new onset dark stools  Has been on Lovenox, IV Solumedrol and PO PPI  Pantoprazole 80mg IV x 1 then PPI 40mg IV BID  GI consulted  Repeat H/H at 2pm  Will transfuse if Hgb is <7g/dl (<8g/dl in cases of active ACS) or if patient has rapid bleeding leading to hemodynamic instability    Recent Labs     05/26/25  0513 05/26/25  0611 05/27/25  0434 05/28/25  0308   HGB 14.4  --  13.8* 13.7*   HCT 43.2  --  41.0 41.8   *  --  170 167   INR  --  1.0 1.0 1.1     Recent Labs     05/26/25  0513 05/26/25  0611 05/27/25  0434 05/28/25  0308   HGB 14.4  --  13.8* 13.7*   HCT 43.2  --  41.0 41.8   *  --  170 167   INR  --  1.0 1.0 1.1   CRP negative. No concern for flare.   GI without concerns of confusion being caused by sterlara, confirmed with pharmacy   On 5/19 developed new onset dark stools  Has been on Lovenox, IV Solumedrol and PO PPI  Pantoprazole 80mg IV x 1 then PPI 40mg IV BID  GI consulted  Repeat H/H at 2pm  Will transfuse if Hgb is <7g/dl (<8g/dl in cases of active ACS) or if patient has rapid bleeding leading to hemodynamic instability  IBD team recommending CTE vs MRE when patient able to take po and improving  Will need re-staging colonoscopy, likely outpatient     Recent Labs     05/26/25 0513 05/26/25 0611 05/27/25  0434 05/28/25  0308   HGB 14.4  --  13.8* 13.7*   HCT 43.2  --  41.0 41.8   *  --  170 167   INR  --  1.0 1.0 1.1     Recent Labs     05/26/25  0513 05/26/25  0611  05/27/25  0434 05/28/25  0308   HGB 14.4  --  13.8* 13.7*   HCT 43.2  --  41.0 41.8   *  --  170 167   INR  --  1.0 1.0 1.1   On 5/19 developed new onset dark stools  Has been on Lovenox, IV Solumedrol and PO PPI  Pantoprazole 80mg IV x 1 then PPI 40mg IV BID  GI consulted  Repeat H/H at 2pm  Will transfuse if Hgb is <7g/dl (<8g/dl in cases of active ACS) or if patient has rapid bleeding leading to hemodynamic instability    Recent Labs     05/26/25  0513 05/26/25  0611 05/27/25  0434 05/28/25  0308   HGB 14.4  --  13.8* 13.7*   HCT 43.2  --  41.0 41.8   *  --  170 167   INR  --  1.0 1.0 1.1     Essential hypertension  Patient's blood pressure range in the last 24 hours was: BP  Min: 123/73  Max: 178/79.The patient's inpatient anti-hypertensive regimen is listed below:  Current Antihypertensives  NIFEdipine 24 hr tablet 60 mg, Daily, Oral    Plan  - BP is controlled, no changes needed to their regimen  Changed from Norvasc to Procardia given Psych recs    Generalized weakness  Sent from SNF  Progressive worsening weakness per son   PT/OT ordered. Will need placement at discharge  Unintended weight loss  Reported per son  Recently started gluten free diet per recommendations from GI  Temporal wasting noted  Body mass index is 19.71 kg/m².  Nutrition following  Boost changed to Boost Breeze given lactose intolerance  Hyponatremia  Hyponatremia is likely due to Dehydration/hypovolemia. The patient's most recent sodium results are listed below.  Recent Labs     05/26/25  0513 05/27/25  0434 05/28/25  0308   * 138 137     Maxime  - Monitor sodium Daily.   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 Most recent hemoglobin and hematocrit are listed below.  Recent Labs     05/26/25  0513 05/27/25  0434 05/28/25  0308   HGB 14.4 13.8* 13.7*   HCT 43.2 41.0 41.8     Plan  - Monitor serial CBC: Daily  - Transfuse PRBC if  patient becomes hemodynamically unstable, symptomatic or H/H drops below 7/21.  - Patient has not received any PRBC transfusions to date  - Patient's anemia is currently stable  -  Parkinsonism  Suspect symptoms are at least partially due to Parkinson's disease.   Continue Sinemet TID  CAD (coronary artery disease)  History noted.   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 Will hold anti-depressant medications. We will consult psychiatry at this time. Patient does not display psychosis at this time. Continue to monitor closely and adjust plan of care as needed.  Anemia  Anemia is likely due to acute on chronic illness. Most recent hemoglobin and hematocrit are listed below.  Recent Labs     05/26/25  0513 05/27/25  0434 05/28/25  0308   HGB 14.4 13.8* 13.7*   HCT 43.2 41.0 41.8     Plan  - Monitor serial CBC: Daily  - Transfuse PRBC if patient becomes hemodynamically unstable, symptomatic or H/H drops below 7/21.  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 Most recent weight and BMI monitored-     Measurements:  Wt Readings from Last 1 Encounters:   05/06/25 64.1 kg (141 lb 5 oz)   Body mass index is 19.71 kg/m².    Patient has been screened and assessed by RD.    Malnutrition Type:  Context: chronic illness  Level: severe    Malnutrition Characteristic Summary:  Energy Intake (Malnutrition): less than or equal to 50% for greater than or equal to 1 month  Subcutaneous Fat (Malnutrition):  (moderate to severe)  Muscle Mass (Malnutrition):  (moderate to  severe)    Interventions/Recommendations (treatment strategy):  1.)    - Discussed G tube placement with daughter/MPOA and GI, planned for placement on 5/22    Palliative care encounter      VTE Risk Mitigation (From admission, onward)           Ordered     enoxaparin injection 40 mg  Every 24 hours         05/27/25 0823     IP VTE HIGH RISK PATIENT  Once         05/01/25 0827     Place sequential compression device  Until discontinued         05/01/25 0827                    Discharge Planning   ERNIE: 5/30/2025     Code Status: DNR   Medical Readiness for Discharge Date: 5/28/2025  Discharge Plan A: Inpatient Hospice, New Nursing Home placement - [facility] care facility   Discharge Delays: None known at this time            Please place Justification for DME        Juan F Ramírez MD  Department of Hospital Medicine   [facility] - Acute Medical Stepdown

## 2025-05-28 NOTE — PLAN OF CARE
05/28/25 0844   Post-Acute Status   Post-Acute Authorization Placement   Hospice Status Pending post acute provider review/more information requested   Patient choice form signed by patient/caregiver List with quality metrics by geographic area provided   Discharge Delays None known at this time   Discharge Plan   Discharge Plan A Inpatient Hospice;[facility] Nursing [facility] placement [facility]      LACEY spoke with Juliette Toledo Jr [facility] at [phone] and stated she's not sure but she believe patien's has been denied and will reach back to confirm after 9 am today. LACEY will continue to follow.      Discharge Plan A and Plan B have been determined by review of patient's clinical status, future medical and therapeutic needs, and coverage/benefits for post-acute care in coordination with multidisciplinary team members.       10:56 AM   LACEY spoke with Juliette Toledo Jr [facility] at [phone] she reported she await the decision of the IV. LACEY will follow.          Concepcion Wood, CARLEE   - [facility]

## 2025-05-29 PROBLEM — F32.2 MAJOR DEPRESSIVE DISORDER, SINGLE EPISODE, SEVERE WITHOUT PSYCHOSIS: Status: RESOLVED | Noted: 2025-05-14 | Resolved: 2025-05-29

## 2025-05-29 PROBLEM — E03.9 HYPOTHYROID: Status: RESOLVED | Noted: 2025-05-17 | Resolved: 2025-05-29

## 2025-05-29 PROBLEM — E87.1 HYPONATREMIA: Status: RESOLVED | Noted: 2025-04-30 | Resolved: 2025-05-29

## 2025-05-29 PROBLEM — R53.1 GENERALIZED WEAKNESS: Chronic | Status: RESOLVED | Noted: 2025-04-24 | Resolved: 2025-05-29

## 2025-05-29 PROBLEM — R63.4 UNINTENDED WEIGHT LOSS: Chronic | Status: RESOLVED | Noted: 2025-04-24 | Resolved: 2025-05-29

## 2025-05-29 LAB
ABSOLUTE EOSINOPHIL (OHS): 0.01 K/UL
ABSOLUTE MONOCYTE (OHS): 1.04 K/UL (ref 0.3–1)
ABSOLUTE NEUTROPHIL COUNT (OHS): 10.54 K/UL (ref 1.8–7.7)
ANION GAP (OHS): 10 MMOL/L (ref 8–16)
BASOPHILS # BLD AUTO: 0.01 K/UL
BASOPHILS NFR BLD AUTO: 0.1 %
BUN SERPL-MCNC: 39 MG/DL (ref 8–23)
CALCIUM SERPL-MCNC: 10 MG/DL (ref 8.7–10.5)
CHLORIDE SERPL-SCNC: 102 MMOL/L (ref 95–110)
CO2 SERPL-SCNC: 24 MMOL/L (ref 23–29)
CREAT SERPL-MCNC: 1.4 MG/DL (ref 0.5–1.4)
ERYTHROCYTE [DISTWIDTH] IN BLOOD BY AUTOMATED COUNT: 12.3 % (ref 11.5–14.5)
GFR SERPLBLD CREATININE-BSD FMLA CKD-EPI: 51 ML/MIN/1.73/M2
GLUCOSE SERPL-MCNC: 111 MG/DL (ref 70–110)
HCT VFR BLD AUTO: 35.8 % (ref 40–54)
HGB BLD-MCNC: 12.1 GM/DL (ref 14–18)
IMM GRANULOCYTES # BLD AUTO: 0.09 K/UL (ref 0–0.04)
IMM GRANULOCYTES NFR BLD AUTO: 0.7 % (ref 0–0.5)
INR PPP: 1.1 (ref 0.8–1.2)
LYMPHOCYTES # BLD AUTO: 1 K/UL (ref 1–4.8)
MAGNESIUM SERPL-MCNC: 1.8 MG/DL (ref 1.6–2.6)
MCH RBC QN AUTO: 31.9 PG (ref 27–31)
MCHC RBC AUTO-ENTMCNC: 33.8 G/DL (ref 32–36)
MCV RBC AUTO: 95 FL (ref 82–98)
NUCLEATED RBC (/100WBC) (OHS): 0 /100 WBC
PLATELET # BLD AUTO: 189 K/UL (ref 150–450)
PMV BLD AUTO: 11.7 FL (ref 9.2–12.9)
POCT GLUCOSE: 112 MG/DL (ref 70–110)
POCT GLUCOSE: 116 MG/DL (ref 70–110)
POCT GLUCOSE: 137 MG/DL (ref 70–110)
POCT GLUCOSE: 196 MG/DL (ref 70–110)
POTASSIUM SERPL-SCNC: 3.9 MMOL/L (ref 3.5–5.1)
PROTHROMBIN TIME: 11.8 SECONDS (ref 9–12.5)
RBC # BLD AUTO: 3.79 M/UL (ref 4.6–6.2)
RELATIVE EOSINOPHIL (OHS): 0.1 %
RELATIVE LYMPHOCYTE (OHS): 7.9 % (ref 18–48)
RELATIVE MONOCYTE (OHS): 8.2 % (ref 4–15)
RELATIVE NEUTROPHIL (OHS): 83 % (ref 38–73)
SODIUM SERPL-SCNC: 136 MMOL/L (ref 136–145)
WBC # BLD AUTO: 12.69 K/UL (ref 3.9–12.7)

## 2025-05-29 PROCEDURE — 20600001 HC STEP DOWN PRIVATE ROOM

## 2025-05-29 PROCEDURE — 25000003 PHARM REV CODE 250: Performed by: STUDENT IN AN ORGANIZED HEALTH CARE EDUCATION/TRAINING PROGRAM

## 2025-05-29 PROCEDURE — 85025 COMPLETE CBC W/AUTO DIFF WBC: CPT | Performed by: STUDENT IN AN ORGANIZED HEALTH CARE EDUCATION/TRAINING PROGRAM

## 2025-05-29 PROCEDURE — 63600175 PHARM REV CODE 636 W HCPCS: Performed by: INTERNAL MEDICINE

## 2025-05-29 PROCEDURE — 25000003 PHARM REV CODE 250

## 2025-05-29 PROCEDURE — 36415 COLL VENOUS BLD VENIPUNCTURE: CPT | Performed by: STUDENT IN AN ORGANIZED HEALTH CARE EDUCATION/TRAINING PROGRAM

## 2025-05-29 PROCEDURE — 25000003 PHARM REV CODE 250: Performed by: HOSPITALIST

## 2025-05-29 PROCEDURE — 63600175 PHARM REV CODE 636 W HCPCS: Performed by: HOSPITALIST

## 2025-05-29 PROCEDURE — 80048 BASIC METABOLIC PNL TOTAL CA: CPT | Performed by: STUDENT IN AN ORGANIZED HEALTH CARE EDUCATION/TRAINING PROGRAM

## 2025-05-29 PROCEDURE — 85610 PROTHROMBIN TIME: CPT | Performed by: STUDENT IN AN ORGANIZED HEALTH CARE EDUCATION/TRAINING PROGRAM

## 2025-05-29 PROCEDURE — 83735 ASSAY OF MAGNESIUM: CPT | Performed by: STUDENT IN AN ORGANIZED HEALTH CARE EDUCATION/TRAINING PROGRAM

## 2025-05-29 RX ORDER — METHOCARBAMOL 500 MG/1
500 TABLET, FILM COATED ORAL 4 TIMES DAILY PRN
Status: DISCONTINUED | OUTPATIENT
Start: 2025-05-29 | End: 2025-06-02 | Stop reason: HOSPADM

## 2025-05-29 RX ORDER — OXYCODONE HYDROCHLORIDE 5 MG/1
5 TABLET ORAL EVERY 6 HOURS PRN
Refills: 0 | Status: DISCONTINUED | OUTPATIENT
Start: 2025-05-29 | End: 2025-06-02 | Stop reason: HOSPADM

## 2025-05-29 RX ORDER — ACETAMINOPHEN 500 MG
1000 TABLET ORAL EVERY 8 HOURS PRN
Status: DISCONTINUED | OUTPATIENT
Start: 2025-05-29 | End: 2025-05-30

## 2025-05-29 RX ORDER — PANTOPRAZOLE SODIUM 40 MG/1
40 TABLET, DELAYED RELEASE ORAL DAILY
Status: DISCONTINUED | OUTPATIENT
Start: 2025-05-30 | End: 2025-06-02 | Stop reason: HOSPADM

## 2025-05-29 RX ADMIN — Medication 100 MG: at 09:05

## 2025-05-29 RX ADMIN — PANTOPRAZOLE SODIUM 40 MG: 40 INJECTION, POWDER, FOR SOLUTION INTRAVENOUS at 09:05

## 2025-05-29 RX ADMIN — ENOXAPARIN SODIUM 40 MG: 40 INJECTION SUBCUTANEOUS at 06:05

## 2025-05-29 RX ADMIN — POLYETHYLENE GLYCOL 3350 17 G: 17 POWDER, FOR SOLUTION ORAL at 09:05

## 2025-05-29 RX ADMIN — CARBIDOPA AND LEVODOPA 2 TABLET: 25; 100 TABLET ORAL at 09:05

## 2025-05-29 RX ADMIN — NIFEDIPINE 60 MG: 30 TABLET, FILM COATED, EXTENDED RELEASE ORAL at 09:05

## 2025-05-29 RX ADMIN — CYANOCOBALAMIN TAB 1000 MCG 1000 MCG: 1000 TAB at 09:05

## 2025-05-29 RX ADMIN — MIRTAZAPINE 15 MG: 15 TABLET, FILM COATED ORAL at 09:05

## 2025-05-29 RX ADMIN — OXYCODONE 5 MG: 5 TABLET ORAL at 01:05

## 2025-05-29 NOTE — PROGRESS NOTES
[facility] - Acute Medical Stepdown  Adult Nutrition  Consult Note    SUMMARY     Recommendations  1. Continue soft and bite sized diet.   2. Continue Boost all meals.   3. Document PO intake in flowsheet.   4. Document weekly weights.    Goals: Meet % of EEN/EPN by next RD follow-up  Nutrition Goal Status: progressing towards goal  Communication of RD Recs:  (POC)    Nutrition Discharge Planning     Nutrition Discharge Planning: General healthy diet, Oral supplement regimen (comments), Oral diet with texture modifications per SLP (comments)  Oral supplement regimen (comments): ONS of choice  Oral diet with texture modifications per SLP (comments): Soft and Bite-sized    Assessment and Plan    Endocrine  Severe protein-calorie malnutrition  Malnutrition Type:  Context: chronic illness  Level: severe    Related to (etiology):   Inadequate protein-energy intake    Signs and Symptoms (as evidenced by):   Moderate to severe muscle/fat wasting and poor PO intake < 50% x 1 month     Malnutrition Characteristic Summary:  Energy Intake (Malnutrition): less than or equal to 50% for greater than or equal to 1 month  Subcutaneous Fat (Malnutrition):  (moderate to severe)  Muscle Mass (Malnutrition):  (moderate to severe)    Interventions/Recommendations (treatment strategy):  Collaboration of nutritional care with other providers.      Nutrition Diagnosis Status:   New         Malnutrition Assessment  Malnutrition Context: chronic illness  Malnutrition Level: severe  Skin (Micronutrient): none  Nails (Micronutrient): none  Hair/Scalp (Micronutrient): none  Eyes (Micronutrient): none  Extraoral (Micronutrient): none  Gums (Micronutrient): none  Lips/Mucous Membranes (Micronutrient): none  Teeth (Micronutrient): none  Tongue (Micronutrient): none  Neck/Chest (Micronutrient): none  Musculoskeletal/Lower Extremities: none   Micronutrient Evaluation Summary: no deficiencies   Energy Intake (Malnutrition): less than or equal  "to 50% for greater than or equal to 1 month  Subcutaneous Fat (Malnutrition):  (moderate to severe)  Muscle Mass (Malnutrition):  (moderate to severe)   Orbital Region (Subcutaneous Fat Loss): severe depletion  Upper Arm Region (Subcutaneous Fat Loss): severe depletion  Thoracic and Lumbar Region: moderate depletion   Taoism Region (Muscle Loss): severe depletion  Clavicle Bone Region (Muscle Loss): severe depletion  Clavicle and Acromion Bone Region (Muscle Loss): severe depletion  Scapular Bone Region (Muscle Loss): severe depletion  Dorsal Hand (Muscle Loss): moderate depletion  Patellar Region (Muscle Loss): severe depletion  Anterior Thigh Region (Muscle Loss): severe depletion  Posterior Calf Region (Muscle Loss):  (compressor present)                 Reason for Assessment    Reason For Assessment: RD follow-up  Diagnosis: other (see comments) (Encephalopathy)  General Information Comments: Patient assessed for RD follow up. Current diet is Soft and Bite Sized w/ Boost Breeze all meals. PO intake remains inadequate ~25%. Patient consumes supplement daily. Last weight recorded on 5/6/25. LBM 5/28/25. RD team following.    Nutrition/Diet History    Spiritual, Cultural Beliefs, Alevism Practices, Values that Affect Care: no  Food Allergies: other (see comments) (gluten/lactose)  Factors Affecting Nutritional Intake: decreased appetite    Anthropometrics    Height: 5' 11" (180.3 cm)  Height (inches): 71 in  Height Method: Stated  Weight: 64.1 kg (141 lb 5 oz)  Weight (lb): 141.32 lb  Weight Method: Bed Scale  Ideal Body Weight (IBW), Male: 172 lb  % Ideal Body Weight, Male (lb): 80.75 %  BMI (Calculated): 19.7  BMI Grade: less than 23 (older than 65 years) - underweight       Lab/Procedures/Meds    Pertinent Labs Reviewed: reviewed  Pertinent Labs Comments: 5/29/25: H/H 21.1/35.8L, BUN 39H, eGFR 51L, Gluc 111H  Pertinent Medications Reviewed: reviewed  Pertinent Medications Comments: Nifedipine, pantoprazole, " polyethylene glycol, thiamine tab, enoxaparin    Estimated/Assessed Needs    Weight Used For Calorie Calculations: 64.1 kg (141 lb 5 oz)  Energy Calorie Requirements (kcal): 1602- 1923 kcal  Energy Need Method: Kcal/kg (25-30 kcal/kg)  Protein Requirements: 84g (1.3g/kg)  Weight Used For Protein Calculations: 64.1 kg (141 lb 5 oz)  Fluid Requirements (mL): 1 mL/kcal or per MD  Estimated Fluid Requirement Method: RDA Method  RDA Method (mL): 1602         Nutrition Prescription Ordered    Current Diet Order: Soft and bite sized  Oral Nutrition Supplement: Boost Breeze all meals    Evaluation of Received Nutrient/Fluid Intake    I/O: -3.5L since admit  Energy Calories Required: not meeting needs  Protein Required: not meeting needs  Fluid Required:  (as per MD)  Comments: LBM 5/28  Tolerance: tolerating  % Intake of Estimated Energy Needs: 25 - 50 %  % Meal Intake: 25 - 50 %    PES Statement  Underweight related to Other (comment) (Pt report of recent 24-33 lb weight loss per MST) as evidenced by BMI  Status: Continues    Nutrition Risk    Level of Risk/Frequency of Follow-up: moderate       Monitor and Evaluation    Monitor and Evaluation: Food and beverage intake, Diet order, Weight, Electrolyte and renal panel, Gastrointestinal profile, Glucose/endocrine profile, Inflammatory profile, Lipid profile, Nutrition focused physical findings, Skin       Nutrition Follow-Up    RD Follow-up?: Yes

## 2025-05-29 NOTE — PLAN OF CARE
Patient resting in bed at this time. Alert and oriented to self. Able to communicate simple needs. No known unmet needs at this time. Respirations even and unlabored. Lung sounds clear, diminished. Heart rate and rhythm regular. Abdomen soft, nontender, nondistended. Bowel sounds present.  Integumentary system clean, dry, intact. VSS. NAD.    Problem: Adult Inpatient Plan of Care  Goal: Plan of Care Review  Outcome: Progressing  Goal: Patient-Specific Goal (Individualized)  Outcome: Progressing  Goal: Absence of Hospital-Acquired Illness or Injury  Outcome: Progressing  Goal: Optimal Comfort and Wellbeing  Outcome: Progressing  Goal: Readiness for Transition of Care  Outcome: Progressing     Problem: Skin Injury Risk Increased  Goal: Skin Health and Integrity  Outcome: Progressing     Problem: Infection  Goal: Absence of Infection Signs and Symptoms  Outcome: Progressing     Problem: Delirium  Goal: Optimal Coping  Outcome: Progressing  Goal: Improved Behavioral Control  Outcome: Progressing  Goal: Improved Attention and Thought Clarity  Outcome: Progressing  Goal: Improved Sleep  Outcome: Progressing     Problem: Fall Injury Risk  Goal: Absence of Fall and Fall-Related Injury  Outcome: Progressing     Problem: Restraint, Nonviolent  Goal: Absence of Harm or Injury  Outcome: Progressing     Problem: Coping Ineffective  Goal: Effective Coping  Outcome: Progressing

## 2025-05-29 NOTE — PLAN OF CARE
Recommendations  1. Continue soft and bite sized diet.   2. Continue Boost all meals.   3. Document PO intake in flowsheet.   4. Document weekly weights.    Goals: Meet % of EEN/EPN by next RD follow-up  Nutrition Goal Status: progressing towards goal

## 2025-05-29 NOTE — PLAN OF CARE
LACEY contacted Juliette [phone] with [facility].  Per Juliette they have accepted the pt clinically but do not have a bed at this time.  Per Juliette she spoke with the family this morning and let them know.      Per [facility] has declined pt for admission    LACEY called Madisyn with [facility] [phone] and left a message at 1128hrs apprising her of the situation and asking the names of the other NH they contract with.    LACEY/LUIS to follow-up with the family    LACEY spoke with son Vince at the bedside who asked SW to talk to his aunt Luiz on speaker phone.  Luiz stated for SW to call pt daughter Radha who has Power of     SW contacted daughter Radha [phone] and spoke with her about pt post acute needs.  Per Radha the family does not have the financial resources to pay for the pt to return home with hospice care as the pt would require 24/7 care and they do not have family who are able to mitigate the cost by staying with the patient.  The family is willing and able to pay for nursing home care.  LACEY explained the concern of having pt remain in the hospital when he is medically ready for d/c and provided daughter with an email [email or web address] to assist in helping the family find placement    See email below:      Radha,    Here is the information to help you with nursing home placement for your father    [facility] - [phone] - they will help with finding a placement  [facility] - [phone] - assisted living  who will also assist in finding a placement    Medicare.gov is the website that will list all of the nursing homes in the area and give you the different ratings so you can have a look at them and decide    [facility] - [phone]  - they have homes all over [facility] - [phone] - they also have homes all over [facility]     Thank you    Kavita Mariano, ASHLEY, LMSW, RSW, MSW  [facility]  Emergency Department          Kavita Mariano CD, MSW, LMSW, RSW   Case Management  [facility]  Email: [email or web address]

## 2025-05-29 NOTE — PLAN OF CARE
Problem: Adult Inpatient Plan of Care  Goal: Plan of Care Review  Outcome: Progressing  Goal: Patient-Specific Goal (Individualized)  Outcome: Progressing  Goal: Absence of Hospital-Acquired Illness or Injury  Outcome: Progressing  Goal: Optimal Comfort and Wellbeing  Outcome: Progressing  Goal: Readiness for Transition of Care  Outcome: Progressing     Problem: Infection  Goal: Absence of Infection Signs and Symptoms  Outcome: Progressing    Patient alert and oriented to self. VSS. Room air. Complaint of pain all over; PRN given x 1. Q2 turns. Encourage fluids and meals; feed assist. Family at bedside. Bed alarm set. Bed low and locked position. Call light within reach.

## 2025-05-30 LAB
POCT GLUCOSE: 112 MG/DL (ref 70–110)
POCT GLUCOSE: 115 MG/DL (ref 70–110)
POCT GLUCOSE: 152 MG/DL (ref 70–110)

## 2025-05-30 PROCEDURE — 93010 ELECTROCARDIOGRAM REPORT: CPT | Mod: ,,, | Performed by: INTERNAL MEDICINE

## 2025-05-30 PROCEDURE — 25000003 PHARM REV CODE 250: Performed by: STUDENT IN AN ORGANIZED HEALTH CARE EDUCATION/TRAINING PROGRAM

## 2025-05-30 PROCEDURE — 25000003 PHARM REV CODE 250

## 2025-05-30 PROCEDURE — 63600175 PHARM REV CODE 636 W HCPCS: Performed by: INTERNAL MEDICINE

## 2025-05-30 PROCEDURE — 63600175 PHARM REV CODE 636 W HCPCS: Performed by: STUDENT IN AN ORGANIZED HEALTH CARE EDUCATION/TRAINING PROGRAM

## 2025-05-30 PROCEDURE — 20600001 HC STEP DOWN PRIVATE ROOM

## 2025-05-30 PROCEDURE — 93005 ELECTROCARDIOGRAM TRACING: CPT

## 2025-05-30 PROCEDURE — 25000003 PHARM REV CODE 250: Performed by: HOSPITALIST

## 2025-05-30 RX ORDER — HYDROMORPHONE HYDROCHLORIDE 1 MG/ML
0.5 INJECTION, SOLUTION INTRAMUSCULAR; INTRAVENOUS; SUBCUTANEOUS EVERY 6 HOURS PRN
Status: DISCONTINUED | OUTPATIENT
Start: 2025-05-30 | End: 2025-06-02 | Stop reason: HOSPADM

## 2025-05-30 RX ORDER — ACETAMINOPHEN 500 MG
1000 TABLET ORAL EVERY 8 HOURS PRN
Status: DISCONTINUED | OUTPATIENT
Start: 2025-05-30 | End: 2025-06-02 | Stop reason: HOSPADM

## 2025-05-30 RX ORDER — ACETAMINOPHEN 650 MG/1
650 SUPPOSITORY RECTAL EVERY 6 HOURS PRN
Status: DISCONTINUED | OUTPATIENT
Start: 2025-05-30 | End: 2025-06-02 | Stop reason: HOSPADM

## 2025-05-30 RX ADMIN — Medication 100 MG: at 09:05

## 2025-05-30 RX ADMIN — NIFEDIPINE 60 MG: 30 TABLET, FILM COATED, EXTENDED RELEASE ORAL at 09:05

## 2025-05-30 RX ADMIN — CARBIDOPA AND LEVODOPA 2 TABLET: 25; 100 TABLET ORAL at 09:05

## 2025-05-30 RX ADMIN — PANTOPRAZOLE SODIUM 40 MG: 40 TABLET, DELAYED RELEASE ORAL at 09:05

## 2025-05-30 RX ADMIN — CARBIDOPA AND LEVODOPA 2 TABLET: 25; 100 TABLET ORAL at 08:05

## 2025-05-30 RX ADMIN — HYDROMORPHONE HYDROCHLORIDE 0.5 MG: 1 INJECTION, SOLUTION INTRAMUSCULAR; INTRAVENOUS; SUBCUTANEOUS at 04:05

## 2025-05-30 RX ADMIN — MIRTAZAPINE 15 MG: 15 TABLET, FILM COATED ORAL at 08:05

## 2025-05-30 RX ADMIN — CYANOCOBALAMIN TAB 1000 MCG 1000 MCG: 1000 TAB at 09:05

## 2025-05-30 RX ADMIN — ENOXAPARIN SODIUM 40 MG: 40 INJECTION SUBCUTANEOUS at 05:05

## 2025-05-30 RX ADMIN — POLYETHYLENE GLYCOL 3350 17 G: 17 POWDER, FOR SOLUTION ORAL at 08:05

## 2025-05-30 NOTE — SUBJECTIVE & OBJECTIVE
Interval History: Medically ready for dc, plan is NH with hospice.    Review of Systems  Objective:     Vital Signs (Most Recent):  Temp: 97.9 °F (36.6 °C) (05/30/25 1530)  Pulse: (!) 135 (05/30/25 1530)  Resp: 18 (05/30/25 1639)  BP: (!) 164/94 (05/30/25 1530)  SpO2: (!) 92 % (05/30/25 1530) Vital Signs (24h Range):  Temp:  [97.9 °F (36.6 °C)-100.5 °F (38.1 °C)] 97.9 °F (36.6 °C)  Pulse:  [] 135  Resp:  [17-20] 18  SpO2:  [92 %-100 %] 92 %  BP: (138-193)/() 164/94     Weight: 64.1 kg (141 lb 5 oz)  Body mass index is 19.71 kg/m².    Intake/Output Summary (Last 24 hours) at 5/30/2025 1649  Last data filed at 5/30/2025 0745  Gross per 24 hour   Intake 222 ml   Output 500 ml   Net -278 ml      Physical Exam  Constitutional:       Appearance: He is ill-appearing.   HENT:      Head: Normocephalic and atraumatic.   Cardiovascular:      Rate and Rhythm: Normal rate and regular rhythm.      Heart sounds: No murmur heard.  Pulmonary:      Effort: Pulmonary effort is normal. No respiratory distress.      Breath sounds: Normal breath sounds. No wheezing or rales.   Abdominal:      General: There is no distension.      Palpations: Abdomen is soft.      Tenderness: There is no abdominal tenderness.   Musculoskeletal:         General: No deformity.   Skin:     General: Skin is warm and dry.      Coloration: Skin is pale.   Neurological:      General: No focal deficit present.      Comments: Unable to speak, AAOx0, intermittently following commands      Significant Labs:  CBC:  Recent Labs   Lab 05/29/25 0154   WBC 12.69   HGB 12.1*   HCT 35.8*        CMP:  Recent Labs   Lab 05/29/25 0154      K 3.9      CO2 24   *   BUN 39*   CREATININE 1.4   CALCIUM 10.0   ANIONGAP 10     PTINR:  Recent Labs   Lab 05/29/25 0154   INR 1.1

## 2025-05-30 NOTE — SUBJECTIVE & OBJECTIVE
Interval History: Medically ready for dc, plan is NH with hospice.    Review of Systems  Objective:     Vital Signs (Most Recent):  Temp: 98 °F (36.7 °C) (05/29/25 1911)  Pulse: (!) 123 (05/29/25 1911)  Resp: 20 (05/29/25 1911)  BP: 138/73 (05/29/25 1655)  SpO2: 100 % (05/29/25 1911) Vital Signs (24h Range):  Temp:  [98 °F (36.7 °C)-98.8 °F (37.1 °C)] 98 °F (36.7 °C)  Pulse:  [] 123  Resp:  [17-22] 20  SpO2:  [97 %-100 %] 100 %  BP: (138-175)/(73-85) 138/73     Weight: 64.1 kg (141 lb 5 oz)  Body mass index is 19.71 kg/m².    Intake/Output Summary (Last 24 hours) at 5/29/2025 2148  Last data filed at 5/29/2025 1039  Gross per 24 hour   Intake 60 ml   Output 525 ml   Net -465 ml      Physical Exam  Constitutional:       Appearance: He is ill-appearing.   HENT:      Head: Normocephalic and atraumatic.   Cardiovascular:      Rate and Rhythm: Normal rate and regular rhythm.      Heart sounds: No murmur heard.  Pulmonary:      Effort: Pulmonary effort is normal. No respiratory distress.      Breath sounds: Normal breath sounds. No wheezing or rales.   Abdominal:      General: There is no distension.      Palpations: Abdomen is soft.      Tenderness: There is no abdominal tenderness.   Musculoskeletal:         General: No deformity.   Skin:     General: Skin is warm and dry.      Coloration: Skin is pale.   Neurological:      General: No focal deficit present.      Comments: Able to have a conversation, oriented to person         MELD 3.0: 11 at 5/23/2025  6:10 AM  MELD-Na: 9 at 5/23/2025  6:10 AM  Calculated from:  Serum Creatinine: 1.2 mg/dL at 5/23/2025  6:10 AM  Serum Sodium: 135 mmol/L at 5/23/2025  6:10 AM  Total Bilirubin: 0.4 mg/dL (Using min of 1 mg/dL) at 5/21/2025  4:13 AM  Serum Albumin: 3.1 g/dL at 5/21/2025  4:13 AM  INR(ratio): 1.1 at 5/23/2025  6:10 AM  Age at listing (hypothetical): 79 years  Sex: Male at 5/23/2025  6:10 AM      Significant Labs:  CBC:  Recent Labs   Lab 05/28/25  2118  05/29/25 0154   WBC 10.87 12.69   HGB 13.7* 12.1*   HCT 41.8 35.8*    189     CMP:  Recent Labs   Lab 05/28/25 0308 05/29/25 0154    136   K 4.6 3.9    102   CO2 19* 24   GLU 96 111*   BUN 40* 39*   CREATININE 1.4 1.4   CALCIUM 10.2 10.0   ANIONGAP 14 10     PTINR:  Recent Labs   Lab 05/28/25 0308 05/29/25 0154   INR 1.1 1.1       Significant Procedures:   Dobutamine Stress Test with Color Flow: No results found for this or any previous visit.    None

## 2025-05-30 NOTE — ASSESSMENT & PLAN NOTE
Patient's blood pressure range in the last 24 hours was: BP  Min: 138/73  Max: 175/85.The patient's inpatient anti-hypertensive regimen is listed below:  Current Antihypertensives  NIFEdipine 24 hr tablet 60 mg, Daily, Oral    Plan  - BP is controlled, no changes needed to their regimen

## 2025-05-30 NOTE — NURSING
Patient remain at baseline, sleeping easily aroused by voiced and stimulation, poor oral intake, b/ps elevated low grade temp noted, Hise, DO aware, no new orders given at this time, patient had small incontinent stool, Q2hr repositioning remains in progress, skin warm dry, resp even unlabored, condom cath changed, awaiting d/c to inpatient hospice, safety measures in place, care plan on going.

## 2025-05-30 NOTE — PLAN OF CARE
Problem: Adult Inpatient Plan of Care  Goal: Plan of Care Review  Outcome: Ongoing  Goal: Patient-Specific Goal (Individualized)  Outcome: Ongoing  Goal: Absence of Hospital-Acquired Illness or Injury  Outcome: Ongoing  Goal: Optimal Comfort and Wellbeing  Outcome: Ongoing  Goal: Readiness for Transition of Care  Outcome: Ongoing     Problem: Infection  Goal: Absence of Infection Signs and Symptoms  Outcome: Ongoing     Problem: Coping Ineffective  Goal: Effective Coping  Outcome: Ongoing     Patient alert and oriented to self. Patient more somnelent oRoom air. BP elevated; Complaint of pain; PRN dilaudid given x 1. Low intake meals/fluids. Patient febrile; cool cloths applied; blankets removed; MD notified. Family at bedside. Monitoring vitals closely. Awaiting d/c NH hospice.

## 2025-05-30 NOTE — PROGRESS NOTES
Adam Amezquita - [facility] Medicine  Progress Note    Patient Name: Vince Huffman  MRN: [id number]  Patient Class: IP- Inpatient   Admission Date: 4/30/2025  Length of Stay: 28 days  Attending Physician: Leeroy Ferguson MD  Primary Care Provider: Leland Porras MD        Subjective     Principal Problem:Acute encephalopathy        HPI:  By Dr. Juan F Ramírez MD    78 yo M w/HTN, hypothyroid, crohn's disease, CAD, hx of SBO, presenting with AMS from [facility]. Patient was sent in from [facility] for progressively worsening cognitive function. Patient is A&O x1 at his baseline. Patient and his daughter endorse mild confusion. Which has been progressive.    Patient has not had any recent falls. Endorsed mild suprapubic abdominal pain. No UTI. Mild inflammation on CT, not unexpected in the setting of Crohn's.     Overview/Hospital Course:  Mr. Huffman was admitted to  for further evaluation of worsening AMS per rehab facility. AAOx1 on admission, normally AAOx4 prior to 4/20 per son. UA negative. CXR clear. CT abd/pelvis with wall thickening of distal ileum, inflammatory infectious ileitis are considerations, fecal distention of the rectum, impaction not excluded. Discussed with GI, anticipated these are chronic findings. CRP negative. Will give enema and monitor for improvement. Patient with successful BM. Neurology consulted: MRI brain ordered (no acute findings, motion artifact), extended EEG, ammonia levels. Worsening mental status on 5/2, medicine team consulted for LP which was unsuccessful. More alert on 5/3 and answering yes/no questions when redirected. Neurology recommending carbidopa-levodopa trial, EEG, and attempting another LP.  Symptoms improved with increasing doses of Carbidopa/Levodopa. LP concerning for meningitis so he was started on Empiric treatment on 5/6/25. Discussion held with both Neurology and Infectious Disease, Infectious Disease does not believe patient has  any infectious etiology and as such recommended discontinuing antimicrobials.  Neurology started IVIG for presumed autoimmune process.  Completed 5 day course of IVIG 5/14. Minimal improvement in cognition. Had bouts of poor oral intake, only drank boost.  Had intermittent hyponatremia responding to IV fluids.  On 05/14 am Sodium 124.  Nephrology was consulted, investigations ordered and sodium corrected with close electrolyte monitoring.  Psych was consulted by the request of the neurologist. Hyponatremia improved after stopping fluids. NG tube attempted thrice at bedside however not able to be placed. Able to tolerate oral intake however very limited intake. Neuro recommended repeat MRI with sedation as previous had artifact. Anesthesia notified to arrange with sedation which was done on 5/17.  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 Endocrine consulted. Neurology planning 5 day course of IV Solumedrol starting 5/17.   He was started on Vitamin B12 and Thiamine replacement.    There was a concern for GI bleeding, but ultimately given his rapid neurologic decline and progressive disease the decision was made to transition to hospice, he did not have a G tube placed.     Family is working on financials to get placed in NH hospice    Interval History: Medically ready for dc, plan is NH with hospice.    Review of Systems  Objective:     Vital Signs (Most Recent):  Temp: 98 °F (36.7 °C) (05/29/25 1911)  Pulse: (!) 123 (05/29/25 1911)  Resp: 20 (05/29/25 1911)  BP: 138/73 (05/29/25 1655)  SpO2: 100 % (05/29/25 1911) Vital Signs (24h Range):  Temp:  [98 °F (36.7 °C)-98.8 °F (37.1 °C)] 98 °F (36.7 °C)  Pulse:  [] 123  Resp:  [17-22] 20  SpO2:  [97 %-100 %] 100 %  BP: (138-175)/(73-85) 138/73     Weight: 64.1 kg (141 lb 5 oz)  Body mass index is 19.71 kg/m².    Intake/Output Summary (Last 24 hours) at 5/29/2025 4900  Last data filed at 5/29/2025  1039  Gross per 24 hour   Intake 60 ml   Output 525 ml   Net -465 ml      Physical Exam  Constitutional:       Appearance: He is ill-appearing.   HENT:      Head: Normocephalic and atraumatic.   Cardiovascular:      Rate and Rhythm: Normal rate and regular rhythm.      Heart sounds: No murmur heard.  Pulmonary:      Effort: Pulmonary effort is normal. No respiratory distress.      Breath sounds: Normal breath sounds. No wheezing or rales.   Abdominal:      General: There is no distension.      Palpations: Abdomen is soft.      Tenderness: There is no abdominal tenderness.   Musculoskeletal:         General: No deformity.   Skin:     General: Skin is warm and dry.      Coloration: Skin is pale.   Neurological:      General: No focal deficit present.      Comments: Able to have a conversation, oriented to person         MELD 3.0: 11 at 5/23/2025  6:10 AM  MELD-Na: 9 at 5/23/2025  6:10 AM  Calculated from:  Serum Creatinine: 1.2 mg/dL at 5/23/2025  6:10 AM  Serum Sodium: 135 mmol/L at 5/23/2025  6:10 AM  Total Bilirubin: 0.4 mg/dL (Using min of 1 mg/dL) at 5/21/2025  4:13 AM  Serum Albumin: 3.1 g/dL at 5/21/2025  4:13 AM  INR(ratio): 1.1 at 5/23/2025  6:10 AM  Age at listing (hypothetical): 79 years  Sex: Male at 5/23/2025  6:10 AM      Significant Labs:  CBC:  Recent Labs   Lab 05/28/25  0308 05/29/25  0154   WBC 10.87 12.69   HGB 13.7* 12.1*   HCT 41.8 35.8*    189     CMP:  Recent Labs   Lab 05/28/25  0308 05/29/25  0154    136   K 4.6 3.9    102   CO2 19* 24   GLU 96 111*   BUN 40* 39*   CREATININE 1.4 1.4   CALCIUM 10.2 10.0   ANIONGAP 14 10     PTINR:  Recent Labs   Lab 05/28/25  0308 05/29/25  0154   INR 1.1 1.1       Significant Procedures:   Dobutamine Stress Test with Color Flow: No results found for this or any previous visit.    None      Assessment & Plan  Acute encephalopathy  AMS (altered mental status)  Palliative care encounter  Patient initially admitted for AMS, thought to be  either autoimmune or infectious. He had a course of IV steroids, but ultimately neurology believes he has a rapidly progressive neurodegenerative condition. The decision was made to transition to palliative/comfort focus care and family would like to pursue nursing home hgospice.  - Palliative consulted   - Neuro consulted  - ID consulted  - All have signed off, now owrking on NH hospice placement    Essential hypertension  Patient's blood pressure range in the last 24 hours was: BP  Min: 138/73  Max: 175/85.The patient's inpatient anti-hypertensive regimen is listed below:  Current Antihypertensives  NIFEdipine 24 hr tablet 60 mg, Daily, Oral    Plan  - BP is controlled, no changes needed to their regimen    Crohn's disease  GIB (gastrointestinal bleeding)  - GI consulted, now signed off, no signs of active disease, on IV PPI, will transition to PO  Parkinsonism  Suspect symptoms are at least partially due to Parkinson's disease.   Continue Sinemet TID  Continue mirtazapine  CAD (coronary artery disease)  History noted.   Anemia  Anemia is likely due to acute on chronic illness. Most recent hemoglobin and hematocrit are listed below.  Recent Labs     05/27/25  0434 05/28/25  0308 05/29/25  0154   HGB 13.8* 13.7* 12.1*   HCT 41.0 41.8 35.8*     Plan  - Monitor serial CBC: Daily  - Transfuse PRBC if patient becomes hemodynamically unstable, symptomatic or H/H drops below 7/21.  - Patient has not received any PRBC transfusions to date  - Patient's anemia is currently stable  Severe protein-calorie malnutrition  Nutrition consulted. Most recent weight and BMI monitored-     Measurements:  Wt Readings from Last 1 Encounters:   05/06/25 64.1 kg (141 lb 5 oz)   Body mass index is 19.71 kg/m².    Patient has been screened and assessed by RD.    Malnutrition Type:  Context: chronic illness  Level: severe    Malnutrition Characteristic Summary:  Energy Intake (Malnutrition): less than or equal to 50% for greater than or  equal to 1 month  Subcutaneous Fat (Malnutrition):  (moderate to severe)  Muscle Mass (Malnutrition):  (moderate to severe)    Interventions/Recommendations (treatment strategy):  1. Continue soft and bite sized diet. 2. Continue Boost all meals. 3. Document PO intake in flowsheet. 4. Document weekly weights.    - Discussed G tube placement with daughter/MPOA and GI, planned for placement on 5/22    VTE Risk Mitigation (From admission, onward)           Ordered     enoxaparin injection 40 mg  Every 24 hours         05/27/25 0823     IP VTE HIGH RISK PATIENT  Once         05/01/25 0827     Place sequential compression device  Until discontinued         05/01/25 0827                    Discharge Planning   ERNIE: 5/29/2025     Code Status: DNR   Medical Readiness for Discharge Date: 5/28/2025  Discharge Plan A: Inpatient Hospice, New Nursing Home placement - long term care facility   Discharge Delays: None known at this time            Please place Justification for DME        Leeroy Ferguson MD  Department of Hospital Medicine   [facility] - Acute Medical Stepdown

## 2025-05-30 NOTE — PLAN OF CARE
CM assigned to patient's care team this shift. Attended multidisciplinary huddle for clinical update and review of discharge plan. Spoke with Te at [facility], will review clinicals to see if patient meets inpatient criteria.      Andressa Ndiaye RN  Weekend  - [facility], [facility]  [phone]

## 2025-05-30 NOTE — ASSESSMENT & PLAN NOTE
Patient initially admitted for AMS, thought to be either autoimmune or infectious. He had a course of IV steroids, but ultimately neurology believes he has a rapidly progressive neurodegenerative condition. The decision was made to transition to palliative/comfort focus care and family would like to pursue nursing home hgospice.  - Palliative consulted   - Neuro consulted  - ID consulted  - All have signed off, now owrking on NH hospice placement

## 2025-05-30 NOTE — ASSESSMENT & PLAN NOTE
Anemia is likely due to acute on chronic illness. Most recent hemoglobin and hematocrit are listed below.  Recent Labs     05/27/25  0434 05/28/25  0308 05/29/25  0154   HGB 13.8* 13.7* 12.1*   HCT 41.0 41.8 35.8*     Plan  - Monitor serial CBC: Daily  - Transfuse PRBC if patient becomes hemodynamically unstable, symptomatic or H/H drops below 7/21.  - Patient has not received any PRBC transfusions to date  - Patient's anemia is currently stable   214.9

## 2025-05-30 NOTE — ASSESSMENT & PLAN NOTE
Suspect symptoms are at least partially due to Parkinson's disease.   Continue Sinemet TID  Continue mirtazapine

## 2025-05-30 NOTE — ASSESSMENT & PLAN NOTE
Nutrition consulted. Most recent weight and BMI monitored-     Measurements:  Wt Readings from Last 1 Encounters:   05/06/25 64.1 kg (141 lb 5 oz)   Body mass index is 19.71 kg/m².    Patient has been screened and assessed by RD.    Malnutrition Type:  Context: chronic illness  Level: severe    Malnutrition Characteristic Summary:  Energy Intake (Malnutrition): less than or equal to 50% for greater than or equal to 1 month  Subcutaneous Fat (Malnutrition):  (moderate to severe)  Muscle Mass (Malnutrition):  (moderate to severe)    Interventions/Recommendations (treatment strategy):  1. Continue soft and bite sized diet. 2. Continue Boost all meals. 3. Document PO intake in flowsheet. 4. Document weekly weights.    - Discussed G tube placement with daughter/MPOA and GI, planned for placement on 5/22

## 2025-05-31 LAB
OHS QRS DURATION: 78 MS
OHS QTC CALCULATION: 402 MS
POCT GLUCOSE: 110 MG/DL (ref 70–110)
POCT GLUCOSE: 131 MG/DL (ref 70–110)
POCT GLUCOSE: 156 MG/DL (ref 70–110)

## 2025-05-31 PROCEDURE — 25000003 PHARM REV CODE 250: Performed by: STUDENT IN AN ORGANIZED HEALTH CARE EDUCATION/TRAINING PROGRAM

## 2025-05-31 PROCEDURE — 25000003 PHARM REV CODE 250

## 2025-05-31 PROCEDURE — 94761 N-INVAS EAR/PLS OXIMETRY MLT: CPT

## 2025-05-31 PROCEDURE — 20600001 HC STEP DOWN PRIVATE ROOM

## 2025-05-31 PROCEDURE — 63600175 PHARM REV CODE 636 W HCPCS: Performed by: INTERNAL MEDICINE

## 2025-05-31 PROCEDURE — 25000003 PHARM REV CODE 250: Performed by: HOSPITALIST

## 2025-05-31 RX ORDER — GUAIFENESIN 100 MG/5ML
100 LIQUID ORAL EVERY 6 HOURS PRN
Status: DISCONTINUED | OUTPATIENT
Start: 2025-05-31 | End: 2025-06-02 | Stop reason: HOSPADM

## 2025-05-31 RX ORDER — BENZONATATE 100 MG/1
100 CAPSULE ORAL 3 TIMES DAILY PRN
Status: DISCONTINUED | OUTPATIENT
Start: 2025-05-31 | End: 2025-05-31

## 2025-05-31 RX ADMIN — CYANOCOBALAMIN TAB 1000 MCG 1000 MCG: 1000 TAB at 08:05

## 2025-05-31 RX ADMIN — CARBIDOPA AND LEVODOPA 2 TABLET: 25; 100 TABLET ORAL at 03:05

## 2025-05-31 RX ADMIN — NIFEDIPINE 60 MG: 30 TABLET, FILM COATED, EXTENDED RELEASE ORAL at 08:05

## 2025-05-31 RX ADMIN — ENOXAPARIN SODIUM 40 MG: 40 INJECTION SUBCUTANEOUS at 05:05

## 2025-05-31 RX ADMIN — OXYCODONE 5 MG: 5 TABLET ORAL at 09:05

## 2025-05-31 RX ADMIN — POLYETHYLENE GLYCOL 3350 17 G: 17 POWDER, FOR SOLUTION ORAL at 08:05

## 2025-05-31 RX ADMIN — MIRTAZAPINE 15 MG: 15 TABLET, FILM COATED ORAL at 09:05

## 2025-05-31 RX ADMIN — Medication 100 MG: at 08:05

## 2025-05-31 RX ADMIN — CARBIDOPA AND LEVODOPA 2 TABLET: 25; 100 TABLET ORAL at 09:05

## 2025-05-31 RX ADMIN — GUAIFENESIN 100 MG: 200 SOLUTION ORAL at 09:05

## 2025-05-31 RX ADMIN — CARBIDOPA AND LEVODOPA 2 TABLET: 25; 100 TABLET ORAL at 08:05

## 2025-05-31 NOTE — ASSESSMENT & PLAN NOTE
Patient initially admitted for AMS, thought to be either autoimmune or infectious. He had a course of IV steroids, but ultimately neurology believes he has a rapidly progressive neurodegenerative condition. The decision was made to transition to palliative/comfort focus care and family would like to pursue hospice. He is continuing to worsen clinically, with decreased interactions, now not taking PO meds  - Palliative consulted   - Neuro consulted  - ID consulted  - All have signed off, now working on inpatient hospice placement

## 2025-05-31 NOTE — ASSESSMENT & PLAN NOTE
Patient's blood pressure range in the last 24 hours was: BP  Min: 121/80  Max: 193/96.The patient's inpatient anti-hypertensive regimen is listed below:  Current Antihypertensives  NIFEdipine 24 hr tablet 60 mg, Daily, Oral    Plan  - BP is controlled, no changes needed to their regimen

## 2025-05-31 NOTE — ASSESSMENT & PLAN NOTE
Anemia is likely due to acute on chronic illness. Most recent hemoglobin and hematocrit are listed below.  Recent Labs     05/28/25  0308 05/29/25  0154   HGB 13.7* 12.1*   HCT 41.8 35.8*     Plan  - Monitor serial CBC: Daily  - Transfuse PRBC if patient becomes hemodynamically unstable, symptomatic or H/H drops below 7/21.  - Patient has not received any PRBC transfusions to date  - Patient's anemia is currently stable

## 2025-05-31 NOTE — ASSESSMENT & PLAN NOTE
Patient's blood pressure range in the last 24 hours was: BP  Min: 110/55  Max: 164/94.The patient's inpatient anti-hypertensive regimen is listed below:  Current Antihypertensives  NIFEdipine 24 hr tablet 60 mg, Daily, Oral    Plan  - BP is controlled, no changes needed to their regimen

## 2025-05-31 NOTE — NURSING
Patient remained in stable condition, VSS no acute changes noted, resp even unlabored, skin warm dry, remained at baseline, very poor oral intake, Q2hr repositioning in progress, 250ml from condom cath, requires a lot of encouragement to swallow medication and/or fluids. Safety measures in place, care plan on going,

## 2025-05-31 NOTE — PROGRESS NOTES
Adam Amezquita - [facility] Medicine  Progress Note    Patient Name: Vince Huffman  MRN: [id number]  Patient Class: IP- Inpatient   Admission Date: 4/30/2025  Length of Stay: 30 days  Attending Physician: Niraj Castillo MD  Primary Care Provider: Leland Porras MD        Subjective     Principal Problem:Acute encephalopathy        HPI:  By Dr. Juan F Ramírez MD    78 yo M w/HTN, hypothyroid, crohn's disease, CAD, hx of SBO, presenting with AMS from [facility]. Patient was sent in from [facility] for progressively worsening cognitive function. Patient is A&O x1 at his baseline. Patient and his daughter endorse mild confusion. Which has been progressive.    Patient has not had any recent falls. Endorsed mild suprapubic abdominal pain. No UTI. Mild inflammation on CT, not unexpected in the setting of Crohn's.     Overview/Hospital Course:  Mr. Huffman was admitted to  for further evaluation of worsening AMS per rehab facility. AAOx1 on admission, normally AAOx4 prior to 4/20 per son. UA negative. CXR clear. CT abd/pelvis with wall thickening of distal ileum, inflammatory infectious ileitis are considerations, fecal distention of the rectum, impaction not excluded. Discussed with GI, anticipated these are chronic findings. CRP negative. Will give enema and monitor for improvement. Patient with successful BM. Neurology consulted: MRI brain ordered (no acute findings, motion artifact), extended EEG, ammonia levels. Worsening mental status on 5/2, medicine team consulted for LP which was unsuccessful. More alert on 5/3 and answering yes/no questions when redirected. Neurology recommending carbidopa-levodopa trial, EEG, and attempting another LP.  Symptoms improved with increasing doses of Carbidopa/Levodopa. LP concerning for meningitis so he was started on Empiric treatment on 5/6/25. Discussion held with both Neurology and Infectious Disease, Infectious Disease does not believe patient  has any infectious etiology and as such recommended discontinuing antimicrobials.  Neurology started IVIG for presumed autoimmune process.  Completed 5 day course of IVIG 5/14. Minimal improvement in cognition. Had bouts of poor oral intake, only drank boost.  Had intermittent hyponatremia responding to IV fluids.  On 05/14 am Sodium 124.  Nephrology was consulted, investigations ordered and sodium corrected with close electrolyte monitoring.  Psych was consulted by the request of the neurologist. Hyponatremia improved after stopping fluids. NG tube attempted thrice at bedside however not able to be placed. Able to tolerate oral intake however very limited intake. Neuro recommended repeat MRI with sedation as previous had artifact. Anesthesia notified to arrange with sedation which was done on 5/17.  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 Endocrine consulted. Neurology planning 5 day course of IV Solumedrol starting 5/17.   He was started on Vitamin B12 and Thiamine replacement.    There was a concern for GI bleeding, but ultimately given his rapid neurologic decline and progressive disease the decision was made to transition to hospice, he did not have a G tube placed.     Family is working on financials to get placed in NH hospice, however patient is now becoming less interactive and unable to take PO meds consistently, looking into inpatient hospice.    Interval History: No events overnight. Sats dropping this morning., to be discharged Nursing home with hospice. Placed him on 5 lit NC    Review of Systems  Objective:     Vital Signs (Most Recent):  Temp: 97.1 °F (36.2 °C) (05/31/25 1154)  Pulse: 107 (05/31/25 1154)  Resp: 18 (05/31/25 1154)  BP: (!) 110/55 (05/31/25 1154)  SpO2: (!) 92 % (05/31/25 1154) Vital Signs (24h Range):  Temp:  [97.1 °F (36.2 °C)-102.8 °F (39.3 °C)] 97.1 °F (36.2 °C)  Pulse:  [] 107  Resp:  [14-20] 18  SpO2:  [92 %-99  "%] 92 %  BP: (110-164)/(55-94) 110/55     Weight: 64.1 kg (141 lb 5 oz)  Body mass index is 19.71 kg/m².    Intake/Output Summary (Last 24 hours) at 5/31/2025 1335  Last data filed at 5/31/2025 0545  Gross per 24 hour   Intake --   Output 250 ml   Net -250 ml      Physical Exam  Cardiovascular:      Rate and Rhythm: Regular rhythm. Tachycardia present.   Pulmonary:      Effort: Pulmonary effort is normal.      Breath sounds: Normal breath sounds.   Musculoskeletal:         General: Normal range of motion.   Skin:     General: Skin is warm.      Capillary Refill: Capillary refill takes less than 2 seconds.   Neurological:      Mental Status: He is alert. Mental status is at baseline.         MELD 3.0: 11 at 5/23/2025  6:10 AM  MELD-Na: 9 at 5/23/2025  6:10 AM  Calculated from:  Serum Creatinine: 1.2 mg/dL at 5/23/2025  6:10 AM  Serum Sodium: 135 mmol/L at 5/23/2025  6:10 AM  Total Bilirubin: 0.4 mg/dL (Using min of 1 mg/dL) at 5/21/2025  4:13 AM  Serum Albumin: 3.1 g/dL at 5/21/2025  4:13 AM  INR(ratio): 1.1 at 5/23/2025  6:10 AM  Age at listing (hypothetical): 79 years  Sex: Male at 5/23/2025  6:10 AM      Significant Labs:  CBC:  No results for input(s): "WBC", "HGB", "HCT", "PLT" in the last 48 hours.  CMP:  No results for input(s): "NA", "K", "CL", "CO2", "GLU", "BUN", "CREATININE", "CALCIUM", "PROT", "ALBUMIN", "BILITOT", "ALKPHOS", "AST", "ALT", "ANIONGAP", "EGFRNONAA" in the last 48 hours.    Invalid input(s): "ESTGFAFR[place]"  PTINR:  No results for input(s): "INR" in the last 48 hours.    Significant Procedures:   Dobutamine Stress Test with Color Flow: No results found for this or any previous visit.    None      Assessment & Plan  Acute encephalopathy  AMS (altered mental status)  Palliative care encounter  Patient initially admitted for AMS, thought to be either autoimmune or infectious. He had a course of IV steroids, but ultimately neurology believes he has a rapidly progressive neurodegenerative " condition. The decision was made to transition to palliative/comfort focus care and family would like to pursue hospice. He is continuing to worsen clinically, with decreased interactions, now not taking PO meds  - Palliative consulted   - Neuro consulted  - ID consulted  - All have signed off, now working on inpatient hospice placement    Essential hypertension  Patient's blood pressure range in the last 24 hours was: BP  Min: 110/55  Max: 164/94.The patient's inpatient anti-hypertensive regimen is listed below:  Current Antihypertensives  NIFEdipine 24 hr tablet 60 mg, Daily, Oral    Plan  - BP is controlled, no changes needed to their regimen    Crohn's disease  GIB (gastrointestinal bleeding)  - GI consulted, now signed off, no signs of active disease, on IV PPI, will transition to PO  Parkinsonism  Suspect symptoms are at least partially due to Parkinson's disease.   Continue Sinemet TID  Continue mirtazapine  CAD (coronary artery disease)  History noted.   Anemia  Anemia is likely due to acute on chronic illness. Most recent hemoglobin and hematocrit are listed below.  Recent Labs     05/29/25  0154   HGB 12.1*   HCT 35.8*     Plan  - Monitor serial CBC: Daily  - Transfuse PRBC if patient becomes hemodynamically unstable, symptomatic or H/H drops below 7/21.  - Patient has not received any PRBC transfusions to date  - Patient's anemia is currently stable  Severe protein-calorie malnutrition  Nutrition consulted. Most recent weight and BMI monitored-     Measurements:  Wt Readings from Last 1 Encounters:   05/06/25 64.1 kg (141 lb 5 oz)   Body mass index is 19.71 kg/m².    Patient has been screened and assessed by RD.    Malnutrition Type:  Context: chronic illness  Level: severe    Malnutrition Characteristic Summary:  Energy Intake (Malnutrition): less than or equal to 50% for greater than or equal to 1 month  Subcutaneous Fat (Malnutrition):  (moderate to severe)  Muscle Mass (Malnutrition):  (moderate to  severe)    Interventions/Recommendations (treatment strategy):  1. Continue soft and bite sized diet. 2. Continue Boost all meals. 3. Document PO intake in flowsheet. 4. Document weekly weights.    - Discussed G tube placement with daughter/MPOA and GI, planned for placement on 5/22    VTE Risk Mitigation (From admission, onward)           Ordered     enoxaparin injection 40 mg  Every 24 hours         05/27/25 0823     IP VTE HIGH RISK PATIENT  Once         05/01/25 0827     Place sequential compression device  Until discontinued         05/01/25 0827                    Discharge Planning   ERNIE: 6/2/2025     Code Status: DNR   Medical Readiness for Discharge Date:   Discharge Plan A: Inpatient Hospice, [facility] Nursing [facility] placement [facility]   Discharge Delays: None known at this time            Please place Justification for DME        Niraj Castillo MD  Department of Hospital Medicine   [facility] - Acute Medical Stepdown

## 2025-05-31 NOTE — NURSING
At bedside with patient and nurse due to concern of low oxygen saturation. Oxygen increased to 5L nasal cannula but patient still only saturating 87%.  Reached out to provider for instructions.   No escalation of care at this time.   MD: Niraj Castillo

## 2025-05-31 NOTE — PLAN OF CARE
Problem: Adult Inpatient Plan of Care  Goal: Plan of Care Review  Outcome: Progressing  Goal: Patient-Specific Goal (Individualized)  Outcome: Progressing  Goal: Absence of Hospital-Acquired Illness or Injury  Outcome: Progressing  Goal: Optimal Comfort and Wellbeing  Outcome: Progressing   POC reviewed with pt and family at bedside. 2L NC. Disoriented. Safety checks performed.

## 2025-05-31 NOTE — PROGRESS NOTES
Adam Amezquita - [facility] Medicine  Progress Note    Patient Name: Vince Huffman  MRN: [id number]  Patient Class: IP- Inpatient   Admission Date: 4/30/2025  Length of Stay: 29 days  Attending Physician: Leeroy Ferguson MD  Primary Care Provider: Leland Porras MD        Subjective     Principal Problem:Acute encephalopathy        HPI:  By Dr. Juan F Ramírez MD    80 yo M w/HTN, hypothyroid, crohn's disease, CAD, hx of SBO, presenting with AMS from [facility]. Patient was sent in from [facility] for progressively worsening cognitive function. Patient is A&O x1 at his baseline. Patient and his daughter endorse mild confusion. Which has been progressive.    Patient has not had any recent falls. Endorsed mild suprapubic abdominal pain. No UTI. Mild inflammation on CT, not unexpected in the setting of Crohn's.     Overview/Hospital Course:  Mr. Huffman was admitted to  for further evaluation of worsening AMS per rehab facility. AAOx1 on admission, normally AAOx4 prior to 4/20 per son. UA negative. CXR clear. CT abd/pelvis with wall thickening of distal ileum, inflammatory infectious ileitis are considerations, fecal distention of the rectum, impaction not excluded. Discussed with GI, anticipated these are chronic findings. CRP negative. Will give enema and monitor for improvement. Patient with successful BM. Neurology consulted: MRI brain ordered (no acute findings, motion artifact), extended EEG, ammonia levels. Worsening mental status on 5/2, medicine team consulted for LP which was unsuccessful. More alert on 5/3 and answering yes/no questions when redirected. Neurology recommending carbidopa-levodopa trial, EEG, and attempting another LP.  Symptoms improved with increasing doses of Carbidopa/Levodopa. LP concerning for meningitis so he was started on Empiric treatment on 5/6/25. Discussion held with both Neurology and Infectious Disease, Infectious Disease does not believe patient has  any infectious etiology and as such recommended discontinuing antimicrobials.  Neurology started IVIG for presumed autoimmune process.  Completed 5 day course of IVIG 5/14. Minimal improvement in cognition. Had bouts of poor oral intake, only drank boost.  Had intermittent hyponatremia responding to IV fluids.  On 05/14 am Sodium 124.  Nephrology was consulted, investigations ordered and sodium corrected with close electrolyte monitoring.  Psych was consulted by the request of the neurologist. Hyponatremia improved after stopping fluids. NG tube attempted thrice at bedside however not able to be placed. Able to tolerate oral intake however very limited intake. Neuro recommended repeat MRI with sedation as previous had artifact. Anesthesia notified to arrange with sedation which was done on 5/17.  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 Endocrine consulted. Neurology planning 5 day course of IV Solumedrol starting 5/17.   He was started on Vitamin B12 and Thiamine replacement.    There was a concern for GI bleeding, but ultimately given his rapid neurologic decline and progressive disease the decision was made to transition to hospice, he did not have a G tube placed.     Family is working on financials to get placed in [facility] hospice, however patient is now becoming less interactive and unable to take PO meds consistently, looking into inpatient hospice.    Interval History: Medically ready for dc, plan is NH with hospice.    Review of Systems  Objective:     Vital Signs (Most Recent):  Temp: 97.9 °F (36.6 °C) (05/30/25 1530)  Pulse: (!) 135 (05/30/25 1530)  Resp: 18 (05/30/25 1639)  BP: (!) 164/94 (05/30/25 1530)  SpO2: (!) 92 % (05/30/25 1530) Vital Signs (24h Range):  Temp:  [97.9 °F (36.6 °C)-100.5 °F (38.1 °C)] 97.9 °F (36.6 °C)  Pulse:  [] 135  Resp:  [17-20] 18  SpO2:  [92 %-100 %] 92 %  BP: (138-193)/() 164/94     Weight: 64.1 kg (141 lb  5 oz)  Body mass index is 19.71 kg/m².    Intake/Output Summary (Last 24 hours) at 5/30/2025 1649  Last data filed at 5/30/2025 0745  Gross per 24 hour   Intake 222 ml   Output 500 ml   Net -278 ml      Physical Exam  Constitutional:       Appearance: He is ill-appearing.   HENT:      Head: Normocephalic and atraumatic.   Cardiovascular:      Rate and Rhythm: Normal rate and regular rhythm.      Heart sounds: No murmur heard.  Pulmonary:      Effort: Pulmonary effort is normal. No respiratory distress.      Breath sounds: Normal breath sounds. No wheezing or rales.   Abdominal:      General: There is no distension.      Palpations: Abdomen is soft.      Tenderness: There is no abdominal tenderness.   Musculoskeletal:         General: No deformity.   Skin:     General: Skin is warm and dry.      Coloration: Skin is pale.   Neurological:      General: No focal deficit present.      Comments: Unable to speak, AAOx0, intermittently following commands      Significant Labs:  CBC:  Recent Labs   Lab 05/29/25  0154   WBC 12.69   HGB 12.1*   HCT 35.8*        CMP:  Recent Labs   Lab 05/29/25  0154      K 3.9      CO2 24   *   BUN 39*   CREATININE 1.4   CALCIUM 10.0   ANIONGAP 10     PTINR:  Recent Labs   Lab 05/29/25  0154   INR 1.1           Assessment & Plan  Acute encephalopathy  AMS (altered mental status)  Palliative care encounter  Patient initially admitted for AMS, thought to be either autoimmune or infectious. He had a course of IV steroids, but ultimately neurology believes he has a rapidly progressive neurodegenerative condition. The decision was made to transition to palliative/comfort focus care and family would like to pursue hospice. He is continuing to worsen clinically, with decreased interactions, now not taking PO meds  - Palliative consulted   - Neuro consulted  - ID consulted  - All have signed off, now working on inpatient hospice placement    Essential hypertension  Patient's  blood pressure range in the last 24 hours was: BP  Min: 121/80  Max: 193/96.The patient's inpatient anti-hypertensive regimen is listed below:  Current Antihypertensives  NIFEdipine 24 hr tablet 60 mg, Daily, Oral    Plan  - BP is controlled, no changes needed to their regimen    Crohn's disease  GIB (gastrointestinal bleeding)  - GI consulted, now signed off, no signs of active disease, on IV PPI, will transition to PO  Parkinsonism  Suspect symptoms are at least partially due to Parkinson's disease.   Continue Sinemet TID  Continue mirtazapine  CAD (coronary artery disease)  History noted.   Anemia  Anemia is likely due to acute on chronic illness. Most recent hemoglobin and hematocrit are listed below.  Recent Labs     05/28/25  0308 05/29/25  0154   HGB 13.7* 12.1*   HCT 41.8 35.8*     Plan  - Monitor serial CBC: Daily  - Transfuse PRBC if patient becomes hemodynamically unstable, symptomatic or H/H drops below 7/21.  - Patient has not received any PRBC transfusions to date  - Patient's anemia is currently stable  Severe protein-calorie malnutrition  Nutrition consulted. Most recent weight and BMI monitored-     Measurements:  Wt Readings from Last 1 Encounters:   05/06/25 64.1 kg (141 lb 5 oz)   Body mass index is 19.71 kg/m².    Patient has been screened and assessed by RD.    Malnutrition Type:  Context: chronic illness  Level: severe    Malnutrition Characteristic Summary:  Energy Intake (Malnutrition): less than or equal to 50% for greater than or equal to 1 month  Subcutaneous Fat (Malnutrition):  (moderate to severe)  Muscle Mass (Malnutrition):  (moderate to severe)    Interventions/Recommendations (treatment strategy):  1. Continue soft and bite sized diet. 2. Continue Boost all meals. 3. Document PO intake in flowsheet. 4. Document weekly weights.    - Discussed G tube placement with daughter/MPOA and GI, planned for placement on 5/22    VTE Risk Mitigation (From admission, onward)           Ordered      enoxaparin injection 40 mg  Every 24 hours         05/27/25 0823     IP VTE HIGH RISK PATIENT  Once         05/01/25 0827     Place sequential compression device  Until discontinued         05/01/25 0827                    Discharge Planning   ERNIE: 6/2/2025     Code Status: DNR   Medical Readiness for Discharge Date:   Discharge Plan A: Inpatient Hospice, New Nursing Home placement - snf care facility   Discharge Delays: None known at this time            Please place Justification for DME        Leeroy Ferguson MD  Department of Hospital Medicine   [facility] - Acute Medical Stepdown

## 2025-05-31 NOTE — ASSESSMENT & PLAN NOTE
Anemia is likely due to acute on chronic illness. Most recent hemoglobin and hematocrit are listed below.  Recent Labs     05/29/25  0154   HGB 12.1*   HCT 35.8*     Plan  - Monitor serial CBC: Daily  - Transfuse PRBC if patient becomes hemodynamically unstable, symptomatic or H/H drops below 7/21.  - Patient has not received any PRBC transfusions to date  - Patient's anemia is currently stable

## 2025-05-31 NOTE — SUBJECTIVE & OBJECTIVE
"Interval History: No events overnight. Dmitri dropping this morning., to be discharged home with hospice. Placed him on 5 lit NC    Review of Systems  Objective:     Vital Signs (Most Recent):  Temp: 97.1 °F (36.2 °C) (05/31/25 1154)  Pulse: 107 (05/31/25 1154)  Resp: 18 (05/31/25 1154)  BP: (!) 110/55 (05/31/25 1154)  SpO2: (!) 92 % (05/31/25 1154) Vital Signs (24h Range):  Temp:  [97.1 °F (36.2 °C)-102.8 °F (39.3 °C)] 97.1 °F (36.2 °C)  Pulse:  [] 107  Resp:  [14-20] 18  SpO2:  [92 %-99 %] 92 %  BP: (110-164)/(55-94) 110/55     Weight: 64.1 kg (141 lb 5 oz)  Body mass index is 19.71 kg/m².    Intake/Output Summary (Last 24 hours) at 5/31/2025 1335  Last data filed at 5/31/2025 0545  Gross per 24 hour   Intake --   Output 250 ml   Net -250 ml      Physical Exam  Cardiovascular:      Rate and Rhythm: Regular rhythm. Tachycardia present.   Pulmonary:      Effort: Pulmonary effort is normal.      Breath sounds: Normal breath sounds.   Musculoskeletal:         General: Normal range of motion.   Skin:     General: Skin is warm.      Capillary Refill: Capillary refill takes less than 2 seconds.   Neurological:      Mental Status: He is alert. Mental status is at baseline.         MELD 3.0: 11 at 5/23/2025  6:10 AM  MELD-Na: 9 at 5/23/2025  6:10 AM  Calculated from:  Serum Creatinine: 1.2 mg/dL at 5/23/2025  6:10 AM  Serum Sodium: 135 mmol/L at 5/23/2025  6:10 AM  Total Bilirubin: 0.4 mg/dL (Using min of 1 mg/dL) at 5/21/2025  4:13 AM  Serum Albumin: 3.1 g/dL at 5/21/2025  4:13 AM  INR(ratio): 1.1 at 5/23/2025  6:10 AM  Age at listing (hypothetical): 79 years  Sex: Male at 5/23/2025  6:10 AM      Significant Labs:  CBC:  No results for input(s): "WBC", "HGB", "HCT", "PLT" in the last 48 hours.  CMP:  No results for input(s): "NA", "K", "CL", "CO2", "GLU", "BUN", "CREATININE", "CALCIUM", "PROT", "ALBUMIN", "BILITOT", "ALKPHOS", "AST", "ALT", "ANIONGAP", "EGFRNONAA" in the last 48 hours.    Invalid input(s): " ""ESTGF[place]"  PTINR:  No results for input(s): "INR" in the last 48 hours.    Significant Procedures:   Dobutamine Stress Test with Color Flow: No results found for this or any previous visit.    None  "

## 2025-06-01 LAB
POCT GLUCOSE: 217 MG/DL (ref 70–110)
POCT GLUCOSE: 98 MG/DL (ref 70–110)

## 2025-06-01 PROCEDURE — 20600001 HC STEP DOWN PRIVATE ROOM

## 2025-06-01 PROCEDURE — 25000003 PHARM REV CODE 250: Performed by: STUDENT IN AN ORGANIZED HEALTH CARE EDUCATION/TRAINING PROGRAM

## 2025-06-01 PROCEDURE — 25000003 PHARM REV CODE 250

## 2025-06-01 PROCEDURE — 63600175 PHARM REV CODE 636 W HCPCS: Performed by: INTERNAL MEDICINE

## 2025-06-01 PROCEDURE — 25000003 PHARM REV CODE 250: Performed by: HOSPITALIST

## 2025-06-01 RX ADMIN — CARBIDOPA AND LEVODOPA 2 TABLET: 25; 100 TABLET ORAL at 09:06

## 2025-06-01 RX ADMIN — POLYETHYLENE GLYCOL 3350 17 G: 17 POWDER, FOR SOLUTION ORAL at 08:06

## 2025-06-01 RX ADMIN — PANTOPRAZOLE SODIUM 40 MG: 40 TABLET, DELAYED RELEASE ORAL at 08:06

## 2025-06-01 RX ADMIN — CARBIDOPA AND LEVODOPA 2 TABLET: 25; 100 TABLET ORAL at 08:06

## 2025-06-01 RX ADMIN — MIRTAZAPINE 15 MG: 15 TABLET, FILM COATED ORAL at 09:06

## 2025-06-01 RX ADMIN — ENOXAPARIN SODIUM 40 MG: 40 INJECTION SUBCUTANEOUS at 05:06

## 2025-06-01 RX ADMIN — NIFEDIPINE 60 MG: 30 TABLET, FILM COATED, EXTENDED RELEASE ORAL at 08:06

## 2025-06-01 RX ADMIN — Medication 100 MG: at 08:06

## 2025-06-01 RX ADMIN — CYANOCOBALAMIN TAB 1000 MCG 1000 MCG: 1000 TAB at 08:06

## 2025-06-01 RX ADMIN — CARBIDOPA AND LEVODOPA 2 TABLET: 25; 100 TABLET ORAL at 04:06

## 2025-06-01 NOTE — PLAN OF CARE
No acute events during shift. Patient with poor PO intake. Assisted with feeding, with family involvement. On waffle overlay, turn Q2H. Heels elevated on pillows. Patient with weak ineffective cough. Placement pending. Plan of care ongoing.       Problem: Adult Inpatient Plan of Care  Goal: Plan of Care Review  Outcome: Ongoing  Goal: Patient-Specific Goal (Individualized)  Outcome: Ongoing  Goal: Absence of Hospital-Acquired Illness or Injury  Outcome: Ongoing  Goal: Optimal Comfort and Wellbeing  Outcome: Ongoing  Goal: Readiness for Transition of Care  Outcome: Ongoing     Problem: Skin Injury Risk Increased  Goal: Skin Health and Integrity  Outcome: Ongoing     Problem: Infection  Goal: Absence of Infection Signs and Symptoms  Outcome: Ongoing     Problem: Delirium  Goal: Optimal Coping  Outcome: Ongoing  Goal: Improved Behavioral Control  Outcome: Ongoing  Goal: Improved Attention and Thought Clarity  Outcome: Ongoing  Goal: Improved Sleep  Outcome: Ongoing     Problem: Fall Injury Risk  Goal: Absence of Fall and Fall-Related Injury  Outcome: Ongoing     Problem: Restraint, Nonviolent  Goal: Absence of Harm or Injury  Outcome: Ongoing     Problem: Coping Ineffective  Goal: Effective Coping  Outcome: Ongoing     Problem: Wound  Goal: Optimal Coping  Outcome: Ongoing  Goal: Optimal Functional Ability  Outcome: Ongoing  Goal: Absence of Infection Signs and Symptoms  Outcome: Ongoing  Goal: Improved Oral Intake  Outcome: Ongoing  Goal: Optimal Pain Control and Function  Outcome: Ongoing  Goal: Skin Health and Integrity  Outcome: Ongoing  Goal: Optimal Wound Healing  Outcome: Ongoing

## 2025-06-01 NOTE — PLAN OF CARE
Problem: Adult Inpatient Plan of Care  Goal: Plan of Care Review  Outcome: Progressing  Goal: Patient-Specific Goal (Individualized)  Outcome: Progressing  Goal: Absence of Hospital-Acquired Illness or Injury  Outcome: Progressing  Goal: Optimal Comfort and Wellbeing  Outcome: Progressing  Goal: Readiness for Transition of Care  Outcome: Progressing     Problem: Skin Injury Risk Increased  Goal: Skin Health and Integrity  Outcome: Progressing     Problem: Infection  Goal: Absence of Infection Signs and Symptoms  Outcome: Progressing     Problem: Delirium  Goal: Optimal Coping  Outcome: Progressing  Goal: Improved Behavioral Control  Outcome: Progressing  Goal: Improved Attention and Thought Clarity  Outcome: Progressing  Goal: Improved Sleep  Outcome: Progressing     Problem: Fall Injury Risk  Goal: Absence of Fall and Fall-Related Injury  Outcome: Progressing     Problem: Restraint, Nonviolent  Goal: Absence of Harm or Injury  Outcome: Progressing     Problem: Coping Ineffective  Goal: Effective Coping  Outcome: Progressing   Pt Aox1, to person only. PRNs given see MAR. Call light within reach, safety measures in place, rounded per facility policy.

## 2025-06-01 NOTE — ASSESSMENT & PLAN NOTE
Patient's blood pressure range in the last 24 hours was: BP  Min: 110/55  Max: 165/81.The patient's inpatient anti-hypertensive regimen is listed below:  Current Antihypertensives  NIFEdipine 24 hr tablet 60 mg, Daily, Oral    Plan  - BP is controlled, no changes needed to their regimen

## 2025-06-01 NOTE — SUBJECTIVE & OBJECTIVE
"Interval History: Diet changed to Puree consistency due to concern for chocking     Review of Systems  Objective:     Vital Signs (Most Recent):  Temp: 98.1 °F (36.7 °C) (06/01/25 0737)  Pulse: (!) 128 (06/01/25 1002)  Resp: 18 (06/01/25 0737)  BP: (!) 140/80 (06/01/25 0737)  SpO2: (!) 86 % (06/01/25 1002) Vital Signs (24h Range):  Temp:  [96.4 °F (35.8 °C)-98.4 °F (36.9 °C)] 98.1 °F (36.7 °C)  Pulse:  [] 128  Resp:  [18] 18  SpO2:  [86 %-97 %] 86 %  BP: (110-165)/(55-81) 140/80     Weight: 64.1 kg (141 lb 5 oz)  Body mass index is 19.71 kg/m².    Intake/Output Summary (Last 24 hours) at 6/1/2025 1024  Last data filed at 6/1/2025 0504  Gross per 24 hour   Intake --   Output 250 ml   Net -250 ml      Physical Exam  HENT:      Head: Normocephalic and atraumatic.   Cardiovascular:      Rate and Rhythm: Regular rhythm. Tachycardia present.   Pulmonary:      Effort: Pulmonary effort is normal.      Breath sounds: Normal breath sounds.   Abdominal:      General: Abdomen is flat. Bowel sounds are normal.      Palpations: Abdomen is soft.   Musculoskeletal:         General: Normal range of motion.      Cervical back: Normal range of motion.   Skin:     General: Skin is warm.      Capillary Refill: Capillary refill takes less than 2 seconds.   Neurological:      General: No focal deficit present.      Mental Status: He is alert. Mental status is at baseline.         MELD 3.0: 11 at 5/23/2025  6:10 AM  MELD-Na: 9 at 5/23/2025  6:10 AM  Calculated from:  Serum Creatinine: 1.2 mg/dL at 5/23/2025  6:10 AM  Serum Sodium: 135 mmol/L at 5/23/2025  6:10 AM  Total Bilirubin: 0.4 mg/dL (Using min of 1 mg/dL) at 5/21/2025  4:13 AM  Serum Albumin: 3.1 g/dL at 5/21/2025  4:13 AM  INR(ratio): 1.1 at 5/23/2025  6:10 AM  Age at listing (hypothetical): 79 years  Sex: Male at 5/23/2025  6:10 AM      Significant Labs:  CBC:  No results for input(s): "WBC", "HGB", "HCT", "PLT" in the last 48 hours.  CMP:  No results for input(s): "NA", " ""K", "CL", "CO2", "GLU", "BUN", "CREATININE", "CALCIUM", "PROT", "ALBUMIN", "BILITOT", "ALKPHOS", "AST", "ALT", "ANIONGAP", "EGFRNONAA" in the last 48 hours.    Invalid input(s): "ESTGF[place]"  PTINR:  No results for input(s): "INR" in the last 48 hours.    Significant Procedures:   Dobutamine Stress Test with Color Flow: No results found for this or any previous visit.    None  "

## 2025-06-01 NOTE — PROGRESS NOTES
Adam Amezquita - [facility] Medicine  Progress Note    Patient Name: Vince Huffman  MRN: [id number]  Patient Class: IP- Inpatient   Admission Date: 4/30/2025  Length of Stay: 31 days  Attending Physician: Niraj Castillo MD  Primary Care Provider: Leland Porras MD        Subjective     Principal Problem:Acute encephalopathy        HPI:  By Dr. Juan F Ramírez MD    78 yo M w/HTN, hypothyroid, crohn's disease, CAD, hx of SBO, presenting with AMS from [facility] re[facility]. Patient was sent in from [facility] re[facility] for progressively worsening cognitive function. Patient is A&O x1 at his baseline. Patient and his daughter endorse mild confusion. Which has been progressive.    Patient has not had any recent falls. Endorsed mild suprapubic abdominal pain. No UTI. Mild inflammation on CT, not unexpected in the setting of Crohn's.     Overview/Hospital Course:  Mr. Huffman was admitted to  for further evaluation of worsening AMS per rehab facility. AAOx1 on admission, normally AAOx4 prior to 4/20 per son. UA negative. CXR clear. CT abd/pelvis with wall thickening of distal ileum, inflammatory infectious ileitis are considerations, fecal distention of the rectum, impaction not excluded. Discussed with GI, anticipated these are chronic findings. CRP negative. Will give enema and monitor for improvement. Patient with successful BM. Neurology consulted: MRI brain ordered (no acute findings, motion artifact), extended EEG, ammonia levels. Worsening mental status on 5/2, medicine team consulted for LP which was unsuccessful. More alert on 5/3 and answering yes/no questions when redirected. Neurology recommending carbidopa-levodopa trial, EEG, and attempting another LP.  Symptoms improved with increasing doses of Carbidopa/Levodopa. LP concerning for meningitis so he was started on Empiric treatment on 5/6/25. Discussion held with both Neurology and Infectious Disease, Infectious Disease does not believe patient  has any infectious etiology and as such recommended discontinuing antimicrobials.  Neurology started IVIG for presumed autoimmune process.  Completed 5 day course of IVIG 5/14. Minimal improvement in cognition. Had bouts of poor oral intake, only drank boost.  Had intermittent hyponatremia responding to IV fluids.  On 05/14 am Sodium 124.  Nephrology was consulted, investigations ordered and sodium corrected with close electrolyte monitoring.  Psych was consulted by the request of the neurologist. Hyponatremia improved after stopping fluids. NG tube attempted thrice at bedside however not able to be placed. Able to tolerate oral intake however very limited intake. Neuro recommended repeat MRI with sedation as previous had artifact. Anesthesia notified to arrange with sedation which was done on 5/17.  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 Endocrine consulted. Neurology planning 5 day course of IV Solumedrol starting 5/17.   He was started on Vitamin B12 and Thiamine replacement.    There was a concern for GI bleeding, but ultimately given his rapid neurologic decline and progressive disease the decision was made to transition to hospice, he did not have a G tube placed.     Family is working on financials to get placed in [place] hospice, however patient is now becoming less interactive and unable to take PO meds consistently, looking into inpatient hospice.    Interval History: Diet changed to Puree consistency due to concern for chocking . Pending placement     Review of Systems  Objective:     Vital Signs (Most Recent):  Temp: 98.1 °F (36.7 °C) (06/01/25 0737)  Pulse: (!) 128 (06/01/25 1002)  Resp: 18 (06/01/25 0737)  BP: (!) 140/80 (06/01/25 0737)  SpO2: (!) 86 % (06/01/25 1002) Vital Signs (24h Range):  Temp:  [96.4 °F (35.8 °C)-98.4 °F (36.9 °C)] 98.1 °F (36.7 °C)  Pulse:  [] 128  Resp:  [18] 18  SpO2:  [86 %-97 %] 86 %  BP: (110-165)/(55-81)  "140/80     Weight: 64.1 kg (141 lb 5 oz)  Body mass index is 19.71 kg/m².    Intake/Output Summary (Last 24 hours) at 6/1/2025 1024  Last data filed at 6/1/2025 0504  Gross per 24 hour   Intake --   Output 250 ml   Net -250 ml      Physical Exam  HENT:      Head: Normocephalic and atraumatic.   Cardiovascular:      Rate and Rhythm: Regular rhythm. Tachycardia present.   Pulmonary:      Effort: Pulmonary effort is normal.      Breath sounds: Normal breath sounds.   Abdominal:      General: Abdomen is flat. Bowel sounds are normal.      Palpations: Abdomen is soft.   Musculoskeletal:         General: Normal range of motion.      Cervical back: Normal range of motion.   Skin:     General: Skin is warm.      Capillary Refill: Capillary refill takes less than 2 seconds.   Neurological:      General: No focal deficit present.      Mental Status: He is alert. Mental status is at baseline.         MELD 3.0: 11 at 5/23/2025  6:10 AM  MELD-Na: 9 at 5/23/2025  6:10 AM  Calculated from:  Serum Creatinine: 1.2 mg/dL at 5/23/2025  6:10 AM  Serum Sodium: 135 mmol/L at 5/23/2025  6:10 AM  Total Bilirubin: 0.4 mg/dL (Using min of 1 mg/dL) at 5/21/2025  4:13 AM  Serum Albumin: 3.1 g/dL at 5/21/2025  4:13 AM  INR(ratio): 1.1 at 5/23/2025  6:10 AM  Age at listing (hypothetical): 79 years  Sex: Male at 5/23/2025  6:10 AM      Significant Labs:  CBC:  No results for input(s): "WBC", "HGB", "HCT", "PLT" in the last 48 hours.  CMP:  No results for input(s): "NA", "K", "CL", "CO2", "GLU", "BUN", "CREATININE", "CALCIUM", "PROT", "ALBUMIN", "BILITOT", "ALKPHOS", "AST", "ALT", "ANIONGAP", "EGFRNONAA" in the last 48 hours.    Invalid input(s): "ESTGFAFRICA"  PTINR:  No results for input(s): "INR" in the last 48 hours.    Significant Procedures:   Dobutamine Stress Test with Color Flow: No results found for this or any previous visit.    None      Assessment & Plan  Acute encephalopathy  AMS (altered mental status)  Palliative care " "encounter  Patient initially admitted for AMS, thought to be either autoimmune or infectious. He had a course of IV steroids, but ultimately neurology believes he has a rapidly progressive neurodegenerative condition. The decision was made to transition to palliative/comfort focus care and family would like to pursue hospice. He is continuing to worsen clinically, with decreased interactions, now not taking PO meds  - Palliative consulted   - Neuro consulted  - ID consulted  - All have signed off, now working on inpatient hospice placement    Essential hypertension  Patient's blood pressure range in the last 24 hours was: BP  Min: 110/55  Max: 165/81.The patient's inpatient anti-hypertensive regimen is listed below:  Current Antihypertensives  NIFEdipine 24 hr tablet 60 mg, Daily, Oral    Plan  - BP is controlled, no changes needed to their regimen    Crohn's disease  GIB (gastrointestinal bleeding)  - GI consulted, now signed off, no signs of active disease, on IV PPI, will transition to PO  Parkinsonism  Suspect symptoms are at least partially due to Parkinson's disease.   Continue Sinemet TID  Continue mirtazapine  CAD (coronary artery disease)  History noted.   Anemia  Anemia is likely due to acute on chronic illness. Most recent hemoglobin and hematocrit are listed below.  No results for input(s): "HGB", "HCT" in the last 72 hours.    Plan  - Monitor serial CBC: Daily  - Transfuse PRBC if patient becomes hemodynamically unstable, symptomatic or H/H drops below 7/21.  - Patient has not received any PRBC transfusions to date  - Patient's anemia is currently stable  Severe protein-calorie malnutrition  Nutrition consulted. Most recent weight and BMI monitored-     Measurements:  Wt Readings from Last 1 Encounters:   05/06/25 64.1 kg (141 lb 5 oz)   Body mass index is 19.71 kg/m².    Patient has been screened and assessed by RD.    Malnutrition Type:  Context: chronic illness  Level: severe    Malnutrition " Characteristic Summary:  Energy Intake (Malnutrition): less than or equal to 50% for greater than or equal to 1 month  Subcutaneous Fat (Malnutrition):  (moderate to severe)  Muscle Mass (Malnutrition):  (moderate to severe)    Interventions/Recommendations (treatment strategy):  1. Continue soft and bite sized diet. 2. Continue Boost all meals. 3. Document PO intake in flowsheet. 4. Document weekly weights.    - Discussed G tube placement with daughter/MPOA and GI, planned for placement on 5/22    VTE Risk Mitigation (From admission, onward)           Ordered     enoxaparin injection 40 mg  Every 24 hours         05/27/25 0823     IP VTE HIGH RISK PATIENT  Once         05/01/25 0827     Place sequential compression device  Until discontinued         05/01/25 0827                    Discharge Planning   ERNIE: 6/2/2025     Code Status: DNR   Medical Readiness for Discharge Date:   Discharge Plan A: Inpatient Hospice, New Nursing Home placement - halfway care facility   Discharge Delays: None known at this time                    Niraj Castillo MD  Department of Hospital Medicine   [facility] - Acute Medical Stepdown

## 2025-06-01 NOTE — ASSESSMENT & PLAN NOTE
"Anemia is likely due to acute on chronic illness. Most recent hemoglobin and hematocrit are listed below.  No results for input(s): "HGB", "HCT" in the last 72 hours.    Plan  - Monitor serial CBC: Daily  - Transfuse PRBC if patient becomes hemodynamically unstable, symptomatic or H/H drops below 7/21.  - Patient has not received any PRBC transfusions to date  - Patient's anemia is currently stable  "

## 2025-06-02 VITALS
SYSTOLIC BLOOD PRESSURE: 112 MMHG | OXYGEN SATURATION: 96 % | TEMPERATURE: 98 F | HEART RATE: 92 BPM | DIASTOLIC BLOOD PRESSURE: 54 MMHG | HEIGHT: 71 IN | WEIGHT: 141.31 LBS | BODY MASS INDEX: 19.78 KG/M2 | RESPIRATION RATE: 16 BRPM

## 2025-06-02 PROBLEM — Z51.5 COMFORT MEASURES ONLY STATUS: Status: ACTIVE | Noted: 2025-06-02

## 2025-06-02 LAB — SARS-COV-2 RNA RESP QL NAA+PROBE: NEGATIVE

## 2025-06-02 PROCEDURE — 87635 SARS-COV-2 COVID-19 AMP PRB: CPT | Performed by: PHYSICIAN ASSISTANT

## 2025-06-02 PROCEDURE — 25000003 PHARM REV CODE 250: Performed by: STUDENT IN AN ORGANIZED HEALTH CARE EDUCATION/TRAINING PROGRAM

## 2025-06-02 PROCEDURE — 94761 N-INVAS EAR/PLS OXIMETRY MLT: CPT

## 2025-06-02 PROCEDURE — 25000003 PHARM REV CODE 250

## 2025-06-02 PROCEDURE — 25000003 PHARM REV CODE 250: Performed by: HOSPITALIST

## 2025-06-02 RX ADMIN — CARBIDOPA AND LEVODOPA 2 TABLET: 25; 100 TABLET ORAL at 08:06

## 2025-06-02 RX ADMIN — Medication 100 MG: at 08:06

## 2025-06-02 RX ADMIN — PANTOPRAZOLE SODIUM 40 MG: 40 TABLET, DELAYED RELEASE ORAL at 08:06

## 2025-06-02 RX ADMIN — CYANOCOBALAMIN TAB 1000 MCG 1000 MCG: 1000 TAB at 08:06

## 2025-06-02 RX ADMIN — NIFEDIPINE 60 MG: 30 TABLET, FILM COATED, EXTENDED RELEASE ORAL at 08:06

## 2025-06-02 NOTE — PLAN OF CARE
Followed up with Te at [facility]. Patient re-reviewed and meets criteria for inpatient hospice. Bed is available and on hold. [facility] Medicine A providers have not discussed this option with patient's daughter, Radha. On-call PA notified and will pass information along in handoff to AM team members.  staff available to assist.    Andressa Ndiaye RN  Weekend  - [facility]

## 2025-06-02 NOTE — PLAN OF CARE
Problem: Adult Inpatient Plan of Care  Goal: Plan of Care Review  Outcome: Progressing  Goal: Patient-Specific Goal (Individualized)  Outcome: Progressing  Goal: Absence of Hospital-Acquired Illness or Injury  Outcome: Progressing  Goal: Optimal Comfort and Wellbeing  Outcome: Progressing  Goal: Readiness for Transition of Care  Outcome: Progressing     Problem: Skin Injury Risk Increased  Goal: Skin Health and Integrity  Outcome: Progressing     Problem: Infection  Goal: Absence of Infection Signs and Symptoms  Outcome: Progressing     Problem: Delirium  Goal: Optimal Coping  Outcome: Progressing  Goal: Improved Behavioral Control  Outcome: Progressing  Goal: Improved Attention and Thought Clarity  Outcome: Progressing  Goal: Improved Sleep  Outcome: Progressing     Problem: Fall Injury Risk  Goal: Absence of Fall and Fall-Related Injury  Outcome: Progressing     Problem: Restraint, Nonviolent  Goal: Absence of Harm or Injury  Outcome: Progressing     Problem: Coping Ineffective  Goal: Effective Coping  Outcome: Progressing     Problem: Wound  Goal: Optimal Coping  Outcome: Progressing  Goal: Optimal Functional Ability  Outcome: Progressing  Goal: Absence of Infection Signs and Symptoms  Outcome: Progressing  Goal: Improved Oral Intake  Outcome: Progressing  Goal: Optimal Pain Control and Function  Outcome: Progressing  Goal: Skin Health and Integrity  Outcome: Progressing  Goal: Optimal Wound Healing  Outcome: Progressing   Pt Aox1, RA. No PRNs given. Rounded per facility protocol, safety measures in place, call light within reach.

## 2025-06-02 NOTE — PLAN OF CARE
CM spoke with patient's daughter Radha.[phone]   Per Radha, family is agreeable for patient to transfer to [facility].  [facility] is ready to receive patient today pending results of COVID test.    Leo Allen, BSN, RN

## 2025-06-02 NOTE — PLAN OF CARE
No acute events this shift.  Pt AAOX1. Appetite poor.  Assisted with feeding.  Meds administered crushed in pudding.  Tolerated meds well.  Safety measures in place.  Safety maintained.  Pt to discharge to [facility] inpatient hospice today.  8503 report given to Aurora LOMBARDO.  Per hospice request PIV to left forearm in place.  Family at bedside.

## 2025-06-02 NOTE — PT/OT/SLP DISCHARGE
Physical Therapy Discharge Summary    Name: Vince Huffman  MRN: [id number]   Principal Problem: Acute encephalopathy     Patient Discharged from acute Physical Therapy on 2025.  Please refer to prior PT noted date on 2025 for functional status.     Assessment:     Patient appropriate for care in another setting.Plan to transfer to hospice today.     Objective:     GOALS:   Multidisciplinary Problems       Physical Therapy Goals          Problem: Physical Therapy    Goal Priority Disciplines Outcome Interventions   Physical Therapy Goal     PT, PT/OT Progressing    Description: Goals to be met by: 2025     Patient will increase functional independence with mobility by performin. Supine to sit with MInimal Assistance  2. Sit to supine with MInimal Assistance  3. Sit to stand transfer with Minimal Assistance  4. Bed to chair transfer with Minimal Assistance using LRAD  5. Gait  x 25 feet with Minimal Assistance using LRAD.   6. Lower extremity exercise program x15 reps per handout, with assistance as needed                         Reasons for Discontinuation of Therapy Services  Patient is unable to continue work toward goals because of medical or psychosocial complications.      Plan:     Patient Discharged to: Palliative Care/Hospice.      2025

## 2025-06-02 NOTE — PLAN OF CARE
Ms. Clarke is a 79-year-old gentleman with Crohn's disease, ankylosing spondylitis, psoriatic arthritis, hypothyroidism, hypertension, coronary artery disease, anxiety disorder, gout, history of SBO.  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  CT of the abdomen pelvis showed wall thickening of the distal ileum.  Other studies including urinalysis, chest x-ray, inflammatory markers, MRI brain, EEG, ammonia levels were not indicative of etiology of encephalopathy.  Symptoms improved with carbidopa-levodopa which was ordered per Neurology recommendations.  But then initial CSF studies from LP showed some evidence of meningitis.  Patient started on empiric treatment on 05/06.  However, as additional CSF studies came back, it was determined that findings likely did not represent meningitis.  ID recommended discontinuation of antimicrobials.  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  Completed 5 day course of IVIG on 05/14.  Patient developed severe hyponatremia.  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  Neurology started 5 day course of IV Solu-Medrol on 05/17.  Patient started on vitamin B12 and thiamine replacement.  Given overall decline.  Family decided to pursue comfort care approach and hospice.

## 2025-06-02 NOTE — DISCHARGE SUMMARY
Adam Amezquita - [facility] Medicine  Discharge Summary      Patient Name: Vince Huffman  MRN: [id number]  [facility]: [id number]  Patient Class: IP- Inpatient  Admission Date: 4/30/2025  Hospital Length of Stay: 32 days  Discharge Date and Time: 06/02/2025 4:41 PM  Attending Physician: Wenceslao De La Rosa MD   Discharging Provider: Wenceslao De La Rosa MD  Primary Care Provider: Leland Porras MD  Hospital Medicine Team: [facility] MED D Wenceslao De La Rosa MD  Primary Care Team: [facility] D    HPI:   By Dr. Juan F Ramírez MD    78 yo M w/HTN, hypothyroid, crohn's disease, CAD, hx of SBO, presenting with AMS from [facility] rehab. Patient was sent in from [facility] rehab for progressively worsening cognitive function. Patient is A&O x1 at his baseline. Patient and his daughter endorse mild confusion. Which has been progressive.    Patient has not had any recent falls. Endorsed mild suprapubic abdominal pain. No UTI. Mild inflammation on CT, not unexpected in the setting of Crohn's.     Procedure(s) (LRB):  MRI (Magnetic Resonance Imagine) (N/A)      Hospital Course:   Mr. Huffman is a 79-year-old gentleman with Crohn's disease, ankylosing spondylitis, psoriatic arthritis, hypothyroidism, hypertension, coronary artery disease, anxiety disorder, gout, history of SBO.  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  CT of the abdomen pelvis showed wall thickening of the distal ileum.  Other studies including urinalysis, chest x-ray, inflammatory markers, MRI brain, EEG, ammonia levels were not indicative of etiology of encephalopathy.  Symptoms improved mildly with carbidopa-levodopa which was ordered per Neurology recommendations.  But then initial CSF studies from LP showed some evidence of meningitis.  Patient started on empiric treatment on 05/06.  However, as additional CSF studies came back, it was determined that findings likely did not  represent meningitis.  ID recommended discontinuation of antimicrobials.  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  Completed 5 day course of IVIG on 05/14.  Patient developed severe hyponatremia.  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  Neurology started 5 day course of IV Solu-Medrol on 05/17.  Patient started on vitamin B12 and thiamine replacement.  Given overall decline, family decided to pursue comfort care approach and hospice.  Patient will be discharged to inpatient hospice service.     Goals of Care Treatment Preferences:  Code Status: DNR          What is most important right now is to focus on symptom/pain control, quality of life, even if it means sacrificing a little time.        SDOH Screening:  The patient was screened for utility difficulties, food insecurity, transport difficulties, housing insecurity, and interpersonal safety and there were no concerns identified this admission.     Consults:   Consults (From admission, onward)          Status Ordering Provider     Inpatient consult to Palliative Care  Once        Provider:  (Not yet assigned)    Completed JUAN JOSÉ FRAIRE     Inpatient consult to Gastroenterology  Once        Provider:  (Not yet assigned)    Completed CARISA ROSA     Inpatient consult to Endocrinology  Once        Provider:  (Not yet assigned)    Completed GRISELDA LUDWIG     Inpatient consult to Nephrology  Once        Provider:  (Not yet assigned)    Completed JOHN THURMAN     Inpatient consult to Psychiatry  Once        Provider:  (Not yet assigned)    Completed JOHN THURMAN     Inpatient consult to Physical Medicine Rehab  Once        Provider:  (Not yet assigned)    Completed JOHN THURMAN     Inpatient consult to Infectious Diseases  Once        Provider:  (Not yet assigned)    Completed DANAY WALKER     Inpatient consult to Physical Medicine Rehab  Once         Provider:  (Not yet assigned)    Completed ARIES GRIMES     Inpatient consult to Registered Dietitian/Nutritionist  Once        Provider:  (Not yet assigned)    Completed DENA JONES     Inpatient consult to Hospital Medicine-General  Once        Provider:  (Not yet assigned)    Completed DENA JONES     Inpatient consult to General Neurology  Once        Provider:  (Not yet assigned)    Completed DENA JONES          Final Active Diagnoses:    Diagnosis Date Noted POA    PRINCIPAL PROBLEM:  Acute encephalopathy [G93.40] 04/30/2025 Yes    Comfort measures only status [Z51.5] 06/02/2025 Not Applicable    Palliative care encounter [Z51.5] 05/22/2025 Not Applicable    Severe protein-calorie malnutrition [E43] 05/20/2025 Yes    GIB (gastrointestinal bleeding) [K92.2] 05/19/2025 No    Anemia [D64.9] 05/15/2025 Yes    CAD (coronary artery disease) [I25.10] 05/09/2025 Yes    AMS (altered mental status) [R41.82] 05/09/2025 Yes    Parkinsonism [G20.C] 05/05/2025 Yes    Essential hypertension [I10] 11/05/2018 Yes     Chronic    Crohn's disease [K50.90] 12/13/2012 Yes      Problems Resolved During this Admission:    Diagnosis Date Noted Date Resolved POA    Hypothyroid [E03.9] 05/17/2025 05/29/2025 Yes    Hypomagnesemia [E83.42] 05/15/2025 05/15/2025 Yes    Major depressive disorder, single episode, 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 [F32.2] 05/14/2025 05/29/2025 Yes    Tachycardia [R00.0] 05/01/2025 05/06/2025 Yes    Hyponatremia [E87.1] 04/30/2025 05/29/2025 Yes    UTI (urinary tract infection) [N39.0] 04/30/2025 05/01/2025 Unknown    Generalized weakness [R53.1] 04/24/2025 05/29/2025 Yes     Chronic    Bereavement [Z63.4] 04/24/2025 05/01/2025 Not Applicable    Unintended weight loss [R63.4] 04/24/2025 05/29/2025 Yes     Chronic       Discharged Condition: fair    Disposition: Hospice/Medical Facility    Follow Up:   Contact information for after-discharge care       [facility] .    Service: Inpatient Rehabilitation  Contact information:  [address]  [facility] [place] [address]  [phone]                                 Patient Instructions:   No discharge procedures on file.    Significant Diagnostic Studies: Labs: CMP No results for input(s): "NA", "K", "CL", "CO2", "GLU", "BUN", "CREATININE", "CALCIUM", "PROT", "ALBUMIN", "BILITOT", "ALKPHOS", "AST", "ALT", "ANIONGAP", "ESTGFRAFRICA", "EGFRNONAA" in the last 48 hours. and CBC No results for input(s): "WBC", "HGB", "HCT", "PLT" in the last 48 hours.    Pending Diagnostic Studies:      Not applicable     Medications:  Reconciled Home Medications:      Medication List        START taking these medications      carbidopa-levodopa  mg  mg per tablet  Commonly known as: SINEMET  Take 2 tablets by mouth 3 (three) times daily.     melatonin 3 mg tablet  Commonly known as: MELATIN  Take 2 tablets (6 mg total) by mouth nightly as needed for Insomnia.            CONTINUE taking these medications      acetaminophen 500 MG tablet  Commonly known as: TYLENOL  Take 500 mg by mouth every 6 (six) hours as needed.     amLODIPine 10 MG tablet  Commonly known as: NORVASC  Take 1 tablet (10 mg total) by mouth once daily.            STOP taking these medications      ALPRAZolam 0.5 MG Tb24  Commonly known as: XANAX XR     STELARA 90 mg/mL Syrg syringe  Generic drug: ustekinumab              Indwelling Lines/Drains at time of discharge:   Lines/Drains/Airways      None      Time spent on the discharge of patient: 45 minutes         Wenceslao De La Rosa MD  Department of Hospital Medicine  [facility] - Acute Medical Stepdown  "

## 2025-06-02 NOTE — PLAN OF CARE
06/02/25 1640   Final Note   Assessment Type Final Discharge Note   Anticipated Discharge Disposition HospiceMedic   What phone number can be called within the next 1-3 days to see how you are doing after discharge? [phone]   Post-Acute Status   Post-Acute Authorization Hospice   Hospice Status Set-up Complete/Auth obtained     Transport to arrive between 1700 and 1900.    Leo Allen RN

## 2025-06-02 NOTE — NURSING
Report given to Aurora at [facility].  Pt transported via [facility].  Personal belongings with pt.  Family aware

## 2025-06-02 NOTE — PLAN OF CARE
Pt discharging to hospice.  Awaiting transport.      Problem: Adult Inpatient Plan of Care  Goal: Plan of Care Review  Outcome: Not Progressing  Goal: Patient-Specific Goal (Individualized)  Outcome: Not Progressing  Goal: Absence of Hospital-Acquired Illness or Injury  Outcome: Not Progressing  Goal: Optimal Comfort and Wellbeing  Outcome: Not Progressing  Goal: Readiness for Transition of Care  Outcome: Not Progressing     Problem: Skin Injury Risk Increased  Goal: Skin Health and Integrity  Outcome: Not Progressing     Problem: Infection  Goal: Absence of Infection Signs and Symptoms  Outcome: Not Progressing     Problem: Delirium  Goal: Optimal Coping  Outcome: Not Progressing  Goal: Improved Behavioral Control  Outcome: Not Progressing  Goal: Improved Attention and Thought Clarity  Outcome: Not Progressing  Goal: Improved Sleep  Outcome: Not Progressing     Problem: Fall Injury Risk  Goal: Absence of Fall and Fall-Related Injury  Outcome: Not Progressing     Problem: Restraint, Nonviolent  Goal: Absence of Harm or Injury  Outcome: Not Progressing     Problem: Coping Ineffective  Goal: Effective Coping  Outcome: Not Progressing     Problem: Wound  Goal: Optimal Coping  Outcome: Not Progressing  Goal: Optimal Functional Ability  Outcome: Not Progressing  Goal: Absence of Infection Signs and Symptoms  Outcome: Not Progressing  Goal: Improved Oral Intake  Outcome: Not Progressing  Goal: Optimal Pain Control and Function  Outcome: Not Progressing  Goal: Skin Health and Integrity  Outcome: Not Progressing  Goal: Optimal Wound Healing  Outcome: Not Progressing

## 2025-06-02 NOTE — PLAN OF CARE
[facility]  Department of Hospital Medicine  [address] [place] [address]  [phone] after hours  [phone] fax    HOSPICE  ORDERS    06/02/2025    Admit to Hospice:  Inpatient Service   Diagnoses:   Active Hospital Problems    Diagnosis  POA    *Acute encephalopathy [G93.40]  Yes    Comfort measures only status [Z51.5]  Not Applicable    Palliative care encounter [Z51.5]  Not Applicable    Severe protein-calorie malnutrition [E43]  Yes    GIB (gastrointestinal bleeding) [K92.2]  No    Anemia [D64.9]  Yes    CAD (coronary artery disease) [I25.10]  Yes    AMS (altered mental status) [R41.82]  Yes    Parkinsonism [G20.C]  Yes    Essential hypertension [I10]  Yes     Chronic    Crohn's disease [K50.90]  Yes      Resolved Hospital Problems    Diagnosis Date Resolved POA    Hypothyroid [E03.9] 05/29/2025 Yes    Hypomagnesemia [E83.42] 05/15/2025 Yes    Major depressive disorder, single episode, 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    Code Status: DNR    Allergies:   Review of patient's allergies indicates:   Allergen Reactions    Gluten Diarrhea    Lactose        Diet: puree with thin liquid    Activities: As tolerated    Goals of Care Treatment Preferences:  Code Status: DNR          What is most important right now is to focus on symptom/pain control, quality of life, even if it means  sacrificing a little time.        Nursing: Per Hospice Routine.      Routine Skin for Bedridden Patients: Apply moisture barrier cream to all skin folds and   wet areas in perineal area daily and after baths and all bowel movements.    Medications:        Medication List        START taking these medications      carbidopa-levodopa  mg  mg per tablet  Commonly known as: SINEMET  Take 2 tablets by mouth 3 (three) times daily.     melatonin 3 mg tablet  Commonly known as: MELATIN  Take 2 tablets (6 mg total) by mouth nightly as needed for Insomnia.            CONTINUE taking these medications      acetaminophen 500 MG tablet  Commonly known as: TYLENOL  Take 500 mg by mouth every 6 (six) hours as needed.     amLODIPine 10 MG tablet  Commonly known as: NORVASC  Take 1 tablet (10 mg total) by mouth once daily.            STOP taking these medications      ALPRAZolam 0.5 MG Tb24  Commonly known as: XANAX XR     STELARA 90 mg/mL Syrg syringe  Generic drug: ustekinumab              Future Orders:  Hospice Medical Director may dictate new orders for comfortable care measures & sign death certificate.        _________________________________  Wenceslao De La Rosa MD  06/02/2025

## 2025-06-04 NOTE — TELEPHONE ENCOUNTER
Source   Vince Huffman (Patient)    Subject   Vince Huffman (Patient)    Topic   General Inquiry - Return Call        Summary   Return Call   Communication   Patient returning call to Elidia

## 2025-06-04 NOTE — TELEPHONE ENCOUNTER
I saw a note that stated he was going to [facility]. Confirm with the patient's daughter and ask if she knows if he will be able to get his Stelara through them or if we need to assist in some way.  MLC

## 2025-06-04 NOTE — TELEPHONE ENCOUNTER
Spoke to patient daughter, patient is in hospice care, curative treatments have been discontinued.

## 2025-06-10 LAB — MYCOBACTERIUM SPEC QL CULT: NORMAL
